# Patient Record
Sex: FEMALE | Race: WHITE | NOT HISPANIC OR LATINO | Employment: OTHER | URBAN - METROPOLITAN AREA
[De-identification: names, ages, dates, MRNs, and addresses within clinical notes are randomized per-mention and may not be internally consistent; named-entity substitution may affect disease eponyms.]

---

## 2017-03-28 ENCOUNTER — LAB VISIT (OUTPATIENT)
Dept: LAB | Facility: HOSPITAL | Age: 22
End: 2017-03-28
Attending: OBSTETRICS & GYNECOLOGY
Payer: MEDICARE

## 2017-03-28 ENCOUNTER — OFFICE VISIT (OUTPATIENT)
Dept: OBSTETRICS AND GYNECOLOGY | Facility: CLINIC | Age: 22
End: 2017-03-28
Payer: MEDICARE

## 2017-03-28 VITALS
SYSTOLIC BLOOD PRESSURE: 118 MMHG | HEART RATE: 102 BPM | WEIGHT: 276.69 LBS | HEIGHT: 63 IN | DIASTOLIC BLOOD PRESSURE: 70 MMHG | BODY MASS INDEX: 49.02 KG/M2

## 2017-03-28 DIAGNOSIS — N92.6 MISSED PERIOD: ICD-10-CM

## 2017-03-28 DIAGNOSIS — O21.9 NAUSEA/VOMITING IN PREGNANCY: ICD-10-CM

## 2017-03-28 DIAGNOSIS — N92.6 MISSED PERIOD: Primary | ICD-10-CM

## 2017-03-28 DIAGNOSIS — O02.89 OTHER ABNORMAL PRODUCTS OF CONCEPTION: ICD-10-CM

## 2017-03-28 LAB
B-HCG UR QL: POSITIVE
CTP QC/QA: YES
ERYTHROCYTE [DISTWIDTH] IN BLOOD BY AUTOMATED COUNT: 13.3 %
HCG INTACT+B SERPL-ACNC: 382 MIU/ML
HCT VFR BLD AUTO: 39.8 %
HGB BLD-MCNC: 13.1 G/DL
MCH RBC QN AUTO: 27.6 PG
MCHC RBC AUTO-ENTMCNC: 32.9 %
MCV RBC AUTO: 84 FL
PLATELET # BLD AUTO: 214 K/UL
PMV BLD AUTO: 11 FL
PROGEST SERPL-MCNC: 9.3 NG/ML
RBC # BLD AUTO: 4.74 M/UL
WBC # BLD AUTO: 8.12 K/UL

## 2017-03-28 PROCEDURE — 85027 COMPLETE CBC AUTOMATED: CPT

## 2017-03-28 PROCEDURE — 99999 PR PBB SHADOW E&M-EST. PATIENT-LVL III: CPT | Mod: PBBFAC,,, | Performed by: OBSTETRICS & GYNECOLOGY

## 2017-03-28 PROCEDURE — 84144 ASSAY OF PROGESTERONE: CPT

## 2017-03-28 PROCEDURE — 36415 COLL VENOUS BLD VENIPUNCTURE: CPT | Mod: PO

## 2017-03-28 PROCEDURE — 99214 OFFICE O/P EST MOD 30 MIN: CPT | Mod: S$PBB,,, | Performed by: OBSTETRICS & GYNECOLOGY

## 2017-03-28 PROCEDURE — 84702 CHORIONIC GONADOTROPIN TEST: CPT

## 2017-03-28 RX ORDER — PENICILLIN V POTASSIUM 500 MG/1
500 TABLET, FILM COATED ORAL 4 TIMES DAILY
COMMUNITY
Start: 2017-03-17 | End: 2017-03-29

## 2017-03-28 RX ORDER — PROMETHAZINE HYDROCHLORIDE 25 MG/1
25 TABLET ORAL EVERY 6 HOURS PRN
Qty: 30 TABLET | Refills: 1 | Status: SHIPPED | OUTPATIENT
Start: 2017-03-28 | End: 2017-04-04

## 2017-03-28 NOTE — PROGRESS NOTES
Chief Complaint   Patient presents with    Amenorrhea    Possible Pregnancy       History of Present Illness   21 y.o.  female  patient presents today for missed menses, LMP= 2017, episode of nasuea and light headed, when to [facility] ER last PM, bedside abdominal u/s showed small untrauterine gestational sac, positive UPT. No Vaginal Bleeding  Or pelvic pain, complains of  nasuea and constipation - counseled on meds and ectopic / bleeding precautions.       Past medical and surgical history reviewed.   I have reviewed the patient's medical history in detail and updated the computerized patient record.    Review of patient's allergies indicates:   Allergen Reactions    No known drug allergies        OB History      Para Term  AB TAB SAB Ectopic Multiple Living    1 1 1 0 0 0 0 0 0 1          Past Medical History:   Diagnosis Date    ADD (attention deficit disorder)     Depression     History of ovarian cyst     Obesity     Substance abuse     Hospitalization        Past Surgical History:   Procedure Laterality Date    TONSILLECTOMY, ADENOIDECTOMY, BILATERAL MYRINGOTOMY AND TUBES         Current Outpatient Prescriptions on File Prior to Visit   Medication Sig Dispense Refill    [DISCONTINUED] hydrocodone-acetaminophen 10-325mg (NORCO)  mg Tab Take 1 tablet by mouth every 6 (six) hours as needed for Pain. 30 tablet 0    [DISCONTINUED] hydrOXYzine pamoate (VISTARIL) 25 MG Cap Take 1 capsule (25 mg total) by mouth 2 (two) times daily as needed. 30 capsule 0    [DISCONTINUED] ibuprofen (ADVIL,MOTRIN) 800 MG tablet Take 1 tablet (800 mg total) by mouth every meal as needed for Pain. 60 tablet 1    [DISCONTINUED] venlafaxine (EFFEXOR-XR) 37.5 MG 24 hr capsule Take 1 capsule (37.5 mg total) by mouth once daily. 30 capsule 2     No current facility-administered medications on file prior to visit.        Review of patient's allergies indicates:   Allergen Reactions    No known  "drug allergies        Social History     Social History    Marital status: Single     Spouse name: N/A    Number of children: N/A    Years of education: N/A     Occupational History    Not on file.     Social History Main Topics    Smoking status: Current Some Day Smoker     Packs/day: 0.25     Types: Cigarettes    Smokeless tobacco: Never Used    Alcohol use No    Drug use: No    Sexual activity: Yes     Partners: Male     Birth control/ protection: None     Other Topics Concern    Not on file     Social History Narrative       Family History   Problem Relation Age of Onset    Colon cancer Neg Hx     Miscarriages / Stillbirths Neg Hx        Review of Systems - Negative except HPI3  GEN ROS: negative for - chills or fever  Breast ROS: negative for breast lumps  Genito-Urinary ROS: no dysuria, trouble voiding, or hematuria      Physical Examination:  /70 (BP Location: Left arm, Patient Position: Sitting, BP Method: Manual)  Pulse 102  Ht 5' 3" (1.6 m)  Wt 125.5 kg (276 lb 10.8 oz)  LMP 02/22/2017 (Exact Date)  BMI 49.01 kg/m2   Constitutional: She is oriented to person, place, and time. She appears well-developed and well-nourished. No distress. over weight.   HENT:   Head: Normocephalic and atraumatic.   Eyes: Conjunctivae and EOM are normal. No scleral icterus.   Neck: Normal range of motion. Neck supple. No tracheal deviation present.   Cardiovascular: Normal rate.    Pulmonary/Chest: Effort normal. No respiratory distress. She exhibits no tenderness.  Abdominal: Soft. She exhibits no distension and no mass. There is no tenderness. There is no rebound and no guarding.   Genitourinary:  Deferred  Musculoskeletal: Normal range of motion.   Lymphadenopathy: No inguinal adenopathy present.   Neurological: She is alert and oriented to person, place, and time. Coordination normal.   Skin: Skin is warm and dry. She is not diaphoretic.   Psychiatric: She has a normal mood and " affect.        Assessment:  Unsure / abnormal pregnancy  1. Missed period  POCT urine pregnancy   2. Nausea/vomiting in pregnancy       Plan:  Screening labs  Follow up 1 week  Bleeding/ pain precautions

## 2017-03-28 NOTE — PROGRESS NOTES
Obtained signed Advance Beneficiary Notice of noncoverage form for Medicare from pt dated today for a Beta HcG, Quantitative Pregnancy Lab draw to be done as ordered by Dr Rivera today; consent was explained to patient & pt verbalizes understanding & consent then signed by pt; consent was sent with pt to Lab.

## 2017-03-28 NOTE — MR AVS SNAPSHOT
[facility] - [facility]  [address] [place] [address]  Phone: [phone]                  Alexandra Martin   3/28/2017 2:20 PM   Office Visit    Description:  Female : 1995   Provider:  Koko Rivera MD   Department:  [facility] - OB/GYN           Reason for Visit     Amenorrhea     Possible Pregnancy           Diagnoses this Visit        Comments    Missed period    -  Primary            To Do List           Goals (5 Years of Data)     None      [facility]     [facility] On Call Nurse Care Line -  Assistance  Registered nurses in the [facility] On Call Center provide clinical advisement, health education, appointment booking, and other advisory services.  Call for this free service at [phone].             Medications           Message regarding Medications     Verify the changes and/or additions to your medication regime listed below are the same as discussed with your clinician today.  If any of these changes or additions are incorrect, please notify your healthcare provider.        STOP taking these medications     hydrocodone-acetaminophen 10-325mg (NORCO)  mg Tab Take 1 tablet by mouth every 6 (six) hours as needed for Pain.    hydrOXYzine pamoate (VISTARIL) 25 MG Cap Take 1 capsule (25 mg total) by mouth 2 (two) times daily as needed.    ibuprofen (ADVIL,MOTRIN) 800 MG tablet Take 1 tablet (800 mg total) by mouth every meal as needed for Pain.    venlafaxine (EFFEXOR-XR) 37.5 MG 24 hr capsule Take 1 capsule (37.5 mg total) by mouth once daily.           Verify that the below list of medications is an accurate representation of the medications you are currently taking.  If none reported, the list may be blank. If incorrect, please contact your healthcare provider. Carry this list with you in case of emergency.           Current Medications     penicillin v potassium (VEETID) 500 MG tablet Take 500 mg by mouth 4 (four) times daily.           Clinical  "Reference Information           Your Vitals Were     BP Pulse Height Weight Last Period BMI    118/70 (BP Location: Left arm, Patient Position: Sitting, BP Method: Manual) 102 5' 3" (1.6 m) 125.5 kg (276 lb 10.8 oz) 02/22/2017 (Exact Date) 49.01 kg/m2      Blood Pressure          Most Recent Value    BP  118/70      Allergies as of 3/28/2017     No Known Drug Allergies      Immunizations Administered on Date of Encounter - 3/28/2017     None      Orders Placed During Today's Visit      Normal Orders This Visit    POCT urine pregnancy          3/28/2017  2:27 PM - Ashley Vazquez LPN      Component Results     Component Value Flag Ref Range Units Status    POC Preg Test, Ur Positive (A) Negative  Final     Acceptable Yes    Final            Smoking Cessation     If you would like to quit smoking:   You may be eligible for free services if you are a [place] resident and started smoking cigarettes before September 1, 1988.  Call the Smoking Cessation Trust ([facility]) toll free at (883) 928-5614 or (823) 328-1170.   Call 1-800-QUIT-NOW if you do not meet the above criteria.            Language Assistance Services     ATTENTION: Language assistance services are available, free of charge. Please call [phone].      ATENCIÓN: Si habla español, tiene a bryan disposición servicios gratuitos de asistencia lingüística. Llame al [phone].     [facility] Ý: N?u b?n nói Ti?ng Vi?t, có các d?ch v? h? tr? ngôn ng? mi?n phí dành cho b?n. G?i s? [phone].         [facility] complies with applicable Federal civil rights laws and does not discriminate on the basis of race, color, national origin, age, disability, or sex.        "

## 2017-03-30 ENCOUNTER — LAB VISIT (OUTPATIENT)
Dept: LAB | Facility: HOSPITAL | Age: 22
End: 2017-03-30
Attending: OBSTETRICS & GYNECOLOGY
Payer: MEDICARE

## 2017-03-30 DIAGNOSIS — O02.81 INAPPROPRIATE CHANGE IN QUANTITATIVE HUMAN CHORIONIC GONADOTROPIN (HCG) IN EARLY PREGNANCY: ICD-10-CM

## 2017-03-30 DIAGNOSIS — Z32.01 POSITIVE URINE PREGNANCY TEST: Primary | ICD-10-CM

## 2017-03-30 LAB — HCG INTACT+B SERPL-ACNC: 725 MIU/ML

## 2017-03-30 PROCEDURE — 36415 COLL VENOUS BLD VENIPUNCTURE: CPT | Mod: PO

## 2017-03-30 PROCEDURE — 84702 CHORIONIC GONADOTROPIN TEST: CPT

## 2017-04-03 ENCOUNTER — TELEPHONE (OUTPATIENT)
Dept: OBSTETRICS AND GYNECOLOGY | Facility: CLINIC | Age: 22
End: 2017-04-03

## 2017-04-03 NOTE — TELEPHONE ENCOUNTER
Spoke with Alexandra c/o occasional sharp pain 6/7 on pain scale middle abdomen. No bleeding. Denies pain at this time. Advise to ER if pain increases, appointment offered for AM , refused wanted late tomorrow, appt scheduled.

## 2017-04-03 NOTE — TELEPHONE ENCOUNTER
----- Message from Florence Chaudhary sent at 4/3/2017  2:47 PM CDT -----  Contact: self  Patient is calling for LAB results. Patient is pregnant and has a question regarding sharp pains in her abdomin. Please call patient at [phone]. Thanks!

## 2017-04-04 ENCOUNTER — OFFICE VISIT (OUTPATIENT)
Dept: OBSTETRICS AND GYNECOLOGY | Facility: CLINIC | Age: 22
End: 2017-04-04
Payer: MEDICARE

## 2017-04-04 VITALS
BODY MASS INDEX: 49.3 KG/M2 | WEIGHT: 278.25 LBS | SYSTOLIC BLOOD PRESSURE: 118 MMHG | HEIGHT: 63 IN | DIASTOLIC BLOOD PRESSURE: 74 MMHG

## 2017-04-04 DIAGNOSIS — O36.8390 UNABLE TO HEAR FETAL HEART TONES AS REASON FOR ULTRASOUND SCAN: ICD-10-CM

## 2017-04-04 DIAGNOSIS — N92.6 MENSES, IRREGULAR: Primary | ICD-10-CM

## 2017-04-04 PROCEDURE — 99214 OFFICE O/P EST MOD 30 MIN: CPT | Mod: 25,S$PBB,, | Performed by: OBSTETRICS & GYNECOLOGY

## 2017-04-04 PROCEDURE — 76817 TRANSVAGINAL US OBSTETRIC: CPT | Mod: 26,S$PBB,, | Performed by: OBSTETRICS & GYNECOLOGY

## 2017-04-04 PROCEDURE — 76817 TRANSVAGINAL US OBSTETRIC: CPT | Mod: PBBFAC,PO | Performed by: OBSTETRICS & GYNECOLOGY

## 2017-04-04 PROCEDURE — 99999 PR PBB SHADOW E&M-EST. PATIENT-LVL II: CPT | Mod: PBBFAC,,, | Performed by: OBSTETRICS & GYNECOLOGY

## 2017-04-04 PROCEDURE — 99212 OFFICE O/P EST SF 10 MIN: CPT | Mod: PBBFAC,PO,25 | Performed by: OBSTETRICS & GYNECOLOGY

## 2017-04-04 NOTE — MR AVS SNAPSHOT
"    [facility]  [address] [place] [address]  Phone: [phone]                  Alexandra Martin   2017 3:00 PM   Office Visit    Description:  Female : 1995   Provider:  Koko Rivera MD   Department:  [facility] - OB/GYN           Reason for Visit     Pelvic Pain                To Do List           Future Appointments        Provider Department Dept Phone    2017 10:20 AM Koko Rievra MD [facility] [phone]      Goals (5 Years of Data)     None      [facility] On Call     [facility] On Call Nurse Care Line -  Assistance  Unless otherwise directed by your provider, please contact [facility] On-Call, our nurse care line that is available for  assistance.     Registered nurses in the [facility] On Call Center provide: appointment scheduling, clinical advisement, health education, and other advisory services.  Call: [phone] (toll free)               Medications           Message regarding Medications     Verify the changes and/or additions to your medication regime listed below are the same as discussed with your clinician today.  If any of these changes or additions are incorrect, please notify your healthcare provider.             Verify that the below list of medications is an accurate representation of the medications you are currently taking.  If none reported, the list may be blank. If incorrect, please contact your healthcare provider. Carry this list with you in case of emergency.           Current Medications     promethazine (PHENERGAN) 25 MG tablet Take 1 tablet (25 mg total) by mouth every 6 (six) hours as needed for Nausea.           Clinical Reference Information           Your Vitals Were     BP Height Weight Last Period BMI    118/74 5' 3" (1.6 m) 126.2 kg (278 lb 3.5 oz) 2017 (Exact Date) 49.28 kg/m2      Blood Pressure          Most Recent Value    BP  118/74      Allergies as of 2017     No Known Drug " Allergies      Immunizations Administered on Date of Encounter - 4/4/2017     None      Smoking Cessation     If you would like to quit smoking:   You may be eligible for free services if you are a [place] resident and started smoking cigarettes before September 1, 1988.  Call the Smoking Cessation Trust (SCT) toll free at (677) 388-6149 or (177) 965-1354.   Call 1-800-QUIT-NOW if you do not meet the above criteria.   Contact us via email: [email or web address]   View our website for more information: [email or web address]        Language Assistance Services     ATTENTION: Language assistance services are available, free of charge. Please call [phone].      ATENCIÓN: Si habla evelin, tiene a bryan disposición servicios gratuitos de asistencia lingüística. Llame al [phone].     CHÚ Ý: N?u b?n nói Ti?ng Vi?t, có các d?ch v? h? tr? ngôn ng? mi?n phí dành cho b?n. G?i s? [phone].         [facility] - [facility] complies with applicable Federal civil rights laws and does not discriminate on the basis of race, color, national origin, age, disability, or sex.

## 2017-04-04 NOTE — PROGRESS NOTES
"Chief Complaint   Patient presents with    Pelvic Pain       History of Present Illness   21 y.o.  female patient presents today for missed menses, spotting, pelvic pains non-localized. No Vaginal Bleeding ,  five days ago, rising normally.     Past medical and surgical history reviewed.   I have reviewed the patient's medical history in detail and updated the computerized patient record.    Review of patient's allergies indicates:   Allergen Reactions    No known drug allergies          Review of Systems - Negative except HPI  GEN ROS: negative for - chills or fever  Breast ROS: negative for breast lumps  Genito-Urinary ROS: no dysuria, trouble voiding, or hematuria      Physical Examination:  /74  Ht 5' 3" (1.6 m)  Wt 126.2 kg (278 lb 3.5 oz)  LMP 02/22/2017 (Exact Date)  BMI 49.28 kg/m2   Deferred    ULTRASOUND:    Transvaginal ultrasound performed in the usual fashion with 6.5mhz probe.  Viable pollack intrauterine pregnancy was seen, yolk sac was not seen  GS= 8 mm without flicker, consistent with 5w5d and EDC 12/01/2017.  Uterus: normal   Left ovary not seen, no pathology  Right ovary not seen  No free fluid in cul-de-sac or adnexal pathology seen      Assessment:  Early IUP - reassured    Plan:  Follow up 2 weeks, repeat ultrasound  Bleeding / pain precautions          "

## 2017-04-13 ENCOUNTER — OFFICE VISIT (OUTPATIENT)
Dept: OBSTETRICS AND GYNECOLOGY | Facility: CLINIC | Age: 22
End: 2017-04-13
Payer: MEDICARE

## 2017-04-13 VITALS
DIASTOLIC BLOOD PRESSURE: 74 MMHG | WEIGHT: 281.31 LBS | SYSTOLIC BLOOD PRESSURE: 128 MMHG | BODY MASS INDEX: 49.84 KG/M2 | HEIGHT: 63 IN

## 2017-04-13 DIAGNOSIS — O20.0 THREATENED ABORTION: Primary | ICD-10-CM

## 2017-04-13 DIAGNOSIS — N92.6 MENSES, IRREGULAR: ICD-10-CM

## 2017-04-13 PROCEDURE — 99999 PR PBB SHADOW E&M-EST. PATIENT-LVL III: CPT | Mod: PBBFAC,,, | Performed by: OBSTETRICS & GYNECOLOGY

## 2017-04-13 PROCEDURE — 99213 OFFICE O/P EST LOW 20 MIN: CPT | Mod: PBBFAC,PO | Performed by: OBSTETRICS & GYNECOLOGY

## 2017-04-13 PROCEDURE — 99213 OFFICE O/P EST LOW 20 MIN: CPT | Mod: S$PBB,,, | Performed by: OBSTETRICS & GYNECOLOGY

## 2017-04-13 RX ORDER — PROMETHAZINE HYDROCHLORIDE 25 MG/1
25 TABLET ORAL EVERY 4 HOURS PRN
COMMUNITY
End: 2018-02-28

## 2017-04-13 NOTE — MR AVS SNAPSHOT
"    [facility] - OB/GY[facility]  [address] [place] [address]  Phone: [phone]                  Alexandra Martin   2017 1:00 PM   Office Visit    Description:  Female : 1995   Provider:  Koko Rivera MD   Department:  [facility] - OB/GYN           Reason for Visit     f/u to er with pelvic pain and some bleeding [facility]                To Do List           Future Appointments        Provider Department Dept Phone    2017 11:00 AM Koko Rivera MD [facility] [phone]      Goals (5 Years of Data)     None      [facility]     [facility] On Call Nurse Care Line -  Assistance  Unless otherwise directed by your provider, please contact [facility], our nurse care line that is available for  assistance.     Registered nurses in the [facility] On Call Center provide: appointment scheduling, clinical advisement, health education, and other advisory services.  Call: [phone] (toll free)               Medications           Message regarding Medications     Verify the changes and/or additions to your medication regime listed below are the same as discussed with your clinician today.  If any of these changes or additions are incorrect, please notify your healthcare provider.             Verify that the below list of medications is an accurate representation of the medications you are currently taking.  If none reported, the list may be blank. If incorrect, please contact your healthcare provider. Carry this list with you in case of emergency.           Current Medications     DOCOSAHEXANOIC ACID (PRENATAL DHA ORAL) Take by mouth.    promethazine (PHENERGAN) 25 MG tablet Take 25 mg by mouth every 4 (four) hours.           Clinical Reference Information           Your Vitals Were     BP Height Weight Last Period BMI    128/74 5' 3" (1.6 m) 127.6 kg (281 lb 4.9 oz) 2017 (Exact Date) 49.83 kg/m2      Blood Pressure          Most Recent Value    BP  " 128/74      Allergies as of 4/13/2017     No Known Drug Allergies      Immunizations Administered on Date of Encounter - 4/13/2017     None      Smoking Cessation     If you would like to quit smoking:   You may be eligible for free services if you are a [place] resident and started smoking cigarettes before September 1, 1988.  Call the Smoking Cessation Trust ([employer]CT) toll free at (218) 381-1954 or (662) 510-2075.   Call 1-800-QUIT-NOW if you do not meet the above criteria.   Contact us via email: [email or web address]   View our website for more information: [email or web address]        Language Assistance Services     ATTENTION: Language assistance services are available, free of charge. Please call [phone].      ATENCIÓN: Si mia waters, tiene a bryan disposición servicios gratuitos de asistencia lingüística. Llame al [phone].     CHÚ Ý: N?u b?n nói Ti?ng Vi?t, có các d?ch v? h? tr? ngôn ng? mi?n phí dành cho b?n. G?i s? [phone].         [facility] - [facility] complies with applicable Federal civil rights laws and does not discriminate on the basis of race, color, national origin, age, disability, or sex.

## 2017-04-13 NOTE — PROGRESS NOTES
"Chief Complaint   Patient presents with    f/u to er with pelvic pain and some bleeding [facility]       History of Present Illness   21 y.o. .wwf patient presents today for follow up from ER, early pregnancy, bleeding, cramps. Ultrasound from ER- [facility] last PM= normal right ovary, CRL= 5mm, c/w 6w1d, FHT++. Small 2cm sunchorionic hemorrhage, normal CBC / CMP and wetprep, genprobe- pending. Results discussed with patient and spouse.       Past medical and surgical history reviewed.   I have reviewed the patient's medical history in detail and updated the computerized patient record.    Review of patient's allergies indicates:   Allergen Reactions    No known drug allergies          Review of Systems - Negative except HPI  GEN ROS: negative for - chills or fever  Breast ROS: negative for breast lumps  Genito-Urinary ROS: no dysuria, trouble voiding, or hematuria      Physical Examination:  /74  Ht 5' 3" (1.6 m)  Wt 127.6 kg (281 lb 4.9 oz)  LMP 2017 (Exact Date)  BMI 49.83 kg/m2   deferred      Assessment:  threatenet , viable IUP on u/s last PM      Plan:  Bleeding/ pain precautions  Pelvic rest until follow up  Follow up 2 weeks as scheduled or as needed.           "

## 2017-04-21 ENCOUNTER — OFFICE VISIT (OUTPATIENT)
Dept: OPTOMETRY | Facility: CLINIC | Age: 22
End: 2017-04-21
Payer: MEDICARE

## 2017-04-21 DIAGNOSIS — H00.021 HORDEOLUM INTERNUM OF RIGHT UPPER EYELID: Primary | ICD-10-CM

## 2017-04-21 PROCEDURE — 92012 INTRM OPH EXAM EST PATIENT: CPT | Mod: S$PBB,,, | Performed by: OPTOMETRIST

## 2017-04-21 PROCEDURE — 99999 PR PBB SHADOW E&M-EST. PATIENT-LVL II: CPT | Mod: PBBFAC,,, | Performed by: OPTOMETRIST

## 2017-04-21 PROCEDURE — 99212 OFFICE O/P EST SF 10 MIN: CPT | Mod: PBBFAC,PO | Performed by: OPTOMETRIST

## 2017-04-21 RX ORDER — BACITRACIN ZINC AND POLYMYXIN B SULFATE 500; 10000 [USP'U]/G; [USP'U]/G
OINTMENT OPHTHALMIC
Qty: 3.5 G | Refills: 0 | Status: SHIPPED | OUTPATIENT
Start: 2017-04-21 | End: 2017-04-28

## 2017-04-21 NOTE — PROGRESS NOTES
"HPI     Stye    Additional comments: pt can see white bumps on inside of upper lids OU x   1 week, crusting in morning --- +warm compresses, no gtts           Eye Pain    Additional comments: tender to touch, sore w/ blinking --- also stinging   when flushes w/ water           Comments   Agree above  Had "styes a lot" when younger, h/o chalazion and excision when younger  No make up recently, no other Rx meds changes  No fever / sore throat  Currently PRG, 7 weeks         Last edited by ELIJAH Jon, OD on 4/21/2017  1:50 PM. (History)            Assessment /Plan     For exam results, see Encounter Report.    Hordeolum internum of right upper eyelid  -     bacitracin-polymyxin b (POLYSPORIN) ophthalmic ointment; Apply to lid margins / lesion OD, tid x 7 days  Dispense: 3.5 g; Refill: 0        Focal hordeola RUL at lateral canthus  Mild edema and tenderness  Pt currently PRG, will hold oral meds at this time  Hot compress tid, bacitracin gauri as directed  Lid hygiene, w/ gentle cleansing  Pt interested in dyllan lesion or excision--discouraged due to active infection  Call / message with report next week, can consider oral abx or excision pending               "

## 2017-04-21 NOTE — PROGRESS NOTES
"HPI     Stye    Additional comments: pt can see white bumps on inside of upper lids OU x   1 week, crusting in morning --- +warm compresses, no gtts           Eye Pain    Additional comments: tender to touch, sore w/ blinking --- also stinging   when flushes w/ water           Comments   Agree above  Had styes "a lot" when younger, h/o chalazion and excision when younger  No make up recently, no other Rx meds changes   Currently PRG, 7 weeks         Last edited by ELIJAH Jon, OD on 4/21/2017  1:34 PM. (History)            Assessment /Plan     For exam results, see Encounter Report.    Hordeolum internum of right upper eyelid      ***                 "

## 2017-04-21 NOTE — MR AVS SNAPSHOT
[facility]  [address] [facility] [address] [place] [address]  Phone: [phone]  Fax: [phone]                  Alexandra Martin   2017 1:30 PM   Office Visit    Description:  Female : 1995   Provider:  ELIJAH Jon, OD   Department:  [facility]           Reason for Visit     Stye     Eye Pain           Diagnoses this Visit        Comments    Hordeolum internum of right upper eyelid    -  Primary            To Do List           Future Appointments        Provider Department Dept Phone    2017 8:45 AM ELIJAH Jon, OD [facility] [phone]    2017 11:00 AM Koko Rivera MD [facility] - [phone]      Goals (5 Years of Data)     None      Follow-Up and Disposition     Return if symptoms worsen or fail to improve.       These Medications        Disp Refills Start End    bacitracin-polymyxin b (POLYSPORIN) ophthalmic ointment 3.5 g 0 2017    Apply to lid margins / lesion OD, tid x 7 days    Pharmacy: [facility]/pharmacy #[id number] - [place], [place] - [address] [place] [address]. AT [facility] Ph #: [phone]         [facility]     [facility] Nurse Care [facility] -  Assistance  Unless otherwise directed by your provider, please contact [facility], our nurse care line that is available for  assistance.     Registered nurses in the [facility] provide: appointment scheduling, clinical advisement, health education, and other advisory services.  Call: [phone] (toll free)               Medications           Message regarding Medications     Verify the changes and/or additions to your medication regime listed below are the same as discussed with your clinician today.  If any of these changes or additions are incorrect, please notify your healthcare provider.        START taking these NEW medications        Refills    bacitracin-polymyxin b (POLYSPORIN) ophthalmic ointment 0    Sig:  Apply to lid margins / lesion OD, tid x 7 days    Class: Normal           Verify that the below list of medications is an accurate representation of the medications you are currently taking.  If none reported, the list may be blank. If incorrect, please contact your healthcare provider. Carry this list with you in case of emergency.           Current Medications     bacitracin-polymyxin b (POLYSPORIN) ophthalmic ointment Apply to lid margins / lesion OD, tid x 7 days    DOCOSAHEXANOIC ACID (PRENATAL DHA ORAL) Take by mouth.    promethazine (PHENERGAN) 25 MG tablet Take 25 mg by mouth every 4 (four) hours.           Clinical Reference Information           Your Vitals Were     Last Period                   02/22/2017 (Exact Date)           Allergies as of 4/21/2017     No Known Drug Allergies      Immunizations Administered on Date of Encounter - 4/21/2017     None      Instructions    BLEPHARITIS & TREATMENT   Definition  Blepharitis is an inflammation of the eyelid margin, which causes itchy, irritated, and often red eyes. One common cause is staphylococcus, skin bacteria, which can thrive in these conditions and can possibly cause eye damage such as corneal scarring.   Occurrence  Blepharitis is a very common problem seen particularly in people with a tendency toward oily skin, dandruff, or dry eyes. Though most frequently seen later in life, blepharitis can begin in childhood.  It can also be associated with hormonal changes (puberty, menopause), or changes in medications.  It is also common in patients with Rosacea.  Symptoms  Lid Crusting - The lids are often reddened, somewhat swollen and scaly appearing at the base of the lashes. These scales, as they become coarser, form crusts that cause the lids to stick together. This is especially prominent upon waking.   Itching - The inflammation of the lids will cause itching and irritation.   Tearing/ Light Sensitivity -The eyes may tear more frequently and may also  be sensitive to bright light.  Treatment  Treatment is aimed at lowering the number of bacteria along the eyelid margin and decreasing the scales by lid hygiene and in some cases antibiotic/steroid medications. The goal is to control this problem and prevent it from becoming a more serious condition. Treatment is focused on keeping the condition under control by routine daily lid hygiene. Unfortunately, if the treatment is stopped the symptoms often recur.    1. Upon waking, place a clean, warm, wet, washcloth over your closed eyelids (upper and lower) for several minutes.  This may be accomplished by allowing warm shower water to run with eyes closed.  2. Place a small amount of diluted (1/4 strength) Baby Shampoo or a commercial lid cleaning solution on a cotton swab, washcloth, or your clean fingertip. Gently pull down on your lower lid and use a horizontal back and forth motion to scrub the edge of your lid at the base of the eyelashes. Do not scrub the inside of the lid or the skin on the outside. Close the eye and use the cotton swab to scrub along the base of the upper lid lashes. Rinse the shampoo away with warm water.   3. Additionally your doctor may ask that you use an antibiotic/steroid drop or ointment for a few weeks. Use as directed.   Perform this procedure 2 times a day for the first 7 days and then cut back to once a day. (Or per your doctors recommendation.) You may need to continue lid scrubs on a daily basis, though some find they can successfully control their blepharitis symptoms with scrubs done 2 to 3 times a week.    Medication--use as directed if medication is prescribed    ________________________________________________________________________    DRY EYES:  Use Over The Counter artificial tears as needed for dry eye symptoms.  Some common brands include:  Systane, Optive, and Refresh.  These drops can be used as frequently as desired, but may be most helpful use during long periods of  "concentrated work.  For example, reading / working at the computer.  Avoid drops that "get redness out", as these contain medication that may further irritate the eyes.    ALLERGY EYES / SYMPTOMS:    Over the counter medications include--Zaditor and Alaway  Use as directed 1-2 drops daily for symptoms of itching / watering eyes.  These drops will not help for dry eye or exposure symptoms.           Smoking Cessation     If you would like to quit smoking:   You may be eligible for free services if you are a [place] resident and started smoking cigarettes before September 1, 1988.  Call the Smoking Cessation Trust ([facility]) toll free at (048) 889-9684 or (035) 415-1157.   Call 1-800-QUIT-NOW if you do not meet the above criteria.   Contact us via email: [email or web address]   View our website for more information: [email or web address]        Language Assistance Services     ATTENTION: Language assistance services are available, free of charge. Please call [phone].      ATENCIÓN: Si mia evelin, tiene a bryan disposición servicios gratuitos de asistencia lingüística. Llame al [phone].     CHÚ Ý: N?u b?n nói Ti?ng Vi?t, có các d?ch v? h? tr? ngôn ng? mi?n phí dành cho b?n. G?i s? [phone].         [facility] complies with applicable Federal civil rights laws and does not discriminate on the basis of race, color, national origin, age, disability, or sex.        "

## 2017-04-24 ENCOUNTER — TELEPHONE (OUTPATIENT)
Dept: OBSTETRICS AND GYNECOLOGY | Facility: CLINIC | Age: 22
End: 2017-04-24

## 2017-04-24 NOTE — TELEPHONE ENCOUNTER
----- Message from ELIJAH Jon, LIDIA sent at 4/24/2017  1:45 PM CDT -----  Patient 1st trimester  Ok to Rx a z roger for internal hordeolum eyelid, or consider something weaker, erythromycin 250 tid?      ----- Message -----     From: Leanne Grimm     Sent: 4/24/2017   1:02 PM       To: ELIJAH Jon, LIDIA    Pt called, states OD is worse -- swollen shut & painful.  Cautious about taking oral antibiotics due to pregnancy.  Please advise

## 2017-04-25 ENCOUNTER — OFFICE VISIT (OUTPATIENT)
Dept: OPTOMETRY | Facility: CLINIC | Age: 22
End: 2017-04-25
Payer: MEDICARE

## 2017-04-25 DIAGNOSIS — H00.021 HORDEOLUM INTERNUM OF RIGHT UPPER EYELID: Primary | ICD-10-CM

## 2017-04-25 PROCEDURE — 92012 INTRM OPH EXAM EST PATIENT: CPT | Mod: S$PBB,,, | Performed by: OPTOMETRIST

## 2017-04-25 PROCEDURE — 99999 PR PBB SHADOW E&M-EST. PATIENT-LVL II: CPT | Mod: PBBFAC,,, | Performed by: OPTOMETRIST

## 2017-04-25 PROCEDURE — 99212 OFFICE O/P EST SF 10 MIN: CPT | Mod: PBBFAC,PO | Performed by: OPTOMETRIST

## 2017-04-25 RX ORDER — AZITHROMYCIN 250 MG/1
TABLET, FILM COATED ORAL
Qty: 2 TABLET | Refills: 0 | Status: SHIPPED | OUTPATIENT
Start: 2017-04-25 | End: 2017-05-01 | Stop reason: SINTOL

## 2017-04-25 NOTE — MR AVS SNAPSHOT
[facility]  [address] [facility] [address] [place] [address]  Phone: [phone]  Fax: [phone]                  Alexandra Martin   2017 1:00 PM   Office Visit    Description:  Female : 1995   Provider:  ELIJAH Jon OD   Department:  [facility]           Reason for Visit     Eye Problem     Eye Pain     Blurred Vision           Diagnoses this Visit        Comments    Hordeolum internum of right upper eyelid    -  Primary            To Do List           Future Appointments        Provider Department Dept Phone    2017 10:40 AM Koko Rivera MD [facility] - [phone]      Goals (5 Years of Data)     None      Follow-Up and Disposition     Return if symptoms worsen or fail to improve.       These Medications        Disp Refills Start End    azithromycin (ZITHROMAX Z-VY) 250 MG tablet 2 tablet 0 2017    Take 2 tablets (500 mg) on  Day 1,  followed by 1 tablet (250 mg) once daily on Days 2 through 5.    Pharmacy: [facility]/pharmacy #[id number] - [facility] [address] [place] [address]. AT [facility] Ph #: [phone]         [facility]     [facility] On [facility] Nurse Care Line -  Assistance  Unless otherwise directed by your provider, please contact [facility], our nurse care line that is available for  assistance.     Registered nurses in the [facility] provide: appointment scheduling, clinical advisement, health education, and other advisory services.  Call: [phone] (toll free)               Medications           Message regarding Medications     Verify the changes and/or additions to your medication regime listed below are the same as discussed with your clinician today.  If any of these changes or additions are incorrect, please notify your healthcare provider.        START taking these NEW medications        Refills    azithromycin (ZITHROMAX Z-VY) 250 MG tablet 0    Sig: Take 2 tablets (500  "mg) on  Day 1,  followed by 1 tablet (250 mg) once daily on Days 2 through 5.    Class: Normal           Verify that the below list of medications is an accurate representation of the medications you are currently taking.  If none reported, the list may be blank. If incorrect, please contact your healthcare provider. Carry this list with you in case of emergency.           Current Medications     azithromycin (ZITHROMAX Z-VY) 250 MG tablet Take 2 tablets (500 mg) on  Day 1,  followed by 1 tablet (250 mg) once daily on Days 2 through 5.    bacitracin-polymyxin b (POLYSPORIN) ophthalmic ointment Apply to lid margins / lesion OD, tid x 7 days    DOCOSAHEXANOIC ACID (PRENATAL DHA ORAL) Take by mouth.    promethazine (PHENERGAN) 25 MG tablet Take 25 mg by mouth every 4 (four) hours.           Clinical Reference Information           Your Vitals Were     Last Period                   02/22/2017 (Exact Date)           Allergies as of 4/25/2017     No Known Drug Allergies      Immunizations Administered on Date of Encounter - 4/25/2017     None      Instructions    DRY EYES:  Use Over The Counter artificial tears as needed for dry eye symptoms.  Some common brands include:  Systane, Optive, and Refresh.  These drops can be used as frequently as desired, but may be most helpful use during long periods of concentrated work.  For example, reading / working at the computer.  Avoid drops that "get redness out", as these contain medication that may further irritate the eyes.    ALLERGY EYES / SYMPTOMS:    Over the counter medications include--Zaditor and Alaway  Use as directed 1-2 drops daily for symptoms of itching / watering eyes.  These drops will not help for dry eye or exposure symptoms.           Smoking Cessation     If you would like to quit smoking:   You may be eligible for free services if you are a [place] resident and started smoking cigarettes before September 1, 1988.  Call the Smoking Cessation Trust ([facility]) " toll free at (454) 793-4442 or (524) 572-7284.   Call 1-800-QUIT-NOW if you do not meet the above criteria.   Contact us via email: [email or web address]   View our website for more information: [email or web address]        Language Assistance Services     ATTENTION: Language assistance services are available, free of charge. Please call [phone].      ATENCIÓN: Si habla adoreañol, tiene a bryan disposición servicios gratuitos de asistencia lingüística. Llame al [phone].     CHÚ Ý: N?u b?n nói Ti?ng Vi?t, có các d?ch v? h? tr? ngôn ng? mi?n phí dành cho b?n. G?i s? [phone].         [facility] complies with applicable Federal civil rights laws and does not discriminate on the basis of race, color, national origin, age, disability, or sex.

## 2017-04-25 NOTE — PROGRESS NOTES
HPI     Eye Problem    Additional comments: f/u internal hordeolum RUL // pt states no change in   symptoms -- OU upper lids swollen, tender, discharge in morning -- only   using polysporin gauri 1x day           Eye Pain    Additional comments: tender, sore           Blurred Vision    Additional comments: OU           Comments   Agree above  Notes is slightly worse than previous visit  Using gauri as directed       Last edited by ELIJAH Jon, OD on 4/25/2017  1:22 PM. (History)            Assessment /Plan     For exam results, see Encounter Report.    Hordeolum internum of right upper eyelid  -     azithromycin (ZITHROMAX Z-VY) 250 MG tablet; Take 2 tablets (500 mg) on  Day 1,  followed by 1 tablet (250 mg) once daily on Days 2 through 5.  Dispense: 2 tablet; Refill: 0      Mild hordeolum / chalazion complex RUL at lateral canthus  Minimal complex NICKIE   Bleph / mgd OU, with h/o chronic chalazia and excision in past    Short course oral abx ok'd from OB office  z vy sent  Continue warm compress, gentle cleansing, and use polysporin gauri to lid areas qhs    Call if any issues with meds, or worsening, may need excision eventually

## 2017-05-01 ENCOUNTER — OFFICE VISIT (OUTPATIENT)
Dept: FAMILY MEDICINE | Facility: CLINIC | Age: 22
End: 2017-05-01
Payer: MEDICARE

## 2017-05-01 ENCOUNTER — TELEPHONE (OUTPATIENT)
Dept: OBSTETRICS AND GYNECOLOGY | Facility: CLINIC | Age: 22
End: 2017-05-01

## 2017-05-01 VITALS
HEIGHT: 63 IN | HEART RATE: 91 BPM | TEMPERATURE: 98 F | OXYGEN SATURATION: 97 % | DIASTOLIC BLOOD PRESSURE: 76 MMHG | BODY MASS INDEX: 51.91 KG/M2 | SYSTOLIC BLOOD PRESSURE: 110 MMHG | WEIGHT: 293 LBS

## 2017-05-01 DIAGNOSIS — J01.10 ACUTE NON-RECURRENT FRONTAL SINUSITIS: Primary | ICD-10-CM

## 2017-05-01 DIAGNOSIS — L73.9 FOLLICULITIS: ICD-10-CM

## 2017-05-01 DIAGNOSIS — Z3A.08 8 WEEKS GESTATION OF PREGNANCY: ICD-10-CM

## 2017-05-01 DIAGNOSIS — R09.81 NASAL CONGESTION: ICD-10-CM

## 2017-05-01 PROCEDURE — 99213 OFFICE O/P EST LOW 20 MIN: CPT | Mod: PBBFAC,PO | Performed by: NURSE PRACTITIONER

## 2017-05-01 PROCEDURE — 99213 OFFICE O/P EST LOW 20 MIN: CPT | Mod: S$PBB,,, | Performed by: NURSE PRACTITIONER

## 2017-05-01 PROCEDURE — 99999 PR PBB SHADOW E&M-EST. PATIENT-LVL III: CPT | Mod: PBBFAC,,, | Performed by: NURSE PRACTITIONER

## 2017-05-01 RX ORDER — CEPHALEXIN 500 MG/1
500 CAPSULE ORAL EVERY 8 HOURS
Qty: 21 CAPSULE | Refills: 0 | Status: ON HOLD | OUTPATIENT
Start: 2017-05-01 | End: 2017-05-09 | Stop reason: HOSPADM

## 2017-05-01 RX ORDER — PYRIDOXINE HCL (VITAMIN B6) 25 MG
12.5 TABLET ORAL DAILY
COMMUNITY
Start: 2017-04-28 | End: 2017-05-28

## 2017-05-01 NOTE — TELEPHONE ENCOUNTER
----- Message from Ilda Duarte sent at 5/1/2017  8:07 AM CDT -----  Contact: mother Nicolette   Patient' mother iNcolette  called stating she was in [place] from 4/26-4/28 for bleeding and a blood clot   She was told to follow up with her doctor today     Please call  to advise or schedule     Thanks

## 2017-05-01 NOTE — TELEPHONE ENCOUNTER
Patient spotted last night but not bleeding any more. Patient can't tell me if they found a heartbeat or not. She will keep her appointment on Friday.

## 2017-05-01 NOTE — PROGRESS NOTES
Subjective:       Patient ID: Alexandra Martin is a 21 y.o. female.    Chief Complaint: Cough (cough and congestion since last wednesday. coughing phlem out,ear stuffed up,chills,headaches,dizzy)    HPI Comments: 4-25-17 - took zpack from Dr Jon for her eyes -     Was in [facility] 4-26-4-28-17 - wed - Friday - they didn't give her anything - not taking anything at home  OTC due to 8 weeks pregnancy   dont know if the Zpack caused her vomiting or being 8 weeks pregnant   Kept for 2 days and then released with OTC meds for vomitting   To see Dr Young Friday     Cough   This is a new problem. The current episode started in the past 7 days (wednesday ). The problem has been rapidly worsening. Associated symptoms include headaches, postnasal drip and rhinorrhea. Pertinent negatives include no chest pain, fever, rash, shortness of breath or wheezing.     Vitals:    05/01/17 1429   BP: 110/76   Pulse: 91   Temp: 97.7 °F (36.5 °C)     Review of Systems   Constitutional: Negative.  Negative for diaphoresis, fatigue and fever.   HENT: Positive for congestion, postnasal drip and rhinorrhea.    Eyes: Negative.    Respiratory: Negative.  Negative for cough, shortness of breath and wheezing.    Cardiovascular: Negative.  Negative for chest pain.   Gastrointestinal: Positive for nausea. Negative for abdominal pain and diarrhea.   Endocrine: Negative.    Genitourinary: Negative.  Negative for dysuria and hematuria.   Musculoskeletal: Negative.    Skin: Negative for color change and rash.   Allergic/Immunologic: Negative.    Neurological: Positive for dizziness and headaches. Negative for speech difficulty and numbness.   Hematological: Negative.    Psychiatric/Behavioral: Negative.        Past Medical History:   Diagnosis Date    ADD (attention deficit disorder)     Depression     History of ovarian cyst     Obesity     Substance abuse     Hospitalization      Objective:      Physical Exam   Constitutional: She  is oriented to person, place, and time. She appears well-developed and well-nourished.   HENT:   Head: Normocephalic and atraumatic.   Right Ear: Hearing and ear canal normal. A middle ear effusion is present.   Left Ear: Hearing and ear canal normal.   Nose: Mucosal edema and rhinorrhea present. Right sinus exhibits frontal sinus tenderness. Right sinus exhibits no maxillary sinus tenderness. Left sinus exhibits frontal sinus tenderness. Left sinus exhibits no maxillary sinus tenderness.   Mouth/Throat: Uvula is midline and mucous membranes are normal. Posterior oropharyngeal erythema present.   Eyes: Conjunctivae and EOM are normal. Pupils are equal, round, and reactive to light.   Neck: Neck supple.   Cardiovascular: Normal rate, regular rhythm, normal heart sounds and intact distal pulses.    Pulmonary/Chest: Effort normal and breath sounds normal.   Abdominal: Soft. Bowel sounds are normal.   Genitourinary:   Genitourinary Comments: She reports enlarged area with boil on labia - but does not want me to look at the area   Has had them in the past    Musculoskeletal: Normal range of motion.   Neurological: She is alert and oriented to person, place, and time.   Skin: Skin is warm and dry.   Psychiatric: She has a normal mood and affect. Her behavior is normal.   Nursing note and vitals reviewed.      Assessment:       1. Acute non-recurrent frontal sinusitis    2. Folliculitis    3. Nasal congestion    4. 8 weeks gestation of pregnancy        Plan:       Acute non-recurrent frontal sinusitis  -     cephALEXin (KEFLEX) 500 MG capsule; Take 1 capsule (500 mg total) by mouth every 8 (eight) hours.  Dispense: 21 capsule; Refill: 0    Folliculitis  -     cephALEXin (KEFLEX) 500 MG capsule; Take 1 capsule (500 mg total) by mouth every 8 (eight) hours.  Dispense: 21 capsule; Refill: 0    Nasal congestion      8 weeks pregnant     Discussed with her Mucinex plain is safe during pregnancy - but I would still clear it with  her GYN   Discussed with her that she needs to call Dr Rivera for further directions on other meds     Fu if not better

## 2017-05-05 ENCOUNTER — APPOINTMENT (OUTPATIENT)
Dept: LAB | Facility: HOSPITAL | Age: 22
End: 2017-05-05
Attending: OBSTETRICS & GYNECOLOGY
Payer: MEDICARE

## 2017-05-05 ENCOUNTER — OFFICE VISIT (OUTPATIENT)
Dept: OBSTETRICS AND GYNECOLOGY | Facility: CLINIC | Age: 22
End: 2017-05-05
Payer: MEDICARE

## 2017-05-05 VITALS
DIASTOLIC BLOOD PRESSURE: 80 MMHG | BODY MASS INDEX: 50.07 KG/M2 | WEIGHT: 282.63 LBS | SYSTOLIC BLOOD PRESSURE: 132 MMHG

## 2017-05-05 DIAGNOSIS — O09.91 SUPERVISION OF HIGH RISK PREGNANCY IN FIRST TRIMESTER: ICD-10-CM

## 2017-05-05 DIAGNOSIS — Z32.00 POSSIBLE PREGNANCY: Primary | ICD-10-CM

## 2017-05-05 DIAGNOSIS — O36.8390 UNABLE TO HEAR FETAL HEART TONES AS REASON FOR ULTRASOUND SCAN: ICD-10-CM

## 2017-05-05 DIAGNOSIS — E07.9 DISORDER OF THYROID: ICD-10-CM

## 2017-05-05 DIAGNOSIS — Z32.00 POSSIBLE PREGNANCY: ICD-10-CM

## 2017-05-05 DIAGNOSIS — O26.811 FATIGUE DURING PREGNANCY IN FIRST TRIMESTER: ICD-10-CM

## 2017-05-05 DIAGNOSIS — O20.0 THREATENED ABORTION: ICD-10-CM

## 2017-05-05 DIAGNOSIS — Z3A.09 9 WEEKS GESTATION OF PREGNANCY: ICD-10-CM

## 2017-05-05 DIAGNOSIS — E66.9 OBESITY, UNSPECIFIED OBESITY SEVERITY, UNSPECIFIED OBESITY TYPE: ICD-10-CM

## 2017-05-05 LAB
BASOPHILS # BLD AUTO: 0.01 K/UL
BASOPHILS NFR BLD: 0.1 %
BILIRUB SERPL-MCNC: NORMAL MG/DL
BLOOD URINE, POC: NORMAL
COLOR, POC UA: YELLOW
DIFFERENTIAL METHOD: NORMAL
EOSINOPHIL # BLD AUTO: 0.1 K/UL
EOSINOPHIL NFR BLD: 1.3 %
ERYTHROCYTE [DISTWIDTH] IN BLOOD BY AUTOMATED COUNT: 13.2 %
GLUCOSE UR QL STRIP: NORMAL
HCT VFR BLD AUTO: 37.7 %
HGB BLD-MCNC: 12.5 G/DL
KETONES UR QL STRIP: NORMAL
LEUKOCYTE ESTERASE URINE, POC: NORMAL
LYMPHOCYTES # BLD AUTO: 2.5 K/UL
LYMPHOCYTES NFR BLD: 23.3 %
MCH RBC QN AUTO: 27.7 PG
MCHC RBC AUTO-ENTMCNC: 33.2 %
MCV RBC AUTO: 84 FL
MONOCYTES # BLD AUTO: 0.6 K/UL
MONOCYTES NFR BLD: 5.9 %
NEUTROPHILS # BLD AUTO: 7.2 K/UL
NEUTROPHILS NFR BLD: 68.4 %
NITRITE, POC UA: NORMAL
PH, POC UA: 5
PLATELET # BLD AUTO: 213 K/UL
PMV BLD AUTO: 10.2 FL
PROTEIN, POC: NORMAL
RBC # BLD AUTO: 4.51 M/UL
SPECIFIC GRAVITY, POC UA: NORMAL
TSH SERPL DL<=0.005 MIU/L-ACNC: 1.99 UIU/ML
UROBILINOGEN, POC UA: NORMAL
WBC # BLD AUTO: 10.5 K/UL

## 2017-05-05 PROCEDURE — 87340 HEPATITIS B SURFACE AG IA: CPT

## 2017-05-05 PROCEDURE — 86592 SYPHILIS TEST NON-TREP QUAL: CPT

## 2017-05-05 PROCEDURE — 86703 HIV-1/HIV-2 1 RESULT ANTBDY: CPT

## 2017-05-05 PROCEDURE — 76817 TRANSVAGINAL US OBSTETRIC: CPT | Mod: 26,S$PBB,, | Performed by: OBSTETRICS & GYNECOLOGY

## 2017-05-05 PROCEDURE — 85025 COMPLETE CBC W/AUTO DIFF WBC: CPT

## 2017-05-05 PROCEDURE — 99999 PR PBB SHADOW E&M-EST. PATIENT-LVL II: CPT | Mod: PBBFAC,,, | Performed by: OBSTETRICS & GYNECOLOGY

## 2017-05-05 PROCEDURE — 86850 RBC ANTIBODY SCREEN: CPT

## 2017-05-05 PROCEDURE — 99213 OFFICE O/P EST LOW 20 MIN: CPT | Mod: 25,S$PBB,, | Performed by: OBSTETRICS & GYNECOLOGY

## 2017-05-05 PROCEDURE — 86762 RUBELLA ANTIBODY: CPT

## 2017-05-05 PROCEDURE — 86901 BLOOD TYPING SEROLOGIC RH(D): CPT

## 2017-05-05 PROCEDURE — 81220 CFTR GENE COM VARIANTS: CPT

## 2017-05-05 PROCEDURE — 84443 ASSAY THYROID STIM HORMONE: CPT

## 2017-05-05 PROCEDURE — 86900 BLOOD TYPING SEROLOGIC ABO: CPT

## 2017-05-05 NOTE — PROGRESS NOTES
Chief Complaint   Patient presents with    Possible Pregnancy       History of Present Illness   21 y.o.  female patient presents today for missed menses, on and off spotting since last visit, cold symptoms. Multiple ER visits for bleeding.       Past medical and surgical history reviewed.   I have reviewed the patient's medical history in detail and updated the computerized patient record.    Review of patient's allergies indicates:   Allergen Reactions    Azithromycin Nausea And Vomiting     Hard to breathe          Review of Systems - Negative except Hpi  GEN ROS: negative for - chills or fever  Breast ROS: negative for breast lumps  Genito-Urinary ROS: no dysuria, trouble voiding, or hematuria      Physical Examination:  /80  Wt 128.2 kg (282 lb 10.1 oz)  LMP 02/22/2017  BMI 50.07 kg/m2   Constitutional: She is oriented to person, place, and time. She appears well-developed and well-nourished. No distress.   HENT:   Head: Normocephalic and atraumatic.   Eyes: Conjunctivae and EOM are normal. No scleral icterus.   Neck: Normal range of motion. Neck supple. No tracheal deviation present.   Cardiovascular: Normal rate.    Pulmonary/Chest: Effort normal. No respiratory distress. She exhibits no tenderness.  Abdominal: Soft. She exhibits no distension and no mass. There is no tenderness. There is no rebound and no guarding.   Genitourinary:    External rectal exam shows no thrombosed external hemorrhoids.    Pelvic exam was performed with patient supine.   No labial fusion.   There is no rash, lesion or injury on the right labia.   There is no rash, lesion or injury on the left labia.   No bleeding and no signs of injury around the vaginal introitus, urethra is without lesions and well supported. The cervix is visualized with no discharge, lesions or friability.   No vaginal discharge found.   No significant Cystocele, Enterocele or rectocele, and uterus well supported.   Bimanual exam:   The  urethra is normal to palpation and there are no palpable vaginal wall masses.   Uterus is not deviated, not enlarged, not fixed, normal shape and not tender.   Cervix exhibits no motion tenderness.    Right adnexum displays no mass and no tenderness.   Left adnexum displays no mass and no tenderness.  Musculoskeletal: Normal range of motion.   Lymphadenopathy: No inguinal adenopathy present.   Neurological: She is alert and oriented to person, place, and time. Coordination normal.   Skin: Skin is warm and dry. She is not diaphoretic.   Psychiatric: She has a normal mood and affect.          ULTRASOUND:    Transvaginal ultrasound performed in the usual fashion with 6.5mhz probe.  Viable pollack intrauterine pregnancy was seen, yolk sac was seen  Crown-rump length = 30 mm with flicker, consistent with 9w6d and EDC 12/02/2017 (agree with LMP EDC= 11/29/2017(LMP= 2/22/2017).  Uterus: normal, small sunchorionic hemorrhage  Left ovary not seen  Right ovary not seen  No free fluid in cul-de-sac or adnexal pathology seen         Assessment:  rachel Bhatia, counseled,. Final edc= 11/29/2017 (LMP), agreed with u/s today  1. Possible pregnancy  POCT URINE DIPSTICK WITHOUT MICROSCOPE    CBC auto differential    Type & Screen - Ob Profile    TSH    HIV-1 and HIV-2 antibodies    Hepatitis B surface antigen    RPR    Rubella antibody, IgG    Cystic Fibrosis Mutation Panel   2. 9 weeks gestation of pregnancy  CBC auto differential    Type & Screen - Ob Profile    TSH    HIV-1 and HIV-2 antibodies    Hepatitis B surface antigen    RPR    Rubella antibody, IgG    Cystic Fibrosis Mutation Panel   3. Fatigue during pregnancy in first trimester  CBC auto differential    Type & Screen - Ob Profile    TSH    HIV-1 and HIV-2 antibodies    Hepatitis B surface antigen    RPR    Rubella antibody, IgG    Cystic Fibrosis Mutation Panel       Plan:  PNL  Bleeding/ pain precautions  Follow up 3-4 weeks, pap at follow up

## 2017-05-05 NOTE — MR AVS SNAPSHOT
[facility]  [address], [address]  [facility] [address]  Phone: [phone]  Fax: [phone]                  Alexandra Martin   2017 10:40 AM   Office Visit    Description:  Female : 1995   Provider:  Koko Rivera MD   Department:  [facility]           Reason for Visit     Possible Pregnancy                To Do List           Goals (5 Years of Data)     None      [facility]     [facility] Care [facility] -  Assistance  Unless otherwise directed by your provider, please contact [facility], our nurse care line that is available for  assistance.     Registered nurses in the [facility] provide: appointment scheduling, clinical advisement, health education, and other advisory services.  Call: [phone] (toll free)               Medications           Message regarding Medications     Verify the changes and/or additions to your medication regime listed below are the same as discussed with your clinician today.  If any of these changes or additions are incorrect, please notify your healthcare provider.             Verify that the below list of medications is an accurate representation of the medications you are currently taking.  If none reported, the list may be blank. If incorrect, please contact your healthcare provider. Carry this list with you in case of emergency.           Current Medications     cephALEXin (KEFLEX) 500 MG capsule Take 1 capsule (500 mg total) by mouth every 8 (eight) hours.    DOCOSAHEXANOIC ACID (PRENATAL DHA ORAL) Take by mouth.    doxylamine succinate 25 mg tablet Take 12.5 mg by mouth.    promethazine (PHENERGAN) 25 MG tablet Take 25 mg by mouth every 4 (four) hours.    pyridoxine, vitamin B6, (B-6) 25 MG Tab Take 12.5 mg by mouth.           Clinical Reference Information           Your Vitals Were     BP Weight Last Period BMI       132/80 128.2 kg (282 lb 10.1 oz)  02/22/2017 50.07 kg/m2       Blood Pressure          Most Recent Value    BP  132/80      Allergies as of 5/5/2017     Azithromycin      Immunizations Administered on Date of Encounter - 5/5/2017     None      Smoking Cessation     If you would like to quit smoking:   You may be eligible for free services if you are a [place] resident and started smoking cigarettes before September 1, 1988.  Call the Smoking Cessation Trust ([facility]) toll free at (248) [phone] or (818) 429-6299.   Call 1-800-QUIT-NOW if you do not meet the above criteria.   Contact us via email: [email or web address]   View our website for more information: [email or web address]        Language Assistance Services     ATTENTION: Language assistance services are available, free of charge. Please call [phone].      ATENCIÓN: Si habla español, tiene a bryan disposición servicios gratuitos de asistencia lingüística. Llame al [phone].     CHÚ Ý: N?u b?n nói Ti?ng Vi?t, có các d?ch v? h? tr? ngôn ng? mi?n phí dành cho b?n. G?i s? [phone].         [facility] complies with applicable Federal civil rights laws and does not discriminate on the basis of race, color, national origin, age, disability, or sex.

## 2017-05-06 PROBLEM — K92.0 HEMATEMESIS WITH NAUSEA: Status: ACTIVE | Noted: 2017-05-06

## 2017-05-06 LAB
ABO + RH BLD: NORMAL
BLD GP AB SCN CELLS X3 SERPL QL: NORMAL
RPR SER QL: NORMAL

## 2017-05-08 PROBLEM — Z3A.10 10 WEEKS GESTATION OF PREGNANCY: Status: ACTIVE | Noted: 2017-05-08

## 2017-05-08 LAB
CFTR MUT ANL BLD/T: NORMAL
HBV SURFACE AG SERPL QL IA: NEGATIVE
HIV 1+2 AB+HIV1 P24 AG SERPL QL IA: NEGATIVE
RUBV IGG SER-ACNC: 21 IU/ML
RUBV IGG SER-IMP: REACTIVE

## 2017-06-02 ENCOUNTER — LAB VISIT (OUTPATIENT)
Dept: LAB | Facility: HOSPITAL | Age: 22
End: 2017-06-02
Attending: OBSTETRICS & GYNECOLOGY
Payer: MEDICARE

## 2017-06-02 ENCOUNTER — ROUTINE PRENATAL (OUTPATIENT)
Dept: OBSTETRICS AND GYNECOLOGY | Facility: CLINIC | Age: 22
End: 2017-06-02
Payer: MEDICARE

## 2017-06-02 VITALS — SYSTOLIC BLOOD PRESSURE: 128 MMHG | DIASTOLIC BLOOD PRESSURE: 72 MMHG | WEIGHT: 285.5 LBS | BODY MASS INDEX: 50.57 KG/M2

## 2017-06-02 DIAGNOSIS — O09.92 SUPERVISION OF HIGH RISK PREGNANCY IN SECOND TRIMESTER: ICD-10-CM

## 2017-06-02 DIAGNOSIS — Z3A.14 14 WEEKS GESTATION OF PREGNANCY: ICD-10-CM

## 2017-06-02 DIAGNOSIS — O09.92 SUPERVISION OF HIGH RISK PREGNANCY IN SECOND TRIMESTER: Primary | ICD-10-CM

## 2017-06-02 DIAGNOSIS — R30.0 DYSURIA: ICD-10-CM

## 2017-06-02 DIAGNOSIS — O26.812 PREGNANCY RELATED FATIGUE IN SECOND TRIMESTER: ICD-10-CM

## 2017-06-02 DIAGNOSIS — R10.2 PELVIC AND PERINEAL PAIN: ICD-10-CM

## 2017-06-02 LAB
ABO + RH BLD: NORMAL
BASOPHILS # BLD AUTO: 0.01 K/UL
BASOPHILS NFR BLD: 0.1 %
BILIRUB SERPL-MCNC: NORMAL MG/DL
BLD GP AB SCN CELLS X3 SERPL QL: NORMAL
BLOOD URINE, POC: NORMAL
C TRACH DNA SPEC QL NAA+PROBE: NOT DETECTED
COLOR, POC UA: YELLOW
DIFFERENTIAL METHOD: ABNORMAL
EOSINOPHIL # BLD AUTO: 0 K/UL
EOSINOPHIL NFR BLD: 0.4 %
ERYTHROCYTE [DISTWIDTH] IN BLOOD BY AUTOMATED COUNT: 13.5 %
GLUCOSE UR QL STRIP: NORMAL
HCT VFR BLD AUTO: 37.3 %
HGB BLD-MCNC: 12.1 G/DL
KETONES UR QL STRIP: NORMAL
LEUKOCYTE ESTERASE URINE, POC: NORMAL
LYMPHOCYTES # BLD AUTO: 2 K/UL
LYMPHOCYTES NFR BLD: 19.4 %
MCH RBC QN AUTO: 27.3 PG
MCHC RBC AUTO-ENTMCNC: 32.4 %
MCV RBC AUTO: 84 FL
MONOCYTES # BLD AUTO: 0.5 K/UL
MONOCYTES NFR BLD: 4.7 %
N GONORRHOEA DNA SPEC QL NAA+PROBE: NOT DETECTED
NEUTROPHILS # BLD AUTO: 7.6 K/UL
NEUTROPHILS NFR BLD: 75 %
NITRITE, POC UA: NORMAL
PH, POC UA: 7
PLATELET # BLD AUTO: 193 K/UL
PMV BLD AUTO: 10.6 FL
PROTEIN, POC: NORMAL
RBC # BLD AUTO: 4.43 M/UL
SPECIFIC GRAVITY, POC UA: NORMAL
UROBILINOGEN, POC UA: NORMAL
WBC # BLD AUTO: 10.14 K/UL

## 2017-06-02 PROCEDURE — 86592 SYPHILIS TEST NON-TREP QUAL: CPT

## 2017-06-02 PROCEDURE — 86703 HIV-1/HIV-2 1 RESULT ANTBDY: CPT

## 2017-06-02 PROCEDURE — 86900 BLOOD TYPING SEROLOGIC ABO: CPT

## 2017-06-02 PROCEDURE — 86850 RBC ANTIBODY SCREEN: CPT

## 2017-06-02 PROCEDURE — 99213 OFFICE O/P EST LOW 20 MIN: CPT | Mod: TH,S$PBB,, | Performed by: OBSTETRICS & GYNECOLOGY

## 2017-06-02 PROCEDURE — 99999 PR PBB SHADOW E&M-EST. PATIENT-LVL II: CPT | Mod: PBBFAC,,, | Performed by: OBSTETRICS & GYNECOLOGY

## 2017-06-02 PROCEDURE — 85025 COMPLETE CBC W/AUTO DIFF WBC: CPT

## 2017-06-02 PROCEDURE — 87340 HEPATITIS B SURFACE AG IA: CPT

## 2017-06-02 PROCEDURE — 86762 RUBELLA ANTIBODY: CPT

## 2017-06-02 PROCEDURE — 88175 CYTOPATH C/V AUTO FLUID REDO: CPT

## 2017-06-03 LAB
BACTERIA UR CULT: NORMAL
BACTERIA UR CULT: NORMAL
RPR SER QL: NORMAL

## 2017-06-05 LAB
HBV SURFACE AG SERPL QL IA: NEGATIVE
HIV 1+2 AB+HIV1 P24 AG SERPL QL IA: NEGATIVE
RUBV IGG SER-ACNC: 17 IU/ML
RUBV IGG SER-IMP: REACTIVE

## 2017-06-30 ENCOUNTER — HOSPITAL ENCOUNTER (OUTPATIENT)
Dept: RADIOLOGY | Facility: HOSPITAL | Age: 22
Discharge: HOME | End: 2017-06-30
Attending: OBSTETRICS & GYNECOLOGY
Payer: MEDICARE

## 2017-06-30 ENCOUNTER — ROUTINE PRENATAL (OUTPATIENT)
Dept: OBSTETRICS AND GYNECOLOGY | Facility: CLINIC | Age: 22
End: 2017-06-30
Payer: MEDICARE

## 2017-06-30 VITALS — DIASTOLIC BLOOD PRESSURE: 60 MMHG | WEIGHT: 282.88 LBS | SYSTOLIC BLOOD PRESSURE: 112 MMHG | BODY MASS INDEX: 50.1 KG/M2

## 2017-06-30 DIAGNOSIS — Z3A.14 14 WEEKS GESTATION OF PREGNANCY: ICD-10-CM

## 2017-06-30 DIAGNOSIS — O09.92 SUPERVISION OF HIGH RISK PREGNANCY IN SECOND TRIMESTER: ICD-10-CM

## 2017-06-30 DIAGNOSIS — O26.812 PREGNANCY RELATED FATIGUE IN SECOND TRIMESTER: ICD-10-CM

## 2017-06-30 DIAGNOSIS — Z3A.18 18 WEEKS GESTATION OF PREGNANCY: Primary | ICD-10-CM

## 2017-06-30 LAB
BILIRUB SERPL-MCNC: NORMAL MG/DL
BLOOD URINE, POC: NORMAL
COLOR, POC UA: YELLOW
GLUCOSE UR QL STRIP: NORMAL
KETONES UR QL STRIP: NORMAL
LEUKOCYTE ESTERASE URINE, POC: 1=
NITRITE, POC UA: NORMAL
PH, POC UA: 6
PROTEIN, POC: NORMAL
SPECIFIC GRAVITY, POC UA: NORMAL
UROBILINOGEN, POC UA: NORMAL

## 2017-06-30 PROCEDURE — 81002 URINALYSIS NONAUTO W/O SCOPE: CPT | Mod: PBBFAC,PN | Performed by: OBSTETRICS & GYNECOLOGY

## 2017-06-30 PROCEDURE — 99999 PR PBB SHADOW E&M-EST. PATIENT-LVL II: CPT | Mod: PBBFAC,,, | Performed by: OBSTETRICS & GYNECOLOGY

## 2017-06-30 PROCEDURE — 0502F SUBSEQUENT PRENATAL CARE: CPT | Mod: S$PBB,,, | Performed by: OBSTETRICS & GYNECOLOGY

## 2017-06-30 PROCEDURE — 76805 OB US >/= 14 WKS SNGL FETUS: CPT | Mod: 26,,, | Performed by: RADIOLOGY

## 2017-06-30 PROCEDURE — 99212 OFFICE O/P EST SF 10 MIN: CPT | Mod: PBBFAC,25,PN | Performed by: OBSTETRICS & GYNECOLOGY

## 2017-08-01 ENCOUNTER — ROUTINE PRENATAL (OUTPATIENT)
Dept: OBSTETRICS AND GYNECOLOGY | Facility: CLINIC | Age: 22
End: 2017-08-01
Payer: MEDICARE

## 2017-08-01 VITALS — DIASTOLIC BLOOD PRESSURE: 68 MMHG | SYSTOLIC BLOOD PRESSURE: 132 MMHG | WEIGHT: 283.94 LBS | BODY MASS INDEX: 50.3 KG/M2

## 2017-08-01 DIAGNOSIS — Z3A.22 22 WEEKS GESTATION OF PREGNANCY: Primary | ICD-10-CM

## 2017-08-01 DIAGNOSIS — O36.63X0: ICD-10-CM

## 2017-08-01 LAB
BILIRUB SERPL-MCNC: NORMAL MG/DL
BLOOD URINE, POC: NORMAL
COLOR, POC UA: YELLOW
GLUCOSE UR QL STRIP: NORMAL
KETONES UR QL STRIP: NORMAL
LEUKOCYTE ESTERASE URINE, POC: NORMAL
NITRITE, POC UA: NORMAL
PH, POC UA: 6
PROTEIN, POC: NORMAL
SPECIFIC GRAVITY, POC UA: NORMAL
UROBILINOGEN, POC UA: NORMAL

## 2017-08-01 PROCEDURE — 81002 URINALYSIS NONAUTO W/O SCOPE: CPT | Mod: PBBFAC,PN | Performed by: OBSTETRICS & GYNECOLOGY

## 2017-08-01 PROCEDURE — 99214 OFFICE O/P EST MOD 30 MIN: CPT | Mod: TH,S$PBB,, | Performed by: OBSTETRICS & GYNECOLOGY

## 2017-08-01 PROCEDURE — 99212 OFFICE O/P EST SF 10 MIN: CPT | Mod: PBBFAC,PN | Performed by: OBSTETRICS & GYNECOLOGY

## 2017-08-01 PROCEDURE — 99999 PR PBB SHADOW E&M-EST. PATIENT-LVL II: CPT | Mod: PBBFAC,,, | Performed by: OBSTETRICS & GYNECOLOGY

## 2017-08-01 RX ORDER — FLUOXETINE HYDROCHLORIDE 20 MG/1
20 CAPSULE ORAL DAILY
COMMUNITY
Start: 2017-07-26 | End: 2017-11-02

## 2017-08-01 RX ORDER — METRONIDAZOLE 500 MG/1
500 TABLET ORAL 3 TIMES DAILY
Qty: 30 TABLET | Refills: 0 | Status: ON HOLD | OUTPATIENT
Start: 2017-08-01 | End: 2017-08-15 | Stop reason: HOSPADM

## 2017-08-01 NOTE — PROGRESS NOTES
Complaints today: diarrhea . Cramping on and off x 3 weeks.    /68   Wt 128.8 kg (283 lb 15.2 oz)   LMP 2017   BMI 50.30 kg/m²     21 y.o., at 22w6d by Estimated Date of Delivery: 17  Patient Active Problem List   Diagnosis    Supervision of high risk pregnancy in second trimester    Bacterial vaginosis    Hematemesis with nausea    10 weeks gestation of pregnancy    14 weeks gestation of pregnancy     OB History    Para Term  AB Living   2 1 1 0 0 1   SAB TAB Ectopic Multiple Live Births   0 0 0 0 1      # Outcome Date GA Lbr Minor/2nd Weight Sex Delivery Anes PTL Lv   2 Current            1 Term 08/29/15 39w4d  3.685 kg (8 lb 2 oz) F Vag-Spont EPI N CINTHIA          Dating reviewed    Allergies and problem list reviewed and updated    Medical and surgical history reviewed    Prenatal labs reviewed and updated    Physical Exam:  ABD: soft, gravid, nontender    Assessment:  22 weeks, diarrhea  S>D    Plan:   Flagyl x 10 days  u/s at follow up   follow up 3 Weeks, kick counts, labor precautions      Complaints today: diarrhea without fever x 3 weeks, counseled.     /68   Wt 128.8 kg (283 lb 15.2 oz)   LMP 2017   BMI 50.30 kg/m²     21 y.o., at 22w6d by Estimated Date of Delivery: 17  Patient Active Problem List   Diagnosis    Supervision of high risk pregnancy in second trimester    Bacterial vaginosis    Hematemesis with nausea    10 weeks gestation of pregnancy    14 weeks gestation of pregnancy     OB History    Para Term  AB Living   2 1 1 0 0 1   SAB TAB Ectopic Multiple Live Births   0 0 0 0 1      # Outcome Date GA Lbr Minor/2nd Weight Sex Delivery Anes PTL Lv   2 Current            1 Term 08/29/15 39w4d  3.685 kg (8 lb 2 oz) F Vag-Spont EPI N CINTHIA          Dating reviewed    Allergies and problem list reviewed and updated    Medical and surgical history reviewed    Prenatal labs reviewed and updated    Physical Exam:  ABD: soft, gravid,  nontender    Assessment:  Suspect infectious diarrhea  S>D    Plan:   U/s at follow up  Flagyl po for colitis   follow up 3 Weeks, kick counts, labor precautions

## 2017-08-08 ENCOUNTER — TELEPHONE (OUTPATIENT)
Dept: OBSTETRICS AND GYNECOLOGY | Facility: CLINIC | Age: 22
End: 2017-08-08

## 2017-08-08 PROBLEM — O26.899 LOW BACK PAIN DURING PREGNANCY: Status: ACTIVE | Noted: 2017-08-08

## 2017-08-08 PROBLEM — M54.50 LOW BACK PAIN DURING PREGNANCY: Status: ACTIVE | Noted: 2017-08-08

## 2017-08-08 NOTE — TELEPHONE ENCOUNTER
C/o severe back pain , passing bloody mucus .Seen at [facility] last night with UTI. Advised to go to [facility] for evaluation.

## 2017-08-09 ENCOUNTER — TELEPHONE (OUTPATIENT)
Dept: OBSTETRICS AND GYNECOLOGY | Facility: CLINIC | Age: 22
End: 2017-08-09

## 2017-08-09 NOTE — TELEPHONE ENCOUNTER
----- Message from Ami Harris sent at 8/9/2017 11:45 AM CDT -----  Contact: self  Call connected to pod.  Patient was in [facility] yesterday for bladder infection, has pus/blood coming out of vagina. Hasn't felt baby move today.    appointment scheduled

## 2017-08-10 ENCOUNTER — ROUTINE PRENATAL (OUTPATIENT)
Dept: OBSTETRICS AND GYNECOLOGY | Facility: CLINIC | Age: 22
End: 2017-08-10
Payer: MEDICARE

## 2017-08-10 VITALS
SYSTOLIC BLOOD PRESSURE: 112 MMHG | BODY MASS INDEX: 49.83 KG/M2 | WEIGHT: 281.31 LBS | DIASTOLIC BLOOD PRESSURE: 72 MMHG

## 2017-08-10 DIAGNOSIS — Z3A.24 24 WEEKS GESTATION OF PREGNANCY: Primary | ICD-10-CM

## 2017-08-10 DIAGNOSIS — R31.9 URINARY TRACT INFECTION WITH HEMATURIA, SITE UNSPECIFIED: ICD-10-CM

## 2017-08-10 DIAGNOSIS — N39.0 URINARY TRACT INFECTION WITH HEMATURIA, SITE UNSPECIFIED: ICD-10-CM

## 2017-08-10 LAB
BILIRUB SERPL-MCNC: NORMAL MG/DL
BLOOD URINE, POC: 250
COLOR, POC UA: NORMAL
GLUCOSE UR QL STRIP: NORMAL
KETONES UR QL STRIP: NORMAL
LEUKOCYTE ESTERASE URINE, POC: NORMAL
NITRITE, POC UA: NORMAL
PH, POC UA: 6
PROTEIN, POC: NORMAL
SPECIFIC GRAVITY, POC UA: NORMAL
UROBILINOGEN, POC UA: NORMAL

## 2017-08-10 PROCEDURE — 87088 URINE BACTERIA CULTURE: CPT

## 2017-08-10 PROCEDURE — 87086 URINE CULTURE/COLONY COUNT: CPT

## 2017-08-10 PROCEDURE — 99999 PR PBB SHADOW E&M-EST. PATIENT-LVL III: CPT | Mod: PBBFAC,,, | Performed by: OBSTETRICS & GYNECOLOGY

## 2017-08-10 PROCEDURE — 87077 CULTURE AEROBIC IDENTIFY: CPT

## 2017-08-10 PROCEDURE — 0502F SUBSEQUENT PRENATAL CARE: CPT | Mod: S$PBB,,, | Performed by: OBSTETRICS & GYNECOLOGY

## 2017-08-10 PROCEDURE — 81002 URINALYSIS NONAUTO W/O SCOPE: CPT | Mod: PBBFAC,PN | Performed by: OBSTETRICS & GYNECOLOGY

## 2017-08-10 PROCEDURE — 87186 SC STD MICRODIL/AGAR DIL: CPT

## 2017-08-10 PROCEDURE — 99213 OFFICE O/P EST LOW 20 MIN: CPT | Mod: PBBFAC,PN | Performed by: OBSTETRICS & GYNECOLOGY

## 2017-08-10 RX ORDER — NITROFURANTOIN 25; 75 MG/1; MG/1
100 CAPSULE ORAL
COMMUNITY
End: 2017-11-02

## 2017-08-10 NOTE — PROGRESS NOTES
Complaints today: stringy vaginal d/c- sex today. No Vaginal Bleeding , but pelvic pain. Placed on antibiotics from hospital for UTI.     /72   Wt 127.6 kg (281 lb 4.9 oz)   LMP 2017   BMI 49.83 kg/m²     21 y.o., at 24w1d by Estimated Date of Delivery: 17  Patient Active Problem List   Diagnosis    Supervision of high risk pregnancy in second trimester    Bacterial vaginosis    Hematemesis with nausea    10 weeks gestation of pregnancy    14 weeks gestation of pregnancy    Low back pain during pregnancy     OB History    Para Term  AB Living   2 1 1 0 0 1   SAB TAB Ectopic Multiple Live Births   0 0 0 0 1      # Outcome Date GA Lbr Minor/2nd Weight Sex Delivery Anes PTL Lv   2 Current            1 Term 08/29/15 39w4d  3.685 kg (8 lb 2 oz) F Vag-Spont EPI N CINTHIA          Dating reviewed    Allergies and problem list reviewed and updated    Medical and surgical history reviewed    Prenatal labs reviewed and updated    Physical Exam:  ABD: soft, gravid, nontender    Assessment:  25 weeks, threatened  labor  Urinary Tract Infection      Plan:   contiue macrobis - repeat urine culture.    follow up 3 Weeks, kick counts, labor precautions

## 2017-08-12 PROBLEM — N12 PYELONEPHRITIS: Status: ACTIVE | Noted: 2017-08-12

## 2017-08-14 LAB — BACTERIA UR CULT: NORMAL

## 2017-08-22 ENCOUNTER — HOSPITAL ENCOUNTER (OUTPATIENT)
Dept: RADIOLOGY | Facility: HOSPITAL | Age: 22
Discharge: HOME | End: 2017-08-22
Attending: OBSTETRICS & GYNECOLOGY
Payer: MEDICARE

## 2017-08-22 ENCOUNTER — ROUTINE PRENATAL (OUTPATIENT)
Dept: OBSTETRICS AND GYNECOLOGY | Facility: CLINIC | Age: 22
End: 2017-08-22
Payer: MEDICARE

## 2017-08-22 VITALS
SYSTOLIC BLOOD PRESSURE: 116 MMHG | BODY MASS INDEX: 50.03 KG/M2 | WEIGHT: 282.44 LBS | DIASTOLIC BLOOD PRESSURE: 70 MMHG

## 2017-08-22 DIAGNOSIS — Z3A.25 25 WEEKS GESTATION OF PREGNANCY: Primary | ICD-10-CM

## 2017-08-22 DIAGNOSIS — O36.63X0: ICD-10-CM

## 2017-08-22 DIAGNOSIS — Z3A.22 22 WEEKS GESTATION OF PREGNANCY: ICD-10-CM

## 2017-08-22 LAB
BILIRUB SERPL-MCNC: NORMAL MG/DL
BLOOD URINE, POC: NORMAL
COLOR, POC UA: NORMAL
GLUCOSE UR QL STRIP: NORMAL
KETONES UR QL STRIP: NORMAL
LEUKOCYTE ESTERASE URINE, POC: NORMAL
NITRITE, POC UA: NORMAL
PH, POC UA: 7
PROTEIN, POC: NORMAL
SPECIFIC GRAVITY, POC UA: NORMAL
UROBILINOGEN, POC UA: NORMAL

## 2017-08-22 PROCEDURE — 99214 OFFICE O/P EST MOD 30 MIN: CPT | Mod: TH,S$PBB,, | Performed by: OBSTETRICS & GYNECOLOGY

## 2017-08-22 PROCEDURE — 76816 OB US FOLLOW-UP PER FETUS: CPT | Mod: 26,,, | Performed by: RADIOLOGY

## 2017-08-22 PROCEDURE — 99212 OFFICE O/P EST SF 10 MIN: CPT | Mod: PBBFAC,25,PN | Performed by: OBSTETRICS & GYNECOLOGY

## 2017-08-22 PROCEDURE — 81002 URINALYSIS NONAUTO W/O SCOPE: CPT | Mod: PBBFAC,PN | Performed by: OBSTETRICS & GYNECOLOGY

## 2017-08-22 PROCEDURE — 99999 PR PBB SHADOW E&M-EST. PATIENT-LVL II: CPT | Mod: PBBFAC,,, | Performed by: OBSTETRICS & GYNECOLOGY

## 2017-08-22 RX ORDER — TERCONAZOLE 4 MG/G
1 CREAM VAGINAL NIGHTLY
Qty: 45 G | Refills: 0 | Status: SHIPPED | OUTPATIENT
Start: 2017-08-22 | End: 2017-08-29

## 2017-08-22 NOTE — PROGRESS NOTES
Complaints today: vaginal itching    /70   Wt 128.1 kg (282 lb 6.6 oz)   LMP 2017   BMI 50.03 kg/m²     21 y.o., at 25w6d by Estimated Date of Delivery: 17  Patient Active Problem List   Diagnosis    Supervision of high risk pregnancy in second trimester    Bacterial vaginosis    Hematemesis with nausea    10 weeks gestation of pregnancy    14 weeks gestation of pregnancy    Low back pain during pregnancy    Pyelonephritis     OB History    Para Term  AB Living   2 1 1 0 0 1   SAB TAB Ectopic Multiple Live Births   0 0 0 0 1      # Outcome Date GA Lbr Minor/2nd Weight Sex Delivery Anes PTL Lv   2 Current            1 Term 08/29/15 39w4d  3.685 kg (8 lb 2 oz) F Vag-Spont EPI N CINTHIA          Dating reviewed    Allergies and problem list reviewed and updated    Medical and surgical history reviewed    Prenatal labs reviewed and updated    Physical Exam:  ABD: soft, gravid, nontender    Assessment:  26 eeks, doing well    Plan:   glucola at follow up   follow up 3 Weeks, kick counts, labor precautions   terazol-7

## 2017-08-29 ENCOUNTER — TELEPHONE (OUTPATIENT)
Dept: OBSTETRICS AND GYNECOLOGY | Facility: CLINIC | Age: 22
End: 2017-08-29

## 2017-08-29 NOTE — TELEPHONE ENCOUNTER
----- Message from Janett Aquino sent at 8/29/2017 10:03 AM CDT -----  Contact: diana   Went to ER for UTI,   No better today   Call back      Temp 101.4 back pain , body aches. Went to [place] last night. Advised to go now to [facility] for evaluation

## 2017-09-12 ENCOUNTER — ROUTINE PRENATAL (OUTPATIENT)
Dept: OBSTETRICS AND GYNECOLOGY | Facility: CLINIC | Age: 22
End: 2017-09-12
Payer: MEDICARE

## 2017-09-12 ENCOUNTER — LAB VISIT (OUTPATIENT)
Dept: LAB | Facility: HOSPITAL | Age: 22
End: 2017-09-12
Attending: OBSTETRICS & GYNECOLOGY
Payer: MEDICARE

## 2017-09-12 VITALS — WEIGHT: 281.5 LBS | DIASTOLIC BLOOD PRESSURE: 70 MMHG | BODY MASS INDEX: 49.87 KG/M2 | SYSTOLIC BLOOD PRESSURE: 118 MMHG

## 2017-09-12 DIAGNOSIS — Z3A.28 28 WEEKS GESTATION OF PREGNANCY: Primary | ICD-10-CM

## 2017-09-12 DIAGNOSIS — Z3A.25 25 WEEKS GESTATION OF PREGNANCY: ICD-10-CM

## 2017-09-12 DIAGNOSIS — R39.9 UTI SYMPTOMS: ICD-10-CM

## 2017-09-12 LAB
BILIRUB SERPL-MCNC: NORMAL MG/DL
BLOOD URINE, POC: NORMAL
COLOR, POC UA: NORMAL
GLUCOSE SERPL-MCNC: 125 MG/DL
GLUCOSE UR QL STRIP: NORMAL
KETONES UR QL STRIP: NORMAL
LEUKOCYTE ESTERASE URINE, POC: NORMAL
NITRITE, POC UA: NORMAL
PH, POC UA: 5
PROTEIN, POC: NORMAL
SPECIFIC GRAVITY, POC UA: NORMAL
UROBILINOGEN, POC UA: NORMAL

## 2017-09-12 PROCEDURE — 81002 URINALYSIS NONAUTO W/O SCOPE: CPT | Mod: PBBFAC,PN | Performed by: OBSTETRICS & GYNECOLOGY

## 2017-09-12 PROCEDURE — 87186 SC STD MICRODIL/AGAR DIL: CPT

## 2017-09-12 PROCEDURE — 99213 OFFICE O/P EST LOW 20 MIN: CPT | Mod: PBBFAC,PN | Performed by: OBSTETRICS & GYNECOLOGY

## 2017-09-12 PROCEDURE — 87088 URINE BACTERIA CULTURE: CPT

## 2017-09-12 PROCEDURE — 87086 URINE CULTURE/COLONY COUNT: CPT

## 2017-09-12 PROCEDURE — 36415 COLL VENOUS BLD VENIPUNCTURE: CPT | Mod: PO

## 2017-09-12 PROCEDURE — 0502F SUBSEQUENT PRENATAL CARE: CPT | Mod: ,,, | Performed by: OBSTETRICS & GYNECOLOGY

## 2017-09-12 PROCEDURE — 87077 CULTURE AEROBIC IDENTIFY: CPT

## 2017-09-12 PROCEDURE — 99999 PR PBB SHADOW E&M-EST. PATIENT-LVL III: CPT | Mod: PBBFAC,,, | Performed by: OBSTETRICS & GYNECOLOGY

## 2017-09-12 PROCEDURE — 82950 GLUCOSE TEST: CPT

## 2017-09-12 RX ORDER — AMOXICILLIN AND CLAVULANATE POTASSIUM 875; 125 MG/1; MG/1
1 TABLET, FILM COATED ORAL DAILY
Qty: 30 TABLET | Refills: 3 | Status: SHIPPED | OUTPATIENT
Start: 2017-09-12 | End: 2017-11-02

## 2017-09-12 RX ORDER — PHENAZOPYRIDINE HYDROCHLORIDE 200 MG/1
200 TABLET, FILM COATED ORAL 3 TIMES DAILY PRN
Qty: 12 TABLET | Refills: 0 | Status: SHIPPED | OUTPATIENT
Start: 2017-09-12 | End: 2017-09-22

## 2017-09-12 NOTE — PROGRESS NOTES
Complaints today: mild dysuria - recent admission to [facility]- recurrent pyelo, resistant    /70   Wt 127.7 kg (281 lb 8.4 oz)   LMP 2017   BMI 49.87 kg/m²     21 y.o., at 28w6d by Estimated Date of Delivery: 17  Patient Active Problem List   Diagnosis    Supervision of high risk pregnancy in second trimester    Bacterial vaginosis    Hematemesis with nausea    10 weeks gestation of pregnancy    14 weeks gestation of pregnancy    Low back pain during pregnancy    Pyelonephritis     OB History    Para Term  AB Living   2 1 1 0 0 1   SAB TAB Ectopic Multiple Live Births   0 0 0 0 1      # Outcome Date GA Lbr Minor/2nd Weight Sex Delivery Anes PTL Lv   2 Current            1 Term 08/29/15 39w4d  3.685 kg (8 lb 2 oz) F Vag-Spont EPI N CINTHIA          Dating reviewed    Allergies and problem list reviewed and updated    Medical and surgical history reviewed    Prenatal labs reviewed and updated    Physical Exam:  ABD: soft, gravid, nontender    Assessment:  Recurrent UTI / pyelo  dysuria    Plan:   Pyridium  aurmentin qd until delivery   follow up 2 Weeks, kick counts, labor precautions   glucola today

## 2017-09-15 LAB — BACTERIA UR CULT: NORMAL

## 2017-09-26 ENCOUNTER — ROUTINE PRENATAL (OUTPATIENT)
Dept: OBSTETRICS AND GYNECOLOGY | Facility: CLINIC | Age: 22
End: 2017-09-26
Payer: MEDICARE

## 2017-09-26 VITALS — DIASTOLIC BLOOD PRESSURE: 68 MMHG | WEIGHT: 285.5 LBS | BODY MASS INDEX: 50.57 KG/M2 | SYSTOLIC BLOOD PRESSURE: 122 MMHG

## 2017-09-26 DIAGNOSIS — Z3A.30 30 WEEKS GESTATION OF PREGNANCY: Primary | ICD-10-CM

## 2017-09-26 LAB
BILIRUB SERPL-MCNC: NORMAL MG/DL
BLOOD URINE, POC: NORMAL
COLOR, POC UA: YELLOW
GLUCOSE UR QL STRIP: NORMAL
KETONES UR QL STRIP: NORMAL
LEUKOCYTE ESTERASE URINE, POC: 1
NITRITE, POC UA: NORMAL
PH, POC UA: 6
PROTEIN, POC: NORMAL
SPECIFIC GRAVITY, POC UA: NORMAL
UROBILINOGEN, POC UA: NORMAL

## 2017-09-26 PROCEDURE — 99212 OFFICE O/P EST SF 10 MIN: CPT | Mod: PBBFAC,PN | Performed by: OBSTETRICS & GYNECOLOGY

## 2017-09-26 PROCEDURE — 99213 OFFICE O/P EST LOW 20 MIN: CPT | Mod: TH,S$PBB,, | Performed by: OBSTETRICS & GYNECOLOGY

## 2017-09-26 PROCEDURE — 99999 PR PBB SHADOW E&M-EST. PATIENT-LVL II: CPT | Mod: PBBFAC,,, | Performed by: OBSTETRICS & GYNECOLOGY

## 2017-09-26 PROCEDURE — 81002 URINALYSIS NONAUTO W/O SCOPE: CPT | Mod: PBBFAC,PN | Performed by: OBSTETRICS & GYNECOLOGY

## 2017-09-26 NOTE — PROGRESS NOTES
Complaints today: none    /68   Wt 129.5 kg (285 lb 7.9 oz)   LMP 2017   BMI 50.57 kg/m²     21 y.o., at 30w6d by Estimated Date of Delivery: 17  Patient Active Problem List   Diagnosis    Supervision of high risk pregnancy in second trimester    Bacterial vaginosis    Hematemesis with nausea    10 weeks gestation of pregnancy    14 weeks gestation of pregnancy    Low back pain during pregnancy    Pyelonephritis     OB History    Para Term  AB Living   2 1 1 0 0 1   SAB TAB Ectopic Multiple Live Births   0 0 0 0 1      # Outcome Date GA Lbr Minor/2nd Weight Sex Delivery Anes PTL Lv   2 Current            1 Term 08/29/15 39w4d  3.685 kg (8 lb 2 oz) F Vag-Spont EPI N CINTHIA          Dating reviewed    Allergies and problem list reviewed and updated    Medical and surgical history reviewed    Prenatal labs reviewed and updated    Physical Exam:  ABD: soft, gravid, nontender    Assessment:  30 weeks doing well  UTI / pyelo improved    Plan:    follow up 2 Weeks, kick counts, labor precautions

## 2017-10-10 ENCOUNTER — ROUTINE PRENATAL (OUTPATIENT)
Dept: OBSTETRICS AND GYNECOLOGY | Facility: CLINIC | Age: 22
End: 2017-10-10
Payer: MEDICARE

## 2017-10-10 VITALS
DIASTOLIC BLOOD PRESSURE: 67 MMHG | BODY MASS INDEX: 50.18 KG/M2 | SYSTOLIC BLOOD PRESSURE: 121 MMHG | WEIGHT: 283.31 LBS

## 2017-10-10 DIAGNOSIS — Z3A.33 33 WEEKS GESTATION OF PREGNANCY: Primary | ICD-10-CM

## 2017-10-10 PROCEDURE — 99212 OFFICE O/P EST SF 10 MIN: CPT | Mod: PBBFAC,PN | Performed by: OBSTETRICS & GYNECOLOGY

## 2017-10-10 PROCEDURE — 99213 OFFICE O/P EST LOW 20 MIN: CPT | Mod: TH,S$PBB,, | Performed by: OBSTETRICS & GYNECOLOGY

## 2017-10-10 PROCEDURE — 99999 PR PBB SHADOW E&M-EST. PATIENT-LVL II: CPT | Mod: PBBFAC,,, | Performed by: OBSTETRICS & GYNECOLOGY

## 2017-10-10 NOTE — PROGRESS NOTES
Complaints today: none, vaginal discharge a few days ago, resolved.     /67   Wt 128.5 kg (283 lb 4.7 oz)   LMP 2017   BMI 50.18 kg/m²     21 y.o., at 32w6d by Estimated Date of Delivery: 17  Patient Active Problem List   Diagnosis    Supervision of high risk pregnancy in second trimester    Bacterial vaginosis    Hematemesis with nausea    10 weeks gestation of pregnancy    14 weeks gestation of pregnancy    Low back pain during pregnancy    Pyelonephritis     OB History    Para Term  AB Living   2 1 1 0 0 1   SAB TAB Ectopic Multiple Live Births   0 0 0 0 1      # Outcome Date GA Lbr Minor/2nd Weight Sex Delivery Anes PTL Lv   2 Current            1 Term 08/29/15 39w4d  3.685 kg (8 lb 2 oz) F Vag-Spont EPI N CINTHIA          Dating reviewed    Allergies and problem list reviewed and updated    Medical and surgical history reviewed    Prenatal labs reviewed and updated    Physical Exam:  ABD: soft, gravid, nontender    Assessment:  32 weeks, doing well    Plan:    follow up 2 Weeks, kick counts, labor precautions

## 2017-10-11 ENCOUNTER — TELEPHONE (OUTPATIENT)
Dept: OBSTETRICS AND GYNECOLOGY | Facility: CLINIC | Age: 22
End: 2017-10-11

## 2017-10-11 RX ORDER — PROMETHAZINE HYDROCHLORIDE AND DEXTROMETHORPHAN HYDROBROMIDE 6.25; 15 MG/5ML; MG/5ML
5 SYRUP ORAL EVERY 4 HOURS PRN
Qty: 180 ML | Refills: 1 | Status: SHIPPED | OUTPATIENT
Start: 2017-10-11 | End: 2017-10-21

## 2017-10-11 NOTE — TELEPHONE ENCOUNTER
----- Message from Rufina Weaver sent at 10/11/2017 10:06 AM CDT -----  Patient is calling concerning to see if office can give her a prescription for her cough. She has been taking over the counter medication but it has been getting worse. Please remit to:      [facility]/pharmacy  [address] [place] [address].  # [phone]    Please call with questions or when completed at [phone].    Coughing , sore throat. Taking robitussin and tylenol sinus. No fever     Patient advised rx called in.

## 2017-10-24 ENCOUNTER — ROUTINE PRENATAL (OUTPATIENT)
Dept: OBSTETRICS AND GYNECOLOGY | Facility: CLINIC | Age: 22
End: 2017-10-24
Payer: MEDICARE

## 2017-10-24 VITALS
WEIGHT: 279.56 LBS | SYSTOLIC BLOOD PRESSURE: 120 MMHG | DIASTOLIC BLOOD PRESSURE: 68 MMHG | BODY MASS INDEX: 49.52 KG/M2

## 2017-10-24 DIAGNOSIS — Z3A.34 34 WEEKS GESTATION OF PREGNANCY: Primary | ICD-10-CM

## 2017-10-24 LAB
BILIRUB SERPL-MCNC: NORMAL MG/DL
BLOOD URINE, POC: NORMAL
COLOR, POC UA: NORMAL
GLUCOSE UR QL STRIP: NORMAL
KETONES UR QL STRIP: NORMAL
LEUKOCYTE ESTERASE URINE, POC: NORMAL
NITRITE, POC UA: NORMAL
PH, POC UA: 6
PROTEIN, POC: NORMAL
SPECIFIC GRAVITY, POC UA: NORMAL
UROBILINOGEN, POC UA: NORMAL

## 2017-10-24 PROCEDURE — 81002 URINALYSIS NONAUTO W/O SCOPE: CPT | Mod: PBBFAC,PN | Performed by: OBSTETRICS & GYNECOLOGY

## 2017-10-24 PROCEDURE — 99213 OFFICE O/P EST LOW 20 MIN: CPT | Mod: TH,S$PBB,, | Performed by: OBSTETRICS & GYNECOLOGY

## 2017-10-24 PROCEDURE — 99999 PR PBB SHADOW E&M-EST. PATIENT-LVL III: CPT | Mod: PBBFAC,,, | Performed by: OBSTETRICS & GYNECOLOGY

## 2017-10-24 PROCEDURE — 99213 OFFICE O/P EST LOW 20 MIN: CPT | Mod: PBBFAC,PN | Performed by: OBSTETRICS & GYNECOLOGY

## 2017-10-24 NOTE — PROGRESS NOTES
Complaints today: none    /68   Wt 126.8 kg (279 lb 8.7 oz)   LMP 2017   BMI 49.52 kg/m²     21 y.o., at 34w6d by Estimated Date of Delivery: 17  Patient Active Problem List   Diagnosis    Supervision of high risk pregnancy in second trimester    Bacterial vaginosis    Hematemesis with nausea    10 weeks gestation of pregnancy    14 weeks gestation of pregnancy    Low back pain during pregnancy    Pyelonephritis     OB History    Para Term  AB Living   2 1 1 0 0 1   SAB TAB Ectopic Multiple Live Births   0 0 0 0 1      # Outcome Date GA Lbr Minor/2nd Weight Sex Delivery Anes PTL Lv   2 Current            1 Term 08/29/15 39w4d  3.685 kg (8 lb 2 oz) F Vag-Spont EPI N CINTHIA          Dating reviewed    Allergies and problem list reviewed and updated    Medical and surgical history reviewed    Prenatal labs reviewed and updated    Physical Exam:  ABD: soft, gravid, nontender    Assessment:  34 weeks, doing well    Plan:   U/s at follow up, GBS at follow up   follow up 1 Weeks, kick counts, labor precautions

## 2017-10-30 ENCOUNTER — TELEPHONE (OUTPATIENT)
Dept: OBSTETRICS AND GYNECOLOGY | Facility: CLINIC | Age: 22
End: 2017-10-30

## 2017-10-30 NOTE — TELEPHONE ENCOUNTER
----- Message from Gifty Mcginnis sent at 10/30/2017  1:15 PM CDT -----  Contact: self  Patient [phone] is calling to reschedule her appt with Dr Rivera and her ultrasound that is scheduled for tomorrow 10 31 17/please call patient

## 2017-10-30 NOTE — TELEPHONE ENCOUNTER
I tried calling the patient but it went to voice mail and that is not set up so I could not leave a message.

## 2017-10-31 ENCOUNTER — HOSPITAL ENCOUNTER (OUTPATIENT)
Dept: RADIOLOGY | Facility: HOSPITAL | Age: 22
Discharge: HOME | End: 2017-10-31
Attending: OBSTETRICS & GYNECOLOGY
Payer: MEDICARE

## 2017-10-31 DIAGNOSIS — Z3A.34 34 WEEKS GESTATION OF PREGNANCY: ICD-10-CM

## 2017-10-31 PROCEDURE — 76816 OB US FOLLOW-UP PER FETUS: CPT | Mod: 26,,, | Performed by: RADIOLOGY

## 2017-10-31 PROCEDURE — 76816 OB US FOLLOW-UP PER FETUS: CPT | Mod: TC

## 2017-11-01 ENCOUNTER — TELEPHONE (OUTPATIENT)
Dept: OBSTETRICS AND GYNECOLOGY | Facility: CLINIC | Age: 22
End: 2017-11-01

## 2017-11-01 NOTE — TELEPHONE ENCOUNTER
Patient had a little bloody show when she wipes. She is also complaining of pain. I instructed her to go to L&D

## 2017-11-01 NOTE — TELEPHONE ENCOUNTER
----- Message from Varshaalbino Callaway sent at 11/1/2017  2:34 PM CDT -----  Patient states that she has a bloody discharge and need to speak to you.  Please call patient at [phone].

## 2017-11-02 ENCOUNTER — ROUTINE PRENATAL (OUTPATIENT)
Dept: OBSTETRICS AND GYNECOLOGY | Facility: CLINIC | Age: 22
End: 2017-11-02
Payer: MEDICARE

## 2017-11-02 ENCOUNTER — LAB VISIT (OUTPATIENT)
Dept: LAB | Facility: HOSPITAL | Age: 22
End: 2017-11-02
Attending: OBSTETRICS & GYNECOLOGY
Payer: MEDICARE

## 2017-11-02 VITALS
BODY MASS INDEX: 49.56 KG/M2 | WEIGHT: 279.75 LBS | SYSTOLIC BLOOD PRESSURE: 100 MMHG | DIASTOLIC BLOOD PRESSURE: 62 MMHG

## 2017-11-02 DIAGNOSIS — R42 DIZZY SPELLS: ICD-10-CM

## 2017-11-02 DIAGNOSIS — Z3A.36 36 WEEKS GESTATION OF PREGNANCY: ICD-10-CM

## 2017-11-02 DIAGNOSIS — Z36.85 ANTENATAL SCREENING FOR STREPTOCOCCUS B: Primary | ICD-10-CM

## 2017-11-02 LAB
ALBUMIN SERPL BCP-MCNC: 2.6 G/DL
ALP SERPL-CCNC: 134 U/L
ALT SERPL W/O P-5'-P-CCNC: <5 U/L
ANION GAP SERPL CALC-SCNC: 8 MMOL/L
AST SERPL-CCNC: 11 U/L
BILIRUB SERPL-MCNC: 0.4 MG/DL
BILIRUB SERPL-MCNC: NORMAL MG/DL
BLOOD URINE, POC: NORMAL
BUN SERPL-MCNC: 4 MG/DL
CALCIUM SERPL-MCNC: 9.2 MG/DL
CHLORIDE SERPL-SCNC: 105 MMOL/L
CO2 SERPL-SCNC: 22 MMOL/L
COLOR, POC UA: YELLOW
CREAT SERPL-MCNC: 0.7 MG/DL
ERYTHROCYTE [DISTWIDTH] IN BLOOD BY AUTOMATED COUNT: 14.6 %
EST. GFR  (AFRICAN AMERICAN): >60 ML/MIN/1.73 M^2
EST. GFR  (NON AFRICAN AMERICAN): >60 ML/MIN/1.73 M^2
GLUCOSE SERPL-MCNC: 99 MG/DL
GLUCOSE UR QL STRIP: NORMAL
HCT VFR BLD AUTO: 32.2 %
HGB BLD-MCNC: 10.2 G/DL
KETONES UR QL STRIP: NORMAL
LEUKOCYTE ESTERASE URINE, POC: NORMAL
MCH RBC QN AUTO: 25.8 PG
MCHC RBC AUTO-ENTMCNC: 31.7 G/DL
MCV RBC AUTO: 82 FL
NITRITE, POC UA: NORMAL
PH, POC UA: 8
PLATELET # BLD AUTO: 235 K/UL
PMV BLD AUTO: 11.2 FL
POTASSIUM SERPL-SCNC: 4.5 MMOL/L
PROT SERPL-MCNC: 6.7 G/DL
PROTEIN, POC: NORMAL
RBC # BLD AUTO: 3.95 M/UL
SODIUM SERPL-SCNC: 135 MMOL/L
SPECIFIC GRAVITY, POC UA: NORMAL
UROBILINOGEN, POC UA: NORMAL
WBC # BLD AUTO: 13.93 K/UL

## 2017-11-02 PROCEDURE — 80053 COMPREHEN METABOLIC PANEL: CPT

## 2017-11-02 PROCEDURE — 99999 PR PBB SHADOW E&M-EST. PATIENT-LVL II: CPT | Mod: PBBFAC,,, | Performed by: OBSTETRICS & GYNECOLOGY

## 2017-11-02 PROCEDURE — 87081 CULTURE SCREEN ONLY: CPT

## 2017-11-02 PROCEDURE — 85027 COMPLETE CBC AUTOMATED: CPT

## 2017-11-02 PROCEDURE — 99213 OFFICE O/P EST LOW 20 MIN: CPT | Mod: TH,S$PBB,, | Performed by: OBSTETRICS & GYNECOLOGY

## 2017-11-02 PROCEDURE — 99212 OFFICE O/P EST SF 10 MIN: CPT | Mod: PBBFAC,PN | Performed by: OBSTETRICS & GYNECOLOGY

## 2017-11-02 PROCEDURE — 36415 COLL VENOUS BLD VENIPUNCTURE: CPT | Mod: PO

## 2017-11-02 PROCEDURE — 81002 URINALYSIS NONAUTO W/O SCOPE: CPT | Mod: PBBFAC,PN | Performed by: OBSTETRICS & GYNECOLOGY

## 2017-11-02 NOTE — PROGRESS NOTES
Complaints today: light headed last few days, vaginal d/c on and off.     /62   Wt 126.9 kg (279 lb 12.2 oz)   LMP 2017   BMI 49.56 kg/m²     21 y.o., at 36w1d by Estimated Date of Delivery: 17  Patient Active Problem List   Diagnosis    Supervision of high risk pregnancy in second trimester    Bacterial vaginosis    Hematemesis with nausea    10 weeks gestation of pregnancy    14 weeks gestation of pregnancy    Low back pain during pregnancy    Pyelonephritis     OB History    Para Term  AB Living   2 1 1 0 0 1   SAB TAB Ectopic Multiple Live Births   0 0 0 0 1      # Outcome Date GA Lbr Minor/2nd Weight Sex Delivery Anes PTL Lv   2 Current            1 Term 08/29/15 39w4d  3.685 kg (8 lb 2 oz) F Vag-Spont EPI N CINTHIA          Dating reviewed    Allergies and problem list reviewed and updated    Medical and surgical history reviewed    Prenatal labs reviewed and updated    Physical Exam:  ABD: soft, gravid, nontender,    Assessment:  36 weeks, doing well  dizzy    Plan:   labs   follow up 1 Weeks, kick counts, labor precautions   GBS today

## 2017-11-04 PROBLEM — O26.899 VAGINAL DISCHARGE DURING PREGNANCY: Status: ACTIVE | Noted: 2017-11-04

## 2017-11-04 PROBLEM — N89.8 VAGINAL DISCHARGE DURING PREGNANCY: Status: ACTIVE | Noted: 2017-11-04

## 2017-11-06 ENCOUNTER — TELEPHONE (OUTPATIENT)
Dept: OBSTETRICS AND GYNECOLOGY | Facility: CLINIC | Age: 22
End: 2017-11-06

## 2017-11-06 LAB — BACTERIA SPEC AEROBE CULT: NORMAL

## 2017-11-06 NOTE — TELEPHONE ENCOUNTER
----- Message from Janett Aquino sent at 11/6/2017  2:23 PM CST -----  Contact: diana   Wants to be seen later 11 07 2017  Call back      appt rescheduled

## 2017-11-08 ENCOUNTER — ROUTINE PRENATAL (OUTPATIENT)
Dept: OBSTETRICS AND GYNECOLOGY | Facility: CLINIC | Age: 22
End: 2017-11-08
Payer: MEDICARE

## 2017-11-08 VITALS
BODY MASS INDEX: 49.68 KG/M2 | DIASTOLIC BLOOD PRESSURE: 58 MMHG | SYSTOLIC BLOOD PRESSURE: 120 MMHG | WEIGHT: 280.44 LBS

## 2017-11-08 DIAGNOSIS — Z3A.37 37 WEEKS GESTATION OF PREGNANCY: Primary | ICD-10-CM

## 2017-11-08 PROCEDURE — 99212 OFFICE O/P EST SF 10 MIN: CPT | Mod: PBBFAC,PN | Performed by: OBSTETRICS & GYNECOLOGY

## 2017-11-08 PROCEDURE — 99999 PR PBB SHADOW E&M-EST. PATIENT-LVL II: CPT | Mod: PBBFAC,,, | Performed by: OBSTETRICS & GYNECOLOGY

## 2017-11-08 PROCEDURE — 81002 URINALYSIS NONAUTO W/O SCOPE: CPT | Mod: PBBFAC,PN | Performed by: OBSTETRICS & GYNECOLOGY

## 2017-11-08 PROCEDURE — 0502F SUBSEQUENT PRENATAL CARE: CPT | Mod: ,,, | Performed by: OBSTETRICS & GYNECOLOGY

## 2017-11-08 NOTE — PROGRESS NOTES
Complaints today: NONE    BP (!) 120/58   Wt 127.2 kg (280 lb 6.8 oz)   LMP 2017   BMI 49.68 kg/m²     21 y.o., at 37w0d by Estimated Date of Delivery: 17  Patient Active Problem List   Diagnosis    Supervision of high risk pregnancy in second trimester    Bacterial vaginosis    Hematemesis with nausea    10 weeks gestation of pregnancy    14 weeks gestation of pregnancy    Low back pain during pregnancy    Pyelonephritis    Vaginal discharge during pregnancy     OB History    Para Term  AB Living   2 1 1 0 0 1   SAB TAB Ectopic Multiple Live Births   0 0 0 0 1      # Outcome Date GA Lbr Minor/2nd Weight Sex Delivery Anes PTL Lv   2 Current            1 Term 08/29/15 39w4d  3.685 kg (8 lb 2 oz) F Vag-Spont EPI N CINTHIA          Dating reviewed    Allergies and problem list reviewed and updated    Medical and surgical history reviewed    Prenatal labs reviewed and updated    Physical Exam:  ABD: soft, gravid, nontender    Assessment:  37 WEEKS, DOING WELL    Plan:   Labor precautions, request induction at 39 weeks   follow up 1 Weeks, kick counts, labor precautions

## 2017-11-10 PROBLEM — O47.9 UTERINE CONTRACTIONS DURING PREGNANCY: Status: ACTIVE | Noted: 2017-11-10

## 2017-11-14 ENCOUNTER — ROUTINE PRENATAL (OUTPATIENT)
Dept: OBSTETRICS AND GYNECOLOGY | Facility: CLINIC | Age: 22
End: 2017-11-14
Payer: MEDICARE

## 2017-11-14 VITALS — WEIGHT: 282.19 LBS | BODY MASS INDEX: 55.11 KG/M2 | SYSTOLIC BLOOD PRESSURE: 98 MMHG | DIASTOLIC BLOOD PRESSURE: 74 MMHG

## 2017-11-14 DIAGNOSIS — Z3A.37 37 WEEKS GESTATION OF PREGNANCY: Primary | ICD-10-CM

## 2017-11-14 PROCEDURE — 99999 PR PBB SHADOW E&M-EST. PATIENT-LVL II: CPT | Mod: PBBFAC,,, | Performed by: OBSTETRICS & GYNECOLOGY

## 2017-11-14 PROCEDURE — 0502F SUBSEQUENT PRENATAL CARE: CPT | Mod: S$PBB,,, | Performed by: OBSTETRICS & GYNECOLOGY

## 2017-11-14 PROCEDURE — 99212 OFFICE O/P EST SF 10 MIN: CPT | Mod: PBBFAC,PN | Performed by: OBSTETRICS & GYNECOLOGY

## 2017-11-16 ENCOUNTER — ROUTINE PRENATAL (OUTPATIENT)
Dept: OBSTETRICS AND GYNECOLOGY | Facility: CLINIC | Age: 22
End: 2017-11-16
Payer: MEDICARE

## 2017-11-16 VITALS — WEIGHT: 281.5 LBS | BODY MASS INDEX: 54.98 KG/M2 | SYSTOLIC BLOOD PRESSURE: 120 MMHG | DIASTOLIC BLOOD PRESSURE: 80 MMHG

## 2017-11-16 DIAGNOSIS — Z3A.38 38 WEEKS GESTATION OF PREGNANCY: Primary | ICD-10-CM

## 2017-11-16 LAB
BILIRUB SERPL-MCNC: NEGATIVE MG/DL
BLOOD URINE, POC: NEGATIVE
COLOR, POC UA: NORMAL
GLUCOSE UR QL STRIP: NORMAL
KETONES UR QL STRIP: NEGATIVE
LEUKOCYTE ESTERASE URINE, POC: NORMAL
NITRITE, POC UA: NEGATIVE
PH, POC UA: 7
PROTEIN, POC: NEGATIVE
SPECIFIC GRAVITY, POC UA: NORMAL
UROBILINOGEN, POC UA: NORMAL

## 2017-11-16 PROCEDURE — 0502F SUBSEQUENT PRENATAL CARE: CPT | Mod: S$PBB,,, | Performed by: SPECIALIST

## 2017-11-16 PROCEDURE — 99212 OFFICE O/P EST SF 10 MIN: CPT | Mod: PBBFAC,PN | Performed by: SPECIALIST

## 2017-11-16 PROCEDURE — 81002 URINALYSIS NONAUTO W/O SCOPE: CPT | Mod: PBBFAC,PN | Performed by: SPECIALIST

## 2017-11-16 PROCEDURE — 99999 PR PBB SHADOW E&M-EST. PATIENT-LVL II: CPT | Mod: PBBFAC,,, | Performed by: SPECIALIST

## 2017-11-16 NOTE — PROGRESS NOTES
Pt Dr Felder retruns for care. Pt doing well but requests MIL at 39 weeks. Good fetal movement. Pt with sucessful  of term fetus.  I discussed the associated increased risks of  as well as fetal nonreassurring monitoring associated with MIL.  Exam reveals cervix FT/40/-1 vertex  Pt voices understanding of the above risks and desires to proceed with MIL next week  Plan Will schedule

## 2017-11-17 ENCOUNTER — TELEPHONE (OUTPATIENT)
Dept: OBSTETRICS AND GYNECOLOGY | Facility: CLINIC | Age: 22
End: 2017-11-17

## 2017-11-17 NOTE — TELEPHONE ENCOUNTER
Pt states she was checked yesterday, scheduled for induction next week, woke up this morning with a mucus all done her legs, vaginal area, bed, she states it is like a green mucus color, is there any reason to be concerned? No pain, no bleeding.

## 2017-11-18 PROBLEM — N89.8 VAGINAL DISCHARGE DURING PREGNANCY IN THIRD TRIMESTER: Status: ACTIVE | Noted: 2017-11-18

## 2017-11-18 PROBLEM — O26.893 VAGINAL DISCHARGE DURING PREGNANCY IN THIRD TRIMESTER: Status: ACTIVE | Noted: 2017-11-18

## 2018-02-28 ENCOUNTER — OFFICE VISIT (OUTPATIENT)
Dept: FAMILY MEDICINE | Facility: CLINIC | Age: 23
End: 2018-02-28
Payer: MEDICARE

## 2018-02-28 ENCOUNTER — HOSPITAL ENCOUNTER (OUTPATIENT)
Dept: RADIOLOGY | Facility: HOSPITAL | Age: 23
Discharge: HOME | End: 2018-02-28
Attending: PHYSICIAN ASSISTANT
Payer: MEDICARE

## 2018-02-28 ENCOUNTER — TELEPHONE (OUTPATIENT)
Dept: FAMILY MEDICINE | Facility: CLINIC | Age: 23
End: 2018-02-28

## 2018-02-28 VITALS
WEIGHT: 242.5 LBS | SYSTOLIC BLOOD PRESSURE: 110 MMHG | DIASTOLIC BLOOD PRESSURE: 70 MMHG | BODY MASS INDEX: 44.63 KG/M2 | HEART RATE: 72 BPM | HEIGHT: 62 IN

## 2018-02-28 DIAGNOSIS — M26.622 ARTHRALGIA OF LEFT TEMPOROMANDIBULAR JOINT: Primary | ICD-10-CM

## 2018-02-28 DIAGNOSIS — S09.90XA TRAUMATIC INJURY OF HEAD, INITIAL ENCOUNTER: ICD-10-CM

## 2018-02-28 DIAGNOSIS — H72.92 PERFORATED EAR DRUM, LEFT: ICD-10-CM

## 2018-02-28 PROCEDURE — 70450 CT HEAD/BRAIN W/O DYE: CPT | Mod: TC,PO

## 2018-02-28 PROCEDURE — 99214 OFFICE O/P EST MOD 30 MIN: CPT | Mod: S$PBB,,, | Performed by: PHYSICIAN ASSISTANT

## 2018-02-28 PROCEDURE — 70450 CT HEAD/BRAIN W/O DYE: CPT | Mod: 26,,, | Performed by: RADIOLOGY

## 2018-02-28 PROCEDURE — 99214 OFFICE O/P EST MOD 30 MIN: CPT | Mod: PBBFAC,25,PO | Performed by: PHYSICIAN ASSISTANT

## 2018-02-28 PROCEDURE — 99999 PR PBB SHADOW E&M-EST. PATIENT-LVL IV: CPT | Mod: PBBFAC,,, | Performed by: PHYSICIAN ASSISTANT

## 2018-02-28 RX ORDER — PENICILLIN V POTASSIUM 500 MG/1
TABLET, FILM COATED ORAL
COMMUNITY
Start: 2018-01-10 | End: 2018-02-28

## 2018-02-28 RX ORDER — HYDROXYZINE PAMOATE 50 MG/1
50 CAPSULE ORAL 4 TIMES DAILY
COMMUNITY
End: 2018-05-02

## 2018-02-28 RX ORDER — DICLOFENAC SODIUM 50 MG/1
50 TABLET, DELAYED RELEASE ORAL 2 TIMES DAILY
Qty: 20 TABLET | Refills: 0 | Status: SHIPPED | OUTPATIENT
Start: 2018-02-28 | End: 2018-03-10
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
## 2018-02-28 NOTE — PATIENT INSTRUCTIONS
Ruptured Eardrum, Traumatic    The eardrum is a thin membrane about one inch from the opening of the ear canal. It is easily injured by objects put into the ear canal. It may also be injured by a loud noise close to the ear or a slap to the ear. A ruptured eardrum will cause pain. There may be some clear or bloody drainage. A buzzing sound may be heard in the ear. Some hearing may be lost the affected ear.  The goal is to keep the ear dry and clean until the eardrum heals. Antibiotics may be prescribed if infection is present. Follow-up with ear and hearing specialists is advised. In many cases, hearing returns to normal after the eardrum heals.  Home care  · Keep a cotton ball in the ear to keep it clean and protect the inner ear.  · Do not use eardrops unless prescribed by the healthcare provider.  · Do not get water in your ear when showering or bathing. No swimming until approved by the healthcare provider.  · Take any prescription or over-the-counter medicines as advised.  Follow-up care  Follow up with specialists for test or exams as directed. A hearing test should be done soon after the injury. Also follow up within 2 weeks to check healing of the eardrum.  When to seek medical advice  Fever in children:  · Child is 3 months old or younger and has a fever of 100.4°F (38°C) or higher. (Get medical care right away. Fever in a young baby can be a sign of a dangerous infection.)  · Child is younger than 2 years of age and has a fever of 100.4°F (38°C) that continues for more than 1 day.  · Child is 2 years old or older and has a fever of 100.4°F (38°C) that continues for more than 3 days.  · Child is of any age and has repeated fevers above 104°F (40°C).  Fever in adults:  · Fever of 100.4°F (38°C)  Also call for any of the following:  · Fluid drainage from the ear for longer than 24 hours  · Increasing ear pain  · Worsening headache or dizziness  · Worsening hearing loss  Date Last Reviewed: 5/3/2015  ©  1571-9346 [employer]. [address] [place] [address], [place] [address]. All rights reserved. This information is not intended as a substitute for professional medical care. Always follow your healthcare professional's instructions.

## 2018-02-28 NOTE — TELEPHONE ENCOUNTER
----- Message from Neelam Noe sent at 2/28/2018  2:31 PM CST -----  Contact: pt  Pt states that she would like to see if the PA will call in some pain medication for her face and ears,forgot to ask when there.....[phone]        .  [facility]/pharmacy #[id number] - [place] - [address] AT [facility]  [address] [address]  Phone: [phone] Fax: [phone]

## 2018-03-02 ENCOUNTER — TELEPHONE (OUTPATIENT)
Dept: FAMILY MEDICINE | Facility: CLINIC | Age: 23
End: 2018-03-02

## 2018-03-02 NOTE — TELEPHONE ENCOUNTER
Spoke to patient and stated to her what was noted from telephone encounter on 2/28/18. Verbalized understanding.

## 2018-03-02 NOTE — TELEPHONE ENCOUNTER
----- Message from Elaine Keyes sent at 3/2/2018 10:15 AM CST -----  Contact: patient   Patient returning a missed call. Please advise. Call to pod.   Call back    Thanks!

## 2018-03-02 NOTE — TELEPHONE ENCOUNTER
----- Message from Diane Aldrich sent at 3/2/2018 10:44 AM CST -----  Contact: Patient  Patient is returning phone call from Elizabeth.  Call placed to pod, no answer.  Call Back#[phone]  Thanks

## 2018-03-02 NOTE — TELEPHONE ENCOUNTER
Attempted to contact patient. Unable to leave message. Third attempt to contact patient. Closing message.

## 2018-03-12 ENCOUNTER — TELEPHONE (OUTPATIENT)
Dept: OTOLARYNGOLOGY | Facility: CLINIC | Age: 23
End: 2018-03-12
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
## 2018-03-14 ENCOUNTER — OFFICE VISIT (OUTPATIENT)
Dept: OTOLARYNGOLOGY | Facility: CLINIC | Age: 23
End: 2018-03-14
Payer: MEDICARE

## 2018-03-14 ENCOUNTER — CLINICAL SUPPORT (OUTPATIENT)
Dept: AUDIOLOGY | Facility: CLINIC | Age: 23
End: 2018-03-14
Payer: MEDICARE

## 2018-03-14 VITALS — HEIGHT: 62 IN | WEIGHT: 242 LBS | BODY MASS INDEX: 44.53 KG/M2

## 2018-03-14 DIAGNOSIS — H90.6 MIXED HEARING LOSS, BILATERAL: Primary | ICD-10-CM

## 2018-03-14 DIAGNOSIS — H90.6 MIXED CONDUCTIVE AND SENSORINEURAL HEARING LOSS OF BOTH EARS: ICD-10-CM

## 2018-03-14 DIAGNOSIS — H72.92 TYMPANIC MEMBRANE PERFORATION, LEFT: ICD-10-CM

## 2018-03-14 DIAGNOSIS — H93.13 BILATERAL TINNITUS: ICD-10-CM

## 2018-03-14 PROCEDURE — 92567 TYMPANOMETRY: CPT | Mod: PBBFAC,PO | Performed by: AUDIOLOGIST

## 2018-03-14 PROCEDURE — 99999 PR PBB SHADOW E&M-EST. PATIENT-LVL II: CPT | Mod: PBBFAC,,, | Performed by: OTOLARYNGOLOGY

## 2018-03-14 PROCEDURE — 99204 OFFICE O/P NEW MOD 45 MIN: CPT | Mod: S$PBB,,, | Performed by: OTOLARYNGOLOGY

## 2018-03-14 PROCEDURE — 99212 OFFICE O/P EST SF 10 MIN: CPT | Mod: PBBFAC,27,PO | Performed by: OTOLARYNGOLOGY

## 2018-03-14 PROCEDURE — 99999 PR PBB SHADOW E&M-EST. PATIENT-LVL I: CPT | Mod: PBBFAC,,,

## 2018-03-14 PROCEDURE — 99211 OFF/OP EST MAY X REQ PHY/QHP: CPT | Mod: PBBFAC,PO

## 2018-03-14 PROCEDURE — 92557 COMPREHENSIVE HEARING TEST: CPT | Mod: PBBFAC,PO | Performed by: AUDIOLOGIST
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
## 2018-03-14 NOTE — LETTER
March 14, 2018      Antoine Guevara MD  [address] [facility] [address]  [facility] [address]           [facility]  [address] [facility] [address] [place] [address]  Phone: [phone]  Fax: [phone]          Patient: Alexandra Martin   MR Number: [id number]   YOB: 1995   Date of Visit: 3/14/2018       Dear Dr. Antoine Guevara:    Thank you for referring Alexandra Martin to me for evaluation. Attached you will find relevant portions of my assessment and plan of care.    If you have questions, please do not hesitate to call me. I look forward to following Alexandra Martin along with you.    Sincerely,    Valeriy Knox MD    Enclosure  CC:  No Recipients    If you would like to receive this communication electronically, please contact [email or web address] or [phone] to request more information on [employer] access.    For providers and/or their staff who would like to refer a patient to [facility], please contact us through our one-stop-shop provider referral line, [facility], at [phone].    If you feel you have received this communication in error or would no longer like to receive these types of communications, please e-mail [email or web address]
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
## 2018-04-16 PROBLEM — Z3A.10 10 WEEKS GESTATION OF PREGNANCY: Status: RESOLVED | Noted: 2017-05-08 | Resolved: 2018-04-16

## 2018-04-27 ENCOUNTER — TELEPHONE (OUTPATIENT)
Dept: FAMILY MEDICINE | Facility: CLINIC | Age: 23
End: 2018-04-27

## 2018-04-27 ENCOUNTER — NURSE TRIAGE (OUTPATIENT)
Dept: ADMINISTRATIVE | Facility: CLINIC | Age: 23
End: 2018-04-27

## 2018-04-27 NOTE — TELEPHONE ENCOUNTER
Spoke to patient and states the past few day she has been dizzy and not feeling well. States she woke up this AM with chest pain, dizziness and sweating. Advised patient to go to ED to be evaluated. States she has someone that can bring her and she will go to [place].

## 2018-04-27 NOTE — TELEPHONE ENCOUNTER
----- Message from Elaine Keyes sent at 4/27/2018 10:32 AM CDT -----  Contact: patient   Patient calling to speak to the Nurse. She states that she is lightheaded and dizzy. No available appointments until Monday, 04/30/18. Please advise. Called on-call Nurse. Call connected. Warm transferred.   Call back    Thanks!

## 2018-04-27 NOTE — TELEPHONE ENCOUNTER
Reason for Disposition   Visible sweat on face or sweat dripping down face    Protocols used: ST CHEST PAIN-A-OH    Alexandra is calling to report dizziness, tightness in chest and states she has sweat dripping down her face.  Per protocol advised to call 911 now.  Asks why.  Advised this could be significant for issue with heart.  Advised to call 911 now.  States alright.

## 2018-05-02 ENCOUNTER — OFFICE VISIT (OUTPATIENT)
Dept: FAMILY MEDICINE | Facility: CLINIC | Age: 23
End: 2018-05-02
Payer: MEDICARE

## 2018-05-02 VITALS
DIASTOLIC BLOOD PRESSURE: 82 MMHG | BODY MASS INDEX: 43 KG/M2 | WEIGHT: 233.69 LBS | HEART RATE: 75 BPM | TEMPERATURE: 98 F | HEIGHT: 62 IN | OXYGEN SATURATION: 98 % | RESPIRATION RATE: 18 BRPM | SYSTOLIC BLOOD PRESSURE: 100 MMHG

## 2018-05-02 DIAGNOSIS — F41.9 ANXIETY: ICD-10-CM

## 2018-05-02 DIAGNOSIS — R42 VERTIGO: Primary | ICD-10-CM

## 2018-05-02 PROCEDURE — 99999 PR PBB SHADOW E&M-EST. PATIENT-LVL III: CPT | Mod: PBBFAC,,, | Performed by: FAMILY MEDICINE

## 2018-05-02 PROCEDURE — 99213 OFFICE O/P EST LOW 20 MIN: CPT | Mod: PBBFAC,PO | Performed by: FAMILY MEDICINE

## 2018-05-02 PROCEDURE — 99214 OFFICE O/P EST MOD 30 MIN: CPT | Mod: S$PBB,,, | Performed by: FAMILY MEDICINE

## 2018-05-02 RX ORDER — MECLIZINE HYDROCHLORIDE 25 MG/1
25 TABLET ORAL 3 TIMES DAILY PRN
Qty: 30 TABLET | Refills: 2 | Status: SHIPPED | OUTPATIENT
Start: 2018-05-02 | End: 2018-10-10

## 2018-05-02 RX ORDER — BUSPIRONE HYDROCHLORIDE 5 MG/1
5 TABLET ORAL 3 TIMES DAILY
Qty: 90 TABLET | Refills: 11 | Status: SHIPPED | OUTPATIENT
Start: 2018-05-02 | End: 2018-08-06 | Stop reason: ALTCHOICE
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
## 2018-05-14 PROBLEM — Z3A.14 14 WEEKS GESTATION OF PREGNANCY: Status: RESOLVED | Noted: 2017-06-02 | Resolved: 2018-05-14

## 2018-07-26 ENCOUNTER — HOSPITAL ENCOUNTER (EMERGENCY)
Facility: HOSPITAL | Age: 23
Discharge: HOME | End: 2018-07-26
Attending: EMERGENCY MEDICINE
Payer: MEDICARE

## 2018-07-26 VITALS
BODY MASS INDEX: 40.75 KG/M2 | WEIGHT: 230 LBS | HEIGHT: 63 IN | HEART RATE: 74 BPM | TEMPERATURE: 98 F | SYSTOLIC BLOOD PRESSURE: 126 MMHG | OXYGEN SATURATION: 100 % | RESPIRATION RATE: 18 BRPM | DIASTOLIC BLOOD PRESSURE: 75 MMHG

## 2018-07-26 DIAGNOSIS — F41.0 PANIC ATTACKS: Primary | ICD-10-CM

## 2018-07-26 PROCEDURE — 25000003 PHARM REV CODE 250: Performed by: EMERGENCY MEDICINE

## 2018-07-26 PROCEDURE — 99283 EMERGENCY DEPT VISIT LOW MDM: CPT

## 2018-07-26 RX ORDER — LORAZEPAM 1 MG/1
2 TABLET ORAL
Status: COMPLETED | OUTPATIENT
Start: 2018-07-26 | End: 2018-07-26

## 2018-07-26 RX ORDER — LORAZEPAM 1 MG/1
0.5 TABLET ORAL EVERY 6 HOURS PRN
Qty: 20 TABLET | Refills: 0 | Status: SHIPPED | OUTPATIENT
Start: 2018-07-26 | End: 2018-12-11

## 2018-07-26 RX ADMIN — LORAZEPAM 2 MG: 1 TABLET ORAL at 10:07

## 2018-07-27 NOTE — ED NOTES
"Pt presents to the ED with c/o a panic attack. Pt reports "I have had 3 panic attacks today and I have had them before in the past. I don't have any other stressors other than midterms at school." pt denies cp, sob, n/v, weakness, headache, or visual changes at this time. rr even and unlabored on ra. Nadn. Pt mother at bedside.       APPEARANCE: Alert, oriented and in no acute distress.  CARDIAC: Normal rate and rhythm.   PERIPHERAL VASCULAR: peripheral pulses present. Normal cap refill. No edema. Warm to touch.    RESPIRATORY:Normal rate and effort, breath sounds clear bilaterally throughout chest. Respirations are equal and unlabored no obvious signs of distress.  GASTRO: soft, bowel sounds normal, no tenderness, no abdominal distention.  MUSC: Full ROM. No bony tenderness or soft tissue tenderness. No obvious deformity.  SKIN: Skin is warm and dry, normal skin turgor, mucous membranes moist.  NEURO: 5/5 strength major flexors/extensors bilaterally. Sensory intact to light touch bilaterally. [place] coma scale: eyes open spontaneously-4, oriented & converses-5, obeys commands-6. No neurological abnormalities.   MENTAL STATUS: awake, alert and aware of environment.  EYE: PERRL, both eyes: pupils brisk and reactive to light. Normal size.  ENT: EARS: no obvious drainage. NOSE: no active bleeding.        "

## 2018-07-27 NOTE — ED PROVIDER NOTES
"Encounter Date: 7/26/2018    SCRIBE #1 NOTE: I, Kinga Garcia, am scribing for, and in the presence of,  Dr. Estrada. I have scribed the entire note.       History     Chief Complaint   Patient presents with    Panic Attack     Patient presents to the ED with reports of having "panic attacks". States having three episodes today. No treatment prior to arrival. Denies any drug use. States having normal stress from school but no additional stressors.      Alexandra Martin is a 22 y.o. female who  has a past medical history of ADD (attention deficit disorder); Depression; History of ovarian cyst; Obesity; Substance abuse; and Vertigo.    The patient presents to the ED due to panic attack. She reports onset of symptoms was several hours ago. She notes she feels like her heart is going to come out of her chest. The patient reports she feels anxious. She has associated shortness of breath, confusion, headache, and nausea. She also notes she has not been eating or sleeping. The patient admits to ceasing use of Buspar suddenly. She denies having any anxiety medications.       The history is provided by the patient.     Review of patient's allergies indicates:   Allergen Reactions    Azithromycin Nausea And Vomiting     Hard to breathe      Past Medical History:   Diagnosis Date    ADD (attention deficit disorder)     Depression     History of ovarian cyst     Obesity     Substance abuse     Hospitalization     Vertigo      Past Surgical History:   Procedure Laterality Date    TONSILLECTOMY, ADENOIDECTOMY, BILATERAL MYRINGOTOMY AND TUBES       Family History   Problem Relation Age of Onset    Breast cancer Maternal Aunt     Colon cancer Neg Hx     Miscarriages / Stillbirths Neg Hx     Ovarian cancer Neg Hx      Social History   Substance Use Topics    Smoking status: Current Every Day Smoker     Packs/day: 0.25     Types: Cigarettes    Smokeless tobacco: Never Used    Alcohol use No     Review of " Systems   Respiratory: Positive for shortness of breath.    Cardiovascular: Positive for palpitations.   Psychiatric/Behavioral: Positive for sleep disturbance. The patient is nervous/anxious.    All other systems reviewed and are negative.      Physical Exam     Initial Vitals [07/26/18 2209]   BP Pulse Resp Temp SpO2   (!) 140/87 73 20 97.6 °F (36.4 °C) 98 %      MAP       --         Physical Exam    Nursing note and vitals reviewed.  Constitutional: She appears well-developed and well-nourished. She is not diaphoretic. No distress.   HENT:   Head: Normocephalic and atraumatic.   Mouth/Throat: Oropharynx is clear and moist.   Eyes: Conjunctivae and EOM are normal. Pupils are equal, round, and reactive to light.   Neck: Normal range of motion. Neck supple.   Cardiovascular: Normal rate, regular rhythm and normal heart sounds. Exam reveals no gallop and no friction rub.    No murmur heard.  Pulmonary/Chest: Breath sounds normal. She has no wheezes. She has no rhonchi. She has no rales.   Abdominal: Soft. Bowel sounds are normal. There is no tenderness. There is no rebound and no guarding.   Musculoskeletal: Normal range of motion. She exhibits no edema or tenderness.   Lymphadenopathy:     She has no cervical adenopathy.   Neurological: She is alert and oriented to person, place, and time. She has normal strength.   Skin: Skin is warm and dry. Capillary refill takes less than 2 seconds. No rash noted.   Psychiatric: Her mood appears anxious.         ED Course   Procedures  Labs Reviewed - No data to display       Imaging Results    None          Medical Decision Making:   ED Management:  Pt will be dc'd w ativan. Instructed to f/u w psychiatry for management              Attending Attestation:           Physician Attestation for Scribe:  Physician Attestation Statement for Scribe #1: I, Alejandro Estrada, reviewed documentation, as scribed by Kinga Garcia in my presence, and it is both accurate and complete.                     Clinical Impression:     1. Panic attacks        Disposition:   Disposition: Discharged  Condition: Stable                        Alejandro Estrada MD  07/26/18 3692

## 2018-07-31 ENCOUNTER — OFFICE VISIT (OUTPATIENT)
Dept: URGENT CARE | Facility: CLINIC | Age: 23
End: 2018-07-31
Payer: MEDICARE

## 2018-07-31 VITALS
OXYGEN SATURATION: 98 % | HEART RATE: 91 BPM | HEIGHT: 63 IN | BODY MASS INDEX: 40.75 KG/M2 | TEMPERATURE: 98 F | SYSTOLIC BLOOD PRESSURE: 141 MMHG | RESPIRATION RATE: 16 BRPM | WEIGHT: 230 LBS | DIASTOLIC BLOOD PRESSURE: 60 MMHG

## 2018-07-31 DIAGNOSIS — J02.9 SORE THROAT: Primary | ICD-10-CM

## 2018-07-31 DIAGNOSIS — K12.1 STOMATITIS: ICD-10-CM

## 2018-07-31 DIAGNOSIS — R11.2 NON-INTRACTABLE VOMITING WITH NAUSEA, UNSPECIFIED VOMITING TYPE: ICD-10-CM

## 2018-07-31 DIAGNOSIS — J06.9 URI, ACUTE: ICD-10-CM

## 2018-07-31 LAB
B-HCG UR QL: NEGATIVE
CTP QC/QA: YES
CTP QC/QA: YES
S PYO RRNA THROAT QL PROBE: NEGATIVE

## 2018-07-31 PROCEDURE — 81025 URINE PREGNANCY TEST: CPT | Mod: S$GLB,,, | Performed by: FAMILY MEDICINE

## 2018-07-31 PROCEDURE — 87880 STREP A ASSAY W/OPTIC: CPT | Mod: QW,S$GLB,, | Performed by: FAMILY MEDICINE

## 2018-07-31 PROCEDURE — 99214 OFFICE O/P EST MOD 30 MIN: CPT | Mod: S$GLB,,, | Performed by: FAMILY MEDICINE

## 2018-07-31 NOTE — PROGRESS NOTES
"Subjective:       Patient ID: Alexandra Martin is a 22 y.o. female.    Vitals:  height is 5' 3" (1.6 m) and weight is 104.3 kg (230 lb). Her oral temperature is 98.1 °F (36.7 °C). Her blood pressure is 141/60 (abnormal) and her pulse is 91. Her respiration is 16 and oxygen saturation is 98%.     Chief Complaint: Sore Throat    Patient states her symptoms started on 7/30/2018. Also vomitted  X once this am, after further questioning states  Was Tx a week ago with ? ABX  For UTI. Denies fever no more dysuria.      Sore Throat    This is a new problem. The current episode started yesterday. The problem has been unchanged. There has been no fever. The pain is at a severity of 7/10. The pain is moderate. Associated symptoms include ear pain, headaches and trouble swallowing. Pertinent negatives include no abdominal pain, diarrhea, shortness of breath or vomiting. She has tried nothing for the symptoms.     Review of Systems   Constitution: Negative for chills and fever.   HENT: Positive for ear pain, sore throat and trouble swallowing.    Eyes: Negative for blurred vision.   Cardiovascular: Negative for chest pain.   Respiratory: Negative for shortness of breath.    Skin: Negative for rash.   Musculoskeletal: Negative for back pain and joint pain.   Gastrointestinal: Negative for abdominal pain, diarrhea, nausea and vomiting.   Neurological: Positive for headaches.   Psychiatric/Behavioral: The patient is not nervous/anxious.        Objective:      Physical Exam   Constitutional: No distress.   HENT:   Slightly blistery tongue, no LAP   Cardiovascular: Exam reveals no friction rub.    No murmur heard.  Pulmonary/Chest: No respiratory distress. She has no wheezes. She has no rales.   Abdominal: She exhibits distension. There is no tenderness. There is no guarding.   Lymphadenopathy:     She has no cervical adenopathy.       Assessment:       No diagnosis found.    Plan:         There are no diagnoses linked to " this encounter.      Reassurance, Gargle with cold water

## 2018-07-31 NOTE — LETTER
July 31, 2018      [facility] [place] [address] [address]  Phone: [phone]  Fax: [phone]       Patient: Alexandra Martin   YOB: 1995  Date of Visit: 07/31/2018    To Whom It May Concern:    New Martin  was at [facility] on 07/31/2018. She may return to work/school on 08/01/2018 with no restrictions. If you have any questions or concerns, or if I can be of further assistance, please do not hesitate to contact me.    Sincerely,    Mayco Lance MA

## 2018-08-01 ENCOUNTER — HOSPITAL ENCOUNTER (EMERGENCY)
Facility: HOSPITAL | Age: 23
Discharge: HOME | End: 2018-08-01
Attending: EMERGENCY MEDICINE
Payer: MEDICARE

## 2018-08-01 VITALS
BODY MASS INDEX: 40.75 KG/M2 | HEART RATE: 81 BPM | OXYGEN SATURATION: 97 % | DIASTOLIC BLOOD PRESSURE: 66 MMHG | RESPIRATION RATE: 18 BRPM | TEMPERATURE: 98 F | WEIGHT: 230 LBS | HEIGHT: 63 IN | SYSTOLIC BLOOD PRESSURE: 131 MMHG

## 2018-08-01 DIAGNOSIS — J06.9 UPPER RESPIRATORY TRACT INFECTION, UNSPECIFIED TYPE: Primary | ICD-10-CM

## 2018-08-01 PROCEDURE — 99283 EMERGENCY DEPT VISIT LOW MDM: CPT

## 2018-08-01 NOTE — ED NOTES
Patient identifiers for Alexandra Martin checked and correct.    LOC: The patient is awake, alert and aware of environment with an appropriate affect, the patient is oriented x 3 and speaking appropriately.  APPEARANCE: Patient resting comfortably and in no acute distress, patient is clean and well groomed, patient's clothing are properly fastened.  SKIN: The skin is warm and dry, patient has age appropriate skin turgor and moist mucus membranes, skin intact, no breakdown or bruising noted.  MUSCULOSKELETAL: Patient moving all extremities well, no obvious swelling or deformities noted.  RESPIRATORY: Airway is open and patent, respirations are spontaneous, patient has a normal effort and rate, no accessory muscle use noted.  Clear breath sounds bilaterally.  CARDIAC: Patient has a normal rate and rhythm, no periphreal edema noted, capillary refill < 3 seconds.  ABDOMEN: Soft and non tender to palpation, no distention noted.  Normoactive bowel sounds x4.  NEUROLOGIC: PERRL, facial expression is symmetrical, bilateral hand grasp equal and even, normal sensation in all extremities when touched with a finger.

## 2018-08-01 NOTE — DISCHARGE INSTRUCTIONS
over the counter cold medicine such as DAY-QUIL for your symptoms.    Thank you for choosing [facility]! We appreciate you coming to us for your medical care. We hope you feel better soon! Please come back to [facility] for all of your future medical needs.      Sincerely,    Rob Ruvalcaba MD  Medical Director  Emergency Department
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
## 2018-08-03 ENCOUNTER — HOSPITAL ENCOUNTER (INPATIENT)
Facility: HOSPITAL | Age: 23
LOS: 1 days | Discharge: HOME | DRG: 074 | End: 2018-08-04
Attending: EMERGENCY MEDICINE | Admitting: PSYCHIATRY & NEUROLOGY
Payer: MEDICARE

## 2018-08-03 DIAGNOSIS — I63.312 THROMBOTIC STROKE INVOLVING LEFT MIDDLE CEREBRAL ARTERY: ICD-10-CM

## 2018-08-03 DIAGNOSIS — I63.9 STROKE: ICD-10-CM

## 2018-08-03 PROBLEM — R53.1 RIGHT SIDED WEAKNESS: Status: ACTIVE | Noted: 2018-08-03

## 2018-08-03 PROBLEM — F12.10 MARIJUANA ABUSE: Status: ACTIVE | Noted: 2018-08-03

## 2018-08-03 PROBLEM — N39.0 UTI (URINARY TRACT INFECTION): Status: ACTIVE | Noted: 2018-08-03

## 2018-08-03 LAB
ALBUMIN SERPL BCP-MCNC: 4 G/DL
ALP SERPL-CCNC: 102 U/L
ALT SERPL W/O P-5'-P-CCNC: 20 U/L
AMPHET+METHAMPHET UR QL: NEGATIVE
ANION GAP SERPL CALC-SCNC: 10 MMOL/L
AST SERPL-CCNC: 18 U/L
B-HCG UR QL: NEGATIVE
BACTERIA #/AREA URNS AUTO: ABNORMAL /HPF
BARBITURATES UR QL SCN>200 NG/ML: NEGATIVE
BASOPHILS # BLD AUTO: 0.03 K/UL
BASOPHILS NFR BLD: 0.3 %
BENZODIAZ UR QL SCN>200 NG/ML: NEGATIVE
BILIRUB SERPL-MCNC: 1 MG/DL
BILIRUB UR QL STRIP: NEGATIVE
BUN SERPL-MCNC: 9 MG/DL
BZE UR QL SCN: NEGATIVE
CALCIUM SERPL-MCNC: 9.8 MG/DL
CANNABINOIDS UR QL SCN: ABNORMAL
CHLORIDE SERPL-SCNC: 107 MMOL/L
CHOLEST SERPL-MCNC: 162 MG/DL
CHOLEST/HDLC SERPL: 5.1 {RATIO}
CLARITY UR REFRACT.AUTO: ABNORMAL
CO2 SERPL-SCNC: 23 MMOL/L
COLOR UR AUTO: ABNORMAL
CREAT SERPL-MCNC: 0.8 MG/DL
CREAT SERPL-MCNC: 0.8 MG/DL (ref 0.5–1.4)
CREAT UR-MCNC: 344 MG/DL
CTP QC/QA: YES
DIASTOLIC DYSFUNCTION: NO
DIFFERENTIAL METHOD: NORMAL
EOSINOPHIL # BLD AUTO: 0.1 K/UL
EOSINOPHIL NFR BLD: 0.9 %
ERYTHROCYTE [DISTWIDTH] IN BLOOD BY AUTOMATED COUNT: 14.2 %
EST. GFR  (AFRICAN AMERICAN): >60 ML/MIN/1.73 M^2
EST. GFR  (NON AFRICAN AMERICAN): >60 ML/MIN/1.73 M^2
ESTIMATED AVG GLUCOSE: 88 MG/DL
ESTIMATED PA SYSTOLIC PRESSURE: 21.32
GLUCOSE SERPL-MCNC: 88 MG/DL
GLUCOSE UR QL STRIP: NEGATIVE
HBA1C MFR BLD HPLC: 4.7 %
HCT VFR BLD AUTO: 42.6 %
HDLC SERPL-MCNC: 32 MG/DL
HDLC SERPL: 19.8 %
HGB BLD-MCNC: 13.9 G/DL
HGB UR QL STRIP: NEGATIVE
HYALINE CASTS UR QL AUTO: 0 /LPF
IMM GRANULOCYTES # BLD AUTO: 0.03 K/UL
IMM GRANULOCYTES NFR BLD AUTO: 0.3 %
INR PPP: 1
KETONES UR QL STRIP: NEGATIVE
LDLC SERPL CALC-MCNC: 106.6 MG/DL
LEUKOCYTE ESTERASE UR QL STRIP: ABNORMAL
LYMPHOCYTES # BLD AUTO: 2.9 K/UL
LYMPHOCYTES NFR BLD: 31.3 %
MCH RBC QN AUTO: 27.2 PG
MCHC RBC AUTO-ENTMCNC: 32.6 G/DL
MCV RBC AUTO: 83 FL
METHADONE UR QL SCN>300 NG/ML: NEGATIVE
MICROSCOPIC COMMENT: ABNORMAL
MITRAL VALVE MOBILITY: NORMAL
MONOCYTES # BLD AUTO: 0.6 K/UL
MONOCYTES NFR BLD: 6.8 %
NEUTROPHILS # BLD AUTO: 5.6 K/UL
NEUTROPHILS NFR BLD: 60.4 %
NITRITE UR QL STRIP: NEGATIVE
NONHDLC SERPL-MCNC: 130 MG/DL
NRBC BLD-RTO: 0 /100 WBC
OPIATES UR QL SCN: NEGATIVE
PCP UR QL SCN>25 NG/ML: NEGATIVE
PH UR STRIP: 6 [PH] (ref 5–8)
PLATELET # BLD AUTO: 213 K/UL
PMV BLD AUTO: 11.2 FL
POC PTINR: 0.9 (ref 0.9–1.2)
POC PTWBT: 11.3 SEC (ref 9.7–14.3)
POTASSIUM SERPL-SCNC: 4.3 MMOL/L
PROT SERPL-MCNC: 7.3 G/DL
PROT UR QL STRIP: ABNORMAL
PROTHROMBIN TIME: 10.6 SEC
RBC # BLD AUTO: 5.11 M/UL
RBC #/AREA URNS AUTO: 3 /HPF (ref 0–4)
RETIRED EF AND QEF - SEE NOTES: 58 (ref 55–65)
SAMPLE: NORMAL
SAMPLE: NORMAL
SODIUM SERPL-SCNC: 140 MMOL/L
SP GR UR STRIP: 1.03 (ref 1–1.03)
SQUAMOUS #/AREA URNS AUTO: 41 /HPF
TOXICOLOGY INFORMATION: ABNORMAL
TRICUSPID VALVE REGURGITATION: NORMAL
TRIGL SERPL-MCNC: 117 MG/DL
TSH SERPL DL<=0.005 MIU/L-ACNC: 0.92 UIU/ML
URN SPEC COLLECT METH UR: ABNORMAL
UROBILINOGEN UR STRIP-ACNC: 1 EU/DL
WBC # BLD AUTO: 9.32 K/UL
WBC #/AREA URNS AUTO: 89 /HPF (ref 0–5)

## 2018-08-03 PROCEDURE — 25500020 PHARM REV CODE 255: Performed by: EMERGENCY MEDICINE

## 2018-08-03 PROCEDURE — 80061 LIPID PANEL: CPT

## 2018-08-03 PROCEDURE — 84443 ASSAY THYROID STIM HORMONE: CPT

## 2018-08-03 PROCEDURE — 93010 ELECTROCARDIOGRAM REPORT: CPT | Mod: ,,, | Performed by: INTERNAL MEDICINE

## 2018-08-03 PROCEDURE — 82565 ASSAY OF CREATININE: CPT

## 2018-08-03 PROCEDURE — 63600175 PHARM REV CODE 636 W HCPCS: Performed by: PHYSICIAN ASSISTANT

## 2018-08-03 PROCEDURE — 99285 EMERGENCY DEPT VISIT HI MDM: CPT | Mod: 25

## 2018-08-03 PROCEDURE — 85610 PROTHROMBIN TIME: CPT

## 2018-08-03 PROCEDURE — 87086 URINE CULTURE/COLONY COUNT: CPT

## 2018-08-03 PROCEDURE — 83036 HEMOGLOBIN GLYCOSYLATED A1C: CPT

## 2018-08-03 PROCEDURE — 80053 COMPREHEN METABOLIC PANEL: CPT

## 2018-08-03 PROCEDURE — 25000003 PHARM REV CODE 250: Performed by: PHYSICIAN ASSISTANT

## 2018-08-03 PROCEDURE — 96374 THER/PROPH/DIAG INJ IV PUSH: CPT | Mod: 59

## 2018-08-03 PROCEDURE — 99222 1ST HOSP IP/OBS MODERATE 55: CPT | Mod: AI,,, | Performed by: PSYCHIATRY & NEUROLOGY

## 2018-08-03 PROCEDURE — 81001 URINALYSIS AUTO W/SCOPE: CPT

## 2018-08-03 PROCEDURE — 93306 TTE W/DOPPLER COMPLETE: CPT | Mod: 26,,, | Performed by: INTERNAL MEDICINE

## 2018-08-03 PROCEDURE — 85025 COMPLETE CBC W/AUTO DIFF WBC: CPT

## 2018-08-03 PROCEDURE — 20600001 HC STEP DOWN PRIVATE ROOM

## 2018-08-03 PROCEDURE — 80307 DRUG TEST PRSMV CHEM ANLYZR: CPT

## 2018-08-03 PROCEDURE — 93005 ELECTROCARDIOGRAM TRACING: CPT

## 2018-08-03 PROCEDURE — 99285 EMERGENCY DEPT VISIT HI MDM: CPT | Mod: ,,, | Performed by: EMERGENCY MEDICINE

## 2018-08-03 PROCEDURE — A4216 STERILE WATER/SALINE, 10 ML: HCPCS | Performed by: PHYSICIAN ASSISTANT

## 2018-08-03 PROCEDURE — 81025 URINE PREGNANCY TEST: CPT | Performed by: PHYSICIAN ASSISTANT

## 2018-08-03 RX ORDER — ACETAMINOPHEN 325 MG/1
650 TABLET ORAL EVERY 6 HOURS PRN
Status: DISCONTINUED | OUTPATIENT
Start: 2018-08-03 | End: 2018-08-04 | Stop reason: HOSPADM

## 2018-08-03 RX ORDER — NAPROXEN SODIUM 220 MG/1
81 TABLET, FILM COATED ORAL DAILY
Status: DISCONTINUED | OUTPATIENT
Start: 2018-08-03 | End: 2018-08-04 | Stop reason: HOSPADM

## 2018-08-03 RX ORDER — LABETALOL HYDROCHLORIDE 5 MG/ML
10 INJECTION, SOLUTION INTRAVENOUS
Status: DISCONTINUED | OUTPATIENT
Start: 2018-08-03 | End: 2018-08-04 | Stop reason: HOSPADM

## 2018-08-03 RX ORDER — SODIUM CHLORIDE 0.9 % (FLUSH) 0.9 %
3 SYRINGE (ML) INJECTION EVERY 8 HOURS
Status: DISCONTINUED | OUTPATIENT
Start: 2018-08-03 | End: 2018-08-04 | Stop reason: HOSPADM

## 2018-08-03 RX ORDER — HEPARIN SODIUM 5000 [USP'U]/ML
5000 INJECTION, SOLUTION INTRAVENOUS; SUBCUTANEOUS EVERY 8 HOURS
Status: DISCONTINUED | OUTPATIENT
Start: 2018-08-03 | End: 2018-08-04 | Stop reason: HOSPADM

## 2018-08-03 RX ORDER — ONDANSETRON 8 MG/1
8 TABLET, ORALLY DISINTEGRATING ORAL EVERY 8 HOURS PRN
Status: DISCONTINUED | OUTPATIENT
Start: 2018-08-03 | End: 2018-08-04 | Stop reason: HOSPADM

## 2018-08-03 RX ORDER — NITROFURANTOIN 25; 75 MG/1; MG/1
100 CAPSULE ORAL EVERY 12 HOURS
Status: DISCONTINUED | OUTPATIENT
Start: 2018-08-03 | End: 2018-08-04 | Stop reason: HOSPADM

## 2018-08-03 RX ORDER — ATORVASTATIN CALCIUM 20 MG/1
40 TABLET, FILM COATED ORAL DAILY
Status: DISCONTINUED | OUTPATIENT
Start: 2018-08-03 | End: 2018-08-04 | Stop reason: HOSPADM

## 2018-08-03 RX ADMIN — ATORVASTATIN CALCIUM 40 MG: 20 TABLET, FILM COATED ORAL at 12:08

## 2018-08-03 RX ADMIN — IOHEXOL 100 ML: 350 INJECTION, SOLUTION INTRAVENOUS at 02:08

## 2018-08-03 RX ADMIN — NITROFURANTOIN MONOHYDRATE/MACROCRYSTALLINE 100 MG: 25; 75 CAPSULE ORAL at 10:08

## 2018-08-03 RX ADMIN — ASPIRIN 81 MG CHEWABLE TABLET 81 MG: 81 TABLET CHEWABLE at 12:08

## 2018-08-03 RX ADMIN — Medication 3 ML: at 10:08

## 2018-08-03 RX ADMIN — HEPARIN SODIUM 5000 UNITS: 5000 INJECTION, SOLUTION INTRAVENOUS; SUBCUTANEOUS at 10:08

## 2018-08-03 RX ADMIN — ACETAMINOPHEN 650 MG: 325 TABLET ORAL at 10:08

## 2018-08-03 NOTE — CONSULTS
Inpatient consult to Physical Medicine Rehab  Consult performed by: YULY CORDOVA  Consult ordered by: GABY MARIA  Reason for consult: assess rehab needs      Reviewed patient history and current admission.  Rehab team following.  Full consult to follow.    SANDRA Alfred, FNP-C  Physical Medicine & Rehabilitation   08/03/2018  Spectralink: [id number]

## 2018-08-03 NOTE — PROGRESS NOTES
Obtained pt from ED room #15 placed to portable monitor / verified name and  moved to prep area and to MRI table and  Monitor

## 2018-08-03 NOTE — SUBJECTIVE & OBJECTIVE
Past Medical History:   Diagnosis Date    ADD (attention deficit disorder)     Depression     History of ovarian cyst     Obesity     Substance abuse     Hospitalization     Vertigo      Past Surgical History:   Procedure Laterality Date    TONSILLECTOMY, ADENOIDECTOMY, BILATERAL MYRINGOTOMY AND TUBES       Family History   Problem Relation Age of Onset    Breast cancer Maternal Aunt     No Known Problems Mother     No Known Problems Father     No Known Problems Brother     Colon cancer Neg Hx     Miscarriages / Stillbirths Neg Hx     Ovarian cancer Neg Hx      Social History   Substance Use Topics    Smoking status: Current Every Day Smoker     Packs/day: 0.25     Types: Cigarettes    Smokeless tobacco: Never Used    Alcohol use No     Review of patient's allergies indicates:   Allergen Reactions    Azithromycin Nausea And Vomiting     Hard to breathe        Medications: I have reviewed the current medication administration record.      (Not in a hospital admission)    Review of Systems   Constitutional: Negative for chills and fever.   HENT: Negative for nosebleeds and trouble swallowing.    Eyes: Negative for discharge and visual disturbance.   Respiratory: Negative for choking and stridor.    Cardiovascular: Negative for chest pain and leg swelling.   Gastrointestinal: Negative for nausea and vomiting.   Musculoskeletal: Positive for gait problem. Negative for neck stiffness.   Skin: Negative for rash.   Neurological: Positive for facial asymmetry, speech difficulty, weakness and numbness. Negative for headaches.   Psychiatric/Behavioral: Negative for agitation and behavioral problems.     Objective:     Vital Signs (Most Recent):  Temp: 98.7 °F (37.1 °C) (08/03/18 1112)  Pulse: 76 (08/03/18 1330)  Resp: 19 (08/03/18 1330)  BP: 118/68 (08/03/18 1330)  SpO2: 100 % (08/03/18 1330)    Vital Signs Range (Last 24H):  Temp:  [98.7 °F (37.1 °C)]   Pulse:  [65-81]   Resp:  [18-21]   BP:  (111-128)/(68-81)   SpO2:  [98 %-100 %]     Physical Exam   Constitutional: She is oriented to person, place, and time. She appears well-developed and well-nourished. No distress.   HENT:   Head: Normocephalic and atraumatic.   Eyes: Conjunctivae and EOM are normal.   Cardiovascular: Normal rate.    Pulmonary/Chest: Effort normal. No respiratory distress.   Musculoskeletal: She exhibits no edema, tenderness or deformity.   Neurological: She is alert and oriented to person, place, and time. A cranial nerve deficit and sensory deficit is present. She exhibits normal muscle tone.   Skin: Skin is warm and dry.   Psychiatric: Her behavior is normal. Her affect is blunt.       Neurological Exam:   LOC: alert  Attention Span: Good   Language: No aphasia  Articulation: Dysarthria  Orientation: Person, Place, Time   Visual Fields: Full  EOM (CN III, IV, VI): Full/intact  Facial Movement (CN VII): Upper facial weakness on the Right  Motor: Arm left  Normal 5/5  Leg left  Normal 5/5  Arm right  Paresis: 4/5  Leg right Paresis: 4/5  Cebellar: No evidence of appendicular or axial ataxia  Sensation: Intact to light touch, temp  Tone: Normal tone throughout      Laboratory:  CMP:   Recent Labs  Lab 08/03/18  1139   CALCIUM 9.8   ALBUMIN 4.0   PROT 7.3      K 4.3   CO2 23      BUN 9   CREATININE 0.8   ALKPHOS 102   ALT 20   AST 18   BILITOT 1.0     CBC:   Recent Labs  Lab 08/03/18  1139   WBC 9.32   RBC 5.11   HGB 13.9   HCT 42.6      MCV 83   MCH 27.2   MCHC 32.6     Lipid Panel:   Recent Labs  Lab 08/03/18  1139   CHOL 162   LDLCALC 106.6   HDL 32*   TRIG 117     Coagulation:   Recent Labs  Lab 08/03/18  1139   INR 1.0     Hgb A1C: No results for input(s): HGBA1C in the last 168 hours.  TSH:   Recent Labs  Lab 08/03/18  1139   TSH 0.920       Diagnostic Results:      Brain imaging:    CT Head 8/3/18  No evidence of acute intracranial pathology.      Vessel Imaging:    No vessel imaging on file.      Cardiac  Evaluation:     Echo pending.
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
## 2018-08-03 NOTE — ED NOTES
LOC: The patient is awake, alert and aware of environment with an appropriate affect, the patient is oriented x 3 and speaking appropriately. PERRLA present. Patient with right side facial droop present. Patient with weakness present to right side. Patient reports numbness present to right side. Patient with slurred speech present. Patient following commands appropriately.  APPEARANCE: Patient resting comfortably and in no acute distress, patient is clean and well groomed, patient's clothing is properly fastened.  SKIN: The skin is warm and dry, patient has normal skin turgor and moist mucus membranes, skin intact, no breakdown or brusing noted.  MUSCULOSKELETAL: Patient moving all extremities well, no obvious swelling or deformities noted. Patient reports weakness to RUE and RLE.  RESPIRATORY: Airway is open and patent, respirations are spontaneous, patient has a normal effort and rate. Breath sounds are clear and equal bilaterally. Denies cough.  CARDIAC: Normal heart sounds. No peripheral edema. Denies chest pain or SOA.  ABDOMEN: Soft and non tender to palpation, no distention noted. Bowel sounds present. Patient denies N/V/D.   ENT: Patient with tearing present to right eye; denies any changes in vision or visual disturbances.     PLEASE SEE NEURO FLOW SHEET FOR ALL FURTHER DOCUMENTATION.

## 2018-08-03 NOTE — SIGNIFICANT EVENT
Calledby ECHO team for hypotension with SBP 83 mmHg and nausea. The patient reports HA and facial paresthesia. She also reports recent nausea, vomiting, and poor appetite. The patient is on the stroke service albeit with a negative MRI. Stroke called immediately given sx and exam. Per Nancy (stroke provider), the patient's exam has not changed. BP noted to improved with 500 cc NS bolus. Team to proceed with TTE.
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
## 2018-08-03 NOTE — ED NOTES
MD Huffman with anesthesiology called to notify that patient back in room from MRI; no answer. Will call back.

## 2018-08-03 NOTE — NURSING
1530 Ok to proceed with testing per Dr Xie and ROYCE Schaefer    Pt reports she feels better.    1535 Pt sitting up in stretcher playing on cellphone with no complaints.    Pt will go back to room after testing.

## 2018-08-03 NOTE — ED PROVIDER NOTES
Encounter Date: 8/3/2018    SCRIBE #1 NOTE: I, Amena Haque, am scribing for, and in the presence of,  Dr. Emmanuel. I have scribed the following portions of the note - the APC attestation.       History     Chief Complaint   Patient presents with    Facial Droop     started  at midnight and my R arm was tingling, slurred speech, numbness to r arm to light touch and drift to r arm noted, crying     22 year old female with medical history of ADD, Depression, History of substance abuse presenting to the ED with the chief complaint of right facial droop. Patient reports noticing her facial droop last night at midnight. She states she went to sleep thinking it would go away, but it persisted into this morning. She reports an associated slurred speech and tingling in her right hand. Patient's mother saw her 1 hour PTA and subsequently brought her to the ED. She expresses concern about possible Bell's Palsy. She denies having symptoms like this previously. Patient's mother at bedside reports concern she maybe using Heroin again, but is not certain.           Review of patient's allergies indicates:   Allergen Reactions    Azithromycin Nausea And Vomiting     Hard to breathe      Past Medical History:   Diagnosis Date    ADD (attention deficit disorder)     Depression     History of ovarian cyst     Obesity     Substance abuse     Hospitalization     Vertigo      Past Surgical History:   Procedure Laterality Date    TONSILLECTOMY, ADENOIDECTOMY, BILATERAL MYRINGOTOMY AND TUBES       Family History   Problem Relation Age of Onset    Breast cancer Maternal Aunt     No Known Problems Mother     No Known Problems Father     No Known Problems Brother     Colon cancer Neg Hx     Miscarriages / Stillbirths Neg Hx     Ovarian cancer Neg Hx      Social History   Substance Use Topics    Smoking status: Current Every Day Smoker     Packs/day: 0.25     Types: Cigarettes    Smokeless tobacco: Never Used     Alcohol use No     Review of Systems   Constitutional: Negative for chills and fever.   HENT: Positive for facial swelling (Right facial droop).    Eyes: Negative for pain.   Respiratory: Negative for cough and shortness of breath.    Cardiovascular: Negative for chest pain.   Gastrointestinal: Negative for abdominal pain, nausea and vomiting.   Genitourinary: Negative for dysuria.   Musculoskeletal: Negative for arthralgias.   Skin: Negative for wound.   Neurological: Positive for speech difficulty (slurred) and weakness (Right facial weakness).       Physical Exam     Initial Vitals [08/03/18 1112]   BP Pulse Resp Temp SpO2   128/78 81 18 98.7 °F (37.1 °C) 98 %      MAP       --         Physical Exam    Constitutional:   Obese female, tearful during exam   HENT:   Head: Normocephalic and atraumatic.   Eyes: EOM are normal. Pupils are equal, round, and reactive to light.   Neck: Normal range of motion. Neck supple.   Cardiovascular: Normal rate and regular rhythm.   Pulmonary/Chest: Breath sounds normal. No respiratory distress. She has no wheezes.   Abdominal: Soft. Bowel sounds are normal. There is no tenderness.   Musculoskeletal: Normal range of motion. She exhibits no edema or tenderness.   Neurological: She is alert and oriented to person, place, and time. A sensory deficit (Reduced sensation to touch RLE) is present.   Right facial asymmetry    Skin:   Ecchymosis to RUE       ED Course   Procedures  Labs Reviewed   LIPID PANEL - Abnormal; Notable for the following:        Result Value    HDL 32 (*)     HDL/Chol Ratio 19.8 (*)     Total Cholesterol/HDL Ratio 5.1 (*)     All other components within normal limits   URINALYSIS, REFLEX TO URINE CULTURE - Abnormal; Notable for the following:     Appearance, UA Cloudy (*)     Protein, UA 1+ (*)     Leukocytes, UA 3+ (*)     All other components within normal limits    Narrative:     Preferred Collection Type->Urine, Clean Catch   DRUG SCREEN PANEL, URINE EMERGENCY  - Abnormal; Notable for the following:     Creatinine, Random Ur 344.0 (*)     All other components within normal limits    Narrative:     Preferred Collection Type->Urine, Clean Catch   URINALYSIS MICROSCOPIC - Abnormal; Notable for the following:     WBC, UA 89 (*)     Bacteria, UA Moderate (*)     All other components within normal limits    Narrative:     Preferred Collection Type->Urine, Clean Catch   CULTURE, URINE   CBC W/ AUTO DIFFERENTIAL   COMPREHENSIVE METABOLIC PANEL   PROTIME-INR   TSH   URINALYSIS   HEMOGLOBIN A1C   HEMOGLOBIN A1C    Narrative:     [facility] add on per MD Avery  13:15  08/03/2018    POCT URINE PREGNANCY   ISTAT CREATININE   ISTAT PROCEDURE          Imaging Results          CTA Head and Neck (xpd) (Final result)  Result time 08/03/18 15:37:12    Final result by Flaco Chou MD (08/03/18 15:37:12)                 Impression:      No evidence of acute hemorrhage or major vascular distribution infarct.    CT angiogram appears within normal limits, allowing for mild patient motion artifact.  No high-grade stenosis or large vessel occlusion.    Electronically signed by resident: Bruna Vega  Date:    08/03/2018  Time:    15:23    Electronically signed by: Flaco Chou MD  Date:    08/03/2018  Time:    15:37             Narrative:    EXAMINATION:  CTA HEAD AND NECK (XPD)    CLINICAL HISTORY:  Focal neuro deficit, new, fixed or worsening, >6 hours;    TECHNIQUE:  Low-dose axial CT were obtained throughout the region of the head before and after the administration of intravenous contrast.  CT angiogram was performed from through the cervical and intracranial vasculature during the IV bolus administration of 100mL of Omnipaque 350.  Multiplanar MPR and MIP reformats were performed.  Examination mildly degraded by patient motion artifact.    COMPARISON:  MRI brain without 08/03/2018, CT head without 08/03/2018, CT head without 02/28/2018      The ventricles are normal in size without  evidence of hydrocephalus.    The brain appears within normal limits. No parenchymal mass, hemorrhage, edema or major vascular distribution infarct.  No enhancing lesions identified.    No extra-axial blood or fluid collections.    The cranium appears intact. Mastoid air cells and paranasal sinuses are essentially clear.      CTA:    The aortic arch maintains a normal branching pattern.    The common and internal carotid arteries are normal in course and caliber. No significant stenosis in either carotid bifurcation.    The vertebral origins are patent. The cervical vertebral arteries are normal in course and caliber. Vertebrobasilar system is within normal limits without focal abnormality.    The anterior, middle, and posterior cerebral arteries are within normal limits, without evidence of significant stenosis, focal occlusion, or intracranial aneurysm formation.                               MRI BRAIN WITHOUT CONTRAST (Final result)  Result time 08/03/18 13:27:51    Final result by Flaco Chou MD (08/03/18 13:27:51)                 Impression:      No evidence of acute intracranial pathology.      Electronically signed by: Flaco Chou MD  Date:    08/03/2018  Time:    13:27             Narrative:    EXAMINATION:  MRI BRAIN WITHOUT CONTRAST    CLINICAL HISTORY:  Focal neuro deficit, new, fixed or worsening, >6 hours;    TECHNIQUE:  Multiplanar multisequence MR imaging of the brain was performed without intravenous contrast.    COMPARISON:  Head CT 08/03/2018    FINDINGS:  Intracranial Compartment:    Ventricles are normal in size for age without evidence of hydrocephalus.    The brain parenchyma appears within normal limits.  No restricted diffusion to suggest infarction.  No parenchymal mass or hemorrhage.  No remote major vascular distribution infarct.    No extra-axial blood or fluid collections.    Normal vascular flow voids are preserved.    Skull/Extracranial Contents (limited evaluation):    Bone  marrow signal intensity is normal.  Small Thornwaldt cyst.                               X-Ray Chest AP Portable (Final result)  Result time 08/03/18 12:27:05    Final result by Jg Agee MD (08/03/18 12:27:05)                 Impression:      No detrimental change or radiographic acute intrathoracic process seen.      Electronically signed by: Jg Agee MD  Date:    08/03/2018  Time:    12:27             Narrative:    EXAMINATION:  XR CHEST AP PORTABLE    CLINICAL HISTORY:  Stroke;    TECHNIQUE:  Single frontal view of the chest was performed.    COMPARISON:  Chest radiograph 05/06/2017    FINDINGS:  No detrimental change.  Monitoring leads overlie the chest.  Large body habitus.  Cardiomediastinal silhouette is midline and within normal limits.  Pulmonary vasculature and hilar regions are within normal limits.  The lungs are symmetrically well expanded and clear.  No pleural effusion or pneumothorax.  No acute osseous process seen.  PA and lateral views can be obtained.                               CT Head Without Contrast (Final result)  Result time 08/03/18 11:31:26    Final result by Flaco Chou MD (08/03/18 11:31:26)                 Impression:      No evidence of acute intracranial pathology.      Electronically signed by: Flaco Chou MD  Date:    08/03/2018  Time:    11:31             Narrative:    EXAMINATION:  CT HEAD WITHOUT CONTRAST    CLINICAL HISTORY:  Motor neuron disease;    TECHNIQUE:  Low dose axial CT images obtained throughout the head without the use of intravenous contrast.  Axial, sagittal and coronal reconstructions were performed.    COMPARISON:  02/28/2018    FINDINGS:  Intracranial compartment:    Ventricles and sulci are normal in size for age without evidence of hydrocephalus.    The brain parenchyma appears within normal limits.  No parenchymal mass, hemorrhage, edema or major vascular distribution infarct.    No extra-axial blood or fluid  collections.    Skull/extracranial contents (limited evaluation):    No fracture. Mastoid air cells and paranasal sinuses are essentially clear.                                 Medical Decision Making:   History:   Old Medical Records: I decided to obtain old medical records.  Clinical Tests:   Lab Tests: Ordered and Reviewed  Radiological Study: Ordered and Reviewed  Medical Tests: Ordered and Reviewed       APC / Resident Notes:   22 year old female with medical history of ADD, Depression, History of substance abuse presenting to the ED c/o right facial droop onset at midnight. Stroke code initiated in triage. DDx includes but not limited to ischemic stroke, TIA, intracranial hemorrhage, SDH, brain mass, cerebral hypoperfusion, complex migraine, Bell's Palsy, seizure d/o.     Patient evaluated by stroke service at bedside. Patient outside TPA window. Initial CTH without acute intracranial pathology. Labs show stable CBC and CMP. Patient will be admitted to the stroke service for ongoing management. Patient agreeable to the plan. I have discussed the care of this patient with my supervising physician.        Scribe Attestation:   Scribe #1: I performed the above scribed service and the documentation accurately describes the services I performed. I attest to the accuracy of the note.    Attending Attestation:     Physician Attestation Statement for NP/PA:   I have conducted a face to face encounter with this patient in addition to the NP/PA, due to Medical Complexity    Other NP/PA Attestation Additions:    History of Present Illness: 22 y.o.female presents to the ED with right sided facial droop, slurred speech, and RUE weakness since midnight. Stroke code activated. Patient is not a TPA candidate. Will admit to vascular neurology.         Physician Attestation for Scribe:      Comments: I, Dr. Hernando Emmanuel, personally performed the services described in this documentation. All medical record entries made by the  ky were at my direction and in my presence.  I have reviewed the chart and agree that the record reflects my personal performance and is accurate and complete. Hernando Emmanuel MD.  3:58 PM 08/03/2018                 Clinical Impression:   Diagnoses of Stroke and Thrombotic stroke involving left middle cerebral artery were pertinent to this visit.      Disposition:   Disposition: Admitted  Condition: Fair                        Jg Rajput PA-C  08/03/18 1602

## 2018-08-03 NOTE — PROGRESS NOTES
Two patient identifiers verified. #18 g IV in place to right AC. 8 cc's of saline agitated and injected into IV x 2 for bubble study. Patient tolerated well.

## 2018-08-03 NOTE — ASSESSMENT & PLAN NOTE
UA with 3+ leukocytes, moderate bacteria  Urine cx pending  Will assess if need for antibiotic therapy

## 2018-08-03 NOTE — ASSESSMENT & PLAN NOTE
Stroke risk factor  Mother reports pt had drug abuse in 2014 and underwent inpatient rehab at that time; Had been doing much better recently  Urine DS shows presumptive positive THC  Will  on cessation for stroke prevention

## 2018-08-04 VITALS
TEMPERATURE: 99 F | DIASTOLIC BLOOD PRESSURE: 68 MMHG | SYSTOLIC BLOOD PRESSURE: 117 MMHG | WEIGHT: 240.31 LBS | BODY MASS INDEX: 44.22 KG/M2 | OXYGEN SATURATION: 98 % | HEART RATE: 67 BPM | RESPIRATION RATE: 18 BRPM | HEIGHT: 62 IN

## 2018-08-04 PROBLEM — G51.0 BELL'S PALSY: Status: ACTIVE | Noted: 2018-08-03

## 2018-08-04 PROBLEM — G51.0 BELL'S PALSY: Status: ACTIVE | Noted: 2018-08-04

## 2018-08-04 LAB
ALBUMIN SERPL BCP-MCNC: 3.7 G/DL
ALP SERPL-CCNC: 99 U/L
ALT SERPL W/O P-5'-P-CCNC: 17 U/L
ANION GAP SERPL CALC-SCNC: 8 MMOL/L
APTT BLDCRRT: 23.5 SEC
AST SERPL-CCNC: 14 U/L
BASOPHILS # BLD AUTO: 0.02 K/UL
BASOPHILS NFR BLD: 0.3 %
BILIRUB SERPL-MCNC: 1.1 MG/DL
BUN SERPL-MCNC: 9 MG/DL
CALCIUM SERPL-MCNC: 9.5 MG/DL
CHLORIDE SERPL-SCNC: 107 MMOL/L
CK MB SERPL-MCNC: 0.5 NG/ML
CK MB SERPL-RTO: 1.5 %
CK SERPL-CCNC: 33 U/L
CO2 SERPL-SCNC: 25 MMOL/L
CREAT SERPL-MCNC: 0.8 MG/DL
DIFFERENTIAL METHOD: NORMAL
EOSINOPHIL # BLD AUTO: 0.1 K/UL
EOSINOPHIL NFR BLD: 1.7 %
ERYTHROCYTE [DISTWIDTH] IN BLOOD BY AUTOMATED COUNT: 14.3 %
EST. GFR  (AFRICAN AMERICAN): >60 ML/MIN/1.73 M^2
EST. GFR  (NON AFRICAN AMERICAN): >60 ML/MIN/1.73 M^2
GLUCOSE SERPL-MCNC: 97 MG/DL
HCT VFR BLD AUTO: 39.5 %
HGB BLD-MCNC: 12.7 G/DL
IMM GRANULOCYTES # BLD AUTO: 0.03 K/UL
IMM GRANULOCYTES NFR BLD AUTO: 0.4 %
INR PPP: 1
LYMPHOCYTES # BLD AUTO: 2.5 K/UL
LYMPHOCYTES NFR BLD: 32.1 %
MAGNESIUM SERPL-MCNC: 2.1 MG/DL
MCH RBC QN AUTO: 27.2 PG
MCHC RBC AUTO-ENTMCNC: 32.2 G/DL
MCV RBC AUTO: 85 FL
MONOCYTES # BLD AUTO: 0.5 K/UL
MONOCYTES NFR BLD: 6.9 %
NEUTROPHILS # BLD AUTO: 4.6 K/UL
NEUTROPHILS NFR BLD: 58.6 %
NRBC BLD-RTO: 0 /100 WBC
PHOSPHATE SERPL-MCNC: 4.4 MG/DL
PLATELET # BLD AUTO: 182 K/UL
PMV BLD AUTO: 11 FL
POTASSIUM SERPL-SCNC: 4 MMOL/L
PROT SERPL-MCNC: 6.7 G/DL
PROTHROMBIN TIME: 10.2 SEC
RBC # BLD AUTO: 4.67 M/UL
SODIUM SERPL-SCNC: 140 MMOL/L
TROPONIN I SERPL DL<=0.01 NG/ML-MCNC: <0.006 NG/ML
WBC # BLD AUTO: 7.83 K/UL

## 2018-08-04 PROCEDURE — 83735 ASSAY OF MAGNESIUM: CPT

## 2018-08-04 PROCEDURE — G8980 MOBILITY D/C STATUS: HCPCS | Mod: CH

## 2018-08-04 PROCEDURE — 85730 THROMBOPLASTIN TIME PARTIAL: CPT

## 2018-08-04 PROCEDURE — G8987 SELF CARE CURRENT STATUS: HCPCS | Mod: CH

## 2018-08-04 PROCEDURE — 85025 COMPLETE CBC W/AUTO DIFF WBC: CPT

## 2018-08-04 PROCEDURE — 82550 ASSAY OF CK (CPK): CPT

## 2018-08-04 PROCEDURE — 97161 PT EVAL LOW COMPLEX 20 MIN: CPT

## 2018-08-04 PROCEDURE — A4216 STERILE WATER/SALINE, 10 ML: HCPCS | Performed by: PHYSICIAN ASSISTANT

## 2018-08-04 PROCEDURE — G8996 SWALLOW CURRENT STATUS: HCPCS | Mod: CH

## 2018-08-04 PROCEDURE — 82553 CREATINE MB FRACTION: CPT

## 2018-08-04 PROCEDURE — 84484 ASSAY OF TROPONIN QUANT: CPT

## 2018-08-04 PROCEDURE — 63600175 PHARM REV CODE 636 W HCPCS: Performed by: PHYSICIAN ASSISTANT

## 2018-08-04 PROCEDURE — 94761 N-INVAS EAR/PLS OXIMETRY MLT: CPT

## 2018-08-04 PROCEDURE — 99239 HOSP IP/OBS DSCHRG MGMT >30: CPT | Mod: GC,,, | Performed by: PSYCHIATRY & NEUROLOGY

## 2018-08-04 PROCEDURE — 97165 OT EVAL LOW COMPLEX 30 MIN: CPT

## 2018-08-04 PROCEDURE — 85610 PROTHROMBIN TIME: CPT | Mod: 59

## 2018-08-04 PROCEDURE — 25000003 PHARM REV CODE 250: Performed by: PHYSICIAN ASSISTANT

## 2018-08-04 PROCEDURE — G8998 SWALLOW D/C STATUS: HCPCS | Mod: CH

## 2018-08-04 PROCEDURE — G8978 MOBILITY CURRENT STATUS: HCPCS | Mod: CH

## 2018-08-04 PROCEDURE — G8997 SWALLOW GOAL STATUS: HCPCS | Mod: CH

## 2018-08-04 PROCEDURE — 92610 EVALUATE SWALLOWING FUNCTION: CPT

## 2018-08-04 PROCEDURE — 80053 COMPREHEN METABOLIC PANEL: CPT

## 2018-08-04 PROCEDURE — G8989 SELF CARE D/C STATUS: HCPCS | Mod: CH

## 2018-08-04 PROCEDURE — 92523 SPEECH SOUND LANG COMPREHEN: CPT

## 2018-08-04 PROCEDURE — 84100 ASSAY OF PHOSPHORUS: CPT

## 2018-08-04 PROCEDURE — G8988 SELF CARE GOAL STATUS: HCPCS | Mod: CH

## 2018-08-04 PROCEDURE — 36415 COLL VENOUS BLD VENIPUNCTURE: CPT

## 2018-08-04 PROCEDURE — G8979 MOBILITY GOAL STATUS: HCPCS | Mod: CH

## 2018-08-04 RX ORDER — PREDNISONE 20 MG/1
20 TABLET ORAL DAILY
Qty: 3 TABLET | Refills: 0 | Status: SHIPPED | OUTPATIENT
Start: 2018-08-10 | End: 2018-08-13

## 2018-08-04 RX ORDER — PREDNISONE 20 MG/1
40 TABLET ORAL DAILY
Qty: 6 TABLET | Refills: 0 | Status: SHIPPED | OUTPATIENT
Start: 2018-08-07 | End: 2018-08-04

## 2018-08-04 RX ORDER — PREDNISONE 20 MG/1
60 TABLET ORAL DAILY
Qty: 9 TABLET | Refills: 0 | Status: SHIPPED | OUTPATIENT
Start: 2018-08-04 | End: 2018-08-07

## 2018-08-04 RX ORDER — VALACYCLOVIR HYDROCHLORIDE 1 G/1
1000 TABLET, FILM COATED ORAL 3 TIMES DAILY
Qty: 21 TABLET | Refills: 0 | Status: SHIPPED | OUTPATIENT
Start: 2018-08-04 | End: 2019-01-11

## 2018-08-04 RX ORDER — PREDNISONE 20 MG/1
40 TABLET ORAL DAILY
Qty: 6 TABLET | Refills: 0 | Status: SHIPPED | OUTPATIENT
Start: 2018-08-07 | End: 2018-08-10

## 2018-08-04 RX ORDER — PREDNISONE 20 MG/1
60 TABLET ORAL DAILY
Qty: 9 TABLET | Refills: 0 | Status: SHIPPED | OUTPATIENT
Start: 2018-08-04 | End: 2018-08-04

## 2018-08-04 RX ORDER — NITROFURANTOIN 25; 75 MG/1; MG/1
100 CAPSULE ORAL 2 TIMES DAILY
Qty: 8 CAPSULE | Refills: 0 | Status: SHIPPED | OUTPATIENT
Start: 2018-08-04 | End: 2018-10-10

## 2018-08-04 RX ORDER — PREDNISONE 20 MG/1
20 TABLET ORAL DAILY
Qty: 3 TABLET | Refills: 0 | Status: SHIPPED | OUTPATIENT
Start: 2018-08-10 | End: 2018-08-04

## 2018-08-04 RX ORDER — VALACYCLOVIR HYDROCHLORIDE 1 G/1
1000 TABLET, FILM COATED ORAL 3 TIMES DAILY
Qty: 21 TABLET | Refills: 0 | Status: SHIPPED | OUTPATIENT
Start: 2018-08-04 | End: 2018-08-04

## 2018-08-04 RX ORDER — NITROFURANTOIN 25; 75 MG/1; MG/1
100 CAPSULE ORAL 2 TIMES DAILY
Qty: 8 CAPSULE | Refills: 0 | Status: SHIPPED | OUTPATIENT
Start: 2018-08-04 | End: 2018-08-04

## 2018-08-04 RX ADMIN — ASPIRIN 81 MG CHEWABLE TABLET 81 MG: 81 TABLET CHEWABLE at 09:08

## 2018-08-04 RX ADMIN — ACETAMINOPHEN 650 MG: 325 TABLET ORAL at 11:08

## 2018-08-04 RX ADMIN — NITROFURANTOIN MONOHYDRATE/MACROCRYSTALLINE 100 MG: 25; 75 CAPSULE ORAL at 09:08

## 2018-08-04 RX ADMIN — Medication 3 ML: at 06:08

## 2018-08-04 RX ADMIN — ATORVASTATIN CALCIUM 40 MG: 20 TABLET, FILM COATED ORAL at 09:08

## 2018-08-04 RX ADMIN — HEPARIN SODIUM 5000 UNITS: 5000 INJECTION, SOLUTION INTRAVENOUS; SUBCUTANEOUS at 02:08

## 2018-08-04 NOTE — CONSULTS
Diet Education: Cardiac  Time Spent: 15 min  Learners: Pt     Nutrition Education provided with handouts: Cardiac-[facility] Nutrition Therapy     Comments: Consult received for cardiac diet education. Provided and explained handout detailing heart healthy fats and increasing omega 3 fatty acids and fiber intake. Pt voiced understanding.     Follow-Up: 1x weekly     Please re-consult as needed.

## 2018-08-04 NOTE — PLAN OF CARE
Problem: Occupational Therapy Goal  Goal: Occupational Therapy Goal  Outcome: Outcome(s) achieved Date Met: 08/04/18  OT eval completed. No goals set due to independent with ADL's assessed.  Discharge OT on acute.  GURWINDER You 8/4/2018

## 2018-08-04 NOTE — PT/OT/SLP EVAL
Occupational Therapy   Evaluation and Discharge Note    Name: Alexandra Martin  MRN: [id number]  Admitting Diagnosis:  Right sided weakness      Recommendations:     Discharge Recommendations: home  Discharge Equipment Recommendations:  none  Barriers to discharge:  None    History:     Occupational Profile:  Living Environment & PLOF: Pt resides with mother in 2 story condo in [place] with bed & bath on 1st floor & no steps to enter.  Pt is independent with ADL's, ambulation, & housework.  Pt has a bathtub for bathing.  Pt is an active .  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  Pt enjoys watching TV & phone.  Equipment Owned:   (tall toilets)  Assistance upon Discharge: yes    Past Medical History:   Diagnosis Date    ADD (attention deficit disorder)     Depression     History of ovarian cyst     Obesity     Substance abuse     Hospitalization     Vertigo        Past Surgical History:   Procedure Laterality Date    TONSILLECTOMY, ADENOIDECTOMY, BILATERAL MYRINGOTOMY AND TUBES         Subjective   Pt reported that she hopes her face gets better.  Chief Complaint: facial droop & language issues  Patient/Family stated goals: to get better  Communicated with: RN prior to session.  Pain/Comfort:  · Pain Rating 1: 0/10  · Pain Rating Post-Intervention 1: 0/10    Patients cultural, spiritual, Adventist conflicts given the current situation: none stated    Objective:     Patient found with: telemetry    General Precautions: Standard, aspiration, fall     Occupational Performance:    Bed Mobility:    · Patient completed Supine to Sit with independence  · Patient completed Sit to Supine with independence    Functional Mobility/Transfers:  · Patient completed Sit <> Stand Transfer with independence  with  no assistive device   · Functional Mobility: Pt performed functional mobility in room with [place].    Activities of Daily Living:  · Grooming: independence putting hair into ponytail while  standing  · Upper Body Dressing: independence donning gown around back while seated EOB  · Lower Body Dressing: independence donning socks seated EOB    Cognitive/Visual Perceptual:  Cognitive/Psychosocial Skills:     -       Oriented to: Person, Place, Time and Situation   -       Follows Commands/attention:Follows multistep  commands  -       Communication: dysarthria  -       Memory: No Deficits noted  -       Safety awareness/insight to disability: intact   -       Mood/Affect/Coping skills/emotional control: Appropriate to situation    Physical Exam:  Postural examination/scapula alignment:    -       Rounded shoulders  -       Forward head  -       Posterior pelvic tilt  Sensation:    -       Impaired  light/touch proximal RUE (shoulder to elbow only)  Dominant hand:    -       right  Upper Extremity Range of Motion:     -       Right Upper Extremity: WFL (decreased coordination of RUE during parts of assessment)  -       Left Upper Extremity: WFL  Upper Extremity Strength:    -       Right Upper Extremity: 4/5 proximally & 5/5 distally  -       Left Upper Extremity: 5/5   Strength:    -       Right Upper Extremity: WFL  -       Left Upper Extremity: WFL  Fine Motor Coordination:    -       Intact    Patient left supine with all lines intact, call button in reach, RN notified and white board updated.    AMPA 6 Click:  AMPAC Total Score: 24    Treatment & Education:  Provided education regarding role of OT, POC, & discharge recommendations with pt & family verbalizing understanding.  Pt had no further questions & when asked whether there were any concerns pt reported none.    Discussed safety due to sensory deficits & signs of stroke with pt verbalizing understanding.  Education:    Assessment:     Alexandra Martin is a 22 y.o. female with a medical diagnosis of Right sided weakness. At this time, patient is functioning at their prior level of function and does not require further acute OT  "services.     Clinical Decision Makin.  OT Low:  "Pt evaluation falls under low complexity for evaluation coding due to performance deficits noted in 1-3 areas as stated above and 0 co-morbities affecting current functional status. Data obtained from problem focused assessments. No modifications or assistance was required for completion of evaluation. Only brief occupational profile and history review completed."     Plan:     During this hospitalization, patient does not require further acute OT services.  Please re-consult if situation changes.    · Plan of Care Reviewed with: patient, mother    This Plan of care has been discussed with the patient who was involved in its development and understands and is in agreement with the identified goals and treatment plan    GOALS:    Occupational Therapy Goals     Not on file          Multidisciplinary Problems (Resolved)        Problem: Occupational Therapy Goal    Goal Priority Disciplines Outcome Interventions   Occupational Therapy Goal   (Resolved)     OT, PT/OT Outcome(s) achieved                    Time Tracking:     OT Date of Treatment: 18  OT Start Time: 1009  OT Stop Time: 1028  OT Total Time (min): 19 min    Billable Minutes:Evaluation 19    GURWINDER You  2018    "

## 2018-08-04 NOTE — PT/OT/SLP EVAL
"Speech Language Pathology Evaluation  Cognitive/Bedside Swallow    Patient Name:  Alexandra Martin   MRN:  [id number]  Admitting Diagnosis: Right sided weakness    Recommendations:                  General Recommendations:  Cognitive-linguistic therapy  Diet recommendations:  Regular, Thin   Aspiration Precautions: Standard aspiration precautions   General Precautions: Standard, fall  Communication strategies:  none    History:     Past Medical History:   Diagnosis Date    ADD (attention deficit disorder)     Depression     History of ovarian cyst     Obesity     Substance abuse     Hospitalization     Vertigo        Past Surgical History:   Procedure Laterality Date    TONSILLECTOMY, ADENOIDECTOMY, BILATERAL MYRINGOTOMY AND TUBES         Social History: Patient lives with mother      Modified Barium Swallow: N/A    Prior diet: Regular/thin.      Subjective     Awake/alert  " My face and tongue are off."     Pain/Comfort:  Pain Rating 1: 0/10  Pain Rating Post-Intervention 1: 0/10    Objective:     Cognitive Status:    Orientation Oriented x4  Memory Immediate Recall 5 numbers/4/5 words   Problem Solving Conclusions 100%, Categories 100%, Sequencing 4/4 word set repetition x2 provided and Compare/contrast 100% with cues to compare items     Receptive Language:   Comprehension:   Questions Complex yes/no 100%  Commands  complex/abstract commands 100%    Pragmatics:    WFL    Expressive Language:  Verbal:    WFL  Nonverbal:   WFL      Motor Speech:  Dysarthria      Voice:   [facility]    Visual-Spatial:  [facility]    Written Expression:   [facility]    Oral Musculature Evaluation  Oral Musculature: facial asymmetry present, right weakness  Dentition: present and adequate  Oral Labial Strength and Mobility: impaired retraction, impaired pursing, impaired seal  Lingual Strength and Mobility: impaired strength  Volitional Cough: adequate  Volitional Swallow: timely  Voice Prior to PO Intake: clear    Bedside Swallow Eval: " "  Consistencies Assessed:  · Thin liquids x6 straw  · Solids x3     Oral Phase:   · WFL  · Prolonged mastication 2/2 facial weakness    Pharyngeal Phase:   · no overt clinical signs/symptoms of aspiration    Compensatory Strategies  · None    Treatment: OME handout provided to pt and mother. Pt believes she is at baseline besides oral motor weakness and dysarthria. Pt's mother states pt is thinking "a little slower" and endorses short term memory difficulty.     Assessment:     Alexandra Martin is a 22 y.o. female with an SLP diagnosis of Dysarthria and Cognitive-Linguistic Impairment. =  Goals:    SLP Goals        Problem: SLP Goal    Goal Priority Disciplines Outcome   SLP Goal     SLP    Description:  Speech Language Pathology Goals  Goals expected to be met by 8/11    1. Pt will complete higher level reasoning task with 80% accy   2. Pt will complete delayed recall task with 75% accy   3. Pt will recall and utilize speech strategies ind'ly  4. Pt will compete OME x5-10 reps with min cues                        Plan:     · Patient to be seen:    3x week  · Plan of Care expires:   9/2/18  · Plan of Care reviewed with:  patient, mother   · SLP Follow-Up:     Yes     Discharge recommendations:    continued ST   Time Tracking:     SLP Treatment Date:   08/04/18  Speech Start Time:  0911  Speech Stop Time:  0927     Speech Total Time (min):  16 min    Billable Minutes: Eval 8  and Eval Swallow and Oral Function 8    Elizabeth Lee CCC-SLP  08/04/2018         "

## 2018-08-04 NOTE — CARE UPDATE
08/04/2018      Alexandra Martin  [address] [address] [address]          Vascular Neurology Dept.  [facility] [address] [place] [id number] [phone] after hours  [phone] fax Diagnosis:  Right sided Bell's palsy                             - requires outpatient physical therapy for facial muscles due to Bell's palsy  __________________________  Nupur Dennis MD  08/04/2018

## 2018-08-04 NOTE — SUBJECTIVE & OBJECTIVE
Neurologic Chief Complaint: R facial droop     Subjective:     Interval History: Patient is seen for follow-up neurological assessment and treatment recommendations: patient with R facial droop, numbness and inability to close R eye. CTH and MRI negative for vascular pathologies. Stroke work up with echo/bubble study, TSH and A1C unremarkable. One episode of hypotension overnight, responsive to fluids.    HPI, Past Medical, Family, and Social History remains the same as documented in the initial encounter.     Review of Systems   Constitutional: Negative for chills, fatigue and fever.   HENT: Positive for tinnitus (chronic). Negative for drooling, hearing loss, trouble swallowing and voice change.    Eyes: Negative for visual disturbance.   Respiratory: Negative for choking and shortness of breath.    Cardiovascular: Negative for palpitations.   Gastrointestinal: Negative for nausea and vomiting.   Genitourinary: Negative for dysuria, frequency and hematuria.   Musculoskeletal: Negative for gait problem.   Skin: Negative for rash.   Allergic/Immunologic: Negative for immunocompromised state.   Neurological: Positive for dizziness (chronic), facial asymmetry, speech difficulty and numbness. Negative for syncope, weakness and headaches.   Psychiatric/Behavioral: Negative for agitation, confusion and hallucinations. The patient is not nervous/anxious.      Scheduled Meds:   aspirin  81 mg Oral Daily    atorvastatin  40 mg Oral Daily    heparin (porcine)  5,000 Units Subcutaneous Q8H    nitrofurantoin (macrocrystal-monohydrate)  100 mg Oral Q12H    sodium chloride 0.9%  3 mL Intravenous Q8H     Continuous Infusions:   sodium chloride 0.9%       PRN Meds:acetaminophen, labetalol, ondansetron, sodium chloride 0.9%    Objective:     Vital Signs (Most Recent):  Temp: 98 °F (36.7 °C) (08/04/18 0535)  Pulse: 72 (08/04/18 0800)  Resp: 16 (08/04/18 0535)  BP: 111/69 (08/04/18 0535)  SpO2: 98 % (08/04/18 1000)  BP  Location: Left arm    Vital Signs Range (Last 24H):  Temp:  [96.4 °F (35.8 °C)-98 °F (36.7 °C)]   Pulse:  [48-74]   Resp:  [14-18]   BP: (109-119)/(67-74)   SpO2:  [98 %-99 %]   BP Location: Left arm    Physical Exam   Constitutional: She is oriented to person, place, and time. She appears well-developed and well-nourished. She is cooperative. She does not appear ill. No distress.   Obese young female in no apparent distress   HENT:   Head: Normocephalic and atraumatic.   Mouth/Throat: Mucous membranes are normal.   Eyes: EOM are normal. Pupils are equal, round, and reactive to light.   Unable to close R eyelid   Neck: Normal range of motion. No neck rigidity. Normal range of motion present.   Cardiovascular: Normal rate and regular rhythm.    Pulses:       Radial pulses are 2+ on the right side, and 2+ on the left side.   Pulmonary/Chest: Effort normal. No tachypnea. No respiratory distress.   Abdominal: Soft. She exhibits no distension.   Musculoskeletal: Normal range of motion. She exhibits no edema.   Neurological: She is alert and oriented to person, place, and time. She displays no seizure activity.   Skin: Skin is warm. She is not diaphoretic. No cyanosis or erythema.   Psychiatric: She has a normal mood and affect. Her behavior is normal. Thought content normal. Her speech is slurred (due to abnormal movements of buccal muscle). Cognition and memory are normal.   Nursing note and vitals reviewed.      Neurological Exam:   LOC: alert  Attention Span: Good   Language: No aphasia  Articulation: moderate slurred speech due to abnormal movement of buccal muscles  Orientation: Person, Place, Time   Visual Fields: Full  EOM (CN III, IV, VI): Full/intact, Unable to close R eyelid  Pupils (CN II, III): PERRL  Facial Sensation (CN V): Facial sensory loss  Facial Movement (CN VII): Upper & lower facial weakness on the Right, change in taste   Reflexes: 2+ throughout  Motor: 5/5 throughout  Cebellar: No evidence of  appendicular or axial ataxia  Sensation: R-sided hypoesthesia on face  Tone: Normal tone throughout    Laboratory:  CMP:   Recent Labs  Lab 08/04/18  0452   CALCIUM 9.5   ALBUMIN 3.7   PROT 6.7      K 4.0   CO2 25      BUN 9   CREATININE 0.8   ALKPHOS 99   ALT 17   AST 14   BILITOT 1.1*     CBC:   Recent Labs  Lab 08/04/18  0452   WBC 7.83   RBC 4.67   HGB 12.7   HCT 39.5      MCV 85   MCH 27.2   MCHC 32.2     Lipid Panel:   Recent Labs  Lab 08/03/18  1139   CHOL 162   LDLCALC 106.6   HDL 32*   TRIG 117     Coagulation:   Recent Labs  Lab 08/04/18  0452   INR 1.0   APTT 23.5     Hgb A1C:   Recent Labs  Lab 08/03/18  1139   HGBA1C 4.7     TSH:   Recent Labs  Lab 08/03/18  1139   TSH 0.920       Diagnostic Results     Brain Imaging   - CTH on 8/3: no acute intracranial pathology  - MRI Brain on 8/3: unremarkable    Vessel Imaging   - CTA on 8/3: No high-grade stenosis or large vessel occlusion    Cardiac Imaging   - 2D echo + bubble study on 8/3:  1 - Normal left ventricular systolic function (EF 55-60%).     2 - No wall motion abnormalities.     3 - Normal left ventricular diastolic function.     4 - Normal right ventricular systolic function .     5 - Trivial tricuspid regurgitation.     6 - No evidence of intracardiac shunt.     7 - The estimated PA systolic pressure is 21 mmHg.

## 2018-08-04 NOTE — PLAN OF CARE
Problem: Physical Therapy Goal  Goal: Physical Therapy Goal  Outcome: Outcome(s) achieved Date Met: 08/04/18  Initial eval completed.  Results, POC, and therapy recommendations discussed with patient and mother.   Complete evaluation documentation to follow.  The patient is currently independent with mobility and not in need of any additional skilled PT services. Safe to d/c home when medically appropriate    Mobility Recommendations: safe to ambulate without assistance  D/c recommendations: home, no PT needs     Qian Irvin, PT  8/4/2018  [phone] (pager)

## 2018-08-04 NOTE — PLAN OF CARE
Problem: Patient Care Overview  Goal: Plan of Care Review  Outcome: Ongoing (interventions implemented as appropriate)  POC reviewed with patient. Safety precautions maintained, med education, and medical equipment information provided. NSR on telemetry. VSS. Mother at BS.
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
## 2018-08-04 NOTE — DISCHARGE INSTRUCTIONS
Start prednisone today (60 mg =3 tablets) continue for 3 days, taper down to 40 mg (=2 tablets), for another 3 days and then 20 mg (1 tablets) for another 3 days. Please call the neurology clinic if your symptoms do not improve after 4 weeks (contact information on discharge papers)  Please try to use artificial tear eye drops and eye patch to prevent the right eye from drying out and irritation

## 2018-08-04 NOTE — PLAN OF CARE
Problem: Fall Risk (Adult)  Goal: Identify Related Risk Factors and Signs and Symptoms  Related risk factors and signs and symptoms are identified upon initiation of Human Response Clinical Practice Guideline (CPG)   Plan of care reviewed with patient. Verbalized understanding. No acute events overnight. Vitals stable. Neuro remain unchanged. Remains free from falls. Bed low, call light in reach. Will continue to monitor.

## 2018-08-04 NOTE — PROGRESS NOTES
POC reviewed with patient and mother. Safety precautions, medication information, and follow up with neurology discussed. Patient and mother verbalized understanding. Transport to d/c by w/c.

## 2018-08-05 ENCOUNTER — NURSE TRIAGE (OUTPATIENT)
Dept: ADMINISTRATIVE | Facility: CLINIC | Age: 23
End: 2018-08-05

## 2018-08-05 LAB
BACTERIA UR CULT: NORMAL
BACTERIA UR CULT: NORMAL

## 2018-08-05 NOTE — TELEPHONE ENCOUNTER
"  Reason for Disposition   Caller requesting a refill, no triage required, and triager able to refill per unit policy    Answer Assessment - Initial Assessment Questions  1. SYMPTOMS: "Do you have any symptoms?"      Rx for prednisone is incorrect  2. SEVERITY: If symptoms are present, ask "Are they mild, moderate or severe?"      Order clarification    Protocols used: ST MEDICATION QUESTION CALL-A-    Patient's mom called for clarification of the prescriptions given to her at discharge yesterday. Information was provided. However, mom states she received incorrect number of prednisone; she was given (6) 20 mg tablets of prednisone. Called the pharmacist Carlos at [facility] and clarified the orders. Mom is aware to f/u with the pharmacy for the rest of the order of prednisone, and she verbalized understanding.     "
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
## 2018-08-05 NOTE — PROGRESS NOTES
[facility]  Vascular Neurology  Comprehensive Stroke Center  Progress Note    Assessment/Plan:     * Bell's palsy    Will discharge on steroid taper (starting on 60 mg daily x3d > 40 mg x3d > 20 mg x3d)  Will start on valacyclovir 1000 mg Tid x7 days  Advised to obtain artificial tear drops and eye patch for preventing dryness and keratitis   Recommended outpatient therapy on facial muscles  Will have her follow up in general neurology clinic in 4 weeks        UTI (urinary tract infection)    UA with 3+ leukocytes, moderate bacteria  Started on macrobid x5 days        Marijuana abuse    Stroke risk factor  Mother reports pt had drug abuse in 2014 and underwent inpatient rehab at that time; Had been doing much better recently  Urine DS shows presumptive positive THC  Will  on cessation for stroke prevention        Right sided weakness    Acute-onset R-sided facial weakness and hypoesthesia + change in the taste   Stroke work up is negative. Physical exam very consistent with Bell's palsy.  From stroke standpoint she is safe for discharge  Please see Bell's palsy for management             No notes on file    STROKE DOCUMENTATION   Acute Stroke Times   Last Known Normal Date: 08/03/18  Last Known Normal Time: 0000  Symptom Onset Date: 08/03/18  Symptom Onset Time: 0000  Stroke Team Called Date: 08/03/18  Stroke Team Called Time: 1117  Stroke Team Arrival Date: 08/03/18  Stroke Team Arrival Time: 1124  CT Interpretation Time: 1131  Decision to Treat Time for Alteplase: 1129  Decision to Treat Time for IR: 1250      Hemorrhagic change of an Ischemic Stroke: Does this patient have an ischemic stroke with hemorrhagic changes? No     Neurologic Chief Complaint: R facial droop     Subjective:     Interval History: Patient is seen for follow-up neurological assessment and treatment recommendations: patient with R facial droop, numbness and inability to close R eye. CTH and MRI negative for vascular  pathologies. Stroke work up with echo/bubble study, TSH and A1C unremarkable. One episode of hypotension overnight, responsive to fluids.    HPI, Past Medical, Family, and Social History remains the same as documented in the initial encounter.     Review of Systems   Constitutional: Negative for chills, fatigue and fever.   HENT: Positive for tinnitus (chronic). Negative for drooling, hearing loss, trouble swallowing and voice change.    Eyes: Negative for visual disturbance.   Respiratory: Negative for choking and shortness of breath.    Cardiovascular: Negative for palpitations.   Gastrointestinal: Negative for nausea and vomiting.   Genitourinary: Negative for dysuria, frequency and hematuria.   Musculoskeletal: Negative for gait problem.   Skin: Negative for rash.   Allergic/Immunologic: Negative for immunocompromised state.   Neurological: Positive for dizziness (chronic), facial asymmetry, speech difficulty and numbness. Negative for syncope, weakness and headaches.   Psychiatric/Behavioral: Negative for agitation, confusion and hallucinations. The patient is not nervous/anxious.      Scheduled Meds:   aspirin  81 mg Oral Daily    atorvastatin  40 mg Oral Daily    heparin (porcine)  5,000 Units Subcutaneous Q8H    nitrofurantoin (macrocrystal-monohydrate)  100 mg Oral Q12H    sodium chloride 0.9%  3 mL Intravenous Q8H     Continuous Infusions:   sodium chloride 0.9%       PRN Meds:acetaminophen, labetalol, ondansetron, sodium chloride 0.9%    Objective:     Vital Signs (Most Recent):  Temp: 98 °F (36.7 °C) (08/04/18 0535)  Pulse: 72 (08/04/18 0800)  Resp: 16 (08/04/18 0535)  BP: 111/69 (08/04/18 0535)  SpO2: 98 % (08/04/18 1000)  BP Location: Left arm    Vital Signs Range (Last 24H):  Temp:  [96.4 °F (35.8 °C)-98 °F (36.7 °C)]   Pulse:  [48-74]   Resp:  [14-18]   BP: (109-119)/(67-74)   SpO2:  [98 %-99 %]   BP Location: Left arm    Physical Exam   Constitutional: She is oriented to person, place, and  time. She appears well-developed and well-nourished. She is cooperative. She does not appear ill. No distress.   Obese young female in no apparent distress   HENT:   Head: Normocephalic and atraumatic.   Mouth/Throat: Mucous membranes are normal.   Eyes: EOM are normal. Pupils are equal, round, and reactive to light.   Unable to close R eyelid   Neck: Normal range of motion. No neck rigidity. Normal range of motion present.   Cardiovascular: Normal rate and regular rhythm.    Pulses:       Radial pulses are 2+ on the right side, and 2+ on the left side.   Pulmonary/Chest: Effort normal. No tachypnea. No respiratory distress.   Abdominal: Soft. She exhibits no distension.   Musculoskeletal: Normal range of motion. She exhibits no edema.   Neurological: She is alert and oriented to person, place, and time. She displays no seizure activity.   Skin: Skin is warm. She is not diaphoretic. No cyanosis or erythema.   Psychiatric: She has a normal mood and affect. Her behavior is normal. Thought content normal. Her speech is slurred (due to abnormal movements of buccal muscle). Cognition and memory are normal.   Nursing note and vitals reviewed.      Neurological Exam:   LOC: alert  Attention Span: Good   Language: No aphasia  Articulation: moderate slurred speech due to abnormal movement of buccal muscles  Orientation: Person, Place, Time   Visual Fields: Full  EOM (CN III, IV, VI): Full/intact, Unable to close R eyelid  Pupils (CN II, III): PERRL  Facial Sensation (CN V): Facial sensory loss  Facial Movement (CN VII): Upper & lower facial weakness on the Right, change in taste   Reflexes: 2+ throughout  Motor: 5/5 throughout  Cebellar: No evidence of appendicular or axial ataxia  Sensation: R-sided hypoesthesia on face  Tone: Normal tone throughout    Laboratory:  CMP:   Recent Labs  Lab 08/04/18  0452   CALCIUM 9.5   ALBUMIN 3.7   PROT 6.7      K 4.0   CO2 25      BUN 9   CREATININE 0.8   ALKPHOS 99   ALT 17    AST 14   BILITOT 1.1*     CBC:   Recent Labs  Lab 08/04/18  0452   WBC 7.83   RBC 4.67   HGB 12.7   HCT 39.5      MCV 85   MCH 27.2   MCHC 32.2     Lipid Panel:   Recent Labs  Lab 08/03/18  1139   CHOL 162   LDLCALC 106.6   HDL 32*   TRIG 117     Coagulation:   Recent Labs  Lab 08/04/18  0452   INR 1.0   APTT 23.5     Hgb A1C:   Recent Labs  Lab 08/03/18  1139   HGBA1C 4.7     TSH:   Recent Labs  Lab 08/03/18  1139   TSH 0.920       Diagnostic Results     Brain Imaging   - CTH on 8/3: no acute intracranial pathology  - MRI Brain on 8/3: unremarkable    Vessel Imaging   - CTA on 8/3: No high-grade stenosis or large vessel occlusion    Cardiac Imaging   - 2D echo + bubble study on 8/3:  1 - Normal left ventricular systolic function (EF 55-60%).     2 - No wall motion abnormalities.     3 - Normal left ventricular diastolic function.     4 - Normal right ventricular systolic function .     5 - Trivial tricuspid regurgitation.     6 - No evidence of intracardiac shunt.     7 - The estimated PA systolic pressure is 21 mmHg.       Nupur Dennis MD  Comprehensive Stroke Center  Department of Vascular Neurology   [facility]

## 2018-08-05 NOTE — ASSESSMENT & PLAN NOTE
Acute-onset R-sided facial weakness and hypoesthesia + change in the taste   Stroke work up is negative. Physical exam very consistent with Bell's palsy.  From stroke standpoint she is safe for discharge  Please see Bell's palsy for management

## 2018-08-05 NOTE — HOSPITAL COURSE
patient transferred to [facility] from [place], with R facial droop, numbness and inability to close R eye. CTH and MRI negative for vascular pathologies, CTA did no show any LVO or high-grade stenosis. Stroke work up with echo/bubble study, TSH and A1C unremarkable. One episode of hypotension overnight, responsive to fluids. In the morning patient unable to close the R eye completely and with abnormal taste sensation, specific for Bell's palsy. Patient discharged on antiviral + steroids and will f/u in neurology clinic. Also evidence of mild UTI in the labs, macrobid prescribed upon discharge.

## 2018-08-05 NOTE — ASSESSMENT & PLAN NOTE
Will discharge on steroid taper (starting on 60 mg daily x3d > 40 mg x3d > 20 mg x3d)  Will start on valacyclovir 1000 mg Tid x7 days  Advised to obtain artificial tear drops and eye patch for preventing dryness and keratitis   Recommended outpatient therapy on facial muscles  Will have her follow up in general neurology clinic in 4 weeks

## 2018-08-06 ENCOUNTER — PATIENT OUTREACH (OUTPATIENT)
Dept: ADMINISTRATIVE | Facility: CLINIC | Age: 23
End: 2018-08-06

## 2018-08-06 NOTE — PATIENT INSTRUCTIONS
Renes Palsy    Bell's Palsy is a problem involving the nerve that controls the muscles on one side of the face.  The cause is unknown, but may be related to inflammation of the nerve. Symptoms usually appear only on the affected side. They may include:  · Inability to close the eyelid  · Tearing of the eye  · Facial drooping  · Drooling  · Numbness or pain  · Changes in taste  · Sound sensitivity  Damage to the eye can be a serious problem. The inability to blink can cause the eye to dry out. An ulcer (sore) can then form on the cornea. Also, not blinking means that the eye has no protection from dirt and dust particles.  Treatment involves protecting and moistening the eye. Medicines may also help.  Most persons recover completely within 3 to 6 months. However, the condition sometimes returns months or years later.  Home care  · Get plenty of rest and eat a healthy diet to help yourself recover.  · Use Artificial Tears frequently during the day and at bedtime to prevent drying. These drops are available without prescription at your drug store.  · Wear protective glasses especially when outside to protect from flying debris. Use sunglasses when outdoors.  · Tape the eyelid closed at bedtime with a paper tape (available at your pharmacy). This has a very mild adhesive to avoid injury to the lid. This will protect your eye from injury while you sleep.  · Sometimes medicines are prescribed to reduce inflammation or treat specific viral infections of the nerve. If medicines are prescribed, take them exactly as directed. Usually there is a 10-day course of medication that is started as soon as possible. Taking this medicine as prescribed will help with a full recovery.  · Use low heat, for example from a heating pad, on the affected area. This can help reduce pain and swelling.  · If you are experiencing sever pain, contact your health care provider.  Follow-up care  Follow up with your healthcare provider as advised.  If you referred to a specialist, make that appointment promptly.  When to seek medical advice  Call your healthcare provider if any of the following occur:  · Severe eye redness  · Eye pain  · Thick drainage from the eye  · Change in vision (such as double vision or losing vision)  · Fever over 100.4°F (38°C) or as directed by your healthcare provider  · Headache, neck pain, weakness, trouble speaking or walking, or other unexplained symptoms  Date Last Reviewed: 8/23/2015 © 2000-2018 [employer]. [address] [place] [address], [place] [address]. All rights reserved. This information is not intended as a substitute for professional medical care. Always follow your healthcare professional's instructions.

## 2018-08-07 ENCOUNTER — NURSE TRIAGE (OUTPATIENT)
Dept: ADMINISTRATIVE | Facility: CLINIC | Age: 23
End: 2018-08-07

## 2018-08-07 NOTE — TELEPHONE ENCOUNTER
"SPoke with pt. States that she has not gone to ER but will "go later". States that she is not feeling any better.  Eye pain remains, numbness to leg remains, blurry vision remains.  Instructed to go to ER as soon as possible rather than waiting until later. Verbalized  Understanding.   "

## 2018-08-07 NOTE — TELEPHONE ENCOUNTER
Reason for Disposition   Severe eye pain   Difficult to awaken or acting confused (e.g., disoriented, slurred speech)    Protocols used:  EYE PAIN-A-OH,  NEUROLOGIC DEFICIT-A-OH    Mother states pt was admitted to hospital Friday for Bell's Palsy and was discharged Saturday. Mother states she is not currently with pt, pt is currently at school. Pt then called directly. Pt c/o R eye pain and has blurred vision. Pt states she has not seen opthalmology since bells palsy diagnosis.  Pt denies relief from eye drops or eye patch. Pt rates eye pain 8/10. Pt c/o intermittent R leg numbness that began Thursday and has worsened. When asking questions, pt c/o confusion. Pt advised per protocol to call 911 and verbalizes understanding.

## 2018-08-08 ENCOUNTER — TELEPHONE (OUTPATIENT)
Dept: INTERNAL MEDICINE | Facility: CLINIC | Age: 23
End: 2018-08-08

## 2018-08-08 PROBLEM — H72.92 TYMPANIC MEMBRANE PERFORATION, LEFT: Status: RESOLVED | Noted: 2018-03-14 | Resolved: 2018-08-08

## 2018-08-08 PROBLEM — M54.50 LOW BACK PAIN DURING PREGNANCY: Status: RESOLVED | Noted: 2017-08-08 | Resolved: 2018-08-08

## 2018-08-08 PROBLEM — O26.899 VAGINAL DISCHARGE DURING PREGNANCY: Status: RESOLVED | Noted: 2017-11-04 | Resolved: 2018-08-08

## 2018-08-08 PROBLEM — N89.8 VAGINAL DISCHARGE DURING PREGNANCY: Status: RESOLVED | Noted: 2017-11-04 | Resolved: 2018-08-08

## 2018-08-08 PROBLEM — O47.9 UTERINE CONTRACTIONS DURING PREGNANCY: Status: RESOLVED | Noted: 2017-11-10 | Resolved: 2018-08-08

## 2018-08-08 PROBLEM — O26.893 VAGINAL DISCHARGE DURING PREGNANCY IN THIRD TRIMESTER: Status: RESOLVED | Noted: 2017-11-18 | Resolved: 2018-08-08

## 2018-08-08 PROBLEM — N89.8 VAGINAL DISCHARGE DURING PREGNANCY IN THIRD TRIMESTER: Status: RESOLVED | Noted: 2017-11-18 | Resolved: 2018-08-08

## 2018-08-08 PROBLEM — O26.899 LOW BACK PAIN DURING PREGNANCY: Status: RESOLVED | Noted: 2017-08-08 | Resolved: 2018-08-08

## 2018-08-08 PROBLEM — N12 PYELONEPHRITIS: Status: RESOLVED | Noted: 2017-08-12 | Resolved: 2018-08-08

## 2018-08-08 PROBLEM — K92.0 HEMATEMESIS WITH NAUSEA: Status: RESOLVED | Noted: 2017-05-06 | Resolved: 2018-08-08

## 2018-08-08 NOTE — TELEPHONE ENCOUNTER
Spoke with pt in regards to canceling her appt with us today do to the phone call she had with the on call nurse which advised the pt to go to the ER for her numbness and blurred vision.
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
## 2018-10-10 ENCOUNTER — OFFICE VISIT (OUTPATIENT)
Dept: NEUROLOGY | Facility: CLINIC | Age: 23
End: 2018-10-10
Payer: MEDICARE

## 2018-10-10 VITALS
SYSTOLIC BLOOD PRESSURE: 118 MMHG | WEIGHT: 240.5 LBS | HEIGHT: 63 IN | HEART RATE: 80 BPM | DIASTOLIC BLOOD PRESSURE: 77 MMHG | BODY MASS INDEX: 42.61 KG/M2

## 2018-10-10 DIAGNOSIS — G44.52 NEW DAILY PERSISTENT HEADACHE: ICD-10-CM

## 2018-10-10 DIAGNOSIS — G51.0 RIGHT-SIDED BELL'S PALSY: Primary | ICD-10-CM

## 2018-10-10 PROBLEM — R51.9 HEADACHE: Status: ACTIVE | Noted: 2018-10-10

## 2018-10-10 PROBLEM — O23.00 PYELONEPHRITIS AFFECTING PREGNANCY: Status: ACTIVE | Noted: 2017-08-29

## 2018-10-10 PROBLEM — R53.1 RIGHT SIDED WEAKNESS: Status: RESOLVED | Noted: 2018-08-03 | Resolved: 2018-10-10

## 2018-10-10 PROCEDURE — 99999 PR PBB SHADOW E&M-EST. PATIENT-LVL III: CPT | Mod: PBBFAC,GC,, | Performed by: STUDENT IN AN ORGANIZED HEALTH CARE EDUCATION/TRAINING PROGRAM

## 2018-10-10 PROCEDURE — 99214 OFFICE O/P EST MOD 30 MIN: CPT | Mod: S$PBB,GC,, | Performed by: PSYCHIATRY & NEUROLOGY

## 2018-10-10 PROCEDURE — 99213 OFFICE O/P EST LOW 20 MIN: CPT | Mod: PBBFAC | Performed by: STUDENT IN AN ORGANIZED HEALTH CARE EDUCATION/TRAINING PROGRAM

## 2018-10-10 RX ORDER — AMITRIPTYLINE HYDROCHLORIDE 10 MG/1
10 TABLET, FILM COATED ORAL NIGHTLY
Qty: 30 TABLET | Refills: 5 | Status: SHIPPED | OUTPATIENT
Start: 2018-10-10 | End: 2019-01-11

## 2018-10-10 NOTE — ASSESSMENT & PLAN NOTE
Completed course of oral prednisone and valtrex with almost complete resolution of symptoms.    No need for further medical treatment or follow up unless symptoms recur.

## 2018-10-11 ENCOUNTER — OFFICE VISIT (OUTPATIENT)
Dept: URGENT CARE | Facility: CLINIC | Age: 23
End: 2018-10-11
Payer: MEDICARE

## 2018-10-11 VITALS
HEIGHT: 63 IN | RESPIRATION RATE: 20 BRPM | WEIGHT: 230 LBS | DIASTOLIC BLOOD PRESSURE: 80 MMHG | OXYGEN SATURATION: 100 % | TEMPERATURE: 98 F | BODY MASS INDEX: 40.75 KG/M2 | HEART RATE: 76 BPM | SYSTOLIC BLOOD PRESSURE: 124 MMHG

## 2018-10-11 DIAGNOSIS — R30.0 DYSURIA: ICD-10-CM

## 2018-10-11 DIAGNOSIS — N92.6 MISSED PERIOD: Primary | ICD-10-CM

## 2018-10-11 DIAGNOSIS — J32.9 SINUSITIS, UNSPECIFIED CHRONICITY, UNSPECIFIED LOCATION: ICD-10-CM

## 2018-10-11 DIAGNOSIS — N30.00 ACUTE CYSTITIS WITHOUT HEMATURIA: ICD-10-CM

## 2018-10-11 LAB
B-HCG UR QL: NEGATIVE
BILIRUB UR QL STRIP: NEGATIVE
CTP QC/QA: YES
GLUCOSE UR QL STRIP: NEGATIVE
KETONES UR QL STRIP: NEGATIVE
LEUKOCYTE ESTERASE UR QL STRIP: POSITIVE
PH, POC UA: 6.5 (ref 5–8)
POC BLOOD, URINE: NEGATIVE
POC NITRATES, URINE: NEGATIVE
PROT UR QL STRIP: NEGATIVE
SP GR UR STRIP: 1.02 (ref 1–1.03)
UROBILINOGEN UR STRIP-ACNC: ABNORMAL (ref 0.1–1.1)

## 2018-10-11 PROCEDURE — 99214 OFFICE O/P EST MOD 30 MIN: CPT | Mod: S$GLB,,, | Performed by: PHYSICIAN ASSISTANT

## 2018-10-11 PROCEDURE — 81003 URINALYSIS AUTO W/O SCOPE: CPT | Mod: QW,S$GLB,, | Performed by: PHYSICIAN ASSISTANT

## 2018-10-11 PROCEDURE — 81025 URINE PREGNANCY TEST: CPT | Mod: S$GLB,,, | Performed by: PHYSICIAN ASSISTANT

## 2018-10-11 RX ORDER — CEPHALEXIN 500 MG/1
500 CAPSULE ORAL 4 TIMES DAILY
Qty: 40 CAPSULE | Refills: 0 | Status: SHIPPED | OUTPATIENT
Start: 2018-10-11 | End: 2018-10-21

## 2018-10-11 NOTE — PATIENT INSTRUCTIONS
Sinusitis (Antibiotic Treatment)    The sinuses are air-filled spaces within the bones of the face. They connect to the inside of the nose. Sinusitis is an inflammation of the tissue lining the sinus cavity. Sinus inflammation can occur during a cold. It can also be due to allergies to pollens and other particles in the air. Sinusitis can cause symptoms of sinus congestion and fullness. A sinus infection causes fever, headache and facial pain. There is often green or yellow drainage from the nose or into the back of the throat (post-nasal drip). You have been given antibiotics to treat this condition.  Home care:  · Take the full course of antibiotics as instructed. Do not stop taking them, even if you feel better.  · Drink plenty of water, hot tea, and other liquids. This may help thin mucus. It also may promote sinus drainage.  · Heat may help soothe painful areas of the face. Use a towel soaked in hot water. Or,  the shower and direct the hot spray onto your face. Using a vaporizer along with a menthol rub at night may also help.   · An expectorant containing guaifenesin may help thin the mucus and promote drainage from the sinuses.  · Over-the-counter decongestants may be used unless a similar medicine was prescribed. Nasal sprays work the fastest. Use one that contains phenylephrine or oxymetazoline. First blow the nose gently. Then use the spray. Do not use these medicines more often than directed on the label or symptoms may get worse. You may also use tablets containing pseudoephedrine. Avoid products that combine ingredients, because side effects may be increased. Read labels. You can also ask the pharmacist for help. (NOTE: Persons with high blood pressure should not use decongestants. They can raise blood pressure.)  · Over-the-counter antihistamines may help if allergies contributed to your sinusitis.    · Do not use nasal rinses or irrigation during an acute sinus infection, unless told to by  "your health care provider. Rinsing may spread the infection to other sinuses.  · Use acetaminophen or ibuprofen to control pain, unless another pain medicine was prescribed. (If you have chronic liver or kidney disease or ever had a stomach ulcer, talk with your doctor before using these medicines. Aspirin should never be used in anyone under 18 years of age who is ill with a fever. It may cause severe liver damage.)  · Don't smoke. This can worsen symptoms.  Follow-up care  Follow up with your healthcare provider or our staff if you are not improving within the next week.  When to seek medical advice  Call your healthcare provider if any of these occur:  · Facial pain or headache becoming more severe  · Stiff neck  · Unusual drowsiness or confusion  · Swelling of the forehead or eyelids  · Vision problems, including blurred or double vision  · Fever of 100.4ºF (38ºC) or higher, or as directed by your healthcare provider  · Seizure  · Breathing problems  · Symptoms not resolving within 10 days  Date Last Reviewed: 4/13/2015  © 2674-1308 [employer]. [address] [place] [address]. All rights reserved. This information is not intended as a substitute for professional medical care. Always follow your healthcare professional's instructions.        Bladder Infection, Female (Adult)    Urine is normally doesn't have any bacteria in it. But bacteria can get into the urinary tract from the skin around the rectum. Or they can travel in the blood from elsewhere in the body. Once they are in your urinary tract, they can cause infection in the urethra (urethritis), the bladder (cystitis), or the kidneys (pyelonephritis).  The most common place for an infection is in the bladder. This is called a bladder infection. This is one of the most common infections in women. Most bladder infections are easily treated. They are not serious unless the infection spreads to the kidney.  The phrases "bladder " "infection," "UTI," and "cystitis" are often used to describe the same thing. But they are not always the same. Cystitis is an inflammation of the bladder. The most common cause of cystitis is an infection.  Symptoms  The infection causes inflammation in the urethra and bladder. This causes many of the symptoms. The most common symptoms of a bladder infection are:  · Pain or burning when urinating  · Having to urinate more often than usual  · Urgent need to urinate  · Only a small amount of urine comes out  · Blood in urine  · Abdominal discomfort. This is usually in the lower abdomen above the pubic bone.  · Cloudy urine  · Strong- or bad-smelling urine  · Unable to urinate (urinary retention)  · Unable to hold urine in (urinary incontinence)  · Fever  · Loss of appetite  · Confusion (in older adults)  Causes  Bladder infections are not contagious. You can't get one from someone else, from a toilet seat, or from sharing a bath.  The most common cause of bladder infections is bacteria from the bowels. The bacteria get onto the skin around the opening of the urethra. From there, they can get into the urine and travel up to the bladder, causing inflammation and infection. This usually happens because of:  · Wiping improperly after urinating. Always wipe from front to back.  · Bowel incontinence  · Pregnancy  · Procedures such as having a catheter inserted  · Older age  · Not emptying your bladder. This can allow bacteria a chance to grow in your urine.  · Dehydration  · Constipation  · Sex  · Use of a diaphragm for birth control   Treatment  Bladder infections are diagnosed by a urine test. They are treated with antibiotics and usually clear up quickly without complications. Treatment helps prevent a more serious kidney infection.  Medicines  Medicines can help in the treatment of a bladder infection:  · Take antibiotics until they are used up, even if you feel better. It is important to finish them to make sure the " infection has cleared.  · You can use acetaminophen or ibuprofen for pain, fever, or discomfort, unless another medicine was prescribed. If you have chronic liver or kidney disease, talk with your healthcare provider before using these medicines. Also talk with your provider if you've ever had a stomach ulcer or gastrointestinal bleeding, or are taking blood-thinner medicines.  · If you are given phenazopydridine to reduce burning with urination, it will cause your urine to become a bright orange color. This can stain clothing.  Care and prevention  These self-care steps can help prevent future infections:  · Drink plenty of fluids to prevent dehydration and flush out your bladder. Do this unless you must restrict fluids for other health reasons, or your doctor told you not to.  · Proper cleaning after going to the bathroom is important. Wipe from front to back after using the toilet to prevent the spread of bacteria.  · Urinate more often. Don't try to hold urine in for a long time.  · Wear loose-fitting clothes and cotton underwear. Avoid tight-fitting pants.  · Improve your diet and prevent constipation. Eat more fresh fruit and vegetables, and fiber, and less junk and fatty foods.  · Avoid sex until your symptoms are gone.  · Avoid caffeine, alcohol, and spicy foods. These can irritate your bladder.  · Urinate right after intercourse to flush out your bladder.  · If you use birth control pills and have frequent bladder infections, discuss it with your doctor.  Follow-up care  Call your healthcare provider if all symptoms are not gone after 3 days of treatment. This is especially important if you have repeat infections.  If a culture was done, you will be told if your treatment needs to be changed. If directed, you can call to find out the results.  If X-rays were done, you will be told if the results will affect your treatment.  Call 911  Call 911 if any of the following occur:  · Trouble breathing  · Hard to  wake up or confusion  · Fainting or loss of consciousness  · Rapid heart rate  When to seek medical advice  Call your healthcare provider right away if any of these occur:  · Fever of 100.4ºF (38.0ºC) or higher, or as directed by your healthcare provider  · Symptoms are not better by the third day of treatment  · Back or belly (abdominal) pain that gets worse  · Repeated vomiting, or unable to keep medicine down  · Weakness or dizziness  · Vaginal discharge  · Pain, redness, or swelling in the outer vaginal area (labia)  Date Last Reviewed: 10/1/2016  © 1304-7894 [employer]. [address] [place] [address]. All rights reserved. This information is not intended as a substitute for professional medical care. Always follow your healthcare professional's instructions.      Please follow up with your Primary care provider within 2-5 days if your signs and symptoms have not resolved or worsen.     If your condition worsens or fails to improve we recommend that you receive another evaluation at the emergency room immediately or contact your primary medical clinic to discuss your concerns.   You must understand that you have received an Urgent Care treatment only and that you may be released before all of your medical problems are known or treated. You, the patient, will arrange for follow up care as instructed.     RED FLAGS/WARNING SYMPTOMS DISCUSSED WITH PATIENT THAT WOULD WARRANT EMERGENT MEDICAL ATTENTION. PATIENT VERBALIZED UNDERSTANDING.

## 2018-10-11 NOTE — PROGRESS NOTES
"Subjective:       Patient ID: Alexandra Martin is a 22 y.o. female.    Vitals:  height is 5' 3" (1.6 m) and weight is 104.3 kg (230 lb). Her temperature is 97.9 °F (36.6 °C). Her blood pressure is 124/80 and her pulse is 76. Her respiration is 20 and oxygen saturation is 100%.     Chief Complaint: Sinus Problem    Sinus Problem   This is a new problem. The current episode started in the past 7 days. The problem is unchanged. There has been no fever. Associated symptoms include congestion, coughing, ear pain, a hoarse voice and sinus pressure. Pertinent negatives include no chills, headaches, shortness of breath or sore throat. Past treatments include nothing.   Dysuria    This is a new problem. The current episode started today. The problem has been unchanged. The quality of the pain is described as burning. The pain is at a severity of 2/10. The pain is mild. There has been no fever. She is sexually active. There is no history of pyelonephritis. Associated symptoms include a possible pregnancy and urgency. Pertinent negatives include no chills, discharge, flank pain, frequency, hematuria, nausea, sweats, vomiting, weight loss, constipation, rash or withholding. She has tried nothing for the symptoms.     Review of Systems   Constitution: Negative for chills, fever, malaise/fatigue and weight loss.   HENT: Positive for congestion, ear pain, hoarse voice and sinus pressure. Negative for sore throat.    Eyes: Negative for discharge and redness.   Cardiovascular: Negative for chest pain, dyspnea on exertion and leg swelling.   Respiratory: Positive for cough and sputum production. Negative for shortness of breath and wheezing.    Skin: Negative for rash.   Musculoskeletal: Negative for myalgias.   Gastrointestinal: Negative for abdominal pain, constipation, nausea and vomiting.   Genitourinary: Positive for dysuria and urgency. Negative for flank pain, frequency and hematuria.   Neurological: Negative for " headaches.       Objective:      Physical Exam   Constitutional: She is oriented to person, place, and time. She appears well-developed and well-nourished. She is cooperative.  Non-toxic appearance. She does not appear ill. No distress.   HENT:   Head: Normocephalic and atraumatic.   Right Ear: Hearing, tympanic membrane, external ear and ear canal normal.   Left Ear: Hearing, tympanic membrane, external ear and ear canal normal.   Nose: Mucosal edema, rhinorrhea and sinus tenderness present. No nasal deformity. No epistaxis. Right sinus exhibits maxillary sinus tenderness. Right sinus exhibits no frontal sinus tenderness. Left sinus exhibits maxillary sinus tenderness. Left sinus exhibits no frontal sinus tenderness.   Mouth/Throat: Uvula is midline, oropharynx is clear and moist and mucous membranes are normal. No trismus in the jaw. Normal dentition. No uvula swelling. No posterior oropharyngeal erythema.   Eyes: Conjunctivae and lids are normal. Right eye exhibits no discharge. Left eye exhibits no discharge. No scleral icterus.   Sclera clear bilat   Neck: Trachea normal, normal range of motion, full passive range of motion without pain and phonation normal. Neck supple.   Cardiovascular: Normal rate, regular rhythm, normal heart sounds, intact distal pulses and normal pulses.   Pulmonary/Chest: Effort normal and breath sounds normal. No respiratory distress.   Abdominal: Soft. Normal appearance and bowel sounds are normal. She exhibits no distension, no pulsatile midline mass and no mass. There is no tenderness.   Musculoskeletal: Normal range of motion. She exhibits no edema or deformity.   Neurological: She is alert and oriented to person, place, and time. She exhibits normal muscle tone. Coordination normal.   Skin: Skin is warm, dry and intact. She is not diaphoretic. No pallor.   Psychiatric: She has a normal mood and affect. Her speech is normal and behavior is normal. Judgment and thought content  normal. Cognition and memory are normal.   Nursing note and vitals reviewed.      Assessment:       1. Sinusitis, unspecified chronicity, unspecified location    2. Missed period    3. Dysuria    4. Acute cystitis without hematuria        Plan:         Sinusitis, unspecified chronicity, unspecified location  -     cephALEXin (KEFLEX) 500 MG capsule; Take 1 capsule (500 mg total) by mouth 4 (four) times daily. for 10 days  Dispense: 40 capsule; Refill: 0    Missed period  -     POCT Urinalysis, Dipstick, Manual, W/O Scope    Dysuria  -     POCT Urinalysis, Dipstick, Automated, W/O Scope    Acute cystitis without hematuria  -     cephALEXin (KEFLEX) 500 MG capsule; Take 1 capsule (500 mg total) by mouth 4 (four) times daily. for 10 days  Dispense: 40 capsule; Refill: 0      Sinusitis (Antibiotic Treatment)    The sinuses are air-filled spaces within the bones of the face. They connect to the inside of the nose. Sinusitis is an inflammation of the tissue lining the sinus cavity. Sinus inflammation can occur during a cold. It can also be due to allergies to pollens and other particles in the air. Sinusitis can cause symptoms of sinus congestion and fullness. A sinus infection causes fever, headache and facial pain. There is often green or yellow drainage from the nose or into the back of the throat (post-nasal drip). You have been given antibiotics to treat this condition.  Home care:  · Take the full course of antibiotics as instructed. Do not stop taking them, even if you feel better.  · Drink plenty of water, hot tea, and other liquids. This may help thin mucus. It also may promote sinus drainage.  · Heat may help soothe painful areas of the face. Use a towel soaked in hot water. Or,  the shower and direct the hot spray onto your face. Using a vaporizer along with a menthol rub at night may also help.   · An expectorant containing guaifenesin may help thin the mucus and promote drainage from the  sinuses.  · Over-the-counter decongestants may be used unless a similar medicine was prescribed. Nasal sprays work the fastest. Use one that contains phenylephrine or oxymetazoline. First blow the nose gently. Then use the spray. Do not use these medicines more often than directed on the label or symptoms may get worse. You may also use tablets containing pseudoephedrine. Avoid products that combine ingredients, because side effects may be increased. Read labels. You can also ask the pharmacist for help. (NOTE: Persons with high blood pressure should not use decongestants. They can raise blood pressure.)  · Over-the-counter antihistamines may help if allergies contributed to your sinusitis.    · Do not use nasal rinses or irrigation during an acute sinus infection, unless told to by your health care provider. Rinsing may spread the infection to other sinuses.  · Use acetaminophen or ibuprofen to control pain, unless another pain medicine was prescribed. (If you have chronic liver or kidney disease or ever had a stomach ulcer, talk with your doctor before using these medicines. Aspirin should never be used in anyone under 18 years of age who is ill with a fever. It may cause severe liver damage.)  · Don't smoke. This can worsen symptoms.  Follow-up care  Follow up with your healthcare provider or our staff if you are not improving within the next week.  When to seek medical advice  Call your healthcare provider if any of these occur:  · Facial pain or headache becoming more severe  · Stiff neck  · Unusual drowsiness or confusion  · Swelling of the forehead or eyelids  · Vision problems, including blurred or double vision  · Fever of 100.4ºF (38ºC) or higher, or as directed by your healthcare provider  · Seizure  · Breathing problems  · Symptoms not resolving within 10 days  Date Last Reviewed: 4/13/2015  © 1063-3778 [employer]. [address] [place] [address], [place] [address]. All rights reserved. This  "information is not intended as a substitute for professional medical care. Always follow your healthcare professional's instructions.        Bladder Infection, Female (Adult)    Urine is normally doesn't have any bacteria in it. But bacteria can get into the urinary tract from the skin around the rectum. Or they can travel in the blood from elsewhere in the body. Once they are in your urinary tract, they can cause infection in the urethra (urethritis), the bladder (cystitis), or the kidneys (pyelonephritis).  The most common place for an infection is in the bladder. This is called a bladder infection. This is one of the most common infections in women. Most bladder infections are easily treated. They are not serious unless the infection spreads to the kidney.  The phrases "bladder infection," "UTI," and "cystitis" are often used to describe the same thing. But they are not always the same. Cystitis is an inflammation of the bladder. The most common cause of cystitis is an infection.  Symptoms  The infection causes inflammation in the urethra and bladder. This causes many of the symptoms. The most common symptoms of a bladder infection are:  · Pain or burning when urinating  · Having to urinate more often than usual  · Urgent need to urinate  · Only a small amount of urine comes out  · Blood in urine  · Abdominal discomfort. This is usually in the lower abdomen above the pubic bone.  · Cloudy urine  · Strong- or bad-smelling urine  · Unable to urinate (urinary retention)  · Unable to hold urine in (urinary incontinence)  · Fever  · Loss of appetite  · Confusion (in older adults)  Causes  Bladder infections are not contagious. You can't get one from someone else, from a toilet seat, or from sharing a bath.  The most common cause of bladder infections is bacteria from the bowels. The bacteria get onto the skin around the opening of the urethra. From there, they can get into the urine and travel up to the bladder, " causing inflammation and infection. This usually happens because of:  · Wiping improperly after urinating. Always wipe from front to back.  · Bowel incontinence  · Pregnancy  · Procedures such as having a catheter inserted  · Older age  · Not emptying your bladder. This can allow bacteria a chance to grow in your urine.  · Dehydration  · Constipation  · Sex  · Use of a diaphragm for birth control   Treatment  Bladder infections are diagnosed by a urine test. They are treated with antibiotics and usually clear up quickly without complications. Treatment helps prevent a more serious kidney infection.  Medicines  Medicines can help in the treatment of a bladder infection:  · Take antibiotics until they are used up, even if you feel better. It is important to finish them to make sure the infection has cleared.  · You can use acetaminophen or ibuprofen for pain, fever, or discomfort, unless another medicine was prescribed. If you have chronic liver or kidney disease, talk with your healthcare provider before using these medicines. Also talk with your provider if you've ever had a stomach ulcer or gastrointestinal bleeding, or are taking blood-thinner medicines.  · If you are given phenazopydridine to reduce burning with urination, it will cause your urine to become a bright orange color. This can stain clothing.  Care and prevention  These self-care steps can help prevent future infections:  · Drink plenty of fluids to prevent dehydration and flush out your bladder. Do this unless you must restrict fluids for other health reasons, or your doctor told you not to.  · Proper cleaning after going to the bathroom is important. Wipe from front to back after using the toilet to prevent the spread of bacteria.  · Urinate more often. Don't try to hold urine in for a long time.  · Wear loose-fitting clothes and cotton underwear. Avoid tight-fitting pants.  · Improve your diet and prevent constipation. Eat more fresh fruit and  vegetables, and fiber, and less junk and fatty foods.  · Avoid sex until your symptoms are gone.  · Avoid caffeine, alcohol, and spicy foods. These can irritate your bladder.  · Urinate right after intercourse to flush out your bladder.  · If you use birth control pills and have frequent bladder infections, discuss it with your doctor.  Follow-up care  Call your healthcare provider if all symptoms are not gone after 3 days of treatment. This is especially important if you have repeat infections.  If a culture was done, you will be told if your treatment needs to be changed. If directed, you can call to find out the results.  If X-rays were done, you will be told if the results will affect your treatment.  Call 911  Call 911 if any of the following occur:  · Trouble breathing  · Hard to wake up or confusion  · Fainting or loss of consciousness  · Rapid heart rate  When to seek medical advice  Call your healthcare provider right away if any of these occur:  · Fever of 100.4ºF (38.0ºC) or higher, or as directed by your healthcare provider  · Symptoms are not better by the third day of treatment  · Back or belly (abdominal) pain that gets worse  · Repeated vomiting, or unable to keep medicine down  · Weakness or dizziness  · Vaginal discharge  · Pain, redness, or swelling in the outer vaginal area (labia)  Date Last Reviewed: 10/1/2016  © 1168-0895 [employer]. [address] [place] [address]. All rights reserved. This information is not intended as a substitute for professional medical care. Always follow your healthcare professional's instructions.      Please follow up with your Primary care provider within 2-5 days if your signs and symptoms have not resolved or worsen.     If your condition worsens or fails to improve we recommend that you receive another evaluation at the emergency room immediately or contact your primary medical clinic to discuss your concerns.   You must understand that  you have received an Urgent Care treatment only and that you may be released before all of your medical problems are known or treated. You, the patient, will arrange for follow up care as instructed.     RED FLAGS/WARNING SYMPTOMS DISCUSSED WITH PATIENT THAT WOULD WARRANT EMERGENT MEDICAL ATTENTION. PATIENT VERBALIZED UNDERSTANDING.

## 2018-10-17 ENCOUNTER — OFFICE VISIT (OUTPATIENT)
Dept: URGENT CARE | Facility: CLINIC | Age: 23
End: 2018-10-17
Payer: MEDICARE

## 2018-10-17 VITALS
WEIGHT: 272 LBS | HEART RATE: 70 BPM | DIASTOLIC BLOOD PRESSURE: 68 MMHG | TEMPERATURE: 99 F | OXYGEN SATURATION: 97 % | BODY MASS INDEX: 48.2 KG/M2 | HEIGHT: 63 IN | RESPIRATION RATE: 16 BRPM | SYSTOLIC BLOOD PRESSURE: 117 MMHG

## 2018-10-17 DIAGNOSIS — R42 DIZZINESS: Primary | ICD-10-CM

## 2018-10-17 LAB — GLUCOSE SERPL-MCNC: 83 MG/DL (ref 70–110)

## 2018-10-17 PROCEDURE — 99214 OFFICE O/P EST MOD 30 MIN: CPT | Mod: S$GLB,,, | Performed by: PHYSICIAN ASSISTANT

## 2018-10-17 PROCEDURE — 82948 REAGENT STRIP/BLOOD GLUCOSE: CPT | Mod: S$GLB,,, | Performed by: PHYSICIAN ASSISTANT

## 2018-10-17 NOTE — PROGRESS NOTES
"Subjective:       Patient ID: Alexandra Martin is a 22 y.o. female.    Vitals:  height is 5' 3" (1.6 m) and weight is 123.4 kg (272 lb). Her oral temperature is 98.5 °F (36.9 °C). Her blood pressure is 117/68 and her pulse is 70. Her respiration is 16 and oxygen saturation is 97%.     Chief Complaint: Dizziness    Dizziness:   Chronicity:  New  Onset:  Yesterday  Progression since onset:  Unchanged  Frequency:  Every few hours  Pain Scale:  10/10  Severity:  Severe  Duration:  1 hour  Dizziness characteristics:  Lightheaded/impending faint  Frequency of Spells:  Daily   Associated symptoms: headaches, nausea and light-headedness.no fever, no vomiting and no chest pain.  Aggravated by:  Nothing  Treatments tried:  Nothing  Improvements on treatment:  No relief    Review of Systems   Constitution: Negative for chills and fever.   HENT: Negative for sore throat.    Eyes: Negative for blurred vision.   Cardiovascular: Negative for chest pain.   Respiratory: Positive for cough. Negative for shortness of breath.    Skin: Negative for rash.   Musculoskeletal: Negative for back pain and joint pain.   Gastrointestinal: Positive for nausea. Negative for abdominal pain, diarrhea and vomiting.   Neurological: Positive for dizziness, headaches and light-headedness.   Psychiatric/Behavioral: The patient is not nervous/anxious.        Objective:      Physical Exam   Constitutional: She is oriented to person, place, and time. Vital signs are normal. She appears well-developed and well-nourished. No distress.   HENT:   Head: Normocephalic and atraumatic.   Right Ear: Hearing, tympanic membrane, external ear and ear canal normal.   Left Ear: Hearing, tympanic membrane, external ear and ear canal normal.   Nose: Nose normal. No mucosal edema. Right sinus exhibits no maxillary sinus tenderness and no frontal sinus tenderness. Left sinus exhibits no maxillary sinus tenderness and no frontal sinus tenderness.   Mouth/Throat: " Uvula is midline. No oropharyngeal exudate, posterior oropharyngeal edema or posterior oropharyngeal erythema.   Eyes: Conjunctivae, EOM and lids are normal.   Neck: Normal range of motion. Neck supple.   Cardiovascular: Normal rate, regular rhythm and normal heart sounds. Exam reveals no gallop and no friction rub.   No murmur heard.  Pulmonary/Chest: Effort normal and breath sounds normal. No respiratory distress. She has no wheezes. She has no rales.   Musculoskeletal: Normal range of motion.   Lymphadenopathy:        Head (right side): No submandibular and no tonsillar adenopathy present.        Head (left side): No submandibular and no tonsillar adenopathy present.   Neurological: She is alert and oriented to person, place, and time. She has normal strength. She is not disoriented. No sensory deficit. She displays a negative Romberg sign. GCS eye subscore is 4. GCS verbal subscore is 5. GCS motor subscore is 6.   Skin: Skin is warm and dry. No rash noted. No erythema.   Psychiatric: She has a normal mood and affect. Her speech is normal and behavior is normal. Thought content normal.   Nursing note and vitals reviewed.      Assessment:       1. Dizziness        Office Visit on 10/17/2018   Component Date Value Ref Range Status    POC Glucose 10/17/2018 83  70 - 110 MG/DL Final     Plan:       ER precautions given.  Patient states she has had episodes in the past with the same symptoms.  Advised she keep a journal documenting circumstances whenever she experiences an episode to find trigger.  Dizziness  -     Orthostatic vital signs  -     POCT glucose

## 2018-11-26 ENCOUNTER — OFFICE VISIT (OUTPATIENT)
Dept: URGENT CARE | Facility: CLINIC | Age: 23
End: 2018-11-26
Payer: MEDICARE

## 2018-11-26 VITALS
DIASTOLIC BLOOD PRESSURE: 92 MMHG | SYSTOLIC BLOOD PRESSURE: 122 MMHG | BODY MASS INDEX: 48.2 KG/M2 | TEMPERATURE: 98 F | HEART RATE: 82 BPM | RESPIRATION RATE: 18 BRPM | OXYGEN SATURATION: 99 % | WEIGHT: 272 LBS | HEIGHT: 63 IN

## 2018-11-26 DIAGNOSIS — J06.9 VIRAL URI WITH COUGH: Primary | ICD-10-CM

## 2018-11-26 DIAGNOSIS — R68.89 FLU-LIKE SYMPTOMS: ICD-10-CM

## 2018-11-26 LAB
CTP QC/QA: YES
FLUAV AG NPH QL: NEGATIVE
FLUBV AG NPH QL: NEGATIVE

## 2018-11-26 PROCEDURE — 96372 THER/PROPH/DIAG INJ SC/IM: CPT | Mod: S$GLB,,, | Performed by: PHYSICIAN ASSISTANT

## 2018-11-26 PROCEDURE — 99214 OFFICE O/P EST MOD 30 MIN: CPT | Mod: 25,S$GLB,, | Performed by: PHYSICIAN ASSISTANT

## 2018-11-26 PROCEDURE — 87804 INFLUENZA ASSAY W/OPTIC: CPT | Mod: QW,S$GLB,, | Performed by: PHYSICIAN ASSISTANT

## 2018-11-26 RX ORDER — PROMETHAZINE HYDROCHLORIDE AND DEXTROMETHORPHAN HYDROBROMIDE 6.25; 15 MG/5ML; MG/5ML
5 SYRUP ORAL 3 TIMES DAILY PRN
Qty: 180 ML | Refills: 0 | Status: SHIPPED | OUTPATIENT
Start: 2018-11-26 | End: 2018-12-06

## 2018-11-26 RX ORDER — BETAMETHASONE SODIUM PHOSPHATE AND BETAMETHASONE ACETATE 3; 3 MG/ML; MG/ML
6 INJECTION, SUSPENSION INTRA-ARTICULAR; INTRALESIONAL; INTRAMUSCULAR; SOFT TISSUE
Status: COMPLETED | OUTPATIENT
Start: 2018-11-26 | End: 2018-11-26

## 2018-11-26 RX ADMIN — BETAMETHASONE SODIUM PHOSPHATE AND BETAMETHASONE ACETATE 6 MG: 3; 3 INJECTION, SUSPENSION INTRA-ARTICULAR; INTRALESIONAL; INTRAMUSCULAR; SOFT TISSUE at 07:11

## 2018-11-27 NOTE — PROGRESS NOTES
"Subjective:       Patient ID: Alexandra Martin is a 23 y.o. female.    Vitals:  height is 5' 3" (1.6 m) and weight is 123.4 kg (272 lb). Her temperature is 97.8 °F (36.6 °C). Her blood pressure is 122/92 (abnormal) and her pulse is 82. Her respiration is 18 and oxygen saturation is 99%.     Chief Complaint: Sinus Problem    Sinus Problem   This is a new problem. Episode onset: 3 days. The problem has been gradually worsening since onset. There has been no fever. Her pain is at a severity of 3/10. The pain is mild. Associated symptoms include congestion, coughing and shortness of breath. Pertinent negatives include no chills, diaphoresis, ear pain, sinus pressure or sore throat. Past treatments include oral decongestants. The treatment provided mild relief.       Constitution: Negative for chills, sweating, fatigue and fever.   HENT: Positive for congestion. Negative for ear pain, sinus pain, sinus pressure, sore throat and voice change.    Neck: Negative for painful lymph nodes.   Eyes: Negative for eye redness.   Respiratory: Positive for cough and shortness of breath. Negative for chest tightness, sputum production, bloody sputum, COPD, stridor, wheezing and asthma.    Gastrointestinal: Negative for abdominal pain, nausea and vomiting.   Musculoskeletal: Positive for muscle ache.   Skin: Negative for rash.   Allergic/Immunologic: Negative for seasonal allergies and asthma.   Hematologic/Lymphatic: Negative for swollen lymph nodes.       Objective:      Physical Exam   Constitutional: She is oriented to person, place, and time. She appears well-developed and well-nourished. She is cooperative.  Non-toxic appearance. She does not appear ill. No distress.   HENT:   Head: Normocephalic and atraumatic.   Right Ear: Hearing, external ear and ear canal normal. Tympanic membrane is injected.   Left Ear: Hearing, external ear and ear canal normal. Tympanic membrane is injected.   Nose: Rhinorrhea present. No " mucosal edema or nasal deformity. No epistaxis. Right sinus exhibits no maxillary sinus tenderness and no frontal sinus tenderness. Left sinus exhibits no maxillary sinus tenderness and no frontal sinus tenderness.   Mouth/Throat: Uvula is midline, oropharynx is clear and moist and mucous membranes are normal. No trismus in the jaw. Normal dentition. No uvula swelling. No posterior oropharyngeal erythema.   Eyes: Conjunctivae and lids are normal. No scleral icterus.   Sclera clear bilat   Neck: Trachea normal, full passive range of motion without pain and phonation normal. Neck supple.   Cardiovascular: Normal rate, regular rhythm, normal heart sounds, intact distal pulses and normal pulses.   Pulmonary/Chest: Effort normal and breath sounds normal. No accessory muscle usage or stridor. No respiratory distress. She has no decreased breath sounds. She has no wheezes. She has no rhonchi. She has no rales.   Abdominal: Soft. Normal appearance and bowel sounds are normal. She exhibits no distension. There is no tenderness.   Musculoskeletal: Normal range of motion. She exhibits no edema or deformity.   Neurological: She is alert and oriented to person, place, and time. She exhibits normal muscle tone. Coordination normal.   Skin: Skin is warm, dry and intact. She is not diaphoretic. No pallor.   Psychiatric: She has a normal mood and affect. Her speech is normal and behavior is normal. Judgment and thought content normal. Cognition and memory are normal.   Nursing note and vitals reviewed.      Assessment:       1. Viral URI with cough    2. Flu-like symptoms        Plan:         Viral URI with cough  -     promethazine-dextromethorphan (PROMETHAZINE-DM) 6.25-15 mg/5 mL Syrp; Take 5 mLs by mouth 3 (three) times daily as needed (cough).  Dispense: 180 mL; Refill: 0  -     betamethasone acetate-betamethasone sodium phosphate injection 6 mg    Flu-like symptoms  -     POCT Influenza A/B          Viral Upper Respiratory  Illness (Adult)  You have a viral upper respiratory illness (URI), which is another term for the common cold. This illness is contagious during the first few days. It is spread through the air by coughing and sneezing. It may also be spread by direct contact (touching the sick person and then touching your own eyes, nose, or mouth). Frequent handwashing will decrease risk of spread. Most viral illnesses go away within 7 to 10 days with rest and simple home remedies. Sometimes the illness may last for several weeks. Antibiotics will not kill a virus, and they are generally not prescribed for this condition.    Home care  · If symptoms are severe, rest at home for the first 2 to 3 days. When you resume activity, don't let yourself get too tired.  · Avoid being exposed to cigarette smoke (yours or others).  · You may use acetaminophen or ibuprofen to control pain and fever, unless another medicine was prescribed. (Note: If you have chronic liver or kidney disease, have ever had a stomach ulcer or gastrointestinal bleeding, or are taking blood-thinning medicines, talk with your healthcare provider before using these medicines.) Aspirin should never be given to anyone under 18 years of age who is ill with a viral infection or fever. It may cause severe liver or brain damage.  · Your appetite may be poor, so a light diet is fine. Avoid dehydration by drinking 6 to 8 glasses of fluids per day (water, soft drinks, juices, tea, or soup). Extra fluids will help loosen secretions in the nose and lungs.  · Over-the-counter cold medicines will not shorten the length of time youre sick, but they may be helpful for the following symptoms: cough, sore throat, and nasal and sinus congestion. (Note: Do not use decongestants if you have high blood pressure.)  Follow-up care  Follow up with your healthcare provider, or as advised.  When to seek medical advice  Call your healthcare provider right away if any of these occur:  · Cough  with lots of colored sputum (mucus)  · Severe headache; face, neck, or ear pain  · Difficulty swallowing due to throat pain  · Fever of 100.4°F (38°C)  Call 911, or get immediate medical care  Call emergency services right away if any of these occur:  · Chest pain, shortness of breath, wheezing, or difficulty breathing  · Coughing up blood  · Inability to swallow due to throat pain  Date Last Reviewed: 9/13/2015  © 8484-9688 [employer]. [address] [place] [address]. All rights reserved. This information is not intended as a substitute for professional medical care. Always follow your healthcare professional's instructions.      Please follow up with your Primary care provider within 2-5 days if your signs and symptoms have not resolved or worsen.     If your condition worsens or fails to improve we recommend that you receive another evaluation at the emergency room immediately or contact your primary medical clinic to discuss your concerns.   You must understand that you have received an Urgent Care treatment only and that you may be released before all of your medical problems are known or treated. You, the patient, will arrange for follow up care as instructed.     RED FLAGS/WARNING SYMPTOMS DISCUSSED WITH PATIENT THAT WOULD WARRANT EMERGENT MEDICAL ATTENTION. PATIENT VERBALIZED UNDERSTANDING.

## 2018-11-27 NOTE — PROGRESS NOTES
Subjective:       Patient ID: Alexandra Martin is a 23 y.o. female.    Vitals:  vitals were not taken for this visit.     Chief Complaint: No chief complaint on file.    HPI    Constitution: Negative for chills, sweating, fatigue and fever.   HENT: Negative for ear pain, congestion, sinus pain, sinus pressure, sore throat and voice change.    Neck: Negative for painful lymph nodes.   Eyes: Negative for eye redness.   Respiratory: Negative for chest tightness, cough, sputum production, bloody sputum, COPD, shortness of breath, stridor, wheezing and asthma.    Gastrointestinal: Negative for nausea and vomiting.   Musculoskeletal: Negative for muscle ache.   Skin: Negative for rash.   Allergic/Immunologic: Negative for seasonal allergies and asthma.   Hematologic/Lymphatic: Negative for swollen lymph nodes.       Objective:      Physical Exam    Assessment:       No diagnosis found.    Plan:         There are no diagnoses linked to this encounter.

## 2018-11-27 NOTE — PATIENT INSTRUCTIONS
Viral Upper Respiratory Illness (Adult)  You have a viral upper respiratory illness (URI), which is another term for the common cold. This illness is contagious during the first few days. It is spread through the air by coughing and sneezing. It may also be spread by direct contact (touching the sick person and then touching your own eyes, nose, or mouth). Frequent handwashing will decrease risk of spread. Most viral illnesses go away within 7 to 10 days with rest and simple home remedies. Sometimes the illness may last for several weeks. Antibiotics will not kill a virus, and they are generally not prescribed for this condition.    Home care  · If symptoms are severe, rest at home for the first 2 to 3 days. When you resume activity, don't let yourself get too tired.  · Avoid being exposed to cigarette smoke (yours or others).  · You may use acetaminophen or ibuprofen to control pain and fever, unless another medicine was prescribed. (Note: If you have chronic liver or kidney disease, have ever had a stomach ulcer or gastrointestinal bleeding, or are taking blood-thinning medicines, talk with your healthcare provider before using these medicines.) Aspirin should never be given to anyone under 18 years of age who is ill with a viral infection or fever. It may cause severe liver or brain damage.  · Your appetite may be poor, so a light diet is fine. Avoid dehydration by drinking 6 to 8 glasses of fluids per day (water, soft drinks, juices, tea, or soup). Extra fluids will help loosen secretions in the nose and lungs.  · Over-the-counter cold medicines will not shorten the length of time youre sick, but they may be helpful for the following symptoms: cough, sore throat, and nasal and sinus congestion. (Note: Do not use decongestants if you have high blood pressure.)  Follow-up care  Follow up with your healthcare provider, or as advised.  When to seek medical advice  Call your healthcare provider right away if any  of these occur:  · Cough with lots of colored sputum (mucus)  · Severe headache; face, neck, or ear pain  · Difficulty swallowing due to throat pain  · Fever of 100.4°F (38°C)  Call 911, or get immediate medical care  Call emergency services right away if any of these occur:  · Chest pain, shortness of breath, wheezing, or difficulty breathing  · Coughing up blood  · Inability to swallow due to throat pain  Date Last Reviewed: 9/13/2015  © 0941-8254 [employer]. [address] [place] [id number]. All rights reserved. This information is not intended as a substitute for professional medical care. Always follow your healthcare professional's instructions.      Please follow up with your Primary care provider within 2-5 days if your signs and symptoms have not resolved or worsen.     If your condition worsens or fails to improve we recommend that you receive another evaluation at the emergency room immediately or contact your primary medical clinic to discuss your concerns.   You must understand that you have received an Urgent Care treatment only and that you may be released before all of your medical problems are known or treated. You, the patient, will arrange for follow up care as instructed.     RED FLAGS/WARNING SYMPTOMS DISCUSSED WITH PATIENT THAT WOULD WARRANT EMERGENT MEDICAL ATTENTION. PATIENT VERBALIZED UNDERSTANDING.

## 2018-12-05 ENCOUNTER — OFFICE VISIT (OUTPATIENT)
Dept: URGENT CARE | Facility: CLINIC | Age: 23
End: 2018-12-05
Payer: MEDICARE

## 2018-12-05 VITALS
WEIGHT: 272 LBS | HEIGHT: 63 IN | OXYGEN SATURATION: 98 % | HEART RATE: 78 BPM | BODY MASS INDEX: 48.2 KG/M2 | RESPIRATION RATE: 18 BRPM | SYSTOLIC BLOOD PRESSURE: 105 MMHG | DIASTOLIC BLOOD PRESSURE: 63 MMHG | TEMPERATURE: 98 F

## 2018-12-05 DIAGNOSIS — J32.9 BACTERIAL SINUSITIS: Primary | ICD-10-CM

## 2018-12-05 DIAGNOSIS — B96.89 BACTERIAL SINUSITIS: Primary | ICD-10-CM

## 2018-12-05 PROCEDURE — 99214 OFFICE O/P EST MOD 30 MIN: CPT | Mod: S$GLB,,, | Performed by: PHYSICIAN ASSISTANT

## 2018-12-05 RX ORDER — AMOXICILLIN AND CLAVULANATE POTASSIUM 875; 125 MG/1; MG/1
1 TABLET, FILM COATED ORAL 2 TIMES DAILY
Qty: 14 TABLET | Refills: 0 | Status: SHIPPED | OUTPATIENT
Start: 2018-12-05 | End: 2018-12-11

## 2018-12-05 NOTE — PROGRESS NOTES
"Subjective:       Patient ID: Alexandra Martin is a 23 y.o. female.    Vitals:  height is 5' 3" (1.6 m) and weight is 123.4 kg (272 lb). Her temperature is 98.3 °F (36.8 °C). Her blood pressure is 105/63 and her pulse is 78. Her respiration is 18 and oxygen saturation is 98%.     Chief Complaint: URI    URI    This is a recurrent problem. Episode onset: 11/25/2018. The problem has been gradually worsening. Associated symptoms include congestion, coughing, ear pain, headaches, nausea, rhinorrhea, sinus pain, a sore throat, vomiting and wheezing. Pertinent negatives include no rash. The treatment provided no relief.       Constitution: Positive for chills and fatigue. Negative for sweating and fever.   HENT: Positive for ear pain, congestion, sinus pain, sinus pressure and sore throat. Negative for voice change.    Neck: Negative for painful lymph nodes.   Eyes: Negative for eye redness.   Respiratory: Positive for cough, sputum production, shortness of breath and wheezing. Negative for chest tightness, bloody sputum, COPD, stridor and asthma.    Gastrointestinal: Positive for nausea and vomiting.   Musculoskeletal: Positive for muscle ache.   Skin: Negative for rash and erythema.   Allergic/Immunologic: Negative for seasonal allergies and asthma.   Neurological: Positive for headaches.   Hematologic/Lymphatic: Negative for swollen lymph nodes.       Objective:      Physical Exam   Constitutional: She is oriented to person, place, and time. Vital signs are normal. She appears well-developed and well-nourished. She does not appear ill. No distress.   HENT:   Head: Normocephalic and atraumatic.   Right Ear: Hearing, tympanic membrane, external ear and ear canal normal.   Left Ear: Hearing, tympanic membrane, external ear and ear canal normal.   Nose: Mucosal edema present. Right sinus exhibits maxillary sinus tenderness and frontal sinus tenderness. Left sinus exhibits maxillary sinus tenderness and frontal " sinus tenderness.   Mouth/Throat: Uvula is midline and oropharynx is clear and moist. No oropharyngeal exudate, posterior oropharyngeal edema or posterior oropharyngeal erythema.   Eyes: Conjunctivae, EOM and lids are normal. Right eye exhibits no discharge. Left eye exhibits no discharge.   Neck: Normal range of motion. Neck supple.   Cardiovascular: Normal rate, regular rhythm and normal heart sounds. Exam reveals no gallop and no friction rub.   No murmur heard.  Pulmonary/Chest: Effort normal and breath sounds normal. No stridor. No respiratory distress. She has no decreased breath sounds. She has no wheezes. She has no rhonchi. She has no rales.   Musculoskeletal: Normal range of motion.   Lymphadenopathy:        Head (right side): No submandibular and no tonsillar adenopathy present.        Head (left side): No submandibular and no tonsillar adenopathy present.   Neurological: She is alert and oriented to person, place, and time.   Skin: Skin is warm and dry. No rash noted. She is not diaphoretic. No erythema. No pallor.   Psychiatric: She has a normal mood and affect. Her behavior is normal.   Nursing note and vitals reviewed.      Assessment:       1. Bacterial sinusitis        Plan:         Bacterial sinusitis  -     amoxicillin-clavulanate 875-125mg (AUGMENTIN) 875-125 mg per tablet; Take 1 tablet by mouth 2 (two) times daily. for 7 days  Dispense: 14 tablet; Refill: 0      Patient Instructions   -Please take antibiotic to completion.  Below are suggestions for symptomatic relief:   -Tylenol every 4 hours OR ibuprofen every 6 hours as needed for pain/fever.   -Salt water gargles to soothe throat pain.   -Chloroseptic spray also helps to numb throat pain.   -Nasal saline spray reduces inflammation and dryness.   -Warm face compresses to help with facial sinus pain/pressure.   -Vicks vapor rub at night.   -Flonase OTC or Nasacort OTC for nasal congestion.   -Simple foods like chicken noodle soup.   -Delsym  helps with coughing at night   -Zyrtec/Claritin during the day & Benadryl at night may help with allergies.     If you DO NOT have Hypertension or any history of palpitations, it is ok to take over the counter Sudafed or Mucinex D or Allegra-D or Claritin-D or Zyrtec-D.  If you do take one of the above, it is ok to combine that with plain over the counter Mucinex or Allegra or Claritin or Zyrtec. If, for example, you are taking Zyrtec -D, you can combine that with Mucinex, but not Mucinex-D.  If you are taking Mucinex-D, you can combine that with plain Allegra or Claritin or Zyrtec.   If you DO have Hypertension or palpitations, it is safe to take Coricidin HBP for relief of sinus symptoms.    Please follow up with your primary care provider within 2-5 days if your signs and symptoms have not resolved or worsen.     If your condition worsens or fails to improve we recommend that you receive another evaluation at the emergency room immediately or contact your primary medical clinic to discuss your concerns.   You must understand that you have received an Urgent Care treatment only and that you may be released before all of your medical problems are known or treated. You, the patient, will arrange for follow up care as instructed.

## 2018-12-11 ENCOUNTER — HOSPITAL ENCOUNTER (EMERGENCY)
Facility: HOSPITAL | Age: 23
Discharge: HOME | End: 2018-12-11
Attending: EMERGENCY MEDICINE
Payer: MEDICARE

## 2018-12-11 VITALS
HEART RATE: 70 BPM | OXYGEN SATURATION: 96 % | RESPIRATION RATE: 18 BRPM | SYSTOLIC BLOOD PRESSURE: 122 MMHG | TEMPERATURE: 98 F | DIASTOLIC BLOOD PRESSURE: 77 MMHG

## 2018-12-11 DIAGNOSIS — N30.91 CYSTITIS WITH HEMATURIA: ICD-10-CM

## 2018-12-11 DIAGNOSIS — R10.2 SUPRAPUBIC ABDOMINAL PAIN: ICD-10-CM

## 2018-12-11 DIAGNOSIS — N93.9 VAGINAL BLEEDING: Primary | ICD-10-CM

## 2018-12-11 LAB
ALBUMIN SERPL BCP-MCNC: 3.7 G/DL
ALP SERPL-CCNC: 85 U/L
ALT SERPL W/O P-5'-P-CCNC: 18 U/L
ANION GAP SERPL CALC-SCNC: 8 MMOL/L
AST SERPL-CCNC: 15 U/L
B-HCG UR QL: NEGATIVE
BACTERIA #/AREA URNS HPF: ABNORMAL /HPF
BILIRUB SERPL-MCNC: 0.4 MG/DL
BILIRUB UR QL STRIP: NEGATIVE
BUN SERPL-MCNC: 11 MG/DL
CALCIUM SERPL-MCNC: 9.4 MG/DL
CHLORIDE SERPL-SCNC: 109 MMOL/L
CLARITY UR: ABNORMAL
CO2 SERPL-SCNC: 25 MMOL/L
COLOR UR: YELLOW
CREAT SERPL-MCNC: 0.8 MG/DL
CTP QC/QA: YES
ERYTHROCYTE [DISTWIDTH] IN BLOOD BY AUTOMATED COUNT: 13.7 %
EST. GFR  (AFRICAN AMERICAN): >60 ML/MIN/1.73 M^2
EST. GFR  (NON AFRICAN AMERICAN): >60 ML/MIN/1.73 M^2
GLUCOSE SERPL-MCNC: 96 MG/DL
GLUCOSE UR QL STRIP: NEGATIVE
HCT VFR BLD AUTO: 41.6 %
HGB BLD-MCNC: 13.3 G/DL
HGB UR QL STRIP: ABNORMAL
KETONES UR QL STRIP: NEGATIVE
LEUKOCYTE ESTERASE UR QL STRIP: ABNORMAL
MCH RBC QN AUTO: 27.8 PG
MCHC RBC AUTO-ENTMCNC: 32 G/DL
MCV RBC AUTO: 87 FL
MICROSCOPIC COMMENT: ABNORMAL
NITRITE UR QL STRIP: NEGATIVE
PH UR STRIP: 7 [PH] (ref 5–8)
PLATELET # BLD AUTO: 196 K/UL
PMV BLD AUTO: 10.8 FL
POTASSIUM SERPL-SCNC: 4.3 MMOL/L
PROT SERPL-MCNC: 7.2 G/DL
PROT UR QL STRIP: NEGATIVE
RBC # BLD AUTO: 4.79 M/UL
RBC #/AREA URNS HPF: 3 /HPF (ref 0–4)
SODIUM SERPL-SCNC: 142 MMOL/L
SP GR UR STRIP: 1.02 (ref 1–1.03)
SQUAMOUS #/AREA URNS HPF: 5 /HPF
URN SPEC COLLECT METH UR: ABNORMAL
UROBILINOGEN UR STRIP-ACNC: NEGATIVE EU/DL
WBC # BLD AUTO: 9.24 K/UL
WBC #/AREA URNS HPF: 30 /HPF (ref 0–5)

## 2018-12-11 PROCEDURE — 99284 EMERGENCY DEPT VISIT MOD MDM: CPT | Mod: 25

## 2018-12-11 PROCEDURE — 81025 URINE PREGNANCY TEST: CPT | Performed by: EMERGENCY MEDICINE

## 2018-12-11 PROCEDURE — 85027 COMPLETE CBC AUTOMATED: CPT

## 2018-12-11 PROCEDURE — 87088 URINE BACTERIA CULTURE: CPT

## 2018-12-11 PROCEDURE — 63600175 PHARM REV CODE 636 W HCPCS: Performed by: EMERGENCY MEDICINE

## 2018-12-11 PROCEDURE — 96372 THER/PROPH/DIAG INJ SC/IM: CPT

## 2018-12-11 PROCEDURE — 87077 CULTURE AEROBIC IDENTIFY: CPT

## 2018-12-11 PROCEDURE — 87186 SC STD MICRODIL/AGAR DIL: CPT

## 2018-12-11 PROCEDURE — 80053 COMPREHEN METABOLIC PANEL: CPT

## 2018-12-11 PROCEDURE — 87086 URINE CULTURE/COLONY COUNT: CPT

## 2018-12-11 PROCEDURE — 81000 URINALYSIS NONAUTO W/SCOPE: CPT

## 2018-12-11 RX ORDER — KETOROLAC TROMETHAMINE 30 MG/ML
15 INJECTION, SOLUTION INTRAMUSCULAR; INTRAVENOUS ONCE
Status: COMPLETED | OUTPATIENT
Start: 2018-12-11 | End: 2018-12-11

## 2018-12-11 RX ORDER — NITROFURANTOIN 25; 75 MG/1; MG/1
100 CAPSULE ORAL 2 TIMES DAILY
Qty: 10 CAPSULE | Refills: 0 | Status: SHIPPED | OUTPATIENT
Start: 2018-12-11 | End: 2018-12-16

## 2018-12-11 RX ADMIN — KETOROLAC TROMETHAMINE 15 MG: 30 INJECTION, SOLUTION INTRAMUSCULAR at 09:12

## 2018-12-11 NOTE — ED PROVIDER NOTES
Encounter Date: 12/11/2018    SCRIBE #1 NOTE: I, Ligia Alonso, am scribing for, and in the presence of,  Dr. Banegas. I have scribed the entire note.       History     Chief Complaint   Patient presents with    Vaginal Bleeding     Vaginal bleeding with dark clots since yesterday with cramping.  Patient is concerned because she took a Plan B 2 to 3 weeks ago     Alexandra Martin is a 23 y.o. female who  has a past medical history of ADD (attention deficit disorder), Depression, History of ovarian cyst, Obesity, Substance abuse, Supervision of high risk pregnancy in second trimester (1/6/2015), Tympanic membrane perforation, left (3/14/2018), and Vertigo.    The patient presents to the ED due to vaginal bleeding.   Patient reports symptoms started yesterday and have been present until this morning. Described as dark bleeding, consistent in amount with her regular menstrual cycles. She has had to use 3 pads over the last few days for the bleeding. She reports associated nausea/vomiting, lower pelvic and back pain. Pt denies any fever, vaginal discharge, flank pain, or urinary complaints. She denies any other medical problems. Pt notes that her LMP was at the end of October. Pt notes that she took Plan B 2 weeks ago. She has a history of STI 2 years ago, which was treated. She reports one prior miscarriage.      The history is provided by the patient.     Review of patient's allergies indicates:   Allergen Reactions    Azithromycin Nausea And Vomiting     Hard to breathe      Past Medical History:   Diagnosis Date    ADD (attention deficit disorder)     Depression     History of ovarian cyst     Obesity     Substance abuse     Hospitalization     Supervision of high risk pregnancy in second trimester 1/6/2015    Tympanic membrane perforation, left 3/14/2018    Vertigo      Past Surgical History:   Procedure Laterality Date    TONSILLECTOMY, ADENOIDECTOMY, BILATERAL MYRINGOTOMY AND TUBES       Family  History   Problem Relation Age of Onset    Breast cancer Maternal Aunt     No Known Problems Mother     No Known Problems Father     No Known Problems Brother     Colon cancer Neg Hx     Miscarriages / Stillbirths Neg Hx     Ovarian cancer Neg Hx      Social History     Tobacco Use    Smoking status: Current Every Day Smoker     Packs/day: 0.25     Types: Cigarettes    Smokeless tobacco: Never Used   Substance Use Topics    Alcohol use: No     Alcohol/week: 0.0 oz    Drug use: Yes     Types: Marijuana     Comment: history of opoid abuse     Review of Systems   Constitutional: Negative for chills and fever.   HENT: Negative for sore throat.    Respiratory: Negative for cough and shortness of breath.    Cardiovascular: Negative for chest pain.   Gastrointestinal: Positive for nausea and vomiting. Negative for abdominal distention, abdominal pain, anal bleeding and constipation.   Genitourinary: Positive for pelvic pain and vaginal bleeding. Negative for dysuria, frequency, urgency and vaginal discharge.   Musculoskeletal: Positive for back pain.   Skin: Negative for rash and wound.   Neurological: Negative for syncope and weakness.   Hematological: Does not bruise/bleed easily.   Psychiatric/Behavioral: Negative for agitation, behavioral problems and confusion.       Physical Exam     Initial Vitals [12/11/18 0806]   BP Pulse Resp Temp SpO2   124/67 80 18 97.8 °F (36.6 °C) 98 %      MAP       --         Physical Exam    Nursing note and vitals reviewed.  Constitutional: She appears well-developed and well-nourished. She is not diaphoretic. No distress.   Well-appearing, NAD.   HENT:   Head: Normocephalic and atraumatic.   Mouth/Throat: Oropharynx is clear and moist.   Eyes: EOM are normal. Pupils are equal, round, and reactive to light.   Neck: No tracheal deviation present.   Cardiovascular: Normal rate, regular rhythm, normal heart sounds and intact distal pulses.   Pulmonary/Chest: Breath sounds normal.  No stridor. No respiratory distress. She has no wheezes.   Abdominal: Soft. Bowel sounds are normal. She exhibits no distension and no mass. There is no tenderness.   Musculoskeletal: Normal range of motion. She exhibits no edema.   Neurological: She is alert and oriented to person, place, and time. She has normal strength. No cranial nerve deficit or sensory deficit.   Skin: Skin is warm and dry. Capillary refill takes less than 2 seconds. No pallor.   Psychiatric: She has a normal mood and affect. Her behavior is normal. Thought content normal.         ED Course   Procedures  Labs Reviewed   URINALYSIS, REFLEX TO URINE CULTURE - Abnormal; Notable for the following components:       Result Value    Appearance, UA Hazy (*)     Occult Blood UA 3+ (*)     Leukocytes, UA Trace (*)     All other components within normal limits    Narrative:     Preferred Collection Type->Urine, Clean Catch   URINALYSIS MICROSCOPIC - Abnormal; Notable for the following components:    WBC, UA 30 (*)     Bacteria, UA Few (*)     All other components within normal limits    Narrative:     Preferred Collection Type->Urine, Clean Catch   CULTURE, URINE    Narrative:     Preferred Collection Type->Urine, Clean Catch   CBC WITHOUT DIFFERENTIAL   COMPREHENSIVE METABOLIC PANEL   POCT URINE PREGNANCY               Medical Decision Making:   Initial Assessment:   22 yo female, A1, LMP approximately 5 weeks ago, presents with vaginal bleeding that started yesterday, continuing today, requiring a total of 3 pads. Vitals and exam benign. No vaginal discharge.  Plan to obtain UPT, UA, labs, and reassess.  Differential Diagnosis:   Differential Diagnosis includes, but is not limited to:  Pregnancy complication (threatened AB, inevitable AB, incomplete Ab, missed AB, ectopic pregnancy, placenta previa), normal menses, STD, foreign body, trauma.  Clinical Tests:   Lab Tests: Ordered and Reviewed  ED Management:  9:11 AM  UPT is negative.  UA with 30  WBCs concerning for UTI. VB possibly from cystitis/UTI.  Will prescribe macrobid and refer to OB for follow up and management of cystitis and vaginal bleeding.                      Clinical Impression:     1. Vaginal bleeding    2. Cystitis with hematuria    3. Suprapubic abdominal pain            Disposition:   Disposition: Discharged  Condition: Stable         I, Dr. Joesph Banegas, personally performed the services described in this documentation. All medical record entries made by the scribe were at my direction and in my presence.  I have reviewed the chart and agree that the record reflects my personal performance and is accurate and complete.     Joesph Banegas MD.                 Joesph Banegas MD  12/15/18 3608

## 2018-12-13 LAB — BACTERIA UR CULT: NORMAL

## 2019-01-10 ENCOUNTER — PES CALL (OUTPATIENT)
Dept: ADMINISTRATIVE | Facility: CLINIC | Age: 24
End: 2019-01-10

## 2019-01-11 ENCOUNTER — OFFICE VISIT (OUTPATIENT)
Dept: OBSTETRICS AND GYNECOLOGY | Facility: CLINIC | Age: 24
End: 2019-01-11
Payer: MEDICARE

## 2019-01-11 ENCOUNTER — TELEPHONE (OUTPATIENT)
Dept: OBSTETRICS AND GYNECOLOGY | Facility: CLINIC | Age: 24
End: 2019-01-11

## 2019-01-11 VITALS
SYSTOLIC BLOOD PRESSURE: 108 MMHG | DIASTOLIC BLOOD PRESSURE: 80 MMHG | BODY MASS INDEX: 43.2 KG/M2 | HEIGHT: 63 IN | WEIGHT: 243.81 LBS

## 2019-01-11 DIAGNOSIS — Z30.9 ENCOUNTER FOR CONTRACEPTIVE MANAGEMENT, UNSPECIFIED TYPE: Primary | ICD-10-CM

## 2019-01-11 PROCEDURE — 99213 OFFICE O/P EST LOW 20 MIN: CPT | Mod: PBBFAC,PO | Performed by: OBSTETRICS & GYNECOLOGY

## 2019-01-11 PROCEDURE — 99212 PR OFFICE/OUTPT VISIT, EST, LEVL II, 10-19 MIN: ICD-10-PCS | Mod: S$PBB,,, | Performed by: OBSTETRICS & GYNECOLOGY

## 2019-01-11 PROCEDURE — 99999 PR PBB SHADOW E&M-EST. PATIENT-LVL III: CPT | Mod: PBBFAC,,, | Performed by: OBSTETRICS & GYNECOLOGY

## 2019-01-11 PROCEDURE — 99999 PR PBB SHADOW E&M-EST. PATIENT-LVL III: ICD-10-PCS | Mod: PBBFAC,,, | Performed by: OBSTETRICS & GYNECOLOGY

## 2019-01-11 PROCEDURE — 99212 OFFICE O/P EST SF 10 MIN: CPT | Mod: S$PBB,,, | Performed by: OBSTETRICS & GYNECOLOGY

## 2019-01-11 RX ORDER — PHENTERMINE HYDROCHLORIDE 37.5 MG/1
37.5 TABLET ORAL
Qty: 30 TABLET | Refills: 2 | Status: SHIPPED | OUTPATIENT
Start: 2019-01-11 | End: 2019-02-06 | Stop reason: ALTCHOICE

## 2019-01-11 RX ORDER — NORGESTIMATE AND ETHINYL ESTRADIOL 7DAYSX3 28
1 KIT ORAL DAILY
Qty: 28 TABLET | Refills: 11 | Status: SHIPPED | OUTPATIENT
Start: 2019-01-11 | End: 2019-02-06 | Stop reason: ALTCHOICE

## 2019-01-11 NOTE — TELEPHONE ENCOUNTER
----- Message from Miranda Richter sent at 1/11/2019 10:43 AM CST -----  Contact: [phone]/self  Patient is running late for their appointment and would like to know if they can still be seen. Please advise.

## 2019-01-11 NOTE — PROGRESS NOTES
Patient presents for contraception.  She has previously use Nexplanon but wishes to go with birth control pills at this time.  She was counseled for starting and scheduling and will begin birth control pill this Sunday.  She is on her period at the present time so exam is deferred until a future visit.  Patient also has a BMI of 43.  This was discussed and dietary plan was reviewed briefly as well as maintenance of hydration to avoid unconscious eating.  Patient will be given Adipex for appetite suppression.  Nexplanon request is submitted and if possible will be reinserted as she prefers this to the pill.

## 2019-02-06 ENCOUNTER — APPOINTMENT (OUTPATIENT)
Dept: LAB | Facility: HOSPITAL | Age: 24
End: 2019-02-06
Attending: OBSTETRICS & GYNECOLOGY
Payer: MEDICARE

## 2019-02-06 ENCOUNTER — OFFICE VISIT (OUTPATIENT)
Dept: OBSTETRICS AND GYNECOLOGY | Facility: CLINIC | Age: 24
End: 2019-02-06
Payer: MEDICARE

## 2019-02-06 VITALS
DIASTOLIC BLOOD PRESSURE: 68 MMHG | SYSTOLIC BLOOD PRESSURE: 114 MMHG | BODY MASS INDEX: 43.05 KG/M2 | RESPIRATION RATE: 18 BRPM | WEIGHT: 242.94 LBS | HEIGHT: 63 IN

## 2019-02-06 DIAGNOSIS — Z32.01 POSITIVE URINE PREGNANCY TEST: Primary | ICD-10-CM

## 2019-02-06 DIAGNOSIS — A54.03 GC CERVICITIS, ACUTE: ICD-10-CM

## 2019-02-06 DIAGNOSIS — Z11.3 SCREENING EXAMINATION FOR SEXUALLY TRANSMITTED DISEASE: ICD-10-CM

## 2019-02-06 DIAGNOSIS — N91.2 ABSENT MENSES: ICD-10-CM

## 2019-02-06 DIAGNOSIS — N94.89 OTHER SPECIFIED CONDITIONS ASSOCIATED WITH FEMALE GENITAL ORGANS AND MENSTRUAL CYCLE: ICD-10-CM

## 2019-02-06 DIAGNOSIS — Z34.91 ENCOUNTER FOR SUPERVISION OF NORMAL PREGNANCY IN FIRST TRIMESTER, UNSPECIFIED GRAVIDITY: ICD-10-CM

## 2019-02-06 PROBLEM — N39.0 UTI (URINARY TRACT INFECTION): Status: RESOLVED | Noted: 2018-08-03 | Resolved: 2019-02-06

## 2019-02-06 PROBLEM — O23.00 PYELONEPHRITIS AFFECTING PREGNANCY: Status: RESOLVED | Noted: 2017-08-29 | Resolved: 2019-02-06

## 2019-02-06 PROCEDURE — 99214 PR OFFICE/OUTPT VISIT, EST, LEVL IV, 30-39 MIN: ICD-10-PCS | Mod: S$PBB,,, | Performed by: OBSTETRICS & GYNECOLOGY

## 2019-02-06 PROCEDURE — 99214 OFFICE O/P EST MOD 30 MIN: CPT | Mod: S$PBB,,, | Performed by: OBSTETRICS & GYNECOLOGY

## 2019-02-06 PROCEDURE — 99213 OFFICE O/P EST LOW 20 MIN: CPT | Mod: PBBFAC,PN | Performed by: OBSTETRICS & GYNECOLOGY

## 2019-02-06 PROCEDURE — 99999 PR PBB SHADOW E&M-EST. PATIENT-LVL III: ICD-10-PCS | Mod: PBBFAC,,, | Performed by: OBSTETRICS & GYNECOLOGY

## 2019-02-06 PROCEDURE — 99999 PR PBB SHADOW E&M-EST. PATIENT-LVL III: CPT | Mod: PBBFAC,,, | Performed by: OBSTETRICS & GYNECOLOGY

## 2019-02-06 PROCEDURE — 87491 CHLMYD TRACH DNA AMP PROBE: CPT

## 2019-02-06 PROCEDURE — 88175 CYTOPATH C/V AUTO FLUID REDO: CPT

## 2019-02-06 NOTE — PROGRESS NOTES
Subjective:       Patient ID: Alexandra Martin is a 23 y.o. female.    Chief Complaint:  Establish Care and Possible Pregnancy      History of Present Illness  HPI  Missed Menses/ Possible Pregnancy  Patient complains of menstrual irregularity. She believes she could be pregnant. Pregnancy is not desired.Patient is contemplating termination of pregnancy. Sexual Activity: single partner, contraception: none. Current symptoms also include: positive home pregnancy test. Last period was normal.     Patient's last menstrual period was 2019.       GYN & OB History  Patient's last menstrual period was 2019.   Date of Last Pap: 2017    OB History    Para Term  AB Living   2 2 2 0 0 2   SAB TAB Ectopic Multiple Live Births   0 0 0 0 2      # Outcome Date GA Lbr Minor/2nd Weight Sex Delivery Anes PTL Lv   2 Term 17 38w3d  3 kg (6 lb 9.8 oz) F Vag-Spont EPI N CINTHIA   1 Term 08/29/15 39w4d  3.685 kg (8 lb 2 oz) F Vag-Spont EPI N CINTHIA          Review of Systems  Review of Systems   Constitutional: Negative for activity change, appetite change, chills, diaphoresis, fatigue, fever and unexpected weight change.   HENT: Negative for nasal congestion, mouth sores and tinnitus.    Eyes: Negative for discharge and visual disturbance.   Respiratory: Negative for cough, shortness of breath and wheezing.    Cardiovascular: Negative for chest pain, palpitations and leg swelling.   Gastrointestinal: Negative for abdominal pain, bloating, blood in stool, constipation, diarrhea, nausea, vomiting, reflux and fecal incontinence.   Endocrine: Negative for diabetes, hair loss, hot flashes, hyperthyroidism and hypothyroidism.   Genitourinary: Negative for bladder incontinence, decreased libido, dysmenorrhea, dyspareunia, dysuria, flank pain, frequency, genital sores, hematuria, hot flashes, menorrhagia, menstrual problem, pelvic pain, urgency, vaginal bleeding, vaginal discharge, vaginal pain, urinary  incontinence, postcoital bleeding, postmenopausal bleeding, vaginal dryness and vaginal odor.   Musculoskeletal: Negative for arthralgias, back pain, joint swelling, leg pain and myalgias.   Neurological: Negative for vertigo, seizures, syncope, numbness and headaches.   Hematological: Negative for adenopathy. Does not bruise/bleed easily.   Psychiatric/Behavioral: Negative for depression and sleep disturbance. The patient is not nervous/anxious.    Breast: Negative for asymmetry, breast self exam, lump, mass, mastodynia, nipple discharge, skin changes and tenderness          Objective:    Physical Exam:   Constitutional: She is oriented to person, place, and time. She appears well-developed and well-nourished. No distress.    HENT:   Head: Normocephalic.   Nose: No epistaxis.    Eyes: Pupils are equal, round, and reactive to light.    Neck: Normal range of motion.    Cardiovascular: Normal rate.     Pulmonary/Chest: Effort normal.        Abdominal: Soft. She exhibits no distension. There is no tenderness.     Genitourinary: Vagina normal and uterus normal.   Genitourinary Comments: Pap smear and cultures done of cervix           Musculoskeletal: Normal range of motion and moves all extremeties.       Neurological: She is alert and oriented to person, place, and time.    Skin: Skin is warm and dry.           Assessment:        1. Positive urine pregnancy test    2. Encounter for supervision of normal pregnancy in first trimester, unspecified      3. Absent menses    4. Screening examination for sexually transmitted disease    5. Other specified conditions associated with female genital organs and menstrual cycle                 Plan:      Prenatal labs and serum HCG today  Initial prenatal visit in 2 weeks

## 2019-02-08 PROBLEM — A54.03 GC CERVICITIS, ACUTE: Status: ACTIVE | Noted: 2019-02-08

## 2019-02-08 LAB
C TRACH DNA SPEC QL NAA+PROBE: NOT DETECTED
N GONORRHOEA DNA SPEC QL NAA+PROBE: DETECTED

## 2019-02-08 RX ORDER — AZITHROMYCIN 1 G/1
1 POWDER, FOR SUSPENSION ORAL ONCE
Qty: 1 PACKET | Refills: 0 | Status: SHIPPED | OUTPATIENT
Start: 2019-02-08 | End: 2019-02-08

## 2019-02-14 ENCOUNTER — CLINICAL SUPPORT (OUTPATIENT)
Dept: OBSTETRICS AND GYNECOLOGY | Facility: CLINIC | Age: 24
End: 2019-02-14
Payer: MEDICARE

## 2019-02-14 ENCOUNTER — TELEPHONE (OUTPATIENT)
Dept: OBSTETRICS AND GYNECOLOGY | Facility: CLINIC | Age: 24
End: 2019-02-14

## 2019-02-14 DIAGNOSIS — A54.9 GONORRHEA: Primary | ICD-10-CM

## 2019-02-14 PROCEDURE — 96372 THER/PROPH/DIAG INJ SC/IM: CPT | Mod: PBBFAC,PN

## 2019-02-14 RX ORDER — CEFTRIAXONE 250 MG/1
250 INJECTION, POWDER, FOR SOLUTION INTRAMUSCULAR; INTRAVENOUS
Status: COMPLETED | OUTPATIENT
Start: 2019-02-14 | End: 2019-02-14

## 2019-02-14 RX ORDER — PROMETHAZINE HYDROCHLORIDE 25 MG/1
25 TABLET ORAL EVERY 6 HOURS PRN
Qty: 20 TABLET | Refills: 2 | Status: SHIPPED | OUTPATIENT
Start: 2019-02-14 | End: 2019-04-12

## 2019-02-14 RX ADMIN — CEFTRIAXONE SODIUM 250 MG: 250 INJECTION, POWDER, FOR SOLUTION INTRAMUSCULAR; INTRAVENOUS at 03:02

## 2019-02-14 NOTE — PROGRESS NOTES
2 pt identifiers and allergies reviewed. Pt tolerated rocephin injection well. Instructed to remain in lobby for 20 minutes to insure no reaction

## 2019-02-14 NOTE — TELEPHONE ENCOUNTER
Pt requesting rx for nausea to be sent to pharmacy.  States zofran doesn't work for her. Prefers phenergan.    [facility] [address]    Pt number [phone]

## 2019-02-27 ENCOUNTER — HOSPITAL ENCOUNTER (OUTPATIENT)
Dept: RADIOLOGY | Facility: HOSPITAL | Age: 24
Discharge: HOME | End: 2019-02-27
Attending: NURSE PRACTITIONER
Payer: MEDICARE

## 2019-02-27 ENCOUNTER — OFFICE VISIT (OUTPATIENT)
Dept: FAMILY MEDICINE | Facility: CLINIC | Age: 24
End: 2019-02-27
Payer: MEDICARE

## 2019-02-27 VITALS
TEMPERATURE: 99 F | WEIGHT: 240.31 LBS | DIASTOLIC BLOOD PRESSURE: 68 MMHG | OXYGEN SATURATION: 97 % | BODY MASS INDEX: 42.58 KG/M2 | HEIGHT: 63 IN | SYSTOLIC BLOOD PRESSURE: 106 MMHG | HEART RATE: 133 BPM

## 2019-02-27 DIAGNOSIS — R10.2 PELVIC AND PERINEAL PAIN: ICD-10-CM

## 2019-02-27 DIAGNOSIS — R10.32 LLQ PAIN: ICD-10-CM

## 2019-02-27 DIAGNOSIS — Z32.01 POSITIVE PREGNANCY TEST: ICD-10-CM

## 2019-02-27 DIAGNOSIS — R11.2 NAUSEA AND VOMITING, INTRACTABILITY OF VOMITING NOT SPECIFIED, UNSPECIFIED VOMITING TYPE: Primary | ICD-10-CM

## 2019-02-27 DIAGNOSIS — K59.00 CONSTIPATION, UNSPECIFIED CONSTIPATION TYPE: ICD-10-CM

## 2019-02-27 DIAGNOSIS — Z3A.01 LESS THAN 8 WEEKS GESTATION OF PREGNANCY: ICD-10-CM

## 2019-02-27 DIAGNOSIS — R10.9 LEFT SIDED ABDOMINAL PAIN: ICD-10-CM

## 2019-02-27 PROBLEM — E86.0 DEHYDRATION: Status: ACTIVE | Noted: 2019-02-27

## 2019-02-27 PROBLEM — J10.1 INFLUENZA A: Status: ACTIVE | Noted: 2019-02-27

## 2019-02-27 PROBLEM — O23.01 PYELONEPHRITIS AFFECTING PREGNANCY IN FIRST TRIMESTER: Status: ACTIVE | Noted: 2019-02-27

## 2019-02-27 PROBLEM — R11.0 NAUSEA: Status: ACTIVE | Noted: 2019-02-27

## 2019-02-27 PROCEDURE — 76700 US ABDOMEN COMPLETE: ICD-10-PCS | Mod: 26,,, | Performed by: RADIOLOGY

## 2019-02-27 PROCEDURE — 99213 OFFICE O/P EST LOW 20 MIN: CPT | Mod: PBBFAC,25,PO | Performed by: NURSE PRACTITIONER

## 2019-02-27 PROCEDURE — 76700 US EXAM ABDOM COMPLETE: CPT | Mod: TC,PO

## 2019-02-27 PROCEDURE — 99215 OFFICE O/P EST HI 40 MIN: CPT | Mod: S$PBB,,, | Performed by: NURSE PRACTITIONER

## 2019-02-27 PROCEDURE — 76801 OB US < 14 WKS SINGLE FETUS: CPT | Mod: TC,PO

## 2019-02-27 PROCEDURE — 76801 US OB LESS THAN 14 WKS WITH TRANSVAGINAL (XPD): ICD-10-PCS | Mod: 26,,, | Performed by: RADIOLOGY

## 2019-02-27 PROCEDURE — 99999 PR PBB SHADOW E&M-EST. PATIENT-LVL III: CPT | Mod: PBBFAC,,, | Performed by: NURSE PRACTITIONER

## 2019-02-27 PROCEDURE — 99215 PR OFFICE/OUTPT VISIT, EST, LEVL V, 40-54 MIN: ICD-10-PCS | Mod: S$PBB,,, | Performed by: NURSE PRACTITIONER

## 2019-02-27 PROCEDURE — 99999 PR PBB SHADOW E&M-EST. PATIENT-LVL III: ICD-10-PCS | Mod: PBBFAC,,, | Performed by: NURSE PRACTITIONER

## 2019-02-27 PROCEDURE — 76700 US EXAM ABDOM COMPLETE: CPT | Mod: 26,,, | Performed by: RADIOLOGY

## 2019-02-27 PROCEDURE — 76801 OB US < 14 WKS SINGLE FETUS: CPT | Mod: 26,,, | Performed by: RADIOLOGY

## 2019-02-27 NOTE — PROGRESS NOTES
Subjective:       Patient ID: Alexandra Martin is a 23 y.o. female.    Chief Complaint: Generalized Body Aches; Otalgia; and Emesis    Here today as 6 weeks pregnant female - went to er 10 days ago + influenza A - was given tamiflu but she didn't take.   2 days ago - she has acute LLQ, discolored urine. Fever, chills, nausea and vomiting. - she has not been taking any thing for this  She is actively vomiting during a(ppt       Otalgia    There is pain in both ears. This is a recurrent problem. The current episode started 1 to 4 weeks ago. The problem occurs constantly. The problem has been gradually worsening. The maximum temperature recorded prior to her arrival was 101 - 101.9 F. The fever has been present for 1 to 2 days. Associated symptoms include abdominal pain, diarrhea and vomiting. Pertinent negatives include no coughing, ear discharge, headaches, hearing loss, neck pain, rash, rhinorrhea or sore throat.   Abdominal Pain   This is a new problem. The current episode started yesterday. The onset quality is sudden. The problem occurs daily. The problem has been gradually worsening. The pain is located in the LLQ. The pain is at a severity of 10/10. The pain is severe. The quality of the pain is aching, dull, colicky and sharp. The abdominal pain radiates to the back. Associated symptoms include anorexia, arthralgias, constipation, diarrhea, dysuria, a fever, frequency, nausea and vomiting. Pertinent negatives include no belching, flatus, headaches, hematochezia, hematuria, melena, myalgias or weight loss. The pain is aggravated by certain positions and deep breathing. The treatment provided no relief.     Vitals:    02/27/19 1105   BP: 106/68   Pulse: (!) 133   Temp: 98.8 °F (37.1 °C)     Review of Systems   Constitutional: Positive for chills, fatigue and fever. Negative for activity change, diaphoresis and weight loss.   HENT: Negative for ear discharge, ear pain, hearing loss, rhinorrhea and sore  throat.    Eyes: Negative.    Respiratory: Negative.  Negative for cough, shortness of breath and wheezing.    Cardiovascular: Negative.  Negative for chest pain.   Gastrointestinal: Positive for abdominal distention, abdominal pain, anorexia, constipation, diarrhea, nausea and vomiting. Negative for flatus, hematochezia and melena.   Endocrine: Negative.    Genitourinary: Positive for dysuria, flank pain, frequency and pelvic pain. Negative for hematuria.   Musculoskeletal: Positive for arthralgias. Negative for myalgias and neck pain.   Skin: Negative.  Negative for color change and rash.   Allergic/Immunologic: Negative.    Neurological: Negative.  Negative for speech difficulty, numbness and headaches.   Hematological: Negative.    Psychiatric/Behavioral: Negative.        Past Medical History:   Diagnosis Date    ADD (attention deficit disorder)     Depression     History of ovarian cyst     Obesity     Pyelonephritis affecting pregnancy 8/29/2017    Substance abuse     Hospitalization     Supervision of high risk pregnancy in second trimester 1/6/2015    Tympanic membrane perforation, left 3/14/2018    UTI (urinary tract infection) 8/3/2018    Vertigo      Objective:      Physical Exam   Constitutional: She is oriented to person, place, and time. She appears well-developed and well-nourished. She has a sickly appearance. She appears ill.   HENT:   Head: Normocephalic and atraumatic.   Right Ear: Hearing, tympanic membrane, external ear and ear canal normal.   Left Ear: Hearing, tympanic membrane, external ear and ear canal normal.   Nose: Nose normal. No mucosal edema, rhinorrhea or sinus tenderness. Right sinus exhibits no maxillary sinus tenderness and no frontal sinus tenderness. Left sinus exhibits no maxillary sinus tenderness and no frontal sinus tenderness.   Mouth/Throat: Uvula is midline, oropharynx is clear and moist and mucous membranes are normal. No oropharyngeal exudate or posterior  oropharyngeal edema.   Eyes: Conjunctivae and EOM are normal. Pupils are equal, round, and reactive to light.   Neck: Normal range of motion. Neck supple.   Cardiovascular: Normal rate, regular rhythm, normal heart sounds and intact distal pulses. Exam reveals no friction rub.   No murmur heard.  Pulmonary/Chest: Effort normal and breath sounds normal. No stridor. No respiratory distress. She has no wheezes. She has no rales.   Abdominal: Soft. Bowel sounds are normal.   Musculoskeletal: Normal range of motion. She exhibits no edema or tenderness.   Neurological: She is alert and oriented to person, place, and time. No cranial nerve deficit. Coordination normal.   Skin: Skin is warm and dry.   Psychiatric: Her behavior is normal. Judgment and thought content normal. Her mood appears anxious. Cognition and memory are normal.   In pain during apt    Nursing note and vitals reviewed.      Assessment:       1. Nausea and vomiting, intractability of vomiting not specified, unspecified vomiting type    2. LLQ pain    3. Constipation, unspecified constipation type    4. Less than 8 weeks gestation of pregnancy    5. Positive pregnancy test    6. Pelvic and perineal pain         Plan:       Nausea and vomiting, intractability of vomiting not specified, unspecified vomiting type  -     Comprehensive metabolic panel; Future; Expected date: 02/27/2019  -     CBC auto differential; Future; Expected date: 02/27/2019  -     hCG, quantitative; Future; Expected date: 02/27/2019  -     URINALYSIS; Future; Expected date: 02/27/2019  -     Urine culture; Future; Expected date: 02/27/2019    LLQ pain  -     Comprehensive metabolic panel; Future; Expected date: 02/27/2019  -     CBC auto differential; Future; Expected date: 02/27/2019  -     hCG, quantitative; Future; Expected date: 02/27/2019  -     URINALYSIS; Future; Expected date: 02/27/2019  -     Urine culture; Future; Expected date: 02/27/2019    Constipation, unspecified  constipation type  -     Comprehensive metabolic panel; Future; Expected date: 02/27/2019  -     CBC auto differential; Future; Expected date: 02/27/2019  -     hCG, quantitative; Future; Expected date: 02/27/2019  -     URINALYSIS; Future; Expected date: 02/27/2019  -     Urine culture; Future; Expected date: 02/27/2019    Less than 8 weeks gestation of pregnancy  -     Comprehensive metabolic panel; Future; Expected date: 02/27/2019  -     CBC auto differential; Future; Expected date: 02/27/2019  -     hCG, quantitative; Future; Expected date: 02/27/2019  -     URINALYSIS; Future; Expected date: 02/27/2019  -     Urine culture; Future; Expected date: 02/27/2019    Positive pregnancy test  -     CBC auto differential; Future; Expected date: 02/27/2019  -     hCG, quantitative; Future; Expected date: 02/27/2019  -     URINALYSIS; Future; Expected date: 02/27/2019  -     Urine culture; Future; Expected date: 02/27/2019    Pelvic and perineal pain   -     hCG, quantitative; Future; Expected date: 02/27/2019  -     URINALYSIS; Future; Expected date: 02/27/2019  -     Urine culture; Future; Expected date: 02/27/2019            Coordinated care with radiologist , labs  And discussed case with patient and based on her status - she needs lindsey go to ER   She was transported to [facility] by her mother and report called to Shan KOWALSKI a [facility]     >40 min spent with patient

## 2019-02-28 PROBLEM — E66.01 CLASS 3 SEVERE OBESITY WITH BODY MASS INDEX (BMI) OF 40.0 TO 44.9 IN ADULT: Status: ACTIVE | Noted: 2019-02-28

## 2019-02-28 PROBLEM — R10.9 ABDOMINAL PAIN: Status: ACTIVE | Noted: 2019-02-28

## 2019-02-28 PROBLEM — E66.813 CLASS 3 SEVERE OBESITY WITH BODY MASS INDEX (BMI) OF 40.0 TO 44.9 IN ADULT: Status: ACTIVE | Noted: 2019-02-28

## 2019-03-01 PROBLEM — Z16.12 UTI DUE TO EXTENDED-SPECTRUM BETA LACTAMASE (ESBL) PRODUCING ESCHERICHIA COLI: Status: ACTIVE | Noted: 2019-03-01

## 2019-03-01 PROBLEM — B96.29 UTI DUE TO EXTENDED-SPECTRUM BETA LACTAMASE (ESBL) PRODUCING ESCHERICHIA COLI: Status: ACTIVE | Noted: 2019-03-01

## 2019-03-01 PROBLEM — N39.0 UTI DUE TO EXTENDED-SPECTRUM BETA LACTAMASE (ESBL) PRODUCING ESCHERICHIA COLI: Status: ACTIVE | Noted: 2019-03-01

## 2019-03-15 ENCOUNTER — HOSPITAL ENCOUNTER (OUTPATIENT)
Dept: RADIOLOGY | Facility: HOSPITAL | Age: 24
Discharge: HOME | End: 2019-03-15
Attending: NURSE PRACTITIONER
Payer: MEDICARE

## 2019-03-15 ENCOUNTER — OFFICE VISIT (OUTPATIENT)
Dept: FAMILY MEDICINE | Facility: CLINIC | Age: 24
End: 2019-03-15
Payer: MEDICARE

## 2019-03-15 VITALS
HEIGHT: 63 IN | SYSTOLIC BLOOD PRESSURE: 112 MMHG | WEIGHT: 238.56 LBS | DIASTOLIC BLOOD PRESSURE: 78 MMHG | BODY MASS INDEX: 42.27 KG/M2 | TEMPERATURE: 98 F | OXYGEN SATURATION: 97 % | HEART RATE: 82 BPM

## 2019-03-15 DIAGNOSIS — J10.1 INFLUENZA A: ICD-10-CM

## 2019-03-15 DIAGNOSIS — R82.90 ABNORMAL FINDING IN URINE: ICD-10-CM

## 2019-03-15 DIAGNOSIS — Z16.12 UTI DUE TO EXTENDED-SPECTRUM BETA LACTAMASE (ESBL) PRODUCING ESCHERICHIA COLI: ICD-10-CM

## 2019-03-15 DIAGNOSIS — G89.29 CHRONIC BILATERAL LOW BACK PAIN WITHOUT SCIATICA: Primary | ICD-10-CM

## 2019-03-15 DIAGNOSIS — R05.9 COUGH: ICD-10-CM

## 2019-03-15 DIAGNOSIS — Z3A.01 6 WEEKS GESTATION OF PREGNANCY: ICD-10-CM

## 2019-03-15 DIAGNOSIS — M54.50 CHRONIC BILATERAL LOW BACK PAIN WITHOUT SCIATICA: Primary | ICD-10-CM

## 2019-03-15 DIAGNOSIS — B96.29 UTI DUE TO EXTENDED-SPECTRUM BETA LACTAMASE (ESBL) PRODUCING ESCHERICHIA COLI: ICD-10-CM

## 2019-03-15 DIAGNOSIS — R06.02 SOB (SHORTNESS OF BREATH) ON EXERTION: ICD-10-CM

## 2019-03-15 DIAGNOSIS — E66.01 CLASS 3 SEVERE OBESITY DUE TO EXCESS CALORIES WITH BODY MASS INDEX (BMI) OF 40.0 TO 44.9 IN ADULT, UNSPECIFIED WHETHER SERIOUS COMORBIDITY PRESENT: ICD-10-CM

## 2019-03-15 DIAGNOSIS — N39.0 UTI DUE TO EXTENDED-SPECTRUM BETA LACTAMASE (ESBL) PRODUCING ESCHERICHIA COLI: ICD-10-CM

## 2019-03-15 LAB
BACTERIA #/AREA URNS HPF: ABNORMAL /HPF
BILIRUB UR QL STRIP: NEGATIVE
CLARITY UR: CLEAR
COLOR UR: YELLOW
GLUCOSE UR QL STRIP: NEGATIVE
HGB UR QL STRIP: NEGATIVE
KETONES UR QL STRIP: NEGATIVE
LEUKOCYTE ESTERASE UR QL STRIP: ABNORMAL
MICROSCOPIC COMMENT: ABNORMAL
NITRITE UR QL STRIP: NEGATIVE
PH UR STRIP: 6 [PH] (ref 5–8)
PROT UR QL STRIP: NEGATIVE
SP GR UR STRIP: 1.02 (ref 1–1.03)
URN SPEC COLLECT METH UR: ABNORMAL
WBC #/AREA URNS HPF: 20 /HPF (ref 0–5)
WBC CLUMPS URNS QL MICRO: ABNORMAL

## 2019-03-15 PROCEDURE — 99999 PR PBB SHADOW E&M-EST. PATIENT-LVL IV: CPT | Mod: PBBFAC,,, | Performed by: NURSE PRACTITIONER

## 2019-03-15 PROCEDURE — 99214 OFFICE O/P EST MOD 30 MIN: CPT | Mod: PBBFAC,25,PO | Performed by: NURSE PRACTITIONER

## 2019-03-15 PROCEDURE — 71046 X-RAY EXAM CHEST 2 VIEWS: CPT | Mod: 26,,, | Performed by: RADIOLOGY

## 2019-03-15 PROCEDURE — 99214 OFFICE O/P EST MOD 30 MIN: CPT | Mod: S$PBB,,, | Performed by: NURSE PRACTITIONER

## 2019-03-15 PROCEDURE — 99999 PR PBB SHADOW E&M-EST. PATIENT-LVL IV: ICD-10-PCS | Mod: PBBFAC,,, | Performed by: NURSE PRACTITIONER

## 2019-03-15 PROCEDURE — 99214 PR OFFICE/OUTPT VISIT, EST, LEVL IV, 30-39 MIN: ICD-10-PCS | Mod: S$PBB,,, | Performed by: NURSE PRACTITIONER

## 2019-03-15 PROCEDURE — 81000 URINALYSIS NONAUTO W/SCOPE: CPT | Mod: PO

## 2019-03-15 PROCEDURE — 71046 X-RAY EXAM CHEST 2 VIEWS: CPT | Mod: TC,FY,PO

## 2019-03-15 PROCEDURE — 71046 XR CHEST PA AND LATERAL: ICD-10-PCS | Mod: 26,,, | Performed by: RADIOLOGY

## 2019-03-15 PROCEDURE — 87086 URINE CULTURE/COLONY COUNT: CPT

## 2019-03-15 NOTE — PROGRESS NOTES
Subjective:       Patient ID: Alexandra Martin is a 23 y.o. female.    Chief Complaint: Cough and foul urine    Here today to follow up on hospital admission 2/27-3/5   Dx with influenza A, ecoli UTI and 6 weeks pregnant   She was treated and sent home with macrobid and percocet for pain and infection - she was to FU with Dr Valerio for pregnancy   She completed the macrobid for UTI - she was on merrem in hospital stay     Today she has back pain and continued cough and foul smelling urine     Cough   This is a new problem. The current episode started in the past 7 days. The problem has been gradually worsening. The cough is productive of brown sputum and productive of purulent sputum. Associated symptoms include nasal congestion, postnasal drip and shortness of breath. Pertinent negatives include no chest pain, chills, ear congestion, ear pain, fever, headaches, heartburn, hemoptysis, myalgias, rash, rhinorrhea, sore throat, sweats, weight loss or wheezing.     Vitals:    03/15/19 1229   BP: 112/78   Pulse: 82   Temp: 98.3 °F (36.8 °C)     Review of Systems   Constitutional: Positive for activity change. Negative for chills, diaphoresis, fatigue, fever, unexpected weight change and weight loss.   HENT: Positive for postnasal drip. Negative for ear pain, rhinorrhea and sore throat.    Eyes: Negative.    Respiratory: Positive for cough and shortness of breath. Negative for hemoptysis and wheezing.    Cardiovascular: Negative.  Negative for chest pain.   Gastrointestinal: Negative.  Negative for abdominal pain, diarrhea, heartburn and nausea.   Endocrine: Negative.    Genitourinary: Positive for dysuria and frequency. Negative for hematuria.        Foul smelling urine    Musculoskeletal: Positive for back pain. Negative for myalgias.   Skin: Negative.  Negative for color change and rash.   Allergic/Immunologic: Negative.    Neurological: Negative for speech difficulty, numbness and headaches.   Hematological:  Negative.    Psychiatric/Behavioral: Negative.        Past Medical History:   Diagnosis Date    ADD (attention deficit disorder)     Depression     History of ovarian cyst     Obesity     Pyelonephritis affecting pregnancy 8/29/2017    Substance abuse     Hospitalization     Supervision of high risk pregnancy in second trimester 1/6/2015    Tympanic membrane perforation, left 3/14/2018    UTI (urinary tract infection) 8/3/2018    Vertigo      Objective:      Physical Exam   Constitutional: She is oriented to person, place, and time. She appears well-developed and well-nourished.   HENT:   Head: Normocephalic and atraumatic.   Right Ear: Hearing, tympanic membrane, external ear and ear canal normal.   Left Ear: Hearing, tympanic membrane, external ear and ear canal normal.   Nose: Nose normal. No mucosal edema, rhinorrhea or sinus tenderness. Right sinus exhibits no maxillary sinus tenderness and no frontal sinus tenderness. Left sinus exhibits no maxillary sinus tenderness and no frontal sinus tenderness.   Mouth/Throat: Uvula is midline, oropharynx is clear and moist and mucous membranes are normal. No oropharyngeal exudate or posterior oropharyngeal edema.   Eyes: Conjunctivae and EOM are normal. Pupils are equal, round, and reactive to light.   Neck: Normal range of motion. Neck supple.   Cardiovascular: Normal rate, regular rhythm, normal heart sounds and intact distal pulses. Exam reveals no friction rub.   No murmur heard.  Pulmonary/Chest: Effort normal and breath sounds normal. No stridor. No respiratory distress. She has no wheezes. She has no rales.   Abdominal: Soft. Bowel sounds are normal. There is no tenderness.   Musculoskeletal: Normal range of motion. She exhibits no edema or tenderness.        Lumbar back: She exhibits pain.   Neurological: She is alert and oriented to person, place, and time. No cranial nerve deficit. Coordination normal.   Skin: Skin is warm and dry.   Psychiatric: She  has a normal mood and affect. Her behavior is normal. Judgment and thought content normal.   Nursing note and vitals reviewed.      Assessment:       1. Chronic bilateral low back pain without sciatica    2. Abnormal finding in urine     3. SOB (shortness of breath) on exertion    4. Cough    5. Influenza A    6. 6 weeks gestation of pregnancy    7. UTI due to extended-spectrum beta lactamase (ESBL) producing Escherichia coli    8. Class 3 severe obesity due to excess calories with body mass index (BMI) of 40.0 to 44.9 in adult, unspecified whether serious comorbidity present        Plan:       Chronic bilateral low back pain without sciatica  -     URINALYSIS  -     Urine culture  -     Cancel: Urinalysis Microscopic  -     CBC auto differential; Future; Expected date: 03/15/2019  -     Comprehensive metabolic panel; Future; Expected date: 03/15/2019    Abnormal finding in urine   -     Urine culture  -     CBC auto differential; Future; Expected date: 03/15/2019  -     Comprehensive metabolic panel; Future; Expected date: 03/15/2019    SOB (shortness of breath) on exertion  -     CBC auto differential; Future; Expected date: 03/15/2019  -     Comprehensive metabolic panel; Future; Expected date: 03/15/2019  -     D dimer, quantitative; Future; Expected date: 03/15/2019  -     X-Ray Chest PA And Lateral; Future; Expected date: 03/15/2019    Cough  -     CBC auto differential; Future; Expected date: 03/15/2019  -     Comprehensive metabolic panel; Future; Expected date: 03/15/2019  -     X-Ray Chest PA And Lateral; Future; Expected date: 03/15/2019    Influenza A  -     CBC auto differential; Future; Expected date: 03/15/2019  -     Comprehensive metabolic panel; Future; Expected date: 03/15/2019    6 weeks gestation of pregnancy  -     X-Ray Chest PA And Lateral; Future; Expected date: 03/15/2019    UTI due to extended-spectrum beta lactamase (ESBL) producing Escherichia coli  Recently got over this in hospital      Class 3 severe obesity due to excess calories with body mass index (BMI) of 40.0 to 44.9 in adult, unspecified whether serious comorbidity present          She reports more SOB, no fever, some mild cough   Will review labs and cxr and call with report

## 2019-03-17 LAB
BACTERIA UR CULT: NORMAL
BACTERIA UR CULT: NORMAL

## 2019-03-18 ENCOUNTER — TELEPHONE (OUTPATIENT)
Dept: FAMILY MEDICINE | Facility: CLINIC | Age: 24
End: 2019-03-18

## 2019-03-18 DIAGNOSIS — N30.00 ACUTE CYSTITIS WITHOUT HEMATURIA: Primary | ICD-10-CM

## 2019-03-18 DIAGNOSIS — J01.00 ACUTE NON-RECURRENT MAXILLARY SINUSITIS: ICD-10-CM

## 2019-03-18 DIAGNOSIS — R05.9 COUGH: ICD-10-CM

## 2019-03-18 RX ORDER — AMOXICILLIN AND CLAVULANATE POTASSIUM 875; 125 MG/1; MG/1
1 TABLET, FILM COATED ORAL EVERY 12 HOURS
Qty: 14 TABLET | Refills: 0 | Status: SHIPPED | OUTPATIENT
Start: 2019-03-18 | End: 2019-04-12

## 2019-03-18 NOTE — TELEPHONE ENCOUNTER
Pc to patient - she is no longer SOB, feeling better - still with some cough. cxr negative for any pulmonary edema, or infection   Based on her UA and still culture for multiple organisms - I want to give her augmentin x 7 days   She is able to take this while pregnant.    Reviewed D dimer - but this can be elevated with trauma to body and also pregnancy - feel that  PE is low probability and she is pregnant at this time, so would increase ddimer value

## 2019-04-12 PROBLEM — Z34.82 PRENATAL CARE, SUBSEQUENT PREGNANCY IN SECOND TRIMESTER: Status: ACTIVE | Noted: 2019-04-12

## 2019-04-15 ENCOUNTER — TELEPHONE (OUTPATIENT)
Dept: OBSTETRICS AND GYNECOLOGY | Facility: CLINIC | Age: 24
End: 2019-04-15

## 2019-04-15 NOTE — TELEPHONE ENCOUNTER
----- Message from Koko Mendez sent at 4/15/2019  9:40 AM CDT -----  Contact: Patient  Type: Needs Medical Advice    Who Called:  Patient  Best Call Back Number: [phone]  Additional Information: Patient states they have an infected tooth and the dentist would like to know which anti-botics the patient can take. Please call to advise. Thanks!

## 2019-05-20 PROBLEM — R10.30 LOWER ABDOMINAL PAIN: Status: ACTIVE | Noted: 2019-05-20

## 2019-05-25 PROBLEM — N10 ACUTE PYELONEPHRITIS: Status: ACTIVE | Noted: 2019-05-25

## 2019-07-17 ENCOUNTER — PATIENT OUTREACH (OUTPATIENT)
Dept: ADMINISTRATIVE | Facility: OTHER | Age: 24
End: 2019-07-17

## 2019-07-19 ENCOUNTER — OFFICE VISIT (OUTPATIENT)
Dept: OBSTETRICS AND GYNECOLOGY | Facility: CLINIC | Age: 24
End: 2019-07-19
Payer: MEDICARE

## 2019-07-19 VITALS
WEIGHT: 249.81 LBS | BODY MASS INDEX: 44.26 KG/M2 | SYSTOLIC BLOOD PRESSURE: 108 MMHG | HEIGHT: 63 IN | DIASTOLIC BLOOD PRESSURE: 60 MMHG

## 2019-07-19 DIAGNOSIS — O09.32 INSUFFICIENT PRENATAL CARE IN SECOND TRIMESTER: Primary | ICD-10-CM

## 2019-07-19 PROCEDURE — 99212 PR OFFICE/OUTPT VISIT, EST, LEVL II, 10-19 MIN: ICD-10-PCS | Mod: S$PBB,,, | Performed by: OBSTETRICS & GYNECOLOGY

## 2019-07-19 PROCEDURE — 99999 PR PBB SHADOW E&M-EST. PATIENT-LVL III: ICD-10-PCS | Mod: PBBFAC,,, | Performed by: OBSTETRICS & GYNECOLOGY

## 2019-07-19 PROCEDURE — 99212 OFFICE O/P EST SF 10 MIN: CPT | Mod: S$PBB,,, | Performed by: OBSTETRICS & GYNECOLOGY

## 2019-07-19 PROCEDURE — 99999 PR PBB SHADOW E&M-EST. PATIENT-LVL III: CPT | Mod: PBBFAC,,, | Performed by: OBSTETRICS & GYNECOLOGY

## 2019-07-19 PROCEDURE — 99213 OFFICE O/P EST LOW 20 MIN: CPT | Mod: PBBFAC,PO | Performed by: OBSTETRICS & GYNECOLOGY

## 2019-08-14 ENCOUNTER — PATIENT OUTREACH (OUTPATIENT)
Dept: ADMINISTRATIVE | Facility: OTHER | Age: 24
End: 2019-08-14

## 2019-08-29 ENCOUNTER — PATIENT OUTREACH (OUTPATIENT)
Dept: ADMINISTRATIVE | Facility: OTHER | Age: 24
End: 2019-08-29

## 2019-08-29 ENCOUNTER — ROUTINE PRENATAL (OUTPATIENT)
Dept: OBSTETRICS AND GYNECOLOGY | Facility: CLINIC | Age: 24
End: 2019-08-29
Payer: MEDICARE

## 2019-08-29 VITALS
DIASTOLIC BLOOD PRESSURE: 80 MMHG | SYSTOLIC BLOOD PRESSURE: 110 MMHG | WEIGHT: 258.81 LBS | BODY MASS INDEX: 45.85 KG/M2

## 2019-08-29 DIAGNOSIS — O23.93: ICD-10-CM

## 2019-08-29 DIAGNOSIS — O09.33 INSUFFICIENT PRENATAL CARE IN THIRD TRIMESTER: Primary | ICD-10-CM

## 2019-08-29 PROCEDURE — 99212 OFFICE O/P EST SF 10 MIN: CPT | Mod: PBBFAC,PO | Performed by: OBSTETRICS & GYNECOLOGY

## 2019-08-29 PROCEDURE — 99214 OFFICE O/P EST MOD 30 MIN: CPT | Mod: S$PBB,,, | Performed by: OBSTETRICS & GYNECOLOGY

## 2019-08-29 PROCEDURE — 87086 URINE CULTURE/COLONY COUNT: CPT

## 2019-08-29 PROCEDURE — 99999 PR PBB SHADOW E&M-EST. PATIENT-LVL II: ICD-10-PCS | Mod: PBBFAC,,, | Performed by: OBSTETRICS & GYNECOLOGY

## 2019-08-29 PROCEDURE — 99214 PR OFFICE/OUTPT VISIT, EST, LEVL IV, 30-39 MIN: ICD-10-PCS | Mod: S$PBB,,, | Performed by: OBSTETRICS & GYNECOLOGY

## 2019-08-29 PROCEDURE — 99999 PR PBB SHADOW E&M-EST. PATIENT-LVL II: CPT | Mod: PBBFAC,,, | Performed by: OBSTETRICS & GYNECOLOGY

## 2019-08-29 RX ORDER — METRONIDAZOLE 500 MG/1
500 TABLET ORAL 2 TIMES DAILY
Refills: 0 | Status: ON HOLD | COMMUNITY
Start: 2019-08-20 | End: 2019-09-11 | Stop reason: HOSPADM

## 2019-08-29 RX ORDER — HYDROCORTISONE 25 MG/G
CREAM TOPICAL
Refills: 5 | Status: ON HOLD | COMMUNITY
Start: 2019-08-19 | End: 2019-09-28

## 2019-08-30 ENCOUNTER — TELEPHONE (OUTPATIENT)
Dept: OBSTETRICS AND GYNECOLOGY | Facility: CLINIC | Age: 24
End: 2019-08-30

## 2019-08-30 RX ORDER — PROMETHAZINE HYDROCHLORIDE 25 MG/1
25 TABLET ORAL EVERY 6 HOURS PRN
Qty: 30 TABLET | Refills: 1 | Status: ON HOLD | OUTPATIENT
Start: 2019-08-30 | End: 2019-10-01 | Stop reason: HOSPADM

## 2019-08-30 NOTE — TELEPHONE ENCOUNTER
----- Message from Mary Mcfarland sent at 8/30/2019  9:42 AM CDT -----  Contact: [phone] /self  Type:  Needs Medical Advice    Who Called:  self  Symptoms (please be specific):  nausea   How long has patient had these symptoms:     Pharmacy name and phone #:   [id number] - [place], [place] - [address]  Would the patient rather a call back or a response via [facility]?  call  Best Call Back Number:    Additional Information:

## 2019-08-30 NOTE — TELEPHONE ENCOUNTER
Pt states she was supposed to have phenergan sent to the pharmacy for her yesterday. Pt is requesting it again. Please advise

## 2019-09-01 LAB — BACTERIA UR CULT: NORMAL

## 2019-09-06 PROBLEM — N93.9 VAGINAL BLEEDING: Status: ACTIVE | Noted: 2019-09-06

## 2019-09-10 ENCOUNTER — PATIENT OUTREACH (OUTPATIENT)
Dept: ADMINISTRATIVE | Facility: OTHER | Age: 24
End: 2019-09-10

## 2019-09-11 ENCOUNTER — TELEPHONE (OUTPATIENT)
Dept: OBSTETRICS AND GYNECOLOGY | Facility: CLINIC | Age: 24
End: 2019-09-11

## 2019-09-11 PROBLEM — O47.03 FALSE LABOR BEFORE 37 COMPLETED WEEKS OF GESTATION IN THIRD TRIMESTER: Status: ACTIVE | Noted: 2019-09-11

## 2019-09-11 NOTE — TELEPHONE ENCOUNTER
----- Message from Mary Mcfarland sent at 9/11/2019  9:10 AM CDT -----  Contact: [phone]/self  Type:  Same Day Appointment Request    Caller is requesting a same day appointment.  Caller declined first available appointment listed below.    Name of Caller:   When is the first available appointment? 09/12  Symptoms: Pregnancy  Best Call Back Number:   Additional Information:

## 2019-09-11 NOTE — TELEPHONE ENCOUNTER
Pt states she is having leaking and she would like to know if she can be seen today instead of tomorrow. Pt advised to go to L&D since she is having leaking

## 2019-09-12 ENCOUNTER — TELEPHONE (OUTPATIENT)
Dept: OBSTETRICS AND GYNECOLOGY | Facility: CLINIC | Age: 24
End: 2019-09-12

## 2019-09-12 NOTE — TELEPHONE ENCOUNTER
----- Message from Fausto Ceballos sent at 9/12/2019  8:57 AM CDT -----  Contact: self [phone]  Patient will be about 30 mins late for her appointment and wants to know if she can still be seen. Please call and advise.

## 2019-09-16 NOTE — PROGRESS NOTES
22yo  presents for late transfer of care at 33w3d.  Left prior to seeing provider at last visit.  Today c/o lower pelvic/ligament pain and nausea.  Overall tolerating PO.  No vb/spotting.  No ctx/lof.  +FM.  Not taking PNV.  H/o 2 previous full term vaginal deliveries, denies any complications.    Prenatal records reviewed - labs wnl.    A/P:  22yo  at 33w3d with late transfer of care and insufficient prenatal care.  - Encourage daily PNV - rx sent.  - Discussed importance of compliance with prenatal visits.  - Counseling done, precautions given, all questions answered.  RTC 2 wks for OB visit and GBS/3rd trimester labs.

## 2019-09-17 DIAGNOSIS — O09.33 INSUFFICIENT PRENATAL CARE IN THIRD TRIMESTER: Primary | ICD-10-CM

## 2019-09-20 ENCOUNTER — PATIENT OUTREACH (OUTPATIENT)
Dept: ADMINISTRATIVE | Facility: OTHER | Age: 24
End: 2019-09-20

## 2019-09-23 ENCOUNTER — ROUTINE PRENATAL (OUTPATIENT)
Dept: OBSTETRICS AND GYNECOLOGY | Facility: CLINIC | Age: 24
End: 2019-09-23
Payer: MEDICARE

## 2019-09-23 ENCOUNTER — LAB VISIT (OUTPATIENT)
Dept: LAB | Facility: HOSPITAL | Age: 24
End: 2019-09-23
Attending: OBSTETRICS & GYNECOLOGY
Payer: MEDICARE

## 2019-09-23 ENCOUNTER — PROCEDURE VISIT (OUTPATIENT)
Dept: OBSTETRICS AND GYNECOLOGY | Facility: CLINIC | Age: 24
End: 2019-09-23
Payer: MEDICARE

## 2019-09-23 VITALS
DIASTOLIC BLOOD PRESSURE: 76 MMHG | WEIGHT: 264.63 LBS | BODY MASS INDEX: 46.87 KG/M2 | SYSTOLIC BLOOD PRESSURE: 122 MMHG

## 2019-09-23 DIAGNOSIS — O23.93: ICD-10-CM

## 2019-09-23 DIAGNOSIS — O09.33 INSUFFICIENT PRENATAL CARE IN THIRD TRIMESTER: ICD-10-CM

## 2019-09-23 DIAGNOSIS — Z36.89 ENCOUNTER FOR ULTRASOUND TO CHECK FETAL GROWTH: ICD-10-CM

## 2019-09-23 DIAGNOSIS — N30.81 OTHER CYSTITIS WITH HEMATURIA: ICD-10-CM

## 2019-09-23 DIAGNOSIS — O09.33 INSUFFICIENT PRENATAL CARE IN THIRD TRIMESTER: Primary | ICD-10-CM

## 2019-09-23 DIAGNOSIS — O09.93 SUPERVISION OF HIGH RISK PREGNANCY IN THIRD TRIMESTER: ICD-10-CM

## 2019-09-23 LAB
BASOPHILS # BLD AUTO: 0.01 K/UL (ref 0–0.2)
BASOPHILS NFR BLD: 0.1 % (ref 0–1.9)
DIFFERENTIAL METHOD: ABNORMAL
EOSINOPHIL # BLD AUTO: 0 K/UL (ref 0–0.5)
EOSINOPHIL NFR BLD: 0.3 % (ref 0–8)
ERYTHROCYTE [DISTWIDTH] IN BLOOD BY AUTOMATED COUNT: 14.4 % (ref 11.5–14.5)
HCT VFR BLD AUTO: 35.8 % (ref 37–48.5)
HGB BLD-MCNC: 11.1 G/DL (ref 12–16)
LYMPHOCYTES # BLD AUTO: 1.1 K/UL (ref 1–4.8)
LYMPHOCYTES NFR BLD: 9.8 % (ref 18–48)
MCH RBC QN AUTO: 25.7 PG (ref 27–31)
MCHC RBC AUTO-ENTMCNC: 31 G/DL (ref 32–36)
MCV RBC AUTO: 83 FL (ref 82–98)
MONOCYTES # BLD AUTO: 0.7 K/UL (ref 0.3–1)
MONOCYTES NFR BLD: 6 % (ref 4–15)
NEUTROPHILS # BLD AUTO: 9.5 K/UL (ref 1.8–7.7)
NEUTROPHILS NFR BLD: 83.8 % (ref 38–73)
PLATELET # BLD AUTO: 197 K/UL (ref 150–350)
PMV BLD AUTO: 10.8 FL (ref 9.2–12.9)
RBC # BLD AUTO: 4.32 M/UL (ref 4–5.4)
WBC # BLD AUTO: 11.36 K/UL (ref 3.9–12.7)

## 2019-09-23 PROCEDURE — 99213 PR OFFICE/OUTPT VISIT, EST, LEVL III, 20-29 MIN: ICD-10-PCS | Mod: S$PBB,,, | Performed by: OBSTETRICS & GYNECOLOGY

## 2019-09-23 PROCEDURE — 86703 HIV-1/HIV-2 1 RESULT ANTBDY: CPT

## 2019-09-23 PROCEDURE — 99212 OFFICE O/P EST SF 10 MIN: CPT | Mod: PBBFAC,PO | Performed by: OBSTETRICS & GYNECOLOGY

## 2019-09-23 PROCEDURE — 87077 CULTURE AEROBIC IDENTIFY: CPT

## 2019-09-23 PROCEDURE — 99999 PR PBB SHADOW E&M-EST. PATIENT-LVL II: CPT | Mod: PBBFAC,,, | Performed by: OBSTETRICS & GYNECOLOGY

## 2019-09-23 PROCEDURE — 87186 SC STD MICRODIL/AGAR DIL: CPT

## 2019-09-23 PROCEDURE — 76816 OB US FOLLOW-UP PER FETUS: CPT | Mod: PBBFAC,PO | Performed by: OBSTETRICS & GYNECOLOGY

## 2019-09-23 PROCEDURE — 87086 URINE CULTURE/COLONY COUNT: CPT

## 2019-09-23 PROCEDURE — 76816 OB US FOLLOW-UP PER FETUS: CPT | Mod: 26,S$PBB,, | Performed by: OBSTETRICS & GYNECOLOGY

## 2019-09-23 PROCEDURE — 87491 CHLMYD TRACH DNA AMP PROBE: CPT

## 2019-09-23 PROCEDURE — 85025 COMPLETE CBC W/AUTO DIFF WBC: CPT

## 2019-09-23 PROCEDURE — 87081 CULTURE SCREEN ONLY: CPT

## 2019-09-23 PROCEDURE — 76816 PR  US,PREGNANT UTERUS,F/U,TRANSABD APP: ICD-10-PCS | Mod: 26,S$PBB,, | Performed by: OBSTETRICS & GYNECOLOGY

## 2019-09-23 PROCEDURE — 87088 URINE BACTERIA CULTURE: CPT

## 2019-09-23 PROCEDURE — 99213 OFFICE O/P EST LOW 20 MIN: CPT | Mod: S$PBB,,, | Performed by: OBSTETRICS & GYNECOLOGY

## 2019-09-23 PROCEDURE — 99999 PR PBB SHADOW E&M-EST. PATIENT-LVL II: ICD-10-PCS | Mod: PBBFAC,,, | Performed by: OBSTETRICS & GYNECOLOGY

## 2019-09-23 PROCEDURE — 36415 COLL VENOUS BLD VENIPUNCTURE: CPT

## 2019-09-23 RX ORDER — CLOTRIMAZOLE AND BETAMETHASONE DIPROPIONATE 10; .64 MG/G; MG/G
CREAM TOPICAL
Qty: 15 G | Refills: 1 | Status: ON HOLD | OUTPATIENT
Start: 2019-09-23 | End: 2019-09-28 | Stop reason: CLARIF

## 2019-09-23 RX ORDER — FLUCONAZOLE 150 MG/1
TABLET ORAL
Qty: 2 TABLET | Refills: 0 | Status: ON HOLD | OUTPATIENT
Start: 2019-09-23 | End: 2019-09-28

## 2019-09-23 RX ORDER — CEPHALEXIN 500 MG/1
500 CAPSULE ORAL 4 TIMES DAILY
Qty: 40 CAPSULE | Refills: 0 | Status: ON HOLD | OUTPATIENT
Start: 2019-09-23 | End: 2019-10-01 | Stop reason: HOSPADM

## 2019-09-23 NOTE — PROGRESS NOTES
22yo  at 37w0d, HR due to late transfer of care and insufficient care - last seen at 33wga.Today c/o pelvic pressure.  No ctx/lof/vb.  +FM.  Not taking PNV.  H/o 2 previous full term vaginal deliveries, denies any complications.    UA: trace blood, trace leuks, Cx pending  EFW 17%  PE: Cvx /-3, very anterior, with bloody show noted.  Of note, erythematous rash in right groin, c/w candida.    A/P:  22yo  at 37w0d with late transfer of care and insufficient prenatal care.  - Encourage daily PNV.  GBS/3T labs today.  - keflex for UTI (recently rx'd macrobid)  - Lotrisone cream and diflucan for candida   - Discussed importance of compliance with prenatal visits.  - Counseling done, labor precautions given, all questions answered.  RTC 1 wk for OB visit.  Will schedule IOL at 39wga per pt request at nv.

## 2019-09-24 ENCOUNTER — TELEPHONE (OUTPATIENT)
Dept: OBSTETRICS AND GYNECOLOGY | Facility: CLINIC | Age: 24
End: 2019-09-24

## 2019-09-24 LAB
C TRACH DNA SPEC QL NAA+PROBE: NOT DETECTED
HIV 1+2 AB+HIV1 P24 AG SERPL QL IA: NEGATIVE
N GONORRHOEA DNA SPEC QL NAA+PROBE: NOT DETECTED

## 2019-09-24 NOTE — TELEPHONE ENCOUNTER
----- Message from Rita Mcfarland sent at 9/24/2019  9:04 AM CDT -----  Contact: pt    Name of Who is Calling:TIMMY ROBERTSON [[id number]]    What is the request in detail: Patient would like a call back regarding cramping and bleeding , states she's can't walk Please contact to further discuss and advise     Can the clinic reply by [facility]: no    What Number to Call Back if not in [facility]: [phone]

## 2019-09-26 LAB
BACTERIA SPEC AEROBE CULT: NORMAL
BACTERIA UR CULT: ABNORMAL

## 2019-09-27 ENCOUNTER — TELEPHONE (OUTPATIENT)
Dept: OBSTETRICS AND GYNECOLOGY | Facility: CLINIC | Age: 24
End: 2019-09-27

## 2019-09-27 ENCOUNTER — HOSPITAL ENCOUNTER (OUTPATIENT)
Facility: HOSPITAL | Age: 24
Discharge: HOME | End: 2019-09-27
Attending: OBSTETRICS & GYNECOLOGY | Admitting: OBSTETRICS & GYNECOLOGY
Payer: MEDICARE

## 2019-09-27 ENCOUNTER — PATIENT OUTREACH (OUTPATIENT)
Dept: ADMINISTRATIVE | Facility: OTHER | Age: 24
End: 2019-09-27

## 2019-09-27 VITALS
HEART RATE: 87 BPM | OXYGEN SATURATION: 97 % | DIASTOLIC BLOOD PRESSURE: 58 MMHG | TEMPERATURE: 98 F | BODY MASS INDEX: 46.78 KG/M2 | HEIGHT: 63 IN | WEIGHT: 264 LBS | RESPIRATION RATE: 18 BRPM | SYSTOLIC BLOOD PRESSURE: 119 MMHG

## 2019-09-27 DIAGNOSIS — Z34.93 THIRD TRIMESTER PREGNANCY: ICD-10-CM

## 2019-09-27 LAB — RUPTURE OF MEMBRANE: NEGATIVE

## 2019-09-27 PROCEDURE — 99211 OFF/OP EST MAY X REQ PHY/QHP: CPT

## 2019-09-27 PROCEDURE — 84112 EVAL AMNIOTIC FLUID PROTEIN: CPT

## 2019-09-27 NOTE — TELEPHONE ENCOUNTER
Please let the patient know she is doing the right thing by going to L&D.  The nurses there will assess her and call me with an update.  Thanks.

## 2019-09-27 NOTE — NURSING
Arrived to the unit from home with c/o possible ROM since last night at 1900.  Reports clear odorless fluid, no contractions, but some blood tinged mucus discharge.  Active fetal movement.  fht's wnl.  Abdomen soft non tender.  Pt instructed to cough with no visible LOF.  ROM+ collected.  SVE done 1.5-2/50/-4 posterior with no blood or fluid to exam glove.  Reports has been taking antibiotics since Monday for UTI.  Infant at bedside in car seat, baby sitting for friend and if definitely SROM will call friend to come and get infant.  Reviewed poc with verbal recall. Call light within reach.     1051  Dr Oseguera called unit, report of above charted information provided and ROM sent to lab.  Orders rec'd to call back with result.    1122  Report to Dr Oseguera via phone of negative ROM+ test results, reactive fht's, no contractions and clarified pt should be taking Keflex 4 times daily.  Orders rec'd to discharge home with labor precautions and keep next appt as scheduled,rb&v.

## 2019-09-27 NOTE — TELEPHONE ENCOUNTER
----- Message from Martha Arellano MA sent at 9/27/2019  8:03 AM CDT -----  Contact: [phone]/ Self       ----- Message -----  From: Miranda Richter  Sent: 9/27/2019   7:23 AM CDT  To: [facility] Staff    Patient thinks her water may have broke and is on her way to labor and delivery. Please call.

## 2019-09-27 NOTE — DISCHARGE INSTRUCTIONS
Home Undelivered Discharge Instructions    After Discharge Orders:    Future Appointments   Date Time Provider Department Center   9/30/2019  9:30 AM Joana Oseguera MD [facility]       Call physician or midwife's office for any problems or concerns.    Current Discharge Medication List      CONTINUE these medications which have NOT CHANGED    Details   cephALEXin (KEFLEX) 500 MG capsule Take 1 capsule (500 mg total) by mouth 4 (four) times daily. for 10 days  Qty: 40 capsule, Refills: 0      clotrimazole-betamethasone 1-0.05% (LOTRISONE) cream Apply 2-3 times daily as needed to affect area.  Qty: 15 g, Refills: 1      fluconazole (DIFLUCAN) 150 MG Tab Take one tablet by mouth.  Repeat in 3 days if symptoms still present.  Qty: 2 tablet, Refills: 0      hydrocortisone 2.5 % cream APPLY TO AFFECTED AREA 2 TO 3 TIMES A DAY  Refills: 5      nitrofurantoin, macrocrystal-monohydrate, (MACROBID) 100 MG capsule Take 1 capsule (100 mg total) by mouth every 12 (twelve) hours.  Qty: 20 capsule, Refills: 0      PNV,calcium 72-iron-folic acid (PRENATAL VITAMIN PLUS LOW IRON) 27 mg iron- 1 mg Tab Take 1 tablet (1 each total) by mouth once daily.  Qty: 30 tablet, Refills: 12      promethazine (PHENERGAN) 25 MG tablet Take 1 tablet (25 mg total) by mouth every 6 (six) hours as needed for Nausea.  Qty: 30 tablet, Refills: 1                     · Diet:  normal diet as tolerated and drink at least 4-5 bottles of water per day    · Rest: normal activity as tolerated    Other instructions: Do kick counts once a day on your baby. Choose the time of day your baby is most active. Get in a comfortable lying or sitting position and time how long it takes to feel 10 kicks, twists, turns, swishes, or rolls. Call your physician or midwife if there have not been 10 kicks in 1.5 hours    Call physician or midwife, return to Labor and Delivery, call 911, or go to the nearest Emergency Room if: increased leakage or fluid,  contractions more than  12 per  1 hour (every 5 minutes, regular pattern,  Increasing in strength), decreased fetal movement, bleeding from vaginal area, difficulty urinating, pain with urination, difficulty breathing, new calf pain, persistent headache or vision change        Recognizing Labor    The beginning of labor is the beginning of birth. Youll start to feel strong contractions. Thats when the muscles of your uterus tighten up to help push your baby out during birth.  Yes, labor has probably started   Signs of labor include:  · Your contractions are getting stronger and more painful instead of weaker. Youll probably feel them throughout your whole uterus.  · Your contractions are regular. This means that you feel them about every 5 to 10 minutes. And they are getting closer together.  · You have pink-colored or blood-streaked fluid from your vagina.  · Your water breaks. It may be a gush or a slow trickle of clear fluid from your vagina.  No, its probably not real labor   Signs of false labor include:  · Your contractions arent regular or strong.  · You feel the contractions only in your lower uterus.  · Your contractions go away when you walk or change position.  · Your contractions go away after drinking fluids.  When to call your healthcare provider  Call your healthcare provider or clinic right away if you notice any of these signs:  · Fluid from your vagina, with or without contractions.  · Bleeding heavy enough that you need a sanitary pad.  · You dont feel your baby moving as much as before.     NOTE: Contractions are timed by both of these measures:  · The length of each contraction from its start to its finish.  · How far apart the contractions are -- the time between the start of one contraction and the start of the next contraction.   Date Last Reviewed: 8/9/2015 © 2000-2017 [employer]. [address] [place] [address], [place] [address]. All rights reserved. This information is not  intended as a substitute for professional medical care. Always follow your healthcare professional's instructions.

## 2019-09-27 NOTE — NURSING
Discharge instructions reviewed with handouts provided and verbal recall. Ambulated off unit to home.

## 2019-09-27 NOTE — TELEPHONE ENCOUNTER
----- Message from Marina Mota sent at 9/27/2019  8:08 AM CDT -----  Contact: self, [phone]  Patient called in returning your call. States she's on her way to L&D.

## 2019-09-28 PROBLEM — O42.90 AMNIOTIC FLUID LEAKING: Status: ACTIVE | Noted: 2019-09-28

## 2019-09-29 PROBLEM — O47.03 FALSE LABOR BEFORE 37 COMPLETED WEEKS OF GESTATION IN THIRD TRIMESTER: Status: RESOLVED | Noted: 2019-09-11 | Resolved: 2019-09-29

## 2019-09-29 PROBLEM — B96.29 UTI DUE TO EXTENDED-SPECTRUM BETA LACTAMASE (ESBL) PRODUCING ESCHERICHIA COLI: Status: RESOLVED | Noted: 2019-03-01 | Resolved: 2019-09-29

## 2019-09-29 PROBLEM — N93.9 VAGINAL BLEEDING: Status: RESOLVED | Noted: 2019-09-06 | Resolved: 2019-09-29

## 2019-09-29 PROBLEM — N10 ACUTE PYELONEPHRITIS: Status: RESOLVED | Noted: 2019-05-25 | Resolved: 2019-09-29

## 2019-09-29 PROBLEM — J10.1 INFLUENZA A: Status: RESOLVED | Noted: 2019-02-27 | Resolved: 2019-09-29

## 2019-09-29 PROBLEM — E86.0 DEHYDRATION: Status: RESOLVED | Noted: 2019-02-27 | Resolved: 2019-09-29

## 2019-09-29 PROBLEM — N39.0 UTI DUE TO EXTENDED-SPECTRUM BETA LACTAMASE (ESBL) PRODUCING ESCHERICHIA COLI: Status: RESOLVED | Noted: 2019-03-01 | Resolved: 2019-09-29

## 2019-09-29 PROBLEM — Z16.12 UTI DUE TO EXTENDED-SPECTRUM BETA LACTAMASE (ESBL) PRODUCING ESCHERICHIA COLI: Status: RESOLVED | Noted: 2019-03-01 | Resolved: 2019-09-29

## 2019-09-29 PROBLEM — R10.9 ABDOMINAL PAIN: Status: RESOLVED | Noted: 2019-02-28 | Resolved: 2019-09-29

## 2019-09-29 PROBLEM — R10.30 LOWER ABDOMINAL PAIN: Status: RESOLVED | Noted: 2019-05-20 | Resolved: 2019-09-29

## 2019-09-29 PROBLEM — Z34.93 THIRD TRIMESTER PREGNANCY: Status: RESOLVED | Noted: 2019-09-27 | Resolved: 2019-09-29

## 2019-09-29 PROBLEM — R11.0 NAUSEA: Status: RESOLVED | Noted: 2019-02-27 | Resolved: 2019-09-29

## 2019-09-29 PROBLEM — O23.01 PYELONEPHRITIS AFFECTING PREGNANCY IN FIRST TRIMESTER: Status: RESOLVED | Noted: 2019-02-27 | Resolved: 2019-09-29

## 2019-09-30 ENCOUNTER — TELEPHONE (OUTPATIENT)
Dept: OBSTETRICS AND GYNECOLOGY | Facility: CLINIC | Age: 24
End: 2019-09-30

## 2019-10-02 ENCOUNTER — TELEPHONE (OUTPATIENT)
Dept: OBSTETRICS AND GYNECOLOGY | Facility: CLINIC | Age: 24
End: 2019-10-02

## 2019-10-02 ENCOUNTER — OFFICE VISIT (OUTPATIENT)
Dept: URGENT CARE | Facility: CLINIC | Age: 24
End: 2019-10-02
Payer: MEDICARE

## 2019-10-02 VITALS
DIASTOLIC BLOOD PRESSURE: 64 MMHG | WEIGHT: 260 LBS | BODY MASS INDEX: 46.07 KG/M2 | SYSTOLIC BLOOD PRESSURE: 97 MMHG | TEMPERATURE: 99 F | HEIGHT: 63 IN | HEART RATE: 109 BPM | RESPIRATION RATE: 20 BRPM | OXYGEN SATURATION: 99 %

## 2019-10-02 DIAGNOSIS — R53.83 FATIGUE, UNSPECIFIED TYPE: Primary | ICD-10-CM

## 2019-10-02 DIAGNOSIS — O99.019 ANEMIA AFFECTING SECOND PREGNANCY: ICD-10-CM

## 2019-10-02 DIAGNOSIS — N30.01 ACUTE CYSTITIS WITH HEMATURIA: ICD-10-CM

## 2019-10-02 LAB
BILIRUB UR QL STRIP: NEGATIVE
GLUCOSE SERPL-MCNC: 88 MG/DL (ref 70–110)
GLUCOSE UR QL STRIP: NEGATIVE
KETONES UR QL STRIP: POSITIVE
LEUKOCYTE ESTERASE UR QL STRIP: POSITIVE
PH, POC UA: 6
POC ANION GAP: 16 MMOL/L (ref 10–20)
POC BLOOD, URINE: POSITIVE
POC BUN: 6 MMOL/L (ref 8–26)
POC CHLORIDE: 102 MMOL/L (ref 98–109)
POC CREATININE: 0.7 MG/DL (ref 0.6–1.3)
POC HEMATOCRIT: 25 %PCV (ref 37–47)
POC HEMOGLOBIN: 8.5 G/DL (ref 12.5–16)
POC ICA: 1.16 MMOL/L (ref 1.12–1.32)
POC NITRATES, URINE: NEGATIVE
POC POTASSIUM: 3.7 MMOL/L (ref 3.5–4.9)
POC SODIUM: 137 MMOL/L (ref 138–146)
POC TCO2: 24 MMOL/L (ref 24–29)
PROT UR QL STRIP: POSITIVE
SP GR UR STRIP: 1.01 (ref 1–1.03)
UROBILINOGEN UR STRIP-ACNC: NORMAL (ref 0.1–1.1)

## 2019-10-02 PROCEDURE — 80047 BASIC METABLC PNL IONIZED CA: CPT | Mod: QW,S$GLB,, | Performed by: INTERNAL MEDICINE

## 2019-10-02 PROCEDURE — 99205 PR OFFICE/OUTPT VISIT, NEW, LEVL V, 60-74 MIN: ICD-10-PCS | Mod: S$GLB,,, | Performed by: INTERNAL MEDICINE

## 2019-10-02 PROCEDURE — 81003 POCT URINALYSIS, DIPSTICK, AUTOMATED, W/O SCOPE: ICD-10-PCS | Mod: QW,S$GLB,, | Performed by: INTERNAL MEDICINE

## 2019-10-02 PROCEDURE — 81003 URINALYSIS AUTO W/O SCOPE: CPT | Mod: QW,S$GLB,, | Performed by: INTERNAL MEDICINE

## 2019-10-02 PROCEDURE — 99205 OFFICE O/P NEW HI 60 MIN: CPT | Mod: S$GLB,,, | Performed by: INTERNAL MEDICINE

## 2019-10-02 PROCEDURE — 80047 POCT CHEMISTRY PANEL: ICD-10-PCS | Mod: QW,S$GLB,, | Performed by: INTERNAL MEDICINE

## 2019-10-02 RX ORDER — SULFAMETHOXAZOLE AND TRIMETHOPRIM 800; 160 MG/1; MG/1
1 TABLET ORAL 2 TIMES DAILY
Qty: 10 TABLET | Refills: 0 | Status: SHIPPED | OUTPATIENT
Start: 2019-10-02 | End: 2019-10-07

## 2019-10-02 NOTE — PROGRESS NOTES
"Subjective:       Patient ID: Alexandra Martin is a 23 y.o. female.    Vitals:  height is 5' 3" (1.6 m) and weight is 117.9 kg (260 lb). Her tympanic temperature is 99.3 °F (37.4 °C). Her blood pressure is 97/64 and her pulse is 109. Her respiration is 20 and oxygen saturation is 99%.     Chief Complaint: Dizziness    Patient states she recently had a baby. Since this weekend, Sunday, she has been feeling really light-headed. She states she is hurting in her back and abdomen and down her legs also.    Dizziness:   Chronicity:  New  Onset:  In the past 7 days (4 days)  Progression since onset:  Unchanged  Frequency:  Constantly  Pain Scale:  10/10  Severity:  Severe  Duration:  Off/on all day  Dizziness characteristics:  Lightheaded/impending faint and spinning inside head only   Associated symptoms: headaches, nausea, weakness and light-headedness.no hearing loss, no ear congestion, no ear pain, no fever, no tinnitus, no vomiting, no diaphoresis, no aural fullness, no visual disturbances, no syncope, no palpitations, no panic, no facial weakness, no slurred speech, no numbness in extremities and no chest pain.  Aggravated by:  Exertion  Treatments tried: ibuprofen.  Improvements on treatment:  No relief      Constitution: Negative for chills, sweating, fatigue and fever.   HENT: Negative for ear pain, tinnitus, hearing loss, congestion and sore throat.    Neck: Negative for painful lymph nodes.   Cardiovascular: Negative for chest pain, leg swelling, palpitations and passing out.   Eyes: Negative for double vision and blurred vision.   Respiratory: Negative for cough and shortness of breath.    Gastrointestinal: Positive for nausea. Negative for vomiting and diarrhea.   Endocrine: negative.   Genitourinary: Negative for dysuria, frequency, urgency and history of kidney stones.   Musculoskeletal: Negative for joint pain, joint swelling, muscle cramps and muscle ache.   Skin: Negative for color change, pale, " rash and bruising.   Allergic/Immunologic: Negative for seasonal allergies.   Neurological: Positive for dizziness, light-headedness and headaches. Negative for history of vertigo and passing out.   Hematologic/Lymphatic: Negative for swollen lymph nodes.   Psychiatric/Behavioral: Negative for nervous/anxious, sleep disturbance and depression. The patient is not nervous/anxious.        Objective:      Physical Exam   Constitutional: She is oriented to person, place, and time. She appears well-developed and well-nourished. She is cooperative.  Non-toxic appearance. She does not appear ill. No distress.   HENT:   Head: Normocephalic and atraumatic.   Right Ear: Hearing, external ear and ear canal normal. Tympanic membrane is injected. Tympanic membrane is not erythematous.   Left Ear: Hearing, external ear and ear canal normal. Tympanic membrane is not injected and not erythematous.   Nose: No mucosal edema, rhinorrhea or nasal deformity. No epistaxis (right). Right sinus exhibits no maxillary sinus tenderness and no frontal sinus tenderness. Left sinus exhibits no maxillary sinus tenderness and no frontal sinus tenderness.   Mouth/Throat: Uvula is midline and mucous membranes are normal. No trismus in the jaw. Normal dentition. No uvula swelling. No posterior oropharyngeal erythema.   Eyes: Conjunctivae and lids are normal. No scleral icterus.   Sclera clear bilat   Neck: Trachea normal, full passive range of motion without pain and phonation normal. Neck supple.   Cardiovascular: Normal rate, regular rhythm, S1 normal, S2 normal, normal heart sounds, intact distal pulses and normal pulses. PMI is not displaced. Exam reveals no S3, no S4, no distant heart sounds and no friction rub.   No murmur heard.  Pulmonary/Chest: Effort normal and breath sounds normal. No accessory muscle usage. No respiratory distress. She has no decreased breath sounds. She has no wheezes. She has no rhonchi. She has no rales (no crackles  bilaterally).   Abdominal: Soft. Normal appearance and bowel sounds are normal. She exhibits no distension. There is no hepatosplenomegaly. There is no tenderness. There is no rigidity, no rebound, no guarding, no CVA tenderness, no tenderness at McBurney's point and negative Solis's sign.       Musculoskeletal: Normal range of motion. She exhibits no edema or deformity.   Neurological: She is alert and oriented to person, place, and time. She has normal reflexes. She is not disoriented. She displays no tremor. She exhibits normal muscle tone. She displays a negative Romberg sign. She displays no seizure activity. Coordination and gait normal. GCS eye subscore is 4. GCS verbal subscore is 5. GCS motor subscore is 6.   Reflex Scores:       Tricep reflexes are 2+ on the right side and 2+ on the left side.       Bicep reflexes are 2+ on the right side and 2+ on the left side.       Brachioradialis reflexes are 2+ on the right side and 2+ on the left side.       Patellar reflexes are 2+ on the right side and 2+ on the left side.       Achilles reflexes are 2+ on the right side and 2+ on the left side.  Skin: Skin is warm, dry and intact. She is not diaphoretic. No pallor.   Psychiatric: She has a normal mood and affect. Her speech is normal and behavior is normal. Judgment and thought content normal. Cognition and memory are normal.   Nursing note and vitals reviewed.      Assessment:       1. Fatigue, unspecified type    2. Anemia affecting second pregnancy        Plan:         Fatigue, unspecified type  -     POCT Chemistry Panel  -     POCT Urinalysis, Dipstick, Automated, W/O Scope    Anemia affecting second pregnancy    fu with gyn

## 2019-10-02 NOTE — TELEPHONE ENCOUNTER
Patient is in a lot of pain from the delivery the ibuprofen is not working.  She is having headaches.  Could you call something else in for her.  She went to urgent care but they said the doctor has to give the pain medication.  Please advise.

## 2019-10-02 NOTE — TELEPHONE ENCOUNTER
----- Message from Haider Lakhani, Patient Care Assistant sent at 10/2/2019  3:21 PM CDT -----  Contact: Self  Pt is calling because the patient is in pain and the ibuprofen she is taking is not helping. Pt went to ER and was told she has to get something from her doctor.    Please advise, pt can be reached at [phone]

## 2019-10-02 NOTE — TELEPHONE ENCOUNTER
----- Message from Vibha Garcia sent at 10/2/2019 11:03 AM CDT -----  Type:  Needs Medical Advice    Who Called: pt  Symptoms (please be specific): pain, lightheaded   How long has patient had these symptoms: 2 days  Pharmacy name and phone #: [facility] [place]  Would the patient rather a call back or a response via [facility]? Call back  Best Call Back Number: [phone]  Additional Information:

## 2019-10-02 NOTE — PATIENT INSTRUCTIONS
Managing Fatigue     Family members can help with meals and chores around the house.   Fatigue is common. It can be caused by worry, lack of sleep, or poor appetite. Fatigue can also be a sign of anemia, a shortage of red blood cells. You might need medical treatment for anemia. The tips below can help you feel better.  Conserving energy  · Keep track of the times of day when you are most tired and plan around them. For instance, if you are more tired in the afternoon, try to get tasks done in the morning.  · Decide which tasks are most important. Do those first.  · Pass tasks along to others when you need to. Ask for help.  · Accept help when its offered. Tell people what they can do to help. For instance, you may need someone to fix a meal, fold clothes, or put gas in your car.  · Plan rest times. You may want to take a nap each day. Just sitting quietly for a few minutes can make you feel more rested.  What you can do to feel better  · Relax before you try to sleep. Take a bath or read for a while.  · Form a sleep pattern. Go to bed at the same time each night and get up at the same time each morning.  · Eat well. Choose foods from all of the food groups each day.  · Exercise. Take a brisk walk to help increase your energy.  · Avoid caffeine and alcohol. Drink plenty of water or fruit juices instead.  Treating anemia  If you begin to feel more tired than normal, tell your doctor. Fatigue could be a sign of anemia. This problem is fairly common in cancer patients, especially during chemotherapy and radiation treatments. If your red blood cell count is too low, you may get a blood transfusion. In some cases, you may need medicine to increase the number of red blood cells your body makes.  When to call your healthcare provider  Call your healthcare provider if you have:  · Shortness of breath or chest pain  · A dizzy feeling when you get up from lying or sitting down  · Paler skin than normal  · Extreme tiredness  that is not helped by sleep   Date Last Reviewed: 1/3/2016  © 0048-0698 [employer]. [address] [place] [address], [place] [address]. All rights reserved. This information is not intended as a substitute for professional medical care. Always follow your healthcare professional's instructions.

## 2019-10-03 NOTE — TELEPHONE ENCOUNTER
Patient delivered at another hospital NOT and NOT with Dr. Oseguera.  If she is a zable patient, I recommend that she come in for an evaluation so that Dr. Oseguera can determine why she is in so much pain since her delivery.    Dr perrin

## 2019-10-03 NOTE — TELEPHONE ENCOUNTER
Please let the patient know she can alternate 1000mg Tylenol and 800mg ibuprofen every 3-4 hours (so she is taking Tylenol every 6 hours, and Ibuprofen every 8 hours).  She can also use a heating bad on her back.  Please also schedule her for a postpartum visit next Friday, Oct 11.  If her pain does not improve before then, she can call to schedule an appointment sooner.  Thanks.

## 2019-10-03 NOTE — TELEPHONE ENCOUNTER
Called patient, informed patient she can alternate 1000mg Tylenol and 800mg ibuprofen every 3-4 hours. Informed patient she could also use a heating pad on her back. Informed patient provider wants to see her for a postpartum visit on 10/11/19. Patient verbalized understanding and appointment scheduled for 10/11/19 at 10:45am.

## 2019-10-07 ENCOUNTER — TELEPHONE (OUTPATIENT)
Dept: OBSTETRICS AND GYNECOLOGY | Facility: CLINIC | Age: 24
End: 2019-10-07

## 2019-10-07 ENCOUNTER — POSTPARTUM VISIT (OUTPATIENT)
Dept: OBSTETRICS AND GYNECOLOGY | Facility: CLINIC | Age: 24
End: 2019-10-07
Payer: MEDICARE

## 2019-10-07 VITALS
BODY MASS INDEX: 44.86 KG/M2 | DIASTOLIC BLOOD PRESSURE: 88 MMHG | TEMPERATURE: 99 F | HEIGHT: 63 IN | SYSTOLIC BLOOD PRESSURE: 120 MMHG | WEIGHT: 253.19 LBS

## 2019-10-07 DIAGNOSIS — R10.2 PELVIC PAIN IN FEMALE: Primary | ICD-10-CM

## 2019-10-07 PROCEDURE — 87088 URINE BACTERIA CULTURE: CPT

## 2019-10-07 PROCEDURE — 87481 CANDIDA DNA AMP PROBE: CPT | Mod: 59

## 2019-10-07 PROCEDURE — 99213 OFFICE O/P EST LOW 20 MIN: CPT | Mod: 24,,, | Performed by: OBSTETRICS & GYNECOLOGY

## 2019-10-07 PROCEDURE — 87186 SC STD MICRODIL/AGAR DIL: CPT

## 2019-10-07 PROCEDURE — 99213 PR OFFICE/OUTPT VISIT, EST, LEVL III, 20-29 MIN: ICD-10-PCS | Mod: 24,,, | Performed by: OBSTETRICS & GYNECOLOGY

## 2019-10-07 PROCEDURE — 87801 DETECT AGNT MULT DNA AMPLI: CPT

## 2019-10-07 PROCEDURE — 87086 URINE CULTURE/COLONY COUNT: CPT

## 2019-10-07 PROCEDURE — 99999 PR PBB SHADOW E&M-EST. PATIENT-LVL III: ICD-10-PCS | Mod: PBBFAC,,, | Performed by: OBSTETRICS & GYNECOLOGY

## 2019-10-07 PROCEDURE — 99213 OFFICE O/P EST LOW 20 MIN: CPT | Mod: PBBFAC,PO | Performed by: OBSTETRICS & GYNECOLOGY

## 2019-10-07 PROCEDURE — 99999 PR PBB SHADOW E&M-EST. PATIENT-LVL III: CPT | Mod: PBBFAC,,, | Performed by: OBSTETRICS & GYNECOLOGY

## 2019-10-07 PROCEDURE — 87077 CULTURE AEROBIC IDENTIFY: CPT | Mod: 59

## 2019-10-07 RX ORDER — NITROFURANTOIN 25; 75 MG/1; MG/1
100 CAPSULE ORAL 2 TIMES DAILY
Qty: 14 CAPSULE | Refills: 0 | Status: SHIPPED | OUTPATIENT
Start: 2019-10-07 | End: 2019-10-14

## 2019-10-07 NOTE — PROGRESS NOTES
"  Subjective:       Patient ID: Alexanrda Martin is a 23 y.o. female.    Chief Complaint:  Gynecologic Exam (lower abdominal and back )      History of Present Illness  24yo  s/p  on 19 at [place].  Today c/o lower abd and back pain.  No fevers/chills.  Previously c/o HA, now resolved.  No other complaints today.  Lochia minimal.  Bottle feeding, no breast tenderness.  No S/sx of PP depression.  Desires Nexplanon for PP contraception.    GYN & OB History  No LMP recorded.   Date of Last Pap: 2019    OB History    Para Term  AB Living   3 3 3 0 0 3   SAB TAB Ectopic Multiple Live Births   0 0 0 0 3      # Outcome Date GA Lbr Minor/2nd Weight Sex Delivery Anes PTL Lv   3 Term 19 37w6d  2.78 kg (6 lb 2.1 oz) M Vag-Spont EPI N CINTHIA      Complications: Anesthetic Complications   2 Term 17 38w3d  3 kg (6 lb 9.8 oz) F Vag-Spont EPI N CINTHIA   1 Term 08/29/15 39w4d  3.685 kg (8 lb 2 oz) F Vag-Spont EPI N CINTHIA       Review of Systems  Review of Systems: neg x 10, except as per HPI        Objective:    Physical Exam     /88   Temp 98.6 °F (37 °C)   Ht 5' 3" (1.6 m)   Wt 114.9 kg (253 lb 3.2 oz)   BMI 44.85 kg/m²     UA: large leuks, large blood, Cx pending    Gen: NAD  Resp: Normal respiratory effort  Abd: soft, NT  Pelvic: Normal-appearing external female genitalia.  Scant lochia noted, cx taken.  Uterus small, mobile, mildly tender along bladder.  No adnexal masses or tenderness.  Hemorrhoids also noted.  Ext: normal ROM  Psych: appropriate affect  Neuro: grossly intact    Assessment:        1. Pelvic pain in female    2. Follow-up, postpartum, routine              Plan:      1.  Will treat empirically for UTI, cx pending.  2.  Continue motrin and tylenol prn.  3.  Contraception counseling done, desires Nexplanon - paperwork submitted today.  4.  Counseling done, precautions given, all questions answered.  RTC 1wk for f/u.      "

## 2019-10-08 LAB
BACTERIAL VAGINOSIS DNA: NEGATIVE
CANDIDA GLABRATA DNA: NEGATIVE
CANDIDA KRUSEI DNA: NEGATIVE
CANDIDA RRNA VAG QL PROBE: NEGATIVE
T VAGINALIS RRNA GENITAL QL PROBE: NEGATIVE

## 2019-10-10 LAB — BACTERIA UR CULT: ABNORMAL

## 2019-10-11 ENCOUNTER — PATIENT OUTREACH (OUTPATIENT)
Dept: ADMINISTRATIVE | Facility: OTHER | Age: 24
End: 2019-10-11

## 2019-10-18 ENCOUNTER — PATIENT OUTREACH (OUTPATIENT)
Dept: ADMINISTRATIVE | Facility: OTHER | Age: 24
End: 2019-10-18

## 2019-11-11 ENCOUNTER — TELEPHONE (OUTPATIENT)
Dept: OBSTETRICS AND GYNECOLOGY | Facility: CLINIC | Age: 24
End: 2019-11-11

## 2019-11-11 NOTE — TELEPHONE ENCOUNTER
----- Message from Morelia Rashid sent at 11/11/2019 10:31 AM CST -----  Contact: self [phone]  Patient is requesting a callback to [facility] nex[facility]on insertion. Please call.

## 2019-11-20 ENCOUNTER — PATIENT OUTREACH (OUTPATIENT)
Dept: ADMINISTRATIVE | Facility: OTHER | Age: 24
End: 2019-11-20

## 2019-12-17 ENCOUNTER — TELEPHONE (OUTPATIENT)
Dept: OBSTETRICS AND GYNECOLOGY | Facility: CLINIC | Age: 24
End: 2019-12-17

## 2019-12-17 NOTE — TELEPHONE ENCOUNTER
----- Message from Day Smith sent at 12/17/2019 12:04 PM CST -----  Contact: self / [phone]  Patient is requesting a call back regarding, her birthcontrol. Please advise

## 2019-12-26 PROBLEM — N13.30 HYDRONEPHROSIS: Status: ACTIVE | Noted: 2019-12-26

## 2019-12-26 PROBLEM — D64.9 ANEMIA: Status: ACTIVE | Noted: 2019-12-26

## 2019-12-26 PROBLEM — I95.9 HYPOTENSION: Status: ACTIVE | Noted: 2019-12-26

## 2019-12-26 PROBLEM — N20.0 KIDNEY STONE: Status: ACTIVE | Noted: 2019-12-26

## 2019-12-27 PROBLEM — R78.81 BACTEREMIA: Status: ACTIVE | Noted: 2019-12-27

## 2019-12-27 PROBLEM — A41.9 SEPSIS: Status: ACTIVE | Noted: 2019-12-27

## 2019-12-29 PROBLEM — I95.9 HYPOTENSION: Status: RESOLVED | Noted: 2019-12-26 | Resolved: 2019-12-29

## 2019-12-30 PROBLEM — R03.0 ELEVATED BLOOD PRESSURE READING: Status: ACTIVE | Noted: 2019-12-30

## 2019-12-30 PROBLEM — A41.9 SEPSIS: Status: RESOLVED | Noted: 2019-12-27 | Resolved: 2019-12-30

## 2019-12-31 ENCOUNTER — TELEPHONE (OUTPATIENT)
Dept: UROLOGY | Facility: CLINIC | Age: 24
End: 2019-12-31

## 2019-12-31 NOTE — TELEPHONE ENCOUNTER
Per patient, she states Dr. Medley was supposed to send in percocet to her pharmacy and they told her they had pyridium. I advised Ultram was what was prescribed, patient stated she could not take that because it does not work. I advised Dr. Medley had noted this was being sent in because patient did not do well with narcotics. Patient then asked for her medical records. I advised she would need to contact HIM. Placed patient on hold to get her the number and the call was disconnected. I called her back, but patient did not answer. I left a message with the correct number to call for the request and advised could also be done on patient portal. Spoke with Dr. Medley about the patient's request for percocet. Dr. Medley advised he could not send in narcotics for patient as she did not do well on them in the hospital.

## 2019-12-31 NOTE — TELEPHONE ENCOUNTER
----- Message from Jennifer Vang sent at 12/31/2019  8:09 AM CST -----  Contact: Patient   Type: Needs Medical Advice    Who Called:  Patient  Best Call Back Number: [phone]  Additional Information:Pt is requesting a call from office regarding medication. Pt states Dr sent the wrong medication to pharmacy on 12/30/19. Pt will further explain when she receive a call from office. Please call and advise.

## 2020-01-02 ENCOUNTER — TELEPHONE (OUTPATIENT)
Dept: OBSTETRICS AND GYNECOLOGY | Facility: CLINIC | Age: 25
End: 2020-01-02

## 2020-01-02 ENCOUNTER — TELEPHONE (OUTPATIENT)
Dept: UROLOGY | Facility: CLINIC | Age: 25
End: 2020-01-02

## 2020-01-02 DIAGNOSIS — N20.1 URETERAL STONE: Primary | ICD-10-CM

## 2020-01-02 NOTE — TELEPHONE ENCOUNTER
----- Message from Aspen Reynolds sent at 1/2/2020  3:23 PM CST -----  Contact: Pt   Pt would like to be called back regarding upcoming appt. Pt is having surgery on Monday 1/6 so she would like to see if it would be possible to see doctor on tomorrow 1/3/20.     Pt can be reached at [phone].

## 2020-01-02 NOTE — TELEPHONE ENCOUNTER
----- Message from Sanya Del Rio sent at 1/2/2020  1:01 PM CST -----  Type:  Same Day Appointment Request    Caller is requesting a same day appointment.  Caller declined first available appointment listed below.      Name of Caller:Patient  When is the first available appointment?  01/15  Symptoms:  Stent removal  Best Call Back Number: [phone]   Additional Information:

## 2020-01-02 NOTE — TELEPHONE ENCOUNTER
----- Message from Ivory Ward sent at 1/2/2020 10:27 AM CST -----  Contact: Self  Type:  Same Day Appointment Request    Caller is requesting a same day appointment.  Caller declined first available appointment listed below.      Name of Caller:  self  When is the first available appointment?  1/21  Symptoms:  [facility] ER f/u appt for kidney stones, had stint put in   Best Call Back Number:  [phone] (home) na  Additional Information:

## 2020-01-03 ENCOUNTER — PATIENT OUTREACH (OUTPATIENT)
Dept: ADMINISTRATIVE | Facility: OTHER | Age: 25
End: 2020-01-03

## 2020-01-06 ENCOUNTER — TELEPHONE (OUTPATIENT)
Dept: UROLOGY | Facility: CLINIC | Age: 25
End: 2020-01-06

## 2020-01-06 PROBLEM — N20.1 URETERAL STONE: Status: ACTIVE | Noted: 2020-01-06

## 2020-01-06 PROBLEM — R10.9 FLANK PAIN: Status: ACTIVE | Noted: 2020-01-06

## 2020-01-06 NOTE — TELEPHONE ENCOUNTER
Pt was asking if she could still have procedure if she is on her cycle. Advised that would not be an issue.

## 2020-01-06 NOTE — TELEPHONE ENCOUNTER
----- Message from Koko Mendez sent at 1/6/2020  8:09 AM CST -----  Contact: Patient  Type: Needs Medical Advice    Who Called:  Patient  Best Call Back Number: [phone]  Additional Information: Patient has questions for procedure later today. Please call to advise. Thanks!

## 2020-01-07 ENCOUNTER — TELEPHONE (OUTPATIENT)
Dept: OBSTETRICS AND GYNECOLOGY | Facility: CLINIC | Age: 25
End: 2020-01-07

## 2020-01-07 NOTE — TELEPHONE ENCOUNTER
----- Message from Day Smith sent at 1/7/2020  8:49 AM CST -----  Contact: self / [phone]  Needs to reschedule her Nexplanon ins. Please advise

## 2020-01-08 ENCOUNTER — TELEPHONE (OUTPATIENT)
Dept: UROLOGY | Facility: CLINIC | Age: 25
End: 2020-01-08

## 2020-01-08 NOTE — TELEPHONE ENCOUNTER
----- Message from Koko Mendez sent at 1/8/2020  8:38 AM CST -----  Contact: Patient  Type: Needs Medical Advice    Who Called:  Patient  Best Call Back Number: [phone]  Additional Information: Patient would like to request a school excuse. Please call to advise. Thanks!

## 2020-01-21 ENCOUNTER — PATIENT OUTREACH (OUTPATIENT)
Dept: ADMINISTRATIVE | Facility: OTHER | Age: 25
End: 2020-01-21

## 2020-01-22 ENCOUNTER — TELEPHONE (OUTPATIENT)
Dept: OBSTETRICS AND GYNECOLOGY | Facility: CLINIC | Age: 25
End: 2020-01-22

## 2020-01-22 ENCOUNTER — PATIENT MESSAGE (OUTPATIENT)
Dept: OBSTETRICS AND GYNECOLOGY | Facility: CLINIC | Age: 25
End: 2020-01-22

## 2020-01-22 NOTE — TELEPHONE ENCOUNTER
----- Message from Sara Martin sent at 1/22/2020  8:37 AM CST -----  Contact: patient  Patient called to reschedule her upcoming appointment for a morning time instead of the current one.    Please call [phone] to discuss today.

## 2020-01-23 ENCOUNTER — TELEPHONE (OUTPATIENT)
Dept: OBSTETRICS AND GYNECOLOGY | Facility: CLINIC | Age: 25
End: 2020-01-23

## 2020-01-23 ENCOUNTER — TELEPHONE (OUTPATIENT)
Dept: UROLOGY | Facility: CLINIC | Age: 25
End: 2020-01-23

## 2020-01-23 NOTE — TELEPHONE ENCOUNTER
----- Message from Rodolfo Tsai sent at 1/23/2020  7:35 AM CST -----  Contact: Ptnt [phone]   Type:  Same Day Appointment Request    Caller is requesting a same day appointment.  Caller declined first available appointment listed below.      Name of Caller:  Ptnt [phone]     When is the first available appointment?  02-11-20    Symptoms:  Pain in lower back kidney area.    Best Call Back Number:  Ptnt [phone]     Additional Information:   Advised to see the Dr today for her condition, please call to schedule.

## 2020-01-23 NOTE — TELEPHONE ENCOUNTER
----- Message from Rodolfo Tsai sent at 1/23/2020  8:10 AM CST -----  Contact: Ptn   Type: Needs Medical Advice    Who Called: Ptnt      Additional Information: Advised can't make it today for her appmnt she will recall to schedule.

## 2020-01-28 ENCOUNTER — OFFICE VISIT (OUTPATIENT)
Dept: FAMILY MEDICINE | Facility: CLINIC | Age: 25
End: 2020-01-28
Payer: MEDICARE

## 2020-01-28 VITALS
TEMPERATURE: 98 F | OXYGEN SATURATION: 97 % | HEIGHT: 63 IN | DIASTOLIC BLOOD PRESSURE: 78 MMHG | SYSTOLIC BLOOD PRESSURE: 114 MMHG | HEART RATE: 86 BPM | WEIGHT: 244.5 LBS | BODY MASS INDEX: 43.32 KG/M2

## 2020-01-28 DIAGNOSIS — H90.6 MIXED CONDUCTIVE AND SENSORINEURAL HEARING LOSS OF BOTH EARS: Primary | ICD-10-CM

## 2020-01-28 DIAGNOSIS — Z23 NEED FOR VACCINATION: ICD-10-CM

## 2020-01-28 PROCEDURE — 90472 IMMUNIZATION ADMIN EACH ADD: CPT | Mod: PBBFAC,PO

## 2020-01-28 PROCEDURE — 90471 IMMUNIZATION ADMIN: CPT | Mod: PBBFAC,PO

## 2020-01-28 PROCEDURE — 99213 OFFICE O/P EST LOW 20 MIN: CPT | Mod: PBBFAC,PO,25 | Performed by: INTERNAL MEDICINE

## 2020-01-28 PROCEDURE — 99213 PR OFFICE/OUTPT VISIT, EST, LEVL III, 20-29 MIN: ICD-10-PCS | Mod: S$PBB,25,, | Performed by: INTERNAL MEDICINE

## 2020-01-28 PROCEDURE — 99999 PR PBB SHADOW E&M-EST. PATIENT-LVL III: CPT | Mod: PBBFAC,,, | Performed by: INTERNAL MEDICINE

## 2020-01-28 PROCEDURE — 90715 TDAP VACCINE 7 YRS/> IM: CPT | Mod: PBBFAC,PO

## 2020-01-28 PROCEDURE — 99213 OFFICE O/P EST LOW 20 MIN: CPT | Mod: S$PBB,25,, | Performed by: INTERNAL MEDICINE

## 2020-01-28 PROCEDURE — 99999 PR PBB SHADOW E&M-EST. PATIENT-LVL III: ICD-10-PCS | Mod: PBBFAC,,, | Performed by: INTERNAL MEDICINE

## 2020-01-28 PROCEDURE — 90686 IIV4 VACC NO PRSV 0.5 ML IM: CPT | Mod: PBBFAC,PO

## 2020-01-28 RX ORDER — METRONIDAZOLE 500 MG/1
TABLET ORAL
COMMUNITY
Start: 2020-01-22 | End: 2020-02-13 | Stop reason: CLARIF
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
## 2020-02-11 ENCOUNTER — OFFICE VISIT (OUTPATIENT)
Dept: FAMILY MEDICINE | Facility: CLINIC | Age: 25
End: 2020-02-11
Payer: MEDICARE

## 2020-02-11 VITALS
WEIGHT: 250 LBS | OXYGEN SATURATION: 98 % | SYSTOLIC BLOOD PRESSURE: 126 MMHG | BODY MASS INDEX: 44.3 KG/M2 | HEIGHT: 63 IN | DIASTOLIC BLOOD PRESSURE: 82 MMHG | HEART RATE: 79 BPM

## 2020-02-11 DIAGNOSIS — F43.10 PTSD (POST-TRAUMATIC STRESS DISORDER): Primary | ICD-10-CM

## 2020-02-11 DIAGNOSIS — F41.9 ANXIETY: ICD-10-CM

## 2020-02-11 PROCEDURE — 99214 PR OFFICE/OUTPT VISIT, EST, LEVL IV, 30-39 MIN: ICD-10-PCS | Mod: S$PBB,,, | Performed by: FAMILY MEDICINE

## 2020-02-11 PROCEDURE — 99999 PR PBB SHADOW E&M-EST. PATIENT-LVL III: ICD-10-PCS | Mod: PBBFAC,,, | Performed by: FAMILY MEDICINE

## 2020-02-11 PROCEDURE — 99999 PR PBB SHADOW E&M-EST. PATIENT-LVL III: CPT | Mod: PBBFAC,,, | Performed by: FAMILY MEDICINE

## 2020-02-11 PROCEDURE — 99214 OFFICE O/P EST MOD 30 MIN: CPT | Mod: S$PBB,,, | Performed by: FAMILY MEDICINE

## 2020-02-11 PROCEDURE — 99213 OFFICE O/P EST LOW 20 MIN: CPT | Mod: PBBFAC,PO | Performed by: FAMILY MEDICINE

## 2020-02-11 RX ORDER — PAROXETINE HYDROCHLORIDE 20 MG/1
20 TABLET, FILM COATED ORAL EVERY MORNING
Qty: 30 TABLET | Refills: 5 | Status: SHIPPED | OUTPATIENT
Start: 2020-02-11 | End: 2020-02-24

## 2020-02-11 NOTE — PROGRESS NOTES
Subjective:       Patient ID: Alexandra Martin is a 24 y.o. female.    Chief Complaint: Anxiety    HPI     Here for a f/u.     C/o generalized anxiety > 1 year.  Reports strain financially.  No depressed mood. Hx of ptsd.       Review of Systems      Review of Systems   Constitutional: Negative for fever and chills.   HENT: Negative for hearing loss and neck stiffness.    Eyes: Negative for redness and itching.   Respiratory: Negative for cough and choking.    Cardiovascular: Negative for chest pain and leg swelling.  Abdomen: Negative for abdominal pain and blood in stool.   Genitourinary: Negative for dysuria and flank pain.   Musculoskeletal: Negative for back pain and gait problem.   Neurological: Negative for light-headedness and headaches.   Hematological: Negative for adenopathy.       Objective:      Physical Exam   Constitutional: She appears well-developed.   HENT:   Head: Normocephalic and atraumatic.   Eyes: Pupils are equal, round, and reactive to light. Conjunctivae are normal.   Neck: Normal range of motion.   Cardiovascular: Normal rate and regular rhythm.   No murmur heard.  Pulmonary/Chest: Effort normal and breath sounds normal.   Lymphadenopathy:     She has no cervical adenopathy.       Assessment:       1. PTSD (post-traumatic stress disorder)    2. Anxiety        Plan:       PTSD (post-traumatic stress disorder)  -     Ambulatory referral/consult to Psychology; Future; Expected date: 02/18/2020    Anxiety    Other orders  -     paroxetine (PAXIL) 20 MG tablet; Take 1 tablet (20 mg total) by mouth every morning.  Dispense: 30 tablet; Refill: 5            Plan:  See referral  Start paxil  rtw letter given to patient      Total time spent with patient was 25 minutes with more than half the time spent in direct consultation with the patient in regards to our treatment/plan.

## 2020-02-13 ENCOUNTER — TELEPHONE (OUTPATIENT)
Dept: UROLOGY | Facility: CLINIC | Age: 25
End: 2020-02-13

## 2020-02-13 NOTE — TELEPHONE ENCOUNTER
----- Message from Matilda Lu sent at 2/13/2020  3:11 PM CST -----  Contact: self   Patient want to know if you can fit her in this month for kidney stones if so please call back at [phone]      Case number [id number]

## 2020-02-21 ENCOUNTER — TELEPHONE (OUTPATIENT)
Dept: FAMILY MEDICINE | Facility: CLINIC | Age: 25
End: 2020-02-21

## 2020-02-21 NOTE — TELEPHONE ENCOUNTER
----- Message from Jennifermyesha Vang sent at 2/21/2020 12:36 PM CST -----  Contact: Pt   Type: Needs Medical Advice    Who Called:Pt  Best Call Back Number: [phone]  Additional Information: Pt states she's taking paroxetine (PAXIL) 20 MG tablet and it's making her sick and not helping with anixety . Pt would like to know if another medication could be called in. Please call and advise.

## 2020-02-21 NOTE — TELEPHONE ENCOUNTER
Pt requesting a change in medication because the Paxil is not working. Pt would like the medication sent to [facility] in [place] off of [address].  Please advise.

## 2020-02-23 ENCOUNTER — OFFICE VISIT (OUTPATIENT)
Dept: URGENT CARE | Facility: CLINIC | Age: 25
End: 2020-02-23
Payer: MEDICAID

## 2020-02-24 ENCOUNTER — TELEPHONE (OUTPATIENT)
Dept: FAMILY MEDICINE | Facility: CLINIC | Age: 25
End: 2020-02-24

## 2020-02-24 RX ORDER — SERTRALINE HYDROCHLORIDE 50 MG/1
50 TABLET, FILM COATED ORAL DAILY
Qty: 30 TABLET | Refills: 11 | Status: SHIPPED | OUTPATIENT
Start: 2020-02-24 | End: 2020-02-24

## 2020-02-24 RX ORDER — ESCITALOPRAM OXALATE 10 MG/1
10 TABLET ORAL DAILY
Qty: 30 TABLET | Refills: 11 | Status: SHIPPED | OUTPATIENT
Start: 2020-02-24 | End: 2020-05-27

## 2020-02-24 NOTE — TELEPHONE ENCOUNTER
Spoke with pt and informed her that PCP sent Erx for Lexapro to her pharmacy. Pt verbalized understanding.

## 2020-02-24 NOTE — TELEPHONE ENCOUNTER
I spoke with pt. She said she has tried zoloft and it does not wrk. She has treid zoloft, respirdone, Seroquel, geodone, prozac, effexor, topamax, depakote, lamictil, saphris. None of these have helped her.

## 2020-02-24 NOTE — TELEPHONE ENCOUNTER
----- Message from María Morton sent at 2/24/2020  8:49 AM CST -----  Contact: pt  Type:  Sooner Apoointment Request    Caller is requesting a sooner appointment.  Caller declined first available appointment listed below.  Caller will not accept being placed on the waitlist and is requesting a message be sent to doctor.    Name of Caller:  pt  When is the first available appointment?  04/26  Symptoms:   Best Call Back Number: [phone] (home)     Additional Information: pt stated that the current medication is taking for depression and anxiety is not working requesting for doctor to change to a different medication

## 2020-03-01 ENCOUNTER — PATIENT OUTREACH (OUTPATIENT)
Dept: ADMINISTRATIVE | Facility: OTHER | Age: 25
End: 2020-03-01

## 2020-03-16 ENCOUNTER — PATIENT OUTREACH (OUTPATIENT)
Dept: ADMINISTRATIVE | Facility: OTHER | Age: 25
End: 2020-03-16

## 2020-05-13 ENCOUNTER — TELEPHONE (OUTPATIENT)
Dept: FAMILY MEDICINE | Facility: CLINIC | Age: 25
End: 2020-05-13

## 2020-05-13 ENCOUNTER — OFFICE VISIT (OUTPATIENT)
Dept: FAMILY MEDICINE | Facility: CLINIC | Age: 25
End: 2020-05-13
Payer: MEDICAID

## 2020-05-13 DIAGNOSIS — G47.00 INSOMNIA, UNSPECIFIED TYPE: ICD-10-CM

## 2020-05-13 DIAGNOSIS — F39 UNSPECIFIED MOOD (AFFECTIVE) DISORDER: ICD-10-CM

## 2020-05-13 DIAGNOSIS — F41.9 ANXIETY: Primary | ICD-10-CM

## 2020-05-13 DIAGNOSIS — E66.01 CLASS 3 SEVERE OBESITY DUE TO EXCESS CALORIES WITH BODY MASS INDEX (BMI) OF 40.0 TO 44.9 IN ADULT, UNSPECIFIED WHETHER SERIOUS COMORBIDITY PRESENT: ICD-10-CM

## 2020-05-13 PROCEDURE — 99213 OFFICE O/P EST LOW 20 MIN: CPT | Mod: 95,,, | Performed by: NURSE PRACTITIONER

## 2020-05-13 PROCEDURE — 99213 PR OFFICE/OUTPT VISIT, EST, LEVL III, 20-29 MIN: ICD-10-PCS | Mod: 95,,, | Performed by: NURSE PRACTITIONER

## 2020-05-13 RX ORDER — BUSPIRONE HYDROCHLORIDE 5 MG/1
5 TABLET ORAL 2 TIMES DAILY
Qty: 14 TABLET | Refills: 0 | Status: SHIPPED | OUTPATIENT
Start: 2020-05-13 | End: 2020-05-27

## 2020-05-13 NOTE — TELEPHONE ENCOUNTER
----- Message from Princess LAYLA Ceballos sent at 5/13/2020  9:00 AM CDT -----  Contact: pt  Type:  Same Day Appointment Request    Caller is requesting a same day appointment.  Caller declined first available appointment listed below.      Name of Caller:  Patient  When is the first available appointment?  none  Symptoms:  severe panic attack- continously  Best Call Back Number:  [phone] (home)     Additional Information:   Requesting to a call in regards to scheduling appt. Patient states they are bad enough she not able to take care of her baby

## 2020-05-13 NOTE — PROGRESS NOTES
Subjective:       Patient ID: Alexandra Martin is a 24 y.o. female.    Chief Complaint: No chief complaint on file.  Patient was last seen by me in 2016  Last seen by PCP on 02/11/2020  HPI  There were no vitals filed for this visit.  Review of Systems   Constitutional: Positive for unexpected weight change. Negative for activity change.   HENT: Negative for hearing loss, rhinorrhea and trouble swallowing.    Eyes: Negative for discharge and visual disturbance.   Respiratory: Negative for chest tightness and wheezing.    Cardiovascular: Positive for palpitations. Negative for chest pain.   Gastrointestinal: Negative for blood in stool, constipation, diarrhea and vomiting.   Endocrine: Negative for polydipsia and polyuria.   Genitourinary: Negative for difficulty urinating, dysuria, hematuria and menstrual problem.   Musculoskeletal: Negative for arthralgias, joint swelling and neck pain.   Neurological: Negative for weakness and headaches.   Psychiatric/Behavioral: Positive for confusion. Negative for dysphoric mood, self-injury and suicidal ideas. The patient is nervous/anxious.        The patient location is: [place], [place].  The chief complaint leading to consultation is: Complains of bad panic attacks that has gotten worse and has not been sleeping. States having some personal issues that are making her anxiety worse now. Visit type: audiovisual  Total time spent with patient: 11:42-12:02  Each patient to whom he or she provides medical services by telemedicine is:  (1) informed of the relationship between the physician and patient and the respective role of any other health care provider with respect to management of the patient; and (2) notified that he or she may decline to receive medical services by telemedicine and may withdraw from such care at any time.    Notes:  States she was on Lexapro for anxiety and stopped about 2-3 weeks ago because it was making her feel depressed.  Feeling panic and  anxiety which is worse then before  States diagnosed with anxiety for many year  States her last in patient treatment was before 2014.  [facility] made an appt for June 3rd to see counselor (at [facility]).  States she has been on numerous medications including (Lexapro, Wellbutrin, Paxil, Prozac) over the year that may work for a brief time but symptoms come back. States some medications also made her too sleepy.  States Buspar and vistaril that helps but it made too sleepy during daytime.  Objective:      Physical Exam   Constitutional: She is oriented to person, place, and time. She appears well-developed and well-nourished. She is active and cooperative.   HENT:   Head: Normocephalic and atraumatic.   Right Ear: Hearing normal.   Left Ear: Hearing normal.   Nose: Nose normal.   Eyes: Lids are normal.   Neurological: She is alert and oriented to person, place, and time.   Psychiatric: Her speech is normal and behavior is normal. Judgment and thought content normal. Her mood appears anxious. Cognition and memory are normal. She expresses no homicidal and no suicidal ideation. She expresses no suicidal plans.       Assessment & Plan:       Anxiety  -     busPIRone (BUSPAR) 5 MG Tab; Take 1 tablet (5 mg total) by mouth 2 (two) times daily.  Dispense: 14 tablet; Refill: 0    Unspecified mood (affective) disorder  -     busPIRone (BUSPAR) 5 MG Tab; Take 1 tablet (5 mg total) by mouth 2 (two) times daily.  Dispense: 14 tablet; Refill: 0    Insomnia, unspecified type  -     busPIRone (BUSPAR) 5 MG Tab; Take 1 tablet (5 mg total) by mouth 2 (two) times daily.  Dispense: 14 tablet; Refill: 0    Class 3 severe obesity due to excess calories with body mass index (BMI) of 40.0 to 44.9 in adult, unspecified whether serious comorbidity present    I have discussed in detail that she is to inform family ad go directly to ED if she feels overwhelmed or that she would like to hurt her self.  She verbalized understanding.       No follow-ups on file.   Medication List with Changes/Refills   New Medications    BUSPIRONE (BUSPAR) 5 MG TAB    Take 1 tablet (5 mg total) by mouth 2 (two) times daily.   Current Medications    ESCITALOPRAM OXALATE (LEXAPRO) 10 MG TABLET    Take 1 tablet (10 mg total) by mouth once daily.   Discontinued Medications    FERROUS GLUCONATE (FERGON) 324 MG TABLET    Take 1 tablet (324 mg total) by mouth daily with breakfast.    KETOROLAC (TORADOL) 10 MG TABLET    Take 1 tablet (10 mg total) by mouth every 6 (six) hours.    ONDANSETRON (ZOFRAN-ODT) 4 MG TBDL    Take 1 tablet (4 mg total) by mouth every 6 (six) hours as needed.

## 2020-05-13 NOTE — TELEPHONE ENCOUNTER
"Spoke with pt she states " Im having severe panic attacks, Im not eating, and I cant sleep. I really need to see someone today." Verbalized understanding informed pt that provider was out this week, but I could scheduled  her with another provider here in the clinic. Pt verbalized understanding, scheduled appointment with provider phone call ended.  "

## 2020-05-27 ENCOUNTER — OFFICE VISIT (OUTPATIENT)
Dept: FAMILY MEDICINE | Facility: CLINIC | Age: 25
End: 2020-05-27
Payer: MEDICAID

## 2020-05-27 VITALS
HEIGHT: 63 IN | WEIGHT: 253.06 LBS | SYSTOLIC BLOOD PRESSURE: 118 MMHG | HEART RATE: 97 BPM | OXYGEN SATURATION: 95 % | BODY MASS INDEX: 44.84 KG/M2 | TEMPERATURE: 99 F | DIASTOLIC BLOOD PRESSURE: 76 MMHG

## 2020-05-27 DIAGNOSIS — F41.1 GAD (GENERALIZED ANXIETY DISORDER): Primary | ICD-10-CM

## 2020-05-27 DIAGNOSIS — J22 LOWER RESPIRATORY INFECTION: ICD-10-CM

## 2020-05-27 PROCEDURE — 99214 OFFICE O/P EST MOD 30 MIN: CPT | Mod: S$PBB,,, | Performed by: NURSE PRACTITIONER

## 2020-05-27 PROCEDURE — 99213 OFFICE O/P EST LOW 20 MIN: CPT | Mod: PBBFAC,PO | Performed by: NURSE PRACTITIONER

## 2020-05-27 PROCEDURE — 99999 PR PBB SHADOW E&M-EST. PATIENT-LVL III: CPT | Mod: PBBFAC,,, | Performed by: NURSE PRACTITIONER

## 2020-05-27 PROCEDURE — 99999 PR PBB SHADOW E&M-EST. PATIENT-LVL III: ICD-10-PCS | Mod: PBBFAC,,, | Performed by: NURSE PRACTITIONER

## 2020-05-27 PROCEDURE — 99214 PR OFFICE/OUTPT VISIT, EST, LEVL IV, 30-39 MIN: ICD-10-PCS | Mod: S$PBB,,, | Performed by: NURSE PRACTITIONER

## 2020-05-27 RX ORDER — ETONOGESTREL 68 MG/1
IMPLANT SUBCUTANEOUS
COMMUNITY
Start: 2020-05-20 | End: 2021-06-17

## 2020-05-27 RX ORDER — DOXYCYCLINE HYCLATE 100 MG
100 TABLET ORAL EVERY 12 HOURS
Qty: 14 TABLET | Refills: 0 | Status: SHIPPED | OUTPATIENT
Start: 2020-05-27 | End: 2020-06-03

## 2020-05-27 RX ORDER — CITALOPRAM 10 MG/1
10 TABLET ORAL DAILY
Qty: 30 TABLET | Refills: 1 | Status: SHIPPED | OUTPATIENT
Start: 2020-05-27 | End: 2020-09-22

## 2020-05-27 NOTE — PROGRESS NOTES
Subjective:       Patient ID: Alexandra Martin is a 24 y.o. female.    Chief Complaint: 2week follow up    HPI   Ms. Martin is a new patient to me. She presents for anxiety follow up. Saw NP Mario via [facility] on 5/13/20--prescribed buspar 5mg BID--has been taking 2-3 tabs at a time--no improvement in anxiety. She is very stressed--fighting for custody of her middle child. She denies depression. She has tried lexapro, prozac, paxil and wellbutrin in the past without improvement in symptoms. She denies thoughts of hurting self or others. She has an appointment with a counselor at [facility] on 6/3    She reports intermittent cough for months--now cough with green mucus production, chest congestion. Denies fever, chills, sweats. Reports daughter recently had bronchitis. Denies smoking. Denies known exposure to Covid 19  Vitals:    05/27/20 1100   BP: 118/76   Pulse: 97   Temp: 98.5 °F (36.9 °C)     Review of Systems   Constitutional: Negative for chills, diaphoresis and fever.   HENT: Negative for facial swelling and trouble swallowing.    Eyes: Negative for discharge and redness.   Respiratory: Positive for cough. Negative for shortness of breath and wheezing.    Cardiovascular: Negative for chest pain and palpitations.   Gastrointestinal: Negative for abdominal pain and diarrhea.   Genitourinary: Negative for difficulty urinating.   Musculoskeletal: Negative for gait problem and myalgias.   Skin: Negative for pallor and rash.   Neurological: Negative for facial asymmetry and speech difficulty.   Psychiatric/Behavioral: Negative for confusion. The patient is nervous/anxious.        Past Medical History:   Diagnosis Date    ADD (attention deficit disorder)     Anxiety disorder     Depression     History of ovarian cyst     Obesity     Substance abuse     Hospitalization     Trauma     not at this time. 9/11/19    Tympanic membrane perforation, left 3/14/2018    UTI (urinary tract infection)  8/3/2018    Vertigo      Objective:      Physical Exam   Constitutional: She is oriented to person, place, and time. No distress.   HENT:   Head: Normocephalic.   Right Ear: Hearing normal.   Left Ear: Hearing normal.   Nose: Nose normal.   Eyes: Conjunctivae and lids are normal.   Neck: No JVD present. No tracheal deviation present.   Cardiovascular: Normal rate and normal heart sounds.   Pulmonary/Chest: Effort normal. She has no wheezes. She has rhonchi.   Abdominal: She exhibits no distension.   Musculoskeletal: She exhibits no deformity.   Neurological: She is alert and oriented to person, place, and time.   Skin: She is not diaphoretic. No pallor.   Psychiatric: Her speech is normal and behavior is normal. Judgment and thought content normal. Her mood appears anxious. Cognition and memory are normal. She does not exhibit a depressed mood.   Nursing note and vitals reviewed.      Assessment:       1. TRACE (generalized anxiety disorder)    2. Lower respiratory infection        Plan:       TRACE (generalized anxiety disorder)  -     citalopram (CELEXA) 10 MG tablet; Take 1 tablet (10 mg total) by mouth once daily.  Dispense: 30 tablet; Refill: 1    Lower respiratory infection  -     doxycycline (VIBRA-TABS) 100 MG tablet; Take 1 tablet (100 mg total) by mouth every 12 (twelve) hours. for 7 days  Dispense: 14 tablet; Refill: 0    discontinue buspar--no improvement with max dose  Start SSRI  Follow up 2 weeks with PCP. Educated on medication safety, return precautions     ADDENDUM: Recommended 2 week follow up with PCP--patient became frustrated with MA and refused scheduling follow up appointment           Medication List with Changes/Refills   New Medications    CITALOPRAM (CELEXA) 10 MG TABLET    Take 1 tablet (10 mg total) by mouth once daily.    DOXYCYCLINE (VIBRA-TABS) 100 MG TABLET    Take 1 tablet (100 mg total) by mouth every 12 (twelve) hours. for 7 days   Current Medications    NEXPLANON 68 MG IMPL  SUBDERMAL DEVICE       Discontinued Medications    BUSPIRONE (BUSPAR) 5 MG TAB    Take 1 tablet (5 mg total) by mouth 2 (two) times daily.    ESCITALOPRAM OXALATE (LEXAPRO) 10 MG TABLET    Take 1 tablet (10 mg total) by mouth once daily.

## 2020-06-02 ENCOUNTER — NURSE TRIAGE (OUTPATIENT)
Dept: ADMINISTRATIVE | Facility: CLINIC | Age: 25
End: 2020-06-02

## 2020-06-02 NOTE — TELEPHONE ENCOUNTER
Spoke with pt: went to [facility] last week and had cough. started on abx. Taking and dayquil/nyquil cough syrup. feeling worse. Coughing frequently- NP now. + subjective. Pt reports feeling SOB at rest. instructed pt to go to Ed for evaluation. Pt states she cannot go. Instructed pt I would send message to [facility] office, but that My recommendation is to go to ED. Pt verbalizes understanding.         Reason for Disposition   MODERATE difficulty breathing (e.g., speaks in phrases, SOB even at rest, pulse 100-120)    Additional Information   Negative: Severe difficulty breathing (e.g., struggling for each breath, speaks in single words)   Negative: Sounds like a life-threatening emergency to the triager    Protocols used: INFECTION ON ANTIBIOTIC FOLLOW-UP CALL-A-

## 2020-06-03 ENCOUNTER — OFFICE VISIT (OUTPATIENT)
Dept: URGENT CARE | Facility: CLINIC | Age: 25
End: 2020-06-03
Payer: MEDICARE

## 2020-06-03 ENCOUNTER — TELEPHONE (OUTPATIENT)
Dept: FAMILY MEDICINE | Facility: CLINIC | Age: 25
End: 2020-06-03

## 2020-06-03 VITALS
HEIGHT: 63 IN | RESPIRATION RATE: 16 BRPM | HEART RATE: 113 BPM | OXYGEN SATURATION: 98 % | BODY MASS INDEX: 44.83 KG/M2 | WEIGHT: 253 LBS | TEMPERATURE: 99 F

## 2020-06-03 DIAGNOSIS — R05.9 COUGH: ICD-10-CM

## 2020-06-03 DIAGNOSIS — R06.02 SOB (SHORTNESS OF BREATH): Primary | ICD-10-CM

## 2020-06-03 DIAGNOSIS — F41.9 ANXIOUSNESS: ICD-10-CM

## 2020-06-03 DIAGNOSIS — F17.200 CURRENT SMOKER: ICD-10-CM

## 2020-06-03 DIAGNOSIS — J06.9 UPPER RESPIRATORY TRACT INFECTION, UNSPECIFIED TYPE: ICD-10-CM

## 2020-06-03 PROCEDURE — 99214 PR OFFICE/OUTPT VISIT, EST, LEVL IV, 30-39 MIN: ICD-10-PCS | Mod: S$GLB,,, | Performed by: NURSE PRACTITIONER

## 2020-06-03 PROCEDURE — U0003 INFECTIOUS AGENT DETECTION BY NUCLEIC ACID (DNA OR RNA); SEVERE ACUTE RESPIRATORY SYNDROME CORONAVIRUS 2 (SARS-COV-2) (CORONAVIRUS DISEASE [COVID-19]), AMPLIFIED PROBE TECHNIQUE, MAKING USE OF HIGH THROUGHPUT TECHNOLOGIES AS DESCRIBED BY CMS-2020-01-R: HCPCS

## 2020-06-03 PROCEDURE — 99214 OFFICE O/P EST MOD 30 MIN: CPT | Mod: S$GLB,,, | Performed by: NURSE PRACTITIONER

## 2020-06-03 RX ORDER — BENZONATATE 200 MG/1
200 CAPSULE ORAL 3 TIMES DAILY PRN
Qty: 30 CAPSULE | Refills: 0 | Status: SHIPPED | OUTPATIENT
Start: 2020-06-03 | End: 2020-06-13

## 2020-06-03 RX ORDER — ALBUTEROL SULFATE 90 UG/1
2 AEROSOL, METERED RESPIRATORY (INHALATION) EVERY 6 HOURS PRN
Qty: 18 G | Refills: 0 | Status: SHIPPED | OUTPATIENT
Start: 2020-06-03 | End: 2020-09-22

## 2020-06-03 RX ORDER — LORATADINE 10 MG/1
10 TABLET ORAL DAILY
Qty: 30 TABLET | Refills: 0 | Status: SHIPPED | OUTPATIENT
Start: 2020-06-03 | End: 2020-09-22

## 2020-06-03 RX ORDER — FLUTICASONE PROPIONATE 50 MCG
1 SPRAY, SUSPENSION (ML) NASAL DAILY
Qty: 16 G | Refills: 0 | Status: SHIPPED | OUTPATIENT
Start: 2020-06-03 | End: 2020-09-22

## 2020-06-03 NOTE — TELEPHONE ENCOUNTER
Spoke with patient, states she is not any better from when she saw Carmen on 5/27. States she has cough and SOB. States nothing is helping the cough. Denies fever. States she has been sick for 2 weeks. Advised patient to go to  for evaluation and possible COVID testing.

## 2020-06-03 NOTE — TELEPHONE ENCOUNTER
----- Message from Rosamaria Arceo sent at 6/3/2020 11:39 AM CDT -----  Contact: Patient  Type: Needs Medical Advice  Who Called:  Patient   Symptoms (please be specific):  Cough and cold  Best Call Back Number:   Additional Information: Calling to schedule office visit for her symptoms. Not giving any available spots. She says she was told to go to the er but she does not wish to.

## 2020-06-03 NOTE — PATIENT INSTRUCTIONS
Follow up with your doctor in a few days.  Return to the urgent care or go to the ER if symptoms get worse.    claritin daily for the next 2 weeks.  flonase nasal spray daily.  Tessalon as needed for cough control.  Albuterol rescue inhaler every 6-8 hours as needed for shortness of breath.    If symptoms worsen- trouble breathing- go to the ER  We will call with results of testing in 1-3 days- self quarantine until results and based on cdc guidelines.

## 2020-06-03 NOTE — PROGRESS NOTES
"Subjective:       Patient ID: Alexandra Martin is a 24 y.o. female.    Vitals:  height is 5' 3" (1.6 m) and weight is 114.8 kg (253 lb). Her temperature is 99.4 °F (37.4 °C). Her pulse is 113 (abnormal). Her respiration is 16 and oxygen saturation is 98%.     Chief Complaint: Cough    Pt presents today with sob, chills for 3 days; cough has been for several weeks- Notes from pcp shows cough present for months. Treated 2 weeks ago with doxycyline. F/u call with pcp yesterday requesting appt for sob, cough- no appts available, presents to . Reports after doxycycline cough improved from wet to dry.  smh- cough, anxiety, unspecified mood disorder  Denies change in smell or taste. Reports congestion and ear fullness. No reported fevers at home. Reports daughter was at home with bronchitis a few weeks back.   Reports not working. Has been out in the community. Denies known exposure to covid. Reports hx of allergies- not currently taking any medication for it. Recently started celexa 2 weeks ago and then stopped.   Reports high stress - reports she is currently in therapy and has an appt this week.     Cough   This is a new problem. The current episode started 1 to 4 weeks ago. The problem has been unchanged. The problem occurs every few minutes. The cough is non-productive. Associated symptoms include chills, ear congestion, headaches, nasal congestion, postnasal drip and shortness of breath. Pertinent negatives include no chest pain, ear pain, eye redness, fever, heartburn, hemoptysis, myalgias, rash, rhinorrhea, sore throat, sweats, weight loss or wheezing. Nothing aggravates the symptoms. Treatments tried: dayquil and nyquil. The treatment provided moderate relief. Her past medical history is significant for bronchitis. There is no history of asthma, bronchiectasis, COPD, emphysema, environmental allergies or pneumonia.       Constitution: Positive for chills. Negative for sweating, fatigue and fever. "   HENT: Positive for postnasal drip. Negative for ear pain, congestion, sinus pain, sinus pressure, sore throat and voice change.    Neck: Negative for painful lymph nodes.   Cardiovascular: Negative for chest pain.   Eyes: Negative for eye redness.   Respiratory: Positive for cough and shortness of breath. Negative for chest tightness, sputum production, bloody sputum, COPD, stridor, wheezing and asthma.    Gastrointestinal: Negative for nausea, vomiting and heartburn.   Musculoskeletal: Negative for muscle ache.   Skin: Negative for rash.   Allergic/Immunologic: Negative for environmental allergies, seasonal allergies and asthma.   Neurological: Positive for headaches.   Hematologic/Lymphatic: Negative for swollen lymph nodes.       Objective:      Physical Exam   Constitutional: She appears well-developed and well-nourished.  Non-toxic appearance. She does not have a sickly appearance. She does not appear ill. No distress.   HENT:   Nose: Mucosal edema and rhinorrhea present.   Eyes: Pupils are equal, round, and reactive to light.   Cardiovascular: Tachycardia present.   Pulses:       Radial pulses are 2+ on the right side, and 2+ on the left side.   Pulmonary/Chest: No stridor. No respiratory distress. She has decreased breath sounds (decreased air movement). She has no wheezes. She has no rhonchi.   Noted mild sob with speaking full sentence   Skin: Skin is not diaphoretic.   Psychiatric: Her speech is normal. Her mood appears anxious. Her affect is not blunt and not inappropriate. She is not agitated and not withdrawn. Thought content is not paranoid. She expresses no homicidal and no suicidal ideation.       Patient was seen with limited PE due to state of emergency for the COVID-19 outbreak.  Assessment:       1. SOB (shortness of breath)    2. Cough    3. Upper respiratory tract infection, unspecified type    4. Anxiousness    5. Current smoker        Plan:     reports she has social support and therapy  scheduled. Advised to f/u with pcp regarding anxiety control after stopping celexa.  Smoking cessation discussed.  ER precautions given.  discussed meets criteria for covid 19 testing- . Advised to self quarantine until results and based on cdc guidelines if symptomatic.  Albuterol, claritin, flonase, tessalon    SOB (shortness of breath)  -     COVID-19 Routine Screening  -     loratadine (CLARITIN) 10 mg tablet; Take 1 tablet (10 mg total) by mouth once daily.  Dispense: 30 tablet; Refill: 0  -     fluticasone propionate (FLONASE) 50 mcg/actuation nasal spray; 1 spray (50 mcg total) by Each Nostril route once daily.  Dispense: 16 g; Refill: 0  -     albuterol (PROVENTIL/VENTOLIN HFA) 90 mcg/actuation inhaler; Inhale 2 puffs into the lungs every 6 (six) hours as needed for Shortness of Breath. Rescue  Dispense: 18 g; Refill: 0    Cough  -     COVID-19 Routine Screening  -     loratadine (CLARITIN) 10 mg tablet; Take 1 tablet (10 mg total) by mouth once daily.  Dispense: 30 tablet; Refill: 0  -     fluticasone propionate (FLONASE) 50 mcg/actuation nasal spray; 1 spray (50 mcg total) by Each Nostril route once daily.  Dispense: 16 g; Refill: 0  -     albuterol (PROVENTIL/VENTOLIN HFA) 90 mcg/actuation inhaler; Inhale 2 puffs into the lungs every 6 (six) hours as needed for Shortness of Breath. Rescue  Dispense: 18 g; Refill: 0  -     benzonatate (TESSALON) 200 MG capsule; Take 1 capsule (200 mg total) by mouth 3 (three) times daily as needed.  Dispense: 30 capsule; Refill: 0    Upper respiratory tract infection, unspecified type    Anxiousness    Current smoker      Patient Instructions   Follow up with your doctor in a few days.  Return to the urgent care or go to the ER if symptoms get worse.    claritin daily for the next 2 weeks.  flonase nasal spray daily.  Tessalon as needed for cough control.  Albuterol rescue inhaler every 6-8 hours as needed for shortness of breath.    If symptoms worsen- trouble breathing-  go to the ER  We will call with results of testing in 1-3 days- self quarantine until results and based on cdc guidelines.

## 2020-06-05 LAB — SARS-COV-2 RNA RESP QL NAA+PROBE: NOT DETECTED

## 2020-06-06 ENCOUNTER — TELEPHONE (OUTPATIENT)
Dept: URGENT CARE | Facility: CLINIC | Age: 25
End: 2020-06-06

## 2020-07-17 ENCOUNTER — TELEPHONE (OUTPATIENT)
Dept: FAMILY MEDICINE | Facility: CLINIC | Age: 25
End: 2020-07-17

## 2020-07-17 NOTE — TELEPHONE ENCOUNTER
----- Message from Mary Berry sent at 7/17/2020  8:32 AM CDT -----  Regarding: needs same day appt/medicaid  Contact: Alexandra pt  Type:  Same Day Appointment Request    Caller is requesting a same day appointment.  Caller declined first available appointment listed below.      Name of Caller:  Alexandra  When is the first available appointment?  Can't schedule due to medicaid  Symptoms:  pains in her lower back, nauseated  Best Call Back Number:  [phone]  Additional Information:   pt will see NP

## 2020-07-22 ENCOUNTER — TELEPHONE (OUTPATIENT)
Dept: UROLOGY | Facility: CLINIC | Age: 25
End: 2020-07-22

## 2020-07-22 NOTE — TELEPHONE ENCOUNTER
----- Message from Javier PAT Fricoty sent at 7/22/2020  2:53 PM CDT -----  Contact: patient  Patient called in and stated she had sent a previous message about having a kidney infection & is getting worse.  Patient stated Dr. Medley put stints in when patient went to hospital.  Patient stated she never saw Dr. Medley in the office.  Patient is currently on antibiotics the hospital gave her on Saturday 7/18/2020 but is not any better.    Patient would like a call back at [phone]

## 2020-07-22 NOTE — TELEPHONE ENCOUNTER
Advised no appts available and cannot find any appts in family medicine for today. Suggested ED. Reviewed recent ED records from [place], [place] says no stones, no infections grown on culture.

## 2020-07-22 NOTE — TELEPHONE ENCOUNTER
----- Message from Tamra Macdonald sent at 7/22/2020 11:53 AM CDT -----  Type:  Sooner Apoointment Request    Caller is requesting a sooner appointment.  Caller declined first available appointment listed below.  Caller will not accept being placed on the waitlist and is requesting a message be sent to doctor.    Name of Caller:  Alexandra  When is the first available appointment?  8/18  Symptoms:  Fever, nausea, vomiting, pain in left flank.  Seen in [facility] on 7/18  Best Call Back Number:  [phone]  Additional Information:  Thank you!

## 2020-07-29 ENCOUNTER — TELEPHONE (OUTPATIENT)
Dept: FAMILY MEDICINE | Facility: CLINIC | Age: 25
End: 2020-07-29

## 2020-07-29 ENCOUNTER — OFFICE VISIT (OUTPATIENT)
Dept: FAMILY MEDICINE | Facility: CLINIC | Age: 25
End: 2020-07-29
Payer: MEDICAID

## 2020-07-29 VITALS
BODY MASS INDEX: 46.25 KG/M2 | TEMPERATURE: 98 F | HEART RATE: 72 BPM | DIASTOLIC BLOOD PRESSURE: 80 MMHG | OXYGEN SATURATION: 98 % | SYSTOLIC BLOOD PRESSURE: 110 MMHG | HEIGHT: 63 IN | WEIGHT: 261 LBS

## 2020-07-29 DIAGNOSIS — R53.83 FATIGUE, UNSPECIFIED TYPE: Primary | ICD-10-CM

## 2020-07-29 DIAGNOSIS — E16.2 LOW BLOOD SUGAR READING: ICD-10-CM

## 2020-07-29 LAB
BILIRUB UR QL STRIP: NEGATIVE
CLARITY UR: CLEAR
COLOR UR: YELLOW
GLUCOSE UR QL STRIP: NEGATIVE
HGB UR QL STRIP: NEGATIVE
KETONES UR QL STRIP: NEGATIVE
LEUKOCYTE ESTERASE UR QL STRIP: NEGATIVE
NITRITE UR QL STRIP: NEGATIVE
PH UR STRIP: 7 [PH] (ref 5–8)
PROT UR QL STRIP: NEGATIVE
SP GR UR STRIP: 1.02 (ref 1–1.03)
URN SPEC COLLECT METH UR: NORMAL

## 2020-07-29 PROCEDURE — 99214 OFFICE O/P EST MOD 30 MIN: CPT | Mod: PBBFAC,PO | Performed by: NURSE PRACTITIONER

## 2020-07-29 PROCEDURE — 99999 PR PBB SHADOW E&M-EST. PATIENT-LVL IV: CPT | Mod: PBBFAC,,, | Performed by: NURSE PRACTITIONER

## 2020-07-29 PROCEDURE — 99214 PR OFFICE/OUTPT VISIT, EST, LEVL IV, 30-39 MIN: ICD-10-PCS | Mod: S$PBB,,, | Performed by: NURSE PRACTITIONER

## 2020-07-29 PROCEDURE — 81003 URINALYSIS AUTO W/O SCOPE: CPT | Mod: PO

## 2020-07-29 PROCEDURE — 99999 PR PBB SHADOW E&M-EST. PATIENT-LVL IV: ICD-10-PCS | Mod: PBBFAC,,, | Performed by: NURSE PRACTITIONER

## 2020-07-29 PROCEDURE — 99214 OFFICE O/P EST MOD 30 MIN: CPT | Mod: S$PBB,,, | Performed by: NURSE PRACTITIONER

## 2020-07-29 RX ORDER — NITROFURANTOIN 25; 75 MG/1; MG/1
CAPSULE ORAL
COMMUNITY
Start: 2020-07-18 | End: 2020-09-11

## 2020-07-29 RX ORDER — AMOXICILLIN 500 MG/1
CAPSULE ORAL
COMMUNITY
Start: 2020-06-16 | End: 2020-11-12

## 2020-07-29 RX ORDER — BUSPIRONE HYDROCHLORIDE 15 MG/1
15 TABLET ORAL
COMMUNITY
End: 2020-11-12

## 2020-07-29 NOTE — TELEPHONE ENCOUNTER
----- Message from Gena Rodrigez sent at 7/29/2020  8:40 AM CDT -----  Regarding: sam day apt  Contact: patient  Type:  Same Day Appointment Request    Caller is requesting a same day appointment.  Caller declined first available appointment listed below.      Name of Caller:  patient  When is the first available appointment?  ?  Symptoms:  low blood sugar last two weeks not feeling well  Best Call Back Number:  [phone]  Additional Information:   Due to Epic I was not able to schedule same day apt.  Please call to advise.  Thanks!

## 2020-07-29 NOTE — PROGRESS NOTES
Subjective:       Patient ID: Alexandra Martin is a 24 y.o. female.    Chief Complaint: Hypoglycemia    Patient who is new to me presents for low blood glucose levels. She checked her blood sugar level the day before yesterday and her blood glucose was low, unsure of the level. Her aunt is diabetic and that is how she was able to check her sugar. For 2 weeks she has been urinating frequently, eating more especially at night, feeling fatigued, and gaining weight. She is unsure what makes it better or worse.     Review of Systems   Constitutional: Positive for appetite change, fatigue and unexpected weight change. Negative for chills and fever.   HENT: Negative for congestion, sinus pressure, sinus pain, sneezing and sore throat.    Respiratory: Negative for cough, chest tightness, shortness of breath and wheezing.    Cardiovascular: Negative for chest pain, palpitations and leg swelling.   Gastrointestinal: Negative for abdominal distention, abdominal pain, constipation, diarrhea, nausea and vomiting.   Endocrine: Positive for polyphagia and polyuria.   Genitourinary: Negative for decreased urine volume, difficulty urinating, dysuria, frequency and urgency.   Musculoskeletal: Negative for arthralgias, gait problem, joint swelling and myalgias.   Skin: Negative for rash and wound.   Neurological: Negative for dizziness, light-headedness, numbness and headaches.       Objective:      Physical Exam  Vitals signs and nursing note reviewed.   Constitutional:       Appearance: She is well-developed. She is obese. She is not ill-appearing or diaphoretic.   HENT:      Head: Normocephalic and atraumatic.      Right Ear: External ear normal.      Left Ear: External ear normal.      Nose: Nose normal.   Eyes:      Pupils: Pupils are equal, round, and reactive to light.   Neck:      Musculoskeletal: Normal range of motion.   Cardiovascular:      Rate and Rhythm: Normal rate and regular rhythm.      Heart sounds: Normal  heart sounds.   Pulmonary:      Effort: Pulmonary effort is normal.      Breath sounds: Normal breath sounds.   Abdominal:      General: Bowel sounds are normal.      Palpations: Abdomen is soft.   Musculoskeletal: Normal range of motion.   Skin:     General: Skin is warm and dry.   Neurological:      Mental Status: She is alert and oriented to person, place, and time.         Assessment:       1. Fatigue, unspecified type    2. Low blood sugar reading        Plan:       Alexandra was seen today for hypoglycemia.    Diagnoses and all orders for this visit:    Fatigue, unspecified type  -     CBC auto differential; Future  -     Comprehensive metabolic panel; Future  -     Hemoglobin A1C; Future  -     TSH; Future  -     Urinalysis, Reflex to Urine Culture Urine, Clean Catch    Low blood sugar reading  -     CBC auto differential; Future  -     Comprehensive metabolic panel; Future  -     Hemoglobin A1C; Future  -     TSH; Future  -     Urinalysis, Reflex to Urine Culture Urine, Clean Catch      Will follow up on labs. Discussed worsening signs/symptoms and when to return to clinic or go to ED.   Patient expresses understanding and agrees with treatment plan.

## 2020-08-05 ENCOUNTER — OFFICE VISIT (OUTPATIENT)
Dept: URGENT CARE | Facility: CLINIC | Age: 25
End: 2020-08-05
Payer: MEDICAID

## 2020-08-05 VITALS
SYSTOLIC BLOOD PRESSURE: 126 MMHG | WEIGHT: 261 LBS | HEIGHT: 63 IN | DIASTOLIC BLOOD PRESSURE: 86 MMHG | HEART RATE: 72 BPM | OXYGEN SATURATION: 98 % | BODY MASS INDEX: 46.25 KG/M2 | RESPIRATION RATE: 16 BRPM | TEMPERATURE: 98 F

## 2020-08-05 DIAGNOSIS — B34.9 NONSPECIFIC SYNDROME SUGGESTIVE OF VIRAL ILLNESS: Primary | ICD-10-CM

## 2020-08-05 PROCEDURE — U0003 INFECTIOUS AGENT DETECTION BY NUCLEIC ACID (DNA OR RNA); SEVERE ACUTE RESPIRATORY SYNDROME CORONAVIRUS 2 (SARS-COV-2) (CORONAVIRUS DISEASE [COVID-19]), AMPLIFIED PROBE TECHNIQUE, MAKING USE OF HIGH THROUGHPUT TECHNOLOGIES AS DESCRIBED BY CMS-2020-01-R: HCPCS

## 2020-08-05 PROCEDURE — 99214 OFFICE O/P EST MOD 30 MIN: CPT | Mod: S$GLB,,, | Performed by: PHYSICIAN ASSISTANT

## 2020-08-05 PROCEDURE — 99214 PR OFFICE/OUTPT VISIT, EST, LEVL IV, 30-39 MIN: ICD-10-PCS | Mod: S$GLB,,, | Performed by: PHYSICIAN ASSISTANT

## 2020-08-05 RX ORDER — ONDANSETRON 4 MG/1
4 TABLET, ORALLY DISINTEGRATING ORAL EVERY 8 HOURS PRN
Qty: 15 TABLET | Refills: 0 | Status: SHIPPED | OUTPATIENT
Start: 2020-08-05 | End: 2020-08-10

## 2020-08-05 RX ORDER — AZELASTINE 1 MG/ML
1 SPRAY, METERED NASAL 2 TIMES DAILY
Qty: 30 ML | Refills: 0 | Status: SHIPPED | OUTPATIENT
Start: 2020-08-05 | End: 2020-11-12

## 2020-08-05 RX ORDER — BENZONATATE 100 MG/1
100 CAPSULE ORAL EVERY 6 HOURS PRN
Qty: 28 CAPSULE | Refills: 0 | Status: SHIPPED | OUTPATIENT
Start: 2020-08-05 | End: 2020-08-12

## 2020-08-05 RX ORDER — LOPERAMIDE HYDROCHLORIDE 2 MG/1
2 CAPSULE ORAL 4 TIMES DAILY PRN
Qty: 21 CAPSULE | Refills: 0 | Status: SHIPPED | OUTPATIENT
Start: 2020-08-05 | End: 2020-08-10

## 2020-08-05 RX ORDER — PROMETHAZINE HYDROCHLORIDE AND DEXTROMETHORPHAN HYDROBROMIDE 6.25; 15 MG/5ML; MG/5ML
5 SYRUP ORAL
Qty: 118 ML | Refills: 0 | Status: SHIPPED | OUTPATIENT
Start: 2020-08-05 | End: 2020-08-12

## 2020-08-05 NOTE — PATIENT INSTRUCTIONS
"· Follow up with your primary care if symptoms do not improve, or you may return here at any time.  · If you were referred to a specialist, please follow up with that specialty.  · If you were prescribed antibiotics, please take them to completion.  · If you were prescribed a narcotic or any medication with sedative effects, do not drive or operate heavy equipment or machinery while taking these medications.  · You must understand that you have received treatment at an Urgent Care facility only, and that you may be released before all of your medical problems are known or treated. Urgent Care facilities are not equipped to handle life threatening emergencies. It is recommended that you seek care at an Emergency Department for further evaluation of worsening or concerning symptoms, or possibly life threatening conditions as discussed.                                        If you  smoke, please stop smoking               PLEASE PRACTICE SOCIAL DISTANCING          Over the counter and home treatment of symptoms:  Alternate tylenol and motrin every 3 hours  Salt water gargles  Cold-eeze helps to reduce the duration of sore throat symptoms  Cepachol helps to numb the discomfort  Chloroseptic spray  Nasal saline spray reduces inflammation and dryness  Warm face compresses as often as you can  Vicks vapor rub at night  Flonase OTC or Nasacort OTC  Simple foods like chicken noodle soup help  Pedialyte helps with dehydration if lacking appetite  Rest as much as you can        Viral Syndrome (Adult)  A viral illness may cause a number of symptoms. The symptoms depend on the part of the body that the virus affects. If it settles in your nose, throat, and lungs, it may cause cough, sore throat, congestion, and sometimes headache. If it settles in your stomach and intestinal tract, it may cause vomiting and diarrhea. Sometimes it causes vague symptoms like "aching all over," feeling tired, loss of appetite, or fever.  A viral " illness usually lasts 1 to 2 weeks, but sometimes it lasts longer. In some cases, a more serious infection can look like a viral syndrome in the first few days of the illness. You may need another exam and additional tests to know the difference. Watch for the warning signs listed below.  Home care  Follow these guidelines for taking care of yourself at home:  · If symptoms are severe, rest at home for the first 2 to 3 days.  · Stay away from cigarette smoke - both your smoke and the smoke from others.  · You may use over-the-counter acetaminophen or ibuprofen for fever, muscle aching, and headache, unless another medicine was prescribed for this. If you have chronic liver or kidney disease or ever had a stomach ulcer or GI bleeding, talk with your doctor before using these medicines. No one who is younger than 18 and ill with a fever should take aspirin. It may cause severe disease or death.  · Your appetite may be poor, so a light diet is fine. Avoid dehydration by drinking 8 to 12 8-ounce glasses of fluids each day. This may include water; orange juice; lemonade; apple, grape, and cranberry juice; clear fruit drinks; electrolyte replacement and sports drinks; and decaffeinated teas and coffee. If you have been diagnosed with a kidney disease, ask your doctor how much and what types of fluids you should drink to prevent dehydration. If you have kidney disease, drinking too much fluid can cause it build up in the your body and be dangerous to your health.  · Over-the-counter remedies won't shorten the length of the illness but may be helpful for cough, sore throat; and nasal and sinus congestion. Don't use decongestants if you have high blood pressure.  Follow-up care  Follow up with your healthcare provider if you do not improve over the next week.  Call 911  Get emergency medical care if any of the following occur:  · Convulsion  · Feeling weak, dizzy, or like you are going to faint  · Chest pain, shortness of  breath, wheezing, or difficulty breathing  When to seek medical advice  Call your healthcare provider right away if any of these occur:  · Cough with lots of colored sputum (mucus) or blood in your sputum  · Chest pain, shortness of breath, wheezing, or difficulty breathing  · Severe headache; face, neck, or ear pain  · Severe, constant pain in the lower right side of your belly (abdominal)  · Continued vomiting (cant keep liquids down)  · Frequent diarrhea (more than 5 times a day); blood (red or black color) or mucus in diarrhea  · Feeling weak, dizzy, or like you are going to faint  · Extreme thirst  · Fever of 100.4°F (38°C) or higher, or as directed by your healthcare provider  Date Last Reviewed: 9/25/2015 © 2000-2017 [employer]. [address] [place] [address], [place] [address]. All rights reserved. This information is not intended as a substitute for professional medical care. Always follow your healthcare professional's instructions.

## 2020-08-05 NOTE — PROGRESS NOTES
Amanda Bettencourt   1/18/2017 8:00 AM   Office Visit    Dept Phone:  [phone]   Encounter #:  [id number]    Provider:  PAPA Rudolph   Department:  [facility] PAIN MANAGEMENT                Your Full Care Plan              Where to Get Your Medications      You can get these medications from any pharmacy     Bring a paper prescription for each of these medications     HYDROcodone-acetaminophen 7.5-325 MG per tablet            Your Updated Medication List          This list is accurate as of: 1/18/17  8:18 AM.  Always use your most recent med list.                aspirin 81 MG tablet       atorvastatin 80 MG tablet   Commonly known as:  LIPITOR       cefdinir 300 MG capsule   Commonly known as:  OMNICEF       cholecalciferol 99307 UNITS capsule   Commonly known as:  VITAMIN D3       ezetimibe 10 MG tablet   Commonly known as:  ZETIA       FLUoxetine 20 MG capsule   Commonly known as:  PROzac       gabapentin 600 MG tablet   Commonly known as:  NEURONTIN       HYDROcodone-acetaminophen 7.5-325 MG per tablet   Commonly known as:  NORCO   Take 1 tablet by mouth Every 6 (Six) Hours As Needed (pain).       lisinopril-hydrochlorothiazide 20-12.5 MG per tablet   Commonly known as:  PRINZIDE,ZESTORETIC       metoclopramide 10 MG tablet   Commonly known as:  REGLAN   Take 1 tablet by mouth 3 (Three) Times a Day.       minocycline 100 MG capsule   Commonly known as:  MINOCIN,DYNACIN       pantoprazole 40 MG EC tablet   Commonly known as:  PROTONIX   Take 1 tablet every morning before breakfast       PredniSONE 10 MG (21) tablet pack   Commonly known as:  DELTASONE       SITagliptin-MetFORMIN HCl -1000 MG tablet sustained-release 24 hour       Vitamin B Complex tablet               You Were Diagnosed With        Codes Comments    Other chronic pain    -  Primary ICD-10-CM: G89.29  ICD-9-CM: 338.29     Chronic pain of both knees     ICD-10-CM: M25.561, M25.562,  "Subjective:       Patient ID: Alexandra Martin is a 24 y.o. female.    Vitals:  height is 5' 3" (1.6 m) and weight is 118.4 kg (261 lb). Her oral temperature is 97.5 °F (36.4 °C). Her blood pressure is 126/86 and her pulse is 72. Her respiration is 16 and oxygen saturation is 98%.     Chief Complaint: Fatigue    Pt presents to urgent care today with fatigue, nausea, diarrhea, sore throat, cough, and she has been having a hard time breathing.   She states that she has been exposed to COVID. Symptoms started 4 days ago.     Fatigue  This is a new problem. The current episode started in the past 7 days. The problem occurs constantly. The problem has been gradually worsening. Associated symptoms include coughing, fatigue, nausea and a sore throat. Pertinent negatives include no arthralgias, chest pain, chills, congestion, fever, headaches, joint swelling, myalgias, rash, vertigo, vomiting or weakness. Associated symptoms comments: diarrhea. Nothing aggravates the symptoms. She has tried nothing for the symptoms. The treatment provided no relief.       Constitution: Positive for fatigue. Negative for chills and fever.   HENT: Positive for sore throat. Negative for congestion.    Neck: Negative for painful lymph nodes.   Cardiovascular: Negative for chest pain and leg swelling.   Eyes: Negative for double vision and blurred vision.   Respiratory: Positive for cough. Negative for shortness of breath.    Gastrointestinal: Positive for nausea and diarrhea. Negative for vomiting.   Genitourinary: Negative for dysuria, frequency, urgency and history of kidney stones.   Musculoskeletal: Negative for joint pain, joint swelling, muscle cramps and muscle ache.   Skin: Negative for color change, pale, rash and bruising.   Allergic/Immunologic: Negative for seasonal allergies.   Neurological: Negative for dizziness, history of vertigo, light-headedness, passing out and headaches.   Hematologic/Lymphatic: Negative for " "G89.29  ICD-9-CM: 719.46, 338.29     Primary osteoarthritis of both knees     ICD-10-CM: M17.0  ICD-9-CM: 715.16     Encounter for long-term current use of high risk medication     ICD-10-CM: Z79.899  ICD-9-CM: V58.69       Instructions     None    Patient Instructions History      Upcoming Appointments     Visit Type Date Time Department    OFFICE VISIT 2017  8:00 AM [facility]      iTOK Signup     [facility] iTOK allows you to send messages to your doctor, view your test results, renew your prescriptions, schedule appointments, and more. To sign up, go to [employer] and click on the Sign Up Now link in the New User? box. Enter your iTOK Activation Code exactly as it appears below along with the last four digits of your Social Security Number and your Date of Birth () to complete the sign-up process. If you do not sign up before the expiration date, you must request a new code.    iTOK Activation Code: [email or web address]  Expires: 2017  8:18 AM    If you have questions, you can email [email or web address] or call [phone] to talk to our iTOK staff. Remember, iTOK is NOT to be used for urgent needs. For medical emergencies, dial 911.               Other Info from Your Visit           Allergies     No Known Allergies      Reason for Visit     Pain           Vital Signs     Blood Pressure Pulse Temperature Respirations Height Weight    152/100 102 97.8 °F (36.6 °C) 16 66\" (167.6 cm) 206 lb 9.6 oz (93.7 kg)    Oxygen Saturation Body Mass Index Smoking Status             100% 33.35 kg/m2 Current Every Day Smoker         Problems and Diagnoses Noted     Bilateral knee pain    Chronic pain    Encounter for long-term current use of high risk medication    Degenerative arthritis of knee        " swollen lymph nodes.   Psychiatric/Behavioral: Negative for nervous/anxious, sleep disturbance and depression. The patient is not nervous/anxious.        Objective:      Physical Exam   Constitutional: She is oriented to person, place, and time. She appears well-developed. She is cooperative.  Non-toxic appearance. She does not appear ill. No distress.   HENT:   Head: Normocephalic and atraumatic.   Ears:   Right Ear: Hearing, tympanic membrane, external ear and ear canal normal.   Left Ear: Hearing, tympanic membrane, external ear and ear canal normal.   Nose: Nose normal. No mucosal edema, rhinorrhea or nasal deformity. No epistaxis. Right sinus exhibits no maxillary sinus tenderness and no frontal sinus tenderness. Left sinus exhibits no maxillary sinus tenderness and no frontal sinus tenderness.   Mouth/Throat: Uvula is midline, oropharynx is clear and moist and mucous membranes are normal. No trismus in the jaw. Normal dentition. No uvula swelling. No oropharyngeal exudate, posterior oropharyngeal edema or posterior oropharyngeal erythema.       Eyes: Conjunctivae and lids are normal. No scleral icterus.   Neck: Trachea normal, full passive range of motion without pain and phonation normal. Neck supple. No neck rigidity. No edema and no erythema present.   Cardiovascular: Normal rate, regular rhythm, normal heart sounds and normal pulses.   Pulmonary/Chest: Effort normal and breath sounds normal. No respiratory distress. She has no decreased breath sounds. She has no wheezes. She has no rhonchi. She has no rales.   Frequent cough. No abnormalities on ausculation. No increased wob.    Comments: Frequent cough. No abnormalities on ausculation. No increased wob.    Abdominal: Soft. Normal appearance and bowel sounds are normal. She exhibits no distension, no abdominal bruit, no pulsatile midline mass and no mass. There is no abdominal tenderness.   Musculoskeletal: Normal range of motion.         General: No  deformity.   Neurological: She is alert and oriented to person, place, and time. She has normal strength. She exhibits normal muscle tone. Coordination normal.   Skin: Skin is warm, dry, intact, not diaphoretic and not pale. Psychiatric: Her speech is normal and behavior is normal. Judgment and thought content normal.   Nursing note and vitals reviewed.        Assessment:       1. Nonspecific syndrome suggestive of viral illness        Plan:       All hx was provided by the pt or available as part of established EMR. The pt past medical hx, family hx, social hx, and current medications were reviewed. Pt to follow up with [facility] if no improvement or for any concern, and seek treatment in an ER for worsening. Tx options discussed. Pt voiced understanding of all discussed, and agreed with decision making.    Nonspecific syndrome suggestive of viral illness  -     COVID-19 Routine Screening  -     promethazine-dextromethorphan (PROMETHAZINE-DM) 6.25-15 mg/5 mL Syrp; Take 5 mLs by mouth every 4 to 6 hours as needed.  Dispense: 118 mL; Refill: 0  -     benzonatate (TESSALON PERLES) 100 MG capsule; Take 1 capsule (100 mg total) by mouth every 6 (six) hours as needed for Cough.  Dispense: 28 capsule; Refill: 0  -     azelastine (ASTELIN) 137 mcg (0.1 %) nasal spray; 1 spray (137 mcg total) by Nasal route 2 (two) times daily.  Dispense: 30 mL; Refill: 0  -     loperamide (IMODIUM) 2 mg capsule; Take 1 capsule (2 mg total) by mouth 4 (four) times daily as needed.  Dispense: 21 capsule; Refill: 0  -     ondansetron (ZOFRAN-ODT) 4 MG TbDL; Take 1 tablet (4 mg total) by mouth every 8 (eight) hours as needed.  Dispense: 15 tablet; Refill: 0      Patient Instructions   · Follow up with your primary care if symptoms do not improve, or you may return here at any time.  · If you were referred to a specialist, please follow up with that specialty.  · If you were prescribed antibiotics, please take them to completion.  · If you were  "prescribed a narcotic or any medication with sedative effects, do not drive or operate heavy equipment or machinery while taking these medications.  · You must understand that you have received treatment at an Urgent Care facility only, and that you may be released before all of your medical problems are known or treated. Urgent Care facilities are not equipped to handle life threatening emergencies. It is recommended that you seek care at an Emergency Department for further evaluation of worsening or concerning symptoms, or possibly life threatening conditions as discussed.                                        If you  smoke, please stop smoking               PLEASE PRACTICE SOCIAL DISTANCING          Over the counter and home treatment of symptoms:  Alternate tylenol and motrin every 3 hours  Salt water gargles  Cold-eeze helps to reduce the duration of sore throat symptoms  Cepachol helps to numb the discomfort  Chloroseptic spray  Nasal saline spray reduces inflammation and dryness  Warm face compresses as often as you can  Vicks vapor rub at night  Flonase OTC or Nasacort OTC  Simple foods like chicken noodle soup help  Pedialyte helps with dehydration if lacking appetite  Rest as much as you can        Viral Syndrome (Adult)  A viral illness may cause a number of symptoms. The symptoms depend on the part of the body that the virus affects. If it settles in your nose, throat, and lungs, it may cause cough, sore throat, congestion, and sometimes headache. If it settles in your stomach and intestinal tract, it may cause vomiting and diarrhea. Sometimes it causes vague symptoms like "aching all over," feeling tired, loss of appetite, or fever.  A viral illness usually lasts 1 to 2 weeks, but sometimes it lasts longer. In some cases, a more serious infection can look like a viral syndrome in the first few days of the illness. You may need another exam and additional tests to know the difference. Watch for the " warning signs listed below.  Home care  Follow these guidelines for taking care of yourself at home:  · If symptoms are severe, rest at home for the first 2 to 3 days.  · Stay away from cigarette smoke - both your smoke and the smoke from others.  · You may use over-the-counter acetaminophen or ibuprofen for fever, muscle aching, and headache, unless another medicine was prescribed for this. If you have chronic liver or kidney disease or ever had a stomach ulcer or GI bleeding, talk with your doctor before using these medicines. No one who is younger than 18 and ill with a fever should take aspirin. It may cause severe disease or death.  · Your appetite may be poor, so a light diet is fine. Avoid dehydration by drinking 8 to 12 8-ounce glasses of fluids each day. This may include water; orange juice; lemonade; apple, grape, and cranberry juice; clear fruit drinks; electrolyte replacement and sports drinks; and decaffeinated teas and coffee. If you have been diagnosed with a kidney disease, ask your doctor how much and what types of fluids you should drink to prevent dehydration. If you have kidney disease, drinking too much fluid can cause it build up in the your body and be dangerous to your health.  · Over-the-counter remedies won't shorten the length of the illness but may be helpful for cough, sore throat; and nasal and sinus congestion. Don't use decongestants if you have high blood pressure.  Follow-up care  Follow up with your healthcare provider if you do not improve over the next week.  Call 911  Get emergency medical care if any of the following occur:  · Convulsion  · Feeling weak, dizzy, or like you are going to faint  · Chest pain, shortness of breath, wheezing, or difficulty breathing  When to seek medical advice  Call your healthcare provider right away if any of these occur:  · Cough with lots of colored sputum (mucus) or blood in your sputum  · Chest pain, shortness of breath, wheezing, or  difficulty breathing  · Severe headache; face, neck, or ear pain  · Severe, constant pain in the lower right side of your belly (abdominal)  · Continued vomiting (cant keep liquids down)  · Frequent diarrhea (more than 5 times a day); blood (red or black color) or mucus in diarrhea  · Feeling weak, dizzy, or like you are going to faint  · Extreme thirst  · Fever of 100.4°F (38°C) or higher, or as directed by your healthcare provider  Date Last Reviewed: 9/25/2015 © 2000-2017 [employer]. [address] [place] [id number]. All rights reserved. This information is not intended as a substitute for professional medical care. Always follow your healthcare professional's instructions.

## 2020-08-06 ENCOUNTER — TELEPHONE (OUTPATIENT)
Dept: URGENT CARE | Facility: CLINIC | Age: 25
End: 2020-08-06

## 2020-08-06 LAB — SARS-COV-2 RNA RESP QL NAA+PROBE: NOT DETECTED

## 2020-08-06 NOTE — TELEPHONE ENCOUNTER
Called patient regarding her COVID-19 (NOT DETECTED) results. Advised patient to continue CDC precautions especially if symptomatic and/or if there was a known exposure as the test could be a false negative result because our test is currently only about 80% accurate. Advised patient to practice social distancing and to wear a mask if in close contact and in public to limit any future exposure. ER precautions given to patient. Patient aware and verbalized understanding.

## 2020-09-10 ENCOUNTER — TELEPHONE (OUTPATIENT)
Dept: FAMILY MEDICINE | Facility: CLINIC | Age: 25
End: 2020-09-10

## 2020-09-10 NOTE — TELEPHONE ENCOUNTER
----- Message from Marilee vIey sent at 9/10/2020 12:53 PM CDT -----  Pt is asking for a call to reschedule the appointment that was set today [phone]

## 2020-09-11 ENCOUNTER — OFFICE VISIT (OUTPATIENT)
Dept: FAMILY MEDICINE | Facility: CLINIC | Age: 25
End: 2020-09-11
Payer: MEDICAID

## 2020-09-11 VITALS
SYSTOLIC BLOOD PRESSURE: 120 MMHG | HEART RATE: 70 BPM | WEIGHT: 266.31 LBS | DIASTOLIC BLOOD PRESSURE: 62 MMHG | BODY MASS INDEX: 47.18 KG/M2 | OXYGEN SATURATION: 98 %

## 2020-09-11 DIAGNOSIS — N30.00 ACUTE CYSTITIS WITHOUT HEMATURIA: Primary | ICD-10-CM

## 2020-09-11 DIAGNOSIS — R10.2 PELVIC PRESSURE IN FEMALE: ICD-10-CM

## 2020-09-11 DIAGNOSIS — R30.0 DYSURIA: ICD-10-CM

## 2020-09-11 PROCEDURE — 99999 PR PBB SHADOW E&M-EST. PATIENT-LVL IV: CPT | Mod: PBBFAC,,, | Performed by: NURSE PRACTITIONER

## 2020-09-11 PROCEDURE — 99999 PR PBB SHADOW E&M-EST. PATIENT-LVL IV: ICD-10-PCS | Mod: PBBFAC,,, | Performed by: NURSE PRACTITIONER

## 2020-09-11 PROCEDURE — 99214 OFFICE O/P EST MOD 30 MIN: CPT | Mod: PBBFAC,PO | Performed by: NURSE PRACTITIONER

## 2020-09-11 PROCEDURE — 99214 OFFICE O/P EST MOD 30 MIN: CPT | Mod: S$PBB,,, | Performed by: NURSE PRACTITIONER

## 2020-09-11 PROCEDURE — 87086 URINE CULTURE/COLONY COUNT: CPT

## 2020-09-11 PROCEDURE — 99214 PR OFFICE/OUTPT VISIT, EST, LEVL IV, 30-39 MIN: ICD-10-PCS | Mod: S$PBB,,, | Performed by: NURSE PRACTITIONER

## 2020-09-11 RX ORDER — NITROFURANTOIN 25; 75 MG/1; MG/1
100 CAPSULE ORAL 2 TIMES DAILY
Qty: 14 CAPSULE | Refills: 0 | Status: SHIPPED | OUTPATIENT
Start: 2020-09-11 | End: 2020-09-18

## 2020-09-11 NOTE — PROGRESS NOTES
Subjective:       Patient ID: Alexandra Martin is a 24 y.o. female.    Chief Complaint: Pelvic Discomfort, Bladder Pain, Abdominal Cramping, and Hematuria    Patient who is known to me presents for pelvic pain. Does have a nexplanon and noticed some brown spotting recently. Has not had a cycle since April. She is sexually active and denies any new partners or discharge. She declines STD testing at this time. She states she has frequent UTIs, history of ovarian cysts, and a history of kidney stones. She denies any fever, n/v, chills, or chest pain.     Pelvic Pain  The patient's primary symptoms include pelvic pain and vaginal bleeding. The patient's pertinent negatives include no genital itching, genital lesions or vaginal discharge. This is a new problem. The current episode started in the past 7 days. The problem occurs daily. The problem has been waxing and waning. She is not pregnant. Associated symptoms include back pain, dysuria, flank pain and hematuria (one episode of dark red blood in urine 2 days ago). Pertinent negatives include no abdominal pain, chills, constipation, diarrhea, fever, frequency, headaches, nausea, rash, sore throat, urgency or vomiting. The vaginal discharge was brown and bloody. The vaginal bleeding is spotting. She has not been passing clots. She has not been passing tissue. The symptoms are aggravated by intercourse. She has tried nothing for the symptoms. She is sexually active. Contraceptive use: nexplanon. Menstrual history: april was last cycle  Her past medical history is significant for an STD. There is no history of an abdominal surgery, a  section or menorrhagia.     Review of Systems   Constitutional: Negative for chills, fatigue and fever.   HENT: Negative for congestion, sinus pressure, sinus pain, sneezing and sore throat.    Respiratory: Negative for cough, chest tightness, shortness of breath and wheezing.    Cardiovascular: Negative for chest pain,  palpitations and leg swelling.   Gastrointestinal: Negative for abdominal distention, abdominal pain, constipation, diarrhea, nausea and vomiting.   Genitourinary: Positive for dysuria, flank pain, hematuria (one episode of dark red blood in urine 2 days ago) and pelvic pain. Negative for decreased urine volume, difficulty urinating, frequency, menorrhagia, urgency and vaginal discharge.   Musculoskeletal: Positive for back pain. Negative for arthralgias, gait problem, joint swelling and myalgias.   Skin: Negative for rash and wound.   Neurological: Negative for dizziness, light-headedness, numbness and headaches.       Objective:      Physical Exam  Vitals signs and nursing note reviewed.   Constitutional:       Appearance: Normal appearance. She is well-developed. She is obese.   HENT:      Head: Normocephalic and atraumatic.      Right Ear: External ear normal.      Left Ear: External ear normal.      Nose: Nose normal.   Eyes:      Pupils: Pupils are equal, round, and reactive to light.   Neck:      Musculoskeletal: Normal range of motion.   Cardiovascular:      Rate and Rhythm: Normal rate and regular rhythm.      Heart sounds: Normal heart sounds.   Pulmonary:      Effort: Pulmonary effort is normal.      Breath sounds: Normal breath sounds.   Abdominal:      General: Bowel sounds are normal.      Palpations: Abdomen is soft.      Tenderness: There is abdominal tenderness in the suprapubic area. There is no right CVA tenderness or left CVA tenderness.   Musculoskeletal: Normal range of motion.   Skin:     General: Skin is warm and dry.   Neurological:      Mental Status: She is alert and oriented to person, place, and time.         Assessment:       1. Acute cystitis without hematuria    2. Dysuria    3. Pelvic pressure in female        Plan:       Alexandra was seen today for pelvic discomfort, bladder pain, abdominal cramping and hematuria.    Diagnoses and all orders for this visit:    Acute cystitis without  hematuria    Dysuria  -     POCT URINE DIPSTICK WITHOUT MICROSCOPE  -     Urine culture  -     PREGNANCY TEST, URINE RAPID  -     nitrofurantoin, macrocrystal-monohydrate, (MACROBID) 100 MG capsule; Take 1 capsule (100 mg total) by mouth 2 (two) times daily. for 7 days    Pelvic pressure in female  -     nitrofurantoin, macrocrystal-monohydrate, (MACROBID) 100 MG capsule; Take 1 capsule (100 mg total) by mouth 2 (two) times daily. for 7 days  -     US Pelvis Comp with Transvag NON-OB (xpd; Future  -     CBC auto differential; Future    Advised to follow up next week if no improvement. Discussed possible STD testing and pelvic ultrasound if symptoms do not improve Will follow up on urine culture.   Discussed worsening signs/symptoms and when to return to clinic or go to ED.   Patient expresses understanding and agrees with treatment plan.

## 2020-09-11 NOTE — PATIENT INSTRUCTIONS

## 2020-09-13 LAB — BACTERIA UR CULT: NORMAL

## 2020-09-14 ENCOUNTER — PATIENT MESSAGE (OUTPATIENT)
Dept: FAMILY MEDICINE | Facility: CLINIC | Age: 25
End: 2020-09-14

## 2020-09-15 ENCOUNTER — HOSPITAL ENCOUNTER (OUTPATIENT)
Dept: RADIOLOGY | Facility: HOSPITAL | Age: 25
Discharge: HOME | End: 2020-09-15
Attending: NURSE PRACTITIONER
Payer: MEDICAID

## 2020-09-15 DIAGNOSIS — R10.2 PELVIC PRESSURE IN FEMALE: ICD-10-CM

## 2020-09-15 PROCEDURE — 76830 TRANSVAGINAL US NON-OB: CPT | Mod: TC,PO

## 2020-09-15 PROCEDURE — 76830 US PELVIS COMP WITH TRANSVAG NON-OB (XPD): ICD-10-PCS | Mod: 26,,, | Performed by: RADIOLOGY

## 2020-09-15 PROCEDURE — 76856 US EXAM PELVIC COMPLETE: CPT | Mod: 26,,, | Performed by: RADIOLOGY

## 2020-09-15 PROCEDURE — 76856 US PELVIS COMP WITH TRANSVAG NON-OB (XPD): ICD-10-PCS | Mod: 26,,, | Performed by: RADIOLOGY

## 2020-09-15 PROCEDURE — 76830 TRANSVAGINAL US NON-OB: CPT | Mod: 26,,, | Performed by: RADIOLOGY

## 2020-09-21 ENCOUNTER — TELEPHONE (OUTPATIENT)
Dept: FAMILY MEDICINE | Facility: CLINIC | Age: 25
End: 2020-09-21

## 2020-09-21 NOTE — TELEPHONE ENCOUNTER
----- Message from Neelam Noe sent at 9/21/2020  7:57 AM CDT -----  Contact: pt  Pt calling would like to be seen today for severe back pain ,going in neck,leg,numb,can't move. Pt has medicaid.....[phone] (home)

## 2020-09-21 NOTE — TELEPHONE ENCOUNTER
----- Message from Neelam Noe sent at 9/21/2020  7:57 AM CDT -----  Contact: pt  Pt calling would like to be seen today for severe back pain ,going in neck,leg,numb,can't move. Pt has medicaid.....[phone] (home)

## 2020-09-21 NOTE — TELEPHONE ENCOUNTER
Pt is requesting an appointment for her back pain, she cant come today she has to get her daughter off the bus. Requesting appt for tomorrow.

## 2020-09-22 ENCOUNTER — OFFICE VISIT (OUTPATIENT)
Dept: FAMILY MEDICINE | Facility: CLINIC | Age: 25
End: 2020-09-22
Payer: MEDICAID

## 2020-09-22 VITALS
OXYGEN SATURATION: 98 % | BODY MASS INDEX: 46.98 KG/M2 | SYSTOLIC BLOOD PRESSURE: 118 MMHG | TEMPERATURE: 99 F | HEART RATE: 83 BPM | WEIGHT: 265.19 LBS | DIASTOLIC BLOOD PRESSURE: 80 MMHG

## 2020-09-22 DIAGNOSIS — M54.9 ACUTE BACK PAIN LESS THAN 4 WEEKS DURATION: ICD-10-CM

## 2020-09-22 DIAGNOSIS — R20.0 NUMBNESS IN BOTH LEGS: ICD-10-CM
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
## 2020-09-22 DIAGNOSIS — R11.2 NAUSEA AND VOMITING, INTRACTABILITY OF VOMITING NOT SPECIFIED, UNSPECIFIED VOMITING TYPE: ICD-10-CM

## 2020-09-22 PROCEDURE — 99999 PR PBB SHADOW E&M-EST. PATIENT-LVL III: CPT | Mod: PBBFAC,,, | Performed by: PHYSICIAN ASSISTANT

## 2020-09-22 PROCEDURE — 99215 OFFICE O/P EST HI 40 MIN: CPT | Mod: S$PBB,,, | Performed by: PHYSICIAN ASSISTANT

## 2020-09-22 PROCEDURE — 99213 OFFICE O/P EST LOW 20 MIN: CPT | Mod: PBBFAC,PO | Performed by: PHYSICIAN ASSISTANT

## 2020-09-22 PROCEDURE — 99999 PR PBB SHADOW E&M-EST. PATIENT-LVL III: ICD-10-PCS | Mod: PBBFAC,,, | Performed by: PHYSICIAN ASSISTANT

## 2020-09-22 PROCEDURE — 99215 PR OFFICE/OUTPT VISIT, EST, LEVL V, 40-54 MIN: ICD-10-PCS | Mod: S$PBB,,, | Performed by: PHYSICIAN ASSISTANT

## 2020-09-22 NOTE — PROGRESS NOTES
Subjective:      Patient ID: Alexandra Martin is a 24 y.o. female.    Chief Complaint: Fall (saturday night, hurt back), Back Pain (down the middle, left leg tingling), and Nausea (last night, vomitting, hot and cold)  Patient is new to me.    HPI   Patient fell Saturday night on the stairs while she was intoxicated with alcohol, and she is having left leg numbness and tingling.  She has been nauseous and vomiting due to pain.  Pain 9/10.  She did not hit head or lose consciousness.  Taken advil and tizanidine muscle relaxant for pain without resolution of symptoms.  Patient denies any urinary or fecal incontinence.    Review of Systems   Constitutional: Negative for appetite change, chills and fever.   HENT: Negative for ear pain.    Eyes: Negative for pain.   Respiratory: Negative for cough and shortness of breath.    Cardiovascular: Negative for chest pain.   Gastrointestinal: Positive for diarrhea, nausea and vomiting. Negative for abdominal pain and constipation.   Musculoskeletal: Positive for back pain.        Left leg numbness.   Neurological: Positive for dizziness, light-headedness and headaches.   Psychiatric/Behavioral: Positive for sleep disturbance (baseline).       Objective:   /80   Pulse 83   Temp 98.8 °F (37.1 °C)   Wt 120.3 kg (265 lb 3.4 oz)   SpO2 98%   BMI 46.98 kg/m²      Physical Exam  Vitals signs reviewed.   Constitutional:       General: She is not in acute distress.     Appearance: Normal appearance. She is well-developed and well-groomed. She is obese.   HENT:      Head: Normocephalic and atraumatic.      Right Ear: Hearing and external ear normal.      Left Ear: Hearing and external ear normal.      Nose: Nose normal.      Mouth/Throat:      Lips: Pink.   Eyes:      General: Lids are normal.      Conjunctiva/sclera: Conjunctivae normal.   Neck:      Musculoskeletal: Normal range of motion.      Trachea: Phonation normal.   Cardiovascular:      Rate and Rhythm: Normal  rate and regular rhythm.      Heart sounds: Normal heart sounds. No murmur. No friction rub. No gallop.    Pulmonary:      Effort: Pulmonary effort is normal. No respiratory distress.      Breath sounds: Normal breath sounds. No decreased breath sounds, wheezing, rhonchi or rales.   Abdominal:      Comments: Patient vomited twice during appointment.   Musculoskeletal:      Left hip: She exhibits tenderness. She exhibits normal range of motion.      Cervical back: She exhibits bony tenderness.      Thoracic back: She exhibits bony tenderness.      Lumbar back: She exhibits bony tenderness.        Legs:    Skin:     General: Skin is warm and dry.      Findings: No rash.   Neurological:      Mental Status: She is alert and oriented to person, place, and time.      Gait: Gait is intact.   Psychiatric:         Mood and Affect: Affect is inappropriate.         Speech: Speech normal.         Behavior: Behavior is cooperative.      Comments: Patient laughed through the whole visit even in between vomiting.         Assessment:      1. Fall down stairs, initial encounter    2. Numbness in both legs    3. Acute back pain less than 4 weeks duration    4. Nausea and vomiting, intractability of vomiting not specified, unspecified vomiting type       Plan:   1. Fall down stairs, initial encounter    2. Numbness in both legs  Sent to ER due to the numbness.    3. Acute back pain less than 4 weeks duration    4. Nausea and vomiting, intractability of vomiting not specified, unspecified vomiting type    Patient agreed with plan and expressed understanding.    Thank you for allowing me to serve you,

## 2020-09-29 ENCOUNTER — OFFICE VISIT (OUTPATIENT)
Dept: URGENT CARE | Facility: CLINIC | Age: 25
End: 2020-09-29
Payer: MEDICAID

## 2020-09-29 VITALS
OXYGEN SATURATION: 98 % | RESPIRATION RATE: 16 BRPM | SYSTOLIC BLOOD PRESSURE: 144 MMHG | HEART RATE: 69 BPM | HEIGHT: 63 IN | TEMPERATURE: 97 F | DIASTOLIC BLOOD PRESSURE: 92 MMHG | WEIGHT: 264 LBS | BODY MASS INDEX: 46.78 KG/M2

## 2020-09-29 DIAGNOSIS — B34.9 VIRAL SYNDROME: Primary | ICD-10-CM

## 2020-09-29 DIAGNOSIS — R05.9 COUGH: ICD-10-CM

## 2020-09-29 DIAGNOSIS — R50.9 FEVER, UNSPECIFIED FEVER CAUSE: ICD-10-CM

## 2020-09-29 LAB
CTP QC/QA: YES
SARS-COV-2 RDRP RESP QL NAA+PROBE: NEGATIVE

## 2020-09-29 PROCEDURE — 99214 OFFICE O/P EST MOD 30 MIN: CPT | Mod: S$GLB,,, | Performed by: PHYSICIAN ASSISTANT

## 2020-09-29 PROCEDURE — U0002 COVID-19 LAB TEST NON-CDC: HCPCS | Mod: QW,S$GLB,, | Performed by: PHYSICIAN ASSISTANT

## 2020-09-29 PROCEDURE — 99214 PR OFFICE/OUTPT VISIT, EST, LEVL IV, 30-39 MIN: ICD-10-PCS | Mod: S$GLB,,, | Performed by: PHYSICIAN ASSISTANT

## 2020-09-29 PROCEDURE — U0002: ICD-10-PCS | Mod: QW,S$GLB,, | Performed by: PHYSICIAN ASSISTANT

## 2020-09-29 RX ORDER — PROMETHAZINE HYDROCHLORIDE AND DEXTROMETHORPHAN HYDROBROMIDE 6.25; 15 MG/5ML; MG/5ML
5 SYRUP ORAL 3 TIMES DAILY PRN
Qty: 180 ML | Refills: 0 | Status: SHIPPED | OUTPATIENT
Start: 2020-09-29 | End: 2020-10-09

## 2020-09-29 NOTE — PROGRESS NOTES
"Subjective:       Patient ID: Alexandra Martin is a 24 y.o. female.    Vitals:  height is 5' 3" (1.6 m) and weight is 119.7 kg (264 lb). Her oral temperature is 97.4 °F (36.3 °C). Her blood pressure is 144/92 (abnormal) and her pulse is 69. Her respiration is 16 and oxygen saturation is 98%.     Chief Complaint: Fever    Pt presents to urgent care with fever, headache and body aches.  Symptoms started last night.  She has not been exposed to COVID that she knows of.  Denies chest pain, SOB, GI upset or abdominal pain.    Fever   This is a new problem. The current episode started yesterday. The problem occurs constantly. The problem has been unchanged. Associated symptoms include headaches. Pertinent negatives include no chest pain, congestion, coughing, diarrhea, nausea, rash, sore throat, urinary pain or vomiting. She has tried nothing for the symptoms. The treatment provided no relief.       Constitution: Positive for fever. Negative for chills and fatigue.   HENT: Negative for congestion and sore throat.    Neck: Negative for painful lymph nodes.   Cardiovascular: Negative for chest pain and leg swelling.   Eyes: Negative for double vision and blurred vision.   Respiratory: Negative for cough and shortness of breath.    Gastrointestinal: Negative for nausea, vomiting and diarrhea.   Genitourinary: Negative for dysuria, frequency, urgency and history of kidney stones.   Musculoskeletal: Negative for joint pain, joint swelling, muscle cramps and muscle ache.   Skin: Negative for color change, pale, rash and bruising.   Allergic/Immunologic: Negative for seasonal allergies.   Neurological: Positive for headaches. Negative for dizziness, history of vertigo, light-headedness and passing out.   Hematologic/Lymphatic: Negative for swollen lymph nodes.   Psychiatric/Behavioral: Negative for nervous/anxious, sleep disturbance and depression. The patient is not nervous/anxious.        Objective:      Physical Exam "   Constitutional: She is oriented to person, place, and time. She appears well-developed. She is cooperative.  Non-toxic appearance. She does not appear ill. No distress.   HENT:   Head: Normocephalic and atraumatic.   Ears:   Right Ear: Hearing, tympanic membrane, external ear and ear canal normal. Tympanic membrane is not erythematous, not retracted and not bulging. No middle ear effusion. impacted cerumen  Left Ear: Hearing, tympanic membrane, external ear and ear canal normal. Tympanic membrane is not erythematous, not retracted and not bulging.  No middle ear effusion. impacted cerumen  Nose: Mucosal edema and rhinorrhea present. No nasal deformity or congestion. No epistaxis. Right sinus exhibits no maxillary sinus tenderness and no frontal sinus tenderness. Left sinus exhibits no maxillary sinus tenderness and no frontal sinus tenderness.   Mouth/Throat: Uvula is midline and mucous membranes are normal. No trismus in the jaw. Normal dentition. No uvula swelling. Posterior oropharyngeal erythema (mild) present. No oropharyngeal exudate, posterior oropharyngeal edema, tonsillar abscesses or cobblestoning. No tonsillar exudate.   Eyes: Conjunctivae and lids are normal. Right eye exhibits no discharge. Left eye exhibits no discharge. No scleral icterus.   Neck: Trachea normal, full passive range of motion without pain and phonation normal. Neck supple. No neck rigidity. No edema and no erythema present.   Cardiovascular: Normal rate, regular rhythm, normal heart sounds and normal pulses. Exam reveals no gallop and no friction rub.   No murmur heard.  Pulmonary/Chest: Effort normal and breath sounds normal. No stridor. No respiratory distress. She has no decreased breath sounds. She has no wheezes. She has no rhonchi. She has no rales.   Abdominal: Normal appearance.   Musculoskeletal: Normal range of motion.         General: No deformity.   Lymphadenopathy:        Head (right side): No submandibular and no  tonsillar adenopathy present.        Head (left side): No submandibular and no tonsillar adenopathy present.     She has no cervical adenopathy.        Right cervical: No superficial cervical and no posterior cervical adenopathy present.       Left cervical: No superficial cervical and no posterior cervical adenopathy present.   Neurological: She is alert and oriented to person, place, and time. She exhibits normal muscle tone. Coordination normal.   Skin: Skin is warm, dry, intact, not diaphoretic and not pale. Psychiatric: Her speech is normal and behavior is normal. Judgment and thought content normal.   Nursing note and vitals reviewed.        Results for orders placed or performed in visit on 09/29/20   POCT COVID-19 Rapid Screening   Result Value Ref Range    POC Rapid COVID Negative Negative     Acceptable Yes      Assessment:       1. Viral syndrome    2. Fever, unspecified fever cause    3. Cough        Plan:     Patient was Covid-19 Rapid tested at time of visit and is negative at this time. Reviewed results with patient. Discussed with patient that their symptoms are consistent with a Viral Syndrome at this time. Flonase should be used twice daily and take a daily Zyrtec to resolve sinus inflammation and post-nasal drip. Take promethazine DM for cough suppression. Should symptoms persist or worsen you may return to clinic for evaluation or follow-up with your PCP. Patient verbalized understanding and all of their questions were answered.       Viral syndrome    Fever, unspecified fever cause  -     POCT COVID-19 Rapid Screening    Cough  -     promethazine-dextromethorphan (PROMETHAZINE-DM) 6.25-15 mg/5 mL Syrp; Take 5 mLs by mouth 3 (three) times daily as needed (cough).  Dispense: 180 mL; Refill: 0      Patient Instructions     Viral Syndrome (Adult)  A viral illness may cause a number of symptoms. The symptoms depend on the part of the body that the virus affects. If it settles in your  "nose, throat, and lungs, it may cause cough, sore throat, congestion, and sometimes headache. If it settles in your stomach and intestinal tract, it may cause vomiting and diarrhea. Sometimes it causes vague symptoms like "aching all over," feeling tired, loss of appetite, or fever.  A viral illness usually lasts 1 to 2 weeks, but sometimes it lasts longer. In some cases, a more serious infection can look like a viral syndrome in the first few days of the illness. You may need another exam and additional tests to know the difference. Watch for the warning signs listed below.  Home care  Follow these guidelines for taking care of yourself at home:  · If symptoms are severe, rest at home for the first 2 to 3 days.  · Stay away from cigarette smoke - both your smoke and the smoke from others.  · You may use over-the-counter acetaminophen or ibuprofen for fever, muscle aching, and headache, unless another medicine was prescribed for this. If you have chronic liver or kidney disease or ever had a stomach ulcer or GI bleeding, talk with your doctor before using these medicines. No one who is younger than 18 and ill with a fever should take aspirin. It may cause severe disease or death.  · Your appetite may be poor, so a light diet is fine. Avoid dehydration by drinking 8 to 12 8-ounce glasses of fluids each day. This may include water; orange juice; lemonade; apple, grape, and cranberry juice; clear fruit drinks; electrolyte replacement and sports drinks; and decaffeinated teas and coffee. If you have been diagnosed with a kidney disease, ask your doctor how much and what types of fluids you should drink to prevent dehydration. If you have kidney disease, drinking too much fluid can cause it build up in the your body and be dangerous to your health.  · Over-the-counter remedies won't shorten the length of the illness but may be helpful for cough, sore throat; and nasal and sinus congestion. Don't use decongestants if you " have high blood pressure.  Follow-up care  Follow up with your healthcare provider if you do not improve over the next week.  Call 911  Get emergency medical care if any of the following occur:  · Convulsion  · Feeling weak, dizzy, or like you are going to faint  · Chest pain, shortness of breath, wheezing, or difficulty breathing  When to seek medical advice  Call your healthcare provider right away if any of these occur:  · Cough with lots of colored sputum (mucus) or blood in your sputum  · Chest pain, shortness of breath, wheezing, or difficulty breathing  · Severe headache; face, neck, or ear pain  · Severe, constant pain in the lower right side of your belly (abdominal)  · Continued vomiting (cant keep liquids down)  · Frequent diarrhea (more than 5 times a day); blood (red or black color) or mucus in diarrhea  · Feeling weak, dizzy, or like you are going to faint  · Extreme thirst  · Fever of 100.4°F (38°C) or higher, or as directed by your healthcare provider  Date Last Reviewed: 9/25/2015  © 4680-0259 [employer]. [address] [place] [address]. All rights reserved. This information is not intended as a substitute for professional medical care. Always follow your healthcare professional's instructions.      Please follow up with your Primary care provider within 2-5 days if your signs and symptoms have not resolved or worsen.     If your condition worsens or fails to improve we recommend that you receive another evaluation at the emergency room immediately or contact your primary medical clinic to discuss your concerns.   You must understand that you have received an Urgent Care treatment only and that you may be released before all of your medical problems are known or treated. You, the patient, will arrange for follow up care as instructed.     RED FLAGS/WARNING SYMPTOMS DISCUSSED WITH PATIENT THAT WOULD WARRANT EMERGENT MEDICAL ATTENTION. PATIENT VERBALIZED UNDERSTANDING.

## 2020-09-29 NOTE — PATIENT INSTRUCTIONS
"  Viral Syndrome (Adult)  A viral illness may cause a number of symptoms. The symptoms depend on the part of the body that the virus affects. If it settles in your nose, throat, and lungs, it may cause cough, sore throat, congestion, and sometimes headache. If it settles in your stomach and intestinal tract, it may cause vomiting and diarrhea. Sometimes it causes vague symptoms like "aching all over," feeling tired, loss of appetite, or fever.  A viral illness usually lasts 1 to 2 weeks, but sometimes it lasts longer. In some cases, a more serious infection can look like a viral syndrome in the first few days of the illness. You may need another exam and additional tests to know the difference. Watch for the warning signs listed below.  Home care  Follow these guidelines for taking care of yourself at home:  · If symptoms are severe, rest at home for the first 2 to 3 days.  · Stay away from cigarette smoke - both your smoke and the smoke from others.  · You may use over-the-counter acetaminophen or ibuprofen for fever, muscle aching, and headache, unless another medicine was prescribed for this. If you have chronic liver or kidney disease or ever had a stomach ulcer or GI bleeding, talk with your doctor before using these medicines. No one who is younger than 18 and ill with a fever should take aspirin. It may cause severe disease or death.  · Your appetite may be poor, so a light diet is fine. Avoid dehydration by drinking 8 to 12 8-ounce glasses of fluids each day. This may include water; orange juice; lemonade; apple, grape, and cranberry juice; clear fruit drinks; electrolyte replacement and sports drinks; and decaffeinated teas and coffee. If you have been diagnosed with a kidney disease, ask your doctor how much and what types of fluids you should drink to prevent dehydration. If you have kidney disease, drinking too much fluid can cause it build up in the your body and be dangerous to your " health.  · Over-the-counter remedies won't shorten the length of the illness but may be helpful for cough, sore throat; and nasal and sinus congestion. Don't use decongestants if you have high blood pressure.  Follow-up care  Follow up with your healthcare provider if you do not improve over the next week.  Call 911  Get emergency medical care if any of the following occur:  · Convulsion  · Feeling weak, dizzy, or like you are going to faint  · Chest pain, shortness of breath, wheezing, or difficulty breathing  When to seek medical advice  Call your healthcare provider right away if any of these occur:  · Cough with lots of colored sputum (mucus) or blood in your sputum  · Chest pain, shortness of breath, wheezing, or difficulty breathing  · Severe headache; face, neck, or ear pain  · Severe, constant pain in the lower right side of your belly (abdominal)  · Continued vomiting (cant keep liquids down)  · Frequent diarrhea (more than 5 times a day); blood (red or black color) or mucus in diarrhea  · Feeling weak, dizzy, or like you are going to faint  · Extreme thirst  · Fever of 100.4°F (38°C) or higher, or as directed by your healthcare provider  Date Last Reviewed: 9/25/2015  © 8987-5519 [employer]. [address] [place] [address], [place] [address]. All rights reserved. This information is not intended as a substitute for professional medical care. Always follow your healthcare professional's instructions.      Please follow up with your Primary care provider within 2-5 days if your signs and symptoms have not resolved or worsen.     If your condition worsens or fails to improve we recommend that you receive another evaluation at the emergency room immediately or contact your primary medical clinic to discuss your concerns.   You must understand that you have received an Urgent Care treatment only and that you may be released before all of your medical problems are known or treated. You, the patient, will  arrange for follow up care as instructed.     RED FLAGS/WARNING SYMPTOMS DISCUSSED WITH PATIENT THAT WOULD WARRANT EMERGENT MEDICAL ATTENTION. PATIENT VERBALIZED UNDERSTANDING.

## 2020-09-30 ENCOUNTER — TELEPHONE (OUTPATIENT)
Dept: FAMILY MEDICINE | Facility: CLINIC | Age: 25
End: 2020-09-30

## 2020-09-30 NOTE — TELEPHONE ENCOUNTER
----- [facility] sent at 9/29/2020 11:20 AM CDT -----  Contact: self  Patient just left urgent care and she was running fever last night of 104 they ran an rapid covid test and it was negative.  Gave her cough medicine for cough and that was it.  She had bells per a couple of years ago and one side of her face and back of neck feels funny.  She states there is something wrong she doesn't feel right.  Call back at [phone] (home) to advise and thank you.

## 2020-09-30 NOTE — TELEPHONE ENCOUNTER
Patient is not feeling well. States her fever is 104. Advised urgent care or ED   Patient verbalized understanding

## 2020-10-26 ENCOUNTER — TELEPHONE (OUTPATIENT)
Dept: FAMILY MEDICINE | Facility: CLINIC | Age: 25
End: 2020-10-26

## 2020-10-26 NOTE — TELEPHONE ENCOUNTER
----- Message from Linda Lakhani sent at 10/26/2020  1:25 PM CDT -----  Regarding: Medication  Contact: Patient  Type: Patient Call Back    Who called: Patient    What is the request in detail: Patient is requesting a refill on weight management medication.  Please advise.    Can the clinic reply by [facility]? No    Would the patient rather a call back or a response via [facility]? Call    Best call back number: [phone] (home)     Additional Information:   [id number] [place] [facility] [address] [place] [address]  Phone: [phone] Fax: [phone]      Thanks

## 2020-10-26 NOTE — TELEPHONE ENCOUNTER
Spoke with patient to schedule appointment   In order to meet Medicare requirements, the clinical documentation must support the information cited in the admission order.  Please be sure to provide detailed and clear documentation about the following in the admitting note/history and physical:

## 2020-11-11 ENCOUNTER — TELEPHONE (OUTPATIENT)
Dept: FAMILY MEDICINE | Facility: CLINIC | Age: 25
End: 2020-11-11

## 2020-11-11 NOTE — TELEPHONE ENCOUNTER
----- Message from Koko Mendez sent at 11/11/2020  8:47 AM CST -----  Type:  Same Day Appointment Request    Caller is requesting a same day appointment.  Caller declined first available appointment listed below.      Name of Caller:  Patient  When is the first available appointment?  N/A, unable to schedule  Symptoms:  various sinus issues; exposure to mold  Best Call Back Number:  [phone]  Additional Information:   NA

## 2020-11-12 ENCOUNTER — LAB VISIT (OUTPATIENT)
Dept: LAB | Facility: HOSPITAL | Age: 25
End: 2020-11-12
Attending: NURSE PRACTITIONER
Payer: MEDICAID

## 2020-11-12 ENCOUNTER — TELEPHONE (OUTPATIENT)
Dept: FAMILY MEDICINE | Facility: CLINIC | Age: 25
End: 2020-11-12

## 2020-11-12 ENCOUNTER — OFFICE VISIT (OUTPATIENT)
Dept: FAMILY MEDICINE | Facility: CLINIC | Age: 25
End: 2020-11-12
Payer: MEDICAID

## 2020-11-12 VITALS
OXYGEN SATURATION: 98 % | TEMPERATURE: 99 F | HEART RATE: 80 BPM | SYSTOLIC BLOOD PRESSURE: 134 MMHG | BODY MASS INDEX: 47.23 KG/M2 | DIASTOLIC BLOOD PRESSURE: 86 MMHG | WEIGHT: 266.56 LBS | HEIGHT: 63 IN

## 2020-11-12 DIAGNOSIS — J32.9 SINUSITIS, UNSPECIFIED CHRONICITY, UNSPECIFIED LOCATION: Primary | ICD-10-CM

## 2020-11-12 DIAGNOSIS — F41.1 GAD (GENERALIZED ANXIETY DISORDER): ICD-10-CM

## 2020-11-12 DIAGNOSIS — F33.9 RECURRENT MAJOR DEPRESSIVE DISORDER, REMISSION STATUS UNSPECIFIED: ICD-10-CM

## 2020-11-12 DIAGNOSIS — N92.1 MENORRHAGIA WITH IRREGULAR CYCLE: ICD-10-CM

## 2020-11-12 LAB
BASOPHILS # BLD AUTO: 0.02 K/UL (ref 0–0.2)
BASOPHILS NFR BLD: 0.2 % (ref 0–1.9)
DIFFERENTIAL METHOD: ABNORMAL
EOSINOPHIL # BLD AUTO: 0.2 K/UL (ref 0–0.5)
EOSINOPHIL NFR BLD: 1.9 % (ref 0–8)
ERYTHROCYTE [DISTWIDTH] IN BLOOD BY AUTOMATED COUNT: 13.8 % (ref 11.5–14.5)
FERRITIN SERPL-MCNC: 20 NG/ML (ref 20–300)
HCT VFR BLD AUTO: 43.8 % (ref 37–48.5)
HGB BLD-MCNC: 13.5 G/DL (ref 12–16)
IMM GRANULOCYTES # BLD AUTO: 0.04 K/UL (ref 0–0.04)
IMM GRANULOCYTES NFR BLD AUTO: 0.3 % (ref 0–0.5)
IRON SERPL-MCNC: 28 UG/DL (ref 30–160)
LYMPHOCYTES # BLD AUTO: 2.3 K/UL (ref 1–4.8)
LYMPHOCYTES NFR BLD: 18.9 % (ref 18–48)
MCH RBC QN AUTO: 26.6 PG (ref 27–31)
MCHC RBC AUTO-ENTMCNC: 30.8 G/DL (ref 32–36)
MCV RBC AUTO: 86 FL (ref 82–98)
MONOCYTES # BLD AUTO: 0.8 K/UL (ref 0.3–1)
MONOCYTES NFR BLD: 6.6 % (ref 4–15)
NEUTROPHILS # BLD AUTO: 8.8 K/UL (ref 1.8–7.7)
NEUTROPHILS NFR BLD: 72.1 % (ref 38–73)
NRBC BLD-RTO: 0 /100 WBC
PLATELET # BLD AUTO: 207 K/UL (ref 150–350)
PMV BLD AUTO: 11 FL (ref 9.2–12.9)
RBC # BLD AUTO: 5.08 M/UL (ref 4–5.4)
SATURATED IRON: 5 % (ref 20–50)
TOTAL IRON BINDING CAPACITY: 528 UG/DL (ref 250–450)
TRANSFERRIN SERPL-MCNC: 357 MG/DL (ref 200–375)
WBC # BLD AUTO: 12.16 K/UL (ref 3.9–12.7)

## 2020-11-12 PROCEDURE — 99214 PR OFFICE/OUTPT VISIT, EST, LEVL IV, 30-39 MIN: ICD-10-PCS | Mod: S$PBB,,, | Performed by: NURSE PRACTITIONER

## 2020-11-12 PROCEDURE — 83540 ASSAY OF IRON: CPT

## 2020-11-12 PROCEDURE — 99999 PR PBB SHADOW E&M-EST. PATIENT-LVL IV: ICD-10-PCS | Mod: PBBFAC,,, | Performed by: NURSE PRACTITIONER

## 2020-11-12 PROCEDURE — 99999 PR PBB SHADOW E&M-EST. PATIENT-LVL IV: CPT | Mod: PBBFAC,,, | Performed by: NURSE PRACTITIONER

## 2020-11-12 PROCEDURE — 82728 ASSAY OF FERRITIN: CPT

## 2020-11-12 PROCEDURE — 99214 OFFICE O/P EST MOD 30 MIN: CPT | Mod: PBBFAC,PO | Performed by: NURSE PRACTITIONER

## 2020-11-12 PROCEDURE — 36415 COLL VENOUS BLD VENIPUNCTURE: CPT | Mod: PO

## 2020-11-12 PROCEDURE — 99214 OFFICE O/P EST MOD 30 MIN: CPT | Mod: S$PBB,,, | Performed by: NURSE PRACTITIONER

## 2020-11-12 PROCEDURE — 85025 COMPLETE CBC W/AUTO DIFF WBC: CPT

## 2020-11-12 RX ORDER — VENLAFAXINE HYDROCHLORIDE 37.5 MG/1
CAPSULE, EXTENDED RELEASE ORAL
COMMUNITY
Start: 2020-11-03 | End: 2021-04-06

## 2020-11-12 RX ORDER — METHYLPREDNISOLONE 4 MG/1
TABLET ORAL
Qty: 21 TABLET | Refills: 0 | Status: SHIPPED | OUTPATIENT
Start: 2020-11-13 | End: 2021-02-22

## 2020-11-12 RX ORDER — AMOXICILLIN AND CLAVULANATE POTASSIUM 875; 125 MG/1; MG/1
1 TABLET, FILM COATED ORAL 2 TIMES DAILY
Qty: 14 TABLET | Refills: 0 | Status: SHIPPED | OUTPATIENT
Start: 2020-11-12 | End: 2020-11-19

## 2020-11-12 NOTE — PROGRESS NOTES
Subjective:       Patient ID: Alexandra Martin is a 24 y.o. female.    Chief Complaint: exposed to mold (not being able to remember things, )    Sinusitis  This is a new problem. The current episode started 1 to 4 weeks ago. The problem has been waxing and waning since onset. Associated symptoms include congestion, ear pain, headaches and sinus pressure. Pertinent negatives include no coughing, diaphoresis or shortness of breath. (Green mucus ) Treatments tried: antihistamine, decongestant, tylenol, ibuprofen. The treatment provided no relief.     Patient reports continues vaginal bleeding--waxing and waning in intensity--for the last 2-3 months. Fatigued. +Nexaplanon. GYN Dr. Adams. [facility]    MDD, TRACE--followed by psych. Stopped taking effexor   Vitals:    11/12/20 1048   BP: 134/86   Pulse: 80   Temp: 98.6 °F (37 °C)     Review of Systems   Constitutional: Positive for fatigue. Negative for diaphoresis and fever.   HENT: Positive for congestion, ear pain, sinus pressure and sinus pain. Negative for facial swelling and trouble swallowing.    Eyes: Negative for discharge and redness.   Respiratory: Negative for cough and shortness of breath.    Cardiovascular: Negative for chest pain and palpitations.   Gastrointestinal: Negative for vomiting.   Genitourinary: Positive for menstrual problem and vaginal bleeding. Negative for difficulty urinating.   Musculoskeletal: Negative for gait problem.   Skin: Negative for pallor and rash.   Neurological: Positive for headaches. Negative for facial asymmetry and speech difficulty.   Psychiatric/Behavioral: Negative for confusion. The patient is not nervous/anxious.        Past Medical History:   Diagnosis Date    ADD (attention deficit disorder)     Anxiety disorder     Depression     History of ovarian cyst     Obesity     Substance abuse     Hospitalization     Trauma     not at this time. 9/11/19    Tympanic membrane perforation, left  3/14/2018    UTI (urinary tract infection) 8/3/2018    Vertigo      Objective:      Physical Exam  Vitals signs and nursing note reviewed.   Constitutional:       General: She is not in acute distress.     Appearance: She is not diaphoretic.   HENT:      Head: Normocephalic.      Right Ear: Hearing normal. A middle ear effusion is present.      Left Ear: Hearing normal. A middle ear effusion is present.      Nose:      Right Sinus: Maxillary sinus tenderness present.      Left Sinus: Maxillary sinus tenderness present.   Eyes:      General: Lids are normal.      Conjunctiva/sclera: Conjunctivae normal.   Neck:      Vascular: No JVD.      Trachea: No tracheal deviation.   Cardiovascular:      Rate and Rhythm: Normal rate.      Heart sounds: Normal heart sounds.   Pulmonary:      Effort: Pulmonary effort is normal.      Breath sounds: Normal breath sounds.   Abdominal:      General: There is no distension.   Musculoskeletal:         General: No deformity.   Skin:     Coloration: Skin is not pale.   Neurological:      Mental Status: She is alert and oriented to person, place, and time.   Psychiatric:         Speech: Speech normal.         Behavior: Behavior normal.         Thought Content: Thought content normal.         Judgment: Judgment normal.         Assessment:       1. Sinusitis, unspecified chronicity, unspecified location    2. Menorrhagia with irregular cycle    3. TRACE (generalized anxiety disorder)    4. Recurrent major depressive disorder, remission status unspecified        Plan:       Sinusitis, unspecified chronicity, unspecified location  -     amoxicillin-clavulanate 875-125mg (AUGMENTIN) 875-125 mg per tablet; Take 1 tablet by mouth 2 (two) times daily. for 7 days  Dispense: 14 tablet; Refill: 0  -     methylPREDNISolone (MEDROL DOSEPACK) 4 mg tablet; use as directed  Dispense: 21 tablet; Refill: 0    Menorrhagia with irregular cycle  -     CBC Auto Differential; Future; Expected date: 11/12/2020  -      Iron and TIBC; Future; Expected date: 11/12/2020  -     Ferritin; Future; Expected date: 11/12/2020  -     Ambulatory referral/consult to Gynecology; Future; Expected date: 11/19/2020    TRACE, MDD   Recommend continued FU with psych     education provided on supportive care, symptom monitoring and return precautions     Follow up for further evaluation if s/s worsen, fail to improve, or new symptoms arise.    Medication List with Changes/Refills   New Medications    AMOXICILLIN-CLAVULANATE 875-125MG (AUGMENTIN) 875-125 MG PER TABLET    Take 1 tablet by mouth 2 (two) times daily. for 7 days    METHYLPREDNISOLONE (MEDROL DOSEPACK) 4 MG TABLET    use as directed   Current Medications    NEXPLANON 68 MG IMPL SUBDERMAL DEVICE        VENLAFAXINE (EFFEXOR-XR) 37.5 MG 24 HR CAPSULE    TAKE 1 CAPSULE BY MOUTH EVERY AM FOR 1 WEEK, THEN 2 CAPSULES EVERY AM FOR DEPRESSION AND ANXIET   Discontinued Medications    AMOXICILLIN (AMOXIL) 500 MG CAPSULE        AZELASTINE (ASTELIN) 137 MCG (0.1 %) NASAL SPRAY    1 spray (137 mcg total) by Nasal route 2 (two) times daily.    BUSPIRONE (BUSPAR) 15 MG TABLET    15 mg.    LIDOCAINE (LIDODERM) 5 %    Place 1 patch onto the skin once daily. Remove & Discard patch within 12 hours or as directed by MD    NAPROXEN (NAPROSYN) 500 MG TABLET    Take 1 tablet (500 mg total) by mouth 2 (two) times daily with meals.

## 2020-11-12 NOTE — TELEPHONE ENCOUNTER
----- Message from Delma Blank sent at 11/12/2020  2:54 PM CST -----  Contact: pt  Type: Needs Medical Advice    Who Called:  Pt  Symptoms (please be specific): left ear pain  How long has patient had these symptoms:  today  Pharmacy name and phone #:    [id number] [place] [address] [facility]  [address] [place] [address]  Phone: [phone] Fax: [phone]  Best Call Back Number: [phone]  Additional Information: Please Advise ---Thank you         .

## 2020-11-12 NOTE — TELEPHONE ENCOUNTER
Spoke with patient. She saw the nurse practitioner today for a sinus infection. She blew her nose and her ear popped and is stopped up now. She was wondering if this is normal. I advised her that could happen, but it should release in time.

## 2020-11-15 ENCOUNTER — PATIENT OUTREACH (OUTPATIENT)
Dept: ADMINISTRATIVE | Facility: OTHER | Age: 25
End: 2020-11-15

## 2020-11-15 NOTE — PROGRESS NOTES
Health Maintenance Due   Topic Date Due    Pneumococcal Vaccine (Medium Risk) (1 of 1 - PPSV23) 11/16/2012    HPV Vaccines (2 - 3-dose series) 02/25/2020     Updates were requested from care everywhere.  Chart was reviewed for overdue [facility] Encounters (HOME) topics (CRS, Breast Cancer Screening, Eye exam)  Health Maintenance has been updated.  LINKS immunization registry triggered.  Immunizations were reconciled.

## 2020-11-19 ENCOUNTER — OFFICE VISIT (OUTPATIENT)
Dept: FAMILY MEDICINE | Facility: CLINIC | Age: 25
End: 2020-11-19
Payer: MEDICAID

## 2020-11-19 VITALS
WEIGHT: 267.19 LBS | TEMPERATURE: 98 F | DIASTOLIC BLOOD PRESSURE: 72 MMHG | OXYGEN SATURATION: 97 % | HEIGHT: 63 IN | BODY MASS INDEX: 47.34 KG/M2 | SYSTOLIC BLOOD PRESSURE: 112 MMHG | HEART RATE: 89 BPM

## 2020-11-19 DIAGNOSIS — F41.1 GAD (GENERALIZED ANXIETY DISORDER): Primary | ICD-10-CM

## 2020-11-19 DIAGNOSIS — E66.01 MORBID OBESITY: ICD-10-CM

## 2020-11-19 DIAGNOSIS — F32.A DEPRESSION, UNSPECIFIED DEPRESSION TYPE: ICD-10-CM

## 2020-11-19 PROCEDURE — 99213 OFFICE O/P EST LOW 20 MIN: CPT | Mod: S$PBB,,, | Performed by: FAMILY MEDICINE

## 2020-11-19 PROCEDURE — 99999 PR PBB SHADOW E&M-EST. PATIENT-LVL III: CPT | Mod: PBBFAC,,, | Performed by: FAMILY MEDICINE

## 2020-11-19 PROCEDURE — 99999 PR PBB SHADOW E&M-EST. PATIENT-LVL III: ICD-10-PCS | Mod: PBBFAC,,, | Performed by: FAMILY MEDICINE

## 2020-11-19 PROCEDURE — 99213 PR OFFICE/OUTPT VISIT, EST, LEVL III, 20-29 MIN: ICD-10-PCS | Mod: S$PBB,,, | Performed by: FAMILY MEDICINE

## 2020-11-19 PROCEDURE — 99213 OFFICE O/P EST LOW 20 MIN: CPT | Mod: PBBFAC,PO | Performed by: FAMILY MEDICINE

## 2020-11-19 RX ORDER — SERTRALINE HYDROCHLORIDE 25 MG/1
25 TABLET, FILM COATED ORAL DAILY
COMMUNITY
Start: 2020-11-12 | End: 2021-04-13 | Stop reason: DRUGHIGH

## 2020-11-19 RX ORDER — PHENTERMINE HYDROCHLORIDE 37.5 MG/1
37.5 TABLET ORAL
Qty: 30 TABLET | Refills: 0 | Status: SHIPPED | OUTPATIENT
Start: 2020-11-19 | End: 2020-12-19

## 2020-11-19 NOTE — PROGRESS NOTES
Subjective:       Patient ID: Alexandra Martin is a 25 y.o. female.    Chief Complaint: Obesity    HPI     Requesting med to curb appetite.    Sees psych for management of TRACE and depression.     Review of Systems    Objective:      Physical Exam    Assessment:       1. TRACE (generalized anxiety disorder)    2. Depression, unspecified depression type    3. Morbid obesity        Plan:       TRACE (generalized anxiety disorder)    Depression, unspecified depression type    Morbid obesity    Other orders  -     phentermine (ADIPEX-P) 37.5 mg tablet; Take 1 tablet (37.5 mg total) by mouth before breakfast.  Dispense: 30 tablet; Refill: 0              Plan:  Start adipex  Cont all other meds      Medication List with Changes/Refills   New Medications    PHENTERMINE (ADIPEX-P) 37.5 MG TABLET    Take 1 tablet (37.5 mg total) by mouth before breakfast.   Current Medications    AMOXICILLIN-CLAVULANATE 875-125MG (AUGMENTIN) 875-125 MG PER TABLET    Take 1 tablet by mouth 2 (two) times daily. for 7 days    METHYLPREDNISOLONE (MEDROL DOSEPACK) 4 MG TABLET    use as directed    NEXPLANON 68 MG IMPL SUBDERMAL DEVICE        SERTRALINE (ZOLOFT) 25 MG TABLET    Take 25 mg by mouth once daily.    VENLAFAXINE (EFFEXOR-XR) 37.5 MG 24 HR CAPSULE    TAKE 1 CAPSULE BY MOUTH EVERY AM FOR 1 WEEK, THEN 2 CAPSULES EVERY AM FOR DEPRESSION AND ANXIET

## 2020-11-24 ENCOUNTER — TELEPHONE (OUTPATIENT)
Dept: FAMILY MEDICINE | Facility: CLINIC | Age: 25
End: 2020-11-24

## 2020-11-24 NOTE — TELEPHONE ENCOUNTER
I see she was given an antibiotic for UTI--this will cover for sinusitis as well    If symptoms persists recommend ENT magdy

## 2020-11-24 NOTE — TELEPHONE ENCOUNTER
----- Message from Mahnaz Shirley sent at 11/24/2020 10:18 AM CST -----  Regarding: Advice  Contact: patient  Type: Needs Medical Advice    Who Called:  patient  Symptoms (please be specific):  n/a  How long has patient had these symptoms:  n/a  Pharmacy name and phone #:  n/a  Best Call Back Number: [phone] (home)     Additional Information: has taken all steroids and antibiotics and still feels the same maybe worse. Taking avil, tylenol and nothing is helping. Please call to advise

## 2020-12-08 ENCOUNTER — OFFICE VISIT (OUTPATIENT)
Dept: URGENT CARE | Facility: CLINIC | Age: 25
End: 2020-12-08
Payer: MEDICARE

## 2020-12-08 VITALS
RESPIRATION RATE: 16 BRPM | SYSTOLIC BLOOD PRESSURE: 101 MMHG | HEIGHT: 63 IN | HEART RATE: 83 BPM | DIASTOLIC BLOOD PRESSURE: 81 MMHG | BODY MASS INDEX: 47.31 KG/M2 | WEIGHT: 267 LBS | TEMPERATURE: 99 F | OXYGEN SATURATION: 98 %

## 2020-12-08 DIAGNOSIS — R10.9 ABDOMINAL PAIN, UNSPECIFIED ABDOMINAL LOCATION: ICD-10-CM

## 2020-12-08 DIAGNOSIS — R05.9 COUGH: ICD-10-CM

## 2020-12-08 DIAGNOSIS — B34.9 NONSPECIFIC SYNDROME SUGGESTIVE OF VIRAL ILLNESS: Primary | ICD-10-CM

## 2020-12-08 LAB
BILIRUB UR QL STRIP: NEGATIVE
CTP QC/QA: YES
GLUCOSE UR QL STRIP: NEGATIVE
KETONES UR QL STRIP: NEGATIVE
LEUKOCYTE ESTERASE UR QL STRIP: NEGATIVE
PH, POC UA: 6.5 (ref 5–8)
POC BLOOD, URINE: NEGATIVE
POC NITRATES, URINE: NEGATIVE
PROT UR QL STRIP: NEGATIVE
SARS-COV-2 RDRP RESP QL NAA+PROBE: NEGATIVE
SP GR UR STRIP: 1.02 (ref 1–1.03)
UROBILINOGEN UR STRIP-ACNC: NORMAL (ref 0.1–1.1)

## 2020-12-08 PROCEDURE — U0002: ICD-10-PCS | Mod: QW,S$GLB,, | Performed by: PHYSICIAN ASSISTANT

## 2020-12-08 PROCEDURE — U0002 COVID-19 LAB TEST NON-CDC: HCPCS | Mod: QW,S$GLB,, | Performed by: PHYSICIAN ASSISTANT

## 2020-12-08 PROCEDURE — 99214 OFFICE O/P EST MOD 30 MIN: CPT | Mod: 25,S$GLB,, | Performed by: PHYSICIAN ASSISTANT

## 2020-12-08 PROCEDURE — 81003 POCT URINALYSIS, DIPSTICK, AUTOMATED, W/O SCOPE: ICD-10-PCS | Mod: QW,S$GLB,, | Performed by: PHYSICIAN ASSISTANT

## 2020-12-08 PROCEDURE — 81003 URINALYSIS AUTO W/O SCOPE: CPT | Mod: QW,S$GLB,, | Performed by: PHYSICIAN ASSISTANT

## 2020-12-08 PROCEDURE — 99214 PR OFFICE/OUTPT VISIT, EST, LEVL IV, 30-39 MIN: ICD-10-PCS | Mod: 25,S$GLB,, | Performed by: PHYSICIAN ASSISTANT

## 2020-12-08 RX ORDER — ONDANSETRON 4 MG/1
4 TABLET, ORALLY DISINTEGRATING ORAL EVERY 8 HOURS PRN
Qty: 15 TABLET | Refills: 0 | Status: SHIPPED | OUTPATIENT
Start: 2020-12-08 | End: 2020-12-13

## 2020-12-08 RX ORDER — KETOROLAC TROMETHAMINE 30 MG/ML
30 INJECTION, SOLUTION INTRAMUSCULAR; INTRAVENOUS
Status: COMPLETED | OUTPATIENT
Start: 2020-12-08 | End: 2020-12-08

## 2020-12-08 RX ORDER — DIPHENOXYLATE HYDROCHLORIDE AND ATROPINE SULFATE 2.5; .025 MG/1; MG/1
1 TABLET ORAL 4 TIMES DAILY PRN
Qty: 30 TABLET | Refills: 0 | Status: SHIPPED | OUTPATIENT
Start: 2020-12-08 | End: 2021-02-22

## 2020-12-08 RX ADMIN — KETOROLAC TROMETHAMINE 30 MG: 30 INJECTION, SOLUTION INTRAMUSCULAR; INTRAVENOUS at 11:12

## 2020-12-08 NOTE — PROGRESS NOTES
"Subjective:       Patient ID: Alexandra Martin is a 25 y.o. female.    Vitals:  height is 5' 3" (1.6 m) and weight is 121.1 kg (267 lb). Her oral temperature is 98.9 °F (37.2 °C). Her blood pressure is 101/81 and her pulse is 83. Her respiration is 16 and oxygen saturation is 98%.     Chief Complaint: Cough    Pt presents to urgent care with fever, nausea, headache, diarrhea,fatigue, chills, stomach pains, no appetite, and cough.  Symptoms started yesterday.     Cough  This is a new problem. The current episode started yesterday. The problem has been unchanged. The problem occurs constantly. The cough is non-productive. Associated symptoms include chills, a fever and headaches. Pertinent negatives include no chest pain, myalgias, rash, sore throat or shortness of breath. Nothing aggravates the symptoms. She has tried nothing for the symptoms. The treatment provided no relief.       Constitution: Positive for chills, fatigue and fever.   HENT: Negative for congestion and sore throat.    Neck: Negative for painful lymph nodes.   Cardiovascular: Negative for chest pain and leg swelling.   Eyes: Negative for double vision and blurred vision.   Respiratory: Positive for cough. Negative for shortness of breath.    Gastrointestinal: Positive for abdominal pain, nausea and diarrhea. Negative for vomiting.   Genitourinary: Negative for dysuria, frequency, urgency and history of kidney stones.   Musculoskeletal: Negative for joint pain, joint swelling, muscle cramps and muscle ache.   Skin: Negative for color change, pale, rash and bruising.   Allergic/Immunologic: Negative for seasonal allergies.   Neurological: Positive for headaches. Negative for dizziness, history of vertigo, light-headedness and passing out.   Hematologic/Lymphatic: Negative for swollen lymph nodes.   Psychiatric/Behavioral: Negative for nervous/anxious, sleep disturbance and depression. The patient is not nervous/anxious.        Objective:      Physical " Exam   Constitutional: She is oriented to person, place, and time. She appears well-developed.  Non-toxic appearance. She does not appear ill. No distress.   HENT:   Head: Normocephalic and atraumatic.   Ears:   Right Ear: Hearing and external ear normal.   Left Ear: Hearing and external ear normal.   Eyes: Pupils are equal, round, and reactive to light. Conjunctivae and EOM are normal.   Neck: Normal range of motion. Neck supple. No neck rigidity.   Cardiovascular: Normal rate and regular rhythm.   Pulmonary/Chest: Effort normal and breath sounds normal. No respiratory distress. She has no decreased breath sounds. She has no wheezes. She has no rhonchi.   Abdominal: Soft. Bowel sounds are normal. There is generalized abdominal tenderness (mild). There is no guarding, no left CVA tenderness and no right CVA tenderness.      Comments: Normal appearance of abdomen   Musculoskeletal: Normal range of motion.   Neurological: She is alert and oriented to person, place, and time.   Skin: Skin is warm, dry and not diaphoretic. Psychiatric: Her speech is normal and behavior is normal. Mood normal.   Nursing note and vitals reviewed.    Office Visit on 12/08/2020   Component Date Value Ref Range Status    POC Rapid COVID 12/08/2020 Negative  Negative Final     Acceptable 12/08/2020 Yes   Final    POC Blood, Urine 12/08/2020 Negative  Negative Final    POC Bilirubin, Urine 12/08/2020 Negative  Negative Final    POC Urobilinogen, Urine 12/08/2020 normal  0.1 - 1.1 Final    POC Ketones, Urine 12/08/2020 Negative  Negative Final    POC Protein, Urine 12/08/2020 Negative  Negative Final    POC Nitrates, Urine 12/08/2020 Negative  Negative Final    POC Glucose, Urine 12/08/2020 Negative  Negative Final    pH, UA 12/08/2020 6.5  5 - 8 Final    POC Specific Gravity, Urine 12/08/2020 1.025  1.003 - 1.029 Final    POC Leukocytes, Urine 12/08/2020 Negative  Negative Final           Assessment:       1.  Nonspecific syndrome suggestive of viral illness    2. Cough    3. Abdominal pain, unspecified abdominal location        Plan:       All hx was provided by the pt or available as part of established EMR. The pt past medical hx, family hx, social hx, and current medications were reviewed. Interpretation of diagnostics performed today were discussed. Tx discussed. Quarantine recommendations based on CDC guidelines discussed. Pt to follow up with [facility] or PCP if no improvement or for any concern. Seek treatment in an ER for worsening. Pt voiced understanding of all discussed, and agreed.    Nonspecific syndrome suggestive of viral illness  -     ketorolac injection 30 mg  -     ondansetron (ZOFRAN-ODT) 4 MG TbDL; Take 1 tablet (4 mg total) by mouth every 8 (eight) hours as needed.  Dispense: 15 tablet; Refill: 0  -     diphenoxylate-atropine 2.5-0.025 mg (LOMOTIL) 2.5-0.025 mg per tablet; Take 1 tablet by mouth 4 (four) times daily as needed for Diarrhea.  Dispense: 30 tablet; Refill: 0    Cough  -     POCT COVID-19 Rapid Screening    Abdominal pain, unspecified abdominal location  -     POCT Urinalysis, Dipstick, Automated, W/O Scope      Patient Instructions   · Follow up with your primary care if symptoms do not improve, or you may return here at any time.  · If you were referred to a specialist, please follow up with that specialty.  · If you were prescribed antibiotics, please take them to completion.  · If you were prescribed a narcotic or any medication with sedative effects, do not drive or operate heavy equipment or machinery while taking these medications.  · You must understand that you have received treatment at an Urgent Care facility only, and that you may be released before all of your medical problems are known or treated. Urgent Care facilities are not equipped to handle life threatening emergencies. It is recommended that you seek care at an Emergency Department for further evaluation of worsening or  "concerning symptoms, or possibly life threatening conditions as discussed.                                        If you  smoke, please stop smoking               PLEASE PRACTICE SOCIAL DISTANCING      COVID19 testing is NEGATIVE and NO known high risk exposure to covid-19 virus, can exclude from work/school until:  o MINIMUM OF 24-48 hours fever-free without the use of fever-reducing medications AND  o Improvement in symptoms (e.g. cough, shortness of breath, fatigue, GI symptoms, etc)      You should follow CDC guidelines as well as your employer/school protocols for safely returning to work/school. Please be aware that there are False Negative possibilities with testing and you should return to work based upon CDC guidelines, not simply a negative result, unless your employer/school has a different RTW protocol/guidance for you. If you are able to return to work, you should still quarantine outside of work based on CDC guidance as we discussed.           Viral Syndrome (Adult)  A viral illness may cause a number of symptoms. The symptoms depend on the part of the body that the virus affects. If it settles in your nose, throat, and lungs, it may cause cough, sore throat, congestion, and sometimes headache. If it settles in your stomach and intestinal tract, it may cause vomiting and diarrhea. Sometimes it causes vague symptoms like "aching all over," feeling tired, loss of appetite, or fever.  A viral illness usually lasts 1 to 2 weeks, but sometimes it lasts longer. In some cases, a more serious infection can look like a viral syndrome in the first few days of the illness. You may need another exam and additional tests to know the difference. Watch for the warning signs listed below.  Home care  Follow these guidelines for taking care of yourself at home:  · If symptoms are severe, rest at home for the first 2 to 3 days.  · Stay away from cigarette smoke - both your smoke and the smoke from others.  · You may use " over-the-counter acetaminophen or ibuprofen for fever, muscle aching, and headache, unless another medicine was prescribed for this. If you have chronic liver or kidney disease or ever had a stomach ulcer or GI bleeding, talk with your doctor before using these medicines. No one who is younger than 18 and ill with a fever should take aspirin. It may cause severe disease or death.  · Your appetite may be poor, so a light diet is fine. Avoid dehydration by drinking 8 to 12 8-ounce glasses of fluids each day. This may include water; orange juice; lemonade; apple, grape, and cranberry juice; clear fruit drinks; electrolyte replacement and sports drinks; and decaffeinated teas and coffee. If you have been diagnosed with a kidney disease, ask your doctor how much and what types of fluids you should drink to prevent dehydration. If you have kidney disease, drinking too much fluid can cause it build up in the your body and be dangerous to your health.  · Over-the-counter remedies won't shorten the length of the illness but may be helpful for cough, sore throat; and nasal and sinus congestion. Don't use decongestants if you have high blood pressure.  Follow-up care  Follow up with your healthcare provider if you do not improve over the next week.  Call 911  Get emergency medical care if any of the following occur:  · Convulsion  · Feeling weak, dizzy, or like you are going to faint  · Chest pain, shortness of breath, wheezing, or difficulty breathing  When to seek medical advice  Call your healthcare provider right away if any of these occur:  · Cough with lots of colored sputum (mucus) or blood in your sputum  · Chest pain, shortness of breath, wheezing, or difficulty breathing  · Severe headache; face, neck, or ear pain  · Severe, constant pain in the lower right side of your belly (abdominal)  · Continued vomiting (cant keep liquids down)  · Frequent diarrhea (more than 5 times a day); blood (red or black color) or mucus  in diarrhea  · Feeling weak, dizzy, or like you are going to faint  · Extreme thirst  · Fever of 100.4°F (38°C) or higher, or as directed by your healthcare provider  Date Last Reviewed: 9/25/2015  © 2947-2801 [employer]. [address] [place] [address]. All rights reserved. This information is not intended as a substitute for professional medical care. Always follow your healthcare professional's instructions.

## 2020-12-08 NOTE — PATIENT INSTRUCTIONS
· Follow up with your primary care if symptoms do not improve, or you may return here at any time.  · If you were referred to a specialist, please follow up with that specialty.  · If you were prescribed antibiotics, please take them to completion.  · If you were prescribed a narcotic or any medication with sedative effects, do not drive or operate heavy equipment or machinery while taking these medications.  · You must understand that you have received treatment at an Urgent Care facility only, and that you may be released before all of your medical problems are known or treated. Urgent Care facilities are not equipped to handle life threatening emergencies. It is recommended that you seek care at an Emergency Department for further evaluation of worsening or concerning symptoms, or possibly life threatening conditions as discussed.                                        If you  smoke, please stop smoking               PLEASE PRACTICE SOCIAL DISTANCING      COVID19 testing is NEGATIVE and NO known high risk exposure to covid-19 virus, can exclude from work/school until:  o MINIMUM OF 24-48 hours fever-free without the use of fever-reducing medications AND  o Improvement in symptoms (e.g. cough, shortness of breath, fatigue, GI symptoms, etc)      You should follow CDC guidelines as well as your employer/school protocols for safely returning to work/school. Please be aware that there are False Negative possibilities with testing and you should return to work based upon CDC guidelines, not simply a negative result, unless your employer/school has a different RTW protocol/guidance for you. If you are able to return to work, you should still quarantine outside of work based on CDC guidance as we discussed.           Viral Syndrome (Adult)  A viral illness may cause a number of symptoms. The symptoms depend on the part of the body that the virus affects. If it settles in your nose, throat, and lungs, it may cause cough,  "sore throat, congestion, and sometimes headache. If it settles in your stomach and intestinal tract, it may cause vomiting and diarrhea. Sometimes it causes vague symptoms like "aching all over," feeling tired, loss of appetite, or fever.  A viral illness usually lasts 1 to 2 weeks, but sometimes it lasts longer. In some cases, a more serious infection can look like a viral syndrome in the first few days of the illness. You may need another exam and additional tests to know the difference. Watch for the warning signs listed below.  Home care  Follow these guidelines for taking care of yourself at home:  · If symptoms are severe, rest at home for the first 2 to 3 days.  · Stay away from cigarette smoke - both your smoke and the smoke from others.  · You may use over-the-counter acetaminophen or ibuprofen for fever, muscle aching, and headache, unless another medicine was prescribed for this. If you have chronic liver or kidney disease or ever had a stomach ulcer or GI bleeding, talk with your doctor before using these medicines. No one who is younger than 18 and ill with a fever should take aspirin. It may cause severe disease or death.  · Your appetite may be poor, so a light diet is fine. Avoid dehydration by drinking 8 to 12 8-ounce glasses of fluids each day. This may include water; orange juice; lemonade; apple, grape, and cranberry juice; clear fruit drinks; electrolyte replacement and sports drinks; and decaffeinated teas and coffee. If you have been diagnosed with a kidney disease, ask your doctor how much and what types of fluids you should drink to prevent dehydration. If you have kidney disease, drinking too much fluid can cause it build up in the your body and be dangerous to your health.  · Over-the-counter remedies won't shorten the length of the illness but may be helpful for cough, sore throat; and nasal and sinus congestion. Don't use decongestants if you have high blood pressure.  Follow-up " care  Follow up with your healthcare provider if you do not improve over the next week.  Call 911  Get emergency medical care if any of the following occur:  · Convulsion  · Feeling weak, dizzy, or like you are going to faint  · Chest pain, shortness of breath, wheezing, or difficulty breathing  When to seek medical advice  Call your healthcare provider right away if any of these occur:  · Cough with lots of colored sputum (mucus) or blood in your sputum  · Chest pain, shortness of breath, wheezing, or difficulty breathing  · Severe headache; face, neck, or ear pain  · Severe, constant pain in the lower right side of your belly (abdominal)  · Continued vomiting (cant keep liquids down)  · Frequent diarrhea (more than 5 times a day); blood (red or black color) or mucus in diarrhea  · Feeling weak, dizzy, or like you are going to faint  · Extreme thirst  · Fever of 100.4°F (38°C) or higher, or as directed by your healthcare provider  Date Last Reviewed: 9/25/2015  © 8704-9015 [employer]. [address] [place] [address]. All rights reserved. This information is not intended as a substitute for professional medical care. Always follow your healthcare professional's instructions.

## 2020-12-10 ENCOUNTER — TELEPHONE (OUTPATIENT)
Dept: FAMILY MEDICINE | Facility: CLINIC | Age: 25
End: 2020-12-10

## 2020-12-10 NOTE — TELEPHONE ENCOUNTER
She wants to make an apt to see the provider    States went to the urgent care and she states they gave her a shot and it is not helping her headache they tested her for covid and was negative but she still feels bad.     Wanted apt with dr. celestin but he is out the clinic till 12/21

## 2020-12-10 NOTE — TELEPHONE ENCOUNTER
----- Message from Tamra Torres sent at 12/10/2020  1:36 PM CST -----  Type:  Same Day Appointment Request    Caller is requesting a same day appointment.  Caller declined first available appointment listed below.      Name of Caller:  Alexandra  When is the first available appointment?  2/25/21  Symptoms:  headache for several days.  Best Call Back Number:  [phone]  Additional Information:   She was seen at urgent care, they gave her a shot.  It did not help.  They also tested her for covid, it was negative.  thank you!

## 2020-12-23 ENCOUNTER — TELEPHONE (OUTPATIENT)
Dept: FAMILY MEDICINE | Facility: CLINIC | Age: 25
End: 2020-12-23

## 2020-12-23 NOTE — TELEPHONE ENCOUNTER
----- Message from Ivory Ward sent at 12/23/2020  8:56 AM CST -----  Contact: self  Went to [facility] on 12/8 and was given an injection and it didn't work. Has had a really bad headache for about 2 weeks now and needs to see if the doctor will call in something for her.  This is the reason she missed her appt on Monday with the doctor.  Call back at [phone] (home) and thanks.    [facility]/pharmacy #[id number] - [place] - [address] AT [facility]  [address]  [place] [address]  Phone: [phone] Fax: [phone]

## 2020-12-23 NOTE — TELEPHONE ENCOUNTER
Pt is requesting a rx for headaches. Adv pt to schedule an appointment for a further eval. Pt verbalized understanding.

## 2020-12-29 ENCOUNTER — TELEPHONE (OUTPATIENT)
Dept: UROLOGY | Facility: CLINIC | Age: 25
End: 2020-12-29

## 2020-12-29 NOTE — TELEPHONE ENCOUNTER
----- Message from Tamra Macdonald sent at 12/29/2020 11:16 AM CST -----  Patient was referred to urology when she was seen in the ER for pain in her low back, radiating to her abdomen. She has previously seen by Dr Medley.  She is now requesting an appt with Dr العلي.  She is now having nausea and vomiting with the pain.  No appts were offered on [place] due to her insurance.  Please call her mother, Elizabeth, at  [phone].  Thank you!

## 2020-12-30 NOTE — TELEPHONE ENCOUNTER
LVM advising dr serrano does not take patient's insurance, gave phone number to Dr. Riggs or callback for further direction

## 2021-01-12 ENCOUNTER — TELEPHONE (OUTPATIENT)
Dept: FAMILY MEDICINE | Facility: CLINIC | Age: 26
End: 2021-01-12

## 2021-01-13 ENCOUNTER — OFFICE VISIT (OUTPATIENT)
Dept: URGENT CARE | Facility: CLINIC | Age: 26
End: 2021-01-13
Payer: MEDICAID

## 2021-01-13 VITALS
TEMPERATURE: 98 F | BODY MASS INDEX: 47.31 KG/M2 | DIASTOLIC BLOOD PRESSURE: 73 MMHG | SYSTOLIC BLOOD PRESSURE: 118 MMHG | WEIGHT: 267 LBS | HEART RATE: 85 BPM | RESPIRATION RATE: 15 BRPM | OXYGEN SATURATION: 98 % | HEIGHT: 63 IN

## 2021-01-13 DIAGNOSIS — R11.2 NAUSEA AND VOMITING, INTRACTABILITY OF VOMITING NOT SPECIFIED, UNSPECIFIED VOMITING TYPE: ICD-10-CM

## 2021-01-13 DIAGNOSIS — N20.0 KIDNEY STONE: Primary | ICD-10-CM

## 2021-01-13 DIAGNOSIS — R10.9 LEFT FLANK PAIN: ICD-10-CM

## 2021-01-13 PROCEDURE — 99214 OFFICE O/P EST MOD 30 MIN: CPT | Mod: 25,S$GLB,, | Performed by: PHYSICIAN ASSISTANT

## 2021-01-13 PROCEDURE — 99214 PR OFFICE/OUTPT VISIT, EST, LEVL IV, 30-39 MIN: ICD-10-PCS | Mod: 25,S$GLB,, | Performed by: PHYSICIAN ASSISTANT

## 2021-01-13 RX ORDER — ONDANSETRON 4 MG/1
4 TABLET, ORALLY DISINTEGRATING ORAL EVERY 8 HOURS PRN
Qty: 30 TABLET | Refills: 0 | Status: SHIPPED | OUTPATIENT
Start: 2021-01-13 | End: 2021-04-06

## 2021-01-13 RX ORDER — TAMSULOSIN HYDROCHLORIDE 0.4 MG/1
1 CAPSULE ORAL DAILY
COMMUNITY
Start: 2021-01-04 | End: 2021-04-06

## 2021-01-13 RX ORDER — ONDANSETRON 2 MG/ML
4 INJECTION INTRAMUSCULAR; INTRAVENOUS
Status: COMPLETED | OUTPATIENT
Start: 2021-01-13 | End: 2021-01-13

## 2021-01-13 RX ORDER — SERTRALINE HYDROCHLORIDE 100 MG/1
TABLET, FILM COATED ORAL
COMMUNITY
Start: 2021-01-05 | End: 2021-04-13

## 2021-01-13 RX ORDER — CIPROFLOXACIN 500 MG/1
500 TABLET ORAL 2 TIMES DAILY
COMMUNITY
Start: 2021-01-04 | End: 2021-04-06

## 2021-01-13 RX ORDER — HYDROCODONE BITARTRATE AND ACETAMINOPHEN 7.5; 325 MG/1; MG/1
1 TABLET ORAL EVERY 8 HOURS PRN
COMMUNITY
Start: 2021-01-04 | End: 2021-04-06

## 2021-01-13 RX ORDER — KETOROLAC TROMETHAMINE 30 MG/ML
30 INJECTION, SOLUTION INTRAMUSCULAR; INTRAVENOUS
Status: COMPLETED | OUTPATIENT
Start: 2021-01-13 | End: 2021-01-13

## 2021-01-13 RX ORDER — TRAZODONE HYDROCHLORIDE 50 MG/1
50 TABLET ORAL NIGHTLY PRN
COMMUNITY
Start: 2020-12-01 | End: 2021-04-06

## 2021-01-13 RX ADMIN — ONDANSETRON 4 MG: 2 INJECTION INTRAMUSCULAR; INTRAVENOUS at 12:01

## 2021-01-13 RX ADMIN — KETOROLAC TROMETHAMINE 30 MG: 30 INJECTION, SOLUTION INTRAMUSCULAR; INTRAVENOUS at 12:01

## 2021-01-19 ENCOUNTER — TELEPHONE (OUTPATIENT)
Dept: FAMILY MEDICINE | Facility: CLINIC | Age: 26
End: 2021-01-19

## 2021-02-22 ENCOUNTER — OFFICE VISIT (OUTPATIENT)
Dept: FAMILY MEDICINE | Facility: CLINIC | Age: 26
End: 2021-02-22
Payer: MEDICARE

## 2021-02-22 DIAGNOSIS — G43.911 INTRACTABLE MIGRAINE WITH STATUS MIGRAINOSUS, UNSPECIFIED MIGRAINE TYPE: ICD-10-CM

## 2021-02-22 DIAGNOSIS — E66.01 MORBID OBESITY: Primary | ICD-10-CM

## 2021-02-22 PROCEDURE — 99213 PR OFFICE/OUTPT VISIT, EST, LEVL III, 20-29 MIN: ICD-10-PCS | Mod: 95,,, | Performed by: NURSE PRACTITIONER

## 2021-02-22 PROCEDURE — 99213 OFFICE O/P EST LOW 20 MIN: CPT | Mod: 95,,, | Performed by: NURSE PRACTITIONER

## 2021-02-22 RX ORDER — TOPIRAMATE 50 MG/1
50 TABLET, FILM COATED ORAL NIGHTLY
Qty: 30 TABLET | Refills: 1 | Status: SHIPPED | OUTPATIENT
Start: 2021-02-22 | End: 2021-04-06

## 2021-02-23 ENCOUNTER — TELEPHONE (OUTPATIENT)
Dept: BARIATRICS | Facility: CLINIC | Age: 26
End: 2021-02-23

## 2021-03-02 ENCOUNTER — TELEPHONE (OUTPATIENT)
Dept: BARIATRICS | Facility: CLINIC | Age: 26
End: 2021-03-02

## 2021-03-12 ENCOUNTER — TELEPHONE (OUTPATIENT)
Dept: BARIATRICS | Facility: CLINIC | Age: 26
End: 2021-03-12

## 2021-03-22 ENCOUNTER — TELEPHONE (OUTPATIENT)
Dept: BARIATRICS | Facility: CLINIC | Age: 26
End: 2021-03-22

## 2021-03-30 ENCOUNTER — TELEPHONE (OUTPATIENT)
Dept: FAMILY MEDICINE | Facility: CLINIC | Age: 26
End: 2021-03-30

## 2021-03-31 ENCOUNTER — OFFICE VISIT (OUTPATIENT)
Dept: BARIATRICS | Facility: CLINIC | Age: 26
End: 2021-03-31
Payer: MEDICARE

## 2021-03-31 ENCOUNTER — HOSPITAL ENCOUNTER (OUTPATIENT)
Dept: CARDIOLOGY | Facility: HOSPITAL | Age: 26
Discharge: HOME | End: 2021-03-31
Attending: SURGERY
Payer: MEDICARE

## 2021-03-31 VITALS
RESPIRATION RATE: 16 BRPM | DIASTOLIC BLOOD PRESSURE: 87 MMHG | HEIGHT: 63 IN | TEMPERATURE: 98 F | SYSTOLIC BLOOD PRESSURE: 119 MMHG | BODY MASS INDEX: 48.98 KG/M2 | WEIGHT: 276.44 LBS | HEART RATE: 95 BPM

## 2021-03-31 DIAGNOSIS — G47.9 SLEEP DISTURBANCE: ICD-10-CM

## 2021-03-31 DIAGNOSIS — E66.01 MORBID OBESITY WITH BMI OF 45.0-49.9, ADULT: ICD-10-CM

## 2021-03-31 DIAGNOSIS — E66.01 MORBID OBESITY: ICD-10-CM

## 2021-03-31 DIAGNOSIS — E66.01 MORBID OBESITY: Primary | ICD-10-CM

## 2021-03-31 DIAGNOSIS — Z01.818 OTHER SPECIFIED PRE-OPERATIVE EXAMINATION: ICD-10-CM

## 2021-03-31 DIAGNOSIS — G47.33 OBSTRUCTIVE SLEEP APNEA OF ADULT: ICD-10-CM

## 2021-03-31 PROCEDURE — 99205 OFFICE O/P NEW HI 60 MIN: CPT | Mod: S$PBB,,, | Performed by: SURGERY

## 2021-03-31 PROCEDURE — 93010 ELECTROCARDIOGRAM REPORT: CPT | Mod: ,,, | Performed by: INTERNAL MEDICINE

## 2021-03-31 PROCEDURE — 99999 PR PBB SHADOW E&M-EST. PATIENT-LVL V: CPT | Mod: PBBFAC,,, | Performed by: SURGERY

## 2021-03-31 PROCEDURE — 99215 OFFICE O/P EST HI 40 MIN: CPT | Mod: PBBFAC,PN | Performed by: SURGERY

## 2021-03-31 PROCEDURE — 93010 EKG 12-LEAD: ICD-10-PCS | Mod: ,,, | Performed by: INTERNAL MEDICINE

## 2021-03-31 PROCEDURE — 99205 PR OFFICE/OUTPT VISIT, NEW, LEVL V, 60-74 MIN: ICD-10-PCS | Mod: S$PBB,,, | Performed by: SURGERY

## 2021-03-31 PROCEDURE — 93005 ELECTROCARDIOGRAM TRACING: CPT

## 2021-03-31 PROCEDURE — 99999 PR PBB SHADOW E&M-EST. PATIENT-LVL V: ICD-10-PCS | Mod: PBBFAC,,, | Performed by: SURGERY

## 2021-04-01 ENCOUNTER — TELEPHONE (OUTPATIENT)
Dept: BARIATRICS | Facility: CLINIC | Age: 26
End: 2021-04-01

## 2021-04-01 ENCOUNTER — PATIENT MESSAGE (OUTPATIENT)
Dept: BARIATRICS | Facility: CLINIC | Age: 26
End: 2021-04-01

## 2021-04-05 ENCOUNTER — TELEPHONE (OUTPATIENT)
Dept: BARIATRICS | Facility: CLINIC | Age: 26
End: 2021-04-05

## 2021-04-05 ENCOUNTER — TELEPHONE (OUTPATIENT)
Dept: FAMILY MEDICINE | Facility: CLINIC | Age: 26
End: 2021-04-05

## 2021-04-06 ENCOUNTER — OFFICE VISIT (OUTPATIENT)
Dept: FAMILY MEDICINE | Facility: CLINIC | Age: 26
End: 2021-04-06
Payer: MEDICARE

## 2021-04-06 ENCOUNTER — HOSPITAL ENCOUNTER (OUTPATIENT)
Dept: PREADMISSION TESTING | Facility: HOSPITAL | Age: 26
Discharge: HOME | End: 2021-04-06
Attending: SURGERY
Payer: MEDICARE

## 2021-04-06 DIAGNOSIS — G43.901 MIGRAINE WITH STATUS MIGRAINOSUS, NOT INTRACTABLE, UNSPECIFIED MIGRAINE TYPE: Primary | ICD-10-CM

## 2021-04-06 DIAGNOSIS — Z01.818 OTHER SPECIFIED PRE-OPERATIVE EXAMINATION: ICD-10-CM

## 2021-04-06 DIAGNOSIS — G51.0 LEFT-SIDED BELL'S PALSY: ICD-10-CM

## 2021-04-06 PROCEDURE — U0003 INFECTIOUS AGENT DETECTION BY NUCLEIC ACID (DNA OR RNA); SEVERE ACUTE RESPIRATORY SYNDROME CORONAVIRUS 2 (SARS-COV-2) (CORONAVIRUS DISEASE [COVID-19]), AMPLIFIED PROBE TECHNIQUE, MAKING USE OF HIGH THROUGHPUT TECHNOLOGIES AS DESCRIBED BY CMS-2020-01-R: HCPCS | Performed by: SURGERY

## 2021-04-06 PROCEDURE — 99442 PR PHYSICIAN TELEPHONE EVALUATION 11-20 MIN: ICD-10-PCS | Mod: 95,,, | Performed by: PHYSICIAN ASSISTANT

## 2021-04-06 PROCEDURE — U0005 INFEC AGEN DETEC AMPLI PROBE: HCPCS | Performed by: SURGERY

## 2021-04-06 PROCEDURE — 99442 PR PHYSICIAN TELEPHONE EVALUATION 11-20 MIN: CPT | Mod: 95,,, | Performed by: PHYSICIAN ASSISTANT

## 2021-04-06 RX ORDER — RIZATRIPTAN BENZOATE 10 MG/1
10 TABLET ORAL
Qty: 15 TABLET | Refills: 1 | Status: SHIPPED | OUTPATIENT
Start: 2021-04-06 | End: 2021-06-17

## 2021-04-07 LAB — SARS-COV-2 RNA RESP QL NAA+PROBE: NOT DETECTED

## 2021-04-08 ENCOUNTER — TELEPHONE (OUTPATIENT)
Dept: NEUROLOGY | Facility: CLINIC | Age: 26
End: 2021-04-08

## 2021-04-09 ENCOUNTER — PROCEDURE VISIT (OUTPATIENT)
Dept: SLEEP MEDICINE | Facility: HOSPITAL | Age: 26
End: 2021-04-09
Attending: SURGERY
Payer: MEDICARE

## 2021-04-09 DIAGNOSIS — G47.33 OBSTRUCTIVE SLEEP APNEA OF ADULT: ICD-10-CM

## 2021-04-09 PROCEDURE — 95810 POLYSOM 6/> YRS 4/> PARAM: CPT

## 2021-04-13 ENCOUNTER — OFFICE VISIT (OUTPATIENT)
Dept: PSYCHIATRY | Facility: CLINIC | Age: 26
End: 2021-04-13
Payer: MEDICARE

## 2021-04-13 VITALS
SYSTOLIC BLOOD PRESSURE: 117 MMHG | BODY MASS INDEX: 48.87 KG/M2 | HEART RATE: 67 BPM | DIASTOLIC BLOOD PRESSURE: 65 MMHG | WEIGHT: 275.81 LBS | HEIGHT: 63 IN

## 2021-04-13 DIAGNOSIS — E66.01 CLASS 3 SEVERE OBESITY DUE TO EXCESS CALORIES WITH BODY MASS INDEX (BMI) OF 40.0 TO 44.9 IN ADULT, UNSPECIFIED WHETHER SERIOUS COMORBIDITY PRESENT: ICD-10-CM

## 2021-04-13 DIAGNOSIS — E66.01 MORBID OBESITY: ICD-10-CM

## 2021-04-13 DIAGNOSIS — F41.1 GAD (GENERALIZED ANXIETY DISORDER): ICD-10-CM

## 2021-04-13 DIAGNOSIS — F12.10 MARIJUANA ABUSE: ICD-10-CM

## 2021-04-13 DIAGNOSIS — Z71.89 ENCOUNTER FOR PSYCHOLOGICAL ASSESSMENT PRIOR TO BARIATRIC SURGERY: Primary | ICD-10-CM

## 2021-04-13 PROCEDURE — 90792 PR PSYCHIATRIC DIAGNOSTIC EVALUATION W/MEDICAL SERVICES: ICD-10-PCS | Mod: ,,, | Performed by: PSYCHIATRY & NEUROLOGY

## 2021-04-13 PROCEDURE — 99213 OFFICE O/P EST LOW 20 MIN: CPT | Mod: PBBFAC,PO | Performed by: PSYCHIATRY & NEUROLOGY

## 2021-04-13 PROCEDURE — 90792 PSYCH DIAG EVAL W/MED SRVCS: CPT | Mod: ,,, | Performed by: PSYCHIATRY & NEUROLOGY

## 2021-04-13 PROCEDURE — 99999 PR PBB SHADOW E&M-EST. PATIENT-LVL III: ICD-10-PCS | Mod: PBBFAC,,, | Performed by: PSYCHIATRY & NEUROLOGY

## 2021-04-13 PROCEDURE — 99999 PR PBB SHADOW E&M-EST. PATIENT-LVL III: CPT | Mod: PBBFAC,,, | Performed by: PSYCHIATRY & NEUROLOGY

## 2021-04-13 RX ORDER — SERTRALINE HYDROCHLORIDE 100 MG/1
150 TABLET, FILM COATED ORAL DAILY
Qty: 45 TABLET | Refills: 0 | Status: SHIPPED | OUTPATIENT
Start: 2021-04-13 | End: 2021-05-12

## 2021-04-14 ENCOUNTER — OFFICE VISIT (OUTPATIENT)
Dept: FAMILY MEDICINE | Facility: CLINIC | Age: 26
End: 2021-04-14
Payer: MEDICARE

## 2021-04-14 ENCOUNTER — OFFICE VISIT (OUTPATIENT)
Dept: PSYCHIATRY | Facility: CLINIC | Age: 26
End: 2021-04-14
Payer: MEDICARE

## 2021-04-14 DIAGNOSIS — G51.0 BELL'S PALSY: Primary | ICD-10-CM

## 2021-04-14 DIAGNOSIS — F33.1 MAJOR DEPRESSIVE DISORDER, RECURRENT, MODERATE: ICD-10-CM

## 2021-04-14 DIAGNOSIS — F43.10 POST TRAUMATIC STRESS DISORDER: ICD-10-CM

## 2021-04-14 DIAGNOSIS — F41.1 GAD (GENERALIZED ANXIETY DISORDER): ICD-10-CM

## 2021-04-14 DIAGNOSIS — F41.0 PANIC DISORDER WITHOUT AGORAPHOBIA: Primary | ICD-10-CM

## 2021-04-14 PROCEDURE — 99999 PR PBB SHADOW E&M-EST. PATIENT-LVL I: CPT | Mod: PBBFAC,,, | Performed by: PSYCHOLOGIST

## 2021-04-14 PROCEDURE — 90791 PR PSYCHIATRIC DIAGNOSTIC EVALUATION: ICD-10-PCS | Mod: ,,, | Performed by: PSYCHOLOGIST

## 2021-04-14 PROCEDURE — 99213 OFFICE O/P EST LOW 20 MIN: CPT | Mod: 95,,, | Performed by: FAMILY MEDICINE

## 2021-04-14 PROCEDURE — 90791 PSYCH DIAGNOSTIC EVALUATION: CPT | Mod: ,,, | Performed by: PSYCHOLOGIST

## 2021-04-14 PROCEDURE — 99211 OFF/OP EST MAY X REQ PHY/QHP: CPT | Mod: PBBFAC,PN | Performed by: PSYCHOLOGIST

## 2021-04-14 PROCEDURE — 99999 PR PBB SHADOW E&M-EST. PATIENT-LVL I: ICD-10-PCS | Mod: PBBFAC,,, | Performed by: PSYCHOLOGIST

## 2021-04-14 PROCEDURE — 99213 PR OFFICE/OUTPT VISIT, EST, LEVL III, 20-29 MIN: ICD-10-PCS | Mod: 95,,, | Performed by: FAMILY MEDICINE

## 2021-04-14 RX ORDER — VALACYCLOVIR HYDROCHLORIDE 1 G/1
1000 TABLET, FILM COATED ORAL 3 TIMES DAILY
Qty: 30 TABLET | Refills: 0 | Status: SHIPPED | OUTPATIENT
Start: 2021-04-14 | End: 2021-06-17

## 2021-04-14 RX ORDER — PREDNISONE 20 MG/1
60 TABLET ORAL DAILY
Qty: 24 TABLET | Refills: 0 | Status: SHIPPED | OUTPATIENT
Start: 2021-04-14 | End: 2021-06-17

## 2021-04-20 ENCOUNTER — TELEPHONE (OUTPATIENT)
Dept: BARIATRICS | Facility: CLINIC | Age: 26
End: 2021-04-20

## 2021-04-20 ENCOUNTER — OFFICE VISIT (OUTPATIENT)
Dept: PSYCHIATRY | Facility: CLINIC | Age: 26
End: 2021-04-20
Payer: MEDICARE

## 2021-04-20 DIAGNOSIS — F43.10 POST TRAUMATIC STRESS DISORDER: Primary | ICD-10-CM

## 2021-04-20 PROCEDURE — 90834 PSYTX W PT 45 MINUTES: CPT | Mod: ,,, | Performed by: PSYCHOLOGIST

## 2021-04-20 PROCEDURE — 90834 PR PSYCHOTHERAPY W/PATIENT, 45 MIN: ICD-10-PCS | Mod: ,,, | Performed by: PSYCHOLOGIST

## 2021-04-21 ENCOUNTER — HOSPITAL ENCOUNTER (OUTPATIENT)
Dept: RADIOLOGY | Facility: HOSPITAL | Age: 26
Discharge: HOME | End: 2021-04-21
Attending: SURGERY
Payer: MEDICARE

## 2021-04-21 DIAGNOSIS — E66.01 MORBID OBESITY: ICD-10-CM

## 2021-04-21 PROCEDURE — A9698 NON-RAD CONTRAST MATERIALNOC: HCPCS | Mod: PO | Performed by: SURGERY

## 2021-04-21 PROCEDURE — 74240 X-RAY XM UPR GI TRC 1CNTRST: CPT | Mod: TC,FY,PO

## 2021-04-21 PROCEDURE — 25500020 PHARM REV CODE 255: Mod: PO | Performed by: SURGERY

## 2021-04-21 PROCEDURE — 74240 FL UPPER GI: ICD-10-PCS | Mod: 26,,, | Performed by: RADIOLOGY

## 2021-04-21 PROCEDURE — 74240 X-RAY XM UPR GI TRC 1CNTRST: CPT | Mod: 26,,, | Performed by: RADIOLOGY

## 2021-04-21 RX ADMIN — Medication: at 10:04

## 2021-04-21 RX ADMIN — BARIUM SULFATE 355 ML: 0.6 SUSPENSION ORAL at 10:04

## 2021-04-23 ENCOUNTER — OFFICE VISIT (OUTPATIENT)
Dept: BARIATRICS | Facility: CLINIC | Age: 26
End: 2021-04-23
Payer: MEDICARE

## 2021-04-23 ENCOUNTER — CLINICAL SUPPORT (OUTPATIENT)
Dept: BARIATRICS | Facility: CLINIC | Age: 26
End: 2021-04-23
Payer: MEDICARE

## 2021-04-23 VITALS
DIASTOLIC BLOOD PRESSURE: 81 MMHG | HEIGHT: 63 IN | SYSTOLIC BLOOD PRESSURE: 149 MMHG | WEIGHT: 276.19 LBS | BODY MASS INDEX: 48.94 KG/M2 | TEMPERATURE: 99 F | RESPIRATION RATE: 16 BRPM | HEART RATE: 75 BPM

## 2021-04-23 VITALS — WEIGHT: 281.75 LBS | HEIGHT: 63 IN | BODY MASS INDEX: 49.92 KG/M2

## 2021-04-23 DIAGNOSIS — E66.01 MORBID OBESITY: Primary | ICD-10-CM

## 2021-04-23 DIAGNOSIS — G47.33 OBSTRUCTIVE SLEEP APNEA OF ADULT: ICD-10-CM

## 2021-04-23 DIAGNOSIS — Z71.3 DIETARY COUNSELING AND SURVEILLANCE: ICD-10-CM

## 2021-04-23 DIAGNOSIS — E66.01 MORBID OBESITY WITH BMI OF 45.0-49.9, ADULT: ICD-10-CM

## 2021-04-23 DIAGNOSIS — E66.01 MORBID OBESITY WITH BMI OF 45.0-49.9, ADULT: Primary | ICD-10-CM

## 2021-04-23 PROCEDURE — 99999 PR PBB SHADOW E&M-EST. PATIENT-LVL II: ICD-10-PCS | Mod: PBBFAC,,, | Performed by: DIETITIAN, REGISTERED

## 2021-04-23 PROCEDURE — 99213 OFFICE O/P EST LOW 20 MIN: CPT | Mod: PBBFAC,27,PO | Performed by: SURGERY

## 2021-04-23 PROCEDURE — 99999 PR PBB SHADOW E&M-EST. PATIENT-LVL III: CPT | Mod: PBBFAC,,, | Performed by: SURGERY

## 2021-04-23 PROCEDURE — 97802 MEDICAL NUTRITION INDIV IN: CPT | Mod: PBBFAC,PO | Performed by: DIETITIAN, REGISTERED

## 2021-04-23 PROCEDURE — 99213 PR OFFICE/OUTPT VISIT, EST, LEVL III, 20-29 MIN: ICD-10-PCS | Mod: S$PBB,,, | Performed by: SURGERY

## 2021-04-23 PROCEDURE — 99212 OFFICE O/P EST SF 10 MIN: CPT | Mod: PBBFAC,PO | Performed by: DIETITIAN, REGISTERED

## 2021-04-23 PROCEDURE — 99999 PR PBB SHADOW E&M-EST. PATIENT-LVL II: CPT | Mod: PBBFAC,,, | Performed by: DIETITIAN, REGISTERED

## 2021-04-23 PROCEDURE — 99213 OFFICE O/P EST LOW 20 MIN: CPT | Mod: S$PBB,,, | Performed by: SURGERY

## 2021-04-23 PROCEDURE — 99999 PR PBB SHADOW E&M-EST. PATIENT-LVL III: ICD-10-PCS | Mod: PBBFAC,,, | Performed by: SURGERY

## 2021-04-23 RX ORDER — CLINDAMYCIN HYDROCHLORIDE 300 MG/1
300 CAPSULE ORAL EVERY 6 HOURS
COMMUNITY
Start: 2021-04-22 | End: 2021-05-18

## 2021-04-23 RX ORDER — HYDROCODONE BITARTRATE AND ACETAMINOPHEN 7.5; 325 MG/1; MG/1
TABLET ORAL
COMMUNITY
Start: 2021-04-22 | End: 2021-06-17

## 2021-04-23 RX ORDER — OMEPRAZOLE 40 MG/1
40 CAPSULE, DELAYED RELEASE ORAL DAILY
COMMUNITY
Start: 2021-03-15 | End: 2021-06-17

## 2021-04-27 ENCOUNTER — OFFICE VISIT (OUTPATIENT)
Dept: PSYCHIATRY | Facility: CLINIC | Age: 26
End: 2021-04-27
Payer: MEDICARE

## 2021-04-27 DIAGNOSIS — F43.10 POST TRAUMATIC STRESS DISORDER: Primary | ICD-10-CM

## 2021-04-27 PROCEDURE — 90834 PR PSYCHOTHERAPY W/PATIENT, 45 MIN: ICD-10-PCS | Mod: ,,, | Performed by: PSYCHOLOGIST

## 2021-04-27 PROCEDURE — 90834 PSYTX W PT 45 MINUTES: CPT | Mod: ,,, | Performed by: PSYCHOLOGIST

## 2021-04-29 ENCOUNTER — TELEPHONE (OUTPATIENT)
Dept: OPTOMETRY | Facility: CLINIC | Age: 26
End: 2021-04-29

## 2021-05-12 ENCOUNTER — OFFICE VISIT (OUTPATIENT)
Dept: PSYCHIATRY | Facility: CLINIC | Age: 26
End: 2021-05-12
Payer: MEDICARE

## 2021-05-12 VITALS
BODY MASS INDEX: 48.64 KG/M2 | HEART RATE: 81 BPM | SYSTOLIC BLOOD PRESSURE: 126 MMHG | HEIGHT: 63 IN | DIASTOLIC BLOOD PRESSURE: 85 MMHG | WEIGHT: 274.5 LBS

## 2021-05-12 DIAGNOSIS — Z71.89 ENCOUNTER FOR PSYCHOLOGICAL ASSESSMENT PRIOR TO BARIATRIC SURGERY: ICD-10-CM

## 2021-05-12 DIAGNOSIS — E66.01 CLASS 3 SEVERE OBESITY DUE TO EXCESS CALORIES WITH BODY MASS INDEX (BMI) OF 40.0 TO 44.9 IN ADULT, UNSPECIFIED WHETHER SERIOUS COMORBIDITY PRESENT: Primary | ICD-10-CM

## 2021-05-12 DIAGNOSIS — F90.0 ADHD (ATTENTION DEFICIT HYPERACTIVITY DISORDER), INATTENTIVE TYPE: ICD-10-CM

## 2021-05-12 DIAGNOSIS — F33.1 MDD (MAJOR DEPRESSIVE DISORDER), RECURRENT EPISODE, MODERATE: ICD-10-CM

## 2021-05-12 DIAGNOSIS — F39 UNSPECIFIED MOOD (AFFECTIVE) DISORDER: ICD-10-CM

## 2021-05-12 DIAGNOSIS — F41.1 GAD (GENERALIZED ANXIETY DISORDER): ICD-10-CM

## 2021-05-12 PROCEDURE — 90792 PR PSYCHIATRIC DIAGNOSTIC EVALUATION W/MEDICAL SERVICES: ICD-10-PCS | Mod: ,,, | Performed by: PSYCHIATRY & NEUROLOGY

## 2021-05-12 PROCEDURE — 99212 OFFICE O/P EST SF 10 MIN: CPT | Mod: PBBFAC,PO | Performed by: PSYCHIATRY & NEUROLOGY

## 2021-05-12 PROCEDURE — 99999 PR PBB SHADOW E&M-EST. PATIENT-LVL II: CPT | Mod: PBBFAC,,, | Performed by: PSYCHIATRY & NEUROLOGY

## 2021-05-12 PROCEDURE — 90792 PSYCH DIAG EVAL W/MED SRVCS: CPT | Mod: ,,, | Performed by: PSYCHIATRY & NEUROLOGY

## 2021-05-12 PROCEDURE — 99999 PR PBB SHADOW E&M-EST. PATIENT-LVL II: ICD-10-PCS | Mod: PBBFAC,,, | Performed by: PSYCHIATRY & NEUROLOGY

## 2021-05-12 RX ORDER — SERTRALINE HYDROCHLORIDE 100 MG/1
200 TABLET, FILM COATED ORAL DAILY
Qty: 60 TABLET | Refills: 0 | Status: SHIPPED | OUTPATIENT
Start: 2021-05-12 | End: 2021-05-12

## 2021-05-12 RX ORDER — CLONAZEPAM 0.5 MG/1
0.25 TABLET ORAL DAILY PRN
Qty: 15 TABLET | Refills: 0 | Status: SHIPPED | OUTPATIENT
Start: 2021-05-12 | End: 2021-05-12 | Stop reason: SDUPTHER

## 2021-05-12 RX ORDER — SERTRALINE HYDROCHLORIDE 100 MG/1
200 TABLET, FILM COATED ORAL DAILY
Qty: 60 TABLET | Refills: 0 | Status: SHIPPED | OUTPATIENT
Start: 2021-05-12 | End: 2021-07-02

## 2021-05-12 RX ORDER — CLONAZEPAM 0.5 MG/1
0.25 TABLET ORAL DAILY PRN
Qty: 15 TABLET | Refills: 0 | Status: SHIPPED | OUTPATIENT
Start: 2021-05-12 | End: 2021-06-17

## 2021-05-17 ENCOUNTER — TELEPHONE (OUTPATIENT)
Dept: PSYCHIATRY | Facility: CLINIC | Age: 26
End: 2021-05-17

## 2021-05-17 ENCOUNTER — TELEPHONE (OUTPATIENT)
Dept: FAMILY MEDICINE | Facility: CLINIC | Age: 26
End: 2021-05-17

## 2021-05-18 ENCOUNTER — OFFICE VISIT (OUTPATIENT)
Dept: FAMILY MEDICINE | Facility: CLINIC | Age: 26
End: 2021-05-18
Payer: MEDICARE

## 2021-05-18 ENCOUNTER — OFFICE VISIT (OUTPATIENT)
Dept: PSYCHIATRY | Facility: CLINIC | Age: 26
End: 2021-05-18
Payer: MEDICARE

## 2021-05-18 VITALS
OXYGEN SATURATION: 98 % | BODY MASS INDEX: 50.46 KG/M2 | DIASTOLIC BLOOD PRESSURE: 72 MMHG | HEART RATE: 80 BPM | SYSTOLIC BLOOD PRESSURE: 118 MMHG | WEIGHT: 284.81 LBS

## 2021-05-18 DIAGNOSIS — F43.10 POST TRAUMATIC STRESS DISORDER: Primary | ICD-10-CM

## 2021-05-18 DIAGNOSIS — H60.90 OTITIS EXTERNA, UNSPECIFIED CHRONICITY, UNSPECIFIED LATERALITY, UNSPECIFIED TYPE: Primary | ICD-10-CM

## 2021-05-18 DIAGNOSIS — M26.622 ARTHRALGIA OF LEFT TEMPOROMANDIBULAR JOINT: ICD-10-CM

## 2021-05-18 PROCEDURE — 96372 THER/PROPH/DIAG INJ SC/IM: CPT | Mod: PBBFAC,PO

## 2021-05-18 PROCEDURE — 99214 OFFICE O/P EST MOD 30 MIN: CPT | Mod: S$PBB,,, | Performed by: NURSE PRACTITIONER

## 2021-05-18 PROCEDURE — 99211 OFF/OP EST MAY X REQ PHY/QHP: CPT | Mod: PBBFAC,PN,25 | Performed by: PSYCHOLOGIST

## 2021-05-18 PROCEDURE — 99999 PR PBB SHADOW E&M-EST. PATIENT-LVL I: CPT | Mod: PBBFAC,,, | Performed by: PSYCHOLOGIST

## 2021-05-18 PROCEDURE — 90834 PSYTX W PT 45 MINUTES: CPT | Mod: ,,, | Performed by: PSYCHOLOGIST

## 2021-05-18 PROCEDURE — 99214 OFFICE O/P EST MOD 30 MIN: CPT | Mod: PBBFAC,27,PO | Performed by: NURSE PRACTITIONER

## 2021-05-18 PROCEDURE — 99214 PR OFFICE/OUTPT VISIT, EST, LEVL IV, 30-39 MIN: ICD-10-PCS | Mod: S$PBB,,, | Performed by: NURSE PRACTITIONER

## 2021-05-18 PROCEDURE — 99999 PR PBB SHADOW E&M-EST. PATIENT-LVL I: ICD-10-PCS | Mod: PBBFAC,,, | Performed by: PSYCHOLOGIST

## 2021-05-18 PROCEDURE — 90834 PR PSYCHOTHERAPY W/PATIENT, 45 MIN: ICD-10-PCS | Mod: ,,, | Performed by: PSYCHOLOGIST

## 2021-05-18 PROCEDURE — 99999 PR PBB SHADOW E&M-EST. PATIENT-LVL IV: CPT | Mod: PBBFAC,,, | Performed by: NURSE PRACTITIONER

## 2021-05-18 PROCEDURE — 99999 PR PBB SHADOW E&M-EST. PATIENT-LVL IV: ICD-10-PCS | Mod: PBBFAC,,, | Performed by: NURSE PRACTITIONER

## 2021-05-18 RX ORDER — AMOXICILLIN AND CLAVULANATE POTASSIUM 875; 125 MG/1; MG/1
1 TABLET, FILM COATED ORAL EVERY 12 HOURS
Qty: 14 TABLET | Refills: 0 | Status: SHIPPED | OUTPATIENT
Start: 2021-05-18 | End: 2021-05-25

## 2021-05-18 RX ORDER — NEOMYCIN SULFATE, POLYMYXIN B SULFATE AND HYDROCORTISONE 10; 3.5; 1 MG/ML; MG/ML; [USP'U]/ML
3 SUSPENSION/ DROPS AURICULAR (OTIC) 3 TIMES DAILY
Qty: 10 ML | Refills: 0 | Status: SHIPPED | OUTPATIENT
Start: 2021-05-18 | End: 2021-06-17

## 2021-05-18 RX ORDER — KETOROLAC TROMETHAMINE 30 MG/ML
60 INJECTION, SOLUTION INTRAMUSCULAR; INTRAVENOUS
Status: COMPLETED | OUTPATIENT
Start: 2021-05-18 | End: 2021-05-18

## 2021-05-18 RX ADMIN — KETOROLAC TROMETHAMINE 60 MG: 30 INJECTION, SOLUTION INTRAMUSCULAR at 12:05

## 2021-05-24 ENCOUNTER — OFFICE VISIT (OUTPATIENT)
Dept: OTOLARYNGOLOGY | Facility: CLINIC | Age: 26
End: 2021-05-24
Payer: MEDICARE

## 2021-05-24 ENCOUNTER — TELEPHONE (OUTPATIENT)
Dept: FAMILY MEDICINE | Facility: CLINIC | Age: 26
End: 2021-05-24

## 2021-05-24 VITALS — BODY MASS INDEX: 50.23 KG/M2 | HEIGHT: 63 IN | WEIGHT: 283.5 LBS

## 2021-05-24 DIAGNOSIS — M79.2 NEUROPATHIC PAIN: Primary | ICD-10-CM

## 2021-05-24 DIAGNOSIS — G51.0 LEFT-SIDED BELL'S PALSY: ICD-10-CM

## 2021-05-24 PROCEDURE — 99204 OFFICE O/P NEW MOD 45 MIN: CPT | Mod: S$PBB,,, | Performed by: OTOLARYNGOLOGY

## 2021-05-24 PROCEDURE — 99213 OFFICE O/P EST LOW 20 MIN: CPT | Mod: PBBFAC,PO | Performed by: OTOLARYNGOLOGY

## 2021-05-24 PROCEDURE — 99999 PR PBB SHADOW E&M-EST. PATIENT-LVL III: CPT | Mod: PBBFAC,,, | Performed by: OTOLARYNGOLOGY

## 2021-05-24 PROCEDURE — 99204 PR OFFICE/OUTPT VISIT, NEW, LEVL IV, 45-59 MIN: ICD-10-PCS | Mod: S$PBB,,, | Performed by: OTOLARYNGOLOGY

## 2021-05-24 PROCEDURE — 99999 PR PBB SHADOW E&M-EST. PATIENT-LVL III: ICD-10-PCS | Mod: PBBFAC,,, | Performed by: OTOLARYNGOLOGY

## 2021-05-24 RX ORDER — GABAPENTIN 300 MG/1
CAPSULE ORAL
Qty: 90 CAPSULE | Refills: 3 | Status: SHIPPED | OUTPATIENT
Start: 2021-05-24 | End: 2021-08-31

## 2021-05-26 ENCOUNTER — OFFICE VISIT (OUTPATIENT)
Dept: PSYCHIATRY | Facility: CLINIC | Age: 26
End: 2021-05-26
Payer: MEDICARE

## 2021-05-26 DIAGNOSIS — F43.10 POST TRAUMATIC STRESS DISORDER: Primary | ICD-10-CM

## 2021-05-26 PROCEDURE — 90832 PR PSYCHOTHERAPY W/PATIENT, 30 MIN: ICD-10-PCS | Mod: ,,, | Performed by: PSYCHOLOGIST

## 2021-05-26 PROCEDURE — 90832 PSYTX W PT 30 MINUTES: CPT | Mod: ,,, | Performed by: PSYCHOLOGIST

## 2021-05-28 ENCOUNTER — OFFICE VISIT (OUTPATIENT)
Dept: BARIATRICS | Facility: CLINIC | Age: 26
End: 2021-05-28
Payer: MEDICARE

## 2021-05-28 ENCOUNTER — OFFICE VISIT (OUTPATIENT)
Dept: PSYCHIATRY | Facility: CLINIC | Age: 26
End: 2021-05-28
Payer: MEDICARE

## 2021-05-28 VITALS
BODY MASS INDEX: 49.11 KG/M2 | WEIGHT: 277.19 LBS | HEIGHT: 63 IN | HEART RATE: 68 BPM | SYSTOLIC BLOOD PRESSURE: 149 MMHG | TEMPERATURE: 98 F | DIASTOLIC BLOOD PRESSURE: 79 MMHG | RESPIRATION RATE: 16 BRPM

## 2021-05-28 DIAGNOSIS — E66.01 CLASS 3 SEVERE OBESITY DUE TO EXCESS CALORIES WITH BODY MASS INDEX (BMI) OF 40.0 TO 44.9 IN ADULT, UNSPECIFIED WHETHER SERIOUS COMORBIDITY PRESENT: ICD-10-CM

## 2021-05-28 DIAGNOSIS — E66.01 MORBID OBESITY: Primary | ICD-10-CM

## 2021-05-28 DIAGNOSIS — F41.1 GAD (GENERALIZED ANXIETY DISORDER): Primary | ICD-10-CM

## 2021-05-28 DIAGNOSIS — E66.01 MORBID OBESITY WITH BMI OF 45.0-49.9, ADULT: ICD-10-CM

## 2021-05-28 DIAGNOSIS — G47.33 OBSTRUCTIVE SLEEP APNEA OF ADULT: ICD-10-CM

## 2021-05-28 DIAGNOSIS — F39 UNSPECIFIED MOOD (AFFECTIVE) DISORDER: ICD-10-CM

## 2021-05-28 PROCEDURE — 99214 OFFICE O/P EST MOD 30 MIN: CPT | Mod: ,,, | Performed by: PSYCHIATRY & NEUROLOGY

## 2021-05-28 PROCEDURE — 99214 PR OFFICE/OUTPT VISIT, EST, LEVL IV, 30-39 MIN: ICD-10-PCS | Mod: ,,, | Performed by: PSYCHIATRY & NEUROLOGY

## 2021-05-28 PROCEDURE — 99214 OFFICE O/P EST MOD 30 MIN: CPT | Mod: PBBFAC,PO | Performed by: SURGERY

## 2021-05-28 PROCEDURE — 99999 PR PBB SHADOW E&M-EST. PATIENT-LVL IV: ICD-10-PCS | Mod: PBBFAC,,, | Performed by: SURGERY

## 2021-05-28 PROCEDURE — 99213 OFFICE O/P EST LOW 20 MIN: CPT | Mod: S$PBB,,, | Performed by: SURGERY

## 2021-05-28 PROCEDURE — 99999 PR PBB SHADOW E&M-EST. PATIENT-LVL IV: CPT | Mod: PBBFAC,,, | Performed by: SURGERY

## 2021-05-28 PROCEDURE — 99213 PR OFFICE/OUTPT VISIT, EST, LEVL III, 20-29 MIN: ICD-10-PCS | Mod: S$PBB,,, | Performed by: SURGERY

## 2021-05-28 PROCEDURE — 90833 PR PSYCHOTHERAPY W/PATIENT W/E&M, 30 MIN (ADD ON): ICD-10-PCS | Mod: ,,, | Performed by: PSYCHIATRY & NEUROLOGY

## 2021-05-28 PROCEDURE — 90833 PSYTX W PT W E/M 30 MIN: CPT | Mod: ,,, | Performed by: PSYCHIATRY & NEUROLOGY

## 2021-06-03 ENCOUNTER — TELEPHONE (OUTPATIENT)
Dept: PSYCHIATRY | Facility: CLINIC | Age: 26
End: 2021-06-03

## 2021-06-09 ENCOUNTER — TELEPHONE (OUTPATIENT)
Dept: OTOLARYNGOLOGY | Facility: CLINIC | Age: 26
End: 2021-06-09

## 2021-06-10 ENCOUNTER — TELEPHONE (OUTPATIENT)
Dept: FAMILY MEDICINE | Facility: CLINIC | Age: 26
End: 2021-06-10

## 2021-06-10 DIAGNOSIS — G51.0 BELL'S PALSY: Primary | ICD-10-CM

## 2021-06-14 NOTE — PROGRESS NOTES
1510 pt c/o headache and feeling like she is going to pass out. Connected to zoll monitor. Pt in sb 47. bp 83/47. Pt placeg in trendelenberg. Dr marin at bedside assessing pt. Pt is alert and oriented x3.  Pt placed on 2l o2 via nc. Dr marin spoke siva on stroke team. Report given.   500 cc ns bolus started per vorb dr marin. Wide open  1520 bp 94/53 monitor showing sb 54. Remains alert and oriented x3. Denies nausea and vomiting today. o2 2l nc in progress. sats 100%.  1527 siva hardin on stroke team here to assess pt.  Report given to mariusz cheng rn. In echo lab.  bp 103/55 hr 79 nsr ,sats 100% on 2l nc   Refill Approved    Rx renewed per Medication Renewal Policy. Medication was last renewed on 1/220/20.    Ashley García, [facility] Triage/Med Refill 2/1/2021     Requested Prescriptions   Pending Prescriptions Disp Refills     tretinoin (RETIN-A) 0.05 % cream [Pharmacy Med Name: TRETINOIN 0.05% CREAM 20GM] 20 g 0     Sig: APPLY EXTERNALLY TO THE AFFECTED AREA AT BEDTIME       Topical Dermatology Medications Refill Protocol Passed - 1/31/2021  9:24 PM        Passed - Patient has had office visit/physical in last 1 year     Last office visit with prescriber/PCP: 1/20/2020 Librado Cesar MD OR same dept: Visit date not found OR same specialty: 1/20/2020 Librado Cesar MD  Last physical: Visit date not found Last MTM visit: Visit date not found   Next visit within 3 mo: Visit date not found  Next physical within 3 mo: Visit date not found  Prescriber OR PCP: Lirbado Cesar MD  Last diagnosis associated with med order: 1. Acne, unspecified acne type  - tretinoin (RETIN-A) 0.05 % cream [Pharmacy Med Name: TRETINOIN 0.05% CREAM 20GM]; APPLY EXTERNALLY TO THE AFFECTED AREA AT BEDTIME  Dispense: 20 g; Refill: 0    If protocol passes may refill for 12 months if within 3 months of last provider visit (or a total of 15 months).

## 2021-06-16 ENCOUNTER — TELEPHONE (OUTPATIENT)
Dept: NEUROLOGY | Facility: CLINIC | Age: 26
End: 2021-06-16

## 2021-06-17 ENCOUNTER — OFFICE VISIT (OUTPATIENT)
Dept: NEUROLOGY | Facility: CLINIC | Age: 26
End: 2021-06-17
Payer: MEDICARE

## 2021-06-17 ENCOUNTER — TELEPHONE (OUTPATIENT)
Dept: NEUROLOGY | Facility: CLINIC | Age: 26
End: 2021-06-17

## 2021-06-17 VITALS
RESPIRATION RATE: 17 BRPM | HEIGHT: 63 IN | DIASTOLIC BLOOD PRESSURE: 90 MMHG | BODY MASS INDEX: 50.79 KG/M2 | WEIGHT: 286.63 LBS | HEART RATE: 76 BPM | SYSTOLIC BLOOD PRESSURE: 130 MMHG | TEMPERATURE: 99 F

## 2021-06-17 DIAGNOSIS — G43.901 MIGRAINE WITH STATUS MIGRAINOSUS, NOT INTRACTABLE, UNSPECIFIED MIGRAINE TYPE: Primary | ICD-10-CM

## 2021-06-17 DIAGNOSIS — M79.18 AMPLIFIED MUSCULOSKELETAL PAIN SYNDROME: ICD-10-CM

## 2021-06-17 DIAGNOSIS — G89.4 AMPLIFIED MUSCULOSKELETAL PAIN SYNDROME: ICD-10-CM

## 2021-06-17 DIAGNOSIS — G47.9 TROUBLE IN SLEEPING: ICD-10-CM

## 2021-06-17 DIAGNOSIS — G51.0 BELL'S PALSY: ICD-10-CM

## 2021-06-17 DIAGNOSIS — R51.9 FACIAL PAIN: ICD-10-CM

## 2021-06-17 PROCEDURE — 99215 OFFICE O/P EST HI 40 MIN: CPT | Mod: S$PBB,,, | Performed by: PHYSICIAN ASSISTANT

## 2021-06-17 PROCEDURE — 99214 OFFICE O/P EST MOD 30 MIN: CPT | Mod: PBBFAC,PO | Performed by: PHYSICIAN ASSISTANT

## 2021-06-17 PROCEDURE — 99215 PR OFFICE/OUTPT VISIT, EST, LEVL V, 40-54 MIN: ICD-10-PCS | Mod: S$PBB,,, | Performed by: PHYSICIAN ASSISTANT

## 2021-06-17 PROCEDURE — 99999 PR PBB SHADOW E&M-EST. PATIENT-LVL IV: CPT | Mod: PBBFAC,,, | Performed by: PHYSICIAN ASSISTANT

## 2021-06-17 PROCEDURE — 99999 PR PBB SHADOW E&M-EST. PATIENT-LVL IV: ICD-10-PCS | Mod: PBBFAC,,, | Performed by: PHYSICIAN ASSISTANT

## 2021-06-17 RX ORDER — CHLORZOXAZONE 500 MG/1
500 TABLET ORAL 3 TIMES DAILY PRN
Qty: 90 TABLET | Refills: 4 | Status: SHIPPED | OUTPATIENT
Start: 2021-06-17 | End: 2021-06-27

## 2021-06-21 ENCOUNTER — NURSE TRIAGE (OUTPATIENT)
Dept: ADMINISTRATIVE | Facility: CLINIC | Age: 26
End: 2021-06-21

## 2021-06-22 ENCOUNTER — TELEPHONE (OUTPATIENT)
Dept: NEUROLOGY | Facility: CLINIC | Age: 26
End: 2021-06-22

## 2021-06-28 ENCOUNTER — OFFICE VISIT (OUTPATIENT)
Dept: OTOLARYNGOLOGY | Facility: CLINIC | Age: 26
End: 2021-06-28
Payer: MEDICARE

## 2021-06-28 ENCOUNTER — TELEPHONE (OUTPATIENT)
Dept: FAMILY MEDICINE | Facility: CLINIC | Age: 26
End: 2021-06-28

## 2021-06-28 VITALS — WEIGHT: 292.56 LBS | HEIGHT: 63 IN | BODY MASS INDEX: 51.84 KG/M2

## 2021-06-28 DIAGNOSIS — M79.2 NEUROPATHIC PAIN: ICD-10-CM

## 2021-06-28 DIAGNOSIS — G51.0 LEFT-SIDED BELL'S PALSY: Primary | ICD-10-CM

## 2021-06-28 DIAGNOSIS — H54.62 VISION LOSS OF LEFT EYE: ICD-10-CM

## 2021-06-28 PROCEDURE — 99999 PR PBB SHADOW E&M-EST. PATIENT-LVL III: ICD-10-PCS | Mod: PBBFAC,,, | Performed by: OTOLARYNGOLOGY

## 2021-06-28 PROCEDURE — 99999 PR PBB SHADOW E&M-EST. PATIENT-LVL III: CPT | Mod: PBBFAC,,, | Performed by: OTOLARYNGOLOGY

## 2021-06-28 PROCEDURE — 99213 OFFICE O/P EST LOW 20 MIN: CPT | Mod: S$PBB,,, | Performed by: OTOLARYNGOLOGY

## 2021-06-28 PROCEDURE — 99213 OFFICE O/P EST LOW 20 MIN: CPT | Mod: PBBFAC,PO | Performed by: OTOLARYNGOLOGY

## 2021-06-28 PROCEDURE — 99213 PR OFFICE/OUTPT VISIT, EST, LEVL III, 20-29 MIN: ICD-10-PCS | Mod: S$PBB,,, | Performed by: OTOLARYNGOLOGY

## 2021-06-28 RX ORDER — HYDROCODONE BITARTRATE AND ACETAMINOPHEN 5; 325 MG/1; MG/1
1 TABLET ORAL EVERY 6 HOURS PRN
COMMUNITY
Start: 2021-06-23 | End: 2021-08-31

## 2021-06-30 ENCOUNTER — OFFICE VISIT (OUTPATIENT)
Dept: OPTOMETRY | Facility: CLINIC | Age: 26
End: 2021-06-30
Payer: MEDICARE

## 2021-06-30 DIAGNOSIS — H54.7 REDUCED VISION: ICD-10-CM

## 2021-06-30 DIAGNOSIS — H16.142 SUPERFICIAL PUNCTATE KERATITIS OF LEFT EYE: ICD-10-CM

## 2021-06-30 DIAGNOSIS — G51.0 LEFT-SIDED BELL'S PALSY: Primary | ICD-10-CM

## 2021-06-30 PROCEDURE — 99213 OFFICE O/P EST LOW 20 MIN: CPT | Mod: PBBFAC,PO | Performed by: OPTOMETRIST

## 2021-06-30 PROCEDURE — 92004 PR EYE EXAM, NEW PATIENT,COMPREHESV: ICD-10-PCS | Mod: S$PBB,,, | Performed by: OPTOMETRIST

## 2021-06-30 PROCEDURE — 92004 COMPRE OPH EXAM NEW PT 1/>: CPT | Mod: S$PBB,,, | Performed by: OPTOMETRIST

## 2021-06-30 PROCEDURE — 99999 PR PBB SHADOW E&M-EST. PATIENT-LVL III: CPT | Mod: PBBFAC,,, | Performed by: OPTOMETRIST

## 2021-06-30 PROCEDURE — 99999 PR PBB SHADOW E&M-EST. PATIENT-LVL III: ICD-10-PCS | Mod: PBBFAC,,, | Performed by: OPTOMETRIST

## 2021-07-01 ENCOUNTER — OFFICE VISIT (OUTPATIENT)
Dept: OPHTHALMOLOGY | Facility: CLINIC | Age: 26
End: 2021-07-01
Payer: MEDICARE

## 2021-07-01 ENCOUNTER — CLINICAL SUPPORT (OUTPATIENT)
Dept: OPHTHALMOLOGY | Facility: CLINIC | Age: 26
End: 2021-07-01
Payer: MEDICARE

## 2021-07-01 DIAGNOSIS — H16.212 EXPOSURE KERATOCONJUNCTIVITIS OF EYE, LEFT: Primary | ICD-10-CM

## 2021-07-01 DIAGNOSIS — H54.62 VISION LOSS OF LEFT EYE: ICD-10-CM

## 2021-07-01 DIAGNOSIS — G51.0 LEFT-SIDED BELL'S PALSY: ICD-10-CM

## 2021-07-01 PROCEDURE — 99213 OFFICE O/P EST LOW 20 MIN: CPT | Mod: PBBFAC,PO | Performed by: OPHTHALMOLOGY

## 2021-07-01 PROCEDURE — 99999 PR PBB SHADOW E&M-EST. PATIENT-LVL III: ICD-10-PCS | Mod: PBBFAC,,, | Performed by: OPHTHALMOLOGY

## 2021-07-01 PROCEDURE — 99203 PR OFFICE/OUTPT VISIT, NEW, LEVL III, 30-44 MIN: ICD-10-PCS | Mod: S$PBB,,, | Performed by: OPHTHALMOLOGY

## 2021-07-01 PROCEDURE — 99999 PR PBB SHADOW E&M-EST. PATIENT-LVL III: CPT | Mod: PBBFAC,,, | Performed by: OPHTHALMOLOGY

## 2021-07-01 PROCEDURE — 99203 OFFICE O/P NEW LOW 30 MIN: CPT | Mod: S$PBB,,, | Performed by: OPHTHALMOLOGY

## 2021-07-02 ENCOUNTER — OFFICE VISIT (OUTPATIENT)
Dept: PSYCHIATRY | Facility: CLINIC | Age: 26
End: 2021-07-02
Payer: MEDICARE

## 2021-07-02 DIAGNOSIS — F39 UNSPECIFIED MOOD (AFFECTIVE) DISORDER: ICD-10-CM

## 2021-07-02 DIAGNOSIS — G51.0 LEFT-SIDED BELL'S PALSY: ICD-10-CM

## 2021-07-02 DIAGNOSIS — E66.01 CLASS 3 SEVERE OBESITY DUE TO EXCESS CALORIES WITH BODY MASS INDEX (BMI) OF 40.0 TO 44.9 IN ADULT, UNSPECIFIED WHETHER SERIOUS COMORBIDITY PRESENT: ICD-10-CM

## 2021-07-02 DIAGNOSIS — F41.1 GAD (GENERALIZED ANXIETY DISORDER): Primary | ICD-10-CM

## 2021-07-02 PROCEDURE — 90833 PR PSYCHOTHERAPY W/PATIENT W/E&M, 30 MIN (ADD ON): ICD-10-PCS | Mod: 95,,, | Performed by: PSYCHIATRY & NEUROLOGY

## 2021-07-02 PROCEDURE — 99214 PR OFFICE/OUTPT VISIT, EST, LEVL IV, 30-39 MIN: ICD-10-PCS | Mod: 95,,, | Performed by: PSYCHIATRY & NEUROLOGY

## 2021-07-02 PROCEDURE — 99214 OFFICE O/P EST MOD 30 MIN: CPT | Mod: 95,,, | Performed by: PSYCHIATRY & NEUROLOGY

## 2021-07-02 PROCEDURE — 90833 PSYTX W PT W E/M 30 MIN: CPT | Mod: 95,,, | Performed by: PSYCHIATRY & NEUROLOGY

## 2021-07-02 RX ORDER — DEXTROAMPHETAMINE SACCHARATE, AMPHETAMINE ASPARTATE, DEXTROAMPHETAMINE SULFATE AND AMPHETAMINE SULFATE 1.25; 1.25; 1.25; 1.25 MG/1; MG/1; MG/1; MG/1
5 TABLET ORAL 2 TIMES DAILY
Qty: 60 TABLET | Refills: 0 | Status: SHIPPED | OUTPATIENT
Start: 2021-07-02 | End: 2021-07-29 | Stop reason: DRUGHIGH

## 2021-07-02 RX ORDER — SERTRALINE HYDROCHLORIDE 100 MG/1
200 TABLET, FILM COATED ORAL DAILY
Qty: 60 TABLET | Refills: 0 | Status: SHIPPED | OUTPATIENT
Start: 2021-07-02 | End: 2021-07-29 | Stop reason: SDUPTHER

## 2021-07-02 RX ORDER — CLONAZEPAM 0.5 MG/1
0.25 TABLET ORAL DAILY PRN
Qty: 15 TABLET | Refills: 0 | Status: SHIPPED | OUTPATIENT
Start: 2021-07-02 | End: 2021-12-20 | Stop reason: SDUPTHER

## 2021-07-12 ENCOUNTER — TELEPHONE (OUTPATIENT)
Dept: PSYCHIATRY | Facility: CLINIC | Age: 26
End: 2021-07-12

## 2021-07-15 ENCOUNTER — PATIENT MESSAGE (OUTPATIENT)
Dept: PSYCHIATRY | Facility: CLINIC | Age: 26
End: 2021-07-15

## 2021-07-26 ENCOUNTER — TELEPHONE (OUTPATIENT)
Dept: NEUROLOGY | Facility: CLINIC | Age: 26
End: 2021-07-26

## 2021-07-29 ENCOUNTER — OFFICE VISIT (OUTPATIENT)
Dept: PSYCHIATRY | Facility: CLINIC | Age: 26
End: 2021-07-29
Payer: MEDICARE

## 2021-07-29 VITALS
HEIGHT: 63 IN | SYSTOLIC BLOOD PRESSURE: 110 MMHG | DIASTOLIC BLOOD PRESSURE: 70 MMHG | HEART RATE: 76 BPM | BODY MASS INDEX: 51.23 KG/M2 | WEIGHT: 289.13 LBS

## 2021-07-29 DIAGNOSIS — F33.1 MDD (MAJOR DEPRESSIVE DISORDER), RECURRENT EPISODE, MODERATE: ICD-10-CM

## 2021-07-29 DIAGNOSIS — F41.1 GAD (GENERALIZED ANXIETY DISORDER): Primary | ICD-10-CM

## 2021-07-29 DIAGNOSIS — E66.01 CLASS 3 SEVERE OBESITY DUE TO EXCESS CALORIES WITH BODY MASS INDEX (BMI) OF 40.0 TO 44.9 IN ADULT, UNSPECIFIED WHETHER SERIOUS COMORBIDITY PRESENT: ICD-10-CM

## 2021-07-29 PROCEDURE — 99214 OFFICE O/P EST MOD 30 MIN: CPT | Mod: S$PBB,,, | Performed by: PSYCHIATRY & NEUROLOGY

## 2021-07-29 PROCEDURE — 90833 PSYTX W PT W E/M 30 MIN: CPT | Mod: ,,, | Performed by: PSYCHIATRY & NEUROLOGY

## 2021-07-29 PROCEDURE — 99999 PR PBB SHADOW E&M-EST. PATIENT-LVL II: ICD-10-PCS | Mod: PBBFAC,,, | Performed by: PSYCHIATRY & NEUROLOGY

## 2021-07-29 PROCEDURE — 99212 OFFICE O/P EST SF 10 MIN: CPT | Mod: PBBFAC,PO | Performed by: PSYCHIATRY & NEUROLOGY

## 2021-07-29 PROCEDURE — 90833 PR PSYCHOTHERAPY W/PATIENT W/E&M, 30 MIN (ADD ON): ICD-10-PCS | Mod: ,,, | Performed by: PSYCHIATRY & NEUROLOGY

## 2021-07-29 PROCEDURE — 99214 PR OFFICE/OUTPT VISIT, EST, LEVL IV, 30-39 MIN: ICD-10-PCS | Mod: S$PBB,,, | Performed by: PSYCHIATRY & NEUROLOGY

## 2021-07-29 PROCEDURE — 99999 PR PBB SHADOW E&M-EST. PATIENT-LVL II: CPT | Mod: PBBFAC,,, | Performed by: PSYCHIATRY & NEUROLOGY

## 2021-07-29 RX ORDER — SERTRALINE HYDROCHLORIDE 100 MG/1
200 TABLET, FILM COATED ORAL DAILY
Qty: 60 TABLET | Refills: 2 | Status: SHIPPED | OUTPATIENT
Start: 2021-07-29 | End: 2021-10-28 | Stop reason: SDUPTHER

## 2021-07-29 RX ORDER — DEXTROAMPHETAMINE SACCHARATE, AMPHETAMINE ASPARTATE, DEXTROAMPHETAMINE SULFATE AND AMPHETAMINE SULFATE 2.5; 2.5; 2.5; 2.5 MG/1; MG/1; MG/1; MG/1
10 TABLET ORAL 2 TIMES DAILY
Qty: 60 TABLET | Refills: 0 | Status: SHIPPED | OUTPATIENT
Start: 2021-07-29 | End: 2021-08-31

## 2021-08-04 ENCOUNTER — LAB VISIT (OUTPATIENT)
Dept: LAB | Facility: HOSPITAL | Age: 26
End: 2021-08-04
Attending: NURSE PRACTITIONER
Payer: MEDICARE

## 2021-08-04 ENCOUNTER — OFFICE VISIT (OUTPATIENT)
Dept: FAMILY MEDICINE | Facility: CLINIC | Age: 26
End: 2021-08-04
Payer: MEDICARE

## 2021-08-04 VITALS
HEART RATE: 90 BPM | SYSTOLIC BLOOD PRESSURE: 114 MMHG | DIASTOLIC BLOOD PRESSURE: 78 MMHG | HEIGHT: 63 IN | TEMPERATURE: 98 F | OXYGEN SATURATION: 97 % | WEIGHT: 290.56 LBS | BODY MASS INDEX: 51.48 KG/M2

## 2021-08-04 DIAGNOSIS — R60.9 EDEMA, UNSPECIFIED TYPE: Primary | ICD-10-CM

## 2021-08-04 DIAGNOSIS — G62.9 NEUROPATHY: ICD-10-CM

## 2021-08-04 DIAGNOSIS — R60.9 EDEMA, UNSPECIFIED TYPE: ICD-10-CM

## 2021-08-04 LAB
ALBUMIN SERPL BCP-MCNC: 3.6 G/DL (ref 3.5–5.2)
ALP SERPL-CCNC: 83 U/L (ref 55–135)
ALT SERPL W/O P-5'-P-CCNC: 21 U/L (ref 10–44)
ANION GAP SERPL CALC-SCNC: 5 MMOL/L (ref 8–16)
AST SERPL-CCNC: 24 U/L (ref 10–40)
BASOPHILS # BLD AUTO: 0.02 K/UL (ref 0–0.2)
BASOPHILS NFR BLD: 0.3 % (ref 0–1.9)
BILIRUB SERPL-MCNC: 0.5 MG/DL (ref 0.1–1)
BNP SERPL-MCNC: 56 PG/ML (ref 0–99)
BUN SERPL-MCNC: 7 MG/DL (ref 6–20)
CALCIUM SERPL-MCNC: 9.5 MG/DL (ref 8.7–10.5)
CHLORIDE SERPL-SCNC: 108 MMOL/L (ref 95–110)
CO2 SERPL-SCNC: 26 MMOL/L (ref 23–29)
CREAT SERPL-MCNC: 0.8 MG/DL (ref 0.5–1.4)
DIFFERENTIAL METHOD: NORMAL
EOSINOPHIL # BLD AUTO: 0.2 K/UL (ref 0–0.5)
EOSINOPHIL NFR BLD: 2.3 % (ref 0–8)
ERYTHROCYTE [DISTWIDTH] IN BLOOD BY AUTOMATED COUNT: 12.5 % (ref 11.5–14.5)
EST. GFR  (AFRICAN AMERICAN): >60 ML/MIN/1.73 M^2
EST. GFR  (NON AFRICAN AMERICAN): >60 ML/MIN/1.73 M^2
GLUCOSE SERPL-MCNC: 77 MG/DL (ref 70–110)
HCT VFR BLD AUTO: 39.7 % (ref 37–48.5)
HGB BLD-MCNC: 12.9 G/DL (ref 12–16)
IMM GRANULOCYTES # BLD AUTO: 0.02 K/UL (ref 0–0.04)
IMM GRANULOCYTES NFR BLD AUTO: 0.3 % (ref 0–0.5)
LYMPHOCYTES # BLD AUTO: 2.1 K/UL (ref 1–4.8)
LYMPHOCYTES NFR BLD: 27.8 % (ref 18–48)
MCH RBC QN AUTO: 28.2 PG (ref 27–31)
MCHC RBC AUTO-ENTMCNC: 32.5 G/DL (ref 32–36)
MCV RBC AUTO: 87 FL (ref 82–98)
MONOCYTES # BLD AUTO: 0.7 K/UL (ref 0.3–1)
MONOCYTES NFR BLD: 8.9 % (ref 4–15)
NEUTROPHILS # BLD AUTO: 4.5 K/UL (ref 1.8–7.7)
NEUTROPHILS NFR BLD: 60.4 % (ref 38–73)
NRBC BLD-RTO: 0 /100 WBC
PLATELET # BLD AUTO: 191 K/UL (ref 150–450)
PMV BLD AUTO: 11 FL (ref 9.2–12.9)
POTASSIUM SERPL-SCNC: 4.1 MMOL/L (ref 3.5–5.1)
PROT SERPL-MCNC: 6.5 G/DL (ref 6–8.4)
RBC # BLD AUTO: 4.57 M/UL (ref 4–5.4)
SODIUM SERPL-SCNC: 139 MMOL/L (ref 136–145)
TSH SERPL DL<=0.005 MIU/L-ACNC: 0.81 UIU/ML (ref 0.4–4)
WBC # BLD AUTO: 7.38 K/UL (ref 3.9–12.7)

## 2021-08-04 PROCEDURE — 84443 ASSAY THYROID STIM HORMONE: CPT | Performed by: NURSE PRACTITIONER

## 2021-08-04 PROCEDURE — 99214 PR OFFICE/OUTPT VISIT, EST, LEVL IV, 30-39 MIN: ICD-10-PCS | Mod: S$PBB,,, | Performed by: NURSE PRACTITIONER

## 2021-08-04 PROCEDURE — 80053 COMPREHEN METABOLIC PANEL: CPT | Performed by: NURSE PRACTITIONER

## 2021-08-04 PROCEDURE — 85025 COMPLETE CBC W/AUTO DIFF WBC: CPT | Performed by: NURSE PRACTITIONER

## 2021-08-04 PROCEDURE — 83880 ASSAY OF NATRIURETIC PEPTIDE: CPT | Performed by: NURSE PRACTITIONER

## 2021-08-04 PROCEDURE — 36415 COLL VENOUS BLD VENIPUNCTURE: CPT | Mod: PO | Performed by: NURSE PRACTITIONER

## 2021-08-04 PROCEDURE — 99214 OFFICE O/P EST MOD 30 MIN: CPT | Mod: S$PBB,,, | Performed by: NURSE PRACTITIONER

## 2021-08-05 ENCOUNTER — NURSE TRIAGE (OUTPATIENT)
Dept: ADMINISTRATIVE | Facility: CLINIC | Age: 26
End: 2021-08-05

## 2021-08-05 ENCOUNTER — PATIENT MESSAGE (OUTPATIENT)
Dept: FAMILY MEDICINE | Facility: CLINIC | Age: 26
End: 2021-08-05

## 2021-08-05 RX ORDER — BENZONATATE 100 MG/1
100 CAPSULE ORAL 3 TIMES DAILY PRN
Qty: 30 CAPSULE | Refills: 0 | Status: SHIPPED | OUTPATIENT
Start: 2021-08-05 | End: 2021-08-15

## 2021-08-06 ENCOUNTER — TELEPHONE (OUTPATIENT)
Dept: FAMILY MEDICINE | Facility: CLINIC | Age: 26
End: 2021-08-06

## 2021-08-17 ENCOUNTER — OFFICE VISIT (OUTPATIENT)
Dept: URGENT CARE | Facility: CLINIC | Age: 26
End: 2021-08-17
Payer: MEDICARE

## 2021-08-17 VITALS
WEIGHT: 290 LBS | TEMPERATURE: 97 F | SYSTOLIC BLOOD PRESSURE: 120 MMHG | OXYGEN SATURATION: 98 % | HEIGHT: 63 IN | RESPIRATION RATE: 20 BRPM | DIASTOLIC BLOOD PRESSURE: 91 MMHG | HEART RATE: 90 BPM | BODY MASS INDEX: 51.38 KG/M2

## 2021-08-17 DIAGNOSIS — R05.9 COUGH: Primary | ICD-10-CM

## 2021-08-17 DIAGNOSIS — R09.81 SINUS CONGESTION: ICD-10-CM

## 2021-08-17 LAB
CTP QC/QA: YES
SARS-COV-2 RDRP RESP QL NAA+PROBE: NEGATIVE

## 2021-08-17 PROCEDURE — 99214 PR OFFICE/OUTPT VISIT, EST, LEVL IV, 30-39 MIN: ICD-10-PCS | Mod: S$GLB,CS,, | Performed by: PHYSICIAN ASSISTANT

## 2021-08-17 PROCEDURE — U0002 COVID-19 LAB TEST NON-CDC: HCPCS | Mod: QW,CR,S$GLB, | Performed by: PHYSICIAN ASSISTANT

## 2021-08-17 PROCEDURE — 99214 OFFICE O/P EST MOD 30 MIN: CPT | Mod: S$GLB,CS,, | Performed by: PHYSICIAN ASSISTANT

## 2021-08-17 PROCEDURE — U0002: ICD-10-PCS | Mod: QW,CR,S$GLB, | Performed by: PHYSICIAN ASSISTANT

## 2021-08-17 RX ORDER — FLUTICASONE PROPIONATE 50 MCG
1 SPRAY, SUSPENSION (ML) NASAL 2 TIMES DAILY
Qty: 16 ML | Refills: 3 | Status: SHIPPED | OUTPATIENT
Start: 2021-08-17 | End: 2021-08-31

## 2021-08-17 RX ORDER — CETIRIZINE HYDROCHLORIDE 10 MG/1
10 TABLET ORAL DAILY
Qty: 30 TABLET | Refills: 3 | Status: SHIPPED | OUTPATIENT
Start: 2021-08-17 | End: 2022-01-19

## 2021-08-18 ENCOUNTER — IMMUNIZATION (OUTPATIENT)
Dept: FAMILY MEDICINE | Facility: CLINIC | Age: 26
End: 2021-08-18
Payer: MEDICARE

## 2021-08-18 DIAGNOSIS — Z23 NEED FOR VACCINATION: Primary | ICD-10-CM

## 2021-08-18 PROCEDURE — 91300 COVID-19, MRNA, LNP-S, PF, 30 MCG/0.3 ML DOSE VACCINE: ICD-10-PCS | Mod: ,,, | Performed by: FAMILY MEDICINE

## 2021-08-18 PROCEDURE — 0001A COVID-19, MRNA, LNP-S, PF, 30 MCG/0.3 ML DOSE VACCINE: ICD-10-PCS | Mod: CV19,,, | Performed by: FAMILY MEDICINE

## 2021-08-18 PROCEDURE — 91300 COVID-19, MRNA, LNP-S, PF, 30 MCG/0.3 ML DOSE VACCINE: CPT | Mod: ,,, | Performed by: FAMILY MEDICINE

## 2021-08-18 PROCEDURE — 0001A COVID-19, MRNA, LNP-S, PF, 30 MCG/0.3 ML DOSE VACCINE: CPT | Mod: CV19,,, | Performed by: FAMILY MEDICINE

## 2021-08-31 ENCOUNTER — OFFICE VISIT (OUTPATIENT)
Dept: PSYCHIATRY | Facility: CLINIC | Age: 26
End: 2021-08-31
Payer: MEDICARE

## 2021-08-31 DIAGNOSIS — F41.1 GAD (GENERALIZED ANXIETY DISORDER): ICD-10-CM

## 2021-08-31 DIAGNOSIS — F90.0 ADHD (ATTENTION DEFICIT HYPERACTIVITY DISORDER), INATTENTIVE TYPE: Primary | ICD-10-CM

## 2021-08-31 DIAGNOSIS — F33.1 MDD (MAJOR DEPRESSIVE DISORDER), RECURRENT EPISODE, MODERATE: ICD-10-CM

## 2021-08-31 DIAGNOSIS — E66.01 CLASS 3 SEVERE OBESITY WITH SERIOUS COMORBIDITY AND BODY MASS INDEX (BMI) OF 50.0 TO 59.9 IN ADULT, UNSPECIFIED OBESITY TYPE: ICD-10-CM

## 2021-08-31 PROCEDURE — 99214 PR OFFICE/OUTPT VISIT, EST, LEVL IV, 30-39 MIN: ICD-10-PCS | Mod: 95,,, | Performed by: PSYCHIATRY & NEUROLOGY

## 2021-08-31 PROCEDURE — 99214 OFFICE O/P EST MOD 30 MIN: CPT | Mod: 95,,, | Performed by: PSYCHIATRY & NEUROLOGY

## 2021-08-31 PROCEDURE — 90833 PR PSYCHOTHERAPY W/PATIENT W/E&M, 30 MIN (ADD ON): ICD-10-PCS | Mod: 95,,, | Performed by: PSYCHIATRY & NEUROLOGY

## 2021-08-31 PROCEDURE — 90833 PSYTX W PT W E/M 30 MIN: CPT | Mod: 95,,, | Performed by: PSYCHIATRY & NEUROLOGY

## 2021-08-31 RX ORDER — DEXTROAMPHETAMINE SACCHARATE, AMPHETAMINE ASPARTATE, DEXTROAMPHETAMINE SULFATE AND AMPHETAMINE SULFATE 5; 5; 5; 5 MG/1; MG/1; MG/1; MG/1
1 TABLET ORAL 2 TIMES DAILY
Qty: 60 TABLET | Refills: 0 | Status: SHIPPED | OUTPATIENT
Start: 2021-08-31 | End: 2021-10-28 | Stop reason: SDUPTHER

## 2021-08-31 RX ORDER — AMITRIPTYLINE HYDROCHLORIDE 25 MG/1
25 TABLET, FILM COATED ORAL NIGHTLY PRN
Qty: 30 TABLET | Refills: 0 | Status: SHIPPED | OUTPATIENT
Start: 2021-08-31 | End: 2021-10-28 | Stop reason: SDUPTHER

## 2021-10-28 ENCOUNTER — OFFICE VISIT (OUTPATIENT)
Dept: PSYCHIATRY | Facility: CLINIC | Age: 26
End: 2021-10-28
Payer: MEDICARE

## 2021-10-28 VITALS
SYSTOLIC BLOOD PRESSURE: 101 MMHG | BODY MASS INDEX: 48.8 KG/M2 | HEART RATE: 81 BPM | HEIGHT: 63 IN | WEIGHT: 275.44 LBS | DIASTOLIC BLOOD PRESSURE: 69 MMHG

## 2021-10-28 DIAGNOSIS — F41.1 GAD (GENERALIZED ANXIETY DISORDER): ICD-10-CM

## 2021-10-28 DIAGNOSIS — F33.1 MDD (MAJOR DEPRESSIVE DISORDER), RECURRENT EPISODE, MODERATE: Primary | ICD-10-CM

## 2021-10-28 DIAGNOSIS — E66.01 CLASS 3 SEVERE OBESITY DUE TO EXCESS CALORIES WITH BODY MASS INDEX (BMI) OF 50.0 TO 59.9 IN ADULT, UNSPECIFIED WHETHER SERIOUS COMORBIDITY PRESENT: ICD-10-CM

## 2021-10-28 DIAGNOSIS — F90.0 ADHD (ATTENTION DEFICIT HYPERACTIVITY DISORDER), INATTENTIVE TYPE: ICD-10-CM

## 2021-10-28 PROCEDURE — 99214 PR OFFICE/OUTPT VISIT, EST, LEVL IV, 30-39 MIN: ICD-10-PCS | Mod: S$PBB,,, | Performed by: PSYCHIATRY & NEUROLOGY

## 2021-10-28 PROCEDURE — 99999 PR PBB SHADOW E&M-EST. PATIENT-LVL III: CPT | Mod: PBBFAC,,, | Performed by: PSYCHIATRY & NEUROLOGY

## 2021-10-28 PROCEDURE — 99999 PR PBB SHADOW E&M-EST. PATIENT-LVL III: ICD-10-PCS | Mod: PBBFAC,,, | Performed by: PSYCHIATRY & NEUROLOGY

## 2021-10-28 PROCEDURE — 90833 PR PSYCHOTHERAPY W/PATIENT W/E&M, 30 MIN (ADD ON): ICD-10-PCS | Mod: ,,, | Performed by: PSYCHIATRY & NEUROLOGY

## 2021-10-28 PROCEDURE — 99213 OFFICE O/P EST LOW 20 MIN: CPT | Mod: PBBFAC,PO | Performed by: PSYCHIATRY & NEUROLOGY

## 2021-10-28 PROCEDURE — 99214 OFFICE O/P EST MOD 30 MIN: CPT | Mod: S$PBB,,, | Performed by: PSYCHIATRY & NEUROLOGY

## 2021-10-28 PROCEDURE — 90833 PSYTX W PT W E/M 30 MIN: CPT | Mod: ,,, | Performed by: PSYCHIATRY & NEUROLOGY

## 2021-10-28 RX ORDER — AMITRIPTYLINE HYDROCHLORIDE 25 MG/1
25 TABLET, FILM COATED ORAL NIGHTLY PRN
Qty: 30 TABLET | Refills: 0 | Status: SHIPPED | OUTPATIENT
Start: 2021-10-28 | End: 2022-01-19

## 2021-10-28 RX ORDER — SERTRALINE HYDROCHLORIDE 100 MG/1
200 TABLET, FILM COATED ORAL DAILY
Qty: 60 TABLET | Refills: 2 | Status: SHIPPED | OUTPATIENT
Start: 2021-10-28 | End: 2022-02-01

## 2021-10-28 RX ORDER — TRIAMCINOLONE ACETONIDE 1 MG/G
PASTE DENTAL
COMMUNITY
Start: 2021-10-20 | End: 2022-11-15

## 2021-10-28 RX ORDER — DEXTROAMPHETAMINE SACCHARATE, AMPHETAMINE ASPARTATE, DEXTROAMPHETAMINE SULFATE AND AMPHETAMINE SULFATE 5; 5; 5; 5 MG/1; MG/1; MG/1; MG/1
1 TABLET ORAL 2 TIMES DAILY
Qty: 60 TABLET | Refills: 0 | Status: SHIPPED | OUTPATIENT
Start: 2021-10-28 | End: 2021-12-01 | Stop reason: SDUPTHER

## 2021-11-16 ENCOUNTER — OFFICE VISIT (OUTPATIENT)
Dept: FAMILY MEDICINE | Facility: CLINIC | Age: 26
End: 2021-11-16
Payer: COMMERCIAL

## 2021-11-16 VITALS
DIASTOLIC BLOOD PRESSURE: 82 MMHG | SYSTOLIC BLOOD PRESSURE: 128 MMHG | HEIGHT: 63 IN | HEART RATE: 81 BPM | BODY MASS INDEX: 48.44 KG/M2 | OXYGEN SATURATION: 97 % | TEMPERATURE: 98 F | WEIGHT: 273.38 LBS

## 2021-11-16 DIAGNOSIS — R60.9 SWELLING: ICD-10-CM

## 2021-11-16 DIAGNOSIS — E66.01 MORBID OBESITY: Primary | ICD-10-CM

## 2021-11-16 PROCEDURE — 99999 PR PBB SHADOW E&M-EST. PATIENT-LVL III: CPT | Mod: PBBFAC,,, | Performed by: NURSE PRACTITIONER

## 2021-11-16 PROCEDURE — 99999 PR PBB SHADOW E&M-EST. PATIENT-LVL III: ICD-10-PCS | Mod: PBBFAC,,, | Performed by: NURSE PRACTITIONER

## 2021-11-16 PROCEDURE — 99213 OFFICE O/P EST LOW 20 MIN: CPT | Mod: S$GLB,,, | Performed by: NURSE PRACTITIONER

## 2021-11-16 PROCEDURE — 99213 PR OFFICE/OUTPT VISIT, EST, LEVL III, 20-29 MIN: ICD-10-PCS | Mod: S$GLB,,, | Performed by: NURSE PRACTITIONER

## 2021-11-16 PROCEDURE — 99213 OFFICE O/P EST LOW 20 MIN: CPT | Mod: PBBFAC,PO | Performed by: NURSE PRACTITIONER

## 2021-12-01 DIAGNOSIS — F90.0 ADHD (ATTENTION DEFICIT HYPERACTIVITY DISORDER), INATTENTIVE TYPE: ICD-10-CM

## 2021-12-01 RX ORDER — DEXTROAMPHETAMINE SACCHARATE, AMPHETAMINE ASPARTATE, DEXTROAMPHETAMINE SULFATE AND AMPHETAMINE SULFATE 5; 5; 5; 5 MG/1; MG/1; MG/1; MG/1
1 TABLET ORAL 2 TIMES DAILY
Qty: 60 TABLET | Refills: 0 | Status: SHIPPED | OUTPATIENT
Start: 2021-12-01 | End: 2022-01-03 | Stop reason: SDUPTHER

## 2021-12-02 ENCOUNTER — TELEPHONE (OUTPATIENT)
Dept: PSYCHIATRY | Facility: CLINIC | Age: 26
End: 2021-12-02
Payer: COMMERCIAL

## 2021-12-02 ENCOUNTER — PATIENT MESSAGE (OUTPATIENT)
Dept: PSYCHIATRY | Facility: CLINIC | Age: 26
End: 2021-12-02
Payer: COMMERCIAL

## 2021-12-20 ENCOUNTER — PATIENT MESSAGE (OUTPATIENT)
Dept: PSYCHIATRY | Facility: CLINIC | Age: 26
End: 2021-12-20
Payer: COMMERCIAL

## 2021-12-20 RX ORDER — CLONAZEPAM 0.5 MG/1
0.25 TABLET ORAL DAILY PRN
Qty: 15 TABLET | Refills: 0 | Status: SHIPPED | OUTPATIENT
Start: 2021-12-20 | End: 2022-02-11 | Stop reason: SDUPTHER

## 2022-01-02 ENCOUNTER — PATIENT MESSAGE (OUTPATIENT)
Dept: PSYCHIATRY | Facility: CLINIC | Age: 27
End: 2022-01-02
Payer: COMMERCIAL

## 2022-01-03 DIAGNOSIS — F90.0 ADHD (ATTENTION DEFICIT HYPERACTIVITY DISORDER), INATTENTIVE TYPE: ICD-10-CM

## 2022-01-03 RX ORDER — DEXTROAMPHETAMINE SACCHARATE, AMPHETAMINE ASPARTATE, DEXTROAMPHETAMINE SULFATE AND AMPHETAMINE SULFATE 5; 5; 5; 5 MG/1; MG/1; MG/1; MG/1
1 TABLET ORAL 2 TIMES DAILY
Qty: 60 TABLET | Refills: 0 | Status: SHIPPED | OUTPATIENT
Start: 2022-01-03 | End: 2022-01-31 | Stop reason: SDUPTHER

## 2022-01-19 ENCOUNTER — OFFICE VISIT (OUTPATIENT)
Dept: FAMILY MEDICINE | Facility: CLINIC | Age: 27
End: 2022-01-19
Payer: MEDICARE

## 2022-01-19 ENCOUNTER — TELEPHONE (OUTPATIENT)
Dept: FAMILY MEDICINE | Facility: CLINIC | Age: 27
End: 2022-01-19

## 2022-01-19 DIAGNOSIS — G43.911 INTRACTABLE MIGRAINE WITH STATUS MIGRAINOSUS, UNSPECIFIED MIGRAINE TYPE: Primary | ICD-10-CM

## 2022-01-19 PROCEDURE — 99214 OFFICE O/P EST MOD 30 MIN: CPT | Mod: 95,,, | Performed by: NURSE PRACTITIONER

## 2022-01-19 PROCEDURE — 99214 PR OFFICE/OUTPT VISIT, EST, LEVL IV, 30-39 MIN: ICD-10-PCS | Mod: 95,,, | Performed by: NURSE PRACTITIONER

## 2022-01-19 RX ORDER — METHOCARBAMOL 500 MG/1
1000 TABLET, FILM COATED ORAL 4 TIMES DAILY
Qty: 24 TABLET | Refills: 0 | Status: SHIPPED | OUTPATIENT
Start: 2022-01-19 | End: 2022-01-22

## 2022-01-19 RX ORDER — RIZATRIPTAN BENZOATE 10 MG/1
TABLET ORAL
Qty: 12 TABLET | Refills: 0 | Status: SHIPPED | OUTPATIENT
Start: 2022-01-19 | End: 2022-02-24 | Stop reason: SDUPTHER

## 2022-01-20 ENCOUNTER — OFFICE VISIT (OUTPATIENT)
Dept: PSYCHIATRY | Facility: CLINIC | Age: 27
End: 2022-01-20
Payer: COMMERCIAL

## 2022-01-20 ENCOUNTER — TELEPHONE (OUTPATIENT)
Dept: PSYCHIATRY | Facility: CLINIC | Age: 27
End: 2022-01-20
Payer: COMMERCIAL

## 2022-01-20 DIAGNOSIS — F41.1 GAD (GENERALIZED ANXIETY DISORDER): Primary | ICD-10-CM

## 2022-01-20 DIAGNOSIS — F33.1 MDD (MAJOR DEPRESSIVE DISORDER), RECURRENT EPISODE, MODERATE: ICD-10-CM

## 2022-01-20 DIAGNOSIS — F43.10 POST TRAUMATIC STRESS DISORDER: ICD-10-CM

## 2022-01-20 PROCEDURE — 99214 PR OFFICE/OUTPT VISIT, EST, LEVL IV, 30-39 MIN: ICD-10-PCS | Mod: 95,,, | Performed by: PSYCHIATRY & NEUROLOGY

## 2022-01-20 PROCEDURE — 90833 PSYTX W PT W E/M 30 MIN: CPT | Mod: 95,,, | Performed by: PSYCHIATRY & NEUROLOGY

## 2022-01-20 PROCEDURE — 99214 OFFICE O/P EST MOD 30 MIN: CPT | Mod: 95,,, | Performed by: PSYCHIATRY & NEUROLOGY

## 2022-01-20 PROCEDURE — 90833 PR PSYCHOTHERAPY W/PATIENT W/E&M, 30 MIN (ADD ON): ICD-10-PCS | Mod: 95,,, | Performed by: PSYCHIATRY & NEUROLOGY

## 2022-01-20 NOTE — TELEPHONE ENCOUNTER
MD Antonietta Fisher MA; Phoebe Recinos PsyD  Caller: Unspecified (Today, 11:31 AM)  Hi- I have a patients,  trauma patient, who have fallen off for f/u (on them, I think, not you) but I was hoping you could reach out to her and get her scheduled?  Alexandra Martin [id number] (last seen 5/2021)- this patient is really struggling- has less resources than many of our patients- I am putting in a case mgmt referral but my idea is that the trauma therapy could be very helpful and then we'll refer to longer term talk therapy/interpersonal stuff/maybe even dbt. She wants a better life for herself just can't seem to find the path. I'm really hopeful about her. Thanks, aw
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
## 2022-02-07 ENCOUNTER — OFFICE VISIT (OUTPATIENT)
Dept: FAMILY MEDICINE | Facility: CLINIC | Age: 27
End: 2022-02-07
Payer: MEDICARE

## 2022-02-07 ENCOUNTER — TELEPHONE (OUTPATIENT)
Dept: FAMILY MEDICINE | Facility: CLINIC | Age: 27
End: 2022-02-07
Payer: COMMERCIAL

## 2022-02-07 DIAGNOSIS — R10.9 FLANK PAIN: Primary | ICD-10-CM

## 2022-02-07 PROCEDURE — 99212 OFFICE O/P EST SF 10 MIN: CPT | Mod: 95,,, | Performed by: NURSE PRACTITIONER

## 2022-02-07 PROCEDURE — 99212 PR OFFICE/OUTPT VISIT, EST, LEVL II, 10-19 MIN: ICD-10-PCS | Mod: 95,,, | Performed by: NURSE PRACTITIONER

## 2022-02-07 NOTE — TELEPHONE ENCOUNTER
Made a virtual with Maryana Iverson       ----- Message from Maryana Torres sent at 2/7/2022  8:59 AM CST -----  Type:  Same Day Appointment Request    Caller is requesting a same day appointment.  Caller declined first available appointment listed below.      Name of Caller:  patient   When is the first available appointment?  2/8   Symptoms:  lower abdominal pain/flank pain   Best Call Back Number:  [phone]  Additional Information:  please advise-thank you

## 2022-02-07 NOTE — PROGRESS NOTES
Answers for HPI/ROS submitted by the patient on 2/7/2022  Chronicity: chronic  Onset: in the past 7 days  Onset quality: undetermined  Frequency: constantly  Progression since onset: rapidly worsening  Pain quality: sharp  dysuria: Yes  frequency: Yes  headaches: Yes  nausea: Yes  vomiting: Yes  Pain severity: severe  Treatments tried: acetaminophen, oral narcotic analgesics  Improvement on treatment: no relief  kidney stones: Yes  PUD: Yes  UTI: Yes

## 2022-02-07 NOTE — PROGRESS NOTES
Subjective:       Patient ID: Alexandra Martin is a 26 y.o. female.    Chief Complaint: No chief complaint on file.    The patient location is: [place], [place]  The chief complaint leading to consultation is: left flank pain    Visit type: audiovisual    Face to Face time with patient: 10  10 minutes of total time spent on the encounter, which includes face to face time and non-face to face time preparing to see the patient (eg, review of tests), Obtaining and/or reviewing separately obtained history, Documenting clinical information in the electronic or other health record, Independently interpreting results (not separately reported) and communicating results to the patient/family/caregiver, or Care coordination (not separately reported).         Each patient to whom he or she provides medical services by telemedicine is:  (1) informed of the relationship between the physician and patient and the respective role of any other health care provider with respect to management of the patient; and (2) notified that he or she may decline to receive medical services by telemedicine and may withdraw from such care at any time.    Notes:   Patient states she is having 10/10 side, back  and abdominal pain, vomiting, diarrhea, fever, chills x 3 days    Abdominal Pain  This is a chronic problem. The current episode started in the past 7 days. The onset quality is undetermined. The problem occurs constantly. The problem has been rapidly worsening. The pain is severe. The quality of the pain is sharp. Associated symptoms include diarrhea, dysuria, a fever, frequency, headaches, nausea and vomiting. She has tried acetaminophen and oral narcotic analgesics for the symptoms. The treatment provided no relief. Her past medical history is significant for PUD. Patient's medical history includes kidney stones and UTI.     Review of Systems   Constitutional: Positive for chills and fever.   Gastrointestinal: Positive for abdominal  pain, diarrhea, nausea and vomiting.   Genitourinary: Positive for dysuria, flank pain and frequency.   Neurological: Positive for headaches.         Objective:      Physical Exam  Constitutional:       General: She is not in acute distress.     Appearance: She is not toxic-appearing.   Neurological:      Mental Status: She is alert.         Assessment:       Problem List Items Addressed This Visit        Unprioritized    Flank pain - Primary          Plan:       I advised patient that if she is having 10/10 pain with fever, chills, vomiting and diarrhea she needs to be seen immediately, preferable in ED, but in urgent care at the least. Discussed with patient this could be a serious issue such as an infection of her kidneys, kidney stone, appendicitis, etc and there is no way for me to evaluate that either over video visit or emergently in regular clinic and that she needs to go to Ed for evaluation right away. She stated her understanding.

## 2022-02-10 ENCOUNTER — PATIENT MESSAGE (OUTPATIENT)
Dept: PSYCHIATRY | Facility: CLINIC | Age: 27
End: 2022-02-10
Payer: COMMERCIAL

## 2022-02-11 RX ORDER — CLONAZEPAM 0.5 MG/1
0.25 TABLET ORAL DAILY PRN
Qty: 15 TABLET | Refills: 0 | Status: SHIPPED | OUTPATIENT
Start: 2022-02-11 | End: 2022-03-23

## 2022-02-11 NOTE — TELEPHONE ENCOUNTER
"Patient called the office apologized for the confusion with message she sent.    States, " I have an appointment with the therapist lady." no ma'am I have no safety concerns."   "I didn't get any phone call from case management."  Monica"

## 2022-02-18 ENCOUNTER — PATIENT OUTREACH (OUTPATIENT)
Dept: ADMINISTRATIVE | Facility: OTHER | Age: 27
End: 2022-02-18
Payer: COMMERCIAL

## 2022-02-18 NOTE — PROGRESS NOTES
Health Maintenance Due   Topic Date Due    Influenza Vaccine (1) 09/01/2021    COVID-19 Vaccine (2 - Pfizer 3-dose series) 09/08/2021     Updates were requested from care everywhere.  Chart was reviewed for overdue Proactive [facility] Encounters (HOME) topics (CRS, Breast Cancer Screening, Eye exam)  Health Maintenance has been updated.  LINKS immunization registry triggered.  Immunizations were reconciled.

## 2022-02-21 ENCOUNTER — TELEPHONE (OUTPATIENT)
Dept: FAMILY MEDICINE | Facility: CLINIC | Age: 27
End: 2022-02-21
Payer: COMMERCIAL

## 2022-02-21 ENCOUNTER — OUTPATIENT CASE MANAGEMENT (OUTPATIENT)
Dept: ADMINISTRATIVE | Facility: OTHER | Age: 27
End: 2022-02-21
Payer: COMMERCIAL

## 2022-02-21 NOTE — PROGRESS NOTES
Outpatient Care Management  Initial Patient Assessment    Patient: Alexandra Martin  MRN: [id number]  Date of Service: 02/21/2022  Completed by: Amaris Lane RN  Referral Date: 01/21/2022  Program:     Reason for Visit   Patient presents with    OPCM Chart Review     02/21/22    OPCM Enrollment Call     02/21/22    Initial Assessment     02/21/22    Nursing Assessment     02/21/22    Plan Of Care     02/21/22       Brief Summary:  Alexandra Martin was referred by Dr. Ball for PTSD. Patient qualifies for program based on high risk score of 92.2%.   Active problem list, medical, surgical and social history reviewed. Active comorbidities include headaches, TRACE, pyelonephritis, anemia, and obesity. Areas of need identified by patient include follow up for UTI and financial concerns.   Complex care plan created with patient/caregiver input. By next encounter, patient agrees to attend appointment with Dr. Ball.  Med Rec done.   F/u in 2weeks, refer to  for financial assistance. Will mail info on depression and anxiety. [place] [facility] [place] and if there were any outpatient recommendations made. Educate patient on the importance of prioritizing mental health.         Assessment Documentation     OPCM Initial Assessment    Involvement of Care  Do I have permission to speak with other family members about your care?: No  Assessment completed by: Patient  Identified Areas of Need  Advanced Care Planning: No  Housing: no  Medication Adherence: No  *Active medication list was reviewed and reconciled with patient and/or caregiver:   Nutrition: yes  Lab Adherence: no  Depression: Yes  Cognitive/Behavioral Health: no  Communication: no  Health Literacy: no  Fall risk?: No  Equipment/Supplies/Services: no          Problem List and History     Problems Addressed This Visit    UTI: Identified as current problem         Reviewed medical and social history with patient and/or caregiver. A  complex care plan was discussed and completed today, with input from patient and/or caregiver.    Patient Instructions     Instructions were provided via the [facility] patient resources and are available for the patient to view on the patient portal, if active.        Follow up in about 2 weeks (around 3/7/2022) for RN follow up.    Todays OPCM Self-Management Care Plan was developed with the patients/caregivers input and was based on identified barriers from todays assessment.  Goals were written today with the patient/caregiver and the patient has agreed to work towards these goals to improve his/her overall well-being. Patient verbalized understanding of the care plan, goals, and all of today's instructions. Encouraged patient/caregiver to communicate with his/her physician and health care team about health conditions and the treatment plan.  Provided my contact information today and encouraged patient/caregiver to call me with any questions as needed.

## 2022-02-21 NOTE — TELEPHONE ENCOUNTER
----- Message from Amaris Lane RN sent at 2/21/2022  2:31 PM CST -----  Patient was seen at [facility] ER last week for back pain and dysuria. Diagnosed with a UTI. She has one day left of her Keflex and still complains of dysuria. She is requesting appt for a follow up.   Please call and advise patient.     Thank you for your assistance,   Amaris Lane RN  Outpatient Complex Care Management  [phone]

## 2022-02-21 NOTE — LETTER
February 21, 2022    Alexandra Martin  [address]  [place] [address]             [facility] [address]  [facility] [address] Dear Ms. Martin:    Welcome to [facility]s Complex Care Management Program.  It was a pleasure talking with you today.  My name is Amaris Lane RN. I look forward to working with you as your Care Manager.  My goal is to help you function at the healthiest and highest level possible.  You can contact me directly at [phone].    As an [facility] patient, some of the services we may be able to provide include:      Development of an individualized care plan with a Registered Nurse    Connection with a    Connection with available resources and services     Coordinate communication among your care team members    Provide coaching and education    Help you understand your doctors treatment plan   Help you obtain information about your insurance coverage.     All services provided by [facility]s Complex Care Managers and other care team members are coordinated with and communicated to your primary care team.    As part of your enrollment, you will be receiving education materials and more information about these services in your [facility] account, by phone or through the mail.  If you do not wish to participate or receive information, please contact our office at [phone].      Sincerely,        Amaris Lane RN  [facility]   Out-patient RN Complex Care Manager

## 2022-02-22 ENCOUNTER — OFFICE VISIT (OUTPATIENT)
Dept: PSYCHIATRY | Facility: CLINIC | Age: 27
End: 2022-02-22
Payer: COMMERCIAL

## 2022-02-22 VITALS
BODY MASS INDEX: 47.13 KG/M2 | HEART RATE: 88 BPM | DIASTOLIC BLOOD PRESSURE: 74 MMHG | HEIGHT: 63 IN | SYSTOLIC BLOOD PRESSURE: 118 MMHG | WEIGHT: 266 LBS

## 2022-02-22 DIAGNOSIS — F41.1 GAD (GENERALIZED ANXIETY DISORDER): Primary | ICD-10-CM

## 2022-02-22 DIAGNOSIS — F33.1 MDD (MAJOR DEPRESSIVE DISORDER), RECURRENT EPISODE, MODERATE: ICD-10-CM

## 2022-02-22 DIAGNOSIS — F39 UNSPECIFIED MOOD (AFFECTIVE) DISORDER: ICD-10-CM

## 2022-02-22 PROCEDURE — 99214 OFFICE O/P EST MOD 30 MIN: CPT | Mod: S$GLB,,, | Performed by: PSYCHIATRY & NEUROLOGY

## 2022-02-22 PROCEDURE — 99214 PR OFFICE/OUTPT VISIT, EST, LEVL IV, 30-39 MIN: ICD-10-PCS | Mod: S$GLB,,, | Performed by: PSYCHIATRY & NEUROLOGY

## 2022-02-22 PROCEDURE — 90833 PR PSYCHOTHERAPY W/PATIENT W/E&M, 30 MIN (ADD ON): ICD-10-PCS | Mod: S$GLB,,, | Performed by: PSYCHIATRY & NEUROLOGY

## 2022-02-22 PROCEDURE — 99999 PR PBB SHADOW E&M-EST. PATIENT-LVL III: CPT | Mod: PBBFAC,,, | Performed by: PSYCHIATRY & NEUROLOGY

## 2022-02-22 PROCEDURE — 99999 PR PBB SHADOW E&M-EST. PATIENT-LVL III: ICD-10-PCS | Mod: PBBFAC,,, | Performed by: PSYCHIATRY & NEUROLOGY

## 2022-02-22 PROCEDURE — 90833 PSYTX W PT W E/M 30 MIN: CPT | Mod: S$GLB,,, | Performed by: PSYCHIATRY & NEUROLOGY

## 2022-02-22 PROCEDURE — 99213 OFFICE O/P EST LOW 20 MIN: CPT | Mod: PBBFAC,PO | Performed by: PSYCHIATRY & NEUROLOGY
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
## 2022-02-23 ENCOUNTER — OUTPATIENT CASE MANAGEMENT (OUTPATIENT)
Dept: ADMINISTRATIVE | Facility: OTHER | Age: 27
End: 2022-02-23
Payer: COMMERCIAL

## 2022-02-23 DIAGNOSIS — F41.1 GAD (GENERALIZED ANXIETY DISORDER): ICD-10-CM

## 2022-02-23 DIAGNOSIS — F33.1 MDD (MAJOR DEPRESSIVE DISORDER), RECURRENT EPISODE, MODERATE: ICD-10-CM

## 2022-02-23 DIAGNOSIS — F43.10 POST TRAUMATIC STRESS DISORDER: Primary | ICD-10-CM

## 2022-02-24 ENCOUNTER — OFFICE VISIT (OUTPATIENT)
Dept: FAMILY MEDICINE | Facility: CLINIC | Age: 27
End: 2022-02-24
Payer: COMMERCIAL

## 2022-02-24 VITALS
DIASTOLIC BLOOD PRESSURE: 78 MMHG | BODY MASS INDEX: 47.53 KG/M2 | WEIGHT: 268.31 LBS | SYSTOLIC BLOOD PRESSURE: 122 MMHG | OXYGEN SATURATION: 98 % | HEART RATE: 70 BPM

## 2022-02-24 DIAGNOSIS — A59.9 TRICHOMONOSIS: ICD-10-CM

## 2022-02-24 DIAGNOSIS — G43.809 OTHER MIGRAINE WITHOUT STATUS MIGRAINOSUS, NOT INTRACTABLE: Primary | ICD-10-CM

## 2022-02-24 PROCEDURE — 99214 PR OFFICE/OUTPT VISIT, EST, LEVL IV, 30-39 MIN: ICD-10-PCS | Mod: S$GLB,,, | Performed by: FAMILY MEDICINE

## 2022-02-24 PROCEDURE — 99214 OFFICE O/P EST MOD 30 MIN: CPT | Mod: S$GLB,,, | Performed by: FAMILY MEDICINE

## 2022-02-24 PROCEDURE — 99999 PR PBB SHADOW E&M-EST. PATIENT-LVL III: ICD-10-PCS | Mod: PBBFAC,,, | Performed by: FAMILY MEDICINE

## 2022-02-24 PROCEDURE — 99999 PR PBB SHADOW E&M-EST. PATIENT-LVL III: CPT | Mod: PBBFAC,,, | Performed by: FAMILY MEDICINE

## 2022-02-24 PROCEDURE — 99213 OFFICE O/P EST LOW 20 MIN: CPT | Mod: PBBFAC,PO | Performed by: FAMILY MEDICINE

## 2022-02-24 RX ORDER — METRONIDAZOLE 500 MG/1
500 TABLET ORAL EVERY 12 HOURS
Qty: 14 TABLET | Refills: 0 | Status: SHIPPED | OUTPATIENT
Start: 2022-02-24 | End: 2022-11-15

## 2022-02-24 RX ORDER — TOPIRAMATE 25 MG/1
25 TABLET ORAL NIGHTLY
Qty: 30 TABLET | Refills: 11 | Status: SHIPPED | OUTPATIENT
Start: 2022-02-24 | End: 2022-11-15

## 2022-02-24 RX ORDER — RIZATRIPTAN BENZOATE 10 MG/1
TABLET ORAL
Qty: 12 TABLET | Refills: 5 | Status: SHIPPED | OUTPATIENT
Start: 2022-02-24 | End: 2022-11-15

## 2022-02-24 NOTE — PROGRESS NOTES
Subjective:       Patient ID: Alexandra Martin is a 26 y.o. female.    Chief Complaint: Urinary Tract Infection    HPI     Reports that she had trichomonas last week when she went to [facility]. Misplaced her flagl rx    Reports migraines daily for the past 2 months. Maxalt helps to ease the pain.          Review of Systems      Review of Systems   Constitutional: Negative for fever and chills.   HENT: Negative for hearing loss and neck stiffness.    Eyes: Negative for redness and itching.   Respiratory: Negative for cough and choking.    Cardiovascular: Negative for chest pain and leg swelling.  Abdomen: Negative for abdominal pain and blood in stool.   Genitourinary: Negative for dysuria and flank pain.   Musculoskeletal: Negative for back pain and gait problem.   Neurological: Negative for light-headedness   Hematological: Negative for adenopathy.   Psychiatric/Behavioral: Negative for behavioral problems.     Objective:      Physical Exam  Constitutional:       Appearance: She is well-developed.   HENT:      Head: Normocephalic and atraumatic.   Eyes:      Conjunctiva/sclera: Conjunctivae normal.      Pupils: Pupils are equal, round, and reactive to light.   Cardiovascular:      Rate and Rhythm: Normal rate and regular rhythm.      Heart sounds: No murmur heard.  Pulmonary:      Effort: Pulmonary effort is normal.      Breath sounds: Normal breath sounds.   Musculoskeletal:      Cervical back: Normal range of motion.   Lymphadenopathy:      Cervical: No cervical adenopathy.         Assessment:       1. Other migraine without status migrainosus, not intractable    2. Trichomonosis        Plan:       Other migraine without status migrainosus, not intractable  -     Ambulatory referral/consult to Neurology; Future; Expected date: 03/03/2022    Trichomonosis    Other orders  -     metroNIDAZOLE (FLAGYL) 500 MG tablet; Take 1 tablet (500 mg total) by mouth every 12 (twelve) hours.  Dispense: 14 tablet;  Refill: 0  -     rizatriptan (MAXALT) 10 MG tablet; Take one at the start of a Migraine Headache. May take one additional tablet in 2 hours if the headache persists.  Dispense: 12 tablet; Refill: 5  -     topiramate (TOPAMAX) 25 MG tablet; Take 1 tablet (25 mg total) by mouth every evening.  Dispense: 30 tablet; Refill: 11               Plan:  Refer to neuro. Start topamax to reduce migraines. rf maxalt  E sent her flagyl

## 2022-02-28 ENCOUNTER — OUTPATIENT CASE MANAGEMENT (OUTPATIENT)
Dept: ADMINISTRATIVE | Facility: OTHER | Age: 27
End: 2022-02-28
Payer: COMMERCIAL

## 2022-02-28 NOTE — PROGRESS NOTES
Community Health Worker assistance requested from Magda Babcock LCSW to provide support calls regarding SNAP as needed for this patient.  Spoke to: Patient   Pt/caregiver reported needs: Patient was was unable to talk at this time.  Resources/Support provided: n/a   Follow up required: yes   Next scheduled call: n/a

## 2022-03-07 ENCOUNTER — OUTPATIENT CASE MANAGEMENT (OUTPATIENT)
Dept: ADMINISTRATIVE | Facility: OTHER | Age: 27
End: 2022-03-07
Payer: COMMERCIAL

## 2022-03-07 ENCOUNTER — OFFICE VISIT (OUTPATIENT)
Dept: FAMILY MEDICINE | Facility: CLINIC | Age: 27
End: 2022-03-07
Payer: MEDICARE

## 2022-03-07 ENCOUNTER — TELEPHONE (OUTPATIENT)
Dept: PSYCHIATRY | Facility: CLINIC | Age: 27
End: 2022-03-07
Payer: COMMERCIAL

## 2022-03-07 DIAGNOSIS — J06.9 UPPER RESPIRATORY TRACT INFECTION, UNSPECIFIED TYPE: Primary | ICD-10-CM

## 2022-03-07 DIAGNOSIS — E66.01 MORBID OBESITY: ICD-10-CM

## 2022-03-07 DIAGNOSIS — R05.9 COUGH: ICD-10-CM

## 2022-03-07 PROCEDURE — 99213 PR OFFICE/OUTPT VISIT, EST, LEVL III, 20-29 MIN: ICD-10-PCS | Mod: 95,,, | Performed by: FAMILY MEDICINE

## 2022-03-07 PROCEDURE — 99213 OFFICE O/P EST LOW 20 MIN: CPT | Mod: 95,,, | Performed by: FAMILY MEDICINE

## 2022-03-07 NOTE — TELEPHONE ENCOUNTER
Patient late canceled appointment today for Rima will need to reschedule. She and son are sick and she has to bring son to ER.

## 2022-03-07 NOTE — PROGRESS NOTES
"Outpatient Care Management   - High Risk Patient Assessment    Patient: Alexandra Martin  MRN:  [id number]  Date of Service:  3/7/2022  Completed by:  Magda Babcock LCSW  Referral Date: 01/21/2022  Program:     Reason for Visit   Patient presents with    [facility] Review    Plan Of Care    Social Work Assessment - High Risk       Brief Summary:  received a referral from [facility] DEX Glez for the following patient identified psycho-social needs of pt needing financial assistance, food and bill assistance.  [facility]  completed high risk assessment. Pt reports that she lives with her children in an apt, does not work and her step father pays for pt's rent and bills but pt is not sure how long he will continue to help financially.  Pt is able to drive and did go to the [facility] the other day.  This  did email ([email or web address]) info on the [employer].  Pt. Reports she is on the "call list" for e(ye)BRAIN and has the application for the [place] [address] [employer] Program but has not filled out or sent in that application.  Pt also states she received a disconnect notice from [employer] but she has not called [facility] yet to request an extension or to see if any assistance could be provided.  This  encouraged pt to contct [employer] and also provided pt with the phone number for "[employer].  The W also tried calling pt on 2/28 but pt told her she was not available to complete phone SNAP application.  This  did message the W to see if she could call pt back this week.  This  and RN case manager reviewed pt's upcoming appts where pt is scheduled to attend an outpt therapy appt today with Rima Morris LCSW with [facility] at 1:00 today which pt states on the call at 12:15 pm. That she would not be able to make this appt.  This  encouraged pt to call as soon as we hung up to reschedule that " therapy appt and also [facility] about her utility disconnect notice.  Pt verbalized understanding.      Patient Summary     OPCM Social Work Assessment (High Risk)    Involvement of Care  Do I have permission to speak with other family members about your care?: No  Assessment completed by: Patient  Cognitive  Which of the following can you state?: Name, Date of birth  Cognitive barriers?: No  General  Marital status: Single  Current employment status: Disabled (Comment: receives SSI)  Support  Level of Caregiver support: Member independent and does not need caregiver assistance  Support system: Family, Friends (Comment: pt's step dad helps with bills; one friend)  Is the caregiver reporting burnout?: No  Support Barriers?: Yes (Comment: limited support system; not many family or friends)  Advanced Care Planning  Do you have any of the following?: None  If yes, do we have a copy?: N/A  If no, would you like Advance Directive resources?: No  Advance Care Planning resources provided?: No  Is Advance Care Planning an area of need?: No  Financial  Current medical coverage: Medicare, Medicaid (Comment: pt has Medicare and [facility] Medicaid)  Have you applied for government assistance programs?: Yes  Are you unable to pay any of the following?: Housing, Utilities, Food (Comment: pt's step dad helps with utilities and rent; pt went to the [facility] the other day)  Gross monthly income: 1000 (Comment: pt receives $1000 and pt's dtr receives $650)  Financial Support Barriers?: Yes (Comment: relying on step dad to pay her bills and not sure how long this will last; not sure how long will be able to keep her car because note and insurance is expensive)  Safety  Significant change in functioning?: Disability benefits  Safety barriers?: No   History  Do you or your spouse currently or formerly serve in the ?: No  Disaster Plan  Established evacuation plan?: No  [place] residence: [facility]  Evacuation  location: unsure at this time  Registered for evacuation?: No  Ability to evacuate: N/A  Mental Health Status  Emotional status: Anxious (Comment: due to financial situation)  Have you recenetly lost a loved one?: No  Psychiatric diagnosis: TRACE  Current mental health treatment: Yes (Comment: pt is followed by [facility] and is going to be referred to outpt )  Would you like mental health resources?: No  Current symptoms: Sleep disturbances  Mental/Behavioral/Environmental risk: History of suicidal ideation (Comment: in her teens)  Mental Health Barriers?: No  Addictive Behaviors  Current alcohol consumption?: No  Current substance abuse?: Yes (Comment: pt smokes marijuana)  Gambling habits?: No  Was the PHQ depression screening completed?: No  Was the TRACE-7 completed?: No         Complex Care Plan     Care plan was discussed and completed today with input from patient and/or caregiver.    Patient Instructions     Follow up in about 1 week (around 3/2/2022).    Todays OPCM Self-Management Care Plan was developed with the patients/caregivers input and was based on identified barriers from todays assessment.  Goals were written today with the patient/caregiver and the patient has agreed to work towards these goals to improve his/her overall well-being. Patient verbalized understanding of the care plan, goals, and all of today's instructions. Encouraged patient/caregiver to communicate with his/her physician and health care team about health conditions and the treatment plan.  Provided my contact information today and encouraged patient/caregiver to call me with any questions as needed.

## 2022-03-07 NOTE — PROGRESS NOTES
Outpatient Care Management  Plan of Care Follow Up Visit    Patient: Alexandra Martin  MRN: [id number]  Date of Service: 03/07/2022  Completed by: Amaris Lane RN  Referral Date: 01/21/2022  Program:     Reason for Visit   Patient presents with    OPCM Chart Review    Update Plan Of Care       Brief Summary: Collaborated with Magda patient's SW, for this outreach. Spoke with the patient about changes in medications and recent appointments. Will follow up in 2 weeks. Continue education and management.     Patient Summary     Involvement of Care:  Do I have permission to speak with other family members about your care?       Patient Reported Labs & Vitals:  1.  Any Patient Reported Labs & Vitals?     2.  Patient Reported Blood Pressure:     3.  Patient Reported Pulse:     4.  Patient Reported Weight (Kg):     5.  Patient Reported Blood Glucose (mg/dl):       Medical and social history was reviewed with patient and/or caregiver.     Clinical Assessment     Reviewed and provided basic information on available community resources for mental health, transportation, wellness resources, and palliative care programs with patient and/or caregiver.     Complex Care Plan     Care plan was discussed and completed today with input from patient and/or caregiver.    Patient Instructions     Instructions were provided via the [facility] patient resources and are available for the patient to view on the patient portal.    Follow up in about 2 weeks (around 3/21/2022) for RN follow up.    [facility] OPC Self-Management Care Plan was developed with the patients/caregivers input and was based on identified barriers from [facility] assessment.  Goals were written today with the patient/caregiver and the patient has agreed to work towards these goals to improve his/her overall well-being. Patient verbalized understanding of the care plan, goals, and all of today's instructions. Encouraged patient/caregiver to communicate with  his/her physician and health care team about health conditions and the treatment plan.  Provided my contact information today and encouraged patient/caregiver to call me with any questions as needed.

## 2022-03-07 NOTE — PROGRESS NOTES
Subjective:       Patient ID: Alexandra Martin is a 26 y.o. female.    Chief Complaint: No chief complaint on file.    HPI     The [place] location is: [place]  The chief complaint leading to consultation is: cough    Visit type: audiovisual    Face to Face time with patient:   20 minutes of total time spent on the encounter, which includes face to face time and non-face to face time preparing to see the patient (eg, review of tests), Obtaining and/or reviewing separately obtained history, Documenting clinical information in the electronic or other health record, Independently interpreting results (not separately reported) and communicating results to the patient/family/caregiver, or Care coordination (not separately reported).         Each patient to whom he or she provides medical services by telemedicine is:  (1) informed of the relationship between the physician and patient and the respective role of any other health care provider with respect to management of the patient; and (2) notified that he or she may decline to receive medical services by telemedicine and may withdraw from such care at any time.    Notes:     C/o sore throat, rhinorrhea, cough and ear pressure x 3 days. Taking otc nyquil and dayquil.  No fever. No altered taste or smell.         Review of Systems      Review of Systems   Constitutional: Negative for fever and chills.   HENT: Negative for hearing loss and neck stiffness.    Eyes: Negative for redness and itching.   Respiratory: Negative for cough and choking.    Cardiovascular: Negative for chest pain and leg swelling.  Abdomen: Negative for abdominal pain and blood in stool.   Genitourinary: Negative for dysuria and flank pain.   Musculoskeletal: Negative for back pain and gait problem.   Neurological: Negative for light-headedness and headaches.   Hematological: Negative for adenopathy.       Objective:      Physical Exam  Constitutional:       Appearance: She is well-developed.    HENT:      Head: Normocephalic and atraumatic.   Eyes:      Conjunctiva/sclera: Conjunctivae normal.      Pupils: Pupils are equal, round, and reactive to light.   Cardiovascular:      Rate and Rhythm: Normal rate and regular rhythm.      Heart sounds: No murmur heard.  Pulmonary:      Effort: Pulmonary effort is normal.      Breath sounds: Normal breath sounds.   Musculoskeletal:      Cervical back: Normal range of motion.   Lymphadenopathy:      Cervical: No cervical adenopathy.         Assessment:       1. Upper respiratory tract infection, unspecified type    2. Morbid obesity    3. Cough        Plan:       Upper respiratory tract infection, unspecified type    Morbid obesity    Cough  -     COVID-19 Routine Screening; Future            Plan:  Order covid test  Cont with otc cold meds      Medication List with Changes/Refills   Current Medications    CLONAZEPAM (KLONOPIN) 0.5 MG TABLET    Take 0.5 tablets (0.25 mg total) by mouth daily as needed for Anxiety.    DEXTROAMPHETAMINE-AMPHETAMINE (ADDERALL) 20 MG TABLET    Take 1 tablet by mouth 2 (two) times a day.    METRONIDAZOLE (FLAGYL) 500 MG TABLET    Take 1 tablet (500 mg total) by mouth every 12 (twelve) hours.    RIZATRIPTAN (MAXALT) 10 MG TABLET    Take one at the start of a Migraine Headache. May take one additional tablet in 2 hours if the headache persists.    SERTRALINE (ZOLOFT) 100 MG TABLET    TAKE 2 TABLETS(200 MG) BY MOUTH EVERY DAY    TOPIRAMATE (TOPAMAX) 25 MG TABLET    Take 1 tablet (25 mg total) by mouth every evening.    TRIAMCINOLONE ACETONIDE 0.1% (KENALOG) 0.1 % PASTE

## 2022-03-11 ENCOUNTER — OUTPATIENT CASE MANAGEMENT (OUTPATIENT)
Dept: ADMINISTRATIVE | Facility: OTHER | Age: 27
End: 2022-03-11
Payer: COMMERCIAL

## 2022-03-11 NOTE — PROGRESS NOTES
Community Health Worker's second attempt to contact this patient regarding support for SNAP status.  Unable to contact patient at this time.  Left a voicemail message and will try again at a later date.

## 2022-03-15 ENCOUNTER — OUTPATIENT CASE MANAGEMENT (OUTPATIENT)
Dept: ADMINISTRATIVE | Facility: OTHER | Age: 27
End: 2022-03-15
Payer: COMMERCIAL

## 2022-03-21 ENCOUNTER — OUTPATIENT CASE MANAGEMENT (OUTPATIENT)
Dept: ADMINISTRATIVE | Facility: OTHER | Age: 27
End: 2022-03-21
Payer: COMMERCIAL

## 2022-03-21 NOTE — PROGRESS NOTES
03/21/22 Attempt f/u with patient/caregiver. No answer. Left message requesting call back. RN OPCM  First follow up attempt

## 2022-03-21 NOTE — PROGRESS NOTES
OPCM  and RN attempted to contact pt for follow up call.  No answer.  Voicmail message left with this 's contact info.  Awaiting return call.  Will follow up at a later date and time.

## 2022-03-25 ENCOUNTER — OUTPATIENT CASE MANAGEMENT (OUTPATIENT)
Dept: ADMINISTRATIVE | Facility: OTHER | Age: 27
End: 2022-03-25
Payer: COMMERCIAL

## 2022-03-25 NOTE — PROGRESS NOTES
03/25/22 Attempt f/u with patient/caregiver. No answer. Left message requesting call back. RN OPCM second f/u attempt. Letter mailed

## 2022-03-25 NOTE — LETTER
March 25, 2022    Alexandra Joana Mario  [address]  [place] [address]             [facility] [address]  [place] [address] Dear Ms. Martin:      I work with [facility]'s Outpatient Case Management Department. I have been unsuccessful at reaching you to follow-up to see how you have been doing. Please call me back at your earliest convenience to discuss your health care needs.      I can be reached at [phone] from 8:00AM to 4:30 PM on Monday thru Friday. [facility] is a program offered through [facility] where a nurse is available 24/7 to answer questions or provide medical advice, their number is [phone].        Best Regards,      BRIGETTE SahuN, RN  Outpatient Complex Care Management  [phone]        This transmission (including any attachments) may contain confidential information, privileged material (including material protected by the solicitor-client or other applicable privileges), or constitute non-public information. Any use of this information by anyone other than the intended recipient is prohibited. If you have received this transmission in error, please immediately reply to the sender and delete this information from your system. Use, dissemination, distribution, or reproduction of this transmission by unintended recipients is not authorized and may be unlawful.  Privileged and Confidential Pursuant to [place] 13:3715.3

## 2022-03-28 NOTE — PROGRESS NOTES
2nd Attempt to complete SW follow-up for Outpatient Care Management; left message requesting a return call and LCSW mailed a letter with contact information requesting a return call.  OPCM RN notified.

## 2022-04-01 DIAGNOSIS — F90.0 ADHD (ATTENTION DEFICIT HYPERACTIVITY DISORDER), INATTENTIVE TYPE: ICD-10-CM

## 2022-04-01 RX ORDER — DEXTROAMPHETAMINE SACCHARATE, AMPHETAMINE ASPARTATE, DEXTROAMPHETAMINE SULFATE AND AMPHETAMINE SULFATE 5; 5; 5; 5 MG/1; MG/1; MG/1; MG/1
1 TABLET ORAL 2 TIMES DAILY
Qty: 60 TABLET | Refills: 0 | Status: SHIPPED | OUTPATIENT
Start: 2022-04-01 | End: 2022-04-06

## 2022-04-04 ENCOUNTER — TELEPHONE (OUTPATIENT)
Dept: NEUROLOGY | Facility: CLINIC | Age: 27
End: 2022-04-04
Payer: COMMERCIAL

## 2022-04-04 NOTE — TELEPHONE ENCOUNTER
Called patient because she was scheduled with the wrong provider but no answer. Left voicemail and sent message through [employer].

## 2022-04-05 ENCOUNTER — TELEPHONE (OUTPATIENT)
Dept: PSYCHIATRY | Facility: CLINIC | Age: 27
End: 2022-04-05
Payer: COMMERCIAL

## 2022-04-05 NOTE — TELEPHONE ENCOUNTER
Called patient to schedule the appointment from the no show/cancelation work que. Spoke to patient and scheduled a virtual appointment for 4/6/22 @ 8 am

## 2022-04-06 ENCOUNTER — OFFICE VISIT (OUTPATIENT)
Dept: PSYCHIATRY | Facility: CLINIC | Age: 27
End: 2022-04-06
Payer: COMMERCIAL

## 2022-04-06 DIAGNOSIS — F33.1 MDD (MAJOR DEPRESSIVE DISORDER), RECURRENT EPISODE, MODERATE: ICD-10-CM

## 2022-04-06 DIAGNOSIS — F43.10 POST TRAUMATIC STRESS DISORDER: Primary | ICD-10-CM

## 2022-04-06 DIAGNOSIS — F41.1 GAD (GENERALIZED ANXIETY DISORDER): ICD-10-CM

## 2022-04-06 DIAGNOSIS — F12.10 MARIJUANA ABUSE: ICD-10-CM

## 2022-04-06 PROCEDURE — 90833 PSYTX W PT W E/M 30 MIN: CPT | Mod: 95,,, | Performed by: PSYCHIATRY & NEUROLOGY

## 2022-04-06 PROCEDURE — 99214 PR OFFICE/OUTPT VISIT, EST, LEVL IV, 30-39 MIN: ICD-10-PCS | Mod: 95,,, | Performed by: PSYCHIATRY & NEUROLOGY

## 2022-04-06 PROCEDURE — 90833 PR PSYCHOTHERAPY W/PATIENT W/E&M, 30 MIN (ADD ON): ICD-10-PCS | Mod: 95,,, | Performed by: PSYCHIATRY & NEUROLOGY

## 2022-04-06 PROCEDURE — 99214 OFFICE O/P EST MOD 30 MIN: CPT | Mod: 95,,, | Performed by: PSYCHIATRY & NEUROLOGY

## 2022-04-06 RX ORDER — HYDROXYZINE PAMOATE 25 MG/1
25 CAPSULE ORAL 2 TIMES DAILY PRN
Qty: 30 CAPSULE | Refills: 2 | Status: SHIPPED | OUTPATIENT
Start: 2022-04-06 | End: 2022-11-15

## 2022-04-06 RX ORDER — SERTRALINE HYDROCHLORIDE 100 MG/1
200 TABLET, FILM COATED ORAL DAILY
Qty: 60 TABLET | Refills: 2 | Status: SHIPPED | OUTPATIENT
Start: 2022-04-06 | End: 2022-11-15
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
## 2022-04-08 ENCOUNTER — OUTPATIENT CASE MANAGEMENT (OUTPATIENT)
Dept: ADMINISTRATIVE | Facility: OTHER | Age: 27
End: 2022-04-08
Payer: COMMERCIAL

## 2022-04-08 NOTE — PROGRESS NOTES
04/08/22 Attempt assessment with patient/caregiver. No answer. Left message requesting call back. RN OPCM third assessment attempt. Will close once SW makes 3rd attempt if unsuccessful.

## 2022-04-11 ENCOUNTER — PATIENT MESSAGE (OUTPATIENT)
Dept: PSYCHIATRY | Facility: CLINIC | Age: 27
End: 2022-04-11
Payer: COMMERCIAL

## 2022-04-11 NOTE — PROGRESS NOTES
OPCM  made 3rd phone attempt today to make contact with pt.  Case closed on this date due to inability to make contact with pt on 3 phone attempts and also mailed to pt's home with this 's contact info.  Notified OPCM RN re: case closure.

## 2022-04-13 ENCOUNTER — OUTPATIENT CASE MANAGEMENT (OUTPATIENT)
Dept: ADMINISTRATIVE | Facility: OTHER | Age: 27
End: 2022-04-13
Payer: COMMERCIAL

## 2022-05-06 ENCOUNTER — TELEPHONE (OUTPATIENT)
Dept: NEUROLOGY | Facility: CLINIC | Age: 27
End: 2022-05-06
Payer: COMMERCIAL

## 2022-05-07 NOTE — ADDENDUM NOTE
Addended by: MANUELA SILVEIRA on: 2/27/2019 05:39 PM     Modules accepted: Level of Service    
negative -  no rash

## 2022-05-19 ENCOUNTER — TELEPHONE (OUTPATIENT)
Dept: NEUROLOGY | Facility: CLINIC | Age: 27
End: 2022-05-19
Payer: COMMERCIAL

## 2022-05-20 ENCOUNTER — TELEPHONE (OUTPATIENT)
Dept: NEUROLOGY | Facility: CLINIC | Age: 27
End: 2022-05-20
Payer: COMMERCIAL

## 2022-05-20 ENCOUNTER — HOSPITAL ENCOUNTER (EMERGENCY)
Facility: HOSPITAL | Age: 27
Discharge: HOME | End: 2022-05-20
Attending: EMERGENCY MEDICINE
Payer: MEDICARE

## 2022-05-20 VITALS
TEMPERATURE: 99 F | DIASTOLIC BLOOD PRESSURE: 64 MMHG | HEIGHT: 63 IN | SYSTOLIC BLOOD PRESSURE: 132 MMHG | RESPIRATION RATE: 19 BRPM | OXYGEN SATURATION: 100 % | BODY MASS INDEX: 44.3 KG/M2 | WEIGHT: 250 LBS | HEART RATE: 92 BPM

## 2022-05-20 DIAGNOSIS — B34.9 VIRAL ILLNESS: Primary | ICD-10-CM

## 2022-05-20 LAB
B-HCG UR QL: NEGATIVE
CTP QC/QA: YES
CTP QC/QA: YES
GROUP A STREP, MOLECULAR: NEGATIVE
INFLUENZA A, MOLECULAR: NEGATIVE
INFLUENZA B, MOLECULAR: NEGATIVE
SARS-COV-2 RDRP RESP QL NAA+PROBE: NEGATIVE
SPECIMEN SOURCE: NORMAL

## 2022-05-20 PROCEDURE — 99284 EMERGENCY DEPT VISIT MOD MDM: CPT

## 2022-05-20 PROCEDURE — U0002 COVID-19 LAB TEST NON-CDC: HCPCS | Performed by: NURSE PRACTITIONER

## 2022-05-20 PROCEDURE — 87502 INFLUENZA DNA AMP PROBE: CPT

## 2022-05-20 PROCEDURE — 81025 URINE PREGNANCY TEST: CPT | Performed by: NURSE PRACTITIONER

## 2022-05-20 PROCEDURE — 87651 STREP A DNA AMP PROBE: CPT | Performed by: NURSE PRACTITIONER

## 2022-05-20 PROCEDURE — 87502 INFLUENZA DNA AMP PROBE: CPT | Performed by: NURSE PRACTITIONER

## 2022-05-20 PROCEDURE — 25000003 PHARM REV CODE 250: Performed by: NURSE PRACTITIONER

## 2022-05-20 RX ORDER — FLUTICASONE PROPIONATE 50 MCG
1 SPRAY, SUSPENSION (ML) NASAL 2 TIMES DAILY PRN
Qty: 15 G | Refills: 0 | Status: SHIPPED | OUTPATIENT
Start: 2022-05-20 | End: 2022-11-15

## 2022-05-20 RX ORDER — ONDANSETRON 4 MG/1
4 TABLET, ORALLY DISINTEGRATING ORAL EVERY 8 HOURS PRN
Qty: 9 TABLET | Refills: 0 | Status: SHIPPED | OUTPATIENT
Start: 2022-05-20 | End: 2022-05-23

## 2022-05-20 RX ORDER — ONDANSETRON 4 MG/1
4 TABLET, ORALLY DISINTEGRATING ORAL
Status: COMPLETED | OUTPATIENT
Start: 2022-05-20 | End: 2022-05-20

## 2022-05-20 RX ORDER — BENZONATATE 100 MG/1
100 CAPSULE ORAL 3 TIMES DAILY PRN
Qty: 20 CAPSULE | Refills: 0 | Status: SHIPPED | OUTPATIENT
Start: 2022-05-20 | End: 2022-05-30

## 2022-05-20 RX ADMIN — ONDANSETRON 4 MG: 4 TABLET, ORALLY DISINTEGRATING ORAL at 10:05

## 2022-05-20 NOTE — DISCHARGE INSTRUCTIONS

## 2022-05-20 NOTE — ED NOTES
APPEARANCE: Awake, alert, & oriented. No acute distress.  CARDIAC: Normal rate and rhythm. Denies chest pain.     RESPIRATORY: Respirations are even and unlabored no obvious signs of distress. No accessory muscle use. Breath sounds clear bilaterally throughout chest.  PERIPHERAL VASCULAR: peripheral pulses present. Normal cap refill. No edema.   GASTRO: soft, no tenderness, no abdominal distention. Reports nausea and diarrhea  MUSC: Full ROM. No bony tenderness or soft tissue tenderness. No obvious deformity.  SKIN: Skin is warm, dry, and intact. Normal skin turgor and color.  NEURO: Equal strength bilaterally. [place] coma scale: Eye Response-4, Motor Response-6, Verbal Response-5. Total=15. Clear speech. No neurological abnormalities except reports tingling in hands and feet. Reports feeling intermittently faint  EENT: No c/o vision or hearing difficulties. Oropharynx clear. Reports sore throat, nasal congestion

## 2022-05-20 NOTE — Clinical Note
"Alexandra"Quyen Martin was seen and treated in our emergency department on 5/20/2022.  She may return to work on 05/23/2022.       If you have any questions or concerns, please don't hesitate to call.      Eleonora Cherry NP"

## 2022-05-20 NOTE — TELEPHONE ENCOUNTER
Called patient to schedule Neurology appointment.  No Answer/No voicemail.  3rd attempt, unable to contact.

## 2022-05-20 NOTE — ED PROVIDER NOTES
Encounter Date: 5/20/2022       History     Chief Complaint   Patient presents with    Influenza     Headache, sore throat,cough, running nose.    COVID-19 Concerns     Headache, sore throat,cough, running nose. X2 days. Denies fevers.        26-year-old female presents emergency room with reports of sore throat, cough, congestion and headache in addition to body aches for the past 2 days.  She denies fever, rash or neck stiffness.  She denies any urinary complaints.  Patient reports nausea currently.  She denies vomiting or diarrhea.  Patient has a past medical history of ADD, anxiety, depression, ovarian cysts, obesity, vertigo, recurrent UTIs.    The history is provided by the patient. No  was used.     Review of patient's allergies indicates:   Allergen Reactions    Ceftriaxone Nausea And Vomiting    Azithromycin Nausea And Vomiting     Hard to breathe     Latex, natural rubber Swelling and Rash     Past Medical History:   Diagnosis Date    ADD (attention deficit disorder)     Anxiety disorder     Depression     Headache     History of ovarian cyst     Obesity     Substance abuse     Hospitalization     Trauma     not at this time. 9/11/19    Tympanic membrane perforation, left 3/14/2018    UTI (urinary tract infection) 8/3/2018    Vertigo      Past Surgical History:   Procedure Laterality Date    CYSTOSCOPY W/ URETERAL STENT PLACEMENT Right 12/26/2019    Procedure: CYSTOSCOPY, WITH URETERAL STENT INSERTION;  Surgeon: Rito Medley MD;  Location: [facility] OR;  Service: Urology;  Laterality: Right;    CYSTOSCOPY W/ URETERAL STENT REMOVAL Right 1/6/2020    Procedure: CYSTOSCOPY, WITH URETERAL STENT REMOVAL;  Surgeon: Rito Medley MD;  Location: [facility] OR;  Service: Urology;  Laterality: Right;    LASER LITHOTRIPSY Right 1/6/2020    Procedure: LITHOTRIPSY, USING LASER;  Surgeon: Rito Medley MD;  Location: [facility] OR;  Service: Urology;  Laterality: Right;     TONSILLECTOMY, ADENOIDECTOMY, BILATERAL MYRINGOTOMY AND TUBES      URETEROSCOPIC REMOVAL OF URETERIC CALCULUS Right 2020    Procedure: REMOVAL, CALCULUS, URETER, URETEROSCOPIC;  Surgeon: Rito Medley MD;  Location: [facility] OR;  Service: Urology;  Laterality: Right;    URETEROSCOPY Left 2019    Procedure: URETEROSCOPY;  Surgeon: Rito Medley MD;  Location: [facility] OR;  Service: Urology;  Laterality: Left;    URETEROSCOPY Right 2020    Procedure: URETEROSCOPY;  Surgeon: Rito Medley MD;  Location: [facility] OR;  Service: Urology;  Laterality: Right;     Family History   Problem Relation Age of Onset    Breast cancer Maternal Aunt     Obesity Mother     No Known Problems Father     No Known Problems Brother     Colon cancer Neg Hx     Miscarriages / Stillbirths Neg Hx     Ovarian cancer Neg Hx      Social History     Tobacco Use    Smoking status: Former Smoker     Packs/day: 0.25     Types: Cigarettes     Quit date: 3/17/2021     Years since quittin.1    Smokeless tobacco: Never Used   Substance Use Topics    Alcohol use: Not Currently     Alcohol/week: 0.0 standard drinks    Drug use: Not Currently     Types: Marijuana     Comment: history of opoid abuse     Review of Systems   HENT: Positive for congestion and sore throat. Negative for facial swelling.    Eyes: Negative for pain and redness.   Respiratory: Positive for cough. Negative for shortness of breath.    Cardiovascular: Negative for chest pain.   Gastrointestinal: Positive for nausea.   Genitourinary: Negative for dysuria.   Musculoskeletal: Positive for myalgias. Negative for neck pain and neck stiffness.   Skin: Negative for wound.   Neurological: Positive for headaches.       Physical Exam     Initial Vitals [22 0942]   BP Pulse Resp Temp SpO2   130/60 95 16 98.8 °F (37.1 °C) 98 %      MAP       --         Physical Exam    Constitutional: She appears well-developed and well-nourished. She appears ill.    HENT:   Head: Normocephalic and atraumatic.   Right Ear: Hearing and tympanic membrane normal.   Left Ear: Hearing and tympanic membrane normal.   Nose: Rhinorrhea present.   Mouth/Throat: Posterior oropharyngeal erythema present. No oropharyngeal exudate or posterior oropharyngeal edema.   Eyes: Lids are normal. Pupils are equal, round, and reactive to light.   Neck:   Normal range of motion.  Cardiovascular: Normal rate.   Pulmonary/Chest: Breath sounds normal. No respiratory distress. She has no wheezes. She has no rhonchi.   Abdominal: Abdomen is soft. There is no abdominal tenderness.   Musculoskeletal:         General: Normal range of motion.      Cervical back: Normal range of motion. No rigidity. No spinous process tenderness or muscular tenderness.     Neurological: She is alert and oriented to person, place, and time.   Skin: Skin is warm and dry. No rash noted.   Psychiatric: She has a normal mood and affect. Her behavior is normal. Judgment and thought content normal.         ED Course   Procedures  Labs Reviewed   INFLUENZA A & B BY MOLECULAR   GROUP A STREP, MOLECULAR   SARS-COV-2 RDRP GENE   POCT URINE PREGNANCY          Imaging Results    None          Medications   ondansetron disintegrating tablet 4 mg (4 mg Oral Given 5/20/22 1018)     Medical Decision Making:   Clinical Tests:   Lab Tests: Ordered and Reviewed             ED Course as of 05/20/22 1102   Fri May 20, 2022   1100 Tested here for flu, covid and strep which were all negative. Pt is not toxic in appearance. Viral illness suspected.  [DT]   1102 Strict return precautions given.  [DT]      ED Course User Index  [DT] Eleonora Cherry NP             Clinical Impression:   Final diagnoses:  [B34.9] Viral illness (Primary)          ED Disposition Condition    Discharge Stable        ED Prescriptions     Medication Sig Dispense Start Date End Date Auth. Provider    fluticasone propionate (FLONASE) 50 mcg/actuation nasal spray 1 spray (50  mcg total) by Each Nostril route 2 (two) times daily as needed for Rhinitis. 15 g 5/20/2022  Eleonora Cherry NP    benzonatate (TESSALON) 100 MG capsule Take 1 capsule (100 mg total) by mouth 3 (three) times daily as needed for Cough. 20 capsule 5/20/2022 5/30/2022 Eleonora Cherry NP    ondansetron (ZOFRAN-ODT) 4 MG TbDL Take 1 tablet (4 mg total) by mouth every 8 (eight) hours as needed (nausea, vomiting). 9 tablet 5/20/2022 5/23/2022 Eleonora Cherry NP        Follow-up Information     Follow up With Specialties Details Why Contact Info    Antoine Guevara MD Family Medicine Schedule an appointment as soon as possible for a visit in 2 days  [address] [place] [address]  [phone]             Eleonora Cherry NP  05/20/22 7646

## 2022-06-28 ENCOUNTER — OFFICE VISIT (OUTPATIENT)
Dept: URGENT CARE | Facility: CLINIC | Age: 27
End: 2022-06-28
Payer: COMMERCIAL

## 2022-06-28 VITALS
RESPIRATION RATE: 22 BRPM | HEART RATE: 97 BPM | SYSTOLIC BLOOD PRESSURE: 111 MMHG | OXYGEN SATURATION: 97 % | HEIGHT: 64 IN | BODY MASS INDEX: 44.83 KG/M2 | TEMPERATURE: 98 F | DIASTOLIC BLOOD PRESSURE: 80 MMHG | WEIGHT: 262.56 LBS

## 2022-06-28 DIAGNOSIS — R30.0 DYSURIA: Primary | ICD-10-CM

## 2022-06-28 DIAGNOSIS — Z30.46 IMPLANTABLE SUBDERMAL CONTRACEPTIVE SURVEILLANCE: ICD-10-CM

## 2022-06-28 DIAGNOSIS — N89.8 VAGINAL DISCHARGE: ICD-10-CM

## 2022-06-28 LAB
B-HCG UR QL: NEGATIVE
BILIRUB UR QL STRIP: NEGATIVE
C TRACH DNA SPEC QL NAA+PROBE: NOT DETECTED
CLUE CELLS VAG QL WET PREP: ABNORMAL
CTP QC/QA: YES
GLUCOSE UR QL STRIP: NEGATIVE
HBV SURFACE AB SER-ACNC: 0.71 MIU/ML
HBV SURFACE AB SERPL IA-ACNC: NONREACTIVE M[IU]/ML
HCV AB SERPL QL IA: NONREACTIVE
HIV 1+2 AB+HIV1 P24 AG SERPL QL IA: NONREACTIVE
KETONES UR QL STRIP: NEGATIVE
LEUKOCYTE ESTERASE UR QL STRIP: POSITIVE
N GONORRHOEA DNA SPEC QL NAA+PROBE: NOT DETECTED
PH, POC UA: 6
POC BLOOD, URINE: POSITIVE
POC NITRATES, URINE: NEGATIVE
PROT UR QL STRIP: POSITIVE
SP GR UR STRIP: ABNORMAL (ref 1–1.03)
T PALLIDUM AB SER QL: NONREACTIVE
T VAGINALIS VAG QL WET PREP: ABNORMAL
UROBILINOGEN UR STRIP-ACNC: 0.2 (ref 0.1–1.1)
WBC #/AREA VAG WET PREP: ABNORMAL
YEAST SPEC QL WET PREP: ABNORMAL

## 2022-06-28 PROCEDURE — 86803 HEPATITIS C AB TEST: CPT | Performed by: NURSE PRACTITIONER

## 2022-06-28 PROCEDURE — 36415 COLL VENOUS BLD VENIPUNCTURE: CPT | Performed by: NURSE PRACTITIONER

## 2022-06-28 PROCEDURE — 99214 OFFICE O/P EST MOD 30 MIN: CPT | Mod: S$PBB,,, | Performed by: NURSE PRACTITIONER

## 2022-06-28 PROCEDURE — 86706 HEP B SURFACE ANTIBODY: CPT | Performed by: NURSE PRACTITIONER

## 2022-06-28 PROCEDURE — 81003 URINALYSIS AUTO W/O SCOPE: CPT | Mod: PBBFAC | Performed by: NURSE PRACTITIONER

## 2022-06-28 PROCEDURE — 87210 SMEAR WET MOUNT SALINE/INK: CPT | Performed by: NURSE PRACTITIONER

## 2022-06-28 PROCEDURE — 87389 HIV-1 AG W/HIV-1&-2 AB AG IA: CPT | Performed by: NURSE PRACTITIONER

## 2022-06-28 PROCEDURE — 87491 CHLMYD TRACH DNA AMP PROBE: CPT | Performed by: NURSE PRACTITIONER

## 2022-06-28 PROCEDURE — 87591 N.GONORRHOEAE DNA AMP PROB: CPT | Performed by: NURSE PRACTITIONER

## 2022-06-28 PROCEDURE — 99214 PR OFFICE/OUTPT VISIT, EST, LEVL IV, 30-39 MIN: ICD-10-PCS | Mod: S$PBB,,, | Performed by: NURSE PRACTITIONER

## 2022-06-28 PROCEDURE — 86780 TREPONEMA PALLIDUM: CPT | Performed by: NURSE PRACTITIONER

## 2022-06-28 PROCEDURE — 99215 OFFICE O/P EST HI 40 MIN: CPT | Mod: PBBFAC | Performed by: NURSE PRACTITIONER

## 2022-06-28 PROCEDURE — 81025 URINE PREGNANCY TEST: CPT | Mod: PBBFAC | Performed by: NURSE PRACTITIONER

## 2022-06-28 NOTE — PATIENT INSTRUCTIONS
The vaginal swab result will be back today.  Please check your patient portal amanuel to see your results.  If they are all negative, you need STD testing if it was not already performed.  If you have any positive/abnormal results, medication will automatically be sent to your pharmacy and we will call you to notify you about that.   Providers can write comments and messages directly in your portal, please look for those.  Stop all bath bombs, scented washes, scented feminine products, scented toilet paper, douching, and tub baths.   Shower only.  Clean and wipe front to back only.  Plain dove or dial soap, or summer's grabiel wash if desired.     Printed this for patient, page 1, 25:  [email or web address]    **Your Nexplanon needs to be REMOVED and possibly replaced if you are not pregnant**    You have been referred to the OB/GYN Clinic at [facility]. They will call you with an appointment, please answer your phone.

## 2022-06-28 NOTE — PROGRESS NOTES
"Subjective:       Patient ID: Alexandra Martin is a 26 y.o. female.    Vitals:  height is 5' 4.17" (1.63 m) and weight is 119.1 kg (262 lb 9.1 oz). Her temperature is 98.2 °F (36.8 °C). Her blood pressure is 111/80 and her pulse is 97. Her respiration is 22 (abnormal) and oxygen saturation is 97%.     Chief Complaint: Dysuria (X 2days) and Vaginal Discharge (x2days)    Vaginal discharge x1 month. States she had 2 periods last month, but has a Nexplanon. Says her Nexplanon has been in for 3 years. States she just "got off drugs" (reports Fentanyl) and gained about 40 pounds. Concerned she  May be pregnant. Consents to blood, urine std testing. Requesting a referral to an OBGYN.    ROS    Objective:      Physical Exam   Constitutional: She is oriented to person, place, and time.  Non-toxic appearance. She does not appear ill. No distress. obesity  Pulmonary/Chest: Effort normal.   Abdominal: Normal appearance.   Genitourinary: Marco A stage (genital) is 5.    Pelvic exam was performed with patient in the lithotomy position.      Vaginal discharge present.      No vaginal erythema or bleeding.   No erythema or bleeding in the vagina.         Comments: White to clear milky discharge. Mild odor.     Neurological: She is alert and oriented to person, place, and time.   Skin: Skin is warm and dry.   Psychiatric: Her behavior is normal. Mood, judgment and thought content normal.   Vitals reviewed.chaperone present (I chaperoned Kinga Hawk NP for pelvic exam)           Assessment:       1. Dysuria    2. Vaginal discharge        Results for orders placed or performed in visit on 06/28/22   POCT Urinalysis, Dipstick, Automated, W/O Scope   Result Value Ref Range    POC Blood, Urine Positive (A) Negative    POC Bilirubin, Urine Negative Negative    POC Urobilinogen, Urine 0.2 0.1 - 1.1    POC Ketones, Urine Negative Negative    POC Protein, Urine Positive (A) Negative    POC Nitrates, Urine Negative Negative    " POC Glucose, Urine Negative Negative    pH, UA 6.0     POC Specific Gravity, Urine >1.030. 1.003 - 1.029    POC Leukocytes, Urine Positive (A) Negative   POCT urine pregnancy   Result Value Ref Range    POC Preg Test, Ur Negative Negative     Acceptable Yes    ]      Plan:         Dysuria  -     POCT Urinalysis, Dipstick, Automated, W/O Scope    Vaginal discharge  -     Wet Prep, Genital  -     Chlamydia/GC, PCR          The vaginal swab result will be back today.  Please check your patient portal amanuel to see your results.  If they are all negative, you need STD testing if it was not already performed.  If you have any positive/abnormal results, medication will automatically be sent to your pharmacy and we will call you to notify you about that.   Providers can write comments and messages directly in your portal, please look for those.  Stop all bath bombs, scented washes, scented feminine products, scented toilet paper, douching, and tub baths.   Shower only.  Clean and wipe front to back only.  Plain dove or dial soap, or summer's grabiel wash if desired.     Printed this for patient, page 1, 25:  [email or web address]    **Your Nexplanon needs to be REMOVED and possibly replaced if you are not pregnant**    You have been referred to the OB/GYN Clinic at [facility]. They will call you with an appointment, please answer your phone.

## 2022-07-15 ENCOUNTER — TELEPHONE (OUTPATIENT)
Dept: URGENT CARE | Facility: CLINIC | Age: 27
End: 2022-07-15
Payer: COMMERCIAL

## 2022-07-15 DIAGNOSIS — Z30.9 ENCOUNTER FOR CONTRACEPTIVE MANAGEMENT, UNSPECIFIED TYPE: Primary | ICD-10-CM

## 2022-07-15 NOTE — TELEPHONE ENCOUNTER
----- Message from Matilde Mei sent at 7/13/2022  2:33 PM CDT -----  Patient called and she was suppose to get a referral to gyn to have her birth control removed. They didn't get it can you resend

## 2022-09-01 ENCOUNTER — OFFICE VISIT (OUTPATIENT)
Dept: URGENT CARE | Facility: CLINIC | Age: 27
End: 2022-09-01
Payer: COMMERCIAL

## 2022-09-01 VITALS
RESPIRATION RATE: 18 BRPM | OXYGEN SATURATION: 99 % | WEIGHT: 271 LBS | HEIGHT: 64 IN | HEART RATE: 72 BPM | BODY MASS INDEX: 46.26 KG/M2 | TEMPERATURE: 98 F | SYSTOLIC BLOOD PRESSURE: 116 MMHG | DIASTOLIC BLOOD PRESSURE: 85 MMHG

## 2022-09-01 DIAGNOSIS — R11.0 NAUSEA: Primary | ICD-10-CM

## 2022-09-01 LAB
B-HCG UR QL: NEGATIVE
BILIRUB UR QL STRIP: NEGATIVE
CTP QC/QA: YES
GLUCOSE UR QL STRIP: NEGATIVE
KETONES UR QL STRIP: NEGATIVE
LEUKOCYTE ESTERASE UR QL STRIP: POSITIVE
PH, POC UA: 6.5
POC BLOOD, URINE: NEGATIVE
POC NITRATES, URINE: NEGATIVE
PROT UR QL STRIP: NEGATIVE
SP GR UR STRIP: 1.02 (ref 1–1.03)
UROBILINOGEN UR STRIP-ACNC: 0.2 (ref 0.1–1.1)

## 2022-09-01 PROCEDURE — 99215 OFFICE O/P EST HI 40 MIN: CPT | Mod: PBBFAC

## 2022-09-01 PROCEDURE — 99213 PR OFFICE/OUTPT VISIT, EST, LEVL III, 20-29 MIN: ICD-10-PCS | Mod: S$PBB,,,

## 2022-09-01 PROCEDURE — 87088 URINE BACTERIA CULTURE: CPT

## 2022-09-01 PROCEDURE — 99213 OFFICE O/P EST LOW 20 MIN: CPT | Mod: S$PBB,,,

## 2022-09-01 PROCEDURE — 81003 URINALYSIS AUTO W/O SCOPE: CPT | Mod: PBBFAC

## 2022-09-01 PROCEDURE — 81025 URINE PREGNANCY TEST: CPT | Mod: PBBFAC

## 2022-09-01 RX ORDER — ONDANSETRON 4 MG/1
4 TABLET, ORALLY DISINTEGRATING ORAL EVERY 8 HOURS PRN
Qty: 10 TABLET | Refills: 0 | Status: SHIPPED | OUTPATIENT
Start: 2022-09-01 | End: 2022-11-15

## 2022-09-01 RX ORDER — NALTREXONE 380 MG
KIT INTRAMUSCULAR
COMMUNITY
Start: 2022-07-05 | End: 2024-03-04

## 2022-09-01 NOTE — PROGRESS NOTES
"Subjective:       Patient ID: Alexandra Martin is a 26 y.o. female.    Vitals:  height is 5' 3.78" (1.62 m) and weight is 122.9 kg (271 lb). Her temperature is 98.4 °F (36.9 °C). Her blood pressure is 116/85 and her pulse is 72. Her respiration is 18 and oxygen saturation is 99%.     Chief Complaint: Nausea (Since yesterday, denies vomiting. Denies any other symptoms.)    Pt states nausea and lower abdominal pain. Denies vomiting or diarrhea. Denies urinary symptoms or flank pain. States brown vaginal discharge.      Constitution: Negative.   HENT: Negative.     Neck: neck negative.   Cardiovascular: Negative.    Eyes: Negative.    Respiratory: Negative.     Gastrointestinal:  Positive for nausea. Negative for vomiting and diarrhea.   Genitourinary:  Positive for vaginal discharge. Negative for dysuria, frequency and urgency.   Musculoskeletal: Negative.    Skin: Negative.      Objective:      Physical Exam   Constitutional: normal  HENT:   Head: Normocephalic.   Nose: Nose normal.   Mouth/Throat: Uvula is midline, oropharynx is clear and moist and mucous membranes are normal. Mucous membranes are moist. Oropharynx is clear.   Eyes: Pupils are equal, round, and reactive to light.   Cardiovascular: Normal rate, regular rhythm, normal heart sounds and normal pulses.   Pulmonary/Chest: Effort normal and breath sounds normal.   Abdominal: Normal appearance and bowel sounds are normal. She exhibits no distension and no mass. Soft. There is no abdominal tenderness. There is no rebound, no guarding, no left CVA tenderness and no right CVA tenderness. No hernia.   Musculoskeletal: Normal range of motion.         General: Normal range of motion.   Neurological: She is alert.   Skin: Skin is warm and dry.   Vitals reviewed.      Results for orders placed or performed in visit on 09/01/22   POCT Urinalysis, Dipstick, Automated, W/O Scope   Result Value Ref Range    POC Blood, Urine Negative Negative    POC Bilirubin, " Urine Negative Negative    POC Urobilinogen, Urine 0.2 0.1 - 1.1    POC Ketones, Urine Negative Negative    POC Protein, Urine Negative Negative    POC Nitrates, Urine Negative Negative    POC Glucose, Urine Negative Negative    pH, UA 6.5     POC Specific Gravity, Urine 1.025 1.003 - 1.029    POC Leukocytes, Urine Positive (A) Negative   POCT urine pregnancy   Result Value Ref Range    POC Preg Test, Ur Negative Negative     Acceptable Yes        Assessment:       1. Nausea            Plan:         Nausea  -     POCT Urinalysis, Dipstick, Automated, W/O Scope  -     POCT urine pregnancy  -     Urine culture  -     ondansetron (ZOFRAN-ODT) 4 MG TbDL; Take 1 tablet (4 mg total) by mouth every 8 (eight) hours as needed (nausea).  Dispense: 10 tablet; Refill: 0  -     Ambulatory referral/consult to Family Practice       Urine sent for culture prior to prescribing antibiotics as there was no flank pain or urinary symptoms.    Pt offered a wet prep and STD testing but currently declined.    - see pt education for bland diet    - discussed give zofran when ready at pharmacy. Wait 30 minutes after giving med, then start water only. If water is tolerated x2 hours, advance to pedialyte/powerade/gatorade, pt choice. If those liquids tolerated x2 hours, advance to bland diet. Do not reintroduce heavier foods until tomorrow.        Please see provided patient education for guidance.    Go to the ER if you experience chest pain with SOB, SOBOE, high fevers 103+, excessive vomiting/diarrhea, or general distress.

## 2022-09-03 LAB — BACTERIA UR CULT: NORMAL

## 2022-09-26 ENCOUNTER — OFFICE VISIT (OUTPATIENT)
Dept: GYNECOLOGY | Facility: CLINIC | Age: 27
End: 2022-09-26
Payer: MEDICARE

## 2022-09-26 VITALS
BODY MASS INDEX: 48.28 KG/M2 | WEIGHT: 272.5 LBS | OXYGEN SATURATION: 98 % | RESPIRATION RATE: 20 BRPM | SYSTOLIC BLOOD PRESSURE: 97 MMHG | HEIGHT: 63 IN | TEMPERATURE: 98 F | DIASTOLIC BLOOD PRESSURE: 66 MMHG | HEART RATE: 60 BPM

## 2022-09-26 DIAGNOSIS — Z30.46 ENCOUNTER FOR REMOVAL AND REINSERTION OF NEXPLANON: Primary | ICD-10-CM

## 2022-09-26 LAB
B-HCG UR QL: NEGATIVE
CTP QC/QA: YES

## 2022-09-26 PROCEDURE — 11976 REMOVE CONTRACEPTIVE CAPSULE: CPT | Mod: PBBFAC | Performed by: OBSTETRICS & GYNECOLOGY

## 2022-09-26 PROCEDURE — 96372 THER/PROPH/DIAG INJ SC/IM: CPT | Mod: PBBFAC

## 2022-09-26 PROCEDURE — 11981 INSERTION DRUG DLVR IMPLANT: CPT | Mod: PBBFAC | Performed by: STUDENT IN AN ORGANIZED HEALTH CARE EDUCATION/TRAINING PROGRAM

## 2022-09-26 PROCEDURE — 81025 URINE PREGNANCY TEST: CPT | Mod: PBBFAC

## 2022-09-26 PROCEDURE — 99214 OFFICE O/P EST MOD 30 MIN: CPT | Mod: PBBFAC

## 2022-09-26 RX ORDER — LIDOCAINE HYDROCHLORIDE 10 MG/ML
10 INJECTION INFILTRATION; PERINEURAL
Status: DISCONTINUED | OUTPATIENT
Start: 2022-09-26 | End: 2022-11-15

## 2022-09-26 RX ADMIN — ETONOGESTREL 68 MG: 68 IMPLANT SUBCUTANEOUS at 11:09

## 2022-09-26 NOTE — PROCEDURES
[facility] GYNECOLOGY CLINIC NOTE     Alexandra Martin is a 26 y.o.  presenting to GYN clinic for Nexplanon removal and insertion.     Nexplanon placed in 2019 after birth of her son. Pt has had no menses since placement until 2022. Currently menses are heavy; using multiple tampons daily with bleeding lasting 1 week. Pt reports slight increase in discharge prior to menses, with no erythema or irritation. Seen in urgent care for young in discharge with wet prep, GC negative. No changes in urination. No changes in BM.      Gynecology  OB History          3    Para   3    Term   3       0    AB   0    Living   3         SAB   0    IAB   0    Ectopic   0    Multiple   0    Live Births   3                Past Medical History:   Diagnosis Date    ADD (attention deficit disorder)     Anxiety disorder     Depression     Headache     History of ovarian cyst     Obesity     Substance abuse     Hospitalization     Trauma     not at this time. 19    Tympanic membrane perforation, left 3/14/2018    UTI (urinary tract infection) 8/3/2018    Vertigo       Past Surgical History:   Procedure Laterality Date    CYSTOSCOPY W/ URETERAL STENT PLACEMENT Right 2019    Procedure: CYSTOSCOPY, WITH URETERAL STENT INSERTION;  Surgeon: Rito Medley MD;  Location: [facility] OR;  Service: Urology;  Laterality: Right;    CYSTOSCOPY W/ URETERAL STENT REMOVAL Right 2020    Procedure: CYSTOSCOPY, WITH URETERAL STENT REMOVAL;  Surgeon: Rito Medley MD;  Location: [facility] OR;  Service: Urology;  Laterality: Right;    LASER LITHOTRIPSY Right 2020    Procedure: LITHOTRIPSY, USING LASER;  Surgeon: Rito Medley MD;  Location: [facility] OR;  Service: Urology;  Laterality: Right;    TONSILLECTOMY, ADENOIDECTOMY, BILATERAL MYRINGOTOMY AND TUBES      URETEROSCOPIC REMOVAL OF URETERIC CALCULUS Right 2020    Procedure: REMOVAL, CALCULUS, URETER, URETEROSCOPIC;  Surgeon: Rito Medley MD;   "Location: [facility] OR;  Service: Urology;  Laterality: Right;    URETEROSCOPY Left 2019    Procedure: URETEROSCOPY;  Surgeon: Rito Medley MD;  Location: [facility] OR;  Service: Urology;  Laterality: Left;    URETEROSCOPY Right 2020    Procedure: URETEROSCOPY;  Surgeon: Rito Medley MD;  Location: [facility] OR;  Service: Urology;  Laterality: Right;      Current Outpatient Medications   Medication Instructions    fluticasone propionate (FLONASE) 50 mcg, Each Nostril, 2 times daily PRN    hydrOXYzine pamoate (VISTARIL) 25 mg, Oral, 2 times daily PRN    metroNIDAZOLE (FLAGYL) 500 mg, Oral, Every 12 hours    ondansetron (ZOFRAN-ODT) 4 mg, Oral, Every 8 hours PRN    rizatriptan (MAXALT) 10 MG tablet Take one at the start of a Migraine Headache. May take one additional tablet in 2 hours if the headache persists.    sertraline (ZOLOFT) 200 mg, Oral, Daily    topiramate (TOPAMAX) 25 mg, Oral, Nightly    triamcinolone acetonide 0.1% (KENALOG) 0.1 % paste No dose, route, or frequency recorded.    VIVITROL 380 mg SSRR kit SMARTSI Milligram(s) IM Every 4 Weeks     Social History     Tobacco Use    Smoking status: Every Day     Packs/day: 0.25     Types: Cigarettes     Last attempt to quit: 3/17/2021     Years since quittin.5    Smokeless tobacco: Never   Substance Use Topics    Alcohol use: Not Currently     Alcohol/week: 0.0 standard drinks    Drug use: Not Currently     Types: Marijuana, Fentanyl     Comment: history of opoid abuse       Review of Systems  Pertinent items are noted in HPI.     Objective:     BP 97/66 (BP Location: Right arm, Patient Position: Sitting, BP Method: X-Large (Automatic))   Pulse 60   Temp 98.2 °F (36.8 °C) (Oral)   Resp 20   Ht 5' 3" (1.6 m)   Wt 123.6 kg (272 lb 7.8 oz)   SpO2 98%   BMI 48.27 kg/m²   Physical Exam:  Gen: Well-nourished, well-developed female appearing stated age. Alert, cooperative, in no acute distress.  CV: rrr, no murmurs/rubs/gallops  Chest: ctabl, " no wheezes/rales/rhonchi  Abdomen: Soft, non-tender, no masses.  Extrem: Extremities normal, atraumatic, non-tender calves.    Assessment:       26 y.o.  here for Nexplanon insertion.  1. Encounter for removal and reinsertion of Nexplanon  Insertion of Nexplanon Device    Removal of Nexplanon Device    POCT Urine Pregnancy             Plan:       Problem List Items Addressed This Visit    None  Visit Diagnoses       Encounter for removal and reinsertion of Nexplanon    -  Primary    Relevant Orders    Insertion of Nexplanon Device    Removal of Nexplanon Device    POCT Urine Pregnancy            Insertion of Nexplanon    Date/Time: 2022 9:15 AM  Performed by: Amaris Anne MD  Authorized by: Reddy Marks MD     Consent obtained:  Written  Consent given by:  Patient  Patient questions answered: yes    Patient agrees, verbalizes understanding, and wants to proceed: yes    Educational handouts given: yes    Instructions and paperwork completed: yes    Pre-procedure timeout performed: yes    Prepped with: povidone-iodine    Local anesthetic:  Lidocaine 1%  The site was cleaned and prepped in a sterile fashion: yes    Small stab incision was made in arm: yes    Left/right:  Left  Preloaded Implanon trocar was placed subdermally: yes    Visualization of implant was obtained: yes    Nexplanon was inserted and trocar removed: yes    Visualization of notch in stilette and palpitation of device: yes    Palpitation confirms placement by provider and patient: yes    Site was closed with steri-strips and pressure bandage applied: yes     Removal nexplanon implant. 1% lidocaine used, parallel incision made at tip of implant, implant removed with forceps successfully. Then preceded with insertion as above. Pt tolerated procedures well.   Lot No: [id number]    RTC in 2 months for annual wellness exam if needed  Encouraged to f/u with [facility] to est PCP    Amaris Anne M.D.  [facility] -2

## 2022-11-10 NOTE — PROGRESS NOTES
Complaints today: pain, request induction    BP 98/74   Wt 128 kg (282 lb 3 oz)   LMP 2017   Breastfeeding? Unknown   BMI 55.11 kg/m²     21 y.o., at 37w6d by Estimated Date of Delivery: 17  Patient Active Problem List   Diagnosis    Supervision of high risk pregnancy in second trimester    Bacterial vaginosis    Hematemesis with nausea    10 weeks gestation of pregnancy    14 weeks gestation of pregnancy    Low back pain during pregnancy    Pyelonephritis    Vaginal discharge during pregnancy    Uterine contractions during pregnancy     OB History    Para Term  AB Living   2 1 1 0 0 1   SAB TAB Ectopic Multiple Live Births   0 0 0 0 1      # Outcome Date GA Lbr Minor/2nd Weight Sex Delivery Anes PTL Lv   2             1 Term 08/29/15 39w4d  3.685 kg (8 lb 2 oz) F Vag-Spont EPI N CINTHIA          Dating reviewed    Allergies and problem list reviewed and updated    Medical and surgical history reviewed    Prenatal labs reviewed and updated    Physical Exam:  ABD: soft, gravid, nontender    Assessment:  37 weeks, doing well    Plan:   Follow up with Hannah this week  to discuss induction   follow up 2-3 days, kick counts, labor precautions       
99

## 2022-11-15 ENCOUNTER — OFFICE VISIT (OUTPATIENT)
Dept: FAMILY MEDICINE | Facility: CLINIC | Age: 27
End: 2022-11-15
Payer: COMMERCIAL

## 2022-11-15 ENCOUNTER — TELEPHONE (OUTPATIENT)
Dept: BARIATRICS | Facility: CLINIC | Age: 27
End: 2022-11-15
Payer: COMMERCIAL

## 2022-11-15 VITALS
WEIGHT: 285 LBS | RESPIRATION RATE: 20 BRPM | TEMPERATURE: 98 F | HEART RATE: 72 BPM | BODY MASS INDEX: 50.5 KG/M2 | OXYGEN SATURATION: 98 % | SYSTOLIC BLOOD PRESSURE: 135 MMHG | HEIGHT: 63 IN | DIASTOLIC BLOOD PRESSURE: 81 MMHG

## 2022-11-15 DIAGNOSIS — F41.1 GAD (GENERALIZED ANXIETY DISORDER): ICD-10-CM

## 2022-11-15 DIAGNOSIS — D64.9 ANEMIA, UNSPECIFIED TYPE: ICD-10-CM

## 2022-11-15 DIAGNOSIS — Z86.69 HISTORY OF BELL'S PALSY: ICD-10-CM

## 2022-11-15 DIAGNOSIS — R51.9 NONINTRACTABLE HEADACHE, UNSPECIFIED CHRONICITY PATTERN, UNSPECIFIED HEADACHE TYPE: ICD-10-CM

## 2022-11-15 DIAGNOSIS — E66.01 CLASS 3 SEVERE OBESITY DUE TO EXCESS CALORIES WITH BODY MASS INDEX (BMI) OF 50.0 TO 59.9 IN ADULT, UNSPECIFIED WHETHER SERIOUS COMORBIDITY PRESENT: ICD-10-CM

## 2022-11-15 DIAGNOSIS — Z00.00 ENCOUNTER FOR WELLNESS EXAMINATION: Primary | ICD-10-CM

## 2022-11-15 PROBLEM — Z71.89 ENCOUNTER FOR PSYCHOLOGICAL ASSESSMENT PRIOR TO BARIATRIC SURGERY: Status: RESOLVED | Noted: 2021-04-13 | Resolved: 2022-11-15

## 2022-11-15 PROBLEM — N20.0 KIDNEY STONE: Status: RESOLVED | Noted: 2019-12-26 | Resolved: 2022-11-15

## 2022-11-15 PROBLEM — R78.81 BACTEREMIA: Status: RESOLVED | Noted: 2019-12-27 | Resolved: 2022-11-15

## 2022-11-15 PROBLEM — H90.6 MIXED CONDUCTIVE AND SENSORINEURAL HEARING LOSS OF BOTH EARS: Status: RESOLVED | Noted: 2018-03-14 | Resolved: 2022-11-15

## 2022-11-15 PROBLEM — O42.90 AMNIOTIC FLUID LEAKING: Status: RESOLVED | Noted: 2019-09-28 | Resolved: 2022-11-15

## 2022-11-15 PROBLEM — N20.1 URETERAL STONE: Status: RESOLVED | Noted: 2020-01-06 | Resolved: 2022-11-15

## 2022-11-15 PROBLEM — F33.1 MDD (MAJOR DEPRESSIVE DISORDER), RECURRENT EPISODE, MODERATE: Status: RESOLVED | Noted: 2021-07-29 | Resolved: 2022-11-15

## 2022-11-15 PROBLEM — N12 PYELONEPHRITIS: Status: RESOLVED | Noted: 2017-08-12 | Resolved: 2022-11-15

## 2022-11-15 PROBLEM — R10.9 FLANK PAIN: Status: RESOLVED | Noted: 2020-01-06 | Resolved: 2022-11-15

## 2022-11-15 PROBLEM — R03.0 ELEVATED BLOOD PRESSURE READING: Status: RESOLVED | Noted: 2019-12-30 | Resolved: 2022-11-15

## 2022-11-15 PROBLEM — A54.03 GC CERVICITIS, ACUTE: Status: RESOLVED | Noted: 2019-02-08 | Resolved: 2022-11-15

## 2022-11-15 PROBLEM — Z32.01 POSITIVE URINE PREGNANCY TEST: Status: RESOLVED | Noted: 2019-02-06 | Resolved: 2022-11-15

## 2022-11-15 PROBLEM — N13.30 HYDRONEPHROSIS: Status: RESOLVED | Noted: 2019-12-26 | Resolved: 2022-11-15

## 2022-11-15 PROBLEM — G51.0 LEFT-SIDED BELL'S PALSY: Status: RESOLVED | Noted: 2021-07-01 | Resolved: 2022-11-15

## 2022-11-15 PROBLEM — Z34.82 PRENATAL CARE, SUBSEQUENT PREGNANCY IN SECOND TRIMESTER: Status: RESOLVED | Noted: 2019-04-12 | Resolved: 2022-11-15

## 2022-11-15 LAB
ALBUMIN SERPL-MCNC: 4 GM/DL (ref 3.5–5)
ALBUMIN/GLOB SERPL: 1.2 RATIO (ref 1.1–2)
ALP SERPL-CCNC: 93 UNIT/L (ref 40–150)
ALT SERPL-CCNC: 20 UNIT/L (ref 0–55)
APPEARANCE UR: ABNORMAL
AST SERPL-CCNC: 17 UNIT/L (ref 5–34)
BACTERIA #/AREA URNS AUTO: ABNORMAL /HPF
BASOPHILS # BLD AUTO: 0.03 X10(3)/MCL (ref 0–0.2)
BASOPHILS NFR BLD AUTO: 0.3 %
BILIRUB UR QL STRIP.AUTO: NEGATIVE MG/DL
BILIRUBIN DIRECT+TOT PNL SERPL-MCNC: 0.6 MG/DL
BUN SERPL-MCNC: 9.8 MG/DL (ref 7–18.7)
CALCIUM SERPL-MCNC: 10 MG/DL (ref 8.4–10.2)
CHLORIDE SERPL-SCNC: 104 MMOL/L (ref 98–107)
CHOLEST SERPL-MCNC: 165 MG/DL
CHOLEST/HDLC SERPL: 4 {RATIO} (ref 0–5)
CO2 SERPL-SCNC: 26 MMOL/L (ref 22–29)
COLOR UR AUTO: YELLOW
CREAT SERPL-MCNC: 0.83 MG/DL (ref 0.55–1.02)
EOSINOPHIL # BLD AUTO: 0.07 X10(3)/MCL (ref 0–0.9)
EOSINOPHIL NFR BLD AUTO: 0.8 %
ERYTHROCYTE [DISTWIDTH] IN BLOOD BY AUTOMATED COUNT: 13.1 % (ref 11.5–17)
EST. AVERAGE GLUCOSE BLD GHB EST-MCNC: 102.5 MG/DL
GFR SERPLBLD CREATININE-BSD FMLA CKD-EPI: >60 MLS/MIN/1.73/M2
GLOBULIN SER-MCNC: 3.4 GM/DL (ref 2.4–3.5)
GLUCOSE SERPL-MCNC: 82 MG/DL (ref 74–100)
GLUCOSE UR QL STRIP.AUTO: NORMAL MG/DL
HAV IGM SERPL QL IA: NONREACTIVE
HBA1C MFR BLD: 5.2 %
HBV CORE IGM SERPL QL IA: NONREACTIVE
HBV SURFACE AG SERPL QL IA: NONREACTIVE
HCT VFR BLD AUTO: 43.3 % (ref 37–47)
HCV AB SERPL QL IA: NONREACTIVE
HDLC SERPL-MCNC: 40 MG/DL (ref 35–60)
HGB BLD-MCNC: 13.7 GM/DL (ref 12–16)
HIV 1+2 AB+HIV1 P24 AG SERPL QL IA: NONREACTIVE
HYALINE CASTS #/AREA URNS LPF: ABNORMAL /LPF
IMM GRANULOCYTES # BLD AUTO: 0.03 X10(3)/MCL (ref 0–0.04)
IMM GRANULOCYTES NFR BLD AUTO: 0.3 %
KETONES UR QL STRIP.AUTO: NEGATIVE MG/DL
LDLC SERPL CALC-MCNC: 98 MG/DL (ref 50–140)
LEUKOCYTE ESTERASE UR QL STRIP.AUTO: NEGATIVE UNIT/L
LYMPHOCYTES # BLD AUTO: 2.43 X10(3)/MCL (ref 0.6–4.6)
LYMPHOCYTES NFR BLD AUTO: 26.4 %
MCH RBC QN AUTO: 26.1 PG (ref 27–31)
MCHC RBC AUTO-ENTMCNC: 31.6 MG/DL (ref 33–36)
MCV RBC AUTO: 82.6 FL (ref 80–94)
MONOCYTES # BLD AUTO: 0.48 X10(3)/MCL (ref 0.1–1.3)
MONOCYTES NFR BLD AUTO: 5.2 %
MUCOUS THREADS URNS QL MICRO: ABNORMAL /LPF
NEUTROPHILS # BLD AUTO: 6.2 X10(3)/MCL (ref 2.1–9.2)
NEUTROPHILS NFR BLD AUTO: 67 %
NITRITE UR QL STRIP.AUTO: NEGATIVE
NRBC BLD AUTO-RTO: 0 %
PH UR STRIP.AUTO: 7.5 [PH]
PLATELET # BLD AUTO: 199 X10(3)/MCL (ref 130–400)
PMV BLD AUTO: 11.4 FL (ref 7.4–10.4)
POTASSIUM SERPL-SCNC: 4.9 MMOL/L (ref 3.5–5.1)
PROT SERPL-MCNC: 7.4 GM/DL (ref 6.4–8.3)
PROT UR QL STRIP.AUTO: ABNORMAL MG/DL
RBC # BLD AUTO: 5.24 X10(6)/MCL (ref 4.2–5.4)
RBC #/AREA URNS AUTO: ABNORMAL /HPF
RBC UR QL AUTO: NEGATIVE UNIT/L
SODIUM SERPL-SCNC: 139 MMOL/L (ref 136–145)
SP GR UR STRIP.AUTO: 1.02
SQUAMOUS #/AREA URNS LPF: ABNORMAL /HPF
T PALLIDUM AB SER QL: NONREACTIVE
T4 FREE SERPL-MCNC: 0.99 NG/DL (ref 0.7–1.48)
TRIGL SERPL-MCNC: 136 MG/DL (ref 37–140)
TSH SERPL-ACNC: 1.61 UIU/ML (ref 0.35–4.94)
UROBILINOGEN UR STRIP-ACNC: NORMAL MG/DL
VLDLC SERPL CALC-MCNC: 27 MG/DL
WBC # SPEC AUTO: 9.2 X10(3)/MCL (ref 4.5–11.5)
WBC #/AREA URNS AUTO: ABNORMAL /HPF

## 2022-11-15 PROCEDURE — 99204 PR OFFICE/OUTPT VISIT, NEW, LEVL IV, 45-59 MIN: ICD-10-PCS | Mod: 25,S$PBB,, | Performed by: NURSE PRACTITIONER

## 2022-11-15 PROCEDURE — 85025 COMPLETE CBC W/AUTO DIFF WBC: CPT | Performed by: NURSE PRACTITIONER

## 2022-11-15 PROCEDURE — 80053 COMPREHEN METABOLIC PANEL: CPT | Performed by: NURSE PRACTITIONER

## 2022-11-15 PROCEDURE — 1159F PR MEDICATION LIST DOCUMENTED IN MEDICAL RECORD: ICD-10-PCS | Mod: CPTII,,, | Performed by: NURSE PRACTITIONER

## 2022-11-15 PROCEDURE — 99214 OFFICE O/P EST MOD 30 MIN: CPT | Mod: PBBFAC,PN | Performed by: NURSE PRACTITIONER

## 2022-11-15 PROCEDURE — 1160F RVW MEDS BY RX/DR IN RCRD: CPT | Mod: CPTII,,, | Performed by: NURSE PRACTITIONER

## 2022-11-15 PROCEDURE — 83036 HEMOGLOBIN GLYCOSYLATED A1C: CPT | Performed by: NURSE PRACTITIONER

## 2022-11-15 PROCEDURE — 3008F BODY MASS INDEX DOCD: CPT | Mod: CPTII,,, | Performed by: NURSE PRACTITIONER

## 2022-11-15 PROCEDURE — 87389 HIV-1 AG W/HIV-1&-2 AB AG IA: CPT | Performed by: NURSE PRACTITIONER

## 2022-11-15 PROCEDURE — 99385 PR PREVENTIVE VISIT,NEW,18-39: ICD-10-PCS | Mod: S$PBB,,, | Performed by: NURSE PRACTITIONER

## 2022-11-15 PROCEDURE — 3079F PR MOST RECENT DIASTOLIC BLOOD PRESSURE 80-89 MM HG: ICD-10-PCS | Mod: CPTII,,, | Performed by: NURSE PRACTITIONER

## 2022-11-15 PROCEDURE — 86780 TREPONEMA PALLIDUM: CPT | Performed by: NURSE PRACTITIONER

## 2022-11-15 PROCEDURE — 3008F PR BODY MASS INDEX (BMI) DOCUMENTED: ICD-10-PCS | Mod: CPTII,,, | Performed by: NURSE PRACTITIONER

## 2022-11-15 PROCEDURE — 36415 COLL VENOUS BLD VENIPUNCTURE: CPT | Performed by: NURSE PRACTITIONER

## 2022-11-15 PROCEDURE — 3075F SYST BP GE 130 - 139MM HG: CPT | Mod: CPTII,,, | Performed by: NURSE PRACTITIONER

## 2022-11-15 PROCEDURE — 80074 ACUTE HEPATITIS PANEL: CPT | Performed by: NURSE PRACTITIONER

## 2022-11-15 PROCEDURE — 1160F PR REVIEW ALL MEDS BY PRESCRIBER/CLIN PHARMACIST DOCUMENTED: ICD-10-PCS | Mod: CPTII,,, | Performed by: NURSE PRACTITIONER

## 2022-11-15 PROCEDURE — 99204 OFFICE O/P NEW MOD 45 MIN: CPT | Mod: 25,S$PBB,, | Performed by: NURSE PRACTITIONER

## 2022-11-15 PROCEDURE — 1159F MED LIST DOCD IN RCRD: CPT | Mod: CPTII,,, | Performed by: NURSE PRACTITIONER

## 2022-11-15 PROCEDURE — 3079F DIAST BP 80-89 MM HG: CPT | Mod: CPTII,,, | Performed by: NURSE PRACTITIONER

## 2022-11-15 PROCEDURE — 84439 ASSAY OF FREE THYROXINE: CPT | Performed by: NURSE PRACTITIONER

## 2022-11-15 PROCEDURE — 84443 ASSAY THYROID STIM HORMONE: CPT | Performed by: NURSE PRACTITIONER

## 2022-11-15 PROCEDURE — 81001 URINALYSIS AUTO W/SCOPE: CPT | Performed by: NURSE PRACTITIONER

## 2022-11-15 PROCEDURE — 3075F PR MOST RECENT SYSTOLIC BLOOD PRESS GE 130-139MM HG: ICD-10-PCS | Mod: CPTII,,, | Performed by: NURSE PRACTITIONER

## 2022-11-15 PROCEDURE — 99385 PREV VISIT NEW AGE 18-39: CPT | Mod: S$PBB,,, | Performed by: NURSE PRACTITIONER

## 2022-11-15 PROCEDURE — 80061 LIPID PANEL: CPT | Performed by: NURSE PRACTITIONER

## 2022-11-15 NOTE — ASSESSMENT & PLAN NOTE
Chronic, worsening  Pt failed Topamax and Maxalt treatments, failed Elavil by previous provider.  Referred to Neurology by previous provider, could not get in touch with her to schedule appt.   Referral to [facility] neurology.  Headache education provided - including good sleep hygiene, stress management, medication overuse education provided - using more 3 OTC per week may worsen headaches, High intensity interval training has shown to reduce headache frequency. Low carb, high protein has shown to reduce headache frequency. Patient is instructed to keep headache diary.

## 2022-11-15 NOTE — PROGRESS NOTES
Patient Name: Alexandra Martin   : 1995  MRN: [id number]     SUBJECTIVE DATA:    CHIEF COMPLAINT:  Alexandra Martin  is a 26 y.o. female presenting to clinic today for Establish Care (Est pcp, )      11/15/22:  Here to establish care with new PCP. Pt with previous documented history of obesity, depression/anxiety, kidney stones, hearing loss.  Has had bell's palsy in past and was referred to Neurology but no appt attended. [place] by [facility] [place].  Concerns about weight loss. Was set up to do bariatric surgery and would like referral for that here but is still smoking cigarettes.  Has quit in past for this reason. Increase anxiety due to life issues currently. Denies drug use.  Stopped all psych meds for anxiety/depression and feels better since stopping.        ROS:  Review of Systems   Constitutional: Negative.    HENT: Negative.     Eyes: Negative.    Respiratory: Negative.     Cardiovascular: Negative.    Gastrointestinal: Negative.    Genitourinary: Negative.    Musculoskeletal: Negative.    Skin: Negative.    Neurological: Negative.    Endo/Heme/Allergies: Negative.    Psychiatric/Behavioral: Negative.  Negative for depression, hallucinations, memory loss, substance abuse and suicidal ideas. The patient is not nervous/anxious and does not have insomnia.    All other systems reviewed and are negative.    ALLERGIES:  Review of patient's allergies indicates:   Allergen Reactions    Ceftriaxone Nausea And Vomiting    Azithromycin Nausea And Vomiting     Hard to breathe     Latex, natural rubber Swelling and Rash        HISTORY:  Past Medical History:   Diagnosis Date    ADD (attention deficit disorder)     Anxiety disorder     Depression     Headache     History of ovarian cyst     MDD (major depressive disorder), recurrent episode, moderate 2021    Obesity     Substance abuse     Hospitalization     Trauma     not at this time. 17    Tympanic  membrane perforation, left 3/14/2018    UTI (urinary tract infection) 8/3/2018    Vertigo       Past Surgical History:   Procedure Laterality Date    CYSTOSCOPY W/ URETERAL STENT PLACEMENT Right 2019    Procedure: CYSTOSCOPY, WITH URETERAL STENT INSERTION;  Surgeon: Rito Medley MD;  Location: [facility];  Service: Urology;  Laterality: Right;    CYSTOSCOPY W/ URETERAL STENT REMOVAL Right 2020    Procedure: CYSTOSCOPY, WITH URETERAL STENT REMOVAL;  Surgeon: Rito Medley MD;  Location: [facility];  Service: Urology;  Laterality: Right;    LASER LITHOTRIPSY Right 2020    Procedure: LITHOTRIPSY, USING LASER;  Surgeon: Rito Medley MD;  Location: [facility] OR;  Service: Urology;  Laterality: Right;    TONSILLECTOMY, ADENOIDECTOMY, BILATERAL MYRINGOTOMY AND TUBES      URETEROSCOPIC REMOVAL OF URETERIC CALCULUS Right 2020    Procedure: REMOVAL, CALCULUS, URETER, URETEROSCOPIC;  Surgeon: Rito Medley MD;  Location: [facility];  Service: Urology;  Laterality: Right;    URETEROSCOPY Left 2019    Procedure: URETEROSCOPY;  Surgeon: Rito Medley MD;  Location: [facility] OR;  Service: Urology;  Laterality: Left;    URETEROSCOPY Right 2020    Procedure: URETEROSCOPY;  Surgeon: Rito Medley MD;  Location: [facility] OR;  Service: Urology;  Laterality: Right;     Family History   Problem Relation Age of Onset    Breast cancer Maternal Aunt     Obesity Mother     No Known Problems Father     No Known Problems Brother     Colon cancer Neg Hx     Miscarriages / Stillbirths Neg Hx     Ovarian cancer Neg Hx      Social History     Tobacco Use   Smoking Status Every Day    Packs/day: 0.25    Types: Cigarettes    Last attempt to quit: 3/17/2021    Years since quittin.6   Smokeless Tobacco Never        OBJECTIVE DATA:  Vital signs  Vitals:    11/15/22 1254   BP: 135/81   BP Location: Right arm   Patient Position: Sitting   BP Method: Large (Automatic)   Pulse: 72   Resp: 20   Temp: 98 °F  "(36.7 °C)   SpO2: 98%   Weight: 129.3 kg (285 lb)   Height: 5' 3" (1.6 m)        Physical Exam  Constitutional:       General: She is awake.      Appearance: Normal appearance. She is well-developed.   HENT:      Head: Normocephalic and atraumatic.      Right Ear: Hearing, tympanic membrane, ear canal and external ear normal.      Left Ear: Hearing, tympanic membrane, ear canal and external ear normal.      Nose: Nose normal.      Mouth/Throat:      Lips: Pink.      Mouth: Mucous membranes are moist.      Pharynx: Oropharynx is clear. Uvula midline.   Eyes:      Pupils: Pupils are equal, round, and reactive to light.   Cardiovascular:      Rate and Rhythm: Normal rate and regular rhythm.      Pulses: Normal pulses.      Heart sounds: Normal heart sounds.   Pulmonary:      Effort: Pulmonary effort is normal.      Breath sounds: Normal breath sounds.   Abdominal:      General: Abdomen is flat. Bowel sounds are normal.      Palpations: Abdomen is soft.   Musculoskeletal:         General: Normal range of motion.      Cervical back: Normal range of motion and neck supple.   Skin:     General: Skin is warm and dry.      Capillary Refill: Capillary refill takes less than 2 seconds.   Neurological:      General: No focal deficit present.      Mental Status: She is alert, oriented to person, place, and time and easily aroused. Mental status is at baseline.   Psychiatric:         Mood and Affect: Mood normal.         Behavior: Behavior is cooperative.        ASSESSMENT/PLAN:  1. Encounter for wellness examination  -     Lipid Panel  -     CBC Auto Differential  -     Comprehensive Metabolic Panel  -     Urinalysis  -     Hemoglobin A1C  -     TSH  -     T4, Free  -     Hepatitis Panel, Acute  -     HIV 1/2 Ag/Ab (4th Gen)  -     SYPHILIS ANTIBODY (WITH REFLEX RPR)    2. Class 3 severe obesity due to excess calories with body mass index (BMI) of 50.0 to 59.9 in adult, unspecified whether serious comorbidity present  Assessment " & Plan:  Pt requesting referral for bariatric surgery.  Is still smoking at this time and has quit before. Tells me that she had met all requirements up [place] to have surgery. I explained that she would need to be nonsmoker for 6 months prior to referral process here.  Voiced understanding. Will get with MDs up [place] for follow-up on that.      3. Anemia, unspecified type  Overview:  Lab Results   Component Value Date    WBC 7.38 08/04/2021    HGB 12.9 08/04/2021    HCT 39.7 08/04/2021    MCV 87 08/04/2021     08/04/2021           Assessment & Plan:  CBC today      4. TRACE (generalized anxiety disorder)  Assessment & Plan:  States stopped all medications and feels better.  Has life issues she is dealing with now contributing to her anxiety.  Practice deep breathing or abdominal breathing exercises when anxiety occurs.  Exercise daily. Get sunlight daily.  Avoid caffeine, alcohol and stimulants.  Practice positive phrases and repeat throughout the day, yoga, lavender scents or Chamomile tea will help anxiety.  Set healthy boundaries, avoid people and conversations that increase stress.  Reports any symptoms of suicidal or homicidal ideations immediately, if clinic is closed go to nearest emergency room.          5. Nonintractable headache, unspecified chronicity pattern, unspecified headache type  Overview:  Headaches (poorly described) associated with tenderness to palpation on the scalp, w/o signs of injury/inflammation or abnormalities on the recent imaging. Has tried acetaminophen/NSAID with moderate response.        Assessment & Plan:  Chronic, worsening  Pt failed Topamax and Maxalt treatments, failed Elavil by previous provider.  Referred to Neurology by previous provider, could not get in touch with her to schedule appt.   Referral to [facility] neurology.  Headache education provided - including good sleep hygiene, stress management, medication overuse education provided - using more 3 OTC per week may worsen  headaches, High intensity interval training has shown to reduce headache frequency. Low carb, high protein has shown to reduce headache frequency. Patient is instructed to keep headache diary.      Orders:  -     Ambulatory referral/consult to Neurology; Future; Expected date: 11/22/2022    6. History of Bell's palsy  Assessment & Plan:  Referral to neurology at [facility]          No results found for this or any previous visit (from the past 1008 hour(s)).        Follow Up:  Follow up in about 2 weeks (around 11/29/2022) for Virtual Visit, Review of labs.             This note was created with the assistance of a voice recognition software or phone dictation. There may be transcription errors as a result of using this technology however minimal. Effort has been made to assure accuracy of transcription but any obvious errors or omissions should be clarified with the author of the document       none

## 2022-11-15 NOTE — ASSESSMENT & PLAN NOTE
States stopped all medications and feels better.  Has life issues she is dealing with now contributing to her anxiety.  Practice deep breathing or abdominal breathing exercises when anxiety occurs.  Exercise daily. Get sunlight daily.  Avoid caffeine, alcohol and stimulants.  Practice positive phrases and repeat throughout the day, yoga, lavender scents or Chamomile tea will help anxiety.  Set healthy boundaries, avoid people and conversations that increase stress.  Reports any symptoms of suicidal or homicidal ideations immediately, if clinic is closed go to nearest emergency room.

## 2022-11-15 NOTE — ASSESSMENT & PLAN NOTE
Pt requesting referral for bariatric surgery.  Is still smoking at this time and has quit before. Tells me that she had met all requirements up [place] to have surgery. I explained that she would need to be nonsmoker for 6 months prior to referral process here.  Voiced understanding. Will get with MDs up [place] for follow-up on that.

## 2022-11-15 NOTE — TELEPHONE ENCOUNTER
----- Message from Chanel Reeder sent at 11/15/2022  1:54 PM CST -----  Contact: self  Type: Sooner Appointment Request        Caller is requesting a sooner appointment. Caller declined first available appointment listed below. Caller will not accept being placed on the waitlist and is requesting a message be sent to doctor.        Name of Caller: Patient   When is the first available appointment? 1/11/2023  Best Call Back Number: [phone]  Additional Information: Patient wants to see Dr. Wilson about getting weight loss surgery wants the consultation to see if he will do it. Thanks

## 2022-11-17 LAB — PATH REV: NORMAL

## 2022-12-08 ENCOUNTER — OFFICE VISIT (OUTPATIENT)
Dept: FAMILY MEDICINE | Facility: CLINIC | Age: 27
End: 2022-12-08
Payer: COMMERCIAL

## 2022-12-08 DIAGNOSIS — G44.52 NEW DAILY PERSISTENT HEADACHE: Primary | ICD-10-CM

## 2022-12-08 PROCEDURE — 1159F MED LIST DOCD IN RCRD: CPT | Mod: CPTII,95,, | Performed by: NURSE PRACTITIONER

## 2022-12-08 PROCEDURE — 1160F PR REVIEW ALL MEDS BY PRESCRIBER/CLIN PHARMACIST DOCUMENTED: ICD-10-PCS | Mod: CPTII,95,, | Performed by: NURSE PRACTITIONER

## 2022-12-08 PROCEDURE — 1160F RVW MEDS BY RX/DR IN RCRD: CPT | Mod: CPTII,95,, | Performed by: NURSE PRACTITIONER

## 2022-12-08 PROCEDURE — 99442 PR PHYSICIAN TELEPHONE EVALUATION 11-20 MIN: ICD-10-PCS | Mod: 95,,, | Performed by: NURSE PRACTITIONER

## 2022-12-08 PROCEDURE — 1159F PR MEDICATION LIST DOCUMENTED IN MEDICAL RECORD: ICD-10-PCS | Mod: CPTII,95,, | Performed by: NURSE PRACTITIONER

## 2022-12-08 PROCEDURE — 99442 PR PHYSICIAN TELEPHONE EVALUATION 11-20 MIN: CPT | Mod: 95,,, | Performed by: NURSE PRACTITIONER

## 2022-12-08 RX ORDER — ACETAMINOPHEN 500 MG
1000 TABLET ORAL EVERY 6 HOURS PRN
Qty: 240 TABLET | Refills: 6 | Status: SHIPPED | OUTPATIENT
Start: 2022-12-08 | End: 2023-02-10

## 2022-12-08 NOTE — PROGRESS NOTES
Patient Name: Alexandra Martin   : 1995  MRN: [id number]     SUBJECTIVE DATA:    CHIEF COMPLAINT:   Alexandra Martin is a 27 y.o. female who presents to clinic today with No chief complaint on file.        HPI:  22:  Review of labs.  Labs reviewed with patient, no abnormalities noted.  No questions.  Pt is asking for a paper to be signed for drug court stating she is able to take Tylenol for migraines.  She also needs prescription for Tylenol to bring to them.  Is trying to get into Bariatric surgery consultation, needs to change her insurance because it is not accepted where she was referred.      Audio Only Telehealth Visit  The patient location is: [place]  Visit type: Virtual visit with audio only (telephone)    Total time spent with patient: 15 min including time spent talking to the patient about her lab results, reviewing old record, preparing chart for call, med reconciliation, and follow up care.   The reason for the audio only service rather than synchronous audio and video virtual visit was related to technical difficulties or patient preference/necessity.  Each patient to whom I provide medical services by telemedicine is:  (1) informed of the relationship between the physician and patient and the respective role of any other health care provider with respect to management of the patient; and (2) notified that they may decline to receive medical services by telemedicine and may withdraw from such care at any time. Patient verbally consented to receive this service via voice-only telephone call.   This service was not originating from a related E/M service provided within the previous 7 days nor will  to an E/M service or procedure within the next 24 hours or my soonest available appointment.  Prevailing standard of care was able to be met in this audio-only visit.               ALLERGIES:   Review of patient's allergies indicates:   Allergen Reactions    Ceftriaxone Nausea  And Vomiting    Azithromycin Nausea And Vomiting     Hard to breathe     Latex, natural rubber Swelling and Rash         ROS:  Review of Systems   Constitutional: Negative.    HENT: Negative.     Eyes: Negative.    Respiratory: Negative.     Cardiovascular: Negative.    Gastrointestinal: Negative.    Genitourinary: Negative.    Musculoskeletal: Negative.    Skin: Negative.    Neurological: Negative.    Endo/Heme/Allergies: Negative.    Psychiatric/Behavioral: Negative.     All other systems reviewed and are negative.      OBJECTIVE DATA:  Vital signs  There were no vitals filed for this visit.   There is no height or weight on file to calculate BMI.    PHYSICAL EXAM:   Physical Exam  Vitals and nursing note reviewed.   Neurological:      Mental Status: She is alert.   Psychiatric:         Attention and Perception: Attention normal.         Mood and Affect: Mood normal.         Speech: Speech normal.         Behavior: Behavior normal. Behavior is cooperative.         Thought Content: Thought content normal.         Cognition and Memory: Cognition normal.         Judgment: Judgment normal.        ASSESSMENT/PLAN:  1. New daily persistent headache  Overview:  Headaches (poorly described) associated with tenderness to palpation on the scalp, w/o signs of injury/inflammation or abnormalities on the recent imaging. Has tried acetaminophen/NSAID with moderate response.        Orders:  -     acetaminophen (TYLENOL) 500 MG tablet; Take 2 tablets (1,000 mg total) by mouth every 6 (six) hours as needed for Pain.  Dispense: 240 tablet; Refill: 6         RESULTS:  Recent Results (from the past 1008 hour(s))   Lipid Panel    Collection Time: 11/15/22  1:41 PM   Result Value Ref Range    Cholesterol Total 165 <=200 mg/dL    HDL Cholesterol 40 35 - 60 mg/dL    Triglyceride 136 37 - 140 mg/dL    Cholesterol/HDL Ratio 4 0 - 5    Very Low Density Lipoprotein 27     LDL Cholesterol 98.00 50.00 - 140.00 mg/dL   Comprehensive Metabolic  Panel    Collection Time: 11/15/22  1:41 PM   Result Value Ref Range    Sodium Level 139 136 - 145 mmol/L    Potassium Level 4.9 3.5 - 5.1 mmol/L    Chloride 104 98 - 107 mmol/L    Carbon Dioxide 26 22 - 29 mmol/L    Glucose Level 82 74 - 100 mg/dL    Blood Urea Nitrogen 9.8 7.0 - 18.7 mg/dL    Creatinine 0.83 0.55 - 1.02 mg/dL    Calcium Level Total 10.0 8.4 - 10.2 mg/dL    Protein Total 7.4 6.4 - 8.3 gm/dL    Albumin Level 4.0 3.5 - 5.0 gm/dL    Globulin 3.4 2.4 - 3.5 gm/dL    Albumin/Globulin Ratio 1.2 1.1 - 2.0 ratio    Bilirubin Total 0.6 <=1.5 mg/dL    Alkaline Phosphatase 93 40 - 150 unit/L    Alanine Aminotransferase 20 0 - 55 unit/L    Aspartate Aminotransferase 17 5 - 34 unit/L    eGFR >60 mls/min/1.73/m2   Urinalysis    Collection Time: 11/15/22  1:41 PM   Result Value Ref Range    Color, UA Yellow Yellow, Light-Yellow, Dark Yellow, Shell, Straw    Appearance, UA Turbid (A) Clear    Specific Gravity, UA 1.024     pH, UA 7.5 5.0 - 8.5    Protein, UA Trace (A) Negative mg/dL    Glucose, UA Normal Negative, Normal mg/dL    Ketones, UA Negative Negative mg/dL    Blood, UA Negative Negative unit/L    Bilirubin, UA Negative Negative mg/dL    Urobilinogen, UA Normal 0.2, 1.0, Normal mg/dL    Nitrites, UA Negative Negative    Leukocyte Esterase, UA Negative Negative unit/L    WBC, UA None Seen None Seen, 0-2, 3-5, 0-5 /HPF    Bacteria, UA Trace (A) None Seen /HPF    Squamous Epithelial Cells, UA Few (A) None Seen /HPF    Mucous, UA Trace (A) None Seen /LPF    Hyaline Casts, UA None Seen None Seen /lpf    RBC, UA 0-5 None Seen, 0-2, 3-5, 0-5 /HPF   Hemoglobin A1C    Collection Time: 11/15/22  1:41 PM   Result Value Ref Range    Hemoglobin A1c 5.2 <=7.0 %    Estimated Average Glucose 102.5 mg/dL   TSH    Collection Time: 11/15/22  1:41 PM   Result Value Ref Range    Thyroid Stimulating Hormone 1.6122 0.3500 - 4.9400 uIU/mL   T4, Free    Collection Time: 11/15/22  1:41 PM   Result Value Ref Range    Thyroxine Free  0.99 0.70 - 1.48 ng/dL   Hepatitis Panel, Acute    Collection Time: 11/15/22  1:41 PM   Result Value Ref Range    Hepatitis A IgM Nonreactive Nonreactive    Hepatitis B Core IgM Nonreactive Nonreactive    Hepatitis B Surface Antigen Nonreactive Nonreactive    Hepatitis C Antibody Nonreactive Nonreactive   HIV 1/2 Ag/Ab (4th Gen)    Collection Time: 11/15/22  1:41 PM   Result Value Ref Range    HIV Nonreactive Nonreactive   SYPHILIS ANTIBODY (WITH REFLEX RPR)    Collection Time: 11/15/22  1:41 PM   Result Value Ref Range    Syphilis Antibody Nonreactive Nonreactive, Equivocal   CBC with Differential    Collection Time: 11/15/22  1:41 PM   Result Value Ref Range    WBC 9.2 4.5 - 11.5 x10(3)/mcL    RBC 5.24 4.20 - 5.40 x10(6)/mcL    Hgb 13.7 12.0 - 16.0 gm/dL    Hct 43.3 37.0 - 47.0 %    MCV 82.6 80.0 - 94.0 fL    MCH 26.1 (L) 27.0 - 31.0 pg    MCHC 31.6 (L) 33.0 - 36.0 mg/dL    RDW 13.1 11.5 - 17.0 %    Platelet 199 130 - 400 x10(3)/mcL    MPV 11.4 (H) 7.4 - 10.4 fL    Neut % 67.0 %    Lymph % 26.4 %    Mono % 5.2 %    Eos % 0.8 %    Basophil % 0.3 %    Lymph # 2.43 0.6 - 4.6 x10(3)/mcL    Neut # 6.2 2.1 - 9.2 x10(3)/mcL    Mono # 0.48 0.1 - 1.3 x10(3)/mcL    Eos # 0.07 0 - 0.9 x10(3)/mcL    Baso # 0.03 0 - 0.2 x10(3)/mcL    IG# 0.03 0 - 0.04 x10(3)/mcL    IG% 0.3 %    NRBC% 0.0 %   Pathologist Interpretation    Collection Time: 11/15/22  1:41 PM   Result Value Ref Range    Pathology Review       No serological evidence of recent or past hepatitis A, B, or C infection.    Ancelmo Donis M.D.            Follow Up:  Follow up in about 6 months (around 6/8/2023) for Wellness.         This note was created with the assistance of a voice recognition software or phone dictation. There may be transcription errors as a result of using this technology however minimal. Effort has been made to assure accuracy of transcription but any obvious errors or omissions should be clarified with the author of the document

## 2023-02-01 ENCOUNTER — OFFICE VISIT (OUTPATIENT)
Dept: URGENT CARE | Facility: CLINIC | Age: 28
End: 2023-02-01
Payer: MEDICARE

## 2023-02-01 VITALS
TEMPERATURE: 98 F | SYSTOLIC BLOOD PRESSURE: 126 MMHG | HEIGHT: 63 IN | HEART RATE: 86 BPM | BODY MASS INDEX: 51.91 KG/M2 | RESPIRATION RATE: 18 BRPM | DIASTOLIC BLOOD PRESSURE: 76 MMHG | OXYGEN SATURATION: 98 % | WEIGHT: 293 LBS

## 2023-02-01 DIAGNOSIS — R51.9 NONINTRACTABLE HEADACHE, UNSPECIFIED CHRONICITY PATTERN, UNSPECIFIED HEADACHE TYPE: Primary | ICD-10-CM

## 2023-02-01 PROCEDURE — 99213 OFFICE O/P EST LOW 20 MIN: CPT | Mod: S$PBB,,,

## 2023-02-01 PROCEDURE — 99214 OFFICE O/P EST MOD 30 MIN: CPT | Mod: PBBFAC

## 2023-02-01 PROCEDURE — 99213 PR OFFICE/OUTPT VISIT, EST, LEVL III, 20-29 MIN: ICD-10-PCS | Mod: S$PBB,,,

## 2023-02-01 RX ORDER — KETOROLAC TROMETHAMINE 30 MG/ML
30 INJECTION, SOLUTION INTRAMUSCULAR; INTRAVENOUS
Status: COMPLETED | OUTPATIENT
Start: 2023-02-01 | End: 2023-02-01

## 2023-02-01 RX ORDER — ONDANSETRON 8 MG/1
8 TABLET, ORALLY DISINTEGRATING ORAL
Status: COMPLETED | OUTPATIENT
Start: 2023-02-01 | End: 2023-02-01

## 2023-02-01 RX ADMIN — ONDANSETRON 8 MG: 8 TABLET, ORALLY DISINTEGRATING ORAL at 03:02

## 2023-02-01 RX ADMIN — KETOROLAC TROMETHAMINE 30 MG: 30 INJECTION, SOLUTION INTRAMUSCULAR; INTRAVENOUS at 03:02

## 2023-02-01 NOTE — LETTER
February 1, 2023      [facility]  [facility] [facility] [address]  Phone: [phone]       Patient: Alexandra Martin   YOB: 1995  Date of Visit: 02/01/2023    To Whom It May Concern:    New Martin  was at [facility] on 02/01/2023. The patient may return to work/school on 2/2/23. If you have any questions or concerns, or if I can be of further assistance, please do not hesitate to contact me.    Sincerely,    RODOLFO Hawk NP

## 2023-02-01 NOTE — PROGRESS NOTES
"Subjective:       Patient ID: Alexandra Martin is a 27 y.o. female.    Vitals:  height is 5' 3" (1.6 m) and weight is 135.4 kg (298 lb 6.4 oz). Her temperature is 98.4 °F (36.9 °C). Her blood pressure is 126/76 and her pulse is 86. Her respiration is 18 and oxygen saturation is 98%.     Chief Complaint: Medication Refill    Pt states chronic migraines. Out of migraine medication. States nausea and light sensitivity.     Medication Refill  Associated symptoms include headaches and nausea.     Constitution: Negative.   HENT: Negative.     Neck: neck negative.   Cardiovascular: Negative.    Eyes: Negative.    Respiratory: Negative.     Gastrointestinal:  Positive for nausea.   Genitourinary: Negative.    Musculoskeletal: Negative.    Skin: Negative.    Neurological:  Positive for headaches.     Objective:      Physical Exam   HENT:   Head: Normocephalic.   Mouth/Throat: Mucous membranes are moist. Oropharynx is clear.   Eyes: Pupils are equal, round, and reactive to light.   Cardiovascular: Normal rate and normal pulses.   Pulmonary/Chest: Effort normal.   Abdominal: Normal appearance. Soft.   Musculoskeletal: Normal range of motion.         General: Normal range of motion.   Neurological: She is alert.   Skin: Skin is warm and dry.   Vitals reviewed.      Assessment:       1. Nonintractable headache, unspecified chronicity pattern, unspecified headache type            Plan:         Nonintractable headache, unspecified chronicity pattern, unspecified headache type  -     ketorolac injection 30 mg  -     ondansetron disintegrating tablet 8 mg    Pt to see PCP tomorrow for medication refills. Pt is followed by adult drug court, prescription drug form completed.    ER precautions given, patient verbalized understanding.     Please see provided patient education for guidance.    Follow up with PCP or return to clinic if symptoms worsen or do not improve.                     "

## 2023-02-10 ENCOUNTER — OFFICE VISIT (OUTPATIENT)
Dept: FAMILY MEDICINE | Facility: CLINIC | Age: 28
End: 2023-02-10
Payer: MEDICARE

## 2023-02-10 VITALS
SYSTOLIC BLOOD PRESSURE: 107 MMHG | OXYGEN SATURATION: 99 % | HEIGHT: 63 IN | BODY MASS INDEX: 51.91 KG/M2 | HEART RATE: 75 BPM | DIASTOLIC BLOOD PRESSURE: 73 MMHG | TEMPERATURE: 99 F | RESPIRATION RATE: 20 BRPM | WEIGHT: 293 LBS

## 2023-02-10 DIAGNOSIS — R51.9 NONINTRACTABLE HEADACHE, UNSPECIFIED CHRONICITY PATTERN, UNSPECIFIED HEADACHE TYPE: Primary | ICD-10-CM

## 2023-02-10 PROBLEM — F12.90 MARIJUANA USER: Status: ACTIVE | Noted: 2023-02-10

## 2023-02-10 PROBLEM — F12.10 MARIJUANA ABUSE: Status: RESOLVED | Noted: 2018-08-03 | Resolved: 2023-02-10

## 2023-02-10 PROCEDURE — 99214 OFFICE O/P EST MOD 30 MIN: CPT | Mod: PBBFAC,PN | Performed by: NURSE PRACTITIONER

## 2023-02-10 PROCEDURE — 3078F PR MOST RECENT DIASTOLIC BLOOD PRESSURE < 80 MM HG: ICD-10-PCS | Mod: CPTII,,, | Performed by: NURSE PRACTITIONER

## 2023-02-10 PROCEDURE — 3008F PR BODY MASS INDEX (BMI) DOCUMENTED: ICD-10-PCS | Mod: CPTII,,, | Performed by: NURSE PRACTITIONER

## 2023-02-10 PROCEDURE — 1160F RVW MEDS BY RX/DR IN RCRD: CPT | Mod: CPTII,,, | Performed by: NURSE PRACTITIONER

## 2023-02-10 PROCEDURE — 99214 OFFICE O/P EST MOD 30 MIN: CPT | Mod: S$PBB,,, | Performed by: NURSE PRACTITIONER

## 2023-02-10 PROCEDURE — 1160F PR REVIEW ALL MEDS BY PRESCRIBER/CLIN PHARMACIST DOCUMENTED: ICD-10-PCS | Mod: CPTII,,, | Performed by: NURSE PRACTITIONER

## 2023-02-10 PROCEDURE — 3074F SYST BP LT 130 MM HG: CPT | Mod: CPTII,,, | Performed by: NURSE PRACTITIONER

## 2023-02-10 PROCEDURE — 1159F MED LIST DOCD IN RCRD: CPT | Mod: CPTII,,, | Performed by: NURSE PRACTITIONER

## 2023-02-10 PROCEDURE — 99214 PR OFFICE/OUTPT VISIT, EST, LEVL IV, 30-39 MIN: ICD-10-PCS | Mod: S$PBB,,, | Performed by: NURSE PRACTITIONER

## 2023-02-10 PROCEDURE — 1159F PR MEDICATION LIST DOCUMENTED IN MEDICAL RECORD: ICD-10-PCS | Mod: CPTII,,, | Performed by: NURSE PRACTITIONER

## 2023-02-10 PROCEDURE — 3008F BODY MASS INDEX DOCD: CPT | Mod: CPTII,,, | Performed by: NURSE PRACTITIONER

## 2023-02-10 PROCEDURE — 3078F DIAST BP <80 MM HG: CPT | Mod: CPTII,,, | Performed by: NURSE PRACTITIONER

## 2023-02-10 PROCEDURE — 3074F PR MOST RECENT SYSTOLIC BLOOD PRESSURE < 130 MM HG: ICD-10-PCS | Mod: CPTII,,, | Performed by: NURSE PRACTITIONER

## 2023-02-10 RX ORDER — ONDANSETRON 8 MG/1
8 TABLET, ORALLY DISINTEGRATING ORAL EVERY 6 HOURS PRN
Qty: 30 TABLET | Refills: 6 | Status: SHIPPED | OUTPATIENT
Start: 2023-02-10 | End: 2023-05-15

## 2023-02-10 RX ORDER — RIZATRIPTAN BENZOATE 10 MG/1
10 TABLET ORAL
Qty: 30 TABLET | Refills: 6 | Status: SHIPPED | OUTPATIENT
Start: 2023-02-10 | End: 2023-08-25 | Stop reason: SDUPTHER

## 2023-02-10 RX ORDER — RIZATRIPTAN BENZOATE 10 MG/1
10 TABLET ORAL
Qty: 30 TABLET | Refills: 6 | Status: SHIPPED | OUTPATIENT
Start: 2023-02-10 | End: 2023-02-10 | Stop reason: SDUPTHER

## 2023-02-10 NOTE — ASSESSMENT & PLAN NOTE
Maxalt 10mg po prescribed for HA  Pending Neurology appointment  No neuro deficits today  Headache education provided - including good sleep hygiene, stress management, medication overuse education provided - using more 3 OTC per week may worsen headaches, High intensity interval training has shown to reduce headache frequency. Low carb, high protein has shown to reduce headache frequency. Patient is instructed to keep headache diary.

## 2023-02-10 NOTE — PROGRESS NOTES
"Patient Name: Alexandra Martin   : 1995  MRN: [id number]     SUBJECTIVE DATA:    CHIEF COMPLAINT:   Alexandra Martin is a 27 y.o. female who presents to clinic today with Migraine (Patient wanting a rx of Maxalt for migraine)        HPI:  2/10/23:  HA; hx migraines.  Pt is in drug court and needs a paper filled out for any medication prescribed.  She is asking for a prescription for Maxalt today. Has been on in past. Last filled 22 by another provider.  Her neurology appointment is set for May 25, 2023 at [facility].  She states Urgent Care gave her Toradol injection and it only made her more nauseated.  Has tried other medications in the past that do not relieve her HA's.  She states Maxalt works for her.  No neuro deficits visualized.  +photophobia and nausea, +vomiting but not currently symptomatic at this time in clinic.          ALLERGIES:   Review of patient's allergies indicates:   Allergen Reactions    Ceftriaxone Nausea And Vomiting    Azithromycin Nausea And Vomiting     Hard to breathe     Latex, natural rubber Swelling and Rash         ROS:  Review of Systems   Neurological:  Positive for headaches. Negative for dizziness.   All other systems reviewed and are negative.      OBJECTIVE DATA:  Vital signs  Vitals:    02/10/23 0822   BP: 107/73   BP Location: Right arm   Patient Position: Sitting   BP Method: Large (Automatic)   Pulse: 75   Resp: 20   Temp: 98.7 °F (37.1 °C)   TempSrc: Oral   SpO2: 99%   Weight: 134.3 kg (296 lb)   Height: 5' 3" (1.6 m)      Body mass index is 52.43 kg/m².    PHYSICAL EXAM:   Physical Exam  Vitals and nursing note reviewed.   HENT:      Head: Normocephalic and atraumatic.   Cardiovascular:      Rate and Rhythm: Normal rate and regular rhythm.      Pulses: Normal pulses.      Heart sounds: Normal heart sounds.   Pulmonary:      Breath sounds: Normal breath sounds.   Musculoskeletal:         General: Normal range of motion.      Cervical back: Normal " range of motion.   Skin:     General: Skin is warm and dry.   Neurological:      General: No focal deficit present.      Mental Status: She is alert and oriented to person, place, and time. Mental status is at baseline.      Cranial Nerves: Cranial nerves 2-12 are intact.      Sensory: Sensation is intact.      Motor: Motor function is intact.      Coordination: Coordination is intact.      Gait: Gait is intact.      Deep Tendon Reflexes: Reflexes are normal and symmetric.   Psychiatric:         Mood and Affect: Mood normal.        ASSESSMENT/PLAN:  1. Nonintractable headache, unspecified chronicity pattern, unspecified headache type  Overview:        Assessment & Plan:  Maxalt 10mg po prescribed for HA  Pending Neurology appointment  No neuro deficits today  Headache education provided - including good sleep hygiene, stress management, medication overuse education provided - using more 3 OTC per week may worsen headaches, High intensity interval training has shown to reduce headache frequency. Low carb, high protein has shown to reduce headache frequency. Patient is instructed to keep headache diary.      Orders:  -     Discontinue: rizatriptan (MAXALT) 10 MG tablet; Take 1 tablet (10 mg total) by mouth as needed for Migraine.  Dispense: 30 tablet; Refill: 6  -     ondansetron (ZOFRAN-ODT) 8 MG TbDL; Take 1 tablet (8 mg total) by mouth every 6 (six) hours as needed (nausea).  Dispense: 30 tablet; Refill: 6  -     rizatriptan (MAXALT) 10 MG tablet; Take 1 tablet (10 mg total) by mouth as needed for Migraine.  Dispense: 30 tablet; Refill: 6           RESULTS:  No results found for this or any previous visit (from the past 1008 hour(s)).      Follow Up:  Follow up in about 3 months (around 5/15/2023).               This note was created with the assistance of a voice recognition software or phone dictation. There may be transcription errors as a result of using this technology however minimal. Effort has been made to  assure accuracy of transcription but any obvious errors or omissions should be clarified with the author of the document

## 2023-04-21 ENCOUNTER — TELEPHONE (OUTPATIENT)
Dept: FAMILY MEDICINE | Facility: CLINIC | Age: 28
End: 2023-04-21
Payer: MEDICARE

## 2023-04-24 PROBLEM — Z12.4 ENCOUNTER FOR PAPANICOLAOU SMEAR OF CERVIX: Status: ACTIVE | Noted: 2023-04-24

## 2023-05-15 ENCOUNTER — TELEPHONE (OUTPATIENT)
Dept: GYNECOLOGY | Facility: CLINIC | Age: 28
End: 2023-05-15
Payer: MEDICARE

## 2023-05-15 ENCOUNTER — OFFICE VISIT (OUTPATIENT)
Dept: FAMILY MEDICINE | Facility: CLINIC | Age: 28
End: 2023-05-15
Payer: MEDICARE

## 2023-05-15 VITALS
SYSTOLIC BLOOD PRESSURE: 115 MMHG | DIASTOLIC BLOOD PRESSURE: 77 MMHG | BODY MASS INDEX: 51.91 KG/M2 | HEIGHT: 63 IN | TEMPERATURE: 98 F | HEART RATE: 76 BPM | WEIGHT: 293 LBS | RESPIRATION RATE: 20 BRPM | OXYGEN SATURATION: 100 %

## 2023-05-15 DIAGNOSIS — N93.9 ABNORMAL UTERINE BLEEDING: ICD-10-CM

## 2023-05-15 DIAGNOSIS — L02.91 CUTANEOUS ABSCESS, UNSPECIFIED SITE: ICD-10-CM

## 2023-05-15 DIAGNOSIS — D64.9 ANEMIA, UNSPECIFIED TYPE: ICD-10-CM

## 2023-05-15 DIAGNOSIS — Z86.69 HISTORY OF BELL'S PALSY: ICD-10-CM

## 2023-05-15 DIAGNOSIS — E66.01 CLASS 3 SEVERE OBESITY DUE TO EXCESS CALORIES WITH BODY MASS INDEX (BMI) OF 50.0 TO 59.9 IN ADULT, UNSPECIFIED WHETHER SERIOUS COMORBIDITY PRESENT: ICD-10-CM

## 2023-05-15 DIAGNOSIS — G44.52 NEW DAILY PERSISTENT HEADACHE: Primary | ICD-10-CM

## 2023-05-15 DIAGNOSIS — L02.818 CUTANEOUS ABSCESS OF OTHER SITE: ICD-10-CM

## 2023-05-15 LAB
BASOPHILS # BLD AUTO: 0.03 X10(3)/MCL
BASOPHILS NFR BLD AUTO: 0.4 %
EOSINOPHIL # BLD AUTO: 0.16 X10(3)/MCL (ref 0–0.9)
EOSINOPHIL NFR BLD AUTO: 1.9 %
ERYTHROCYTE [DISTWIDTH] IN BLOOD BY AUTOMATED COUNT: 13.5 % (ref 11.5–17)
FERRITIN SERPL-MCNC: 27.81 NG/ML (ref 4.63–204)
HCT VFR BLD AUTO: 41.4 % (ref 37–47)
HGB BLD-MCNC: 13.1 G/DL (ref 12–16)
IMM GRANULOCYTES # BLD AUTO: 0.04 X10(3)/MCL (ref 0–0.04)
IMM GRANULOCYTES NFR BLD AUTO: 0.5 %
IRON SATN MFR SERPL: 13 % (ref 20–50)
IRON SERPL-MCNC: 46 UG/DL (ref 50–170)
LYMPHOCYTES # BLD AUTO: 2.5 X10(3)/MCL (ref 0.6–4.6)
LYMPHOCYTES NFR BLD AUTO: 29.4 %
MCH RBC QN AUTO: 25.9 PG (ref 27–31)
MCHC RBC AUTO-ENTMCNC: 31.6 G/DL (ref 33–36)
MCV RBC AUTO: 82 FL (ref 80–94)
MONOCYTES # BLD AUTO: 0.45 X10(3)/MCL (ref 0.1–1.3)
MONOCYTES NFR BLD AUTO: 5.3 %
NEUTROPHILS # BLD AUTO: 5.31 X10(3)/MCL (ref 2.1–9.2)
NEUTROPHILS NFR BLD AUTO: 62.5 %
NRBC BLD AUTO-RTO: 0 %
PLATELET # BLD AUTO: 204 X10(3)/MCL (ref 130–400)
PMV BLD AUTO: 10.7 FL (ref 7.4–10.4)
RBC # BLD AUTO: 5.05 X10(6)/MCL (ref 4.2–5.4)
RET# (OHS): 0.08 (ref 0.02–0.08)
RETICULOCYTE COUNT AUTOMATED (OLG): 1.58 % (ref 1.1–2.1)
TIBC SERPL-MCNC: 322 UG/DL (ref 70–310)
TIBC SERPL-MCNC: 368 UG/DL (ref 250–450)
TRANSFERRIN SERPL-MCNC: 318 MG/DL (ref 180–382)
WBC # SPEC AUTO: 8.49 X10(3)/MCL (ref 4.5–11.5)

## 2023-05-15 PROCEDURE — 99214 PR OFFICE/OUTPT VISIT, EST, LEVL IV, 30-39 MIN: ICD-10-PCS | Mod: S$PBB,,, | Performed by: NURSE PRACTITIONER

## 2023-05-15 PROCEDURE — 83550 IRON BINDING TEST: CPT | Performed by: NURSE PRACTITIONER

## 2023-05-15 PROCEDURE — 36415 COLL VENOUS BLD VENIPUNCTURE: CPT | Performed by: NURSE PRACTITIONER

## 2023-05-15 PROCEDURE — 85025 COMPLETE CBC W/AUTO DIFF WBC: CPT | Performed by: NURSE PRACTITIONER

## 2023-05-15 PROCEDURE — 99213 OFFICE O/P EST LOW 20 MIN: CPT | Mod: PBBFAC,PN | Performed by: NURSE PRACTITIONER

## 2023-05-15 PROCEDURE — 82728 ASSAY OF FERRITIN: CPT | Performed by: NURSE PRACTITIONER

## 2023-05-15 PROCEDURE — 99214 OFFICE O/P EST MOD 30 MIN: CPT | Mod: S$PBB,,, | Performed by: NURSE PRACTITIONER

## 2023-05-15 PROCEDURE — 85045 AUTOMATED RETICULOCYTE COUNT: CPT | Performed by: NURSE PRACTITIONER

## 2023-05-15 RX ORDER — DOXYCYCLINE 100 MG/1
100 CAPSULE ORAL 2 TIMES DAILY
Qty: 20 CAPSULE | Refills: 0 | Status: SHIPPED | OUTPATIENT
Start: 2023-05-15 | End: 2023-05-25

## 2023-05-15 RX ORDER — MUPIROCIN 20 MG/G
OINTMENT TOPICAL 3 TIMES DAILY
Qty: 15 G | Refills: 3 | Status: SHIPPED | OUTPATIENT
Start: 2023-05-15

## 2023-05-15 NOTE — ASSESSMENT & PLAN NOTE
POCT A1c today  BMI Body mass index is 55.09 kg/m².   Goal BMI <30.  Exercise 5 times a week for 30 minutes per day.  Avoid soda, simple sugars, excessive rice, potatoes or bread. Limit fast foods and fried foods.  Choose complex carbs in moderation (example: green vegetables, beans, oatmeal). Eat plenty of fresh fruits and vegetables with lean meats daily.  Do not skip meals. Eat a balanced portion size.  Avoid fad diets. Consider permanent healthy life style changes.

## 2023-05-15 NOTE — PROGRESS NOTES
Patient Name: Alexandra Martin   : 1995  MRN: [id number]     SUBJECTIVE DATA:    CHIEF COMPLAINT:   Alexandra Martin is a 27 y.o. female who presents to clinic today with Follow-up (3 month f/u, )        HPI:  5/15/23:  6 month FU.  Pt here today because she would like her A1c checked.  Is inquiring about Ozempic treatment for weight loss.  Last A1c in November was 5.2.  She is not having any diabetic symptoms.  Due for PAP but has been bleeding since January daily going through ab out 5-6 pads a day.  She recently had COVID.  Has Nexplanon device in her left arm placed by [facility].  Is not due for appointment until next year. Has not called them about this bleeding issue.  Has developed some skin abscesses in vaginal area below labia.  She has two lesions that are draining.      2/10/23:  HA; hx migraines.  Pt is in drug court and needs a paper filled out for any medication prescribed.  She is asking for a prescription for Maxalt today. Has been on in past. Last filled 22 by another provider.  Her neurology appointment is set for May 25, 2023 at [facility].  She states Urgent Care gave her Toradol injection and it only made her more nauseated.  Has tried other medications in the past that do not relieve her HA's.  She states Maxalt works for her.  No neuro deficits visualized.  +photophobia and nausea, +vomiting but not currently symptomatic at this time in clinic.        22:  Review of labs.  Labs reviewed with patient, no abnormalities noted.  No questions.  Pt is asking for a paper to be signed for drug court stating she is able to take Tylenol for migraines.  She also needs prescription for Tylenol to bring to them.  Is trying to get into Bariatric surgery consultation, needs to change her insurance because it is not accepted where she was referred.      11/15/22:  Here to establish care with new PCP. Pt with previous documented history of obesity, depression/anxiety,  "kidney stones, hearing loss.  Has had bell's palsy in past and was referred to Neurology but no appt attended. [place] [facility] [place].  Concerns about weight loss. Was set up to do bariatric surgery and would like referral for that here but is still smoking cigarettes.  Has quit in past for this reason. Increase anxiety due to life issues currently. Denies drug use.  Stopped all psych meds for anxiety/depression and feels better since stopping.              ALLERGIES:   Review of patient's allergies indicates:   Allergen Reactions    Ceftriaxone Nausea And Vomiting    Azithromycin Nausea And Vomiting     Hard to breathe     Latex, natural rubber Swelling and Rash         ROS:  Review of Systems   Skin:         Abscesses to vaginal area       OBJECTIVE DATA:  Vital signs  Vitals:    05/15/23 0900   BP: 115/77   BP Location: Right arm   Patient Position: Sitting   BP Method: Large (Automatic)   Pulse: 76   Resp: 20   Temp: 98 °F (36.7 °C)   TempSrc: Oral   SpO2: 100%   Weight: (!) 141.1 kg (311 lb)   Height: 5' 3" (1.6 m)      Body mass index is 55.09 kg/m².    PHYSICAL EXAM:   Physical Exam  Vitals and nursing note reviewed.   HENT:      Head: Normocephalic and atraumatic.   Cardiovascular:      Rate and Rhythm: Normal rate and regular rhythm.      Pulses: Normal pulses.      Heart sounds: Normal heart sounds.   Pulmonary:      Breath sounds: Normal breath sounds.   Genitourinary:      Musculoskeletal:         General: Normal range of motion.      Cervical back: Normal range of motion.   Skin:     General: Skin is warm and dry.   Neurological:      General: No focal deficit present.      Mental Status: She is alert and oriented to person, place, and time.   Psychiatric:         Mood and Affect: Mood normal.        ASSESSMENT/PLAN:  1. New daily persistent headache  Assessment & Plan:  Headache education provided - including good sleep hygiene, stress management, medication overuse " education provided - using more 3 OTC per week may worsen headaches, High intensity interval training has shown to reduce headache frequency. Low carb, high protein has shown to reduce headache frequency. Patient is instructed to keep headache diary.        2. Class 3 severe obesity due to excess calories with body mass index (BMI) of 50.0 to 59.9 in adult, unspecified whether serious comorbidity present  Assessment & Plan:  POCT A1c today  BMI Body mass index is 55.09 kg/m².   Goal BMI <30.  Exercise 5 times a week for 30 minutes per day.  Avoid soda, simple sugars, excessive rice, potatoes or bread. Limit fast foods and fried foods.  Choose complex carbs in moderation (example: green vegetables, beans, oatmeal). Eat plenty of fresh fruits and vegetables with lean meats daily.  Do not skip meals. Eat a balanced portion size.  Avoid fad diets. Consider permanent healthy life style changes.         Orders:  -     Discontinue: orlistat (AASHISH) 60 MG capsule; Take 1 capsule (60 mg total) by mouth 3 (three) times daily with meals.  Dispense: 90 capsule; Refill: 11  -     orlistat (AASHISH) 60 MG capsule; Take 2 capsules (120 mg total) by mouth 3 (three) times daily with meals.  Dispense: 90 capsule; Refill: 11    3. Abnormal uterine bleeding  -     Ferritin  -     Reticulocytes  -     Iron and TIBC  -     CBC Auto Differential    4. Cutaneous abscess of other site  Assessment & Plan:  Doxycycline 100mg po BID  Mupirocin ointment TID to area  Sitz baths TID  FU with GYN    Orders:  -     doxycycline (VIBRAMYCIN) 100 MG Cap; Take 1 capsule (100 mg total) by mouth 2 (two) times daily. for 10 days  Dispense: 20 capsule; Refill: 0  -     mupirocin (BACTROBAN) 2 % ointment; Apply topically 3 (three) times daily.  Dispense: 15 g; Refill: 3    5. Cutaneous abscess, unspecified site  Assessment & Plan:  Doxycycline 100mg po BID  Mupirocin ointment TID to area  Sitz baths TID  FU with GYN      6. History of Bell's palsy  Assessment &  Plan:  Referral to Neurology at [facility]      7. Anemia, unspecified type  Overview:  Lab Results   Component Value Date    WBC 9.2 11/15/2022    HGB 13.7 11/15/2022    HCT 43.3 11/15/2022    MCV 82.6 11/15/2022     11/15/2022           Assessment & Plan:  Take iron supplements as prescribed.  Add Vitamin C to your diet.  Take iron and vitamin C on an full stomach for less GI upset.  Add iron rich foods to diet such as liver, lean beef, eggs, dried fruit, dark green leafy vegetables.  .  Do NOT drink milk or take antacids at the same time you take your iron supplement.  Iron supplements can cause constipation; add stool softener or fiber as needed.               RESULTS:  No results found for this or any previous visit (from the past 1008 hour(s)).      Follow Up:  Follow up in about 6 months (around 11/15/2023).               This note was created with the assistance of a voice recognition software or phone dictation. There may be transcription errors as a result of using this technology however minimal. Effort has been made to assure accuracy of transcription but any obvious errors or omissions should be clarified with the author of the document

## 2023-05-15 NOTE — ASSESSMENT & PLAN NOTE
Take iron supplements as prescribed.  Add Vitamin C to your diet.  Take iron and vitamin C on an full stomach for less GI upset.  Add iron rich foods to diet such as liver, lean beef, eggs, dried fruit, dark green leafy vegetables.  .  Do NOT drink milk or take antacids at the same time you take your iron supplement.  Iron supplements can cause constipation; add stool softener or fiber as needed.

## 2023-05-15 NOTE — TELEPHONE ENCOUNTER
----- Message from Ami Romero sent at 5/15/2023  9:49 AM CDT -----  Regarding: appt/pt call back  Leena esquivel/ross servin 's office ([facility] on [address]) requesting pt be seen soon for heavy bleeding. Please contact pt to schedule  Called patient who states that since January she has been having heavy bleeding with dizziness, weakness, fast heart rate intermittently (saturating 6 pads a day everyday).  She states that she has been to ED and [facility] but they told her that they couldn't do anything so she left and did not check in.  Directed her to ED today and explained to have Dr lara.  Verbalizes understanding.  Will make appt for next available.

## 2023-05-15 NOTE — ASSESSMENT & PLAN NOTE
Headache education provided - including good sleep hygiene, stress management, medication overuse education provided - using more 3 OTC per week may worsen headaches, High intensity interval training has shown to reduce headache frequency. Low carb, high protein has shown to reduce headache frequency. Patient is instructed to keep headache diary.

## 2023-05-24 NOTE — PROGRESS NOTES
"[facility] Neurology Initial Office Visit Note    Initial Visit Date: 5/25/2023  Current Visit Date:  05/25/2023    Chief Complaint:     Chief Complaint   Patient presents with    Patient presents today with a hx of headaches    Patient mentioned that she has a growth growing out of her        History of Present Illness:      This is 27 y.o. female with history of obesity, generalized anxiety, substance abuse disorder, history of bilateral Bell's palsy, renal stones who is referred headache disorder.  Patient had previously seen Neurology at [facility].  Was previously thought to have headache disorder associated with fibromyalgia.    Age of Onset : childhood     Headache Description:   frontal, stabbing, severe, lasting "days", with nausea, with photophobia and phonophobia, impeding day to day activity.     Frequency: 23 migraine headache days per month for the past "few years."    Provocation Factors: Denied    Risk Factors  - Family history of headache disorder: Yes mom with headache disorder  - History of focal CNS lesions: No  - History of CNS infections: No  - History head trauma: No  - History of underlying mood disorder: Yes anxiety, depression. Not on medication.  - History of sleep disorder: No  - Recreational drug use: Yes last used fentanyl one year ago.   - Tobacco use: No  - Alcohol use: No  - Weight fluctuation: Yes gaining weight   - Isotretinoin or Tetracycline use:  Not Applicable  - Family planning and contraceptive use: Yes IUD    Medications:     Current Prophylactic  Denied    Current Abortive  Rizatriptan 10 mg twice a day PRN     Prior Prophylactic  Gabapentin 1200 mg at bedtime  Topiramate: ineffective.  Was abusing illicit substances at the time.     Prior Abortive  Denied    Devices:     - VNS:  - TNS  - TMS:     Procedures:     - Botox:  - PSG block:   - Occipital nerve block:     Labs:     Results for orders placed or performed in visit on 05/15/23   Ferritin   Result Value Ref Range "    Ferritin Level 27.81 4.63 - 204.00 ng/mL   Reticulocytes   Result Value Ref Range    Retic Cnt Auto 1.58 1.1 - 2.1 %    RET# 0.0798 (H) 0.016 - 0.078   Iron and TIBC   Result Value Ref Range    Iron Binding Capacity Unsaturated 322 (H) 70 - 310 ug/dL    Iron Level 46 (L) 50 - 170 ug/dL    Transferrin 318 180 - 382 mg/dL    Iron Binding Capacity Total 368 250 - 450 ug/dL    Iron Saturation 13 (L) 20 - 50 %   CBC with Differential   Result Value Ref Range    WBC 8.49 4.50 - 11.50 x10(3)/mcL    RBC 5.05 4.20 - 5.40 x10(6)/mcL    Hgb 13.1 12.0 - 16.0 g/dL    Hct 41.4 37.0 - 47.0 %    MCV 82.0 80.0 - 94.0 fL    MCH 25.9 (L) 27.0 - 31.0 pg    MCHC 31.6 (L) 33.0 - 36.0 g/dL    RDW 13.5 11.5 - 17.0 %    Platelet 204 130 - 400 x10(3)/mcL    MPV 10.7 (H) 7.4 - 10.4 fL    Neut % 62.5 %    Lymph % 29.4 %    Mono % 5.3 %    Eos % 1.9 %    Basophil % 0.4 %    Lymph # 2.50 0.6 - 4.6 x10(3)/mcL    Neut # 5.31 2.1 - 9.2 x10(3)/mcL    Mono # 0.45 0.1 - 1.3 x10(3)/mcL    Eos # 0.16 0 - 0.9 x10(3)/mcL    Baso # 0.03 <=0.2 x10(3)/mcL    IG# 0.04 0 - 0.04 x10(3)/mcL    IG% 0.5 %    NRBC% 0.0 %       Studies:     - MRI Brain without contrast 8/3/2018: I have reviewed the study independently and with the patient.  Unremarkable.    - MRA Head w/o Geovanni:   - MRV Head w/o Geovanni:   - NCHCT:  - Lumbar Puncture:    Review of Systems:     Review of Systems   All other systems reviewed and are negative.    Physical Exams:     Vitals:    05/25/23 1333   BP: 136/76   Pulse: 75   Temp: 97.8 °F (36.6 °C)       Physical Exam  Vitals and nursing note reviewed.   Constitutional:       Appearance: Normal appearance.   HENT:      Head: Normocephalic and atraumatic.      Nose: Nose normal.      Mouth/Throat:      Mouth: Mucous membranes are moist.      Pharynx: Oropharynx is clear.   Eyes:      Conjunctiva/sclera: Conjunctivae normal.   Cardiovascular:      Rate and Rhythm: Normal rate and regular rhythm.      Pulses: Normal pulses.   Pulmonary:      Effort:  Pulmonary effort is normal.      Breath sounds: Normal breath sounds.   Abdominal:      General: Abdomen is flat.   Musculoskeletal:         General: Normal range of motion.      Cervical back: Normal range of motion.   Skin:     General: Skin is warm.   Neurological:      Mental Status: She is alert.       Comprehensive Neurological Exam:  Mental Status: Alert Oriented to Self, Date, and Place. Comprehension wnl. No dysarthria.   CN II - XII: FAROOQ, No APD, Fundus wnl OU, VFFC, No ptosis OU, EOMI without nystagmus LT/Temp symmetric in CN V1-3 distribution, Hearing grossly intact, Face Symmetric, Tongue and Uvula midline, Trapezius symmetric bilateral.   Motor: tone and bulk wnl throughout, no abnormal involuntary or voluntary movements, 5/5 to confrontation, Fine finger movements wnl b/l, No pronator drift.   Sensory: LT, Proprioception, Vibration, PP, Temp symmetric. No sensory simultagnosia.   Reflexes: 2+ throughout  Cerebellar: FNF wnl b/l, RAHM wnl b/l  Romberg: Negative  Gait: normal.     Assessment:     This is 27 y.o. female with history of obesity, generalized anxiety, substance abuse disorder, history of bilateral Bell's palsy, renal stones, substance withdrawal headache who is referred for chronic migraine without aura.     Problem List Items Addressed This Visit          Neuro    Headache - Primary    Relevant Medications    topiramate (TOPAMAX) 50 MG tablet    topiramate (TOPAMAX) 50 MG tablet (Start on 6/24/2023)       Plan:     [] start Topiramate 50 mg at bedtime for 2 weeks then 50 mg twice a day thereafter   [] continue with Rizatriptan 10 mg twice a day as needed     RTC 3 Months     Headache education provided: good sleep hygiene and 7 hours of sleep per night, stress management, medication overuse education provided. Using more 3 OTC per week may worsen headaches, high intensity interval training has shown to reduce headache frequency. Low carb, high protein has shown to reduce headache  frequency. Patient is instructed in keep headache diary.     I have explained the treatment plan, diagnosis, and prognosis to patient. All questions are answered to the best of my knowledge.     Face to face time 45 minutes, including documentation, chart review, counseling, education, review of test results, relevant medical records, and coordination of care.       Deanne Lanza MD   General Neurology  05/25/2023

## 2023-05-25 ENCOUNTER — OFFICE VISIT (OUTPATIENT)
Dept: NEUROLOGY | Facility: CLINIC | Age: 28
End: 2023-05-25
Payer: MEDICARE

## 2023-05-25 VITALS
BODY MASS INDEX: 51.91 KG/M2 | HEART RATE: 75 BPM | DIASTOLIC BLOOD PRESSURE: 76 MMHG | OXYGEN SATURATION: 98 % | HEIGHT: 63 IN | WEIGHT: 293 LBS | SYSTOLIC BLOOD PRESSURE: 136 MMHG | TEMPERATURE: 98 F

## 2023-05-25 DIAGNOSIS — G43.709 CHRONIC MIGRAINE WITHOUT AURA WITHOUT STATUS MIGRAINOSUS, NOT INTRACTABLE: Primary | ICD-10-CM

## 2023-05-25 PROBLEM — G43.719 INTRACTABLE CHRONIC MIGRAINE WITHOUT AURA AND WITHOUT STATUS MIGRAINOSUS: Status: ACTIVE | Noted: 2018-10-10

## 2023-05-25 PROCEDURE — 99214 OFFICE O/P EST MOD 30 MIN: CPT | Mod: PBBFAC | Performed by: PSYCHIATRY & NEUROLOGY

## 2023-05-25 PROCEDURE — 99214 OFFICE O/P EST MOD 30 MIN: CPT | Mod: S$PBB,,, | Performed by: PSYCHIATRY & NEUROLOGY

## 2023-05-25 PROCEDURE — 99214 PR OFFICE/OUTPT VISIT, EST, LEVL IV, 30-39 MIN: ICD-10-PCS | Mod: S$PBB,,, | Performed by: PSYCHIATRY & NEUROLOGY

## 2023-05-25 RX ORDER — TOPIRAMATE 50 MG/1
TABLET, FILM COATED ORAL
Qty: 46 TABLET | Refills: 0 | Status: SHIPPED | OUTPATIENT
Start: 2023-05-25 | End: 2023-06-24

## 2023-05-25 RX ORDER — TOPIRAMATE 50 MG/1
50 TABLET, FILM COATED ORAL 2 TIMES DAILY
Qty: 60 TABLET | Refills: 3 | Status: SHIPPED | OUTPATIENT
Start: 2023-06-24 | End: 2023-11-15

## 2023-06-05 ENCOUNTER — PATIENT MESSAGE (OUTPATIENT)
Dept: ADMINISTRATIVE | Facility: HOSPITAL | Age: 28
End: 2023-06-05
Payer: MEDICARE

## 2023-07-10 ENCOUNTER — PATIENT MESSAGE (OUTPATIENT)
Dept: ADMINISTRATIVE | Facility: HOSPITAL | Age: 28
End: 2023-07-10
Payer: MEDICARE

## 2023-07-31 ENCOUNTER — OFFICE VISIT (OUTPATIENT)
Dept: GYNECOLOGY | Facility: CLINIC | Age: 28
End: 2023-07-31
Payer: MEDICARE

## 2023-07-31 VITALS
RESPIRATION RATE: 20 BRPM | BODY MASS INDEX: 51.91 KG/M2 | HEART RATE: 73 BPM | OXYGEN SATURATION: 97 % | TEMPERATURE: 98 F | WEIGHT: 293 LBS | DIASTOLIC BLOOD PRESSURE: 81 MMHG | SYSTOLIC BLOOD PRESSURE: 127 MMHG | HEIGHT: 63 IN

## 2023-07-31 DIAGNOSIS — N93.9 ABNORMAL UTERINE BLEEDING (AUB): Primary | ICD-10-CM

## 2023-07-31 PROCEDURE — 99213 OFFICE O/P EST LOW 20 MIN: CPT | Mod: PBBFAC

## 2023-07-31 RX ORDER — NORGESTIMATE AND ETHINYL ESTRADIOL 0.25-0.035
1 KIT ORAL DAILY
Qty: 30 TABLET | Refills: 11 | Status: SHIPPED | OUTPATIENT
Start: 2023-07-31 | End: 2023-11-15

## 2023-07-31 NOTE — PROGRESS NOTES
[facility] NOTE  [facility]  [address]  : [place] [id number]  : ALEXANDER, Phone: [phone]  Fax: [phone]    Subjective:     Alexandra Martin is a 27 y.o.  who presents  for AUB s/p Nexplanon placement.       Pt  has Nexplanon placed in 2022 and experienced AUB. Pt represented to clinic in 2022 and was told that the nexplanon needed to be re-inserted due to abnormal placement. Pt has continued to have spotting with episode of  heavy bleeding. There has not been more than 3 days since having the Nexplanon where she has not had bleeding. Pt reports changing pads every hour on her heaviest days.  Pt endorses lightheadedness during bleeding.    Denies vaginal discharge, dysuria, or dyspareunia       OBHx:  OB History    Para Term  AB Living   3 3 3 0 0 3   SAB IAB Ectopic Multiple Live Births   0 0 0 0 3      # Outcome Date GA Lbr Minor/2nd Weight Sex Delivery Anes PTL Lv   3 Term 19 37w6d  2.78 kg (6 lb 2.1 oz) M Vag-Spont EPI N CINTHIA      Complications: Anesthetic Complications   2 Term 17 38w3d  3 kg (6 lb 9.8 oz) F Vag-Spont EPI N CINTHIA   1 Term 08/29/15 39w4d  3.685 kg (8 lb 2 oz) F Vag-Spont EPI N CINTHIA         GynHx:    13/R/3-5 days/ normal   STI: remote hx of gonorrhea, trichomoas , treated   Pap  2019 : normal  Previous Birth control: OCPs and Nexplanon    MedHx:   Past Medical History:   Diagnosis Date    ADD (attention deficit disorder)     Anxiety disorder     Depression     Headache     History of ovarian cyst     MDD (major depressive disorder), recurrent episode, moderate 2021    Obesity     Substance abuse     Hospitalization     Trauma     not at this time. 19    Tympanic membrane perforation, left 3/14/2018    UTI (urinary tract infection) 8/3/2018    Vertigo        SurgHx:   Past Surgical History:   Procedure Laterality Date    CYSTOSCOPY W/ URETERAL STENT PLACEMENT Right 2019    Procedure: CYSTOSCOPY, WITH URETERAL STENT  INSERTION;  Surgeon: Rito Medley MD;  Location: [facility] OR;  Service: Urology;  Laterality: Right;    CYSTOSCOPY W/ URETERAL STENT REMOVAL Right 2020    Procedure: CYSTOSCOPY, WITH URETERAL STENT REMOVAL;  Surgeon: Rito Medley MD;  Location: [facility] OR;  Service: Urology;  Laterality: Right;    LASER LITHOTRIPSY Right 2020    Procedure: LITHOTRIPSY, USING LASER;  Surgeon: Rito Medley MD;  Location: [facility] OR;  Service: Urology;  Laterality: Right;    TONSILLECTOMY, ADENOIDECTOMY, BILATERAL MYRINGOTOMY AND TUBES      URETEROSCOPIC REMOVAL OF URETERIC CALCULUS Right 2020    Procedure: REMOVAL, CALCULUS, URETER, URETEROSCOPIC;  Surgeon: Rito Medley MD;  Location: [facility] OR;  Service: Urology;  Laterality: Right;    URETEROSCOPY Left 2019    Procedure: URETEROSCOPY;  Surgeon: Rito Medley MD;  Location: [facility] OR;  Service: Urology;  Laterality: Left;    URETEROSCOPY Right 2020    Procedure: URETEROSCOPY;  Surgeon: Rito Medley MD;  Location: [facility] OR;  Service: Urology;  Laterality: Right;       Medications:   Current Outpatient Medications   Medication Instructions    etonogestreL (NEXPLANON) 68 mg, Subdermal, Once, A single NEXPLANON implant is inserted subdermally just under the skin at the inner side of the non-dominant upper arm.    mupirocin (BACTROBAN) 2 % ointment Topical (Top), 3 times daily    norgestimate-ethinyl estradioL (ORTHO-CYCLEN) 0.25-35 mg-mcg per tablet 1 tablet, Oral, Daily    orlistat (AASHISH) 120 mg, Oral, 3 times daily with meals    rizatriptan (MAXALT) 10 mg, Oral, As needed (PRN)    topiramate (TOPAMAX) 50 mg, Oral, 2 times daily    VIVITROL 380 mg SSRR kit SMARTSI Milligram(s) IM Every 4 Weeks       FM Hx:   Family History   Problem Relation Age of Onset    Breast cancer Maternal Aunt     Obesity Mother     No Known Problems Father     No Known Problems Brother     Colon cancer Neg Hx     Miscarriages / Stillbirths Neg Hx     Ovarian  "cancer Neg Hx       Denies hx of ovarian, uterine, endometrial, or colon cancer.    Social Hx:   Social History     Tobacco Use    Smoking status: Every Day     Current packs/day: 0.25     Types: Cigarettes    Smokeless tobacco: Never   Substance Use Topics    Alcohol use: Not Currently     Alcohol/week: 0.0 standard drinks of alcohol    Drug use: Not Currently     Types: Marijuana, Fentanyl     Comment: history of opoid abuse      Denies tobacco, alcohol and illicit drug usage.    Review of Systems  Denies fevers, chills, headache, blurry vision, nausea, vomiting, dizziness, or syncope.Denies chest pain, shortness of breath, RUQ pain, or calf pain.    Objective:     Vitals:    23 1042   BP: 127/81   BP Location: Right arm   Patient Position: Sitting   BP Method: Large (Automatic)   Pulse: 73   Resp: 20   Temp: 97.8 °F (36.6 °C)   TempSrc: Oral   SpO2: 97%   Weight: (!) 145.2 kg (320 lb)   Height: 5' 3" (1.6 m)     Body mass index is 56.69 kg/m².    Physical Exam:     General: alert and oriented, in no acute distress  Physical exam deferred     Note: RN chaperone present for entirety of exam.    Labs  No results found for this or any previous visit (from the past 24 hour(s))..    Imaging  No images are attached to the encounter.    HCM:   Paps: 2019 normal   Mammogram: n/a  Colonoscopy: n/a    Assessment:   27 y.o.  here for breakthrough bleeding with Nexplanon     Plan:     Problem List Items Addressed This Visit          Renal/    Abnormal uterine bleeding (AUB) - Primary     AUB since 2022 with Nexplanon insertion    - Counseled the patient that about 1/3 of patients will experience breakthrough bleeding/ AUB while on Nexplanon  - Reviewed risk factors : smoker, BMI 57, migraine without aura  - Discussed options to address breakthrough bleeding. Reviewed bedsider handout with pt   She was not amenable to IUD or nuvaring  Pt BMI us 57, therefore combined hormone patches are not recommended ( " weight limit of 198 lbs), also discussed the increase risk of weight gain with depo provera.  Discussed OCP's with Nexplanon to help with breakthrough bleeding since the patient was worried about compliance on OCPs. Discussed that it takes OCPs about 3-6 months to see its full affects.       Pt is amenable to starting OCPs with nexplanon in place to help with breakthrough bleeding               6-8 week telehealth  foloow up visit to discuss improvements       Patient and plan were discussed with Dr. Marks .    Mary Desai MD   [facility], PGY2

## 2023-08-01 NOTE — ASSESSMENT & PLAN NOTE
AUB since 07/2022 with Nexplanon insertion    - Counseled the patient that about 1/3 of patients will experience breakthrough bleeding/ AUB while on Nexplanon  - Reviewed risk factors : smoker, BMI 57, migraine without aura  - Discussed options to address breakthrough bleeding. Reviewed bedsider handout with pt   · She was not amenable to IUD or nuvaring  · Pt BMI us 57, therefore combined hormone patches are not recommended ( weight limit of 198 lbs), also discussed the increase risk of weight gain with depo provera.  · Discussed OCP's with Nexplanon to help with breakthrough bleeding since the patient was worried about compliance on OCPs. Discussed that it takes OCPs about 3-6 months to see its full affects.       Pt is amenable to starting OCPs with nexplanon in place to help with breakthrough bleeding

## 2023-08-25 ENCOUNTER — HOSPITAL ENCOUNTER (EMERGENCY)
Facility: HOSPITAL | Age: 28
Discharge: HOME | End: 2023-08-25
Attending: FAMILY MEDICINE
Payer: MEDICARE

## 2023-08-25 VITALS
HEIGHT: 68 IN | RESPIRATION RATE: 17 BRPM | HEART RATE: 99 BPM | SYSTOLIC BLOOD PRESSURE: 119 MMHG | OXYGEN SATURATION: 98 % | DIASTOLIC BLOOD PRESSURE: 81 MMHG | WEIGHT: 293 LBS | BODY MASS INDEX: 44.41 KG/M2 | TEMPERATURE: 98 F

## 2023-08-25 DIAGNOSIS — R51.9 NONINTRACTABLE HEADACHE, UNSPECIFIED CHRONICITY PATTERN, UNSPECIFIED HEADACHE TYPE: ICD-10-CM

## 2023-08-25 DIAGNOSIS — G43.809 OTHER MIGRAINE WITHOUT STATUS MIGRAINOSUS, NOT INTRACTABLE: ICD-10-CM

## 2023-08-25 DIAGNOSIS — N30.00 ACUTE CYSTITIS WITHOUT HEMATURIA: Primary | ICD-10-CM

## 2023-08-25 LAB
ALBUMIN SERPL-MCNC: 3.7 G/DL (ref 3.5–5)
ALBUMIN/GLOB SERPL: 1.1 RATIO (ref 1.1–2)
ALP SERPL-CCNC: 104 UNIT/L (ref 40–150)
ALT SERPL-CCNC: 23 UNIT/L (ref 0–55)
APPEARANCE UR: ABNORMAL
AST SERPL-CCNC: 19 UNIT/L (ref 5–34)
B-HCG UR QL: NEGATIVE
BACTERIA #/AREA URNS AUTO: ABNORMAL /HPF
BASOPHILS # BLD AUTO: 0.03 X10(3)/MCL
BASOPHILS NFR BLD AUTO: 0.3 %
BILIRUB SERPL-MCNC: 0.4 MG/DL
BILIRUB UR QL STRIP.AUTO: NEGATIVE
BUN SERPL-MCNC: 10.4 MG/DL (ref 7–18.7)
C TRACH DNA SPEC QL NAA+PROBE: NOT DETECTED
CALCIUM SERPL-MCNC: 9.9 MG/DL (ref 8.4–10.2)
CHLORIDE SERPL-SCNC: 108 MMOL/L (ref 98–107)
CO2 SERPL-SCNC: 27 MMOL/L (ref 22–29)
COLOR UR: ABNORMAL
CREAT SERPL-MCNC: 1.01 MG/DL (ref 0.55–1.02)
CTP QC/QA: YES
EOSINOPHIL # BLD AUTO: 0.2 X10(3)/MCL (ref 0–0.9)
EOSINOPHIL NFR BLD AUTO: 2.1 %
ERYTHROCYTE [DISTWIDTH] IN BLOOD BY AUTOMATED COUNT: 13.2 % (ref 11.5–17)
FLUAV AG UPPER RESP QL IA.RAPID: NOT DETECTED
FLUBV AG UPPER RESP QL IA.RAPID: NOT DETECTED
GFR SERPLBLD CREATININE-BSD FMLA CKD-EPI: >60 MLS/MIN/1.73/M2
GLOBULIN SER-MCNC: 3.4 GM/DL (ref 2.4–3.5)
GLUCOSE SERPL-MCNC: 96 MG/DL (ref 74–100)
GLUCOSE UR QL STRIP.AUTO: NORMAL
HCT VFR BLD AUTO: 41.8 % (ref 37–47)
HGB BLD-MCNC: 13.5 G/DL (ref 12–16)
HYALINE CASTS #/AREA URNS LPF: ABNORMAL /LPF
IMM GRANULOCYTES # BLD AUTO: 0.05 X10(3)/MCL (ref 0–0.04)
IMM GRANULOCYTES NFR BLD AUTO: 0.5 %
KETONES UR QL STRIP.AUTO: NEGATIVE
LEUKOCYTE ESTERASE UR QL STRIP.AUTO: 500
LIPASE SERPL-CCNC: 20 U/L
LYMPHOCYTES # BLD AUTO: 2.72 X10(3)/MCL (ref 0.6–4.6)
LYMPHOCYTES NFR BLD AUTO: 28 %
MAGNESIUM SERPL-MCNC: 2.2 MG/DL (ref 1.6–2.6)
MCH RBC QN AUTO: 27 PG (ref 27–31)
MCHC RBC AUTO-ENTMCNC: 32.3 G/DL (ref 33–36)
MCV RBC AUTO: 83.6 FL (ref 80–94)
MONOCYTES # BLD AUTO: 0.75 X10(3)/MCL (ref 0.1–1.3)
MONOCYTES NFR BLD AUTO: 7.7 %
MUCOUS THREADS URNS QL MICRO: ABNORMAL /LPF
N GONORRHOEA DNA SPEC QL NAA+PROBE: NOT DETECTED
NEUTROPHILS # BLD AUTO: 5.97 X10(3)/MCL (ref 2.1–9.2)
NEUTROPHILS NFR BLD AUTO: 61.4 %
NITRITE UR QL STRIP.AUTO: NEGATIVE
NRBC BLD AUTO-RTO: 0 %
PH UR STRIP.AUTO: 6 [PH]
PLATELET # BLD AUTO: 240 X10(3)/MCL (ref 130–400)
PMV BLD AUTO: 10.4 FL (ref 7.4–10.4)
POTASSIUM SERPL-SCNC: 4 MMOL/L (ref 3.5–5.1)
PROT SERPL-MCNC: 7.1 GM/DL (ref 6.4–8.3)
PROT UR QL STRIP.AUTO: ABNORMAL
RBC # BLD AUTO: 5 X10(6)/MCL (ref 4.2–5.4)
RBC #/AREA URNS AUTO: ABNORMAL /HPF
RBC UR QL AUTO: NEGATIVE
SARS-COV-2 RNA RESP QL NAA+PROBE: NOT DETECTED
SODIUM SERPL-SCNC: 144 MMOL/L (ref 136–145)
SOURCE (OHS): NORMAL
SP GR UR STRIP.AUTO: 1.03
SQUAMOUS #/AREA URNS LPF: ABNORMAL /HPF
UROBILINOGEN UR STRIP-ACNC: NORMAL
WBC # SPEC AUTO: 9.72 X10(3)/MCL (ref 4.5–11.5)
WBC #/AREA URNS AUTO: ABNORMAL /HPF

## 2023-08-25 PROCEDURE — 0240U COVID/FLU A&B PCR: CPT | Performed by: PHYSICIAN ASSISTANT

## 2023-08-25 PROCEDURE — 96372 THER/PROPH/DIAG INJ SC/IM: CPT | Performed by: PHYSICIAN ASSISTANT

## 2023-08-25 PROCEDURE — 83690 ASSAY OF LIPASE: CPT | Performed by: PHYSICIAN ASSISTANT

## 2023-08-25 PROCEDURE — 85025 COMPLETE CBC W/AUTO DIFF WBC: CPT | Performed by: PHYSICIAN ASSISTANT

## 2023-08-25 PROCEDURE — 25000003 PHARM REV CODE 250: Performed by: PHYSICIAN ASSISTANT

## 2023-08-25 PROCEDURE — 87591 N.GONORRHOEAE DNA AMP PROB: CPT | Performed by: PHYSICIAN ASSISTANT

## 2023-08-25 PROCEDURE — 83735 ASSAY OF MAGNESIUM: CPT | Performed by: PHYSICIAN ASSISTANT

## 2023-08-25 PROCEDURE — 80053 COMPREHEN METABOLIC PANEL: CPT | Performed by: PHYSICIAN ASSISTANT

## 2023-08-25 PROCEDURE — 81025 URINE PREGNANCY TEST: CPT | Performed by: PHYSICIAN ASSISTANT

## 2023-08-25 PROCEDURE — 63600175 PHARM REV CODE 636 W HCPCS: Performed by: PHYSICIAN ASSISTANT

## 2023-08-25 PROCEDURE — 81001 URINALYSIS AUTO W/SCOPE: CPT | Performed by: PHYSICIAN ASSISTANT

## 2023-08-25 PROCEDURE — 87088 URINE BACTERIA CULTURE: CPT | Performed by: PHYSICIAN ASSISTANT

## 2023-08-25 PROCEDURE — 99284 EMERGENCY DEPT VISIT MOD MDM: CPT

## 2023-08-25 RX ORDER — KETOROLAC TROMETHAMINE 30 MG/ML
30 INJECTION, SOLUTION INTRAMUSCULAR; INTRAVENOUS
Status: COMPLETED | OUTPATIENT
Start: 2023-08-25 | End: 2023-08-25

## 2023-08-25 RX ORDER — NITROFURANTOIN 25; 75 MG/1; MG/1
100 CAPSULE ORAL 2 TIMES DAILY
Qty: 14 CAPSULE | Refills: 0 | Status: SHIPPED | OUTPATIENT
Start: 2023-08-25 | End: 2023-09-01

## 2023-08-25 RX ORDER — ONDANSETRON 4 MG/1
4 TABLET, ORALLY DISINTEGRATING ORAL EVERY 8 HOURS PRN
Qty: 15 TABLET | Refills: 0 | Status: SHIPPED | OUTPATIENT
Start: 2023-08-25 | End: 2023-11-15

## 2023-08-25 RX ORDER — ONDANSETRON 4 MG/1
4 TABLET, ORALLY DISINTEGRATING ORAL
Status: COMPLETED | OUTPATIENT
Start: 2023-08-25 | End: 2023-08-25

## 2023-08-25 RX ORDER — RIZATRIPTAN BENZOATE 10 MG/1
10 TABLET ORAL DAILY PRN
Qty: 30 TABLET | Refills: 0 | Status: SHIPPED | OUTPATIENT
Start: 2023-08-25

## 2023-08-25 RX ADMIN — ONDANSETRON 4 MG: 4 TABLET, ORALLY DISINTEGRATING ORAL at 08:08

## 2023-08-25 RX ADMIN — KETOROLAC TROMETHAMINE 30 MG: 30 INJECTION, SOLUTION INTRAMUSCULAR; INTRAVENOUS at 08:08

## 2023-08-26 NOTE — DISCHARGE INSTRUCTIONS
Take medication as prescribed with food and water.   Follow up with your primary care provider within 2-3 days.

## 2023-08-26 NOTE — ED PROVIDER NOTES
Encounter Date: 8/25/2023       History     Chief Complaint   Patient presents with    Headache     Pt c/o headache with N/V/D and bodyaches x4 days      27-year-old female with a history of migraines, presents emergency department with complaints of frontal headache, subjective fever and chills, nausea, vomiting, diarrhea, urinary frequency and body aches x4 days.  She rates her pain 7 /10 and has not taken anything for pain.  She denies abdominal pain, dysuria, shortness of breath, chest pain, vaginal discharge.    The history is provided by the patient. No  was used.     Review of patient's allergies indicates:   Allergen Reactions    Ceftriaxone Nausea And Vomiting    Azithromycin Nausea And Vomiting     Hard to breathe     Latex, natural rubber Swelling and Rash     Past Medical History:   Diagnosis Date    ADD (attention deficit disorder)     Anxiety disorder     Depression     Headache     History of ovarian cyst     MDD (major depressive disorder), recurrent episode, moderate 7/29/2021    Obesity     Substance abuse     Hospitalization     Trauma     not at this time. 9/11/19    Tympanic membrane perforation, left 3/14/2018    UTI (urinary tract infection) 8/3/2018    Vertigo      Past Surgical History:   Procedure Laterality Date    CYSTOSCOPY W/ URETERAL STENT PLACEMENT Right 12/26/2019    Procedure: CYSTOSCOPY, WITH URETERAL STENT INSERTION;  Surgeon: Rito Medley MD;  Location: [facility] OR;  Service: Urology;  Laterality: Right;    CYSTOSCOPY W/ URETERAL STENT REMOVAL Right 1/6/2020    Procedure: CYSTOSCOPY, WITH URETERAL STENT REMOVAL;  Surgeon: Rito Medley MD;  Location: [facility] OR;  Service: Urology;  Laterality: Right;    LASER LITHOTRIPSY Right 1/6/2020    Procedure: LITHOTRIPSY, USING LASER;  Surgeon: iRto Medley MD;  Location: [facility] OR;  Service: Urology;  Laterality: Right;    TONSILLECTOMY, ADENOIDECTOMY, BILATERAL MYRINGOTOMY AND TUBES      URETEROSCOPIC  REMOVAL OF URETERIC CALCULUS Right 1/6/2020    Procedure: REMOVAL, CALCULUS, URETER, URETEROSCOPIC;  Surgeon: Rito Medley MD;  Location: [facility] OR;  Service: Urology;  Laterality: Right;    URETEROSCOPY Left 12/26/2019    Procedure: URETEROSCOPY;  Surgeon: Rito Medley MD;  Location: [facility] OR;  Service: Urology;  Laterality: Left;    URETEROSCOPY Right 1/6/2020    Procedure: URETEROSCOPY;  Surgeon: Rito Medley MD;  Location: [facility] OR;  Service: Urology;  Laterality: Right;     Family History   Problem Relation Age of Onset    Breast cancer Maternal Aunt     Obesity Mother     No Known Problems Father     No Known Problems Brother     Colon cancer Neg Hx     Miscarriages / Stillbirths Neg Hx     Ovarian cancer Neg Hx      Social History     Tobacco Use    Smoking status: Every Day     Current packs/day: 0.25     Types: Cigarettes    Smokeless tobacco: Never   Substance Use Topics    Alcohol use: Not Currently     Alcohol/week: 0.0 standard drinks of alcohol    Drug use: Not Currently     Types: Marijuana, Fentanyl     Comment: history of opoid abuse     Review of Systems   Constitutional:  Positive for chills and fever. Negative for appetite change.   HENT:  Negative for congestion, ear pain and sore throat.    Eyes: Negative.    Respiratory:  Negative for cough, chest tightness and shortness of breath.    Cardiovascular:  Negative for chest pain and palpitations.   Gastrointestinal:  Positive for nausea and vomiting. Negative for abdominal pain and diarrhea.   Genitourinary:  Positive for flank pain (right flank) and frequency. Negative for decreased urine volume and dysuria.   Musculoskeletal:  Negative for back pain.   Skin:  Negative for rash and wound.   Neurological:  Positive for headaches. Negative for dizziness and numbness.       Physical Exam     Initial Vitals [08/25/23 2006]   BP Pulse Resp Temp SpO2   123/84 109 18 98.3 °F (36.8 °C) 97 %      MAP       --         Physical  Exam    Nursing note and vitals reviewed.  Constitutional: She appears well-developed and well-nourished.   HENT:   Nose: Nose normal.   Mouth/Throat: Oropharynx is clear and moist.   Eyes: Conjunctivae are normal.   Neck: Neck supple.   Normal range of motion.  Cardiovascular:  Normal rate, regular rhythm, normal heart sounds and intact distal pulses.           Pulmonary/Chest: Breath sounds normal. No respiratory distress. She has no wheezes.   Abdominal: Abdomen is soft. Bowel sounds are normal. There is no abdominal tenderness. There is no rebound and no guarding.   Musculoskeletal:         General: Normal range of motion.      Cervical back: Normal range of motion and neck supple.     Lymphadenopathy:     She has no cervical adenopathy.   Neurological: She is alert and oriented to person, place, and time. She has normal strength.   Skin: Skin is warm. Capillary refill takes less than 2 seconds.         ED Course   Procedures  Labs Reviewed   COMPREHENSIVE METABOLIC PANEL - Abnormal; Notable for the following components:       Result Value    Chloride 108 (*)     All other components within normal limits   URINALYSIS, REFLEX TO URINE CULTURE - Abnormal; Notable for the following components:    Appearance, UA Turbid (*)     Protein, UA Trace (*)     Leukocyte Esterase,  (*)     WBC, UA 11-20 (*)     Squamous Epithelial Cells, UA Moderate (*)     Mucous, UA Trace (*)     All other components within normal limits   CBC WITH DIFFERENTIAL - Abnormal; Notable for the following components:    MCHC 32.3 (*)     IG# 0.05 (*)     All other components within normal limits   LIPASE - Normal   MAGNESIUM - Normal   COVID/FLU A&B PCR - Normal    Narrative:     The Xpert Xpress SARS-CoV-2/FLU/RSV plus is a rapid, multiplexed real-time PCR test intended for the simultaneous qualitative detection and differentiation of SARS-CoV-2, Influenza A, Influenza B, and respiratory syncytial virus (RSV) viral RNA in either  nasopharyngeal swab or nasal swab specimens.         CHLAMYDIA/GONORRHOEAE(GC), PCR    Narrative:     The Xpert CT/NG test, performed on the GeneXpert system is a qualitative in vitro real-time polymerase chain reaction (PCR) test for the automated detected and differentiation for genomic DNA from Chlamydia trachomatis (CT) and/or Neisseria gonorrhoeae (NG).   CULTURE, URINE   CBC W/ AUTO DIFFERENTIAL    Narrative:     The following orders were created for panel order CBC Auto Differential.  Procedure                               Abnormality         Status                     ---------                               -----------         ------                     CBC with Differential[[id number]]        Abnormal            Final result                 Please view results for these tests on the individual orders.   EXTRA TUBES    Narrative:     The following orders were created for panel order EXTRA TUBES.  Procedure                               Abnormality         Status                     ---------                               -----------         ------                     Light Blue Top Hold[[id number]]                              In process                 Gold Top Hold[[id number]]                                    In process                 Gold Top Hold[[id number]]                                    In process                   Please view results for these tests on the individual orders.   LIGHT BLUE TOP HOLD   GOLD TOP HOLD   GOLD TOP HOLD   POCT URINE PREGNANCY          Imaging Results    None          Medications   ondansetron disintegrating tablet 4 mg (4 mg Oral Given 8/25/23 2058)   ketorolac injection 30 mg (30 mg Intramuscular Given 8/25/23 2058)     Medical Decision Making  27-year-old female with a history of migraines, presents emergency department with complaints of frontal headache, nausea, vomiting, diarrhea, urinary frequency and body aches x4 days    DDx:  COVID, influenza, UTI, classic  migraine, STD, viral illness, gastritis    Blood work unremarkable, urinalysis consistent with UTI.  Urine culture pending.  Will treat with Macrobid.    Patient headache and nausea improved after medications given in the ED. Refilled rizatriptan per patient's request for migraines    The patient is resting comfortably and in no acute distress.  She states that her symptoms have improved after treatment in Emergency Department. I personally discussed her test results and treatment plan.  Gave strict ED precautions, discussed specific conditions for return to the emergency department and importance of follow up with her primary care provider.  Patient voices understanding and agrees to the plan discussed. All patients' questions have been answered at this time.   She has remained hemodynamically stable throughout entire stay in ED and is stable for discharge home.            Amount and/or Complexity of Data Reviewed  Labs: ordered. Decision-making details documented in ED Course.     Details: Leukocytes:  500, WBC 11- 20    Risk  Prescription drug management.               ED Course as of 08/26/23 0007   Fri Aug 25, 2023   2155 Leukocytes, UA(!): 500 [ER]   2155 WBC, UA(!): 11-20 [ER]   2155 Bacteria, UA: None Seen [ER]      ED Course User Index  [ER] Janell Eden PA                    Clinical Impression:   Final diagnoses:  [N30.00] Acute cystitis without hematuria (Primary)  [G43.809] Other migraine without status migrainosus, not intractable        ED Disposition Condition    Discharge Stable          ED Prescriptions       Medication Sig Dispense Start Date End Date Auth. Provider    rizatriptan (MAXALT) 10 MG tablet Take 1 tablet (10 mg total) by mouth daily as needed for Migraine. 30 tablet 8/25/2023 -- Janell Eden PA    nitrofurantoin, macrocrystal-monohydrate, (MACROBID) 100 MG capsule Take 1 capsule (100 mg total) by mouth 2 (two) times daily. for 7 days 14 capsule 8/25/2023 9/1/2023 Janell Eden  PA    ondansetron (ZOFRAN-ODT) 4 MG TbDL Take 1 tablet (4 mg total) by mouth every 8 (eight) hours as needed (nausea). 15 tablet 8/25/2023 -- Janell Eden PA          Follow-up Information       Follow up With Specialties Details Why Contact Info    [facility] - Emergency Dept Emergency Medicine  As needed, If symptoms worsen [address] [facility] [id number] [phone]             Janell Eden PA  08/26/23 0007

## 2023-08-28 LAB — BACTERIA UR CULT: NO GROWTH

## 2023-10-05 ENCOUNTER — OFFICE VISIT (OUTPATIENT)
Dept: FAMILY MEDICINE | Facility: CLINIC | Age: 28
End: 2023-10-05
Payer: MEDICARE

## 2023-10-05 VITALS
HEIGHT: 68 IN | DIASTOLIC BLOOD PRESSURE: 76 MMHG | WEIGHT: 293 LBS | SYSTOLIC BLOOD PRESSURE: 110 MMHG | TEMPERATURE: 98 F | BODY MASS INDEX: 44.41 KG/M2 | HEART RATE: 86 BPM | RESPIRATION RATE: 18 BRPM | OXYGEN SATURATION: 98 %

## 2023-10-05 DIAGNOSIS — M54.9 BACK PAIN, UNSPECIFIED BACK LOCATION, UNSPECIFIED BACK PAIN LATERALITY, UNSPECIFIED CHRONICITY: ICD-10-CM

## 2023-10-05 DIAGNOSIS — R20.2 PARESTHESIA OF BOTH FEET: Primary | ICD-10-CM

## 2023-10-05 DIAGNOSIS — R20.2 PARESTHESIA OF BOTH HANDS: ICD-10-CM

## 2023-10-05 DIAGNOSIS — M54.2 NECK PAIN: ICD-10-CM

## 2023-10-05 PROCEDURE — 99215 OFFICE O/P EST HI 40 MIN: CPT | Mod: PBBFAC,PN | Performed by: NURSE PRACTITIONER

## 2023-10-05 PROCEDURE — 99213 OFFICE O/P EST LOW 20 MIN: CPT | Mod: S$PBB,,, | Performed by: NURSE PRACTITIONER

## 2023-10-05 PROCEDURE — 99213 PR OFFICE/OUTPT VISIT, EST, LEVL III, 20-29 MIN: ICD-10-PCS | Mod: S$PBB,,, | Performed by: NURSE PRACTITIONER
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
## 2023-11-15 ENCOUNTER — OFFICE VISIT (OUTPATIENT)
Dept: FAMILY MEDICINE | Facility: CLINIC | Age: 28
End: 2023-11-15
Payer: MEDICARE

## 2023-11-15 VITALS
DIASTOLIC BLOOD PRESSURE: 92 MMHG | OXYGEN SATURATION: 98 % | WEIGHT: 293 LBS | SYSTOLIC BLOOD PRESSURE: 138 MMHG | HEART RATE: 78 BPM | BODY MASS INDEX: 44.41 KG/M2 | TEMPERATURE: 98 F | RESPIRATION RATE: 18 BRPM | HEIGHT: 68 IN

## 2023-11-15 DIAGNOSIS — Z00.00 MEDICARE ANNUAL WELLNESS VISIT, SUBSEQUENT: ICD-10-CM

## 2023-11-15 DIAGNOSIS — E66.01 MORBID OBESITY: ICD-10-CM

## 2023-11-15 DIAGNOSIS — G43.909 MIGRAINE WITHOUT STATUS MIGRAINOSUS, NOT INTRACTABLE, UNSPECIFIED MIGRAINE TYPE: ICD-10-CM

## 2023-11-15 DIAGNOSIS — R79.9 ABNORMAL FINDING OF BLOOD CHEMISTRY, UNSPECIFIED: ICD-10-CM

## 2023-11-15 DIAGNOSIS — F11.11 HISTORY OF OPIOID ABUSE: Primary | ICD-10-CM

## 2023-11-15 PROBLEM — F12.90 MARIJUANA USER: Status: RESOLVED | Noted: 2023-02-10 | Resolved: 2023-11-15

## 2023-11-15 PROBLEM — N93.9 ABNORMAL UTERINE BLEEDING (AUB): Status: RESOLVED | Noted: 2023-07-31 | Resolved: 2023-11-15

## 2023-11-15 PROBLEM — R20.2 PARESTHESIA OF BOTH HANDS: Status: RESOLVED | Noted: 2023-10-05 | Resolved: 2023-11-15

## 2023-11-15 PROBLEM — Z86.69 HISTORY OF BELL'S PALSY: Status: RESOLVED | Noted: 2022-11-15 | Resolved: 2023-11-15

## 2023-11-15 PROBLEM — R20.2 PARESTHESIA OF BOTH FEET: Status: RESOLVED | Noted: 2023-10-05 | Resolved: 2023-11-15

## 2023-11-15 PROBLEM — F41.1 GAD (GENERALIZED ANXIETY DISORDER): Status: RESOLVED | Noted: 2021-04-13 | Resolved: 2023-11-15

## 2023-11-15 PROBLEM — F39 UNSPECIFIED MOOD (AFFECTIVE) DISORDER: Status: RESOLVED | Noted: 2020-05-13 | Resolved: 2023-11-15

## 2023-11-15 PROBLEM — L02.91 SKIN ABSCESS: Status: RESOLVED | Noted: 2023-05-15 | Resolved: 2023-11-15

## 2023-11-15 PROBLEM — G43.719 INTRACTABLE CHRONIC MIGRAINE WITHOUT AURA AND WITHOUT STATUS MIGRAINOSUS: Status: RESOLVED | Noted: 2018-10-10 | Resolved: 2023-11-15

## 2023-11-15 PROBLEM — D64.9 ANEMIA: Status: RESOLVED | Noted: 2019-12-26 | Resolved: 2023-11-15

## 2023-11-15 PROBLEM — E66.813 CLASS 3 SEVERE OBESITY WITH BODY MASS INDEX (BMI) OF 50.0 TO 59.9 IN ADULT: Status: RESOLVED | Noted: 2019-02-28 | Resolved: 2023-11-15

## 2023-11-15 PROCEDURE — 99214 OFFICE O/P EST MOD 30 MIN: CPT | Mod: ,,, | Performed by: STUDENT IN AN ORGANIZED HEALTH CARE EDUCATION/TRAINING PROGRAM

## 2023-11-15 PROCEDURE — 99214 PR OFFICE/OUTPT VISIT, EST, LEVL IV, 30-39 MIN: ICD-10-PCS | Mod: ,,, | Performed by: STUDENT IN AN ORGANIZED HEALTH CARE EDUCATION/TRAINING PROGRAM

## 2023-11-15 RX ORDER — PROPRANOLOL HYDROCHLORIDE 10 MG/1
10 TABLET ORAL 3 TIMES DAILY
Qty: 30 TABLET | Refills: 0 | Status: SHIPPED | OUTPATIENT
Start: 2023-11-15 | End: 2023-11-15 | Stop reason: SDUPTHER

## 2023-11-15 RX ORDER — PROPRANOLOL HYDROCHLORIDE 10 MG/1
10 TABLET ORAL NIGHTLY
Qty: 30 TABLET | Refills: 0 | Status: SHIPPED | OUTPATIENT
Start: 2023-11-15 | End: 2023-12-11 | Stop reason: SDUPTHER

## 2023-11-15 NOTE — PROGRESS NOTES
"Subjective:      Patient ID: Alexandra Martin is a 27 y.o.  female.    Chief Complaint: Establish Care    History of Opioid Abuse: Patient taking Vivitrol.    Migraine: Patient takes Maxalt almost daily. Patient has been on Topamax in the past, but made her, "feel weird."    Preventative Health: Patient has an appointment with OB-GYN for her well-woman exam on 06/12/24.    Review of Systems   Constitutional:  Negative for activity change, appetite change, chills, diaphoresis, fatigue, fever and unexpected weight change.   Respiratory:  Negative for apnea, cough and shortness of breath.    Cardiovascular:  Negative for chest pain, palpitations and leg swelling.   Gastrointestinal:  Negative for abdominal pain, diarrhea and vomiting.   Genitourinary:  Negative for dysuria and hematuria.   Skin:  Negative for rash and wound.   Neurological:  Positive for headaches. Negative for dizziness, syncope and weakness.   Psychiatric/Behavioral:  Negative for agitation, behavioral problems, confusion, decreased concentration, dysphoric mood, hallucinations, self-injury, sleep disturbance and suicidal ideas. The patient is not nervous/anxious.      Objective:   BP (!) 138/92 (BP Location: Right arm, Patient Position: Sitting, BP Method: Medium (Automatic))   Pulse 78   Temp 97.7 °F (36.5 °C)   Resp 18   Ht 5' 8" (1.727 m)   Wt (!) 152.6 kg (336 lb 8 oz)   SpO2 98%   BMI 51.16 kg/m²     Physical Exam  Vitals and nursing note reviewed.   Constitutional:       General: She is not in acute distress.     Appearance: Normal appearance. She is obese. She is not ill-appearing or toxic-appearing.   HENT:      Head: Normocephalic and atraumatic.   Eyes:      Conjunctiva/sclera: Conjunctivae normal.   Cardiovascular:      Rate and Rhythm: Normal rate and regular rhythm.      Heart sounds: Normal heart sounds. No murmur heard.  Pulmonary:      Effort: Pulmonary effort is normal. No respiratory distress.      Breath " sounds: Normal breath sounds.   Abdominal:      General: There is no distension.      Palpations: Abdomen is soft.      Tenderness: There is no abdominal tenderness.   Musculoskeletal:         General: No deformity.      Right lower leg: No edema.      Left lower leg: No edema.   Skin:     General: Skin is warm and dry.      Findings: No lesion or rash.   Neurological:      General: No focal deficit present.      Mental Status: She is alert.      Motor: No weakness.      Coordination: Coordination normal.   Psychiatric:         Mood and Affect: Mood normal.         Behavior: Behavior normal.         Thought Content: Thought content normal.         Judgment: Judgment normal.       Assessment/Plan:   1. History of opioid abuse  Assessment & Plan:  - Patient taking Vivitrol. She gets this from another clinic in town.      2. Migraine without status migrainosus, not intractable, unspecified migraine type  Assessment & Plan:  - Starting Propranolol 10mg nightly.  - Continue Maxalt PRN.  - Re-evaluation in 1 month.    Orders:  -     propranoloL (INDERAL) 10 MG tablet; Take 1 tablet (10 mg total) by mouth every evening.  Dispense: 30 tablet; Refill: 0    3. Morbid obesity  Assessment & Plan:  - BMI 51  - Patient counseled regarding lifestyle modifications with diet and exercise.  - Patient not amenable to any referrals at this time.    Orders:  -     Hemoglobin A1C; Future; Expected date: 11/15/2023    4. Medicare annual wellness visit, subsequent  -     TSH; Future; Expected date: 11/15/2023    5. Abnormal finding of blood chemistry, unspecified  -     Hemoglobin A1C; Future; Expected date: 11/15/2023       Follow up in about 1 month (around 12/15/2023) for Migraine Headaches.

## 2023-11-15 NOTE — ASSESSMENT & PLAN NOTE
- BMI 51  - Patient counseled regarding lifestyle modifications with diet and exercise.  - Patient not amenable to any referrals at this time.

## 2023-12-04 ENCOUNTER — CLINICAL SUPPORT (OUTPATIENT)
Dept: GYNECOLOGY | Facility: CLINIC | Age: 28
End: 2023-12-04
Payer: MEDICARE

## 2023-12-04 DIAGNOSIS — N93.9 ABNORMAL UTERINE BLEEDING (AUB): Primary | ICD-10-CM

## 2023-12-04 NOTE — PROGRESS NOTES
[facility] Progress Note    Primary Site: [facility]  Patient location: Home  Physician location: In office    Chief Complaint: F/u breakthrough bleeding while on Nexplanon    HPI  Alexandra Martin joined me via our telemedicine platform.     who presents for AUB s/p Nexplanon placement.      At time of last visit in , pt reported breakthrough bleeding since Nexplanon placement in  with almost daily spotting. Was started on Sprintec at time of last visit to cover breakthrough bleeding which has now resolved. States that she ran out of the Rx because insurance didn't cover her refill but states that she has been doing well without the OCPs. No other concerns.     I have reviewed family history, social history, medications and allergies as documented in the patient's electronic medical record.    Reports, labs, outside records, and images have been reviewed with pertinent interpretations below. See telemedicine notes records for intervening communications.    Assessment/Plan   who presents for AUB s/p Nexplanon placement.     AUB  - Breakthrough bleeding w/ Nexplanon now resolved s/p OCPs. Pt without other concerns at this time.   - Pt due for pap smear (last in ; NILM, + Trich s/p treatment), pt has follow up scheduled with NP in .     See correspondence above for plan.   Patient's needs assessed and health education provided. Patient understands risks, benefits, and alternatives of treatment prescribed above. Patient verbalizes understanding and agrees to follow plan.    Patient's identity was confirmed and confidentiality/privacy confirmed prior to visit. I certify that this visit was done via secure two-way transmission with informed consent of the patient and/or guardian.  Each patient to whom I provide medical services by telemedicine is:  (1) informed of the relationship between the physician and patient and the respective role of any other health care  provider with respect to management of the patient; and (2) notified that they may decline to receive medical services by telemedicine and may withdraw from such care at any time. Patient verbally consented to receive this service via voice-only telephone call.    Audio only was selected because there was no capability for video conferencing with patient.    50% of the time was counseling or coordinating care.  Start Time: 11:30 AM   End Time: 11:35 AM     Amaris Schneider MD   [employer] [facility] PGY-3

## 2023-12-11 DIAGNOSIS — G43.909 MIGRAINE WITHOUT STATUS MIGRAINOSUS, NOT INTRACTABLE, UNSPECIFIED MIGRAINE TYPE: ICD-10-CM

## 2023-12-11 RX ORDER — PROPRANOLOL HYDROCHLORIDE 10 MG/1
10 TABLET ORAL NIGHTLY
Qty: 90 TABLET | Refills: 1 | Status: SHIPPED | OUTPATIENT
Start: 2023-12-11 | End: 2024-12-10

## 2023-12-21 ENCOUNTER — DOCUMENTATION ONLY (OUTPATIENT)
Dept: FAMILY MEDICINE | Facility: CLINIC | Age: 28
End: 2023-12-21
Payer: MEDICARE

## 2024-01-03 ENCOUNTER — OFFICE VISIT (OUTPATIENT)
Dept: URGENT CARE | Facility: CLINIC | Age: 29
End: 2024-01-03
Payer: MEDICARE

## 2024-01-03 VITALS — BODY MASS INDEX: 53.92 KG/M2 | HEIGHT: 62 IN | WEIGHT: 293 LBS | RESPIRATION RATE: 20 BRPM | OXYGEN SATURATION: 96 %

## 2024-01-03 DIAGNOSIS — J06.9 VIRAL URI: Primary | ICD-10-CM

## 2024-01-03 DIAGNOSIS — R68.89 FLU-LIKE SYMPTOMS: ICD-10-CM

## 2024-01-03 LAB
CTP QC/QA: YES
MOLECULAR STREP A: NEGATIVE
POC MOLECULAR INFLUENZA A AGN: NEGATIVE
POC MOLECULAR INFLUENZA B AGN: NEGATIVE
SARS-COV-2 RDRP RESP QL NAA+PROBE: NEGATIVE

## 2024-01-03 PROCEDURE — 99213 OFFICE O/P EST LOW 20 MIN: CPT | Mod: S$PBB,,, | Performed by: FAMILY MEDICINE

## 2024-01-03 PROCEDURE — 99213 OFFICE O/P EST LOW 20 MIN: CPT | Mod: PBBFAC | Performed by: FAMILY MEDICINE

## 2024-01-03 PROCEDURE — 87502 INFLUENZA DNA AMP PROBE: CPT | Mod: PBBFAC | Performed by: FAMILY MEDICINE

## 2024-01-03 PROCEDURE — 87635 SARS-COV-2 COVID-19 AMP PRB: CPT | Mod: PBBFAC | Performed by: FAMILY MEDICINE

## 2024-01-03 PROCEDURE — 87651 STREP A DNA AMP PROBE: CPT | Mod: PBBFAC | Performed by: FAMILY MEDICINE

## 2024-01-03 RX ORDER — ONDANSETRON 8 MG/1
8 TABLET, ORALLY DISINTEGRATING ORAL EVERY 8 HOURS PRN
Qty: 10 TABLET | Refills: 0 | Status: SHIPPED | OUTPATIENT
Start: 2024-01-03

## 2024-01-03 RX ORDER — ALBUTEROL SULFATE 90 UG/1
2 AEROSOL, METERED RESPIRATORY (INHALATION) EVERY 6 HOURS PRN
Qty: 1 G | Refills: 3 | Status: SHIPPED | OUTPATIENT
Start: 2024-01-03 | End: 2024-02-02

## 2024-01-03 RX ORDER — PREDNISONE 20 MG/1
20 TABLET ORAL 2 TIMES DAILY
Qty: 10 TABLET | Refills: 0 | Status: SHIPPED | OUTPATIENT
Start: 2024-01-03 | End: 2024-01-08

## 2024-01-03 NOTE — PROGRESS NOTES
"Subjective:       Patient ID: Alexandra Martin is a 28 y.o. female.    Vitals:  height is 5' 2" (1.575 m) and weight is 145 kg (319 lb 11.2 oz) (abnormal). Her respiration is 20 and oxygen saturation is 96%.     Chief Complaint: URI (Cough, congestion, SOB since Sunday)    URI   This is a new problem. The current episode started in the past 7 days. There has been no fever. Associated symptoms include congestion, coughing and wheezing. Pertinent negatives include no abdominal pain, chest pain, diarrhea, neck pain, rash, sinus pain, sore throat or vomiting.         Constitution: Negative for fever.   HENT:  Positive for congestion. Negative for ear discharge, drooling, facial swelling, sinus pain, sore throat and trouble swallowing.    Neck: Negative for neck pain and neck stiffness.   Cardiovascular:  Negative for chest pain and sob on exertion.   Respiratory:  Positive for cough and wheezing. Negative for bloody sputum and shortness of breath.    Gastrointestinal:  Negative for abdominal pain, vomiting and diarrhea.   Skin:  Negative for rash.   Neurological:  Negative for altered mental status.   Psychiatric/Behavioral:  Negative for altered mental status.        Objective:   Physical Exam   Constitutional: She appears well-developed.  Non-toxic appearance. She does not appear ill. No distress.   HENT:   Head: Atraumatic.   Nose: Rhinorrhea present. No purulent discharge. Right sinus exhibits no maxillary sinus tenderness and no frontal sinus tenderness. Left sinus exhibits no maxillary sinus tenderness and no frontal sinus tenderness.   Mouth/Throat: Uvula is midline.   Eyes: Right eye exhibits no discharge. Left eye exhibits no discharge. Extraocular movement intact   Neck: Neck supple. No neck rigidity present.   Cardiovascular: Regular rhythm.   Pulmonary/Chest: Effort normal and breath sounds normal. No respiratory distress. She has no wheezes. She has no rales.   Lymphadenopathy:     She has no " cervical adenopathy.   Neurological: She is alert.   Skin: Skin is warm, dry and not diaphoretic.   Psychiatric: Her behavior is normal.   Nursing note and vitals reviewed.    Results for orders placed or performed in visit on 01/03/24   POCT COVID-19 Rapid Screening   Result Value Ref Range    POC Rapid COVID Negative Negative     Acceptable Yes    POCT Influenza A/B MOLECULAR   Result Value Ref Range    POC Molecular Influenza A Ag Negative Negative, Not Reported    POC Molecular Influenza B Ag Negative Negative, Not Reported     Acceptable Yes    POCT Strep A, Molecular   Result Value Ref Range    Molecular Strep A, POC Negative Negative     Acceptable Yes          Assessment:     1. Viral URI    2. Flu-like symptoms          Plan:     Will give a course of prednisone, albuterol.   Zofran if nausea develops.  Increase fluids.  Fill out papers listing medications for drug court.    Viral URI  -     predniSONE (DELTASONE) 20 MG tablet; Take 1 tablet (20 mg total) by mouth 2 (two) times daily. for 5 days  Dispense: 10 tablet; Refill: 0  -     albuterol (PROVENTIL HFA) 90 mcg/actuation inhaler; Inhale 2 puffs into the lungs every 6 (six) hours as needed for Wheezing. Rescue  Dispense: 1 g; Refill: 3    Flu-like symptoms  -     POCT COVID-19 Rapid Screening  -     POCT Influenza A/B MOLECULAR  -     POCT Strep A, Molecular    Other orders  -     ondansetron (ZOFRAN-ODT) 8 MG TbDL; Take 1 tablet (8 mg total) by mouth every 8 (eight) hours as needed (nausea).  Dispense: 10 tablet; Refill: 0        Please note: This chart was completed via voice to text dictation. It may contain typographical/word recognition errors. If there are any questions, please contact the provider for final clarification.

## 2024-01-03 NOTE — LETTER
January 3, 2024      [facility]  [address] [address]  Phone: [phone]       Patient: Alexandra Martin   YOB: 1995  Date of Visit: 01/03/2024    To Whom It May Concern:    New Martin  was at [facility] on 01/03/2024. The patient may return to work/school on 1/5/24 with no restrictions. If you have any questions or concerns, or if I can be of further assistance, please do not hesitate to contact me.    Sincerely,    MARIA ISABEL Sommer MD

## 2024-02-05 ENCOUNTER — PATIENT MESSAGE (OUTPATIENT)
Dept: ADMINISTRATIVE | Facility: HOSPITAL | Age: 29
End: 2024-02-05
Payer: MEDICARE

## 2024-02-27 ENCOUNTER — OFFICE VISIT (OUTPATIENT)
Dept: URGENT CARE | Facility: CLINIC | Age: 29
End: 2024-02-27
Payer: MEDICARE

## 2024-02-27 VITALS
OXYGEN SATURATION: 98 % | DIASTOLIC BLOOD PRESSURE: 88 MMHG | BODY MASS INDEX: 53.92 KG/M2 | RESPIRATION RATE: 18 BRPM | HEART RATE: 87 BPM | SYSTOLIC BLOOD PRESSURE: 134 MMHG | WEIGHT: 293 LBS | HEIGHT: 62 IN | TEMPERATURE: 98 F

## 2024-02-27 DIAGNOSIS — L03.115 CELLULITIS OF RIGHT THIGH: Primary | ICD-10-CM

## 2024-02-27 DIAGNOSIS — K59.00 CONSTIPATION, UNSPECIFIED CONSTIPATION TYPE: ICD-10-CM

## 2024-02-27 PROCEDURE — 99214 OFFICE O/P EST MOD 30 MIN: CPT | Mod: PBBFAC | Performed by: NURSE PRACTITIONER

## 2024-02-27 PROCEDURE — 99213 OFFICE O/P EST LOW 20 MIN: CPT | Mod: S$PBB,,, | Performed by: NURSE PRACTITIONER

## 2024-02-27 RX ORDER — MUPIROCIN 20 MG/G
OINTMENT TOPICAL 3 TIMES DAILY
Qty: 15 G | Refills: 1 | Status: SHIPPED | OUTPATIENT
Start: 2024-02-27 | End: 2024-03-05

## 2024-02-27 RX ORDER — CLINDAMYCIN HYDROCHLORIDE 300 MG/1
300 CAPSULE ORAL 3 TIMES DAILY
Qty: 30 CAPSULE | Refills: 0 | Status: SHIPPED | OUTPATIENT
Start: 2024-02-27 | End: 2024-03-04

## 2024-02-27 RX ORDER — IBUPROFEN 800 MG/1
800 TABLET ORAL 3 TIMES DAILY
Qty: 15 TABLET | Refills: 0 | Status: SHIPPED | OUTPATIENT
Start: 2024-02-27 | End: 2024-03-03

## 2024-02-27 RX ORDER — BISACODYL 5 MG
5 TABLET, DELAYED RELEASE (ENTERIC COATED) ORAL DAILY PRN
Qty: 30 TABLET | Refills: 0 | Status: SHIPPED | OUTPATIENT
Start: 2024-02-27 | End: 2024-03-04

## 2024-02-27 RX ORDER — VENLAFAXINE HYDROCHLORIDE 75 MG/1
75 CAPSULE, EXTENDED RELEASE ORAL
COMMUNITY
Start: 2024-01-30 | End: 2024-03-04

## 2024-02-27 NOTE — PROGRESS NOTES
"Subjective:      Patient ID: Alexandra Martin is a 28 y.o. female.    Vitals:  height is 5' 2" (1.575 m) and weight is 145.6 kg (321 lb) (abnormal). Her oral temperature is 98.1 °F (36.7 °C). Her blood pressure is 134/88 and her pulse is 87. Her respiration is 18 and oxygen saturation is 98%.     Chief Complaint: Mass (Patient reports painful red bump on back of Rt thigh x 4 days Patient states she has tried warm compresses and nothing has helped/)    Mass     As stated in chief complaint.  Patient also complains of constipation.  Patient states it has been hard for her to have a bowel movement or full bowel movement for 1 week.  Patient denies taking any medication for treatment of symptoms.    Skin:  Positive for erythema.      Objective:     Physical Exam   Constitutional: She is oriented to person, place, and time. She appears well-developed.  Non-toxic appearance. She does not appear ill. No distress. obesity  HENT:   Head: Normocephalic and atraumatic.   Ears:   Right Ear: Tympanic membrane normal.   Nose: No purulent discharge. Right sinus exhibits no maxillary sinus tenderness and no frontal sinus tenderness. Left sinus exhibits no maxillary sinus tenderness and no frontal sinus tenderness.   Mouth/Throat: Uvula is midline.   Eyes: Right eye exhibits no discharge. Left eye exhibits no discharge. Extraocular movement intact   Neck: Neck supple. No neck rigidity present.   Cardiovascular: Normal rate, regular rhythm and normal pulses.   Pulmonary/Chest: Effort normal and breath sounds normal. No respiratory distress. She has no wheezes. She has no rales.   Abdominal: Normal appearance and bowel sounds are normal. She exhibits no distension. Soft. There is no abdominal tenderness. There is no rebound, no guarding, no left CVA tenderness and no right CVA tenderness.   Musculoskeletal: Normal range of motion.         General: Normal range of motion.   Lymphadenopathy:     She has no cervical adenopathy. "   Neurological: no focal deficit. She is alert and oriented to person, place, and time.   Skin: Skin is warm, dry, not diaphoretic and rash. erythema         Comments: TTP to a firm, red erythematous 2 cm area to posterior right thigh with surrounding cellulitis, no drainage, no induration, no central clearing   Psychiatric: Her behavior is normal. Mood, judgment and thought content normal.   Nursing note and vitals reviewed.      Assessment:     1. Cellulitis of right thigh    2. Constipation, unspecified constipation type        Plan:   ER precautions given and discussed.  Abdominal exam benign  - do not put hydrogen peroxide, rubbing alcohol, or any items from the kitchen into the wound  - take antibiotics as prescribed  - if you get fever, increasing pain, increasing drainage, go to the ER.  - follow up with your PCP next week for a wound check      Cellulitis of right thigh  -     mupirocin (BACTROBAN) 2 % ointment; Apply topically 3 (three) times daily. for 7 days  Dispense: 15 g; Refill: 1  -     clindamycin (CLEOCIN) 300 MG capsule; Take 1 capsule (300 mg total) by mouth 3 (three) times daily. for 10 days  Dispense: 30 capsule; Refill: 0  -     ibuprofen (ADVIL,MOTRIN) 800 MG tablet; Take 1 tablet (800 mg total) by mouth 3 (three) times daily. for 5 days  Dispense: 15 tablet; Refill: 0    Constipation, unspecified constipation type  -     bisacodyL (DULCOLAX) 5 mg EC tablet; Take 1 tablet (5 mg total) by mouth daily as needed for Constipation.  Dispense: 30 tablet; Refill: 0

## 2024-02-27 NOTE — PATIENT INSTRUCTIONS
You have cellulitis. This is an infection of your skin and the fat tissue under the skin. This can happen with any small knick, scratch or cut in the skin, allowing bacteria to get in.  For constipation you need to increase water intake and information given for high-fiber diet.    - do not put hydrogen peroxide, rubbing alcohol, or any items from the kitchen into the wound  - take antibiotics as prescribed  - if you get fever, increasing pain, increasing drainage, go to the ER.  - follow up with your PCP next week for a wound check    Apply Warm Compresses to your infection 4-5x/day.  Run the warm to hot water in the sink. Wet a wash rag completely. Ring it out. Fold it in half or in fours. Apply to thigh. Leave it there until it reaches room temperature, then throw it in the dirty clothes. Do not use the same rag more than once. Wash them all, including other bath towels and clothes in hot water.    - Increase your water intake to 5-6 bottles per day    - If you start to have fevers 100.4+, significant lower back/lower abdominal pain, vomiting, chills/body aches, or worsening bladder/urination symptoms - go to the ER.

## 2024-03-01 ENCOUNTER — HOSPITAL ENCOUNTER (EMERGENCY)
Facility: HOSPITAL | Age: 29
Discharge: HOME | End: 2024-03-01
Attending: EMERGENCY MEDICINE
Payer: MEDICARE

## 2024-03-01 VITALS
OXYGEN SATURATION: 100 % | HEART RATE: 87 BPM | WEIGHT: 293 LBS | TEMPERATURE: 98 F | BODY MASS INDEX: 58.47 KG/M2 | DIASTOLIC BLOOD PRESSURE: 87 MMHG | RESPIRATION RATE: 17 BRPM | SYSTOLIC BLOOD PRESSURE: 130 MMHG

## 2024-03-01 DIAGNOSIS — Z76.89 ENCOUNTER FOR INCISION AND DRAINAGE PROCEDURE: Primary | ICD-10-CM

## 2024-03-01 DIAGNOSIS — L02.91 ABSCESS: ICD-10-CM

## 2024-03-01 LAB
B-HCG UR QL: NEGATIVE
CTP QC/QA: YES

## 2024-03-01 PROCEDURE — 99283 EMERGENCY DEPT VISIT LOW MDM: CPT | Mod: 25

## 2024-03-01 PROCEDURE — 25000003 PHARM REV CODE 250: Performed by: NURSE PRACTITIONER

## 2024-03-01 PROCEDURE — 81025 URINE PREGNANCY TEST: CPT | Performed by: NURSE PRACTITIONER

## 2024-03-01 PROCEDURE — 10060 I&D ABSCESS SIMPLE/SINGLE: CPT

## 2024-03-01 RX ORDER — IBUPROFEN 400 MG/1
800 TABLET ORAL
Status: COMPLETED | OUTPATIENT
Start: 2024-03-01 | End: 2024-03-01

## 2024-03-01 RX ORDER — LIDOCAINE HYDROCHLORIDE 10 MG/ML
10 INJECTION, SOLUTION EPIDURAL; INFILTRATION; INTRACAUDAL; PERINEURAL
Status: COMPLETED | OUTPATIENT
Start: 2024-03-01 | End: 2024-03-01

## 2024-03-01 RX ADMIN — LIDOCAINE HYDROCHLORIDE 100 MG: 10 INJECTION, SOLUTION EPIDURAL; INFILTRATION; INTRACAUDAL; PERINEURAL at 07:03

## 2024-03-01 RX ADMIN — IBUPROFEN 800 MG: 400 TABLET, FILM COATED ORAL at 07:03

## 2024-03-01 NOTE — ED PROVIDER NOTES
Encounter Date: 3/1/2024       History     Chief Complaint   Patient presents with    Abscess     Recent dx of cellulitis to rt thigh, no improvement w rx meds from UC.       The patient presents with abscess.  The onset was 1 week ago.  The course/duration of symptoms is constant and improving.  Location: right posterior thigh. The character of symptoms is pain, redness, swelling and drainage.  The degree of symptoms is minimal.  Risk factors consist of none.  Prior episodes: none.  Therapy today: clindamycin.  Associated symptoms: none, denies fever and denies chills. She was seen 3 days ago at urgent care and placed on clindamycin without I&D. She is in drug court and requests nonnarcotic pain medication.        Review of patient's allergies indicates:   Allergen Reactions    Ceftriaxone Nausea And Vomiting    Azithromycin Nausea And Vomiting     Hard to breathe     Latex, natural rubber Swelling and Rash     Past Medical History:   Diagnosis Date    ADD (attention deficit disorder)     Anxiety disorder     Depression     Headache     History of ovarian cyst     MDD (major depressive disorder), recurrent episode, moderate 7/29/2021    Obesity     Substance abuse     Hospitalization     Trauma     not at this time. 9/11/19    Tympanic membrane perforation, left 3/14/2018    UTI (urinary tract infection) 8/3/2018    Vertigo      Past Surgical History:   Procedure Laterality Date    CYSTOSCOPY W/ URETERAL STENT PLACEMENT Right 12/26/2019    Procedure: CYSTOSCOPY, WITH URETERAL STENT INSERTION;  Surgeon: Rito Medley MD;  Location: [facility] OR;  Service: Urology;  Laterality: Right;    CYSTOSCOPY W/ URETERAL STENT REMOVAL Right 1/6/2020    Procedure: CYSTOSCOPY, WITH URETERAL STENT REMOVAL;  Surgeon: Rito Medley MD;  Location: [facility] OR;  Service: Urology;  Laterality: Right;    LASER LITHOTRIPSY Right 1/6/2020    Procedure: LITHOTRIPSY, USING LASER;  Surgeon: Rito Medley MD;  Location: [facility] OR;   Service: Urology;  Laterality: Right;    TONSILLECTOMY, ADENOIDECTOMY, BILATERAL MYRINGOTOMY AND TUBES      URETEROSCOPIC REMOVAL OF URETERIC CALCULUS Right 1/6/2020    Procedure: REMOVAL, CALCULUS, URETER, URETEROSCOPIC;  Surgeon: Rito Medley MD;  Location: [facility];  Service: Urology;  Laterality: Right;    URETEROSCOPY Left 12/26/2019    Procedure: URETEROSCOPY;  Surgeon: Rito Medley MD;  Location: [facility] OR;  Service: Urology;  Laterality: Left;    URETEROSCOPY Right 1/6/2020    Procedure: URETEROSCOPY;  Surgeon: Rito Medley MD;  Location: [facility] OR;  Service: Urology;  Laterality: Right;     Family History   Problem Relation Age of Onset    Breast cancer Maternal Aunt     Obesity Mother     No Known Problems Father     No Known Problems Brother     Colon cancer Neg Hx     Miscarriages / Stillbirths Neg Hx     Ovarian cancer Neg Hx      Social History     Tobacco Use    Smoking status: Every Day     Current packs/day: 0.25     Average packs/day: 0.3 packs/day for 14.2 years (3.5 ttl pk-yrs)     Types: Cigarettes     Start date: 2010    Smokeless tobacco: Never   Substance Use Topics    Alcohol use: Not Currently     Alcohol/week: 0.0 standard drinks of alcohol    Drug use: Not Currently     Types: Marijuana, Fentanyl     Comment: history of opoid abuse     Review of Systems   Constitutional:  Negative for fever.   HENT:  Negative for sore throat.    Respiratory:  Negative for shortness of breath.    Cardiovascular:  Negative for chest pain.   Gastrointestinal:  Negative for nausea.   Genitourinary:  Negative for dysuria.   Musculoskeletal:  Negative for back pain.   Skin:  Positive for wound. Negative for rash.   Neurological:  Negative for weakness.   Hematological:  Does not bruise/bleed easily.   All other systems reviewed and are negative.      Physical Exam     Initial Vitals [03/01/24 0722]   BP Pulse Resp Temp SpO2   (!) 137/91 100 16 97.9 °F (36.6 °C) 98 %      MAP       --          Physical Exam    Nursing note and vitals reviewed.  Constitutional: She appears well-developed and well-nourished.   HENT:   Head: Normocephalic and atraumatic.   Neck: Neck supple.   Normal range of motion.  Cardiovascular:  Normal rate, regular rhythm, normal heart sounds and intact distal pulses.           Pulmonary/Chest: Effort normal and breath sounds normal.   Abdominal: Abdomen is soft and flat. Bowel sounds are normal. There is no abdominal tenderness.   Musculoskeletal:         General: Normal range of motion.      Cervical back: Normal range of motion and neck supple.     Neurological: She is alert. She has normal strength.   Skin: Skin is warm and dry.   2cm in diameter abscess to right posterior thigh with mild surrounding erythema, fluctuance without drainage   Psychiatric: She has a normal mood and affect.         ED Course   I & D - Incision and Drainage    Date/Time: 3/1/2024 7:48 AM  Location procedure was performed: [facility] EMERGENCY DEPARTMENT    Performed by: Constantino Porter ACNP  Authorized by: Tereso Sebastian MD  Type: abscess  Body area: lower extremity  Location details: right leg  Anesthesia: local infiltration    Anesthesia:  Local Anesthetic: lidocaine 1% without epinephrine  Scalpel size: 11  Incision type: single straight  Incision depth: dermal  Complexity: simple  Drainage: purulent  Drainage amount: moderate  Wound treatment: wound packed  Packing material: 1/4 in iodoform gauze  Complications: No    Incision depth: dermal        Labs Reviewed   POCT URINE PREGNANCY          Imaging Results    None          Medications   LIDOcaine (PF) 10 mg/ml (1%) injection 100 mg (has no administration in time range)   ibuprofen tablet 800 mg (has no administration in time range)     Medical Decision Making  The patient presents with abscess.  The onset was 1 week ago.  The course/duration of symptoms is constant and improving.  Location: right posterior thigh. The character of symptoms is pain,  redness, swelling and drainage.  The degree of symptoms is minimal.  Risk factors consist of none.  Prior episodes: none.  Therapy today: clindamycin.  Associated symptoms: none, denies fever and denies chills. She was seen 3 days ago at urgent care and placed on clindamycin without I&D. She is in drug court and requests nonnarcotic pain medication.    She will continue clindamycin and take otc pain medication if needed. She will return to ER in 2 days for packing removal and f/u with pcp as needed.    Amount and/or Complexity of Data Reviewed  Labs: ordered.    Risk  Prescription drug management.      Additional MDM:   Differential Diagnosis:   Cellulitis, abscess, mass, cyst among others                                     Clinical Impression:  Final diagnoses:  [Z76.89] Encounter for incision and drainage procedure (Primary)  [L02.91] Abscess          ED Disposition Condition    Discharge Stable          ED Prescriptions    None       Follow-up Information       Follow up With Specialties Details Why Contact Info    Nikki Marks, DO Family Medicine  As needed [address] [address]  [phone]      [facility] Emergency Dept Emergency Medicine In 2 days If symptoms worsen, For wound re-check, packing removal [address] [facility] [id number] [phone]             Constantino Porter, ACNP  03/01/24 0751

## 2024-03-03 ENCOUNTER — HOSPITAL ENCOUNTER (EMERGENCY)
Facility: HOSPITAL | Age: 29
Discharge: HOME | End: 2024-03-03
Attending: INTERNAL MEDICINE
Payer: MEDICARE

## 2024-03-03 VITALS
WEIGHT: 293 LBS | BODY MASS INDEX: 53.92 KG/M2 | TEMPERATURE: 98 F | SYSTOLIC BLOOD PRESSURE: 131 MMHG | RESPIRATION RATE: 17 BRPM | DIASTOLIC BLOOD PRESSURE: 82 MMHG | HEART RATE: 90 BPM | OXYGEN SATURATION: 100 % | HEIGHT: 62 IN

## 2024-03-03 DIAGNOSIS — Z48.00 ENCOUNTER FOR ABSCESS PACKING REMOVAL: Primary | ICD-10-CM

## 2024-03-03 DIAGNOSIS — L02.415 ABSCESS OF RIGHT THIGH: ICD-10-CM

## 2024-03-03 PROCEDURE — 99282 EMERGENCY DEPT VISIT SF MDM: CPT | Mod: 25

## 2024-03-03 PROCEDURE — 10060 I&D ABSCESS SIMPLE/SINGLE: CPT

## 2024-03-03 RX ORDER — IBUPROFEN 800 MG/1
800 TABLET ORAL EVERY 6 HOURS PRN
Qty: 20 TABLET | Refills: 0 | Status: SHIPPED | OUTPATIENT
Start: 2024-03-03 | End: 2024-05-07

## 2024-03-03 NOTE — ED PROVIDER NOTES
Encounter Date: 3/3/2024       History     Chief Complaint   Patient presents with    Abscess     I&D done on Friday; here for packing removal to right thigh     28-year-old female with past medical history significant for obesity, smoking and opioid abuse presents to ED for wound check.  Patient was seen in ED 2 days ago for abscess right posterior thigh and had I&D performed and packing placed.  Patient reports compliance with clindamycin and antibiotic ointment.  Patient reports wound continues to drain, but states area of erythema has decreased in size and pain has improved.  Patient endorses some mild nausea, but denies any vomiting.  Patient has a picture of the original wound which I have compared to today's presentation and it does appear that wound is improving.  Patient denies diarrhea, abdominal pain, body aches, generalized weakness, fatigue, appetite changes, fever, chills.  Vital signs stable on arrival, patient in no acute distress.      Review of patient's allergies indicates:   Allergen Reactions    Ceftriaxone Nausea And Vomiting    Azithromycin Nausea And Vomiting     Hard to breathe     Latex, natural rubber Swelling and Rash     Past Medical History:   Diagnosis Date    ADD (attention deficit disorder)     Anxiety disorder     Depression     Headache     History of ovarian cyst     MDD (major depressive disorder), recurrent episode, moderate 7/29/2021    Obesity     Substance abuse     Hospitalization     Trauma     not at this time. 9/11/19    Tympanic membrane perforation, left 3/14/2018    UTI (urinary tract infection) 8/3/2018    Vertigo      Past Surgical History:   Procedure Laterality Date    CYSTOSCOPY W/ URETERAL STENT PLACEMENT Right 12/26/2019    Procedure: CYSTOSCOPY, WITH URETERAL STENT INSERTION;  Surgeon: Rito Medley MD;  Location: [facility];  Service: Urology;  Laterality: Right;    CYSTOSCOPY W/ URETERAL STENT REMOVAL Right 1/6/2020    Procedure: CYSTOSCOPY, WITH URETERAL  STENT REMOVAL;  Surgeon: Rito Medley MD;  Location: [facility] OR;  Service: Urology;  Laterality: Right;    LASER LITHOTRIPSY Right 1/6/2020    Procedure: LITHOTRIPSY, USING LASER;  Surgeon: Rito Medley MD;  Location: [facility] OR;  Service: Urology;  Laterality: Right;    TONSILLECTOMY, ADENOIDECTOMY, BILATERAL MYRINGOTOMY AND TUBES      URETEROSCOPIC REMOVAL OF URETERIC CALCULUS Right 1/6/2020    Procedure: REMOVAL, CALCULUS, URETER, URETEROSCOPIC;  Surgeon: Rito Medley MD;  Location: [facility] OR;  Service: Urology;  Laterality: Right;    URETEROSCOPY Left 12/26/2019    Procedure: URETEROSCOPY;  Surgeon: Rito Medley MD;  Location: [facility] OR;  Service: Urology;  Laterality: Left;    URETEROSCOPY Right 1/6/2020    Procedure: URETEROSCOPY;  Surgeon: Rito Medley MD;  Location: [facility] OR;  Service: Urology;  Laterality: Right;     Family History   Problem Relation Age of Onset    Breast cancer Maternal Aunt     Obesity Mother     No Known Problems Father     No Known Problems Brother     Colon cancer Neg Hx     Miscarriages / Stillbirths Neg Hx     Ovarian cancer Neg Hx      Social History     Tobacco Use    Smoking status: Every Day     Current packs/day: 0.25     Average packs/day: 0.3 packs/day for 14.2 years (3.5 ttl pk-yrs)     Types: Cigarettes     Start date: 2010    Smokeless tobacco: Never   Substance Use Topics    Alcohol use: Not Currently     Alcohol/week: 0.0 standard drinks of alcohol    Drug use: Not Currently     Types: Marijuana, Fentanyl     Comment: history of opoid abuse     Review of Systems   All other systems reviewed and are negative.      Physical Exam     Initial Vitals [03/03/24 0946]   BP Pulse Resp Temp SpO2   131/82 90 17 98 °F (36.7 °C) 100 %      MAP       --         Physical Exam    Nursing note and vitals reviewed.  Constitutional: She appears well-developed and well-nourished. No distress.   HENT:   Head: Normocephalic and atraumatic.   Mouth/Throat: Oropharynx  is clear and moist.   Eyes: Conjunctivae and EOM are normal. Pupils are equal, round, and reactive to light.   Neck: Neck supple.   Normal range of motion.  Cardiovascular:  Normal rate, regular rhythm, normal heart sounds and intact distal pulses.     Exam reveals no gallop and no friction rub.       No murmur heard.  Pulmonary/Chest: Breath sounds normal. No respiratory distress. She has no wheezes. She has no rhonchi. She has no rales.   Abdominal: Abdomen is soft. Bowel sounds are normal. She exhibits no distension and no mass. There is no abdominal tenderness. There is no rebound and no guarding.   Musculoskeletal:         General: No tenderness or edema. Normal range of motion.      Cervical back: Normal range of motion and neck supple.     Neurological: She is alert and oriented to person, place, and time. She has normal strength.   Skin: Skin is warm and dry. Capillary refill takes less than 2 seconds. Abscess noted. No rash noted.        Psychiatric: She has a normal mood and affect. Thought content normal.         ED Course   Procedures  Labs Reviewed - No data to display       Imaging Results    None          Medications - No data to display  Medical Decision Making  Differential diagnosis: Includes but not limited to abscess, cellulitis, sepsis     ED management:  Proximal skin cleaned with Betadine and then packing removed.  Island dressing placed.      ED course:  Patient is nontoxic appearing with normal vitals and aside from mild nausea denies all systemic symptoms.  Based off what patient tells me and picture patient has on her phone abscess appears to have shown interval improvement in the past 2 days.  Patient reports compliance with antibiotics.  Patient is stable for discharge.  I instructed patient to continue taking clindamycin and applying antibiotic ointment.  Home hygiene instructions given.  I have provided patient with island dressings for home.  Strict ED precautions given for new or  worsening symptoms and patient verbalized understanding.  Instructed patient to contact PCP tomorrow in order to schedule close follow-up.  I will send patient home with 800 mg ibuprofen for symptomatic pain relief.    Amount and/or Complexity of Data Reviewed  External Data Reviewed: notes.     Details: Reviewed notes from ED visit 2 days ago when I&D was performed.                                      Clinical Impression:  Final diagnoses:  [L02.415] Abscess of right thigh  [Z48.00] Encounter for abscess packing removal (Primary)          ED Disposition Condition    Discharge Good          ED Prescriptions       Medication Sig Dispense Start Date End Date Auth. Provider    ibuprofen (ADVIL,MOTRIN) 800 MG tablet Take 1 tablet (800 mg total) by mouth every 6 (six) hours as needed for Pain. 20 tablet 3/3/2024 -- Bhavesh Urena PA          Follow-up Information       Follow up With Specialties Details Why Contact Info    Nikki Marks, DO Family Medicine Call in 1 day  [address] [address]  [phone]      [facility] Emergency Medicine  As needed, If symptoms worsen [address] [facility] [address] [phone]             Bhavesh Urena PA  03/03/24 3178

## 2024-03-03 NOTE — DISCHARGE INSTRUCTIONS
Report to Emergency Department if symptoms return or worsen; [facility] Medicine Clinic Within 1 to 2 days, It is important that you follow up with your primary care provider or specialist if indicated for further evaluation, workup, and treatment as necessary. The exam and treatment you received in Emergency Department was for an urgent problem and NOT INTENDED AS COMPLETE CARE. It is important that you FOLLOW UP with a doctor for ongoing care. If your symptoms become WORSE or you DO NOT IMPROVE and you are unable to reach your health care provider, you should RETURN to the Emergency Department. The Emergency Department provider has provided a PRELIMINARY INTERPRETATION of all your studies. A final interpretation may be done after you are discharged. If a change in your diagnosis or treatment is needed WE WILL CONTACT YOU. It is critical that we have a CURRENT PHONE NUMBER FOR YOU.

## 2024-03-04 ENCOUNTER — OFFICE VISIT (OUTPATIENT)
Dept: FAMILY MEDICINE | Facility: CLINIC | Age: 29
End: 2024-03-04
Payer: MEDICARE

## 2024-03-04 VITALS
HEART RATE: 79 BPM | BODY MASS INDEX: 58.69 KG/M2 | RESPIRATION RATE: 20 BRPM | OXYGEN SATURATION: 97 % | DIASTOLIC BLOOD PRESSURE: 79 MMHG | WEIGHT: 293 LBS | TEMPERATURE: 98 F | SYSTOLIC BLOOD PRESSURE: 124 MMHG

## 2024-03-04 DIAGNOSIS — L29.9 ITCHY SCALP: Primary | ICD-10-CM

## 2024-03-04 DIAGNOSIS — K59.03 DRUG-INDUCED CONSTIPATION: ICD-10-CM

## 2024-03-04 DIAGNOSIS — E66.01 MORBID OBESITY: ICD-10-CM

## 2024-03-04 PROCEDURE — 99214 OFFICE O/P EST MOD 30 MIN: CPT | Mod: S$PBB,,, | Performed by: NURSE PRACTITIONER

## 2024-03-04 PROCEDURE — 1160F RVW MEDS BY RX/DR IN RCRD: CPT | Mod: CPTII,,, | Performed by: NURSE PRACTITIONER

## 2024-03-04 PROCEDURE — 1159F MED LIST DOCD IN RCRD: CPT | Mod: CPTII,,, | Performed by: NURSE PRACTITIONER

## 2024-03-04 PROCEDURE — 3078F DIAST BP <80 MM HG: CPT | Mod: CPTII,,, | Performed by: NURSE PRACTITIONER

## 2024-03-04 PROCEDURE — 99215 OFFICE O/P EST HI 40 MIN: CPT | Mod: PBBFAC,PN | Performed by: NURSE PRACTITIONER

## 2024-03-04 PROCEDURE — 3008F BODY MASS INDEX DOCD: CPT | Mod: CPTII,,, | Performed by: NURSE PRACTITIONER

## 2024-03-04 PROCEDURE — 3074F SYST BP LT 130 MM HG: CPT | Mod: CPTII,,, | Performed by: NURSE PRACTITIONER

## 2024-03-04 RX ORDER — POLYETHYLENE GLYCOL 3350 17 G/17G
17 POWDER, FOR SOLUTION ORAL DAILY
Qty: 510 G | Refills: 11 | Status: SHIPPED | OUTPATIENT
Start: 2024-03-04

## 2024-03-04 RX ORDER — VENLAFAXINE HYDROCHLORIDE 150 MG/1
150 CAPSULE, EXTENDED RELEASE ORAL
COMMUNITY
Start: 2024-02-21

## 2024-03-04 NOTE — ASSESSMENT & PLAN NOTE
2/2 Ozempic use.    Advised Magnesium citrate OTC  Miralax rx sent to pharmacy to take daily once colon cleaned out  Take miralax and stool softener daily.  Educated on high fiber diet and exercise.  Drink at least 64 ozs of water daily.  Eat hot breakfast in morning to help have BM.

## 2024-03-04 NOTE — ASSESSMENT & PLAN NOTE
BMI Body mass index is 58.69 kg/m².   Goal BMI <30.  Exercise 5 times a week for 30 minutes per day.  Avoid soda, simple sugars, excessive rice, potatoes or bread. Limit fast foods and fried foods.  Choose complex carbs in moderation (example: green vegetables, beans, oatmeal). Eat plenty of fresh fruits and vegetables with lean meats daily.  Do not skip meals. Eat a balanced portion size.  Avoid fad diets. Consider permanent healthy life style changes.
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
## 2024-03-04 NOTE — ASSESSMENT & PLAN NOTE
Referral to Dermatology, pt instructed to call her insurance to find provider in network and let us know  Also referred to [facility].  Not sure if that provider is in her network for dermatology.   Advised tea tree shampoo, coal-tar shampoo, selsum blue.

## 2024-03-07 ENCOUNTER — PATIENT MESSAGE (OUTPATIENT)
Dept: FAMILY MEDICINE | Facility: CLINIC | Age: 29
End: 2024-03-07
Payer: MEDICARE

## 2024-03-07 DIAGNOSIS — L29.9 ITCHY SCALP: Primary | ICD-10-CM

## 2024-05-07 ENCOUNTER — OFFICE VISIT (OUTPATIENT)
Dept: FAMILY MEDICINE | Facility: CLINIC | Age: 29
End: 2024-05-07
Payer: MEDICARE

## 2024-05-07 VITALS
DIASTOLIC BLOOD PRESSURE: 78 MMHG | TEMPERATURE: 98 F | SYSTOLIC BLOOD PRESSURE: 134 MMHG | BODY MASS INDEX: 53.92 KG/M2 | HEART RATE: 70 BPM | RESPIRATION RATE: 18 BRPM | OXYGEN SATURATION: 98 % | HEIGHT: 62 IN | WEIGHT: 293 LBS

## 2024-05-07 DIAGNOSIS — L29.9 ITCHY SCALP: ICD-10-CM

## 2024-05-07 DIAGNOSIS — G43.E09 CHRONIC MIGRAINE WITH AURA WITHOUT STATUS MIGRAINOSUS, NOT INTRACTABLE: Primary | ICD-10-CM

## 2024-05-07 PROCEDURE — 1160F RVW MEDS BY RX/DR IN RCRD: CPT | Mod: CPTII,,, | Performed by: NURSE PRACTITIONER

## 2024-05-07 PROCEDURE — 3075F SYST BP GE 130 - 139MM HG: CPT | Mod: CPTII,,, | Performed by: NURSE PRACTITIONER

## 2024-05-07 PROCEDURE — 3008F BODY MASS INDEX DOCD: CPT | Mod: CPTII,,, | Performed by: NURSE PRACTITIONER

## 2024-05-07 PROCEDURE — 1159F MED LIST DOCD IN RCRD: CPT | Mod: CPTII,,, | Performed by: NURSE PRACTITIONER

## 2024-05-07 PROCEDURE — 99214 OFFICE O/P EST MOD 30 MIN: CPT | Mod: PBBFAC,PN | Performed by: NURSE PRACTITIONER

## 2024-05-07 PROCEDURE — 3078F DIAST BP <80 MM HG: CPT | Mod: CPTII,,, | Performed by: NURSE PRACTITIONER

## 2024-05-07 PROCEDURE — 99213 OFFICE O/P EST LOW 20 MIN: CPT | Mod: S$PBB,,, | Performed by: NURSE PRACTITIONER

## 2024-05-07 RX ORDER — FLUTICASONE PROPIONATE 50 MCG
1 SPRAY, SUSPENSION (ML) NASAL DAILY
Qty: 18.2 ML | Refills: 11 | Status: SHIPPED | OUTPATIENT
Start: 2024-05-07

## 2024-05-07 RX ORDER — LORATADINE 10 MG/1
10 TABLET ORAL DAILY
Qty: 30 TABLET | Refills: 3 | Status: SHIPPED | OUTPATIENT
Start: 2024-05-07
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
## 2024-06-07 ENCOUNTER — HOSPITAL ENCOUNTER (EMERGENCY)
Facility: HOSPITAL | Age: 29
Discharge: HOME | End: 2024-06-07
Attending: EMERGENCY MEDICINE
Payer: MEDICARE

## 2024-06-07 VITALS
RESPIRATION RATE: 18 BRPM | HEART RATE: 101 BPM | WEIGHT: 293 LBS | SYSTOLIC BLOOD PRESSURE: 159 MMHG | TEMPERATURE: 98 F | OXYGEN SATURATION: 100 % | BODY MASS INDEX: 62.1 KG/M2 | DIASTOLIC BLOOD PRESSURE: 101 MMHG

## 2024-06-07 DIAGNOSIS — K02.9 PAIN DUE TO DENTAL CARIES: Primary | ICD-10-CM

## 2024-06-07 DIAGNOSIS — K04.7 DENTAL INFECTION: ICD-10-CM

## 2024-06-07 LAB
B-HCG UR QL: NEGATIVE
CTP QC/QA: YES

## 2024-06-07 PROCEDURE — 63600175 PHARM REV CODE 636 W HCPCS: Performed by: PHYSICIAN ASSISTANT

## 2024-06-07 PROCEDURE — 81025 URINE PREGNANCY TEST: CPT | Performed by: PHYSICIAN ASSISTANT

## 2024-06-07 PROCEDURE — 96372 THER/PROPH/DIAG INJ SC/IM: CPT | Performed by: PHYSICIAN ASSISTANT

## 2024-06-07 PROCEDURE — 99284 EMERGENCY DEPT VISIT MOD MDM: CPT | Mod: 25

## 2024-06-07 RX ORDER — DICLOFENAC SODIUM 50 MG/1
50 TABLET, DELAYED RELEASE ORAL 2 TIMES DAILY PRN
Qty: 20 TABLET | Refills: 0 | Status: SHIPPED | OUTPATIENT
Start: 2024-06-07

## 2024-06-07 RX ORDER — AMOXICILLIN AND CLAVULANATE POTASSIUM 875; 125 MG/1; MG/1
1 TABLET, FILM COATED ORAL 2 TIMES DAILY
Qty: 20 TABLET | Refills: 0 | Status: SHIPPED | OUTPATIENT
Start: 2024-06-07 | End: 2024-06-17

## 2024-06-07 RX ORDER — CHLORHEXIDINE GLUCONATE ORAL RINSE 1.2 MG/ML
15 SOLUTION DENTAL 2 TIMES DAILY
Qty: 300 ML | Refills: 0 | Status: SHIPPED | OUTPATIENT
Start: 2024-06-07 | End: 2024-06-17

## 2024-06-07 RX ORDER — KETOROLAC TROMETHAMINE 30 MG/ML
15 INJECTION, SOLUTION INTRAMUSCULAR; INTRAVENOUS
Status: COMPLETED | OUTPATIENT
Start: 2024-06-07 | End: 2024-06-07

## 2024-06-07 RX ADMIN — KETOROLAC TROMETHAMINE 15 MG: 30 INJECTION, SOLUTION INTRAMUSCULAR; INTRAVENOUS at 06:06

## 2024-06-07 NOTE — ED PROVIDER NOTES
Encounter Date: 6/7/2024       History     Chief Complaint   Patient presents with    Dental Pain     Co recurrent dental/ear pain x 1 month.      Alexandra Martin is a 28 y.o. female with a history of anxiety, depression, vertigo who presents to the ED complaining of left upper dental pain x 3 days. Reports she was diagnosed with an abscess over a month ago. Took antibiotics with improvement, however thinks she may have another infection. She was supposed to have tooth pulled, but could not afford the procedure. She reports subjective fever. Denies facial swelling, drainage, sore throat.     The history is provided by the patient.     Review of patient's allergies indicates:   Allergen Reactions    Ceftriaxone Nausea And Vomiting    Azithromycin Nausea And Vomiting     Hard to breathe     Latex, natural rubber Swelling and Rash     Past Medical History:   Diagnosis Date    ADD (attention deficit disorder)     Anxiety disorder     Depression     Headache     History of ovarian cyst     MDD (major depressive disorder), recurrent episode, moderate 7/29/2021    Obesity     Substance abuse     Hospitalization     Trauma     not at this time. 9/11/19    Tympanic membrane perforation, left 3/14/2018    UTI (urinary tract infection) 8/3/2018    Vertigo      Past Surgical History:   Procedure Laterality Date    CYSTOSCOPY W/ URETERAL STENT PLACEMENT Right 12/26/2019    Procedure: CYSTOSCOPY, WITH URETERAL STENT INSERTION;  Surgeon: Rito Medley MD;  Location: [facility] OR;  Service: Urology;  Laterality: Right;    CYSTOSCOPY W/ URETERAL STENT REMOVAL Right 1/6/2020    Procedure: CYSTOSCOPY, WITH URETERAL STENT REMOVAL;  Surgeon: Rito Medley MD;  Location: [facility] OR;  Service: Urology;  Laterality: Right;    LASER LITHOTRIPSY Right 1/6/2020    Procedure: LITHOTRIPSY, USING LASER;  Surgeon: Rito Medley MD;  Location: [facility] OR;  Service: Urology;  Laterality: Right;    TONSILLECTOMY, ADENOIDECTOMY,  BILATERAL MYRINGOTOMY AND TUBES      URETEROSCOPIC REMOVAL OF URETERIC CALCULUS Right 1/6/2020    Procedure: REMOVAL, CALCULUS, URETER, URETEROSCOPIC;  Surgeon: Rito Medley MD;  Location: [facility] OR;  Service: Urology;  Laterality: Right;    URETEROSCOPY Left 12/26/2019    Procedure: URETEROSCOPY;  Surgeon: Rito Medley MD;  Location: [facility] OR;  Service: Urology;  Laterality: Left;    URETEROSCOPY Right 1/6/2020    Procedure: URETEROSCOPY;  Surgeon: Rito Medley MD;  Location: [facility] OR;  Service: Urology;  Laterality: Right;     Family History   Problem Relation Name Age of Onset    Breast cancer Maternal Aunt      Obesity Mother      No Known Problems Father      No Known Problems Brother      Colon cancer Neg Hx      Miscarriages / Stillbirths Neg Hx      Ovarian cancer Neg Hx       Social History     Tobacco Use    Smoking status: Every Day     Current packs/day: 0.25     Average packs/day: 0.3 packs/day for 14.4 years (3.6 ttl pk-yrs)     Types: Cigarettes     Start date: 2010    Smokeless tobacco: Never   Substance Use Topics    Alcohol use: Not Currently     Alcohol/week: 0.0 standard drinks of alcohol    Drug use: Not Currently     Types: Marijuana, Fentanyl     Comment: history of opoid abuse     Review of Systems   Constitutional:  Negative for fever.   HENT:  Positive for dental problem. Negative for sore throat.    Respiratory:  Negative for shortness of breath.    Cardiovascular:  Negative for chest pain.   Gastrointestinal:  Negative for nausea.   Genitourinary:  Negative for dysuria.   Musculoskeletal:  Negative for back pain.   Skin:  Negative for rash.   Neurological:  Negative for weakness.   Hematological:  Does not bruise/bleed easily.       Physical Exam     Initial Vitals [06/07/24 1722]   BP Pulse Resp Temp SpO2   (!) 159/101 101 18 97.9 °F (36.6 °C) 100 %      MAP       --         Physical Exam    Nursing note and vitals reviewed.  Constitutional: She appears well-developed  and well-nourished. No distress.   HENT:   Head: Normocephalic and atraumatic.   Mouth/Throat: Dental abscesses and dental caries present. No oropharyngeal exudate.   Eyes: EOM are normal. No scleral icterus.   Neck: Neck supple.   Normal range of motion.  Cardiovascular:  Normal rate and regular rhythm.           No murmur heard.  Pulmonary/Chest: Breath sounds normal. No respiratory distress. She has no wheezes.   Abdominal: Abdomen is soft. Bowel sounds are normal. She exhibits no distension. There is no abdominal tenderness.   Musculoskeletal:         General: No tenderness. Normal range of motion.      Cervical back: Normal range of motion and neck supple.     Neurological: She is alert and oriented to person, place, and time. No cranial nerve deficit.   Skin: Skin is warm and dry. Capillary refill takes less than 2 seconds. No erythema.   Psychiatric: She has a normal mood and affect. Her behavior is normal. Judgment and thought content normal.         ED Course   Procedures  Labs Reviewed   POCT URINE PREGNANCY          Imaging Results    None          Medications   ketorolac injection 15 mg (15 mg Intramuscular Given 6/7/24 1826)     Medical Decision Making  Pt has dental abscess. Stable for discharge with augmentin, peridex rinse, and dclofenac prn pain. Instructed to follow up with dentist ASAP. ED return precautions given. She verbalized understanding. All questions answered.     Amount and/or Complexity of Data Reviewed  Labs: ordered.    Risk  Prescription drug management.                                      Clinical Impression:  Final diagnoses:  [K02.9] Pain due to dental caries (Primary)  [K04.7] Dental infection          ED Disposition Condition    Discharge Stable          ED Prescriptions       Medication Sig Dispense Start Date End Date Auth. Provider    amoxicillin-clavulanate 875-125mg (AUGMENTIN) 875-125 mg per tablet Take 1 tablet by mouth 2 (two) times daily. for 10 days 20 tablet 6/7/2024  6/17/2024 Yelena Nick PA-C    chlorhexidine (PERIDEX) 0.12 % solution Use as directed 15 mLs in the mouth or throat 2 (two) times daily. for 10 days 300 mL 6/7/2024 6/17/2024 Yelena Nick PA-C    diclofenac (VOLTAREN) 50 MG EC tablet Take 1 tablet (50 mg total) by mouth 2 (two) times daily as needed (pain). 20 tablet 6/7/2024 -- Yelena Nick PA-C          Follow-up Information       Follow up With Specialties Details Why Contact Info    Chanelle Rain, FNP Family Medicine In 3 days Hospital follow up [address] [place] [id number] [phone]      dentist  Schedule an appointment as soon as possible for a visit                Yelena Nick PA-C  06/07/24 1054

## 2024-06-10 ENCOUNTER — OFFICE VISIT (OUTPATIENT)
Dept: URGENT CARE | Facility: CLINIC | Age: 29
End: 2024-06-10
Payer: MEDICARE

## 2024-06-10 VITALS
SYSTOLIC BLOOD PRESSURE: 144 MMHG | TEMPERATURE: 99 F | WEIGHT: 293 LBS | OXYGEN SATURATION: 100 % | BODY MASS INDEX: 53.92 KG/M2 | HEART RATE: 108 BPM | RESPIRATION RATE: 20 BRPM | DIASTOLIC BLOOD PRESSURE: 85 MMHG | HEIGHT: 62 IN

## 2024-06-10 DIAGNOSIS — J20.8 ACUTE BACTERIAL BRONCHITIS: ICD-10-CM

## 2024-06-10 DIAGNOSIS — R50.9 FEVER, UNSPECIFIED FEVER CAUSE: Primary | ICD-10-CM

## 2024-06-10 DIAGNOSIS — B96.89 ACUTE BACTERIAL BRONCHITIS: ICD-10-CM

## 2024-06-10 LAB
CTP QC/QA: YES
MOLECULAR STREP A: NEGATIVE

## 2024-06-10 PROCEDURE — 99213 OFFICE O/P EST LOW 20 MIN: CPT | Mod: ,,, | Performed by: FAMILY MEDICINE

## 2024-06-10 PROCEDURE — 87651 STREP A DNA AMP PROBE: CPT | Mod: QW,,, | Performed by: FAMILY MEDICINE

## 2024-06-10 RX ORDER — BETAMETHASONE SODIUM PHOSPHATE AND BETAMETHASONE ACETATE 3; 3 MG/ML; MG/ML
12 INJECTION, SUSPENSION INTRA-ARTICULAR; INTRALESIONAL; INTRAMUSCULAR; SOFT TISSUE
Status: DISCONTINUED | OUTPATIENT
Start: 2024-06-10 | End: 2024-06-10

## 2024-06-10 RX ORDER — DOXYCYCLINE 100 MG/1
100 CAPSULE ORAL 2 TIMES DAILY
Qty: 14 CAPSULE | Refills: 0 | Status: SHIPPED | OUTPATIENT
Start: 2024-06-10 | End: 2024-06-17

## 2024-06-10 NOTE — PATIENT INSTRUCTIONS
Plan:   Strep negative  Medications sent to pharmacy  Start taking an allergy pill daily such as claritin, zyrtec, allegrea or xyzal. Also start using a nasal steroid spray such as flonase or nasacort daily. If you are not being treated for high blood pressure, you can also take decongestant such as sudafed as needed. They can be purchased over the counter. Monitor for fever. Take tylenol/acetaminophen or ibuprofen as needed. Rest and hydrate. If symptoms persist or worsen, return to clinic or seek medical attention immediately.

## 2024-06-10 NOTE — LETTER
Ayesha 10, 2024      [facility] at [address] [facility] [address]  Phone: [phone]       Patient: Alexandra Martin   YOB: 1995  Date of Visit: 06/10/2024    To Whom It May Concern:    New Martin  was at [facility] on 06/10/2024. The patient may return to work/school on 06/13/2024 with no restrictions. If you have any questions or concerns, or if I can be of further assistance, please do not hesitate to contact me.    Sincerely,    Rachid Sotelo MA

## 2024-06-10 NOTE — PROGRESS NOTES
"Subjective:      Patient ID: Alexandra Martin is a 28 y.o. female.    Vitals:  height is 5' 2" (1.575 m) and weight is 153.8 kg (339 lb) (abnormal). Her temperature is 98.6 °F (37 °C). Her blood pressure is 144/85 (abnormal) and her pulse is 108. Her respiration is 20 and oxygen saturation is 100%.     Chief Complaint: Cough     Patient is a 28 y.o. female who presents to urgent care with complaints of productive cough with green colored phlegm, accompanied with sore throat  and bilateral ear congestion x 2-3 days. Also reports fever. Medicating with ibuprofen for fever--mild amount of relief. Exposure to strep at work, therefore, requesting to be tested. Lastly, details N/V 2 days ago. Patient denies sob.        Constitution: Positive for fever.   HENT: Negative.     Cardiovascular: Negative.    Eyes: Negative.    Respiratory:  Positive for cough.    Gastrointestinal: Negative.    Genitourinary: Negative.    Musculoskeletal: Negative.    Skin: Negative.    Allergic/Immunologic: Negative.    Neurological: Negative.    Hematologic/Lymphatic: Negative.       Objective:     Physical Exam   Constitutional: She is oriented to person, place, and time. She appears well-developed. She is cooperative.  Non-toxic appearance. She does not appear ill. No distress.   HENT:   Head: Normocephalic and atraumatic.   Ears:   Right Ear: Hearing, tympanic membrane and external ear normal.   Left Ear: Hearing, tympanic membrane and external ear normal.   Mouth/Throat: Oropharynx is clear and moist and mucous membranes are normal. No oropharyngeal exudate or posterior oropharyngeal erythema (postnasal drip).   Eyes: Conjunctivae and lids are normal.   Neck: Trachea normal and phonation normal. Neck supple. No edema present. No erythema present. No neck rigidity present.   Cardiovascular: Normal rate.   Pulmonary/Chest: Effort normal and breath sounds normal. No stridor. No respiratory distress. She has no decreased breath " sounds. She has no wheezes. She has no rhonchi. She has no rales.   Abdominal: Normal appearance.   Neurological: She is alert and oriented to person, place, and time. She exhibits normal muscle tone. Coordination normal.   Skin: Skin is warm, dry, intact, not diaphoretic and no rash.   Psychiatric: Her speech is normal and behavior is normal. Mood, judgment and thought content normal.   Nursing note and vitals reviewed.         Previous History      Review of patient's allergies indicates:   Allergen Reactions    Ceftriaxone Nausea And Vomiting    Azithromycin Nausea And Vomiting     Hard to breathe     Latex, natural rubber Swelling and Rash       Past Medical History:   Diagnosis Date    ADD (attention deficit disorder)     Anxiety disorder     Depression     Headache     History of ovarian cyst     MDD (major depressive disorder), recurrent episode, moderate 7/29/2021    Obesity     Substance abuse     Hospitalization     Trauma     not at this time. 9/11/19    Tympanic membrane perforation, left 3/14/2018    UTI (urinary tract infection) 8/3/2018    Vertigo      Current Outpatient Medications   Medication Instructions    amoxicillin-clavulanate 875-125mg (AUGMENTIN) 875-125 mg per tablet 1 tablet, Oral, 2 times daily    chlorhexidine (PERIDEX) 0.12 % solution 15 mLs, Mouth/Throat, 2 times daily    diclofenac (VOLTAREN) 50 mg, Oral, 2 times daily PRN    doxycycline (MONODOX) 100 mg, Oral, 2 times daily    etonogestreL (NEXPLANON) 68 mg, Subdermal, Once, A single NEXPLANON implant is inserted subdermally just under the skin at the inner side of the non-dominant upper arm.    fluticasone propionate (FLONASE) 50 mcg, Each Nostril, Daily    loratadine (CLARITIN) 10 mg, Oral, Daily    mupirocin (BACTROBAN) 2 % ointment Topical (Top), 3 times daily    polyethylene glycol (GLYCOLAX) 17 g, Oral, Daily    propranoloL (INDERAL) 10 mg, Oral, Nightly    rizatriptan (MAXALT) 10 mg, Oral, Daily PRN    venlafaxine (EFFEXOR-XR)  150 mg, Oral     Past Surgical History:   Procedure Laterality Date    CYSTOSCOPY W/ URETERAL STENT PLACEMENT Right 12/26/2019    Procedure: CYSTOSCOPY, WITH URETERAL STENT INSERTION;  Surgeon: Rito Medley MD;  Location: [facility] OR;  Service: Urology;  Laterality: Right;    CYSTOSCOPY W/ URETERAL STENT REMOVAL Right 1/6/2020    Procedure: CYSTOSCOPY, WITH URETERAL STENT REMOVAL;  Surgeon: Rito Medley MD;  Location: [facility] OR;  Service: Urology;  Laterality: Right;    LASER LITHOTRIPSY Right 1/6/2020    Procedure: LITHOTRIPSY, USING LASER;  Surgeon: Rito Medley MD;  Location: [facility] OR;  Service: Urology;  Laterality: Right;    TONSILLECTOMY, ADENOIDECTOMY, BILATERAL MYRINGOTOMY AND TUBES      URETEROSCOPIC REMOVAL OF URETERIC CALCULUS Right 1/6/2020    Procedure: REMOVAL, CALCULUS, URETER, URETEROSCOPIC;  Surgeon: Rito Medley MD;  Location: [facility] OR;  Service: Urology;  Laterality: Right;    URETEROSCOPY Left 12/26/2019    Procedure: URETEROSCOPY;  Surgeon: Rito Medley MD;  Location: [facility] OR;  Service: Urology;  Laterality: Left;    URETEROSCOPY Right 1/6/2020    Procedure: URETEROSCOPY;  Surgeon: Rito Medley MD;  Location: [facility] OR;  Service: Urology;  Laterality: Right;     Family History   Problem Relation Name Age of Onset    Breast cancer Maternal Aunt      Obesity Mother      No Known Problems Father      No Known Problems Brother      Colon cancer Neg Hx      Miscarriages / Stillbirths Neg Hx      Ovarian cancer Neg Hx         Social History     Tobacco Use    Smoking status: Every Day     Current packs/day: 0.25     Average packs/day: 0.3 packs/day for 14.4 years (3.6 ttl pk-yrs)     Types: Cigarettes     Start date: 2010    Smokeless tobacco: Never   Substance Use Topics    Alcohol use: Not Currently     Alcohol/week: 0.0 standard drinks of alcohol    Drug use: Not Currently     Types: Marijuana, Fentanyl     Comment: history of opoid abuse        Physical Exam   "    Vital Signs Reviewed   BP (!) 144/85   Pulse 108   Temp 98.6 °F (37 °C)   Resp 20   Ht 5' 2" (1.575 m)   Wt (!) 153.8 kg (339 lb)   LMP  (LMP Unknown)   SpO2 100%   BMI 62.00 kg/m²        Procedures    Procedures     Labs     Results for orders placed or performed in visit on 06/10/24   POCT Strep A, Molecular   Result Value Ref Range    Molecular Strep A, POC Negative Negative     Acceptable Yes      *Note: Due to a large number of results and/or encounters for the requested time period, some results have not been displayed. A complete set of results can be found in Results Review.       Assessment:     1. Fever, unspecified fever cause    2. Acute bacterial bronchitis        Plan:   Strep negative  Medications sent to pharmacy  Start taking an allergy pill daily such as claritin, zyrtec, allegrea or xyzal. Also start using a nasal steroid spray such as flonase or nasacort daily. If you are not being treated for high blood pressure, you can also take decongestant such as sudafed as needed. They can be purchased over the counter. Monitor for fever. Take tylenol/acetaminophen or ibuprofen as needed. Rest and hydrate. If symptoms persist or worsen, return to clinic or seek medical attention immediately.       Fever, unspecified fever cause  -     POCT Strep A, Molecular    Acute bacterial bronchitis    Other orders  -     betamethasone acetate-betamethasone sodium phosphate injection 12 mg  -     doxycycline (MONODOX) 100 MG capsule; Take 1 capsule (100 mg total) by mouth 2 (two) times daily. for 7 days  Dispense: 14 capsule; Refill: 0                    "

## 2024-07-06 ENCOUNTER — OFFICE VISIT (OUTPATIENT)
Dept: URGENT CARE | Facility: CLINIC | Age: 29
End: 2024-07-06
Payer: MEDICARE

## 2024-07-06 VITALS
DIASTOLIC BLOOD PRESSURE: 76 MMHG | WEIGHT: 293 LBS | BODY MASS INDEX: 53.92 KG/M2 | HEART RATE: 86 BPM | RESPIRATION RATE: 16 BRPM | SYSTOLIC BLOOD PRESSURE: 111 MMHG | OXYGEN SATURATION: 96 % | TEMPERATURE: 98 F | HEIGHT: 62 IN

## 2024-07-06 DIAGNOSIS — R05.1 ACUTE COUGH: ICD-10-CM

## 2024-07-06 DIAGNOSIS — J02.9 SORE THROAT: ICD-10-CM

## 2024-07-06 DIAGNOSIS — J01.40 ACUTE NON-RECURRENT PANSINUSITIS: Primary | ICD-10-CM

## 2024-07-06 LAB
CTP QC/QA: YES
MOLECULAR STREP A: NEGATIVE

## 2024-07-06 PROCEDURE — 99213 OFFICE O/P EST LOW 20 MIN: CPT | Mod: 25,,, | Performed by: FAMILY MEDICINE

## 2024-07-06 PROCEDURE — 96372 THER/PROPH/DIAG INJ SC/IM: CPT | Mod: ,,, | Performed by: FAMILY MEDICINE

## 2024-07-06 PROCEDURE — 87651 STREP A DNA AMP PROBE: CPT | Mod: QW,,, | Performed by: FAMILY MEDICINE

## 2024-07-06 RX ORDER — ALBUTEROL SULFATE 90 UG/1
2 AEROSOL, METERED RESPIRATORY (INHALATION) EVERY 6 HOURS PRN
Qty: 18 G | Refills: 0 | Status: SHIPPED | OUTPATIENT
Start: 2024-07-06 | End: 2025-07-06

## 2024-07-06 RX ORDER — BETAMETHASONE SODIUM PHOSPHATE AND BETAMETHASONE ACETATE 3; 3 MG/ML; MG/ML
12 INJECTION, SUSPENSION INTRA-ARTICULAR; INTRALESIONAL; INTRAMUSCULAR; SOFT TISSUE
Status: COMPLETED | OUTPATIENT
Start: 2024-07-06 | End: 2024-07-06

## 2024-07-06 RX ADMIN — BETAMETHASONE SODIUM PHOSPHATE AND BETAMETHASONE ACETATE 12 MG: 3; 3 INJECTION, SUSPENSION INTRA-ARTICULAR; INTRALESIONAL; INTRAMUSCULAR; SOFT TISSUE at 11:07

## 2024-07-06 NOTE — LETTER
July 6, 2024      [facility] at [address] [facility] [address]  Phone: [phone]       Patient: Alexandra Martin   YOB: 1995  Date of Visit: 07/06/2024    To Whom It May Concern:    New Martin  was at [facility] on 07/06/2024. The patient may return to work/school on 07/08/2024 with no restrictions. If you have any questions or concerns, or if I can be of further assistance, please do not hesitate to contact me.    Sincerely,    Daphne Cr MA

## 2024-07-06 NOTE — PROGRESS NOTES
"Subjective:      Patient ID: Alexandra Martin is a 28 y.o. female.    Vitals:  height is 5' 2" (1.575 m) and weight is 153.8 kg (339 lb) (abnormal). Her temperature is 97.9 °F (36.6 °C). Her blood pressure is 111/76 and her pulse is 86. Her respiration is 16 and oxygen saturation is 96%.     Chief Complaint: Sore Throat     Patient is a 28 y.o. female who presents to urgent care with complaints of sore throat, sinus congestion, ear pain x yesterday, cough x 2 months. Alleviating factors include ibuprofen with no relief. Exposed to strep.       Constitution: Positive for fatigue.   HENT:  Positive for sinus pain, sinus pressure and sore throat.    Respiratory:  Positive for cough.       Objective:     Physical Exam   Constitutional: She is oriented to person, place, and time.  Non-toxic appearance. She appears ill. obesity  HENT:   Mouth/Throat: Oropharyngeal exudate, posterior oropharyngeal edema and cobblestoning present.   Cardiovascular: Normal rate and normal pulses.   Pulmonary/Chest: Effort normal.   Abdominal: flat abdomen   Musculoskeletal: Normal range of motion.         General: Normal range of motion.   Neurological: no focal deficit. She is oriented to person, place, and time.   Skin: Skin is warm and not diaphoretic.       Assessment:     1. Acute non-recurrent pansinusitis    2. Sore throat    3. Acute cough        Plan:       Acute non-recurrent pansinusitis  -     betamethasone acetate-betamethasone sodium phosphate injection 12 mg  Strep negative.   Sore throat  -     POCT Strep A, Molecular  Strep negative.   Acute cough  -     albuterol (PROVENTIL HFA) 90 mcg/actuation inhaler; Inhale 2 puffs into the lungs every 6 (six) hours as needed for Wheezing (cough). Rescue  Dispense: 18 g; Refill: 0  Strep negative.                   "

## 2024-07-08 ENCOUNTER — TELEPHONE (OUTPATIENT)
Dept: BARIATRICS | Facility: CLINIC | Age: 29
End: 2024-07-08
Payer: MEDICARE

## 2024-07-08 NOTE — TELEPHONE ENCOUNTER
----- Message from Haydee Leo MA sent at 7/8/2024  1:32 PM CDT -----  Regarding: FW: appt    ----- Message -----  From: Zhao Eagle  Sent: 7/8/2024   1:16 PM CDT  To: #  Subject: appt                                             Type:  Sooner Apoointment Request      Name of Caller:pt    When is the first available appointment?dept booked     Symptoms:sx      Best Call Back Number:[phone]      Additional Information: pt is looking to get schedule.  please call to discuss.

## 2024-07-22 ENCOUNTER — OFFICE VISIT (OUTPATIENT)
Dept: URGENT CARE | Facility: CLINIC | Age: 29
End: 2024-07-22
Payer: MEDICARE

## 2024-07-22 VITALS
RESPIRATION RATE: 18 BRPM | DIASTOLIC BLOOD PRESSURE: 82 MMHG | WEIGHT: 293 LBS | HEART RATE: 84 BPM | SYSTOLIC BLOOD PRESSURE: 122 MMHG | HEIGHT: 62 IN | TEMPERATURE: 99 F | OXYGEN SATURATION: 100 % | BODY MASS INDEX: 53.92 KG/M2

## 2024-07-22 DIAGNOSIS — J02.9 SORE THROAT: Primary | ICD-10-CM

## 2024-07-22 DIAGNOSIS — R05.9 COUGH, UNSPECIFIED TYPE: ICD-10-CM

## 2024-07-22 LAB
CTP QC/QA: YES
CTP QC/QA: YES
MOLECULAR STREP A: NEGATIVE
SARS-COV-2 AG RESP QL IA.RAPID: NEGATIVE

## 2024-07-22 PROCEDURE — 87811 SARS-COV-2 COVID19 W/OPTIC: CPT | Mod: QW,,, | Performed by: FAMILY MEDICINE

## 2024-07-22 PROCEDURE — 99213 OFFICE O/P EST LOW 20 MIN: CPT | Mod: ,,, | Performed by: FAMILY MEDICINE

## 2024-07-22 PROCEDURE — 87651 STREP A DNA AMP PROBE: CPT | Mod: QW,,, | Performed by: FAMILY MEDICINE

## 2024-07-22 RX ORDER — PROMETHAZINE HYDROCHLORIDE AND DEXTROMETHORPHAN HYDROBROMIDE 6.25; 15 MG/5ML; MG/5ML
5 SYRUP ORAL EVERY 4 HOURS PRN
Qty: 120 ML | Refills: 0 | Status: SHIPPED | OUTPATIENT
Start: 2024-07-22 | End: 2024-08-01

## 2024-07-22 RX ORDER — PREDNISONE 10 MG/1
30 TABLET ORAL DAILY
Qty: 15 TABLET | Refills: 0 | Status: SHIPPED | OUTPATIENT
Start: 2024-07-22 | End: 2024-07-27

## 2024-07-22 RX ORDER — DOXYCYCLINE 100 MG/1
100 CAPSULE ORAL 2 TIMES DAILY
Qty: 14 CAPSULE | Refills: 0 | Status: SHIPPED | OUTPATIENT
Start: 2024-07-22 | End: 2024-07-29

## 2024-07-22 RX ORDER — HYDROXYZINE PAMOATE 25 MG/1
25 CAPSULE ORAL DAILY PRN
COMMUNITY

## 2024-07-22 NOTE — PATIENT INSTRUCTIONS
COVID and strep were negative  Medications sent to pharmacy.   Increase fluids intake to prevent dehydration. You may take Tylenol and ibuprofen as directed if needed. Get plenty of rest.   May use saline nose spray and humidifer at bedtime. Warm saltwater gargles for sore throat. Warm water with honey to help coat the throat. Throat lozenges. Chloraseptic spray for worsening sore throat.   Do not smoke or allow others to smoke around you. Practice good hand hygiene to include frequent hand washing to lessen the likelihood of transmission. Return or seek immediate medical attention for any new or worsening symptoms such as trouble breathing, continued high fever, neck stiffness, rash, or if you do not get better as expected.

## 2024-07-22 NOTE — PROGRESS NOTES
"Subjective:      Patient ID: Alexandra Martin is a 28 y.o. female.    Vitals:  height is 5' 2" (1.575 m) and weight is 158.8 kg (350 lb) (abnormal). Her temperature is 99 °F (37.2 °C). Her blood pressure is 122/82 and her pulse is 84. Her respiration is 18 and oxygen saturation is 100%.     Chief Complaint: Sore Throat    Patient is a 28 y.o. female who presents to urgent care with complaints of sore throat, headache, mild congestion, right ear pain x 2-3 days. Alleviating factors include none. Patient denies fever, chest pain.  She has been using her albuterol inhaler 1 today with mild symptom relief.  Denies shortness of breath, hemoptysis, malaise or fatigue.     Sore Throat   Associated symptoms include coughing and shortness of breath. Pertinent negatives include no congestion, ear pain, stridor or trouble swallowing.     Constitution: Negative for chills, sweating, fatigue and fever.   HENT:  Positive for sore throat. Negative for ear pain, congestion, postnasal drip, sinus pain, sinus pressure and trouble swallowing.    Neck: neck negative.   Cardiovascular: Negative.    Eyes: Negative.    Respiratory:  Positive for cough and shortness of breath. Negative for sputum production, bloody sputum, stridor and wheezing.    Gastrointestinal: Negative.    Endocrine: negative.   Genitourinary: Negative.    Musculoskeletal: Negative.    Skin: Negative.    Allergic/Immunologic: Negative.    Neurological: Negative.  Negative for disorientation and altered mental status.   Hematologic/Lymphatic: Negative.    Psychiatric/Behavioral:  Negative for altered mental status, disorientation and confusion.       Objective:     Physical Exam   Constitutional: She is oriented to person, place, and time. She appears well-developed. She is cooperative.  Non-toxic appearance. She does not appear ill. No distress.   HENT:   Head: Normocephalic and atraumatic.   Ears:   Right Ear: Hearing, tympanic membrane, external ear and ear " canal normal.   Left Ear: Hearing, tympanic membrane, external ear and ear canal normal.   Nose: Nose normal. No mucosal edema, rhinorrhea or nasal deformity. No epistaxis. Right sinus exhibits no maxillary sinus tenderness and no frontal sinus tenderness. Left sinus exhibits no maxillary sinus tenderness and no frontal sinus tenderness.   Mouth/Throat: Uvula is midline, oropharynx is clear and moist and mucous membranes are normal. No trismus in the jaw. Normal dentition. No uvula swelling. No oropharyngeal exudate, posterior oropharyngeal edema or posterior oropharyngeal erythema.   Eyes: Conjunctivae and lids are normal. No scleral icterus.   Neck: Trachea normal and phonation normal. Neck supple. No edema present. No erythema present. No neck rigidity present.   Cardiovascular: Normal rate, regular rhythm, normal heart sounds and normal pulses.   Pulmonary/Chest: Effort normal and breath sounds normal. No respiratory distress. She has no decreased breath sounds. She has no rhonchi.   Abdominal: Normal appearance.   Musculoskeletal: Normal range of motion.         General: No deformity. Normal range of motion.   Neurological: She is alert and oriented to person, place, and time. She exhibits normal muscle tone. Coordination normal.   Skin: Skin is warm, dry, intact, not diaphoretic and not pale.   Psychiatric: Her speech is normal and behavior is normal. Judgment and thought content normal.   Nursing note and vitals reviewed.         Previous History      Review of patient's allergies indicates:   Allergen Reactions    Ceftriaxone Nausea And Vomiting    Azithromycin Nausea And Vomiting     Hard to breathe     Latex, natural rubber Swelling and Rash       Past Medical History:   Diagnosis Date    ADD (attention deficit disorder)     Anxiety disorder     Depression     Headache     History of ovarian cyst     MDD (major depressive disorder), recurrent episode, moderate 7/29/2021    Obesity     Substance abuse      Hospitalization     Trauma     not at this time. 9/11/19    Tympanic membrane perforation, left 3/14/2018    UTI (urinary tract infection) 8/3/2018    Vertigo      Current Outpatient Medications   Medication Instructions    albuterol (PROVENTIL HFA) 90 mcg/actuation inhaler 2 puffs, Inhalation, Every 6 hours PRN, Rescue    doxycycline (VIBRAMYCIN) 100 mg, Oral, 2 times daily    etonogestreL (NEXPLANON) 68 mg, Subdermal, Once, A single NEXPLANON implant is inserted subdermally just under the skin at the inner side of the non-dominant upper arm.    hydrOXYzine pamoate (VISTARIL) 25 mg, Oral, Daily PRN    predniSONE (DELTASONE) 30 mg, Oral, Daily    promethazine-dextromethorphan (PROMETHAZINE-DM) 6.25-15 mg/5 mL Syrp 5 mLs, Oral, Every 4 hours PRN    venlafaxine (EFFEXOR-XR) 150 mg, Oral     Past Surgical History:   Procedure Laterality Date    CYSTOSCOPY W/ URETERAL STENT PLACEMENT Right 12/26/2019    Procedure: CYSTOSCOPY, WITH URETERAL STENT INSERTION;  Surgeon: Rito Medley MD;  Location: [facility] OR;  Service: Urology;  Laterality: Right;    CYSTOSCOPY W/ URETERAL STENT REMOVAL Right 1/6/2020    Procedure: CYSTOSCOPY, WITH URETERAL STENT REMOVAL;  Surgeon: Rito Medley MD;  Location: [facility] OR;  Service: Urology;  Laterality: Right;    LASER LITHOTRIPSY Right 1/6/2020    Procedure: LITHOTRIPSY, USING LASER;  Surgeon: Rito Medley MD;  Location: [facility] OR;  Service: Urology;  Laterality: Right;    TONSILLECTOMY, ADENOIDECTOMY, BILATERAL MYRINGOTOMY AND TUBES      URETEROSCOPIC REMOVAL OF URETERIC CALCULUS Right 1/6/2020    Procedure: REMOVAL, CALCULUS, URETER, URETEROSCOPIC;  Surgeon: Rito Medley MD;  Location: [facility] OR;  Service: Urology;  Laterality: Right;    URETEROSCOPY Left 12/26/2019    Procedure: URETEROSCOPY;  Surgeon: Rito Medley MD;  Location: [facility] OR;  Service: Urology;  Laterality: Left;    URETEROSCOPY Right 1/6/2020    Procedure: URETEROSCOPY;  Surgeon: Rito Medley  "MD;  Location: [facility];  Service: Urology;  Laterality: Right;     Family History   Problem Relation Name Age of Onset    Breast cancer Maternal Aunt      Obesity Mother      No Known Problems Father      No Known Problems Brother      Colon cancer Neg Hx      Miscarriages / Stillbirths Neg Hx      Ovarian cancer Neg Hx         Social History     Tobacco Use    Smoking status: Former     Current packs/day: 0.25     Average packs/day: 0.3 packs/day for 14.6 years (3.6 ttl pk-yrs)     Types: Cigarettes     Start date: 2010    Smokeless tobacco: Never    Tobacco comments:     Quit smoking June 2024    Substance Use Topics    Alcohol use: Not Currently     Alcohol/week: 0.0 standard drinks of alcohol    Drug use: Not Currently     Types: Marijuana, Fentanyl     Comment: history of opoid abuse        Physical Exam      Vital Signs Reviewed   /82   Pulse 84   Temp 99 °F (37.2 °C)   Resp 18   Ht 5' 2" (1.575 m)   Wt (!) 158.8 kg (350 lb)   SpO2 100%   BMI 64.02 kg/m²        Procedures    Procedures     Labs     Results for orders placed or performed in visit on 07/22/24   SARS Coronavirus 2 Antigen, POCT Manual Read   Result Value Ref Range    SARS Coronavirus 2 Antigen Negative Negative     Acceptable Yes    POCT Strep A, Molecular   Result Value Ref Range    Molecular Strep A, POC Negative Negative     Acceptable Yes      *Note: Due to a large number of results and/or encounters for the requested time period, some results have not been displayed. A complete set of results can be found in Results Review.      Assessment:     1. Sore throat    2. Cough, unspecified type        Plan:   COVID and strep were negative  Medications sent to pharmacy.   Increase fluids intake to prevent dehydration. You may take Tylenol and ibuprofen as directed if needed. Get plenty of rest.   May use saline nose spray and humidifer at bedtime. Warm saltwater gargles for sore throat. Warm water with " honey to help coat the throat. Throat lozenges. Chloraseptic spray for worsening sore throat.   Do not smoke or allow others to smoke around you. Practice good hand hygiene to include frequent hand washing to lessen the likelihood of transmission. Return or seek immediate medical attention for any new or worsening symptoms such as trouble breathing, continued high fever, neck stiffness, rash, or if you do not get better as expected.      Sore throat  -     SARS Coronavirus 2 Antigen, POCT Manual Read  -     POCT Strep A, Molecular    Cough, unspecified type    Other orders  -     predniSONE (DELTASONE) 10 MG tablet; Take 3 tablets (30 mg total) by mouth once daily. for 5 days  Dispense: 15 tablet; Refill: 0  -     doxycycline (VIBRAMYCIN) 100 MG Cap; Take 1 capsule (100 mg total) by mouth 2 (two) times daily. for 7 days  Dispense: 14 capsule; Refill: 0  -     promethazine-dextromethorphan (PROMETHAZINE-DM) 6.25-15 mg/5 mL Syrp; Take 5 mLs by mouth every 4 (four) hours as needed (cough).  Dispense: 120 mL; Refill: 0

## 2024-07-23 ENCOUNTER — TELEPHONE (OUTPATIENT)
Dept: BARIATRICS | Facility: CLINIC | Age: 29
End: 2024-07-23
Payer: MEDICARE

## 2024-07-23 NOTE — TELEPHONE ENCOUNTER
Patient information received from Dr. Wilson's office, pt started program in 2021 with them and ready to restart. Spoke with patient, scheduled FS phone appointment with our program for 7/29/2024 to update GN and medical criteria. Once complete, pt will need consult appointments with Dr. Pena and diet. Patient verbalized understanding. Snapshot and Bariatric Navigator updated.

## 2024-08-15 ENCOUNTER — OFFICE VISIT (OUTPATIENT)
Dept: URGENT CARE | Facility: CLINIC | Age: 29
End: 2024-08-15
Payer: MEDICARE

## 2024-08-15 ENCOUNTER — HOSPITAL ENCOUNTER (EMERGENCY)
Facility: HOSPITAL | Age: 29
Discharge: HOME | End: 2024-08-15
Attending: EMERGENCY MEDICINE
Payer: MEDICARE

## 2024-08-15 VITALS
WEIGHT: 293 LBS | DIASTOLIC BLOOD PRESSURE: 81 MMHG | TEMPERATURE: 98 F | RESPIRATION RATE: 20 BRPM | OXYGEN SATURATION: 98 % | SYSTOLIC BLOOD PRESSURE: 121 MMHG | BODY MASS INDEX: 51.91 KG/M2 | HEART RATE: 116 BPM | HEIGHT: 63 IN

## 2024-08-15 VITALS
TEMPERATURE: 98 F | HEIGHT: 63 IN | DIASTOLIC BLOOD PRESSURE: 86 MMHG | BODY MASS INDEX: 44.3 KG/M2 | RESPIRATION RATE: 17 BRPM | OXYGEN SATURATION: 99 % | SYSTOLIC BLOOD PRESSURE: 129 MMHG | WEIGHT: 250 LBS | HEART RATE: 88 BPM

## 2024-08-15 DIAGNOSIS — M54.50 LOW BACK PAIN, UNSPECIFIED BACK PAIN LATERALITY, UNSPECIFIED CHRONICITY, UNSPECIFIED WHETHER SCIATICA PRESENT: Primary | ICD-10-CM

## 2024-08-15 DIAGNOSIS — R39.9 UTI SYMPTOMS: ICD-10-CM

## 2024-08-15 DIAGNOSIS — N39.0 URINARY TRACT INFECTION WITHOUT HEMATURIA, SITE UNSPECIFIED: Primary | ICD-10-CM

## 2024-08-15 LAB
ALBUMIN SERPL-MCNC: 3.7 G/DL (ref 3.5–5)
ALBUMIN/GLOB SERPL: 1.2 RATIO (ref 1.1–2)
ALP SERPL-CCNC: 110 UNIT/L (ref 40–150)
ALT SERPL-CCNC: 21 UNIT/L (ref 0–55)
ANION GAP SERPL CALC-SCNC: 9 MEQ/L
AST SERPL-CCNC: 19 UNIT/L (ref 5–34)
B-HCG UR QL: NEGATIVE
BACTERIA #/AREA URNS AUTO: ABNORMAL /HPF
BASOPHILS # BLD AUTO: 0.04 X10(3)/MCL
BASOPHILS NFR BLD AUTO: 0.4 %
BILIRUB SERPL-MCNC: 0.5 MG/DL
BILIRUB UR QL STRIP.AUTO: NEGATIVE
BILIRUB UR QL STRIP: POSITIVE
BUN SERPL-MCNC: 10.9 MG/DL (ref 7–18.7)
CALCIUM SERPL-MCNC: 9.4 MG/DL (ref 8.4–10.2)
CHLORIDE SERPL-SCNC: 111 MMOL/L (ref 98–107)
CLARITY UR: ABNORMAL
CO2 SERPL-SCNC: 21 MMOL/L (ref 22–29)
COLOR UR AUTO: YELLOW
CREAT SERPL-MCNC: 0.99 MG/DL (ref 0.55–1.02)
CREAT/UREA NIT SERPL: 11
EOSINOPHIL # BLD AUTO: 0.1 X10(3)/MCL (ref 0–0.9)
EOSINOPHIL NFR BLD AUTO: 1 %
ERYTHROCYTE [DISTWIDTH] IN BLOOD BY AUTOMATED COUNT: 13.7 % (ref 11.5–17)
GFR SERPLBLD CREATININE-BSD FMLA CKD-EPI: >60 ML/MIN/1.73/M2
GLOBULIN SER-MCNC: 3.1 GM/DL (ref 2.4–3.5)
GLUCOSE SERPL-MCNC: 95 MG/DL (ref 74–100)
GLUCOSE UR QL STRIP: NEGATIVE
GLUCOSE UR QL STRIP: NORMAL
HCT VFR BLD AUTO: 39.1 % (ref 37–47)
HGB BLD-MCNC: 12.9 G/DL (ref 12–16)
HGB UR QL STRIP: NEGATIVE
IMM GRANULOCYTES # BLD AUTO: 0.05 X10(3)/MCL (ref 0–0.04)
IMM GRANULOCYTES NFR BLD AUTO: 0.5 %
KETONES UR QL STRIP: ABNORMAL
KETONES UR QL STRIP: NEGATIVE
LEUKOCYTE ESTERASE UR QL STRIP: 250
LEUKOCYTE ESTERASE UR QL STRIP: POSITIVE
LYMPHOCYTES # BLD AUTO: 2.26 X10(3)/MCL (ref 0.6–4.6)
LYMPHOCYTES NFR BLD AUTO: 21.8 %
MCH RBC QN AUTO: 27.6 PG (ref 27–31)
MCHC RBC AUTO-ENTMCNC: 33 G/DL (ref 33–36)
MCV RBC AUTO: 83.7 FL (ref 80–94)
MONOCYTES # BLD AUTO: 0.68 X10(3)/MCL (ref 0.1–1.3)
MONOCYTES NFR BLD AUTO: 6.6 %
MUCOUS THREADS URNS QL MICRO: ABNORMAL /LPF
NEUTROPHILS # BLD AUTO: 7.24 X10(3)/MCL (ref 2.1–9.2)
NEUTROPHILS NFR BLD AUTO: 69.7 %
NITRITE UR QL STRIP: NEGATIVE
NRBC BLD AUTO-RTO: 0 %
PH UR STRIP: 6 [PH]
PH, POC UA: 5
PLATELET # BLD AUTO: 215 X10(3)/MCL (ref 130–400)
PMV BLD AUTO: 10.1 FL (ref 7.4–10.4)
POC BLOOD, URINE: NEGATIVE
POC NITRATES, URINE: NEGATIVE
POTASSIUM SERPL-SCNC: 4.3 MMOL/L (ref 3.5–5.1)
PROT SERPL-MCNC: 6.8 GM/DL (ref 6.4–8.3)
PROT UR QL STRIP: ABNORMAL
PROT UR QL STRIP: POSITIVE
RBC # BLD AUTO: 4.67 X10(6)/MCL (ref 4.2–5.4)
RBC #/AREA URNS AUTO: ABNORMAL /HPF
SODIUM SERPL-SCNC: 141 MMOL/L (ref 136–145)
SP GR UR STRIP.AUTO: 1.04 (ref 1–1.03)
SP GR UR STRIP: 1.02 (ref 1–1.03)
SQUAMOUS #/AREA URNS LPF: ABNORMAL /HPF
UROBILINOGEN UR STRIP-ACNC: 2
UROBILINOGEN UR STRIP-ACNC: POSITIVE (ref 0.1–1.1)
WBC # BLD AUTO: 10.37 X10(3)/MCL (ref 4.5–11.5)
WBC #/AREA URNS AUTO: ABNORMAL /HPF

## 2024-08-15 PROCEDURE — 99284 EMERGENCY DEPT VISIT MOD MDM: CPT | Mod: 25

## 2024-08-15 PROCEDURE — 80053 COMPREHEN METABOLIC PANEL: CPT

## 2024-08-15 PROCEDURE — 87086 URINE CULTURE/COLONY COUNT: CPT

## 2024-08-15 PROCEDURE — 81001 URINALYSIS AUTO W/SCOPE: CPT

## 2024-08-15 PROCEDURE — 85025 COMPLETE CBC W/AUTO DIFF WBC: CPT

## 2024-08-15 PROCEDURE — 81025 URINE PREGNANCY TEST: CPT

## 2024-08-15 RX ORDER — CEFDINIR 300 MG/1
300 CAPSULE ORAL 2 TIMES DAILY
Qty: 20 CAPSULE | Refills: 0 | Status: SHIPPED | OUTPATIENT
Start: 2024-08-15 | End: 2024-08-25

## 2024-08-15 RX ORDER — KETOROLAC TROMETHAMINE 30 MG/ML
60 INJECTION, SOLUTION INTRAMUSCULAR; INTRAVENOUS
Status: COMPLETED | OUTPATIENT
Start: 2024-08-15 | End: 2024-08-15

## 2024-08-15 RX ORDER — PHENAZOPYRIDINE HYDROCHLORIDE 100 MG/1
100 TABLET, FILM COATED ORAL 3 TIMES DAILY PRN
Qty: 9 TABLET | Refills: 0 | Status: SHIPPED | OUTPATIENT
Start: 2024-08-15 | End: 2024-08-18

## 2024-08-15 RX ADMIN — KETOROLAC TROMETHAMINE 60 MG: 30 INJECTION, SOLUTION INTRAMUSCULAR; INTRAVENOUS at 01:08

## 2024-08-15 NOTE — PROGRESS NOTES
Patient ID: Alexandra Martin is a 28 y.o. female.  Chief Complaint: Emesis    HPI:   Patient presents here today for above reason.        Patient is a 28 y.o. female who presents to urgent care with complaints of lower back pain on right side and emesis  started since Yesterday. Reports she has hx of kidney stones. Alleviating factors include Ibuprofen  with no relief.      female presents to urgent care today with complaints of right-sided abdominal and lower back pain.  Onset proximally 24 hours ago.  Conservative management including with OTC NSAIDs with 0 relief.  Associated symptoms include nausea and vomiting.  Does carry with her history of urolithiasis requiring lithotripsy and ureteral stents.  She reports today that pain is a 9/10.  Additional associated symptoms include right lower quadrant abdominal/bladder spasming.      The patient's Health Maintenance was reviewed and the following appears to be due at this time:   Health Maintenance Due   Topic Date Due    Cervical Cancer Screening  08/14/2022     Past Medical History:  Past Medical History:   Diagnosis Date    ADD (attention deficit disorder)     Anxiety disorder     Depression     Headache     History of ovarian cyst     MDD (major depressive disorder), recurrent episode, moderate 7/29/2021    Obesity     Substance abuse     Hospitalization     Trauma     not at this time. 9/11/19    Tympanic membrane perforation, left 3/14/2018    UTI (urinary tract infection) 8/3/2018    Vertigo      Past Surgical History:   Procedure Laterality Date    CYSTOSCOPY W/ URETERAL STENT PLACEMENT Right 12/26/2019    Procedure: CYSTOSCOPY, WITH URETERAL STENT INSERTION;  Surgeon: Rito Medley MD;  Location: [facility] OR;  Service: Urology;  Laterality: Right;    CYSTOSCOPY W/ URETERAL STENT REMOVAL Right 1/6/2020    Procedure: CYSTOSCOPY, WITH URETERAL STENT REMOVAL;  Surgeon: Rito Medley MD;  Location: [facility] OR;  Service: Urology;   Laterality: Right;    LASER LITHOTRIPSY Right 1/6/2020    Procedure: LITHOTRIPSY, USING LASER;  Surgeon: Rito Medley MD;  Location: [facility] OR;  Service: Urology;  Laterality: Right;    TONSILLECTOMY, ADENOIDECTOMY, BILATERAL MYRINGOTOMY AND TUBES      URETEROSCOPIC REMOVAL OF URETERIC CALCULUS Right 1/6/2020    Procedure: REMOVAL, CALCULUS, URETER, URETEROSCOPIC;  Surgeon: Rito Medley MD;  Location: [facility] OR;  Service: Urology;  Laterality: Right;    URETEROSCOPY Left 12/26/2019    Procedure: URETEROSCOPY;  Surgeon: Rito Medley MD;  Location: [facility] OR;  Service: Urology;  Laterality: Left;    URETEROSCOPY Right 1/6/2020    Procedure: URETEROSCOPY;  Surgeon: Rito Medley MD;  Location: [facility] OR;  Service: Urology;  Laterality: Right;     Review of patient's allergies indicates:   Allergen Reactions    Ceftriaxone Nausea And Vomiting    Azithromycin Nausea And Vomiting     Hard to breathe     Latex, natural rubber Swelling and Rash     Current Outpatient Medications on File Prior to Visit   Medication Sig Dispense Refill    albuterol (PROVENTIL HFA) 90 mcg/actuation inhaler Inhale 2 puffs into the lungs every 6 (six) hours as needed for Wheezing (cough). Rescue 18 g 0    etonogestreL (NEXPLANON) 68 mg Impl subdermal device 68 mg by Subdermal route once. A single NEXPLANON implant is inserted subdermally just under the skin at the inner side of the non-dominant upper arm.      hydrOXYzine pamoate (VISTARIL) 25 MG Cap Take 25 mg by mouth daily as needed.      venlafaxine (EFFEXOR-XR) 150 MG Cp24 Take 150 mg by mouth.       No current facility-administered medications on file prior to visit.     Social History     Socioeconomic History    Marital status: Single   Tobacco Use    Smoking status: Former     Current packs/day: 0.25     Average packs/day: 0.3 packs/day for 14.6 years (3.7 ttl pk-yrs)     Types: Cigarettes     Start date: 2010    Smokeless tobacco: Never    Tobacco comments:     Quit  "smoking June 2024    Substance and Sexual Activity    Alcohol use: Not Currently     Alcohol/week: 0.0 standard drinks of alcohol    Drug use: Not Currently     Types: Marijuana, Fentanyl     Comment: history of opoid abuse    Sexual activity: Not Currently     Partners: Male     Birth control/protection: Implant     Family History   Problem Relation Name Age of Onset    Breast cancer Maternal Aunt      Obesity Mother      No Known Problems Father      No Known Problems Brother      Colon cancer Neg Hx      Miscarriages / Stillbirths Neg Hx      Ovarian cancer Neg Hx       ROS:   Review of Systems   Constitutional:  Positive for activity change, appetite change and fatigue. Negative for fever and unexpected weight change.   Respiratory: Negative.     Cardiovascular: Negative.    Gastrointestinal:  Positive for abdominal pain, nausea and vomiting.   Genitourinary:  Positive for dysuria and flank pain.   Skin: Negative.    Psychiatric/Behavioral: Negative.         Vitals/PE:   /81   Pulse (!) 116   Temp 98.2 °F (36.8 °C) (Oral)   Resp 20   Ht 5' 3" (1.6 m)   Wt (!) 158.8 kg (350 lb)   LMP  (LMP Unknown)   SpO2 98%   BMI 62.00 kg/m²   Physical Exam  Vitals and nursing note reviewed.   Constitutional:       General: She is not in acute distress.     Appearance: She is obese. She is ill-appearing. She is not toxic-appearing or diaphoretic.   HENT:      Head: Normocephalic and atraumatic.      Right Ear: Tympanic membrane normal.      Left Ear: Tympanic membrane normal.      Mouth/Throat:      Mouth: Mucous membranes are dry.      Pharynx: Oropharynx is clear.   Eyes:      Extraocular Movements: Extraocular movements intact.      Conjunctiva/sclera: Conjunctivae normal.      Pupils: Pupils are equal, round, and reactive to light.   Neck:      Vascular: No carotid bruit.   Cardiovascular:      Rate and Rhythm: Regular rhythm. Tachycardia present.      Pulses: Normal pulses.      Heart sounds: Normal heart " sounds. No murmur heard.     No friction rub. No gallop.   Pulmonary:      Effort: Pulmonary effort is normal. No respiratory distress.      Breath sounds: No stridor. No wheezing or rhonchi.   Abdominal:      General: There is no distension.      Palpations: Abdomen is soft. There is no shifting dullness, fluid wave, splenomegaly, mass or pulsatile mass.      Tenderness: There is abdominal tenderness in the right upper quadrant and right lower quadrant. There is right CVA tenderness and rebound. There is no left CVA tenderness or guarding. Positive signs include Solis's sign.      Hernia: No hernia is present.   Musculoskeletal:         General: No swelling or signs of injury. Normal range of motion.      Cervical back: Normal range of motion and neck supple. No rigidity or tenderness.      Right lower leg: No edema.      Left lower leg: No edema.   Lymphadenopathy:      Cervical: No cervical adenopathy.   Skin:     Capillary Refill: Capillary refill takes less than 2 seconds.      Coloration: Skin is not jaundiced.      Findings: No bruising, lesion or rash.   Neurological:      General: No focal deficit present.      Mental Status: She is alert and oriented to person, place, and time. Mental status is at baseline.      Cranial Nerves: No cranial nerve deficit.      Sensory: No sensory deficit.      Motor: No weakness.      Coordination: Coordination normal.      Gait: Gait normal.   Psychiatric:         Mood and Affect: Mood normal.         Behavior: Behavior normal.         Thought Content: Thought content normal.         Judgment: Judgment normal.         Assessment/Plan:   Low back pain, unspecified back pain laterality, unspecified chronicity, unspecified whether sciatica present  -     POCT Urinalysis, Dipstick, Manual, w/o Scope  -     ketorolac injection 60 mg   XR ABDOMEN FLAT AND ERECT; Future; Expected date: 08/15/2024  Refer to ED.   Highly suspicious for cholecystitis vs urolithiasis vs other.    Patient verbalizes understanding.   Minimal to no improvement with IM NSAID.     She will proceed to [facility].   UTI symptoms  Urinalysis + for WBCs and bilirubin as well as protein.        Orders Placed This Encounter   Procedures    Refer to Emergency Dept.    POCT Urinalysis, Dipstick, Manual, w/o Scope       Education and counseling done face to face regarding medical conditions and plan. Contact office if new symptoms develop. Should any symptoms ever significantly worsen seek emergency medical attention/go to ER. Follow up at least yearly for wellness or sooner PRN. Nurse to call patient with any results. The patient is receptive, expresses understanding and is agreeable to plan. All questions have been answered.

## 2024-08-15 NOTE — ED PROVIDER NOTES
Encounter Date: 8/15/2024       History     Chief Complaint   Patient presents with    Abdominal Pain     POV, RLQ pain that radiates into back x 1 day. Denies any urinary complaints or fever. C/o nausea and vomiting. Hx kidney stones     See MDM    The history is provided by the patient. No  was used.     Review of patient's allergies indicates:   Allergen Reactions    Ceftriaxone Nausea And Vomiting    Azithromycin Nausea And Vomiting     Hard to breathe     Latex, natural rubber Swelling and Rash     Past Medical History:   Diagnosis Date    ADD (attention deficit disorder)     Anxiety disorder     Depression     Headache     History of ovarian cyst     MDD (major depressive disorder), recurrent episode, moderate 7/29/2021    Obesity     Substance abuse     Hospitalization     Trauma     not at this time. 9/11/19    Tympanic membrane perforation, left 3/14/2018    UTI (urinary tract infection) 8/3/2018    Vertigo      Past Surgical History:   Procedure Laterality Date    CYSTOSCOPY W/ URETERAL STENT PLACEMENT Right 12/26/2019    Procedure: CYSTOSCOPY, WITH URETERAL STENT INSERTION;  Surgeon: Rito Medley MD;  Location: [facility] OR;  Service: Urology;  Laterality: Right;    CYSTOSCOPY W/ URETERAL STENT REMOVAL Right 1/6/2020    Procedure: CYSTOSCOPY, WITH URETERAL STENT REMOVAL;  Surgeon: Rito Medley MD;  Location: [facility] OR;  Service: Urology;  Laterality: Right;    LASER LITHOTRIPSY Right 1/6/2020    Procedure: LITHOTRIPSY, USING LASER;  Surgeon: Rito Medley MD;  Location: [facility] OR;  Service: Urology;  Laterality: Right;    TONSILLECTOMY, ADENOIDECTOMY, BILATERAL MYRINGOTOMY AND TUBES      URETEROSCOPIC REMOVAL OF URETERIC CALCULUS Right 1/6/2020    Procedure: REMOVAL, CALCULUS, URETER, URETEROSCOPIC;  Surgeon: Rito Medley MD;  Location: [facility] OR;  Service: Urology;  Laterality: Right;    URETEROSCOPY Left 12/26/2019    Procedure: URETEROSCOPY;  Surgeon: Rito PINA  MD Jasmyne;  Location: [facility] OR;  Service: Urology;  Laterality: Left;    URETEROSCOPY Right 1/6/2020    Procedure: URETEROSCOPY;  Surgeon: Rito Medley MD;  Location: [facility] OR;  Service: Urology;  Laterality: Right;     Family History   Problem Relation Name Age of Onset    Breast cancer Maternal Aunt      Obesity Mother      No Known Problems Father      No Known Problems Brother      Colon cancer Neg Hx      Miscarriages / Stillbirths Neg Hx      Ovarian cancer Neg Hx       Social History     Tobacco Use    Smoking status: Former     Current packs/day: 0.25     Average packs/day: 0.3 packs/day for 14.6 years (3.7 ttl pk-yrs)     Types: Cigarettes     Start date: 2010    Smokeless tobacco: Never    Tobacco comments:     Quit smoking June 2024    Substance Use Topics    Alcohol use: Not Currently     Alcohol/week: 0.0 standard drinks of alcohol    Drug use: Not Currently     Types: Marijuana, Fentanyl     Comment: history of opoid abuse     Review of Systems   Constitutional:  Negative for fever.   Respiratory:  Negative for cough and shortness of breath.    Cardiovascular:  Negative for chest pain.   Gastrointestinal:  Positive for abdominal pain.   Genitourinary:  Negative for difficulty urinating and dysuria.   Musculoskeletal:  Negative for gait problem.   Skin:  Negative for color change.   Neurological:  Negative for dizziness, speech difficulty and headaches.   Psychiatric/Behavioral:  Negative for hallucinations and suicidal ideas.    All other systems reviewed and are negative.      Physical Exam     Initial Vitals   BP Pulse Resp Temp SpO2   08/15/24 1437 08/15/24 1435 08/15/24 1435 08/15/24 1435 08/15/24 1435   100/76 104 20 98.4 °F (36.9 °C) 98 %      MAP       --                Physical Exam    Nursing note and vitals reviewed.  Constitutional: She appears well-developed and well-nourished.   HENT:   Head: Normocephalic.   Eyes: EOM are normal.   Neck:   Normal range of motion.  Cardiovascular:   Normal rate, regular rhythm, normal heart sounds and intact distal pulses.           Pulmonary/Chest: Breath sounds normal. No respiratory distress.   Abdominal: Abdomen is soft. Bowel sounds are normal. There is no abdominal tenderness.   Musculoskeletal:         General: Normal range of motion.      Cervical back: Normal range of motion.     Neurological: She is alert and oriented to person, place, and time. She has normal strength.   Skin: Skin is warm and dry.   Psychiatric: She has a normal mood and affect. Her behavior is normal. Judgment and thought content normal.         ED Course   Procedures  Labs Reviewed   COMPREHENSIVE METABOLIC PANEL - Abnormal       Result Value    Sodium 141      Potassium 4.3      Chloride 111 (*)     CO2 21 (*)     Glucose 95      Blood Urea Nitrogen 10.9      Creatinine 0.99      Calcium 9.4      Protein Total 6.8      Albumin 3.7      Globulin 3.1      Albumin/Globulin Ratio 1.2      Bilirubin Total 0.5            ALT 21      AST 19      eGFR >60      Anion Gap 9.0      BUN/Creatinine Ratio 11     URINALYSIS, REFLEX TO URINE CULTURE - Abnormal    Color, UA Yellow      Appearance, UA Turbid (*)     Specific Gravity, UA 1.038 (*)     pH, UA 6.0      Protein, UA 1+ (*)     Glucose, UA Normal      Ketones, UA Trace (*)     Blood, UA Negative      Bilirubin, UA Negative      Urobilinogen, UA 2.0 (*)     Nitrites, UA Negative      Leukocyte Esterase,  (*)     RBC, UA 0-5      WBC, UA 11-20 (*)     Bacteria, UA None Seen      Squamous Epithelial Cells, UA Moderate (*)     Mucous, UA Few (*)    CBC WITH DIFFERENTIAL - Abnormal    WBC 10.37      RBC 4.67      Hgb 12.9      Hct 39.1      MCV 83.7      MCH 27.6      MCHC 33.0      RDW 13.7      Platelet 215      MPV 10.1      Neut % 69.7      Lymph % 21.8      Mono % 6.6      Eos % 1.0      Basophil % 0.4      Lymph # 2.26      Neut # 7.24      Mono # 0.68      Eos # 0.10      Baso # 0.04      IG# 0.05 (*)     IG% 0.5       NRBC% 0.0     PREGNANCY TEST, URINE RAPID - Normal    hCG Qualitative, Urine Negative     CULTURE, URINE   CBC W/ AUTO DIFFERENTIAL    Narrative:     The following orders were created for panel order CBC Auto Differential.  Procedure                               Abnormality         Status                     ---------                               -----------         ------                     CBC with Differential[[id number]]       Abnormal            Final result                 Please view results for these tests on the individual orders.          Imaging Results              CT Renal Stone Study ABD Pelvis WO (Final result)  Result time 08/15/24 16:36:59      Final result by Esteban Burgos MD (08/15/24 16:36:59)                   Impression:      Punctate nonobstructing medullary calculi seen in both kidneys with      Electronically signed by: Mat Burgos  Date:    08/15/2024  Time:    16:36               Narrative:    EXAMINATION:  CT RENAL STONE STUDY ABD PELVIS WO    CLINICAL HISTORY:  Flank pain, kidney stone suspected;    TECHNIQUE:  Low dose axial images, sagittal and coronal reformations were obtained from the lung bases to the pubic symphysis.  No contrast was administered.  Automatic exposure control is utilized to reduce patient radiation exposure.    COMPARISON:  None    FINDINGS:  The lung bases are clear.    The liver appears normal.  No obvious liver mass or lesion is seen.    Gallbladder appears normal.  No gallstones are seen.    The pancreas appears normal.  No inflammatory changes are seen in the pancreatic region.    The spleen appears normal and adrenal glands appear normal.  No adrenal nodule is seen.    There is some punctate nonobstructing medullary calculi seen in both kidneys..  No hydronephrosis is seen.  No hydroureter is seen.  No ureteral stone is seen.    No colitis is seen.  No diverticulitis is seen.  No appendicitis is seen.    No free air seen.  No free fluid is  seen.  The urinary bladder appears normal.    Bones show no acute abnormality.                                       Medications - No data to display  Medical Decision Making  Historian:  Patient.  Patient is a White 28 y.o. female that presents with lower abdominal pain that has been present few days. Associated symptoms nothing. Surrounding information is went to urgent care. Exacerbated by nothing. Relieved by nothing. Patient treatment prior to arrival none. Risk factors include none. Other history pertaining to this complaint nothing.   Assessment:  See physical exam.  DD:  Kidney stone, appendicitis, cholecystitis, UTI  ED Course: History was obtained.  Physical was performed.  Patient appears to have a UTI.  I will put her on cefdinir and Pyridium. Medical or surgical consults:  None. Social determinants that affect healthcare:  None.       Amount and/or Complexity of Data Reviewed  Labs:      Details: UTI  Radiology:      Details: CT showed no acute findings    Risk  Prescription drug management.  Risk Details: Cefdinir and Pyridium                                      Clinical Impression:  Final diagnoses:  [N39.0] Urinary tract infection without hematuria, site unspecified (Primary)          ED Disposition Condition    Discharge Stable          ED Prescriptions       Medication Sig Dispense Start Date End Date Auth. Provider    cefdinir (OMNICEF) 300 MG capsule Take 1 capsule (300 mg total) by mouth 2 (two) times daily. for 10 days 20 capsule 8/15/2024 8/25/2024 Enmanuel Montalvo FNP    phenazopyridine (PYRIDIUM) 100 MG tablet Take 1 tablet (100 mg total) by mouth 3 (three) times daily as needed for Pain. 9 tablet 8/15/2024 8/18/2024 Enmanuel Montalvo FNP          Follow-up Information       Follow up With Specialties Details Why Contact Info    Your Primary Care Provider  Call in 3 days ed follow up              Enmanuel Montalvo FNP  08/15/24 8742

## 2024-08-15 NOTE — FIRST PROVIDER EVALUATION
"Medical screening examination initiated.  I have conducted a focused provider triage encounter, findings are as follows:    Brief history of present illness:  28 year old female presents to the ER for evaluation of right flank and RLQ pain x 1 day. Associated nausea, vomiting. History of renal stones. Denies fevers. Sent by . IM Toradol at urgent with some improvement. Denies urinary symptoms.     Vitals:    08/15/24 1435 08/15/24 1437   BP:  100/76   Pulse: 104    Resp: 20    Temp: 98.4 °F (36.9 °C)    TempSrc: Oral    SpO2: 98%    Weight: 113.4 kg (250 lb)    Height: 5' 3" (1.6 m)        Pertinent physical exam:  alert, non-labored, ambulatory     Brief workup plan:  labs, urine, imaging     Preliminary workup initiated; this workup will be continued and followed by the physician or advanced practice provider that is assigned to the patient when roomed.  "

## 2024-08-17 LAB — BACTERIA UR CULT: NORMAL

## 2024-08-23 ENCOUNTER — TELEPHONE (OUTPATIENT)
Dept: BARIATRICS | Facility: CLINIC | Age: 29
End: 2024-08-23
Payer: MEDICARE

## 2024-08-23 NOTE — TELEPHONE ENCOUNTER
Dr. Pena requested pt's appt on 8/26/2024 moved to [facility].  Called pt and LVM.  Will follow up with a portal message.

## 2024-08-26 ENCOUNTER — CLINICAL SUPPORT (OUTPATIENT)
Dept: BARIATRICS | Facility: CLINIC | Age: 29
End: 2024-08-26
Payer: MEDICARE

## 2024-08-26 ENCOUNTER — HOSPITAL ENCOUNTER (OUTPATIENT)
Dept: CARDIOLOGY | Facility: CLINIC | Age: 29
Discharge: HOME | End: 2024-08-26
Payer: MEDICARE

## 2024-08-26 ENCOUNTER — PATIENT MESSAGE (OUTPATIENT)
Dept: BARIATRICS | Facility: CLINIC | Age: 29
End: 2024-08-26

## 2024-08-26 ENCOUNTER — OFFICE VISIT (OUTPATIENT)
Dept: BARIATRICS | Facility: CLINIC | Age: 29
End: 2024-08-26
Payer: MEDICARE

## 2024-08-26 VITALS
HEART RATE: 86 BPM | OXYGEN SATURATION: 97 % | SYSTOLIC BLOOD PRESSURE: 130 MMHG | WEIGHT: 293 LBS | HEIGHT: 63 IN | DIASTOLIC BLOOD PRESSURE: 77 MMHG | BODY MASS INDEX: 51.91 KG/M2

## 2024-08-26 DIAGNOSIS — G47.33 OSA (OBSTRUCTIVE SLEEP APNEA): Primary | ICD-10-CM

## 2024-08-26 DIAGNOSIS — E66.01 MORBID OBESITY: ICD-10-CM

## 2024-08-26 DIAGNOSIS — Z86.39 HISTORY OF OBESITY IN ADULTHOOD: ICD-10-CM

## 2024-08-26 DIAGNOSIS — G47.33 OSA (OBSTRUCTIVE SLEEP APNEA): ICD-10-CM

## 2024-08-26 DIAGNOSIS — Z98.84 OBESITY SURGERY STATUS: ICD-10-CM

## 2024-08-26 DIAGNOSIS — F11.11 HISTORY OF OPIOID ABUSE: ICD-10-CM

## 2024-08-26 DIAGNOSIS — E66.01 MORBID OBESITY WITH BMI OF 60.0-69.9, ADULT: ICD-10-CM

## 2024-08-26 DIAGNOSIS — Z71.3 DIETARY COUNSELING AND SURVEILLANCE: Primary | ICD-10-CM

## 2024-08-26 PROCEDURE — 99999PBSHW PR PBB SHADOW TECHNICAL ONLY FILED TO HB: Mod: PBBFAC,,,

## 2024-08-26 PROCEDURE — 93005 ELECTROCARDIOGRAM TRACING: CPT | Mod: PBBFAC | Performed by: STUDENT IN AN ORGANIZED HEALTH CARE EDUCATION/TRAINING PROGRAM

## 2024-08-26 PROCEDURE — 99211 OFF/OP EST MAY X REQ PHY/QHP: CPT | Mod: PBBFAC,27,25 | Performed by: DIETITIAN, REGISTERED

## 2024-08-26 PROCEDURE — 99999 PR PBB SHADOW E&M-EST. PATIENT-LVL V: CPT | Mod: PBBFAC,,, | Performed by: NURSE PRACTITIONER

## 2024-08-26 PROCEDURE — 99999 PR PBB SHADOW E&M-EST. PATIENT-LVL I: CPT | Mod: PBBFAC,,, | Performed by: DIETITIAN, REGISTERED

## 2024-08-26 PROCEDURE — 93010 ELECTROCARDIOGRAM REPORT: CPT | Mod: S$PBB,,, | Performed by: STUDENT IN AN ORGANIZED HEALTH CARE EDUCATION/TRAINING PROGRAM

## 2024-08-26 PROCEDURE — 97802 MEDICAL NUTRITION INDIV IN: CPT | Mod: PBBFAC | Performed by: DIETITIAN, REGISTERED

## 2024-08-26 PROCEDURE — 99215 OFFICE O/P EST HI 40 MIN: CPT | Mod: PBBFAC,25 | Performed by: NURSE PRACTITIONER

## 2024-08-26 NOTE — PROGRESS NOTES
BARIATRIC NEW PATIENT EVALUATION    CHIEF COMPLAINT:   Morbid obesity, body mass index is 62.27 kg/m². and inability to lose weight.    HPI:  Alexandra Martin is a 28 y.o. morbidly obese female. Her current body mass index is 62.27 kg/m². She has no associated comorbidities. She has struggled with excess weight since childhood.  Her highest adult weight was 351 lbs at age 28, and her lowest adult weight was 180 lbs at age 18.  The patient has tried Weight Watchers, Atkins Diet, calorie counting, Keto diet, phentermine (Adipex-P), Exercise, and GLP 1 .  The patient was most successful with adipex with a weight loss of 30-40 lbs.  Her current exercise includes walking 3 times a week. She denies any history of eating disorder such as anorexia, bulimia, or taking laxatives for weight loss, and endorses a addiction including illicit substances, alcohol, or gambling.  Patient states she has a good  support system.  She lives with alone.  She is currently disabled .  She  denies a history of GERD.  The patient's goal is 190 lbs.    ESS: Score of 6, reviewed 08/26/2024.  Does not need Sleep Study.    Pre op weight- 351  IBW-135    CXR 7/11/24 FINDINGS: Frontal and lateral views were obtained of the chest. There are no previous studies for comparison. The heart and mediastinum are not widened. The lungs are clear and well aerated. There is no infiltrate effusion or pneumothorax. Bony structures are intact.     Hx of Fentanyl abuse was admitted in 2022 and has been clean and sober since.  [facility] inpatient treatment.     PAST MEDICAL HISTORY:  Past Medical History:   Diagnosis Date    ADD (attention deficit disorder)     Anxiety disorder     Depression     Headache     History of ovarian cyst     MDD (major depressive disorder), recurrent episode, moderate 7/29/2021    Obesity     Substance abuse     Hospitalization     Trauma     not at this time. 9/11/19    Tympanic membrane perforation, left  3/14/2018    UTI (urinary tract infection) 8/3/2018    Vertigo        PAST SURGICAL HISTORY:  Past Surgical History:   Procedure Laterality Date    CYSTOSCOPY W/ URETERAL STENT PLACEMENT Right 12/26/2019    Procedure: CYSTOSCOPY, WITH URETERAL STENT INSERTION;  Surgeon: Rito Medley MD;  Location: [facility] OR;  Service: Urology;  Laterality: Right;    CYSTOSCOPY W/ URETERAL STENT REMOVAL Right 1/6/2020    Procedure: CYSTOSCOPY, WITH URETERAL STENT REMOVAL;  Surgeon: Rito Medley MD;  Location: [facility] OR;  Service: Urology;  Laterality: Right;    LASER LITHOTRIPSY Right 1/6/2020    Procedure: LITHOTRIPSY, USING LASER;  Surgeon: Rito Medley MD;  Location: [facility] OR;  Service: Urology;  Laterality: Right;    TONSILLECTOMY, ADENOIDECTOMY, BILATERAL MYRINGOTOMY AND TUBES      URETEROSCOPIC REMOVAL OF URETERIC CALCULUS Right 1/6/2020    Procedure: REMOVAL, CALCULUS, URETER, URETEROSCOPIC;  Surgeon: Rito Medley MD;  Location: [facility] OR;  Service: Urology;  Laterality: Right;    URETEROSCOPY Left 12/26/2019    Procedure: URETEROSCOPY;  Surgeon: Rito Medley MD;  Location: [facility] OR;  Service: Urology;  Laterality: Left;    URETEROSCOPY Right 1/6/2020    Procedure: URETEROSCOPY;  Surgeon: Rito Medley MD;  Location: [facility] OR;  Service: Urology;  Laterality: Right;       FAMILY HISTORY:  Family History   Problem Relation Name Age of Onset    Breast cancer Maternal Aunt      Obesity Mother      No Known Problems Father      No Known Problems Brother      Colon cancer Neg Hx      Miscarriages / Stillbirths Neg Hx      Ovarian cancer Neg Hx          SOCIAL HISTORY:  Social History     Socioeconomic History    Marital status: Single   Tobacco Use    Smoking status: Former     Current packs/day: 0.25     Average packs/day: 0.3 packs/day for 14.7 years (3.7 ttl pk-yrs)     Types: Cigarettes     Start date: 2010    Smokeless tobacco: Never    Tobacco comments:     Quit smoking June 2024    Substance  and Sexual Activity    Alcohol use: Not Currently     Alcohol/week: 0.0 standard drinks of alcohol    Drug use: Not Currently     Types: Marijuana, Fentanyl     Comment: history of opoid abuse    Sexual activity: Not Currently     Partners: Male     Birth control/protection: Implant     Social Determinants of Health     Financial Resource Strain: Low Risk  (8/25/2024)    Overall Financial Resource Strain (CARDIA)     Difficulty of Paying Living Expenses: Not very hard   Food Insecurity: No Food Insecurity (8/25/2024)    Hunger Vital Sign     Worried About Running Out of Food in the Last Year: Never true     Ran Out of Food in the Last Year: Never true   Physical Activity: Unknown (8/25/2024)    Exercise Vital Sign     Days of Exercise per Week: Patient declined     Minutes of Exercise per Session: 30 min   Stress: No Stress Concern Present (8/25/2024)    [employer] - Occupational Stress Questionnaire     Feeling of Stress : Only a little   Housing Stability: Unknown (8/25/2024)    Housing Stability Vital Sign     Unable to Pay for Housing in the Last Year: No       MEDICATIONS:    Current Outpatient Medications:     albuterol (PROVENTIL HFA) 90 mcg/actuation inhaler, Inhale 2 puffs into the lungs every 6 (six) hours as needed for Wheezing (cough). Rescue, Disp: 18 g, Rfl: 0    etonogestreL (NEXPLANON) 68 mg Impl subdermal device, 68 mg by Subdermal route once. A single NEXPLANON implant is inserted subdermally just under the skin at the inner side of the non-dominant upper arm., Disp: , Rfl:     hydrOXYzine pamoate (VISTARIL) 25 MG Cap, Take 25 mg by mouth daily as needed., Disp: , Rfl:     venlafaxine (EFFEXOR-XR) 150 MG Cp24, Take 150 mg by mouth., Disp: , Rfl:     ALLERGIES:  Review of patient's allergies indicates:   Allergen Reactions    Ceftriaxone Nausea And Vomiting    Azithromycin Nausea And Vomiting     Hard to breathe     Latex, natural rubber Swelling and Rash       Review  "of Systems   Constitutional:  Negative for chills and fever.   HENT:  Negative for ear pain, nosebleeds and sore throat.    Eyes:  Negative for blurred vision and double vision.   Respiratory:  Negative for cough and shortness of breath.    Cardiovascular:  Negative for chest pain, palpitations, orthopnea, claudication and leg swelling.   Gastrointestinal:  Negative for abdominal pain, constipation, diarrhea, heartburn, nausea and vomiting.   Genitourinary:  Negative for dysuria and urgency.   Musculoskeletal:  Negative for back pain and joint pain.   Skin:  Negative for rash.   Neurological:  Negative for dizziness, tingling, focal weakness and headaches.   Endo/Heme/Allergies:  Does not bruise/bleed easily.   Psychiatric/Behavioral:  Negative for depression and suicidal ideas.        Vitals:    08/26/24 1219   BP: 130/77   Pulse: 86   SpO2: 97%   Weight: (!) 159.4 kg (351 lb 8 oz)   Height: 5' 3" (1.6 m)   PainSc: 0-No pain       Physical Exam  Vitals and nursing note reviewed.   Constitutional:       Appearance: She is well-developed. She is morbidly obese.   HENT:      Head: Normocephalic.      Nose: Nose normal.      Mouth/Throat:      Mouth: Mucous membranes are moist.   Eyes:      Extraocular Movements: Extraocular movements intact.   Cardiovascular:      Rate and Rhythm: Normal rate and regular rhythm.      Heart sounds: Normal heart sounds.   Pulmonary:      Effort: Pulmonary effort is normal.      Breath sounds: Normal breath sounds.   Abdominal:      General: Bowel sounds are normal.      Palpations: Abdomen is soft.   Musculoskeletal:         General: Normal range of motion.      Cervical back: Normal range of motion.   Skin:     General: Skin is warm and dry.      Capillary Refill: Capillary refill takes less than 2 seconds.   Neurological:      Mental Status: She is alert and oriented to person, place, and time.   Psychiatric:         Mood and Affect: Mood normal.          DIAGNOSIS:  1. Morbid " obesity, body mass index is 62.27 kg/m². and inability to lose weight.  2. Co-morbidities: none    PLAN:  The patient is a good candidate for Bariatric Surgery. The patient is interested in [facility]OM[facility]-S with Dr. Pena. The surgery and post-op care was discussed in detail with the patient. All questions were answered.    The patient understands that bariatric surgery is a tool to aid in weight loss and that they need to be committed to the diet and exercise post-operatively for successful weight loss. Discussed with patient that bariatric surgery is not the easy way out and that it will take plenty of dedication on the patient's part to be successful. Also discussed the possibility of weight regain if the patient strays from the diet guidelines or exercise requirements. Patient verbalized understanding and wishes to proceed with the work-up.    Estimated Goal weight is 240-245 lbs.    Discussed hyper fertility and no NSAIDS post op     ORDERS:  1. EKG, UGI, EGD  2. Psychological Consult and Bariatric Dietician Consult  3. Bariatric Labs: Per orders.    PCP: Chanelle Rain FNP  RTC: As scheduled.    This includes 60 mins face to face time and non-face to face time preparing to see the patient (eg, review of tests), obtaining and/or reviewing separately obtained history, documenting clinical information in the electronic or other health record, independently interpreting results and communicating results to the patient/family/caregiver, or care coordinator.

## 2024-08-26 NOTE — PATIENT INSTRUCTIONS
Prior to surgery you will need to complete:  - Dietitian consult and follow up appointments as needed  - Labs  - EKG  - Psychological evaluation, Please call psychiatry [phone] to schedule  - UGI  - EGD      In preparation for bariatric surgery, please complete the following:   Discuss your current medications with your primary care provider, remember your medications will need to be crushed, chewable, or in liquid form for the first 2-4 weeks after a gastric bypass or sleeve.  For a gastric band, your medications will need to be crushed indefinitely.    If you take a blood thinner such as: Coumadin (warfarin), Pradaxa (dabigatran), or Plavix (clopidogrel), you will need to speak with your prescribing provider on how or if this medication can be stopped before surgery.   If you take a medication for depression or anxiety, remember to discuss this with the psychologist or psychiatrist that you see.   If you take medication for arthritis on a daily basis that is considered a non-steroidal anti-inflammatory (NSAID), please discuss with your prescribing physician an alternative medication.  After having gastric bypass or gastric sleeve, this group of medications is not appropriate to take due to increased risk of bleeding stomach ulcers.      DEFINITIONS  Appointments: Pre-scheduled meetings or consultations with any physician, advanced practice provider, dietitian, or psychologist, and labs, imaging studies, sleep studies, etc.   Late cancellation: Cancelling an appointment 24-48 hours prior to scheduled time.  No-Show appointment:  is when   You do NOT arrive to your appointment at the time its scheduled.  You call to cancel or cancel via [facility] less than 24 hours in advance of your scheduled appointment  You show up 15 minutes AFTER your scheduled appointment time without any notification of being late.     POLICY  You are allowed up to 3 cancellations for appointments.   After 3 cancellations your case  will be placed on hold for 2 months and after that time you can resume the program.   You are allowed only 1 no-show for an appointment.   You will be re-scheduled one time and if there is a 2nd no-show at any point, your case will be placed on hold for 3 months.  After 3 months you can resume the program.     Upon resuming the program after being placed on hold for either above mentioned reasons, if you have a late cancel or no show for any appointment, the bariatric team will review if youre an appropriate candidate for surgery at the monthly meeting.

## 2024-08-26 NOTE — PROGRESS NOTES
"NUTRITIONAL CONSULT    Referring Physician: Chivo Pena M.D.   Reason for MNT Referral: Initial assessment for TOMMIE-S work-up    PAST MEDICAL HISTORY:   28 y.o. female    Weight history includes:  Highest adult weight was 351 lbs at age 28, and her lowest adult weight was 180 lbs at age 18.    Dieting attempts include: Weight Watchers, Atkins Diet, calorie counting, Keto diet, phentermine (Adipex-P), Exercise, and GLP-1.  The patient was most successful with Adipex with a weight loss of 30-40 lbs.     Past Medical History:   Diagnosis Date    ADD (attention deficit disorder)     Anxiety disorder     Depression     Headache     History of ovarian cyst     MDD (major depressive disorder), recurrent episode, moderate 7/29/2021    Obesity     Substance abuse     Hospitalization     Trauma     not at this time. 9/11/19    Tympanic membrane perforation, left 3/14/2018    UTI (urinary tract infection) 8/3/2018    Vertigo      CLINICAL DATA:  28 y.o.-year-old White female.  Height: 5' 3"  Weight: 351 lbs  IBW: 135 lbs  BMI: 62.27  The patient's goal weight (50% EBW): 243 lbs  Personal goal weight: 190 lbs    Goal for Bariatric Surgery: to improve health, to improve quality of life, to lose weight, and to prevent future medical conditions    DAILY NUTRITIONAL NEEDS: pre-op nutritional bariatric guidelines to promote weight loss  2784-4257 Calories    Grams Protein    NUTRITION & HEALTH HISTORY:  Greatest challenge: eating out/delivery frequency, portion control, irregular meal patterns, high-fat diet, and sugar-sweetened beverages    Current diet recall:     Non-working  Wakes 6:30/7am  B: Skips or protein shake  L:  Banana, grapes, strawberries, 2 packs [place] Balanced Breaks with Ritz crackers and cheese  D: Fast food  S: Power Crunch bar, ice cream, crackers, beef jerky, fruit    Working day  B: Protein shake or eggs  L: Skips  D: Fast food or air fried chicken and vegetables    Current Diet:  Meal pattern: 1 " meal, 1 snack  Protein supplements: Usually drinks [employer] Core Power Elite, Premier Protein  Snackin / day  Vegetables: Likes a few. Eats almost daily. Broccoli, green beans, zucchini, squash, onion,   Fruits: Likes a variety. Eats daily. Any  Beverages: water, sugar-free beverages/powders/drops, juices, and Coke 4-5 mini cans  Dining out/delivery:  5 x week  Mostly fast food. [employer]aco [employer], sushi, [employer], Chick[employer]radha[employer] - chicken sandwich, 3 chicken strips  Cooking at home:  2 x week  Mostly baked/roast, boiled, air-fried, and microwaved fish/seafood and chicken/turkey Cabbage, green beans, baked chicken, salmon, shrimp    Food Allergies:   NKFA  Lactose intolerance - no milk or yogurt, cheese ok    Vitamins / Minerals / Herbs:   Multivitamin    Labs:   No recent bariatric labs    Exercise:  Past exercise:   Used to competitive dance 10 yrs ago    Current exercise: Fair  Walking about 10-15 mins, 2-3 x week  Weight lifting 10 mins, 3 x week    Restrictions to exercise: back, legs, feet    Social:  2-4 days 6pm-6am , including weekends  Lives with 9 year old daughter.  Grocery shopping and food prep PT.  Patient believes the household will be supportive after surgery.  Alcohol: None.  Smoking: None.    ASSESSMENT:  Patient reports attempts at weight loss, only to regain lost weight.  Patient demonstrated knowledge of healthy eating behaviors and exercise patterns; admits to not eating healthy and not exercising at this point.  Patient states willingness to change lifestyle and make behavior modifications     Barriers to Education: none    Stage of change: determination    NUTRITION DIAGNOSIS:    Morbid Obesity related to Excessive carbohydrate intake, Excessive calorie intake, and Inadequate protein intake as evidence by BMI.    BARIATRIC DIET DISCUSSION/PLAN:  Discussed diet after surgery and related to patient's food record.  Reviewed nutrition guidelines for before and after surgery.  Answered all  "questions.  Discussed protein and calorie goals  Discussed tracking - Baritastic amanuel provided  Reviewed bariatric plate method  Reviewed portion sizing  PT complained of feeling hungry within an hour of eating. Suggested PT eat more slowly, take smaller bites, chew thoroughly, and also add vegetables to bulk up meals  Work on gradually cutting back on starchy CHO in the diet.  1200-calorie diet.  1500-calorie diet.  Reduce frequency of eating out/delivery.  More grocery shopping and meal preparation at home.  Increase exercise  Work on cutting out sugar-sweetened beverages  Work on cutting out "junk foods"    PT Plan  Cook more, eat out less    RECOMMENDATIONS:  Pt is a potential candidate for bariatric surgery and needs additional medically supervised diet counseling and surveillance    Follow up in one month. Needs additional visits with RD.    Patient verbalized understanding.    Communicated nutrition plan with bariatric team.    SESSION TIME:  60 minutes  "

## 2024-08-27 ENCOUNTER — HOSPITAL ENCOUNTER (OUTPATIENT)
Dept: RADIOLOGY | Facility: HOSPITAL | Age: 29
Discharge: HOME | End: 2024-08-27
Attending: NURSE PRACTITIONER
Payer: MEDICARE

## 2024-08-27 DIAGNOSIS — G47.33 OSA (OBSTRUCTIVE SLEEP APNEA): ICD-10-CM

## 2024-08-27 DIAGNOSIS — R79.89 LOW VITAMIN D LEVEL: Primary | ICD-10-CM

## 2024-08-27 DIAGNOSIS — F11.11 HISTORY OF OPIOID ABUSE: ICD-10-CM

## 2024-08-27 DIAGNOSIS — E66.01 MORBID OBESITY: ICD-10-CM

## 2024-08-27 LAB
OHS QRS DURATION: 86 MS
OHS QTC CALCULATION: 408 MS

## 2024-08-27 PROCEDURE — A9698 NON-RAD CONTRAST MATERIALNOC: HCPCS | Performed by: NURSE PRACTITIONER

## 2024-08-27 PROCEDURE — 74240 X-RAY XM UPR GI TRC 1CNTRST: CPT | Mod: TC

## 2024-08-27 PROCEDURE — 25500020 PHARM REV CODE 255: Performed by: NURSE PRACTITIONER

## 2024-08-27 RX ORDER — ERGOCALCIFEROL 1.25 MG/1
50000 CAPSULE ORAL
Qty: 24 CAPSULE | Refills: 0 | Status: SHIPPED | OUTPATIENT
Start: 2024-08-29

## 2024-08-27 RX ADMIN — BARIUM SULFATE 100 ML: 0.6 SUSPENSION ORAL at 10:08

## 2024-08-27 RX ADMIN — BARIUM SULFATE 100 ML: 980 POWDER, FOR SUSPENSION ORAL at 10:08

## 2024-08-29 ENCOUNTER — PATIENT MESSAGE (OUTPATIENT)
Dept: BARIATRICS | Facility: CLINIC | Age: 29
End: 2024-08-29
Payer: MEDICARE

## 2024-08-29 NOTE — TELEPHONE ENCOUNTER
Called [facility] [phone] and stated I have an EGD referral and the pt is requesting for it to be completed at [facility].  The staff member was unfamiliar with EGDs or Endoscopy and transferred me. I was sent to an automated message which provided a fax number for referrals and orders: [phone].    Called and spoke with the pt.  Updated her.  She will reach out to the Bariatric clinic if [facility] does not have an option for EGD at that location or she does not hear back from them. Informed her that I will send her the referral in a portal message if needed to have scheduled at her preferred location.

## 2024-09-03 ENCOUNTER — PATIENT MESSAGE (OUTPATIENT)
Dept: BARIATRICS | Facility: CLINIC | Age: 29
End: 2024-09-03
Payer: MEDICARE

## 2024-09-09 ENCOUNTER — HOSPITAL ENCOUNTER (OUTPATIENT)
Dept: RADIOLOGY | Facility: HOSPITAL | Age: 29
Discharge: HOME | End: 2024-09-09
Attending: NURSE PRACTITIONER
Payer: MEDICARE

## 2024-09-09 ENCOUNTER — OFFICE VISIT (OUTPATIENT)
Dept: FAMILY MEDICINE | Facility: CLINIC | Age: 29
End: 2024-09-09
Payer: MEDICARE

## 2024-09-09 VITALS
RESPIRATION RATE: 18 BRPM | TEMPERATURE: 98 F | WEIGHT: 293 LBS | OXYGEN SATURATION: 98 % | SYSTOLIC BLOOD PRESSURE: 138 MMHG | BODY MASS INDEX: 51.91 KG/M2 | HEIGHT: 63 IN | DIASTOLIC BLOOD PRESSURE: 88 MMHG | HEART RATE: 74 BPM

## 2024-09-09 DIAGNOSIS — R79.9 ABNORMAL FINDING OF BLOOD CHEMISTRY, UNSPECIFIED: ICD-10-CM

## 2024-09-09 DIAGNOSIS — F11.11 HISTORY OF OPIOID ABUSE: ICD-10-CM

## 2024-09-09 DIAGNOSIS — E66.01 MORBID OBESITY: ICD-10-CM

## 2024-09-09 DIAGNOSIS — Z01.818 PREOPERATIVE CLEARANCE: Primary | ICD-10-CM

## 2024-09-09 DIAGNOSIS — R79.89 LOW VITAMIN D LEVEL: ICD-10-CM

## 2024-09-09 DIAGNOSIS — G43.E09 CHRONIC MIGRAINE WITH AURA WITHOUT STATUS MIGRAINOSUS, NOT INTRACTABLE: ICD-10-CM

## 2024-09-09 DIAGNOSIS — E55.9 VITAMIN D DEFICIENCY: ICD-10-CM

## 2024-09-09 DIAGNOSIS — G47.33 OSA (OBSTRUCTIVE SLEEP APNEA): ICD-10-CM

## 2024-09-09 PROBLEM — K59.03 DRUG-INDUCED CONSTIPATION: Status: RESOLVED | Noted: 2024-03-04 | Resolved: 2024-09-09

## 2024-09-09 LAB
25(OH)D3+25(OH)D2 SERPL-MCNC: 19 NG/ML (ref 30–80)
ALBUMIN SERPL-MCNC: 3.6 G/DL (ref 3.5–5)
ALBUMIN/GLOB SERPL: 0.9 RATIO (ref 1.1–2)
ALP SERPL-CCNC: 106 UNIT/L (ref 40–150)
ALT SERPL-CCNC: 20 UNIT/L (ref 0–55)
ANION GAP SERPL CALC-SCNC: 10 MEQ/L
AST SERPL-CCNC: 19 UNIT/L (ref 5–34)
BACTERIA #/AREA URNS AUTO: ABNORMAL /HPF
BASOPHILS # BLD AUTO: 0.03 X10(3)/MCL
BASOPHILS NFR BLD AUTO: 0.4 %
BILIRUB SERPL-MCNC: 0.4 MG/DL
BILIRUB UR QL STRIP.AUTO: NEGATIVE
BUN SERPL-MCNC: 14.8 MG/DL (ref 7–18.7)
CALCIUM SERPL-MCNC: 9.9 MG/DL (ref 8.4–10.2)
CHLORIDE SERPL-SCNC: 106 MMOL/L (ref 98–107)
CHOLEST SERPL-MCNC: 227 MG/DL
CHOLEST/HDLC SERPL: 5 {RATIO} (ref 0–5)
CLARITY UR: ABNORMAL
CO2 SERPL-SCNC: 23 MMOL/L (ref 22–29)
COLOR UR AUTO: ABNORMAL
CREAT SERPL-MCNC: 0.98 MG/DL (ref 0.55–1.02)
CREAT/UREA NIT SERPL: 15
EOSINOPHIL # BLD AUTO: 0.15 X10(3)/MCL (ref 0–0.9)
EOSINOPHIL NFR BLD AUTO: 1.8 %
ERYTHROCYTE [DISTWIDTH] IN BLOOD BY AUTOMATED COUNT: 13.2 % (ref 11.5–17)
EST. AVERAGE GLUCOSE BLD GHB EST-MCNC: 105.4 MG/DL
GFR SERPLBLD CREATININE-BSD FMLA CKD-EPI: >60 ML/MIN/1.73/M2
GLOBULIN SER-MCNC: 3.9 GM/DL (ref 2.4–3.5)
GLUCOSE SERPL-MCNC: 102 MG/DL (ref 74–100)
GLUCOSE UR QL STRIP: NORMAL
HBA1C MFR BLD: 5.3 %
HCT VFR BLD AUTO: 40.3 % (ref 37–47)
HDLC SERPL-MCNC: 42 MG/DL (ref 35–60)
HGB BLD-MCNC: 13.4 G/DL (ref 12–16)
HGB UR QL STRIP: NEGATIVE
HIV 1+2 AB+HIV1 P24 AG SERPL QL IA: NONREACTIVE
HYALINE CASTS #/AREA URNS LPF: ABNORMAL /LPF
IMM GRANULOCYTES # BLD AUTO: 0.05 X10(3)/MCL (ref 0–0.04)
IMM GRANULOCYTES NFR BLD AUTO: 0.6 %
KETONES UR QL STRIP: NEGATIVE
LDLC SERPL CALC-MCNC: 114 MG/DL (ref 50–140)
LEUKOCYTE ESTERASE UR QL STRIP: 250
LYMPHOCYTES # BLD AUTO: 2.1 X10(3)/MCL (ref 0.6–4.6)
LYMPHOCYTES NFR BLD AUTO: 25 %
MCH RBC QN AUTO: 28 PG (ref 27–31)
MCHC RBC AUTO-ENTMCNC: 33.3 G/DL (ref 33–36)
MCV RBC AUTO: 84.3 FL (ref 80–94)
MONOCYTES # BLD AUTO: 0.62 X10(3)/MCL (ref 0.1–1.3)
MONOCYTES NFR BLD AUTO: 7.4 %
NEUTROPHILS # BLD AUTO: 5.45 X10(3)/MCL (ref 2.1–9.2)
NEUTROPHILS NFR BLD AUTO: 64.8 %
NITRITE UR QL STRIP: NEGATIVE
NRBC BLD AUTO-RTO: 0 %
PH UR STRIP: 6 [PH]
PLATELET # BLD AUTO: 259 X10(3)/MCL (ref 130–400)
PMV BLD AUTO: 10.7 FL (ref 7.4–10.4)
POTASSIUM SERPL-SCNC: 4.2 MMOL/L (ref 3.5–5.1)
PROT SERPL-MCNC: 7.5 GM/DL (ref 6.4–8.3)
PROT UR QL STRIP: NEGATIVE
RBC # BLD AUTO: 4.78 X10(6)/MCL (ref 4.2–5.4)
RBC #/AREA URNS AUTO: ABNORMAL /HPF
SODIUM SERPL-SCNC: 139 MMOL/L (ref 136–145)
SP GR UR STRIP.AUTO: 1.02 (ref 1–1.03)
SQUAMOUS #/AREA URNS LPF: ABNORMAL /HPF
T PALLIDUM AB SER QL: NONREACTIVE
TRIGL SERPL-MCNC: 353 MG/DL (ref 37–140)
UROBILINOGEN UR STRIP-ACNC: NORMAL
VLDLC SERPL CALC-MCNC: 71 MG/DL
WBC # BLD AUTO: 8.4 X10(3)/MCL (ref 4.5–11.5)
WBC #/AREA URNS AUTO: ABNORMAL /HPF

## 2024-09-09 PROCEDURE — 36415 COLL VENOUS BLD VENIPUNCTURE: CPT | Performed by: NURSE PRACTITIONER

## 2024-09-09 PROCEDURE — 71046 X-RAY EXAM CHEST 2 VIEWS: CPT | Mod: TC,PN

## 2024-09-09 PROCEDURE — 99213 OFFICE O/P EST LOW 20 MIN: CPT | Mod: PBBFAC,PN | Performed by: NURSE PRACTITIONER

## 2024-09-09 PROCEDURE — 83036 HEMOGLOBIN GLYCOSYLATED A1C: CPT | Performed by: NURSE PRACTITIONER

## 2024-09-09 PROCEDURE — 82306 VITAMIN D 25 HYDROXY: CPT | Performed by: NURSE PRACTITIONER

## 2024-09-09 PROCEDURE — 99214 OFFICE O/P EST MOD 30 MIN: CPT | Mod: S$PBB,,, | Performed by: NURSE PRACTITIONER

## 2024-09-09 PROCEDURE — 80061 LIPID PANEL: CPT | Performed by: NURSE PRACTITIONER

## 2024-09-09 PROCEDURE — 87389 HIV-1 AG W/HIV-1&-2 AB AG IA: CPT | Performed by: NURSE PRACTITIONER

## 2024-09-09 PROCEDURE — 81001 URINALYSIS AUTO W/SCOPE: CPT | Performed by: NURSE PRACTITIONER

## 2024-09-09 PROCEDURE — 85025 COMPLETE CBC W/AUTO DIFF WBC: CPT | Performed by: NURSE PRACTITIONER

## 2024-09-09 PROCEDURE — 86780 TREPONEMA PALLIDUM: CPT | Performed by: NURSE PRACTITIONER

## 2024-09-09 PROCEDURE — 80053 COMPREHEN METABOLIC PANEL: CPT | Performed by: NURSE PRACTITIONER

## 2024-09-09 RX ORDER — ERGOCALCIFEROL 1.25 MG/1
50000 CAPSULE ORAL
Qty: 24 CAPSULE | Refills: 0 | Status: SHIPPED | OUTPATIENT
Start: 2024-09-09

## 2024-09-09 NOTE — ASSESSMENT & PLAN NOTE
Educated on increasing foods high in Vitamin D such as fish oil, cod liver oil, salmon, milk fortified with vitamin D.  RX Vitamin D3 10212 IU weekly x 12 weeks.  Complete entire 12 weeks of Vitamin D prescription.  After completion of prescription (12 weeks/3 months), begin taking Vitamin D 2000 I.U. tablets daily (purchase over the counter).  Repeat Vitamin D level as ordered.

## 2024-09-09 NOTE — PROGRESS NOTES
Patient Name: Alexandra Martin     : 1995    MRN: [id number]     Subjective:     Patient ID: Alexandra Martin is a 28 y.o. female.    Chief Complaint:   Chief Complaint   Patient presents with    Follow-up     Pt is here for follow up. Pt request a letter for bariatric surgery.         HPI: 24:  Pt here for clearance letter for bariatric surgery.          ROS:      Review of Systems   Constitutional: Negative.    HENT: Negative.     Eyes: Negative.    Respiratory: Negative.     Cardiovascular: Negative.    Gastrointestinal: Negative.    Genitourinary: Negative.    Musculoskeletal: Negative.    Skin: Negative.    Neurological: Negative.    Endo/Heme/Allergies: Negative.    Psychiatric/Behavioral: Negative.     All other systems reviewed and are negative.           History:     Past Medical History:   Diagnosis Date    ADD (attention deficit disorder)     Anxiety disorder     Depression     Drug-induced constipation 2024    Headache     History of ovarian cyst     MDD (major depressive disorder), recurrent episode, moderate 2021    Obesity     Substance abuse     Hospitalization     Trauma     not at this time. 19    Tympanic membrane perforation, left 2018    UTI (urinary tract infection) 2018    Vertigo         Past Surgical History:   Procedure Laterality Date    CYSTOSCOPY W/ URETERAL STENT PLACEMENT Right 2019    Procedure: CYSTOSCOPY, WITH URETERAL STENT INSERTION;  Surgeon: Rito Medley MD;  Location: [facility] OR;  Service: Urology;  Laterality: Right;    CYSTOSCOPY W/ URETERAL STENT REMOVAL Right 2020    Procedure: CYSTOSCOPY, WITH URETERAL STENT REMOVAL;  Surgeon: Rito Medley MD;  Location: [facility] OR;  Service: Urology;  Laterality: Right;    LASER LITHOTRIPSY Right 2020    Procedure: LITHOTRIPSY, USING LASER;  Surgeon: Rito Medley MD;  Location: [facility] OR;  Service: Urology;  Laterality: Right;    TONSILLECTOMY,  ADENOIDECTOMY, BILATERAL MYRINGOTOMY AND TUBES      URETEROSCOPIC REMOVAL OF URETERIC CALCULUS Right 1/6/2020    Procedure: REMOVAL, CALCULUS, URETER, URETEROSCOPIC;  Surgeon: Rito Medley MD;  Location: [facility] OR;  Service: Urology;  Laterality: Right;    URETEROSCOPY Left 12/26/2019    Procedure: URETEROSCOPY;  Surgeon: Rito Medley MD;  Location: [facility] OR;  Service: Urology;  Laterality: Left;    URETEROSCOPY Right 1/6/2020    Procedure: URETEROSCOPY;  Surgeon: Rito Medley MD;  Location: [facility] OR;  Service: Urology;  Laterality: Right;       Family History   Problem Relation Name Age of Onset    Breast cancer Maternal Aunt      Obesity Mother      No Known Problems Father      No Known Problems Brother      Colon cancer Neg Hx      Miscarriages / Stillbirths Neg Hx      Ovarian cancer Neg Hx          Social History     Tobacco Use    Smoking status: Former     Current packs/day: 0.25     Average packs/day: 0.3 packs/day for 14.7 years (3.7 ttl pk-yrs)     Types: Cigarettes     Start date: 2010    Smokeless tobacco: Never    Tobacco comments:     Quit smoking June 2024    Substance and Sexual Activity    Alcohol use: Not Currently     Alcohol/week: 0.0 standard drinks of alcohol    Drug use: Not Currently     Types: Marijuana, Fentanyl     Comment: history of opoid abuse    Sexual activity: Not Currently     Partners: Male     Birth control/protection: Implant       Current Outpatient Medications   Medication Instructions    ergocalciferol (ERGOCALCIFEROL) 50,000 Units, Oral, Twice weekly    etonogestreL (NEXPLANON) 68 mg, Subdermal, Once, A single NEXPLANON implant is inserted subdermally just under the skin at the inner side of the non-dominant upper arm.    hydrOXYzine pamoate (VISTARIL) 25 mg, Oral, Daily PRN    venlafaxine (EFFEXOR-XR) 150 mg, Oral        Review of patient's allergies indicates:   Allergen Reactions    Ceftriaxone Nausea And Vomiting    Azithromycin Nausea And Vomiting      "Hard to breathe     Latex, natural rubber Swelling and Rash       Objective:     Visit Vitals  /88 (BP Location: Left arm, Patient Position: Sitting, BP Method: Large (Automatic))   Pulse 74   Temp 98.1 °F (36.7 °C) (Oral)   Resp 18   Ht 5' 3" (1.6 m)   Wt (!) 160.1 kg (353 lb)   LMP  (LMP Unknown)   SpO2 98%   BMI 62.53 kg/m²       Physical Examination:     Physical Exam  Vitals reviewed.   Constitutional:       Appearance: Normal appearance. She is normal weight.   HENT:      Head: Normocephalic.   Cardiovascular:      Rate and Rhythm: Normal rate and regular rhythm.      Pulses: Normal pulses.      Heart sounds: Normal heart sounds.   Pulmonary:      Effort: Pulmonary effort is normal.      Breath sounds: Normal breath sounds.   Abdominal:      General: Abdomen is flat.      Palpations: Abdomen is soft.   Musculoskeletal:         General: Normal range of motion.      Cervical back: Normal range of motion.   Skin:     General: Skin is warm and dry.   Neurological:      Mental Status: She is alert.   Psychiatric:         Mood and Affect: Mood normal.         Lab Results:     Chemistry:  Lab Results   Component Value Date     08/27/2024    K 4.5 08/27/2024    BUN 11.1 08/27/2024    CREATININE 0.83 08/27/2024    EGFRNORACEVR >60 08/27/2024    GLUCOSE 100 08/27/2024    CALCIUM 9.4 08/27/2024    ALKPHOS 109 08/27/2024    LABPROT 6.7 08/27/2024    ALBUMIN 3.6 08/27/2024    BILIDIR 0.2 08/27/2024    IBILI 0.60 08/27/2024    AST 25 08/27/2024    ALT 27 08/27/2024    MG 2.00 08/27/2024    PHOS 3.7 08/27/2024    HAMULAPT63RP 16 (L) 08/27/2024    TSH 1.195 08/27/2024    DIHABZ4SYVH 0.87 08/27/2024        Lab Results   Component Value Date    HGBA1C 5.3 08/27/2024        Hematology:  Lab Results   Component Value Date    WBC 8.06 08/27/2024    HGB 13.0 08/27/2024    HCT 40.2 08/27/2024     08/27/2024       Lipid Panel:  Lab Results   Component Value Date    CHOL 158 01/25/2024    HDL 34 (L) 01/25/2024    " .00 01/25/2024    TRIG 112 01/25/2024    TOTALCHOLEST 5 01/25/2024        Urine:  Lab Results   Component Value Date    APPEARANCEUA Turbid (A) 08/15/2024    SGUA 1.038 (H) 08/15/2024    PROTEINUA 1+ (A) 08/15/2024    KETONESUA Trace (A) 08/15/2024    LEUKOCYTESUR 250 (A) 08/15/2024    RBCUA 0-5 08/15/2024    WBCUA 11-20 (A) 08/15/2024    BACTERIA None Seen 08/15/2024    SQEPUA Moderate (A) 08/15/2024    HYALINECASTS None Seen 08/25/2023    CREATRANDUR 78.5 02/27/2019        Assessment:          ICD-10-CM ICD-9-CM   1. Preoperative clearance  Z01.818 V72.84   2. Abnormal finding of blood chemistry, unspecified  R79.9 790.6   3. Morbid obesity  E66.01 278.01   4. Chronic migraine with aura without status migrainosus, not intractable  G43.E09 346.00   5. History of opioid abuse  F11.11 305.53   6. SAM (obstructive sleep apnea)  G47.33 327.23   7. Vitamin D deficiency  E55.9 268.9   8. Low vitamin D level  R79.89 790.6        Plan:     1. Preoperative clearance  -     Comprehensive Metabolic Panel  -     Hemoglobin A1C  -     IN OFFICE EKG 12-LEAD (to Muse)  -     X-Ray Chest PA And Lateral  -     CBC Auto Differential    2. Abnormal finding of blood chemistry, unspecified  -     Hemoglobin A1C  -     Urinalysis  -     HIV 1/2 Ag/Ab (4th Gen)  -     SYPHILIS ANTIBODY (WITH REFLEX RPR)  -     Lipid Panel    3. Morbid obesity  Overview:  Wt Readings from Last 3 Encounters:   09/09/24 1250 (!) 160.1 kg (353 lb)   08/26/24 1219 (!) 159.4 kg (351 lb 8 oz)   08/15/24 1435 113.4 kg (250 lb)         Assessment & Plan:  Is going through bariatric program for surgery currently.  Pt needs clearance.    BMI Body mass index is 62.53 kg/m².   Goal BMI <30.  Exercise 5 times a week for 30 minutes per day.  Avoid soda, simple sugars, excessive rice, potatoes or bread. Limit fast foods and fried foods.  Choose complex carbs in moderation (example: green vegetables, beans, oatmeal). Eat plenty of fresh fruits and vegetables with  lean meats daily.  Do not skip meals. Eat a balanced portion size.  Avoid fad diets. Consider permanent healthy life style changes.         Orders:  -     Urinalysis  -     HIV 1/2 Ag/Ab (4th Gen)  -     SYPHILIS ANTIBODY (WITH REFLEX RPR)  -     Lipid Panel    4. Chronic migraine with aura without status migrainosus, not intractable  Assessment & Plan:  Sees Neurology    Orders:  -     Urinalysis  -     HIV 1/2 Ag/Ab (4th Gen)  -     SYPHILIS ANTIBODY (WITH REFLEX RPR)  -     Lipid Panel    5. History of opioid abuse  Assessment & Plan:  Stable. In drug court    Orders:  -     Urinalysis  -     HIV 1/2 Ag/Ab (4th Gen)  -     SYPHILIS ANTIBODY (WITH REFLEX RPR)  -     Lipid Panel    6. SAM (obstructive sleep apnea)  Assessment & Plan:  Not treated.    Orders:  -     Urinalysis  -     HIV 1/2 Ag/Ab (4th Gen)  -     SYPHILIS ANTIBODY (WITH REFLEX RPR)  -     Lipid Panel    7. Vitamin D deficiency  Assessment & Plan:  Educated on increasing foods high in Vitamin D such as fish oil, cod liver oil, salmon, milk fortified with vitamin D.  RX Vitamin D3 60646 IU weekly x 12 weeks.  Complete entire 12 weeks of Vitamin D prescription.  After completion of prescription (12 weeks/3 months), begin taking Vitamin D 2000 I.U. tablets daily (purchase over the counter).  Repeat Vitamin D level as ordered.      Orders:  -     Vitamin D    8. Low vitamin D level  -     ergocalciferol (ERGOCALCIFEROL) 50,000 unit Cap; Take 1 capsule (50,000 Units total) by mouth twice a week.  Dispense: 24 capsule; Refill: 0         Follow up in about 3 months (around 12/9/2024) for Wellness.    Future Appointments   Date Time Provider Department Center   9/11/2024 11:30 AM DEMAR, PSYCHIATRY TESTING [facility] Geovany Levine   9/17/2024  3:00 PM Paul Perez [facility]   9/26/2024  9:00 AM Elizabeth Mann RD [facility] YUE Cortes

## 2024-09-09 NOTE — ASSESSMENT & PLAN NOTE
Is going through bariatric program for surgery currently.  Pt needs clearance.    BMI Body mass index is 62.53 kg/m².   Goal BMI <30.  Exercise 5 times a week for 30 minutes per day.  Avoid soda, simple sugars, excessive rice, potatoes or bread. Limit fast foods and fried foods.  Choose complex carbs in moderation (example: green vegetables, beans, oatmeal). Eat plenty of fresh fruits and vegetables with lean meats daily.  Do not skip meals. Eat a balanced portion size.  Avoid fad diets. Consider permanent healthy life style changes.

## 2024-09-10 ENCOUNTER — PATIENT MESSAGE (OUTPATIENT)
Dept: BARIATRICS | Facility: CLINIC | Age: 29
End: 2024-09-10
Payer: MEDICARE

## 2024-09-10 DIAGNOSIS — E78.2 MIXED HYPERLIPIDEMIA: Primary | ICD-10-CM

## 2024-09-10 RX ORDER — ROSUVASTATIN CALCIUM 10 MG/1
10 TABLET, COATED ORAL DAILY
Qty: 30 TABLET | Refills: 11 | Status: SHIPPED | OUTPATIENT
Start: 2024-09-10 | End: 2025-09-10

## 2024-09-10 NOTE — PROGRESS NOTES
I have sent medications and/or lab orders in for this patient.  Please notify the patient.     No orders of the defined types were placed in this encounter.      Medications Ordered This Encounter   Medications    rosuvastatin (CRESTOR) 10 MG tablet     Sig: Take 1 tablet (10 mg total) by mouth once daily.     Dispense:  30 tablet     Refill:  11

## 2024-09-17 ENCOUNTER — OFFICE VISIT (OUTPATIENT)
Dept: PSYCHIATRY | Facility: CLINIC | Age: 29
End: 2024-09-17
Payer: MEDICARE

## 2024-09-17 DIAGNOSIS — E66.01 MORBID OBESITY: Primary | ICD-10-CM

## 2024-09-17 NOTE — PROGRESS NOTES
The patient location is: [place]  The chief complaint leading to consultation is: Psychological evaluation to rule out contraindications to bariatric surgery    Visit type: audiovisual    Face to Face time with patient: 5  10 minutes of total time spent on the encounter, which includes face to face time and non-face to face time preparing to see the patient (eg, review of tests), Obtaining and/or reviewing separately obtained history, Documenting clinical information in the electronic or other health record, Independently interpreting results (not separately reported) and communicating results to the patient/family/caregiver, or Care coordination (not separately reported).         Each patient to whom he or she provides medical services by telemedicine is:  (1) informed of the relationship between the physician and patient and the respective role of any other health care provider with respect to management of the patient; and (2) notified that he or she may decline to receive medical services by telemedicine and may withdraw from such care at any time.    Notes: Patient was scheduled to complete MMPI assessment on Wednesday 9/10 but had to reschedule on account of inclement weather. Discussed with patient rescheduling the assessment and then continuing the psychological evaluation once the clinician had access to the MMPI assessment report. Patient was agreeable to this solution.

## 2024-09-23 ENCOUNTER — CLINICAL SUPPORT (OUTPATIENT)
Dept: PSYCHIATRY | Facility: CLINIC | Age: 29
End: 2024-09-23
Payer: MEDICARE

## 2024-09-23 DIAGNOSIS — Z00.8 ENCOUNTER FOR PRE-SURGICAL PSYCHOLOGICAL ASSESSMENT: Primary | ICD-10-CM
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
## 2024-09-24 ENCOUNTER — OFFICE VISIT (OUTPATIENT)
Dept: PSYCHIATRY | Facility: CLINIC | Age: 29
End: 2024-09-24
Payer: MEDICARE

## 2024-09-24 DIAGNOSIS — E66.01 MORBID OBESITY: ICD-10-CM

## 2024-09-24 DIAGNOSIS — Z01.818 PREOPERATIVE EVALUATION TO RULE OUT SURGICAL CONTRAINDICATION: Primary | ICD-10-CM

## 2024-09-26 ENCOUNTER — CLINICAL SUPPORT (OUTPATIENT)
Dept: BARIATRICS | Facility: CLINIC | Age: 29
End: 2024-09-26
Payer: MEDICARE

## 2024-09-26 DIAGNOSIS — G47.33 OSA (OBSTRUCTIVE SLEEP APNEA): ICD-10-CM

## 2024-09-26 DIAGNOSIS — Z71.3 DIETARY COUNSELING AND SURVEILLANCE: Primary | ICD-10-CM

## 2024-09-26 DIAGNOSIS — E66.01 MORBID OBESITY WITH BMI OF 60.0-69.9, ADULT: ICD-10-CM

## 2024-09-26 NOTE — PROGRESS NOTES
The patient location is: home ([place])  The chief complaint leading to consultation is: 3 months medically supervised diet counseling and surveillance pending laproscopic TOMMIE-S  Visit type: audiovisual     Face to Face time with patient: 20 min    30 minutes of total time spent on the encounter, which includes face to face time and non-face to face time preparing to see the patient (eg, review of tests), Obtaining and/or reviewing separately obtained history, Documenting clinical information in the electronic or other health record, Independently interpreting results (not separately reported) and communicating results to the patient/family/caregiver, or Care coordination (not separately reported).       Each patient to whom he or she provides medical services by telemedicine is:  (1) informed of the relationship between the physician and patient and the respective role of any other health care provider with respect to management of the patient; and (2) notified that he or she may decline to receive medical services by telemedicine and may withdraw from such care at any time.    NUTRITION NOTE    Referring Physician: Chivo Pena M.D.   Reason for MNT Referral: 3 months medically supervised diet counseling and surveillance pending laproscopic TOMMIE-S    Patient presents for follow-up visit for MSD with weight not available over the past month    CLINICAL DATA:  28 y.o. female.    Initial weight: 351 lbs  Current weight: NA lbs  Weight change since last visit: NA lbs  BMI: NA    DAILY NUTRITIONAL NEEDS: pre-op nutritional bariatric guidelines to promote weight loss  2682-3806 Calories    Grams Protein    CURRENT DIET:  Regular diet.  Diet Recall: Food records are not present.  Greatest challenge: eating out/delivery frequency, portion control, irregular meal patterns, high-fat diet, and sugar-sweetened beverages  Update: PT reports drinking more water, noticed that eating more protein resulted in feeling less  hunger, eating out less/cooking more    Current diet recall:      B: Protein shake, or eggs or oatmeal  L:  Healthy Choice Cafe [employer]  D: Pot roast, broccoli, baked chicken, green beans, or protein pasta with mushrooms, zucchini  S: Power Crunch bar, protein chips     Diet Includes: 3 meals  Meal Pattern: Improved.  Protein Supplements: 1 per day.  Snacking: Adequate. Snacks include healthy choices.  Vegetables: Likes a few. Eats almost daily. Broccoli, green beans, zucchini, squash, onion, cabbage  Fruits: Likes a variety. Eats daily. Any  Beverages: water, sugar-free beverages/powders/drops, juices, and Coke 4-5 mini cans  Dining out/delivery:  1 x week  Mostly fast food. Chicken caesar salad, garlic knots  Cooking at home:  Almost daily;  Mostly baked/roast, boiled, air-fried, and microwaved fish/seafood and chicken/turkey, vegetables Cabbage, green beans, baked chicken, salmon, shrimp, pot roast, protein pasta with mushrooms and zucchini     Food Allergies:   NKFA  Lactose intolerance - no milk or yogurt, cheese ok     Vitamins / Minerals / Herbs:   Multivitamin  Vit D    Labs:   Reviewed    Exercise:  Walking about 10-15 mins, 2-3 x week     Restrictions to exercise: back, legs, feet     Social:  2-4 days 6pm-6am , including weekends  Lives with 9 year old daughter.  Grocery shopping and food prep PT.  Patient believes the household will be supportive after surgery.  Alcohol: None.  Smoking: None.    ASSESSMENT:  Patient demonstrates some willingness/progress to change lifestyle habits as evidenced by dietary changes, including protein drinks, reduced eating out/delivery, better choices when eating out/delivery, more food preparation at home, and healthier snacking    Doing well with working on greatest challenges ( eating out/delivery frequency, portion control, irregular meal patterns, high-fat diet, and sugar-sweetened beverages ).    Barriers to Education:  none  Stage of Change:  action    NUTRITION  DIAGNOSIS:  Morbid Obesity related to Excessive carbohydrate intake, Excessive calorie intake, and Physical inactivity as evidence by BMI.  Obesity Status: Same    BARIATRIC DIET DISCUSSION:  Discussed diet after surgery and related to patient's food record.  Reviewed nutrition guidelines for before and after surgery.  Answered all questions.  Discussed tracking 2-3 days per week to ensure PT is meeting protein goal and staying within calorie range  Looked at nutrition facts for Healthy Choice meals and Pure Protein pop chips    PT plan  Increase physical activity    BARIATRIC PLAN/RECOMMENDATIONS:  Pt is a potential candidate for bariatric surgery and needs additional medically supervised diet counseling and surveillance    Diet: Maintain diet plan.  Continue to review Bariatric Nutrition Guidebook at home and call with any questions.    Exercise: Increase.    Behavior Modification: Begin to document food & activity logs daily.    Return to clinic in one month.    Needs additional visits with RD.    Communicated nutrition plan with bariatric team.    SESSION TIME:  30 minutes

## 2024-10-01 ENCOUNTER — PATIENT MESSAGE (OUTPATIENT)
Dept: BARIATRICS | Facility: CLINIC | Age: 29
End: 2024-10-01
Payer: MEDICARE

## 2024-10-03 ENCOUNTER — DOCUMENTATION ONLY (OUTPATIENT)
Dept: BARIATRICS | Facility: CLINIC | Age: 29
End: 2024-10-03
Payer: MEDICARE

## 2024-10-08 ENCOUNTER — PATIENT MESSAGE (OUTPATIENT)
Dept: BARIATRICS | Facility: CLINIC | Age: 29
End: 2024-10-08
Payer: MEDICARE

## 2024-10-11 ENCOUNTER — OFFICE VISIT (OUTPATIENT)
Dept: URGENT CARE | Facility: CLINIC | Age: 29
End: 2024-10-11
Payer: MEDICARE

## 2024-10-11 VITALS
HEIGHT: 63 IN | WEIGHT: 293 LBS | TEMPERATURE: 98 F | HEART RATE: 99 BPM | SYSTOLIC BLOOD PRESSURE: 140 MMHG | OXYGEN SATURATION: 99 % | BODY MASS INDEX: 51.91 KG/M2 | DIASTOLIC BLOOD PRESSURE: 80 MMHG | RESPIRATION RATE: 20 BRPM

## 2024-10-11 DIAGNOSIS — R19.7 DIARRHEA, UNSPECIFIED TYPE: ICD-10-CM

## 2024-10-11 DIAGNOSIS — R11.0 NAUSEA: Primary | ICD-10-CM

## 2024-10-11 RX ORDER — ONDANSETRON 4 MG/1
8 TABLET, ORALLY DISINTEGRATING ORAL
Status: COMPLETED | OUTPATIENT
Start: 2024-10-11 | End: 2024-10-11

## 2024-10-11 RX ORDER — ONDANSETRON 4 MG/1
8 TABLET, ORALLY DISINTEGRATING ORAL EVERY 8 HOURS PRN
Qty: 10 TABLET | Refills: 0 | Status: SHIPPED | OUTPATIENT
Start: 2024-10-11

## 2024-10-11 RX ADMIN — ONDANSETRON 8 MG: 4 TABLET, ORALLY DISINTEGRATING ORAL at 05:10

## 2024-10-11 NOTE — PATIENT INSTRUCTIONS
Medication sent to pharmacy  Hydrate with Pedialyte, Gatorade or powerade. When you decide to eat, keep your diet simple and bland. Bread, crackers, bananas, rice or broth for example. You can take immodium over the counter for non-bloody diarrhea. If there is blood in your diarrhea or vomit, seek medical attention immediately. If you have abdominal pain or worsening of your abdominal pain, seek medical attention immediately in the emergency department. If you have worsening diarrhea, seek medical attention immediately, you will need stool studies at that time.

## 2024-10-11 NOTE — PROGRESS NOTES
"Subjective:      Patient ID: Alexandra Martin is a 28 y.o. female.    Vitals:  height is 5' 3" (1.6 m) and weight is 160.1 kg (353 lb) (abnormal). Her temperature is 97.6 °F (36.4 °C). Her blood pressure is 140/80 (abnormal) and her pulse is 99. Her respiration is 20 and oxygen saturation is 99%.     Chief Complaint: Nausea     Patient is a 28 y.o. female who presents to urgent care with complaints of headache, nausea, diarrhea x Sunday . Alleviating factors include none.  Since Sunday she reports 2-3 episodes of diarrhea per day.  Denies blood or mucus in stool.  She has had only 1 episode of vomiting that was today, but has had nausea intermittently each day since Sunday.  Denies any abdominal pain, fever, recent travel, recent antibiotic use, syncope or malaise.         Constitution: Negative for chills, sweating, fatigue and fever.   HENT: Negative.     Neck: neck negative.   Cardiovascular: Negative.    Eyes: Negative.    Respiratory: Negative.     Gastrointestinal:  Positive for nausea, vomiting, diarrhea and hemorrhoids. Negative for abdominal pain, abdominal bloating, constipation, bright red blood in stool, dark colored stools and bowel incontinence.   Endocrine: negative.   Genitourinary: Negative.    Musculoskeletal: Negative.    Skin: Negative.    Allergic/Immunologic: Negative.    Neurological: Negative.  Negative for disorientation and altered mental status.   Hematologic/Lymphatic: Negative.    Psychiatric/Behavioral:  Negative for altered mental status, disorientation and confusion.       Objective:     Physical Exam   Constitutional: She is oriented to person, place, and time. She appears well-developed.   HENT:   Head: Normocephalic and atraumatic.   Ears:   Right Ear: External ear normal.   Left Ear: External ear normal.   Nose: Nose normal.   Mouth/Throat: Mucous membranes are normal.   Eyes: Conjunctivae and lids are normal.   Neck: Trachea normal. Neck supple.   Cardiovascular: Normal " rate, regular rhythm and normal heart sounds.   Pulmonary/Chest: Effort normal and breath sounds normal. No respiratory distress.   Abdominal: Normal appearance and bowel sounds are normal. She exhibits no distension and no mass. Soft. There is no abdominal tenderness. There is no rebound, no guarding, no left CVA tenderness and no right CVA tenderness.   Musculoskeletal: Normal range of motion.         General: Normal range of motion.   Neurological: She is alert and oriented to person, place, and time. She has normal strength.   Skin: Skin is warm, dry, intact, not diaphoretic and not pale.   Psychiatric: Her speech is normal and behavior is normal. Judgment and thought content normal.   Nursing note and vitals reviewed.         Previous History      Review of patient's allergies indicates:   Allergen Reactions    Ceftriaxone Nausea And Vomiting    Azithromycin Nausea And Vomiting     Hard to breathe     Latex, natural rubber Swelling and Rash       Past Medical History:   Diagnosis Date    ADD (attention deficit disorder)     Anxiety disorder     Depression     Drug-induced constipation 03/04/2024    Headache     History of ovarian cyst     MDD (major depressive disorder), recurrent episode, moderate 07/29/2021    Obesity     Substance abuse     Hospitalization     Trauma     not at this time. 9/11/19    Tympanic membrane perforation, left 03/14/2018    UTI (urinary tract infection) 08/03/2018    Vertigo      Current Outpatient Medications   Medication Instructions    ergocalciferol (ERGOCALCIFEROL) 50,000 Units, Oral, Twice weekly    etonogestreL (NEXPLANON) 68 mg, Once    hydrOXYzine pamoate (VISTARIL) 25 mg, Daily PRN    ondansetron (ZOFRAN-ODT) 8 mg, Oral, Every 8 hours PRN    rosuvastatin (CRESTOR) 10 mg, Oral, Daily    venlafaxine (EFFEXOR-XR) 150 mg     Past Surgical History:   Procedure Laterality Date    CYSTOSCOPY W/ URETERAL STENT PLACEMENT Right 12/26/2019    Procedure: CYSTOSCOPY, WITH URETERAL STENT  "INSERTION;  Surgeon: Rito Medley MD;  Location: [facility] OR;  Service: Urology;  Laterality: Right;    CYSTOSCOPY W/ URETERAL STENT REMOVAL Right 1/6/2020    Procedure: CYSTOSCOPY, WITH URETERAL STENT REMOVAL;  Surgeon: Rito Medley MD;  Location: [facility] OR;  Service: Urology;  Laterality: Right;    LASER LITHOTRIPSY Right 1/6/2020    Procedure: LITHOTRIPSY, USING LASER;  Surgeon: Rito Medley MD;  Location: [facility];  Service: Urology;  Laterality: Right;    TONSILLECTOMY, ADENOIDECTOMY, BILATERAL MYRINGOTOMY AND TUBES      URETEROSCOPIC REMOVAL OF URETERIC CALCULUS Right 1/6/2020    Procedure: REMOVAL, CALCULUS, URETER, URETEROSCOPIC;  Surgeon: Rito Medley MD;  Location: [facility] OR;  Service: Urology;  Laterality: Right;    URETEROSCOPY Left 12/26/2019    Procedure: URETEROSCOPY;  Surgeon: Rito Medley MD;  Location: [facility];  Service: Urology;  Laterality: Left;    URETEROSCOPY Right 1/6/2020    Procedure: URETEROSCOPY;  Surgeon: Rito Medley MD;  Location: [facility] OR;  Service: Urology;  Laterality: Right;     Family History   Problem Relation Name Age of Onset    Breast cancer Maternal Aunt      Obesity Mother      No Known Problems Father      No Known Problems Brother      Colon cancer Neg Hx      Miscarriages / Stillbirths Neg Hx      Ovarian cancer Neg Hx         Social History     Tobacco Use    Smoking status: Former     Current packs/day: 0.25     Average packs/day: 0.3 packs/day for 14.8 years (3.7 ttl pk-yrs)     Types: Cigarettes     Start date: 2010    Smokeless tobacco: Never    Tobacco comments:     Quit smoking June 2024    Substance Use Topics    Alcohol use: Not Currently     Alcohol/week: 0.0 standard drinks of alcohol    Drug use: Not Currently     Types: Marijuana, Fentanyl     Comment: history of opoid abuse        Physical Exam      Vital Signs Reviewed   BP (!) 140/80   Pulse 99   Temp 97.6 °F (36.4 °C)   Resp 20   Ht 5' 3" (1.6 m)   Wt (!) 160.1 kg (353 " lb)   SpO2 99%   BMI 62.53 kg/m²        Procedures    Procedures     Labs     Results for orders placed or performed in visit on 09/09/24   Comprehensive Metabolic Panel    Collection Time: 09/09/24  1:35 PM   Result Value Ref Range    Sodium 139 136 - 145 mmol/L    Potassium 4.2 3.5 - 5.1 mmol/L    Chloride 106 98 - 107 mmol/L    CO2 23 22 - 29 mmol/L    Glucose 102 (H) 74 - 100 mg/dL    Blood Urea Nitrogen 14.8 7.0 - 18.7 mg/dL    Creatinine 0.98 0.55 - 1.02 mg/dL    Calcium 9.9 8.4 - 10.2 mg/dL    Protein Total 7.5 6.4 - 8.3 gm/dL    Albumin 3.6 3.5 - 5.0 g/dL    Globulin 3.9 (H) 2.4 - 3.5 gm/dL    Albumin/Globulin Ratio 0.9 (L) 1.1 - 2.0 ratio    Bilirubin Total 0.4 <=1.5 mg/dL     40 - 150 unit/L    ALT 20 0 - 55 unit/L    AST 19 5 - 34 unit/L    eGFR >60 mL/min/1.73/m2    Anion Gap 10.0 mEq/L    BUN/Creatinine Ratio 15    Hemoglobin A1C    Collection Time: 09/09/24  1:35 PM   Result Value Ref Range    Hemoglobin A1c 5.3 <=7.0 %    Estimated Average Glucose 105.4 mg/dL   CBC with Differential    Collection Time: 09/09/24  1:35 PM   Result Value Ref Range    WBC 8.40 4.50 - 11.50 x10(3)/mcL    RBC 4.78 4.20 - 5.40 x10(6)/mcL    Hgb 13.4 12.0 - 16.0 g/dL    Hct 40.3 37.0 - 47.0 %    MCV 84.3 80.0 - 94.0 fL    MCH 28.0 27.0 - 31.0 pg    MCHC 33.3 33.0 - 36.0 g/dL    RDW 13.2 11.5 - 17.0 %    Platelet 259 130 - 400 x10(3)/mcL    MPV 10.7 (H) 7.4 - 10.4 fL    Neut % 64.8 %    Lymph % 25.0 %    Mono % 7.4 %    Eos % 1.8 %    Basophil % 0.4 %    Lymph # 2.10 0.6 - 4.6 x10(3)/mcL    Neut # 5.45 2.1 - 9.2 x10(3)/mcL    Mono # 0.62 0.1 - 1.3 x10(3)/mcL    Eos # 0.15 0 - 0.9 x10(3)/mcL    Baso # 0.03 <=0.2 x10(3)/mcL    IG# 0.05 (H) 0 - 0.04 x10(3)/mcL    IG% 0.6 %    NRBC% 0.0 %   Urinalysis    Collection Time: 09/09/24  1:35 PM   Result Value Ref Range    Color, UA Light-Orange (A) Yellow, Light-Yellow, Dark Yellow, Shell, Straw    Appearance, UA Turbid (A) Clear    Specific Gravity, UA 1.024 1.005 - 1.030     pH, UA 6.0 5.0 - 8.5    Protein, UA Negative Negative    Glucose, UA Normal Negative, Normal    Ketones, UA Negative Negative    Blood, UA Negative Negative    Bilirubin, UA Negative Negative    Urobilinogen, UA Normal 0.2, 1.0, Normal    Nitrites, UA Negative Negative    Leukocyte Esterase,  (A) Negative    RBC, UA None Seen None Seen, 0-2, 3-5, 0-5 /HPF    WBC, UA 6-10 (A) None Seen, 0-2, 3-5, 0-5 /HPF    Bacteria, UA Occ (A) None Seen /HPF    Squamous Epithelial Cells, UA Moderate (A) None Seen /HPF    Hyaline Casts, UA None Seen None Seen /lpf   Vitamin D    Collection Time: 09/09/24  1:35 PM   Result Value Ref Range    Vitamin D 19 (L) 30 - 80 ng/mL   HIV 1/2 Ag/Ab (4th Gen)    Collection Time: 09/09/24  1:35 PM   Result Value Ref Range    HIV Nonreactive Nonreactive   SYPHILIS ANTIBODY (WITH REFLEX RPR)    Collection Time: 09/09/24  1:35 PM   Result Value Ref Range    Syphilis Antibody Nonreactive Nonreactive, Equivocal   Lipid Panel    Collection Time: 09/09/24  1:35 PM   Result Value Ref Range    Cholesterol Total 227 (H) <=200 mg/dL    HDL Cholesterol 42 35 - 60 mg/dL    Triglyceride 353 (H) 37 - 140 mg/dL    Cholesterol/HDL Ratio 5 0 - 5    Very Low Density Lipoprotein 71     LDL Cholesterol 114.00 50.00 - 140.00 mg/dL     *Note: Due to a large number of results and/or encounters for the requested time period, some results have not been displayed. A complete set of results can be found in Results Review.      Assessment:     1. Nausea    2. Diarrhea, unspecified type        Plan:       Medication sent to pharmacy  Hydrate with Pedialyte, Gatorade or powerade. When you decide to eat, keep your diet simple and bland. Bread, crackers, bananas, rice or broth for example. You can take immodium over the counter for non-bloody diarrhea. If there is blood in your diarrhea or vomit, seek medical attention immediately. If you have abdominal pain or worsening of your abdominal pain, seek medical attention  immediately in the emergency department. If you have worsening diarrhea, seek medical attention immediately, you will need stool studies at that time.      Nausea    Diarrhea, unspecified type    Other orders  -     ondansetron (ZOFRAN-ODT) 4 MG TbDL; Take 2 tablets (8 mg total) by mouth every 8 (eight) hours as needed (Nausea).  Dispense: 10 tablet; Refill: 0  -     ondansetron disintegrating tablet 8 mg

## 2024-10-15 ENCOUNTER — TELEPHONE (OUTPATIENT)
Dept: BARIATRICS | Facility: CLINIC | Age: 29
End: 2024-10-15
Payer: MEDICARE

## 2024-10-18 NOTE — TELEPHONE ENCOUNTER
Called and spoke with the pt. She requested what workup was left.  Reviewed wit her.  She would like her EGD moved up.  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  [employer] says insurance requires: (Letter from Pcp recommend weight loss surg).    Messaged Ashley Fajardo RN for follow up.

## 2024-10-24 ENCOUNTER — TELEPHONE (OUTPATIENT)
Dept: BARIATRICS | Facility: CLINIC | Age: 29
End: 2024-10-24
Payer: MEDICARE

## 2024-10-25 ENCOUNTER — CLINICAL SUPPORT (OUTPATIENT)
Dept: BARIATRICS | Facility: CLINIC | Age: 29
End: 2024-10-25
Payer: MEDICARE

## 2024-10-25 ENCOUNTER — TELEPHONE (OUTPATIENT)
Dept: ENDOSCOPY | Facility: HOSPITAL | Age: 29
End: 2024-10-25
Payer: MEDICARE

## 2024-10-25 DIAGNOSIS — Z12.11 SPECIAL SCREENING FOR MALIGNANT NEOPLASMS, COLON: Primary | ICD-10-CM

## 2024-10-25 DIAGNOSIS — E66.01 MORBID OBESITY WITH BMI OF 60.0-69.9, ADULT: ICD-10-CM

## 2024-10-25 DIAGNOSIS — G47.33 OSA (OBSTRUCTIVE SLEEP APNEA): ICD-10-CM

## 2024-10-25 DIAGNOSIS — Z71.3 DIETARY COUNSELING AND SURVEILLANCE: Primary | ICD-10-CM

## 2024-10-25 DIAGNOSIS — R11.0 NAUSEA WITHOUT VOMITING: ICD-10-CM

## 2024-10-25 NOTE — PROGRESS NOTES
The patient location is: home ([place])  The chief complaint leading to consultation is: 3 months medically supervised diet counseling and surveillance pending laproscopic TOMMIE-S  Visit type: audiovisual     Face to Face time with patient: 20 min    30 minutes of total time spent on the encounter, which includes face to face time and non-face to face time preparing to see the patient (eg, review of tests), Obtaining and/or reviewing separately obtained history, Documenting clinical information in the electronic or other health record, Independently interpreting results (not separately reported) and communicating results to the patient/family/caregiver, or Care coordination (not separately reported).       Each patient to whom he or she provides medical services by telemedicine is:  (1) informed of the relationship between the physician and patient and the respective role of any other health care provider with respect to management of the patient; and (2) notified that he or she may decline to receive medical services by telemedicine and may withdraw from such care at any time.    NUTRITION NOTE    Referring Physician: Chivo Pena M.D.   Reason for MNT Referral: 3 months medically supervised diet counseling and surveillance pending laproscopic TOMMIE-S    Patient presents for follow-up visit for MSD with weight gain over the past two months    PT reports schedule has changed and is now working day shifts    CLINICAL DATA:  28 y.o. female.    Initial weight: 351 lbs  Current weight: 353 lbs on 10/11/24  Weight change since last visit: +2 lbs  BMI: 62.53    DAILY NUTRITIONAL NEEDS: pre-op nutritional bariatric guidelines to promote weight loss  0820-9453 Calories    Grams Protein    CURRENT DIET:  Regular diet.  Diet Recall: Food records are not present.  Greatest challenge: eating out/delivery frequency, portion control, irregular meal patterns, high-fat diet, and sugar-sweetened beverages  Update: Increased  exercise    Current diet recall:      B: Protein shake  L:  Protein bar or chips (30 minute break) and a Coke Zero or diet Coke  D: Healthy Choice meal or protein pasta with meat sauce or baked fish with vegetables  S: Protein cookies     Diet Includes: 3 meals  Meal Pattern: Improved.  Protein Supplements: 1 per day. Fairlife, Fit Crunch, Built Bars, Power Crunch, Legendary chips, Quest protein cookies  Snacking: Adequate. Snacks include healthy choices.  Vegetables: Likes a few. Eats almost daily. Broccoli, green beans, zucchini, squash, onion, cabbage  Fruits: Likes a variety. Eats almost daily. Bananas, oranges  Beverages: water, sugar-free beverages/powders/drops, juices, and Coke 4-5 mini cans  Dining out/delivery: 1 x week  Mostly fast food. Chicken caesar salad, garlic knots  Cooking at home: Almost daily;  Mostly baked/roast, boiled, air-fried, and microwaved fish/seafood and chicken/turkey, vegetables Cabbage, green beans, baked chicken, salmon, shrimp, pot roast, protein pasta with mushrooms and zucchini     Food Allergies:   NKFA  Lactose intolerance - no milk or yogurt, cheese ok     Vitamins / Minerals / Herbs:   Multivitamin  Vit D     Labs:   Reviewed     Exercise:  Walking about 10-15 mins, 5 x week  Lifting weights 1-2 x week watching You Tube videos     Restrictions to exercise: back, legs, feet     Social:  6 days 7am-3:30pm  Lives with 9 year old daughter.  Grocery shopping and food prep PT.  Patient believes the household will be supportive after surgery.  Alcohol: None.  Smoking: None.    ASSESSMENT:  Patient demonstrates some willingness/progress to change lifestyle habits as evidenced by good/increased exercise, dietary changes, and including protein drinks    Doing well with working on greatest challenges ( eating out/delivery frequency, portion control, irregular meal patterns, high-fat diet, and sugar-sweetened beverages ).    Barriers to Education:  none  Stage of Change:   action    NUTRITION DIAGNOSIS:  Morbid Obesity related to Excessive carbohydrate intake, Excessive calorie intake, and Physical inactivity as evidence by BMI.  Obesity Status: Same    BARIATRIC DIET DISCUSSION:  Discussed diet after surgery and related to patient's food record.  Reviewed nutrition guidelines for before and after surgery.  Discussed having protein after working out  Answered all questions.    BARIATRIC PLAN/RECOMMENDATIONS:  Pt is a potential candidate for bariatric surgery and needs additional medically supervised diet counseling and surveillance    Diet: Maintain diet plan.  Continue to review Bariatric Nutrition Guidebook at home and call with any questions.    Exercise: Increase.    Behavior Modification: Begin to document food & activity logs daily.    Return to clinic in one month.    Needs additional visits with RD.    Communicated nutrition plan with bariatric team.    SESSION TIME:  30 minutes

## 2024-10-31 ENCOUNTER — TELEPHONE (OUTPATIENT)
Dept: BARIATRICS | Facility: CLINIC | Age: 29
End: 2024-10-31
Payer: MEDICARE

## 2024-10-31 ENCOUNTER — TELEPHONE (OUTPATIENT)
Dept: BARIATRICS | Facility: CLINIC | Age: 29
End: 2024-10-31

## 2024-10-31 NOTE — TELEPHONE ENCOUNTER
----- Message from Majo sent at 10/31/2024 12:20 PM CDT -----  Regarding: Missed call  Contact: [phone]  Caller is returning a missed called from Ashley Fajardo RN in the  office. Please call back as soon as possible.

## 2024-11-02 ENCOUNTER — PATIENT MESSAGE (OUTPATIENT)
Dept: BARIATRICS | Facility: CLINIC | Age: 29
End: 2024-11-02
Payer: MEDICARE

## 2024-11-07 ENCOUNTER — TELEPHONE (OUTPATIENT)
Dept: ENDOSCOPY | Facility: HOSPITAL | Age: 29
End: 2024-11-07
Payer: MEDICARE

## 2024-11-07 NOTE — TELEPHONE ENCOUNTER
Telephoned pt for precall for EGD scheduled 11/13/24.  Spoke with pt and she states she needs to reschedule procedure.  She had cholecystectomy on 11/5/24 with Dr. Gillespie at [facility] in [place].  Message sent to Endoscopy clearance nurse per protocol.  EGD rescheduled as below with Dr. Pena at [facility]:    Referral for procedure from [facility] originally     ----- Message -----  From: Ashley Fajardo RN  Sent: 10/24/2024  10:32 AM CDT  To: [facility] Endoscopy Schedulers  Subject: EGD for Dr. Pena - BARIATRIC SURGERY PRE T*    Good morning,    I reached out last week regarding scheduling Ms. Alexandra Martin for an EGD with Dr. Pena for Bariatric Surgery pre-testing. I originally requested a [place] day, however, this patient's BMI is too high for that facility. I am now requesting that she please be scheduled for her EGD at [facility] on 11/13/24. It appears as though he may have openings on that day. Please let me know if this is possible.    Thank you very much!      Ashley Fajardo, DEX  [facility] Coordinator      Spoke to pt to reschedule procedure(s) Upper Endoscopy (EGD)       Physician to perform procedure(s) Dr. RODOLFO Pena  Date of Procedure (s) 11/27/24  Arrival Time 11:30 AM  Time of Procedure(s) 12:30 PM   Location of Procedure(s) [address]  Type of Rx Prep sent to patient: N/A  Instructions provided to patient via [facility]    Patient was informed on the following information and verbalized understanding. Screening questionnaire reviewed with patient and complete. If procedure requires anesthesia, a responsible adult needs to be present to accompany the patient home, patient cannot drive after receiving anesthesia. Appointment details are tentative, especially check-in time. Patient will receive a prep-op call 7 days prior to confirm check-in time for procedure. If applicable the patient should contact their pharmacy to verify Rx for  procedure prep is ready for pick-up. Patient was advised to call the scheduling department at [phone] if pharmacy states no Rx is available. Patient was advised to call the endoscopy scheduling department if any questions or concerns arise.      [facility]oscopy Scheduling Department

## 2024-11-12 ENCOUNTER — PATIENT MESSAGE (OUTPATIENT)
Dept: BARIATRICS | Facility: CLINIC | Age: 29
End: 2024-11-12
Payer: MEDICARE

## 2024-11-18 ENCOUNTER — TELEPHONE (OUTPATIENT)
Dept: ENDOSCOPY | Facility: HOSPITAL | Age: 29
End: 2024-11-18
Payer: MEDICARE

## 2024-11-18 ENCOUNTER — TELEPHONE (OUTPATIENT)
Dept: BARIATRICS | Facility: CLINIC | Age: 29
End: 2024-11-18

## 2024-11-18 NOTE — TELEPHONE ENCOUNTER
----- Message from DEX Hernandez sent at 2024 11:33 AM CST -----  Regardin/27 CL  The patient is currently under an external Surgeon's Dr. Shobha Gillespie care and requires a surgical clearance for their upcoming scheduled Upper Endoscopy (EGD) on 24.     External provider information:    Physician name: Dr. Shobha Gillespie with General and Bariatric Surgery  Facility/Location: [facility], [address] [place] [address] Mandeep Vasquez, Erik. [address] [place] [id number]  Phone number: [phone]    Notes: Pt had cholecystectomy on 24 and has follow-up visit scheduled with Dr. Gillespie on 24

## 2024-11-18 NOTE — TELEPHONE ENCOUNTER
----- Message from Juliette sent at 11/18/2024 11:50 AM CST -----  Contact: pt @ [phone]  Alexandra Martin calling regarding Patient Advice (message) for #pt is returning call from nurse, asking for call back

## 2024-11-19 NOTE — TELEPHONE ENCOUNTER
Spoke with patient regarding Dr. Pena's need to change the date of her EGD. She asked once more for the purpose of the EGD. After explaining, she said that she has had issues with reflux in the past and recently and wishes to have a Bypass instead of the TOMMIE. Will advise Dr. Pena of patient's change of procedure as well as call endoscopy to cancel her EGD.

## 2024-11-20 ENCOUNTER — TELEPHONE (OUTPATIENT)
Dept: ENDOSCOPY | Facility: HOSPITAL | Age: 29
End: 2024-11-20
Payer: MEDICARE

## 2024-11-20 NOTE — TELEPHONE ENCOUNTER
Called pt to confirm egd for 11/27/24. Pt stated md cancelled this procedure see telephone encounter 11/18/24. Case request cancelled

## 2024-11-20 NOTE — TELEPHONE ENCOUNTER
----- Message from Renu sent at 11/20/2024 11:30 AM CST -----  Regarding: FW: Cancelling EGD    ----- Message -----  From: Ashley Fajardo RN  Sent: 11/19/2024   3:18 PM CST  To: [facility] Endoscopy Schedulers  Subject: Cancelling EGD                                   Good afternoon,    Ms. Martin is scheduled for an EGD with Dr. Pena on 11/27/24. It has been recently determined that this EGD is no longer necessary and can be cancelled.     Thank you very much.      Ashley Fajardo, DEX   [facility] Coordinator

## 2024-11-26 ENCOUNTER — CLINICAL SUPPORT (OUTPATIENT)
Dept: BARIATRICS | Facility: CLINIC | Age: 29
End: 2024-11-26
Payer: MEDICARE

## 2024-11-26 DIAGNOSIS — Z71.3 DIETARY COUNSELING AND SURVEILLANCE: Primary | ICD-10-CM

## 2024-11-26 DIAGNOSIS — G47.33 OSA (OBSTRUCTIVE SLEEP APNEA): ICD-10-CM

## 2024-11-26 DIAGNOSIS — E66.01 MORBID OBESITY WITH BMI OF 60.0-69.9, ADULT: ICD-10-CM

## 2024-11-26 NOTE — PROGRESS NOTES
NUTRITION NOTE    Referring Physician: Chivo Pena M.D.   Reason for MNT Referral: 3 months medically supervised diet counseling and surveillance pending laproscopic TOMMIE-S    Patient presents for follow-up visit for MSD with weight loss over the past month; 3 lbs total weight loss    PT reports she had her gallbladder removed on 11/5/24. PT states she has been eating bland foods and drinking more protein shakes since the surgery.    CLINICAL DATA:  29 y.o. female.    Initial weight: 351 lbs  Current weight: 350 lbs (PT reported)  Weight change since last visit: -3 lbs  BMI: 62.0    DAILY NUTRITIONAL NEEDS: pre-op nutritional bariatric guidelines to promote weight loss  [id number] Calories    Grams Protein    CURRENT DIET:  Regular diet.  Diet Recall: Food records are not present.  Greatest challenge: eating out/delivery frequency, portion control, irregular meal patterns, high-fat diet, and sugar-sweetened beverages  Update: stopped drinking Coke, increased physical activity     Current diet recall:      B: Protein shake  S: 1/2-1 protein shake  L: Air fried chicken or salmon and green beans or broccoli or soup  S: Protein shake  D: Same as lunch     Diet Includes: 3 meals  Meal Pattern: Improved.  Protein Supplements: 3 per day. Fairlife, Fit Crunch, Built Bars, Power Crunch, Legendary chips, Quest protein cookies  Snacking: Adequate. Snacks include healthy choices.  Vegetables: Likes a few. Eats almost daily. Broccoli, green beans, zucchini, squash, onion, cabbage  Fruits: Likes a variety. Eats almost daily. Bananas, oranges  Beverages: water, sugar-free beverages/powders/drops, juices  Dining out/delivery: 1 x week  Mostly fast food. Sushi  Cooking at home: Almost daily;  Mostly baked/roast, boiled, air-fried, and microwaved fish/seafood and chicken/turkey, vegetables Cabbage, green beans, baked chicken, salmon, shrimp, pot roast, protein pasta with mushrooms and zucchini     Food Allergies:    NKFA  Lactose intolerance - no milk or yogurt, cheese ok     Vitamins / Minerals / Herbs:   Multivitamin gummy  Vit D     Labs:   Reviewed     Exercise:  Walking about 45 mins, 3 x week  Lifting weights 1-2 x week, watching You Tube videos     Restrictions to exercise: back, legs, feet     Social:  6 days 7am-3:30pm  Lives with 9 year old daughter.  Grocery shopping and food prep PT.  Patient believes the household will be supportive after surgery.  Alcohol: None.  Smoking: None.    ASSESSMENT:  Patient demonstrates some willingness/progress to change lifestyle habits as evidenced by dietary changes, including protein drinks, and reduced sugar-sweetened beverages    Doing well with working on greatest challenges ( eating out/delivery frequency, portion control, irregular meal patterns, high-fat diet, and sugar-sweetened beverages ).    Barriers to Education:  none  Stage of Change:  action    NUTRITION DIAGNOSIS:  Morbid Obesity related to Excessive carbohydrate intake, Excessive calorie intake, and Physical inactivity as evidence by BMI.  Obesity Status: Same    BARIATRIC DIET DISCUSSION:  Discussed diet after surgery and related to patient's food record.  Reviewed nutrition guidelines for before and after surgery.  Answered all questions.  Reviewed pre and post op liquid diet, vitamins, and minerals    BARIATRIC PLAN/RECOMMENDATIONS:  Pt has completed required medically supervised diet counseling and surveillance  and may proceed with bariatric surgery    Diet: Maintain diet plan.  Start shopping for bariatric vitamins & minerals    Exercise: Increase.    Behavior Modification: Begin to document food & activity logs daily.    Return to clinic in one month.    Needs additional visits with RD.    Communicated nutrition plan with bariatric team.    SESSION TIME:  30 minutes

## 2024-11-29 ENCOUNTER — TELEPHONE (OUTPATIENT)
Dept: BARIATRICS | Facility: CLINIC | Age: 29
End: 2024-11-29
Payer: MEDICARE

## 2024-11-29 DIAGNOSIS — E66.01 MORBID OBESITY: ICD-10-CM

## 2024-11-29 DIAGNOSIS — Z79.899 LONG-TERM USE OF HIGH-RISK MEDICATION: Primary | ICD-10-CM

## 2024-11-29 DIAGNOSIS — Z91.89 AT RISK FOR POLYPHARMACY: ICD-10-CM

## 2024-11-29 DIAGNOSIS — G47.33 OBSTRUCTIVE SLEEP APNEA: ICD-10-CM

## 2024-12-02 NOTE — TELEPHONE ENCOUNTER
"Spoke with patient and confirmed the surgical procedure of Laparoscopic Bypass with Dr Cee on 1/06/25.  Scheduled preop appts/surgery date/2 week and 8 week post op appts. All dates and times agreed upon. Pt aware that if they are required to have PCP clearance, it must be within 6 months of surgery, unless their medical history has changed, it should be dated, signed and in chart for preop appointment. All medications have been reviewed regarding the necessity to be crushed or broken into pieces smaller that the tip of a pencil eraser for 2 weeks following gastric sleeve surgery and 4 weeks following gastric bypass surgery. Pt instructed to stop taking all NSAIDS 1 week before surgery and for life after surgery. Pt aware that protein liquid diet start date is 1223/24. Patient is doing well with their diet. Patient was instructed about the progression of the diet phases. The patient's current weight is 353 lbs, height is 5'3", and BMI is 62.5. Refer to medical letter of necessity from Surgeon. Discussed the importance of increased physical activity and dieting lifestyle changes to improve weight loss and meet goals. Screened patient for history of UTI per protocol. Discussed with patient to avoid antibiotics and elective procedures involving sedation/anesthesia within 30 days of surgery unless cleared by the bariatric department. Patient instructed to call the bariatric clinic post op for any s/s of UTI. Patient's mailing address confirmed and informed to expect a manilla envelop containing bariatric surgery pre op booklet, appointment reminders, protein and fluid log sheets, and liquid diet and vitamin information sheets. Pt aware that all appts can be seen in my [facility] patient portal at this time. Confirmed email address and informed patient that they will be enrolled in the Patient Reported Outcomes program to track their progress and successes. The first email will be sent 2-3 weeks before surgery and then " every year on your surgery anniversary date. Office phone and fax number given to patient for any future questions/concerns. Discussed the pre-surgery complex carbohydrate beverage to purchase in [facility] pharmacy to drink 30 minutes before the surgery arrival time. Reviewed the policy of scheduling a covid test 72 hours prior to surgery if necessary.

## 2024-12-03 ENCOUNTER — OFFICE VISIT (OUTPATIENT)
Dept: FAMILY MEDICINE | Facility: CLINIC | Age: 29
End: 2024-12-03
Payer: MEDICARE

## 2024-12-03 VITALS
WEIGHT: 293 LBS | OXYGEN SATURATION: 96 % | HEIGHT: 63 IN | SYSTOLIC BLOOD PRESSURE: 138 MMHG | TEMPERATURE: 98 F | HEART RATE: 79 BPM | BODY MASS INDEX: 51.91 KG/M2 | DIASTOLIC BLOOD PRESSURE: 85 MMHG

## 2024-12-03 DIAGNOSIS — J40 BRONCHITIS: ICD-10-CM

## 2024-12-03 DIAGNOSIS — Z41.9 ELECTIVE SURGERY: Primary | ICD-10-CM

## 2024-12-03 PROCEDURE — 99213 OFFICE O/P EST LOW 20 MIN: CPT | Mod: PBBFAC,PN | Performed by: STUDENT IN AN ORGANIZED HEALTH CARE EDUCATION/TRAINING PROGRAM

## 2024-12-03 NOTE — ASSESSMENT & PLAN NOTE
Lungs clear to auscultation bilaterally no wheezing on exam   Advised patient to complete Medrol Dosepak and gave patient returned to clinic precautions

## 2024-12-03 NOTE — PROGRESS NOTES
Patient Name: Alexandra Martin     : 1995    MRN: [id number]     Subjective:     Patient ID: Alexandra Martin is a 29 y.o. female.    Chief Complaint:   Chief Complaint   Patient presents with    Follow-up     Patient here for surgery clearance for bariatric surgery. Bp 138/85        HPI: HPI  29-year-old female presents to clinic for surgical risk stratification for gastric bypass surgery to take place 2025.    Patient currently is completing a Medrol Dosepak for bronchitis.  No shortness a breath or chest pain    Patient reports no issues with anesthesia from previous surgeries.   Able to walk up a flight of stairs  Able to take care of her own activities of daily living.  ROS:      ROS     12 point review of systems conducted, negative except as stated in the history of present illness. See HPI for details.    History:     Past Medical History:   Diagnosis Date    ADD (attention deficit disorder)     Anxiety disorder     Depression     Drug-induced constipation 2024    Headache     History of ovarian cyst     MDD (major depressive disorder), recurrent episode, moderate 2021    Obesity     Substance abuse     Hospitalization     Trauma     not at this time. 19    Tympanic membrane perforation, left 2018    UTI (urinary tract infection) 2018    Vertigo         Past Surgical History:   Procedure Laterality Date    CHOLECYSTECTOMY  2024    CYSTOSCOPY W/ URETERAL STENT PLACEMENT Right 2019    Procedure: CYSTOSCOPY, WITH URETERAL STENT INSERTION;  Surgeon: Rito Medley MD;  Location: [facility] OR;  Service: Urology;  Laterality: Right;    CYSTOSCOPY W/ URETERAL STENT REMOVAL Right 2020    Procedure: CYSTOSCOPY, WITH URETERAL STENT REMOVAL;  Surgeon: Rito Medley MD;  Location: [facility] OR;  Service: Urology;  Laterality: Right;    LASER LITHOTRIPSY Right 2020    Procedure: LITHOTRIPSY, USING LASER;  Surgeon: Rito Medley  MD;  Location: [facility] OR;  Service: Urology;  Laterality: Right;    TONSILLECTOMY, ADENOIDECTOMY, BILATERAL MYRINGOTOMY AND TUBES      URETEROSCOPIC REMOVAL OF URETERIC CALCULUS Right 01/06/2020    Procedure: REMOVAL, CALCULUS, URETER, URETEROSCOPIC;  Surgeon: Rito Medley MD;  Location: [facility] OR;  Service: Urology;  Laterality: Right;    URETEROSCOPY Left 12/26/2019    Procedure: URETEROSCOPY;  Surgeon: Rito Medley MD;  Location: [facility] OR;  Service: Urology;  Laterality: Left;    URETEROSCOPY Right 01/06/2020    Procedure: URETEROSCOPY;  Surgeon: Rito Medley MD;  Location: [facility] OR;  Service: Urology;  Laterality: Right;       Family History   Problem Relation Name Age of Onset    Breast cancer Maternal Aunt      Obesity Mother      No Known Problems Father      No Known Problems Brother      Colon cancer Neg Hx      Miscarriages / Stillbirths Neg Hx      Ovarian cancer Neg Hx          Social History     Tobacco Use    Smoking status: Former     Current packs/day: 0.25     Average packs/day: 0.3 packs/day for 14.9 years (3.7 ttl pk-yrs)     Types: Cigarettes     Start date: 2010    Smokeless tobacco: Never    Tobacco comments:     Quit smoking June 2024    Substance and Sexual Activity    Alcohol use: Not Currently     Alcohol/week: 0.0 standard drinks of alcohol    Drug use: Not Currently     Types: Marijuana, Fentanyl     Comment: history of opoid abuse    Sexual activity: Not Currently     Partners: Male     Birth control/protection: Implant       Current Outpatient Medications   Medication Instructions    ergocalciferol (ERGOCALCIFEROL) 50,000 Units, Oral, Twice weekly    etonogestreL (NEXPLANON) 68 mg, Once    hydrOXYzine pamoate (VISTARIL) 25 mg, Daily PRN    ondansetron (ZOFRAN-ODT) 8 mg, Oral, Every 8 hours PRN    rosuvastatin (CRESTOR) 10 mg, Oral, Daily    venlafaxine (EFFEXOR-XR) 150 mg        Review of patient's allergies indicates:   Allergen Reactions    Ceftriaxone Nausea And  "Vomiting    Azithromycin Nausea And Vomiting     Hard to breathe     Latex, natural rubber Swelling and Rash       Objective:     Visit Vitals  /85 (BP Location: Right forearm, Patient Position: Sitting)   Pulse 79   Temp 98 °F (36.7 °C) (Oral)   Ht 5' 3" (1.6 m)   Wt (!) 161 kg (355 lb)   SpO2 96%   BMI 62.89 kg/m²       Physical Examination:     Physical Exam  Constitutional:       General: She is not in acute distress.     Appearance: Normal appearance. She is obese. She is not ill-appearing or diaphoretic.   HENT:      Nose: No congestion.   Eyes:      Conjunctiva/sclera: Conjunctivae normal.      Pupils: Pupils are equal, round, and reactive to light.   Cardiovascular:      Rate and Rhythm: Normal rate and regular rhythm.      Heart sounds: No murmur heard.  Pulmonary:      Effort: Pulmonary effort is normal. No respiratory distress.      Breath sounds: Normal breath sounds. No wheezing.   Musculoskeletal:         General: No swelling, tenderness, deformity or signs of injury. Normal range of motion.      Cervical back: Normal range of motion.   Skin:     General: Skin is warm and dry.      Coloration: Skin is not jaundiced.   Neurological:      General: No focal deficit present.      Mental Status: She is alert and oriented to person, place, and time. Mental status is at baseline.      Gait: Gait normal.   Psychiatric:         Behavior: Behavior normal.         Thought Content: Thought content normal.         Lab Results:     Chemistry:  Lab Results   Component Value Date     11/02/2024    K 4.2 11/02/2024    BUN 9 11/02/2024    CREATININE 0.89 11/02/2024    EGFRNORACEVR >60 09/09/2024    GLUCOSE 102 (H) 09/09/2024    CALCIUM 8.7 (L) 11/02/2024    ALKPHOS 106 09/09/2024    LABPROT 7.5 09/09/2024    ALBUMIN 3.6 09/09/2024    BILIDIR 0.2 08/27/2024    IBILI 0.60 08/27/2024    AST 19 09/09/2024    ALT 20 09/09/2024    MG 2.00 08/27/2024    PHOS 3.7 08/27/2024    SARYNUQM69FC 19 (L) 09/09/2024    TSH " 1.195 08/27/2024    WPGNXY6TXMX 0.87 08/27/2024        Lab Results   Component Value Date    HGBA1C 5.3 09/09/2024        Hematology:  Lab Results   Component Value Date    WBC 8.40 09/09/2024    HGB 13.4 09/09/2024    HCT 40.3 09/09/2024     09/09/2024       Lipid Panel:  Lab Results   Component Value Date    CHOL 227 (H) 09/09/2024    HDL 42 09/09/2024    .00 09/09/2024    TRIG 353 (H) 09/09/2024    TOTALCHOLEST 5 09/09/2024        Urine:  Lab Results   Component Value Date    APPEARANCEUA Turbid (A) 09/09/2024    SGUA 1.024 09/09/2024    PROTEINUA Negative 09/09/2024    KETONESUA Negative 09/09/2024    LEUKOCYTESUR 250 (A) 09/09/2024    RBCUA None Seen 09/09/2024    WBCUA 6-10 (A) 09/09/2024    BACTERIA Occ (A) 09/09/2024    SQEPUA Moderate (A) 09/09/2024    HYALINECASTS None Seen 09/09/2024    CREATRANDUR 78.5 02/27/2019        Assessment:          ICD-10-CM ICD-9-CM   1. Elective surgery  Z41.9 V50.9   2. Bronchitis  J40 490        Plan:     1. Elective surgery  Assessment & Plan:  Patient is average risk for procedure   Reviewed EKG and previous labs.      2. Bronchitis  Assessment & Plan:  Lungs clear to auscultation bilaterally no wheezing on exam   Advised patient to complete Medrol Dosepak and gave patient returned to clinic precautions           Follow up if symptoms worsen or fail to improve.    Future Appointments   Date Time Provider Department Center   12/9/2024 10:00 AM Chanelle Rain FNP [facility]   12/18/2024 11:00 AM Bibi Edmondson MD [facility]   12/23/2024  1:30 PM [facility] [place] [facility]   1/13/2025 11:30 [facility] [address]   1/22/2025  9:30 AM LAB, APPOINTMENT [facility] LAB VNP [facility]   1/22/2025 10:00 AM Josi Ybarra NP [facility]   1/22/2025 10:30 AM Elizabeth Mann RD [facility]   3/3/2025  9:30 AM LAB, PHILIP [facility] LAB [facility]   3/3/2025 10:00 AM  Josi Ybarra, NP [facility] BORIS [facility]   3/3/2025 10:30 AM [facility]   7/7/2025  9:30 AM LAB, PHILIP [facility] LAB [facility]   7/7/2025 10:00 AM Josi Ybarra, NP [facility]        Flor Aldrich MD

## 2024-12-09 ENCOUNTER — PATIENT MESSAGE (OUTPATIENT)
Dept: FAMILY MEDICINE | Facility: CLINIC | Age: 29
End: 2024-12-09

## 2024-12-09 ENCOUNTER — OFFICE VISIT (OUTPATIENT)
Dept: FAMILY MEDICINE | Facility: CLINIC | Age: 29
End: 2024-12-09
Payer: MEDICARE

## 2024-12-09 VITALS
TEMPERATURE: 98 F | HEIGHT: 63 IN | DIASTOLIC BLOOD PRESSURE: 80 MMHG | HEART RATE: 72 BPM | WEIGHT: 293 LBS | OXYGEN SATURATION: 98 % | SYSTOLIC BLOOD PRESSURE: 123 MMHG | BODY MASS INDEX: 51.91 KG/M2 | RESPIRATION RATE: 18 BRPM

## 2024-12-09 DIAGNOSIS — L98.9 SKIN LESION: ICD-10-CM

## 2024-12-09 DIAGNOSIS — F11.11 HISTORY OF OPIOID ABUSE: ICD-10-CM

## 2024-12-09 DIAGNOSIS — B37.2 CANDIDAL INTERTRIGO: ICD-10-CM

## 2024-12-09 DIAGNOSIS — G43.E09 CHRONIC MIGRAINE WITH AURA WITHOUT STATUS MIGRAINOSUS, NOT INTRACTABLE: ICD-10-CM

## 2024-12-09 DIAGNOSIS — R79.89 LOW VITAMIN D LEVEL: ICD-10-CM

## 2024-12-09 DIAGNOSIS — Z12.4 ENCOUNTER FOR PAPANICOLAOU SMEAR OF CERVIX: ICD-10-CM

## 2024-12-09 DIAGNOSIS — G47.33 OSA (OBSTRUCTIVE SLEEP APNEA): ICD-10-CM

## 2024-12-09 DIAGNOSIS — L29.9 ITCHY SCALP: ICD-10-CM

## 2024-12-09 DIAGNOSIS — E66.01 MORBID OBESITY: ICD-10-CM

## 2024-12-09 DIAGNOSIS — D64.9 ANEMIA, UNSPECIFIED TYPE: ICD-10-CM

## 2024-12-09 DIAGNOSIS — R79.9 ABNORMAL FINDING OF BLOOD CHEMISTRY, UNSPECIFIED: ICD-10-CM

## 2024-12-09 DIAGNOSIS — E78.2 MIXED HYPERLIPIDEMIA: Primary | ICD-10-CM

## 2024-12-09 DIAGNOSIS — E55.9 VITAMIN D DEFICIENCY: ICD-10-CM

## 2024-12-09 PROBLEM — J40 BRONCHITIS: Status: RESOLVED | Noted: 2024-12-03 | Resolved: 2024-12-09

## 2024-12-09 LAB
25(OH)D3+25(OH)D2 SERPL-MCNC: 12 NG/ML (ref 30–80)
ALBUMIN SERPL-MCNC: 3.4 G/DL (ref 3.5–5)
ALBUMIN/GLOB SERPL: 1 RATIO (ref 1.1–2)
ALP SERPL-CCNC: 107 UNIT/L (ref 40–150)
ALT SERPL-CCNC: 19 UNIT/L (ref 0–55)
AMORPH URATE CRY URNS QL MICRO: ABNORMAL /UL
ANION GAP SERPL CALC-SCNC: 9 MEQ/L
AST SERPL-CCNC: 15 UNIT/L (ref 5–34)
BACTERIA #/AREA URNS AUTO: ABNORMAL /HPF
BASOPHILS # BLD AUTO: 0.03 X10(3)/MCL
BASOPHILS NFR BLD AUTO: 0.3 %
BILIRUB SERPL-MCNC: 0.5 MG/DL
BILIRUB UR QL STRIP.AUTO: NEGATIVE
BUN SERPL-MCNC: 6.9 MG/DL (ref 7–18.7)
CALCIUM SERPL-MCNC: 9.4 MG/DL (ref 8.4–10.2)
CHLORIDE SERPL-SCNC: 109 MMOL/L (ref 98–107)
CHOLEST SERPL-MCNC: 196 MG/DL
CHOLEST/HDLC SERPL: 5 {RATIO} (ref 0–5)
CLARITY UR: ABNORMAL
CO2 SERPL-SCNC: 22 MMOL/L (ref 22–29)
COLOR UR AUTO: ABNORMAL
CREAT SERPL-MCNC: 0.85 MG/DL (ref 0.55–1.02)
CREAT/UREA NIT SERPL: 8
EOSINOPHIL # BLD AUTO: 0.19 X10(3)/MCL (ref 0–0.9)
EOSINOPHIL NFR BLD AUTO: 1.9 %
ERYTHROCYTE [DISTWIDTH] IN BLOOD BY AUTOMATED COUNT: 13.4 % (ref 11.5–17)
EST. AVERAGE GLUCOSE BLD GHB EST-MCNC: 105.4 MG/DL
GFR SERPLBLD CREATININE-BSD FMLA CKD-EPI: >60 ML/MIN/1.73/M2
GLOBULIN SER-MCNC: 3.4 GM/DL (ref 2.4–3.5)
GLUCOSE SERPL-MCNC: 115 MG/DL (ref 74–100)
GLUCOSE UR QL STRIP: NORMAL
HBA1C MFR BLD: 5.3 %
HCT VFR BLD AUTO: 39.7 % (ref 37–47)
HDLC SERPL-MCNC: 39 MG/DL (ref 35–60)
HGB BLD-MCNC: 13 G/DL (ref 12–16)
HGB UR QL STRIP: NEGATIVE
HIV 1+2 AB+HIV1 P24 AG SERPL QL IA: NONREACTIVE
HYALINE CASTS #/AREA URNS LPF: ABNORMAL /LPF
IMM GRANULOCYTES # BLD AUTO: 0.05 X10(3)/MCL (ref 0–0.04)
IMM GRANULOCYTES NFR BLD AUTO: 0.5 %
KETONES UR QL STRIP: ABNORMAL
LDLC SERPL CALC-MCNC: 112 MG/DL (ref 50–140)
LEUKOCYTE ESTERASE UR QL STRIP: NEGATIVE
LYMPHOCYTES # BLD AUTO: 2.11 X10(3)/MCL (ref 0.6–4.6)
LYMPHOCYTES NFR BLD AUTO: 21.6 %
MCH RBC QN AUTO: 26.6 PG (ref 27–31)
MCHC RBC AUTO-ENTMCNC: 32.7 G/DL (ref 33–36)
MCV RBC AUTO: 81.4 FL (ref 80–94)
MONOCYTES # BLD AUTO: 0.62 X10(3)/MCL (ref 0.1–1.3)
MONOCYTES NFR BLD AUTO: 6.4 %
MUCOUS THREADS URNS QL MICRO: ABNORMAL /LPF
NEUTROPHILS # BLD AUTO: 6.76 X10(3)/MCL (ref 2.1–9.2)
NEUTROPHILS NFR BLD AUTO: 69.3 %
NITRITE UR QL STRIP: NEGATIVE
NRBC BLD AUTO-RTO: 0 %
PH UR STRIP: 6.5 [PH]
PLATELET # BLD AUTO: 234 X10(3)/MCL (ref 130–400)
PMV BLD AUTO: 10.6 FL (ref 7.4–10.4)
POTASSIUM SERPL-SCNC: 3.9 MMOL/L (ref 3.5–5.1)
PROT SERPL-MCNC: 6.8 GM/DL (ref 6.4–8.3)
PROT UR QL STRIP: NEGATIVE
RBC # BLD AUTO: 4.88 X10(6)/MCL (ref 4.2–5.4)
RBC #/AREA URNS AUTO: ABNORMAL /HPF
SODIUM SERPL-SCNC: 140 MMOL/L (ref 136–145)
SP GR UR STRIP.AUTO: 1.03 (ref 1–1.03)
SQUAMOUS #/AREA URNS LPF: ABNORMAL /HPF
T PALLIDUM AB SER QL: NONREACTIVE
T4 FREE SERPL-MCNC: 0.83 NG/DL (ref 0.7–1.48)
TRIGL SERPL-MCNC: 226 MG/DL (ref 37–140)
TSH SERPL-ACNC: 3.29 UIU/ML (ref 0.35–4.94)
UNIDENT CRYS #/AREA URNS HPF: ABNORMAL /HPF
UROBILINOGEN UR STRIP-ACNC: NORMAL
VLDLC SERPL CALC-MCNC: 45 MG/DL
WBC # BLD AUTO: 9.76 X10(3)/MCL (ref 4.5–11.5)
WBC #/AREA URNS AUTO: ABNORMAL /HPF

## 2024-12-09 PROCEDURE — 10160 PNXR ASPIR ABSC HMTMA BULLA: CPT | Mod: PBBFAC,PN | Performed by: NURSE PRACTITIONER

## 2024-12-09 PROCEDURE — 99213 OFFICE O/P EST LOW 20 MIN: CPT | Mod: 25,S$PBB,, | Performed by: NURSE PRACTITIONER

## 2024-12-09 PROCEDURE — 83036 HEMOGLOBIN GLYCOSYLATED A1C: CPT | Performed by: NURSE PRACTITIONER

## 2024-12-09 PROCEDURE — 82306 VITAMIN D 25 HYDROXY: CPT | Performed by: NURSE PRACTITIONER

## 2024-12-09 PROCEDURE — 88174 CYTOPATH C/V AUTO IN FLUID: CPT | Performed by: NURSE PRACTITIONER

## 2024-12-09 PROCEDURE — 85025 COMPLETE CBC W/AUTO DIFF WBC: CPT | Performed by: NURSE PRACTITIONER

## 2024-12-09 PROCEDURE — 84439 ASSAY OF FREE THYROXINE: CPT | Performed by: NURSE PRACTITIONER

## 2024-12-09 PROCEDURE — 99395 PREV VISIT EST AGE 18-39: CPT | Mod: S$PBB,25,GZ, | Performed by: NURSE PRACTITIONER

## 2024-12-09 PROCEDURE — 36415 COLL VENOUS BLD VENIPUNCTURE: CPT | Performed by: NURSE PRACTITIONER

## 2024-12-09 PROCEDURE — 81001 URINALYSIS AUTO W/SCOPE: CPT | Performed by: NURSE PRACTITIONER

## 2024-12-09 PROCEDURE — 10160 PNXR ASPIR ABSC HMTMA BULLA: CPT | Mod: S$PBB,,, | Performed by: NURSE PRACTITIONER

## 2024-12-09 PROCEDURE — 80053 COMPREHEN METABOLIC PANEL: CPT | Performed by: NURSE PRACTITIONER

## 2024-12-09 PROCEDURE — 86780 TREPONEMA PALLIDUM: CPT | Performed by: NURSE PRACTITIONER

## 2024-12-09 PROCEDURE — 87389 HIV-1 AG W/HIV-1&-2 AB AG IA: CPT | Performed by: NURSE PRACTITIONER

## 2024-12-09 PROCEDURE — 80061 LIPID PANEL: CPT | Performed by: NURSE PRACTITIONER

## 2024-12-09 PROCEDURE — 99214 OFFICE O/P EST MOD 30 MIN: CPT | Mod: PBBFAC,PN,25 | Performed by: NURSE PRACTITIONER

## 2024-12-09 PROCEDURE — 84443 ASSAY THYROID STIM HORMONE: CPT | Performed by: NURSE PRACTITIONER

## 2024-12-09 RX ORDER — DOXYCYCLINE HYCLATE 100 MG
100 TABLET ORAL 2 TIMES DAILY
Qty: 20 TABLET | Refills: 0 | Status: SHIPPED | OUTPATIENT
Start: 2024-12-09 | End: 2024-12-19

## 2024-12-09 RX ORDER — NYSTATIN 100000 [USP'U]/G
POWDER TOPICAL 2 TIMES DAILY
Qty: 60 G | Refills: 11 | Status: SHIPPED | OUTPATIENT
Start: 2024-12-09

## 2024-12-09 NOTE — PROGRESS NOTES
Patient Name: Alexandra Martin     : 1995    MRN: [id number]     Subjective:     Patient ID: Alexandra Martin is a 29 y.o. female.    Chief Complaint:   Chief Complaint   Patient presents with    Follow-up     Pt is here for pap. Pt has nexplanon and doesn't know LMP        HPI: 24:  Pt here for wellness and PAP.  Has Nexplanon and does not know LMP.  No female issues reported.  She does have lesion on mons pubis that is pustule with pain and erythema to area.  She also has rash to inner thigh right groin area that is red, painful, itchy and has odor.  Bariatric surgery scheduled for 25.             ROS:      Review of Systems   Constitutional: Negative.    HENT: Negative.     Eyes: Negative.    Respiratory: Negative.     Cardiovascular: Negative.    Gastrointestinal: Negative.    Genitourinary: Negative.    Musculoskeletal: Negative.    Skin:  Positive for itching and rash.   Neurological: Negative.    Endo/Heme/Allergies: Negative.    Psychiatric/Behavioral: Negative.     All other systems reviewed and are negative.           History:     Past Medical History:   Diagnosis Date    ADD (attention deficit disorder)     Anxiety disorder     Bronchitis 2024    Depression     Drug-induced constipation 2024    Headache     History of ovarian cyst     MDD (major depressive disorder), recurrent episode, moderate 2021    Obesity     Substance abuse     Hospitalization     Trauma     not at this time. 19    Tympanic membrane perforation, left 2018    UTI (urinary tract infection) 2018    Vertigo         Past Surgical History:   Procedure Laterality Date    CHOLECYSTECTOMY  2024    CYSTOSCOPY W/ URETERAL STENT PLACEMENT Right 2019    Procedure: CYSTOSCOPY, WITH URETERAL STENT INSERTION;  Surgeon: Rito Medley MD;  Location: [facility];  Service: Urology;  Laterality: Right;    CYSTOSCOPY W/ URETERAL STENT REMOVAL Right 2020    Procedure:  CYSTOSCOPY, WITH URETERAL STENT REMOVAL;  Surgeon: Rito Medley MD;  Location: [facility];  Service: Urology;  Laterality: Right;    LASER LITHOTRIPSY Right 01/06/2020    Procedure: LITHOTRIPSY, USING LASER;  Surgeon: Rito Medley MD;  Location: [facility];  Service: Urology;  Laterality: Right;    TONSILLECTOMY, ADENOIDECTOMY, BILATERAL MYRINGOTOMY AND TUBES      URETEROSCOPIC REMOVAL OF URETERIC CALCULUS Right 01/06/2020    Procedure: REMOVAL, CALCULUS, URETER, URETEROSCOPIC;  Surgeon: Rito Medley MD;  Location: [facility];  Service: Urology;  Laterality: Right;    URETEROSCOPY Left 12/26/2019    Procedure: URETEROSCOPY;  Surgeon: Rito Medley MD;  Location: [facility];  Service: Urology;  Laterality: Left;    URETEROSCOPY Right 01/06/2020    Procedure: URETEROSCOPY;  Surgeon: Rito Medley MD;  Location: [facility];  Service: Urology;  Laterality: Right;       Family History   Problem Relation Name Age of Onset    Breast cancer Maternal Aunt      Obesity Mother      No Known Problems Father      No Known Problems Brother      Colon cancer Neg Hx      Miscarriages / Stillbirths Neg Hx      Ovarian cancer Neg Hx          Social History     Tobacco Use    Smoking status: Former     Current packs/day: 0.25     Average packs/day: 0.3 packs/day for 14.9 years (3.7 ttl pk-yrs)     Types: Cigarettes     Start date: 2010    Smokeless tobacco: Never    Tobacco comments:     Quit smoking June 2024    Substance and Sexual Activity    Alcohol use: Not Currently     Alcohol/week: 0.0 standard drinks of alcohol    Drug use: Not Currently     Types: Marijuana, Fentanyl     Comment: history of opoid abuse    Sexual activity: Not Currently     Partners: Male     Birth control/protection: Implant       Current Outpatient Medications   Medication Instructions    doxycycline (VIBRA-TABS) 100 mg, Oral, 2 times daily    ergocalciferol (ERGOCALCIFEROL) 50,000 Units, Oral, Twice weekly    etonogestreL (NEXPLANON) 68  "mg, Once    hydrOXYzine pamoate (VISTARIL) 25 mg, Daily PRN    nystatin (MYCOSTATIN) powder Topical (Top), 2 times daily    ondansetron (ZOFRAN-ODT) 8 mg, Oral, Every 8 hours PRN    rosuvastatin (CRESTOR) 10 mg, Oral, Daily    venlafaxine (EFFEXOR-XR) 150 mg        Review of patient's allergies indicates:   Allergen Reactions    Ceftriaxone Nausea And Vomiting    Azithromycin Nausea And Vomiting     Hard to breathe     Latex, natural rubber Swelling and Rash       Objective:     Visit Vitals  /80 (BP Location: Left arm, Patient Position: Sitting)   Pulse 72   Temp 98.1 °F (36.7 °C) (Oral)   Resp 18   Ht 5' 3" (1.6 m)   Wt (!) 163.3 kg (360 lb)   SpO2 98%   BMI 63.77 kg/m²       Physical Examination:     Physical Exam  Vitals reviewed. Exam conducted with a chaperone present.   Constitutional:       Appearance: Normal appearance. She is normal weight.   HENT:      Head: Normocephalic.   Cardiovascular:      Rate and Rhythm: Normal rate and regular rhythm.      Pulses: Normal pulses.      Heart sounds: Normal heart sounds.   Pulmonary:      Effort: Pulmonary effort is normal.      Breath sounds: Normal breath sounds.   Abdominal:      General: Abdomen is flat.      Palpations: Abdomen is soft.      Hernia: There is no hernia in the left inguinal area or right inguinal area.   Genitourinary:     Marco A stage (genital): 5.      Labia:         Right: No rash, tenderness, lesion or injury.         Left: No rash, tenderness, lesion or injury.       Vagina: Normal.      Cervix: Discharge present.      Uterus: Normal.       Adnexa: Right adnexa normal.      Rectum: Normal.      Comments: White discharge, no odor, no abnormalities  Musculoskeletal:         General: Normal range of motion.      Cervical back: Normal range of motion.   Lymphadenopathy:      Lower Body: No right inguinal adenopathy. No left inguinal adenopathy.   Skin:     General: Skin is warm and dry.      Findings: Lesion present.             Comments: " +erythema, +TTP, +odor to right groin-rash  Lesion to mons pubis-pustular with erythema surrounding   Neurological:      Mental Status: She is alert.   Psychiatric:         Mood and Affect: Mood normal.         Lab Results:     Chemistry:  Lab Results   Component Value Date     11/02/2024    K 4.2 11/02/2024    BUN 9 11/02/2024    CREATININE 0.89 11/02/2024    EGFRNORACEVR >60 09/09/2024    GLUCOSE 102 (H) 09/09/2024    CALCIUM 8.7 (L) 11/02/2024    ALKPHOS 106 09/09/2024    LABPROT 7.5 09/09/2024    ALBUMIN 3.6 09/09/2024    BILIDIR 0.2 08/27/2024    IBILI 0.60 08/27/2024    AST 19 09/09/2024    ALT 20 09/09/2024    MG 2.00 08/27/2024    PHOS 3.7 08/27/2024    BCNVSNAR53LH 19 (L) 09/09/2024    TSH 1.195 08/27/2024    PXDVHE8HGPS 0.87 08/27/2024        Lab Results   Component Value Date    HGBA1C 5.3 09/09/2024        Hematology:  Lab Results   Component Value Date    WBC 8.40 09/09/2024    HGB 13.4 09/09/2024    HCT 40.3 09/09/2024     09/09/2024       Lipid Panel:  Lab Results   Component Value Date    CHOL 227 (H) 09/09/2024    HDL 42 09/09/2024    .00 09/09/2024    TRIG 353 (H) 09/09/2024    TOTALCHOLEST 5 09/09/2024        Urine:  Lab Results   Component Value Date    APPEARANCEUA Turbid (A) 09/09/2024    SGUA 1.024 09/09/2024    PROTEINUA Negative 09/09/2024    KETONESUA Negative 09/09/2024    LEUKOCYTESUR 250 (A) 09/09/2024    RBCUA None Seen 09/09/2024    WBCUA 6-10 (A) 09/09/2024    BACTERIA Occ (A) 09/09/2024    SQEPUA Moderate (A) 09/09/2024    HYALINECASTS None Seen 09/09/2024    CREATRANDUR 78.5 02/27/2019        Assessment:          ICD-10-CM ICD-9-CM   1. Mixed hyperlipidemia  E78.2 272.2   2. Encounter for Papanicolaou smear of cervix  Z12.4 V76.2   3. Abnormal finding of blood chemistry, unspecified  R79.9 790.6   4. Morbid obesity  E66.01 278.01   5. Chronic migraine with aura without status migrainosus, not intractable  G43.E09 346.00   6. Low vitamin D level  R79.89 790.6   7.  Anemia, unspecified type  D64.9 285.9   8. Vitamin D deficiency  E55.9 268.9   9. SAM (obstructive sleep apnea)  G47.33 327.23   10. Itchy scalp  L29.9 698.9   11. History of opioid abuse  F11.11 305.53   12. Skin lesion  L98.9 709.9   13. Candidal intertrigo  B37.2 112.3        Plan:     1. Mixed hyperlipidemia  -     CBC Auto Differential  -     Comprehensive Metabolic Panel  -     Urinalysis  -     Hemoglobin A1C  -     TSH  -     T4, Free  -     HIV 1/2 Ag/Ab (4th Gen)  -     SYPHILIS ANTIBODY (WITH REFLEX RPR)  -     Lipid Panel    2. Encounter for Papanicolaou smear of cervix  Assessment & Plan:  PAP obtained    Orders:  -     Liquid-Based Pap Smear, Screening    3. Abnormal finding of blood chemistry, unspecified  -     CBC Auto Differential  -     Comprehensive Metabolic Panel  -     Urinalysis  -     Hemoglobin A1C  -     TSH  -     T4, Free  -     HIV 1/2 Ag/Ab (4th Gen)  -     SYPHILIS ANTIBODY (WITH REFLEX RPR)    4. Morbid obesity  Overview:  Wt Readings from Last 3 Encounters:   12/09/24 1307 (!) 163.3 kg (360 lb)   12/03/24 1231 (!) 161 kg (355 lb)   10/11/24 1719 (!) 160.1 kg (353 lb)         Assessment & Plan:  Is going through bariatric program for surgery currently.  Pt to have surgery 1/6/25    BMI Body mass index is 63.77 kg/m².   Goal BMI <30.  Exercise 5 times a week for 30 minutes per day.  Avoid soda, simple sugars, excessive rice, potatoes or bread. Limit fast foods and fried foods.  Choose complex carbs in moderation (example: green vegetables, beans, oatmeal). Eat plenty of fresh fruits and vegetables with lean meats daily.  Do not skip meals. Eat a balanced portion size.  Avoid fad diets. Consider permanent healthy life style changes.         Orders:  -     CBC Auto Differential  -     Comprehensive Metabolic Panel  -     Urinalysis  -     Hemoglobin A1C  -     TSH  -     T4, Free  -     HIV 1/2 Ag/Ab (4th Gen)  -     SYPHILIS ANTIBODY (WITH REFLEX RPR)    5. Chronic migraine with aura  without status migrainosus, not intractable  Assessment & Plan:  Sees Neurology    Orders:  -     CBC Auto Differential  -     Comprehensive Metabolic Panel  -     Urinalysis  -     Hemoglobin A1C  -     TSH  -     T4, Free  -     HIV 1/2 Ag/Ab (4th Gen)  -     SYPHILIS ANTIBODY (WITH REFLEX RPR)    6. Low vitamin D level  -     CBC Auto Differential  -     Comprehensive Metabolic Panel  -     Urinalysis  -     Hemoglobin A1C  -     TSH  -     T4, Free  -     HIV 1/2 Ag/Ab (4th Gen)  -     SYPHILIS ANTIBODY (WITH REFLEX RPR)    7. Anemia, unspecified type  -     CBC Auto Differential  -     Comprehensive Metabolic Panel  -     Urinalysis  -     Hemoglobin A1C  -     TSH  -     T4, Free  -     HIV 1/2 Ag/Ab (4th Gen)  -     SYPHILIS ANTIBODY (WITH REFLEX RPR)    8. Vitamin D deficiency  Assessment & Plan:  Vitamin D repeat today    Orders:  -     Vitamin D    9. SAM (obstructive sleep apnea)  Assessment & Plan:  Not treated.      10. Itchy scalp  Assessment & Plan:  Pt has seen Dermatology.    Advised tea tree shampoo, coal-tar shampoo, selsum blue.        11. History of opioid abuse  Assessment & Plan:  Stable. In drug court      12. Skin lesion  Assessment & Plan:  Mons pubis with abscess punctured with needle and expressed pus and blood from lesion  Doxycycline 100mg po BID x 10 days prescribed    21 gauge needle used to puncture abscess and expressed with hands serosanguinous fluid from lesion. Covered with gauze    Orders:  -     doxycycline (VIBRA-TABS) 100 MG tablet; Take 1 tablet (100 mg total) by mouth 2 (two) times daily. for 10 days  Dispense: 20 tablet; Refill: 0    13. Candidal intertrigo  Assessment & Plan:  Nystatin powder to area BID    Orders:  -     nystatin (MYCOSTATIN) powder; Apply topically 2 (two) times daily.  Dispense: 60 g; Refill: 11         Follow up in about 6 months (around 6/9/2025) for Routine FU.    Future Appointments   Date Time Provider Department Center   12/18/2024 11:00 AM  Bibi Edmondson MD [facility] B[facility]   12/23/2024  1:30 PM MitraGenna valentin [facility]   1/13/2025 11:30 AM MitraGenna [facility]   1/22/2025  9:30 AM LAB, APPOINTMENT [facility] LAB [facility]   1/22/2025 10:00 AM Josi Ybarra, NP [facility]   1/22/2025 10:30 AM Elizabeth Mann, AMBER [facility]   3/3/2025  9:30 AM LAB, APPOINTMENT [facility] LAB [facility]   3/3/2025 10:00 AM Josi Ybarra, NP [facility]   3/3/2025 10:30 AM Elizabeth Mann, AMBER [facility]   7/7/2025  9:30 AM LAB, APPOINTMENT [facility]   7/7/2025 10:00 AM Josi Ybarra, NP [facility]        YUE Snow

## 2024-12-09 NOTE — ASSESSMENT & PLAN NOTE
Is going through bariatric program for surgery currently.  Pt to have surgery 1/6/25    BMI Body mass index is 63.77 kg/m².   Goal BMI <30.  Exercise 5 times a week for 30 minutes per day.  Avoid soda, simple sugars, excessive rice, potatoes or bread. Limit fast foods and fried foods.  Choose complex carbs in moderation (example: green vegetables, beans, oatmeal). Eat plenty of fresh fruits and vegetables with lean meats daily.  Do not skip meals. Eat a balanced portion size.  Avoid fad diets. Consider permanent healthy life style changes.

## 2024-12-09 NOTE — ASSESSMENT & PLAN NOTE
Mons pubis with abscess punctured with needle and expressed pus and blood from lesion  Doxycycline 100mg po BID x 10 days prescribed    21 gauge needle used to puncture abscess and expressed with hands serosanguinous fluid from lesion. Covered with gauze

## 2024-12-10 ENCOUNTER — TELEPHONE (OUTPATIENT)
Dept: FAMILY MEDICINE | Facility: CLINIC | Age: 29
End: 2024-12-10
Payer: MEDICARE

## 2024-12-10 ENCOUNTER — PATIENT MESSAGE (OUTPATIENT)
Dept: BARIATRICS | Facility: CLINIC | Age: 29
End: 2024-12-10
Payer: MEDICARE

## 2024-12-10 DIAGNOSIS — E55.9 VITAMIN D DEFICIENCY: Primary | ICD-10-CM

## 2024-12-10 RX ORDER — ERGOCALCIFEROL 1.25 MG/1
50000 CAPSULE ORAL
Qty: 8 CAPSULE | Refills: 0 | Status: SHIPPED | OUTPATIENT
Start: 2024-12-10

## 2024-12-10 NOTE — PROGRESS NOTES
I have sent medications and/or lab orders in for this patient.  Please notify the patient.     No orders of the defined types were placed in this encounter.      Medications Ordered This Encounter   Medications    ergocalciferol (ERGOCALCIFEROL) 50,000 unit Cap     Sig: Take 1 capsule (50,000 Units total) by mouth every 7 days.     Dispense:  8 capsule     Refill:  0

## 2024-12-11 LAB — PSYCHE PATHOLOGY RESULT: NORMAL

## 2024-12-18 ENCOUNTER — OFFICE VISIT (OUTPATIENT)
Dept: BARIATRICS | Facility: CLINIC | Age: 29
End: 2024-12-18
Payer: MEDICARE

## 2024-12-18 ENCOUNTER — TELEPHONE (OUTPATIENT)
Dept: BARIATRICS | Facility: CLINIC | Age: 29
End: 2024-12-18
Payer: MEDICARE

## 2024-12-18 VITALS
HEART RATE: 80 BPM | WEIGHT: 293 LBS | HEIGHT: 63 IN | DIASTOLIC BLOOD PRESSURE: 90 MMHG | OXYGEN SATURATION: 98 % | BODY MASS INDEX: 51.91 KG/M2 | SYSTOLIC BLOOD PRESSURE: 130 MMHG

## 2024-12-18 DIAGNOSIS — E66.01 MORBID OBESITY: Primary | ICD-10-CM

## 2024-12-18 DIAGNOSIS — Z98.84 S/P BARIATRIC SURGERY: ICD-10-CM

## 2024-12-18 DIAGNOSIS — G47.33 OSA (OBSTRUCTIVE SLEEP APNEA): ICD-10-CM

## 2024-12-18 PROCEDURE — 99215 OFFICE O/P EST HI 40 MIN: CPT | Mod: S$PBB,,, | Performed by: SURGERY

## 2024-12-18 PROCEDURE — 99213 OFFICE O/P EST LOW 20 MIN: CPT | Mod: PBBFAC | Performed by: SURGERY

## 2024-12-18 PROCEDURE — 99999 PR PBB SHADOW E&M-EST. PATIENT-LVL III: CPT | Mod: PBBFAC,,, | Performed by: SURGERY

## 2024-12-18 RX ORDER — GABAPENTIN 300 MG/1
300 CAPSULE ORAL 2 TIMES DAILY
Qty: 10 CAPSULE | Refills: 0 | Status: SHIPPED | OUTPATIENT
Start: 2024-12-18

## 2024-12-18 NOTE — TELEPHONE ENCOUNTER
Called patient to see if she could come in earlier for her appointment. Patient states she will be here around 10 (1 hour early).

## 2024-12-18 NOTE — TELEPHONE ENCOUNTER
Called patient to let her know that Dr. Cee is putting her prescriptions in the system and they should be ready shortly.

## 2024-12-23 ENCOUNTER — CLINICAL SUPPORT (OUTPATIENT)
Dept: BARIATRICS | Facility: CLINIC | Age: 29
End: 2024-12-23
Payer: MEDICARE

## 2024-12-23 DIAGNOSIS — Z71.3 DIETARY COUNSELING AND SURVEILLANCE: Primary | ICD-10-CM

## 2024-12-23 PROCEDURE — 99499 UNLISTED E&M SERVICE: CPT | Mod: 95,,, | Performed by: DIETITIAN, REGISTERED

## 2024-12-23 NOTE — PROGRESS NOTES
Established Patient - Audio Only Telehealth Visit     The patient location is: home ([place])  The chief complaint leading to consultation is: pre op  Visit type: Virtual visit with audio only (telephone)  Total time spent with patient: 15 min       The reason for the audio only service rather than synchronous audio and video virtual visit was related to technical difficulties or patient preference/necessity.     Each patient to whom I provide medical services by telemedicine is:  (1) informed of the relationship between the physician and patient and the respective role of any other health care provider with respect to management of the patient; and (2) notified that they may decline to receive medical services by telemedicine and may withdraw from such care at any time. Patient verbally consented to receive this service via voice-only telephone call.       NUTRITION NOTE    Bariatric Surgeon: Chivo Pena M.D.  Reason for MNT Referral: Pre-op liquid diet and nutrition instructions  Bypass  Date of Surgery 1/6/25    Pre-op liquid diet  Pt using [employer] shakes for preop liquid diet    Discussion:  -  gms of protein per day  - 600-800 calories per day   - Less than 4gm sugar per shake  - SF, Decaf, non-carbonated Fluids  - No Fruits, juices, yogurt or pudding on liquid diet  - No vitamins for 1 week prior to surgery  - No herbal supplements including green tea and fish oils for 2 weeks prior to surgery    Remind pt per nursing and medical team to inform our department if taking antibiotics within the 30 days post bariatric surgery or it any other surgeries/procedures are scheduled within 30 days after bariatric surgery.    2 week post-op instructions  Reviewed nutritional guidelines for protein and fluid requirements for week 1 and week 2 post-surgery.  Handout provided to log protein and fluid daily.  1 ounce medicine cups provided for patient to measure fluid intake after surgery.    Pt has the following vitamins  and minerals to start taking 1 week after surgery  Multivitamin with 18 mg iron take one tablet or chewable twice a day  B-complex with 50 mg thiamin taken once daily  Calcium citrate 500 mg with vitamin D three times per day  Vitamin B-12 500 mcg  Sublingually daily  Reviewed dosage and timing of vitamin/mineral guidelines.    Reviewed common nutritional concerns and prevention tips after bariatric surgery.  Reminded not to lift anything greater than 10 lbs for 6 week post-surgery  Pt verbalized understanding of information provided with appropriate questions and comments.         SESSION TIME: 15 minutes                          This service was not originating from a related E/M service provided within the previous 7 days nor will  to an E/M service or procedure within the next 24 hours or my soonest available appointment.  Prevailing standard of care was able to be met in this audio-only visit.

## 2025-01-03 ENCOUNTER — TELEPHONE (OUTPATIENT)
Dept: BARIATRICS | Facility: CLINIC | Age: 30
End: 2025-01-03
Payer: MEDICARE

## 2025-01-03 ENCOUNTER — ANESTHESIA EVENT (OUTPATIENT)
Dept: SURGERY | Facility: HOSPITAL | Age: 30
End: 2025-01-03
Payer: MEDICARE

## 2025-01-03 PROBLEM — K82.8 BILIARY DYSKINESIA: Status: RESOLVED | Noted: 2024-11-02 | Resolved: 2025-01-03

## 2025-01-03 PROBLEM — Z12.4 ENCOUNTER FOR PAPANICOLAOU SMEAR OF CERVIX: Status: RESOLVED | Noted: 2024-12-03 | Resolved: 2025-01-03

## 2025-01-03 RX ORDER — ENOXAPARIN SODIUM 100 MG/ML
60 INJECTION SUBCUTANEOUS EVERY 12 HOURS
OUTPATIENT
Start: 2025-01-03

## 2025-01-03 RX ORDER — KETOROLAC TROMETHAMINE 15 MG/ML
15 INJECTION, SOLUTION INTRAMUSCULAR; INTRAVENOUS ONCE
OUTPATIENT
Start: 2025-01-03 | End: 2025-01-03

## 2025-01-03 RX ORDER — SODIUM CHLORIDE, SODIUM LACTATE, POTASSIUM CHLORIDE, CALCIUM CHLORIDE 600; 310; 30; 20 MG/100ML; MG/100ML; MG/100ML; MG/100ML
INJECTION, SOLUTION INTRAVENOUS CONTINUOUS
OUTPATIENT
Start: 2025-01-03

## 2025-01-03 RX ORDER — OMEPRAZOLE 40 MG/1
40 CAPSULE, DELAYED RELEASE ORAL EVERY MORNING
Qty: 30 CAPSULE | Refills: 2 | Status: SHIPPED | OUTPATIENT
Start: 2025-01-03

## 2025-01-03 RX ORDER — ONDANSETRON HYDROCHLORIDE 2 MG/ML
8 INJECTION, SOLUTION INTRAVENOUS EVERY 6 HOURS
OUTPATIENT
Start: 2025-01-03

## 2025-01-03 RX ORDER — PROCHLORPERAZINE EDISYLATE 5 MG/ML
5 INJECTION INTRAMUSCULAR; INTRAVENOUS EVERY 6 HOURS PRN
OUTPATIENT
Start: 2025-01-03

## 2025-01-03 RX ORDER — ACETAMINOPHEN 10 MG/ML
1000 INJECTION, SOLUTION INTRAVENOUS ONCE
OUTPATIENT
Start: 2025-01-03 | End: 2025-01-03

## 2025-01-03 RX ORDER — ONDANSETRON 8 MG/1
8 TABLET, ORALLY DISINTEGRATING ORAL EVERY 6 HOURS PRN
Qty: 30 TABLET | Refills: 0 | Status: SHIPPED | OUTPATIENT
Start: 2025-01-03

## 2025-01-03 RX ORDER — DEXTROMETHORPHAN/PSEUDOEPHED 2.5-7.5/.8
40 DROPS ORAL 4 TIMES DAILY PRN
OUTPATIENT
Start: 2025-01-03

## 2025-01-03 RX ORDER — HEPARIN SODIUM 5000 [USP'U]/ML
5000 INJECTION, SOLUTION INTRAVENOUS; SUBCUTANEOUS ONCE
OUTPATIENT
Start: 2025-01-03 | End: 2025-01-03

## 2025-01-03 RX ORDER — FAMOTIDINE 10 MG/ML
20 INJECTION INTRAVENOUS ONCE
OUTPATIENT
Start: 2025-01-03 | End: 2025-01-03

## 2025-01-03 RX ORDER — ACETAMINOPHEN 650 MG/20.3ML
500 LIQUID ORAL
OUTPATIENT
Start: 2025-01-03

## 2025-01-03 RX ORDER — SODIUM CHLORIDE 9 MG/ML
INJECTION, SOLUTION INTRAVENOUS CONTINUOUS
OUTPATIENT
Start: 2025-01-03

## 2025-01-03 RX ORDER — ENOXAPARIN SODIUM 100 MG/ML
60 INJECTION SUBCUTANEOUS 2 TIMES DAILY
Qty: 15.6 ML | Refills: 0 | Status: SHIPPED | OUTPATIENT
Start: 2025-01-03 | End: 2025-01-16

## 2025-01-03 RX ORDER — MUPIROCIN 20 MG/G
OINTMENT TOPICAL
OUTPATIENT
Start: 2025-01-03

## 2025-01-03 RX ORDER — ACETAMINOPHEN 650 MG/20.3ML
1000 LIQUID ORAL EVERY 8 HOURS
OUTPATIENT
Start: 2025-01-03

## 2025-01-03 RX ORDER — PANTOPRAZOLE SODIUM 40 MG/10ML
40 INJECTION, POWDER, LYOPHILIZED, FOR SOLUTION INTRAVENOUS 2 TIMES DAILY
OUTPATIENT
Start: 2025-01-03

## 2025-01-03 RX ORDER — GABAPENTIN 250 MG/5ML
300 SOLUTION ORAL 2 TIMES DAILY
OUTPATIENT
Start: 2025-01-03

## 2025-01-03 RX ORDER — METRONIDAZOLE 500 MG/100ML
500 INJECTION, SOLUTION INTRAVENOUS
OUTPATIENT
Start: 2025-01-03

## 2025-01-03 RX ORDER — LIDOCAINE HYDROCHLORIDE 10 MG/ML
1 INJECTION, SOLUTION EPIDURAL; INFILTRATION; INTRACAUDAL; PERINEURAL ONCE
OUTPATIENT
Start: 2025-01-03 | End: 2025-01-03

## 2025-01-03 RX ORDER — SCOLOPAMINE TRANSDERMAL SYSTEM 1 MG/1
1 PATCH, EXTENDED RELEASE TRANSDERMAL ONCE
OUTPATIENT
Start: 2025-01-03 | End: 2025-01-03

## 2025-01-03 NOTE — H&P (VIEW-ONLY)
History & Physical    SUBJECTIVE:     History of Present Illness:  Alexandra Martin is a 29 year-old morbidly obese female with BMI 63.42 kg/m² and multiple associated comorbidities including SAM and migraines, who presents for pre-operative exam for weight loss surgery. She has failed multiple attempts at non-surgical methods of weight loss and has [place] [facility] [place] which she started on 8/26/24. She meets NIH consensus criteria for bariatric surgery and is interested in undergoing laparoscopic rosa-en-y gastric bypass. Her medical history includes opioid abuse and she requests no exposure to opioids throughout the duration of her perioperative care.      Clearances:  Psych: Mao 9/24/24: There are no overt psychological contraindications for proceeding with bariatric surgery at present. Although Ms. Martin endorsed past psychiatric history, treatment for substance abuse, suicidality, and hospitalizations for mental health concerns, these risk factors are mitigated by her continued engagement in outpatient psychotherapy support services, use of psychopharmacological medications, continued maintenance of her sobriety, and more recent history of stability. However, based on the established guidelines, patients who have been hospitalized for psychiatric reasons within the last 12 months are not suitable candidates for bariatric surgery. If the patient continues with the protective factors referenced above (maintaining sobriety, compliance with psychiatric medications, participation in outpatient psychotherapy) surgery could be appropriate in January 2025.    Chief Complaint   Patient presents with    Pre-op Exam     RGYB 1/6/25     Review of patient's allergies indicates:   Allergen Reactions    Ceftriaxone Nausea And Vomiting    Azithromycin Nausea And Vomiting     Hard to breathe     Latex, natural rubber Swelling and Rash     Current Outpatient Medications   Medication Sig     ergocalciferol (ERGOCALCIFEROL) 50,000 unit Cap Take 1 capsule (50,000 Units total) by mouth every 7 days.    etonogestreL (NEXPLANON) 68 mg Impl subdermal device 68 mg by Subdermal route once. A single NEXPLANON implant is inserted subdermally just under the skin at the inner side of the non-dominant upper arm.    hydrOXYzine pamoate (VISTARIL) 25 MG Cap Take 25 mg by mouth daily as needed.    nystatin (MYCOSTATIN) powder Apply topically 2 (two) times daily.    ondansetron (ZOFRAN-ODT) 4 MG TbDL Take 2 tablets (8 mg total) by mouth every 8 (eight) hours as needed (Nausea). (Patient not taking: Reported on 12/9/2024)    rosuvastatin (CRESTOR) 10 MG tablet Take 1 tablet (10 mg total) by mouth once daily.    venlafaxine (EFFEXOR-XR) 150 MG Cp24 Take 150 mg by mouth.     No current facility-administered medications for this visit.     Past Medical History:   Diagnosis Date    ADD (attention deficit disorder)     Anxiety disorder     Biliary dyskinesia 11/02/2024    Bronchitis 12/03/2024    Depression     Drug-induced constipation 03/04/2024    Headache     MDD (major depressive disorder), recurrent episode, moderate 07/29/2021    Morbid obesity with BMI of 60.0-69.9, adult 06/10/2024    Nephrolithiasis     Ovarian cyst     Substance abuse     Hospitalization     Trauma 09/11/2019    Tympanic membrane perforation, left 03/14/2018    UTI (urinary tract infection) 08/03/2018    Vertigo      Past Surgical History:   Procedure Laterality Date    CYSTOSCOPY W/ URETERAL STENT PLACEMENT Right 12/26/2019    Procedure: CYSTOSCOPY, WITH URETERAL STENT INSERTION;  Surgeon: Rito Medley MD;  Location: [facility] OR;  Service: Urology;  Laterality: Right;    CYSTOSCOPY W/ URETERAL STENT REMOVAL Right 01/06/2020    Procedure: CYSTOSCOPY, WITH URETERAL STENT REMOVAL;  Surgeon: Rito Medley MD;  Location: [facility] OR;  Service: Urology;  Laterality: Right;    LAPAROSCOPIC CHOLECYSTECTOMY  11/05/2024    LASER LITHOTRIPSY Right  "01/06/2020    Procedure: LITHOTRIPSY, USING LASER;  Surgeon: Rito Medley MD;  Location: [facility] OR;  Service: Urology;  Laterality: Right;    TONSILLECTOMY, ADENOIDECTOMY, BILATERAL MYRINGOTOMY AND TUBES      URETEROSCOPIC REMOVAL OF URETERIC CALCULUS Right 01/06/2020    Procedure: REMOVAL, CALCULUS, URETER, URETEROSCOPIC;  Surgeon: Rito Medley MD;  Location: [facility] OR;  Service: Urology;  Laterality: Right;    URETEROSCOPY Left 12/26/2019    Procedure: URETEROSCOPY;  Surgeon: Rito Medley MD;  Location: [facility] OR;  Service: Urology;  Laterality: Left;    URETEROSCOPY Right 01/06/2020    Procedure: URETEROSCOPY;  Surgeon: Rito Medley MD;  Location: [facility] OR;  Service: Urology;  Laterality: Right;     Review of Systems   Constitutional:  Negative for chills, diaphoresis, fever and malaise/fatigue.   HENT:  Negative for congestion, hearing loss and sore throat.    Eyes:  Negative for blurred vision and double vision.   Respiratory:  Negative for cough and shortness of breath.    Cardiovascular:  Negative for chest pain, palpitations, orthopnea, claudication, leg swelling and PND.   Gastrointestinal:  Negative for abdominal pain, blood in stool, constipation, diarrhea, heartburn, nausea and vomiting.   Genitourinary:  Negative for dysuria and urgency.   Musculoskeletal:  Negative for back pain, falls, joint pain, myalgias and neck pain.   Skin:  Negative for itching and rash.   Neurological:  Negative for weakness and headaches.   Endo/Heme/Allergies:  Does not bruise/bleed easily.   Psychiatric/Behavioral:  Negative for depression and substance abuse.      OBJECTIVE:     Vitals:    12/18/24 1057   BP: (!) 130/90   Pulse: 80   SpO2: 98%   Weight: (!) 162.4 kg (358 lb)   Height: 5' 3" (1.6 m)     Physical Exam  Vitals reviewed.   Constitutional:       General: She is not in acute distress.     Appearance: Normal appearance. She is obese.   HENT:      Head: Normocephalic and atraumatic.   Eyes:     "  Conjunctiva/sclera: Conjunctivae normal.   Neck:      Thyroid: No thyromegaly.   Cardiovascular:      Rate and Rhythm: Normal rate and regular rhythm.      Heart sounds: Normal heart sounds.   Pulmonary:      Effort: Pulmonary effort is normal. No respiratory distress.      Breath sounds: Normal breath sounds.   Abdominal:      General: There is no distension.      Palpations: Abdomen is soft.      Tenderness: There is no abdominal tenderness. There is no guarding or rebound.   Musculoskeletal:         General: Normal range of motion.      Cervical back: Normal range of motion and neck supple.      Right lower leg: No edema.      Left lower leg: No edema.   Skin:     General: Skin is warm and dry.      Findings: No rash.   Neurological:      General: No focal deficit present.      Mental Status: She is alert and oriented to person, place, and time.      Gait: Gait is intact.   Psychiatric:         Mood and Affect: Mood and affect normal.         Behavior: Behavior normal.         Cognition and Memory: Memory normal.        Laboratory  CBC: Reviewed  BMP: Reviewed  LFTs: Reviewed  Bariatric Labs: Reviewed    Diagnostic Results:  Labs: Reviewed  ECG: Reviewed  X-Ray: Reviewed     Dietitian: Patient has participated in pre-operative nutritional program with understanding of necessary lifelong dietary changes required with surgery.    Psych: No overt contraindications to bariatric surgery, patient has completed psychological evaluation and is able to give informed consent.    PCP: Medically cleared for surgery.     ASSESSMENT/PLAN:     Morbid obesity with failure of medical conservative therapy.    Co Morbid Conditions:   Patient Active Problem List   Diagnosis    Morbid obesity    Migraine without status migrainosus, not intractable    History of opioid abuse    Itchy scalp    SAM (obstructive sleep apnea)    Vitamin D deficiency    Skin lesion    Candidal intertrigo     Patient wishes to undergo laparoscopic  rosa-en-y gastric bypass. She is scheduled for 1/6/25. She will start the pre-op liquid diet on Monday 12/23/24.     The patient was informed that risks of bariatric surgery include, but are not limited to: bleeding, infection, injury to intraabdominal structures such as bowel, stomach, esophagus, liver, and spleen; DVT/PE, cardiopulmonary complications such as MI, stroke or PNA; marginal ulcers which can cause pain, bleed or perforate, stricture requiring dilations, incisional hernia, gallstones, exacerbation of mood disorders, vitamin/mineral deficiencies, dumping syndrome, failure of weight loss or weight regain, possible conversion to an open operation, and anastomotic or staple-line leak which may require surgical or endoscopic interventions, drains, antibiotics and NPO status with IV nutrition. Risks of general anesthesia with endotracheal intubation including but are not limited to heart attack, stroke, inability to wean off ventilator, and neurologic disability. We discussed increased risk of marginal ulcer in the setting of smoking, NSAID use, and immunosuppression including steroids.      We discussed that our goal is to ameliorate her medical problems and not to obtain a specific body mass index. All questions were answered to her satisfaction and she has elected to proceed with surgery. Consent for surgery and blood transfusion were signed. Prescriptions for ondansetron, omeprazole, and acetaminophen were sent to the pharmacy for bedside delivery. Perioperative gabapentin was sent to the pharmacy for pick-up. Patient discussed perioperative instructions with the Bariatric RN.     Bibi Cee  12/18/24

## 2025-01-03 NOTE — TELEPHONE ENCOUNTER
Notified patient of arrival time to the [facility] 2nd floor [facility] at 0730 with expected surgery start time 0925 on 1/6/2025. Instructed patient regarding pre-op instructions including no protein shakes or sugar free clear liquids after midnight but can have a rare sip of water for comfort, showering and preop medications to hold/take per anesthesia/preop. Reminded pt to drink pre-surgery beverage or 8 oz water and take one dose of Gabapentin at 0700. Instructed patient on the s/s of dehydration and for patient to call at the first sign of dehydration. Informed patient that someone from bariatrics will call them 1 week post op to review diet/fluid intake and to ensure adequate hydration. Reminded patient not to take antibiotics for 30 days following surgery or schedule elective procedures involving anesthesia/sedation for 30 days following surgery unless checking with the bariatric clinic first. Pt verbalized understanding. Pt given office phone number for any additional questions/concerns.

## 2025-01-03 NOTE — PRE-PROCEDURE INSTRUCTIONS
PreOp Instructions given:   - Verbal medication information (what to hold and what to take)   - NPO guidelines   - Arrival place directions given; time to be given the day before procedure by the   Surgeon's Office 0730 DOSC  - Bathing with antibacterial soap   - Don't wear any jewelry or bring any valuables AM of surgery   - No makeup or moisturizer to face   - No perfume/cologne, powder, lotions or aftershave   Pt. verbalized understanding.   Pt denies any h/o Anesthesia/Sedation complications or side effects.

## 2025-01-03 NOTE — PROGRESS NOTES
History & Physical    SUBJECTIVE:     History of Present Illness:  Alexandra Martin is a 29 year-old morbidly obese female with BMI 63.42 kg/m² and multiple associated comorbidities including SAM and migraines, who presents for pre-operative exam for weight loss surgery. She has failed multiple attempts at non-surgical methods of weight loss and has completed [place] [facility] [place] which she started on 8/26/24. She meets NIH consensus criteria for bariatric surgery and is interested in undergoing laparoscopic rosa-en-y gastric bypass. Her medical history includes opioid abuse and she requests no exposure to opioids throughout the duration of her perioperative care.      Clearances:  Psych: Mao 9/24/24: There are no overt psychological contraindications for proceeding with bariatric surgery at present. Although Ms. Martin endorsed past psychiatric history, treatment for substance abuse, suicidality, and hospitalizations for mental health concerns, these risk factors are mitigated by her continued engagement in outpatient psychotherapy support services, use of psychopharmacological medications, continued maintenance of her sobriety, and more recent history of stability. However, based on the established guidelines, patients who have been hospitalized for psychiatric reasons within the last 12 months are not suitable candidates for bariatric surgery. If the patient continues with the protective factors referenced above (maintaining sobriety, compliance with psychiatric medications, participation in outpatient psychotherapy) surgery could be appropriate in January 2025.    Chief Complaint   Patient presents with    Pre-op Exam     RGYB 1/6/25     Review of patient's allergies indicates:   Allergen Reactions    Ceftriaxone Nausea And Vomiting    Azithromycin Nausea And Vomiting     Hard to breathe     Latex, natural rubber Swelling and Rash     Current Outpatient Medications   Medication Sig     ergocalciferol (ERGOCALCIFEROL) 50,000 unit Cap Take 1 capsule (50,000 Units total) by mouth every 7 days.    etonogestreL (NEXPLANON) 68 mg Impl subdermal device 68 mg by Subdermal route once. A single NEXPLANON implant is inserted subdermally just under the skin at the inner side of the non-dominant upper arm.    hydrOXYzine pamoate (VISTARIL) 25 MG Cap Take 25 mg by mouth daily as needed.    nystatin (MYCOSTATIN) powder Apply topically 2 (two) times daily.    ondansetron (ZOFRAN-ODT) 4 MG TbDL Take 2 tablets (8 mg total) by mouth every 8 (eight) hours as needed (Nausea). (Patient not taking: Reported on 12/9/2024)    rosuvastatin (CRESTOR) 10 MG tablet Take 1 tablet (10 mg total) by mouth once daily.    venlafaxine (EFFEXOR-XR) 150 MG Cp24 Take 150 mg by mouth.     No current facility-administered medications for this visit.     Past Medical History:   Diagnosis Date    ADD (attention deficit disorder)     Anxiety disorder     Biliary dyskinesia 11/02/2024    Bronchitis 12/03/2024    Depression     Drug-induced constipation 03/04/2024    Headache     MDD (major depressive disorder), recurrent episode, moderate 07/29/2021    Morbid obesity with BMI of 60.0-69.9, adult 06/10/2024    Nephrolithiasis     Ovarian cyst     Substance abuse     Hospitalization     Trauma 09/11/2019    Tympanic membrane perforation, left 03/14/2018    UTI (urinary tract infection) 08/03/2018    Vertigo      Past Surgical History:   Procedure Laterality Date    CYSTOSCOPY W/ URETERAL STENT PLACEMENT Right 12/26/2019    Procedure: CYSTOSCOPY, WITH URETERAL STENT INSERTION;  Surgeon: Rito Medley MD;  Location: [facility] OR;  Service: Urology;  Laterality: Right;    CYSTOSCOPY W/ URETERAL STENT REMOVAL Right 01/06/2020    Procedure: CYSTOSCOPY, WITH URETERAL STENT REMOVAL;  Surgeon: Rito Medley MD;  Location: [facility] OR;  Service: Urology;  Laterality: Right;    LAPAROSCOPIC CHOLECYSTECTOMY  11/05/2024    LASER LITHOTRIPSY Right  "01/06/2020    Procedure: LITHOTRIPSY, USING LASER;  Surgeon: Rito Medley MD;  Location: [facility] OR;  Service: Urology;  Laterality: Right;    TONSILLECTOMY, ADENOIDECTOMY, BILATERAL MYRINGOTOMY AND TUBES      URETEROSCOPIC REMOVAL OF URETERIC CALCULUS Right 01/06/2020    Procedure: REMOVAL, CALCULUS, URETER, URETEROSCOPIC;  Surgeon: Rito Medley MD;  Location: [facility] OR;  Service: Urology;  Laterality: Right;    URETEROSCOPY Left 12/26/2019    Procedure: URETEROSCOPY;  Surgeon: Rito Medley MD;  Location: [facility] OR;  Service: Urology;  Laterality: Left;    URETEROSCOPY Right 01/06/2020    Procedure: URETEROSCOPY;  Surgeon: Rito Medley MD;  Location: [facility] OR;  Service: Urology;  Laterality: Right;     Review of Systems   Constitutional:  Negative for chills, diaphoresis, fever and malaise/fatigue.   HENT:  Negative for congestion, hearing loss and sore throat.    Eyes:  Negative for blurred vision and double vision.   Respiratory:  Negative for cough and shortness of breath.    Cardiovascular:  Negative for chest pain, palpitations, orthopnea, claudication, leg swelling and PND.   Gastrointestinal:  Negative for abdominal pain, blood in stool, constipation, diarrhea, heartburn, nausea and vomiting.   Genitourinary:  Negative for dysuria and urgency.   Musculoskeletal:  Negative for back pain, falls, joint pain, myalgias and neck pain.   Skin:  Negative for itching and rash.   Neurological:  Negative for weakness and headaches.   Endo/Heme/Allergies:  Does not bruise/bleed easily.   Psychiatric/Behavioral:  Negative for depression and substance abuse.      OBJECTIVE:     Vitals:    12/18/24 1057   BP: (!) 130/90   Pulse: 80   SpO2: 98%   Weight: (!) 162.4 kg (358 lb)   Height: 5' 3" (1.6 m)     Physical Exam  Vitals reviewed.   Constitutional:       General: She is not in acute distress.     Appearance: Normal appearance. She is obese.   HENT:      Head: Normocephalic and atraumatic.   Eyes:     "  Conjunctiva/sclera: Conjunctivae normal.   Neck:      Thyroid: No thyromegaly.   Cardiovascular:      Rate and Rhythm: Normal rate and regular rhythm.      Heart sounds: Normal heart sounds.   Pulmonary:      Effort: Pulmonary effort is normal. No respiratory distress.      Breath sounds: Normal breath sounds.   Abdominal:      General: There is no distension.      Palpations: Abdomen is soft.      Tenderness: There is no abdominal tenderness. There is no guarding or rebound.   Musculoskeletal:         General: Normal range of motion.      Cervical back: Normal range of motion and neck supple.      Right lower leg: No edema.      Left lower leg: No edema.   Skin:     General: Skin is warm and dry.      Findings: No rash.   Neurological:      General: No focal deficit present.      Mental Status: She is alert and oriented to person, place, and time.      Gait: Gait is intact.   Psychiatric:         Mood and Affect: Mood and affect normal.         Behavior: Behavior normal.         Cognition and Memory: Memory normal.        Laboratory  CBC: Reviewed  BMP: Reviewed  LFTs: Reviewed  Bariatric Labs: Reviewed    Diagnostic Results:  Labs: Reviewed  ECG: Reviewed  X-Ray: Reviewed     Dietitian: Patient has participated in pre-operative nutritional program with understanding of necessary lifelong dietary changes required with surgery.    Psych: No overt contraindications to bariatric surgery, patient has completed psychological evaluation and is able to give informed consent.    PCP: Medically cleared for surgery.     ASSESSMENT/PLAN:     Morbid obesity with failure of medical conservative therapy.    Co Morbid Conditions:   Patient Active Problem List   Diagnosis    Morbid obesity    Migraine without status migrainosus, not intractable    History of opioid abuse    Itchy scalp    SAM (obstructive sleep apnea)    Vitamin D deficiency    Skin lesion    Candidal intertrigo     Patient wishes to undergo laparoscopic  rosa-en-y gastric bypass. She is scheduled for 1/6/25. She will start the pre-op liquid diet on Monday 12/23/24.     The patient was informed that risks of bariatric surgery include, but are not limited to: bleeding, infection, injury to intraabdominal structures such as bowel, stomach, esophagus, liver, and spleen; DVT/PE, cardiopulmonary complications such as MI, stroke or PNA; marginal ulcers which can cause pain, bleed or perforate, stricture requiring dilations, incisional hernia, gallstones, exacerbation of mood disorders, vitamin/mineral deficiencies, dumping syndrome, failure of weight loss or weight regain, possible conversion to an open operation, and anastomotic or staple-line leak which may require surgical or endoscopic interventions, drains, antibiotics and NPO status with IV nutrition. Risks of general anesthesia with endotracheal intubation including but are not limited to heart attack, stroke, inability to wean off ventilator, and neurologic disability. We discussed increased risk of marginal ulcer in the setting of smoking, NSAID use, and immunosuppression including steroids.      We discussed that our goal is to ameliorate her medical problems and not to obtain a specific body mass index. All questions were answered to her satisfaction and she has elected to proceed with surgery. Consent for surgery and blood transfusion were signed. Prescriptions for ondansetron, omeprazole, and acetaminophen were sent to the pharmacy for bedside delivery. Perioperative gabapentin was sent to the pharmacy for pick-up. Patient discussed perioperative instructions with the Bariatric RN.     Bibi Cee  12/18/24

## 2025-01-05 RX ORDER — FENTANYL CITRATE 50 UG/ML
25 INJECTION, SOLUTION INTRAMUSCULAR; INTRAVENOUS EVERY 5 MIN PRN
Status: CANCELLED | OUTPATIENT
Start: 2025-01-05

## 2025-01-05 NOTE — ANESTHESIA PREPROCEDURE EVALUATION
[facility]  Anesthesia Pre-Operative Evaluation         Patient Name: Alexandra Martin  YOB: 1995  MRN: [id number]    SUBJECTIVE:     Pre-operative evaluation for Procedure(s) (LRB):  GASTROENTEROSTOMY, LAPAROSCOPIC with inraop EGD (N/A)     01/05/2025    Alexandra Martin is a 29 y.o. female with a significant medical history of morbid obesity, SAM, migraines who presents for the above procedure.    LDA: None documented.       Prev airway:   Method of Intubation Direct laryngoscopy, Rapid Sequence Induction; Inserted by CRNA; Airway Device Endotracheal Tube; Induction type IV - routine; Mask Ventilation Easy; Intubated Postinduction; Blade Jacques #2; Airway Device Size 7.5; Style Cuffed; Cuff Inflation Minimal occlusive pressure; Inflation Amount 4; Placement Verified by Auscultation, Capnometry, ETT Condensation; Grade Grade I; Complicating Factors None;     Drips: None documented.      Patient Active Problem List   Diagnosis    Morbid obesity    Migraine without status migrainosus, not intractable    History of opioid abuse    Itchy scalp    SAM (obstructive sleep apnea)    Vitamin D deficiency    Skin lesion    Candidal intertrigo       Review of patient's allergies indicates:   Allergen Reactions    Ceftriaxone Nausea And Vomiting    Azithromycin Nausea And Vomiting     Hard to breathe     Latex, natural rubber Swelling and Rash       Current Inpatient Medications:      No current facility-administered medications on file prior to encounter.     Current Outpatient Medications on File Prior to Encounter   Medication Sig Dispense Refill    etonogestreL (NEXPLANON) 68 mg Impl subdermal device 68 mg by Subdermal route once. A single NEXPLANON implant is inserted subdermally just under the skin at the inner side of the non-dominant upper arm.      hydrOXYzine  [FreeTextEntry1] : Mr. Leyva presents with above history and imaging.  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  We discussed in detail smoking cessation.  I discussed the risks, benefits, and alternatives of surgical decompression with or without fusion as below:\par \par without fusion:\par \par benefit: hopeful decompression, hopeful improvement in symptoms, hopeful prevention of progression of deficit\par alternative: no surgical intervention; continued surveillance\par risks: bleeding, infection, CSF leak/pseudomeningocele, failure of procedure, destabilization of the spine/craniocervical junction, need for re-operation, need for  shunt, seizure, stroke, coma, death, DVT, PE, MI, PNA, UTI, difficulty/failure to intubate or extubate, new or worsening numbness, tingling, weakness, paralysis, sensory changes, difficulty/inability to ambulate, difficulty with expressive or receptive speech, altered personality or judgment, sexual dysfunction, incontinence\par \par I explained that with O-C fusion, there is a risk of failure of hardware, adjacent level degeneration, and need for re-operation.  I explained that smoking will increase his risk of fusion failure.  I explained the associated morbidity of loss of ROM of the neck.\par \par The patient verbalizes his understanding of the above.  I have answered all his questions.  He wishes to proceed with Chiari decompression without fusion.  As such, I will obtain flexion-extension x-rays of the cervical spine to ascertain if this is an appropriate option.\par    \par I, Dr. Nash Araiza, personally performed the evaluation and management (E/M) services for this patient.  That E/M includes assessment of the initial examination, assessing the pertinent medical and surgical history, and establishing the plan of care.  At the time of the visit, my ACP, Sandy Lamar, actively participated in the evaluation and management services for this patient to be followed going forward in accordance with the plan documented in the clinical note.\par \par  pamoate (VISTARIL) 25 MG Cap Take 25 mg by mouth daily as needed. (Patient not taking: Reported on 1/3/2025)      ondansetron (ZOFRAN-ODT) 4 MG TbDL Take 2 tablets (8 mg total) by mouth every 8 (eight) hours as needed (Nausea). (Patient not taking: Reported on 12/9/2024) 10 tablet 0    rosuvastatin (CRESTOR) 10 MG tablet Take 1 tablet (10 mg total) by mouth once daily. 30 tablet 11    venlafaxine (EFFEXOR-XR) 150 MG Cp24 Take 150 mg by mouth. (Patient not taking: Reported on 1/3/2025)         Past Surgical History:   Procedure Laterality Date    CYSTOSCOPY W/ URETERAL STENT PLACEMENT Right 12/26/2019    Procedure: CYSTOSCOPY, WITH URETERAL STENT INSERTION;  Surgeon: Rito Medley MD;  Location: [facility] OR;  Service: Urology;  Laterality: Right;    CYSTOSCOPY W/ URETERAL STENT REMOVAL Right 01/06/2020    Procedure: CYSTOSCOPY, WITH URETERAL STENT REMOVAL;  Surgeon: Rito Medley MD;  Location: [facility] OR;  Service: Urology;  Laterality: Right;    LAPAROSCOPIC CHOLECYSTECTOMY  11/05/2024    LASER LITHOTRIPSY Right 01/06/2020    Procedure: LITHOTRIPSY, USING LASER;  Surgeon: Rito Medley MD;  Location: [facility];  Service: Urology;  Laterality: Right;    TONSILLECTOMY, ADENOIDECTOMY, BILATERAL MYRINGOTOMY AND TUBES      URETEROSCOPIC REMOVAL OF URETERIC CALCULUS Right 01/06/2020    Procedure: REMOVAL, CALCULUS, URETER, URETEROSCOPIC;  Surgeon: Rito Medley MD;  Location: [facility] OR;  Service: Urology;  Laterality: Right;    URETEROSCOPY Left 12/26/2019    Procedure: URETEROSCOPY;  Surgeon: Rito Medley MD;  Location: [facility] OR;  Service: Urology;  Laterality: Left;    URETEROSCOPY Right 01/06/2020    Procedure: URETEROSCOPY;  Surgeon: Rito Medley MD;  Location: [facility] OR;  Service: Urology;  Laterality: Right;       Social History     Socioeconomic History    Marital status: Single   Tobacco Use    Smoking status: Former     Current packs/day: 0.25     Average packs/day: 0.3 packs/day for 15.0  "years (3.8 ttl pk-yrs)     Types: Cigarettes     Start date: 2010    Smokeless tobacco: Never    Tobacco comments:     Quit smoking June 2024    Substance and Sexual Activity    Alcohol use: Not Currently     Alcohol/week: 0.0 standard drinks of alcohol    Drug use: Not Currently     Types: Marijuana, Fentanyl     Comment: history of opoid abuse    Sexual activity: Not Currently     Partners: Male     Birth control/protection: Implant     Social Drivers of Health     Financial Resource Strain: Low Risk  (8/25/2024)    Overall Financial Resource Strain (CARDIA)     Difficulty of Paying Living Expenses: Not very hard   Food Insecurity: No Food Insecurity (8/25/2024)    Hunger Vital Sign     Worried About Running Out of Food in the Last Year: Never true     Ran Out of Food in the Last Year: Never true   Physical Activity: Unknown (8/25/2024)    Exercise Vital Sign     Days of Exercise per Week: Patient declined     Minutes of Exercise per Session: 30 min   Stress: No Stress Concern Present (8/25/2024)    [employer] - Occupational Stress Questionnaire     Feeling of Stress : Only a little   Housing Stability: Unknown (8/25/2024)    Housing Stability Vital Sign     Unable to Pay for Housing in the Last Year: No       OBJECTIVE:     Vital Signs Range:      12/3/2024    12:31 PM 12/9/2024     1:07 PM 12/18/2024    10:57 AM   Vitals - 1 value per visit   SYSTOLIC 138 123 130   DIASTOLIC 85 80 90   Pulse 79 72 80   Temp 36.7 °C (98 °F) 36.7 °C (98.1 °F)    Resp  18    SPO2 96 % 98 % 98 %   Weight (lb) 355 360 358   Weight (kg) 161.027 163.295 162.388   Height 5' 3" (1.6 m) 5' 3" (1.6 m) 5' 3" (1.6 m)   BMI (Calculated) 62.9 63.8 63.4   Pain Score Zero Zero Zero         CBC:   Lab Results   Component Value Date    WBC 9.76 12/09/2024    HGB 13.0 12/09/2024    HCT 39.7 12/09/2024    MCV 81.4 12/09/2024     12/09/2024         CMP:   Sodium   Date Value Ref Range Status   12/09/2024 140 136 - " 145 mmol/L Final   11/02/2024 137 136 - 145 mmol/L Final   08/04/2021 139 136 - 145 mmol/L Final     Potassium   Date Value Ref Range Status   12/09/2024 3.9 3.5 - 5.1 mmol/L Final   11/02/2024 4.2 3.5 - 5.1 mmol/L Final   08/04/2021 4.1 3.5 - 5.1 mmol/L Final     Chloride   Date Value Ref Range Status   12/09/2024 109 (H) 98 - 107 mmol/L Final   11/02/2024 107 100 - 109 mmol/L Final   08/04/2021 108 95 - 110 mmol/L Final     CO2   Date Value Ref Range Status   12/09/2024 22 22 - 29 mmol/L Final   08/04/2021 26 23 - 29 mmol/L Final     Carbon Dioxide   Date Value Ref Range Status   11/02/2024 22 22 - 33 mmol/L Final     Glucose   Date Value Ref Range Status   08/04/2021 77 70 - 110 mg/dL Final     BUN   Date Value Ref Range Status   08/04/2021 7 6 - 20 mg/dL Final     Blood Urea Nitrogen   Date Value Ref Range Status   12/09/2024 6.9 (L) 7.0 - 18.7 mg/dL Final   11/02/2024 9 5 - 25 mg/dL Final     Creatinine   Date Value Ref Range Status   12/09/2024 0.85 0.55 - 1.02 mg/dL Final   11/02/2024 0.89 0.55 - 1.02 mg/dL Final   08/04/2021 0.8 0.5 - 1.4 mg/dL Final     Calcium   Date Value Ref Range Status   12/09/2024 9.4 8.4 - 10.2 mg/dL Final   11/02/2024 8.7 (L) 8.8 - 10.6 mg/dL Final   08/04/2021 9.5 8.7 - 10.5 mg/dL Final     Total Protein   Date Value Ref Range Status   08/04/2021 6.5 6.0 - 8.4 g/dL Final     Albumin   Date Value Ref Range Status   12/09/2024 3.4 (L) 3.5 - 5.0 g/dL Final   08/04/2021 3.6 3.5 - 5.2 g/dL Final     Total Bilirubin   Date Value Ref Range Status   08/04/2021 0.5 0.1 - 1.0 mg/dL Final     Comment:     For infants and newborns, interpretation of results should be based  on gestational age, weight and in agreement with clinical  observations.    Premature Infant recommended reference ranges:  Up to 24 hours.............<8.0 mg/dL  Up to 48 hours............<12.0 mg/dL  3-5 days..................<15.0 mg/dL  6-29 days.................<15.0 mg/dL       Bilirubin Total   Date Value Ref Range  Status   12/09/2024 0.5 <=1.5 mg/dL Final     Alkaline Phosphatase   Date Value Ref Range Status   08/04/2021 83 55 - 135 U/L Final     ALP   Date Value Ref Range Status   12/09/2024 107 40 - 150 unit/L Final     AST   Date Value Ref Range Status   12/09/2024 15 5 - 34 unit/L Final   08/04/2021 24 10 - 40 U/L Final     ALT   Date Value Ref Range Status   12/09/2024 19 0 - 55 unit/L Final   08/04/2021 21 10 - 44 U/L Final     Anion Gap   Date Value Ref Range Status   11/02/2024 8 8 - 16 mmol/L Final   08/04/2021 5 (L) 8 - 16 mmol/L Final     eGFR if    Date Value Ref Range Status   08/04/2021 >60.0 >60 mL/min/1.73 m^2 Final     eGFR    Date Value Ref Range Status   11/02/2024 90 mL/min/1.73mSq Final     Comment:     In accordance with NKF-ASN Task Force recommendation, calculation based on the Chronic Kidney Disease Epidemiology Collaboration (CKD-EPI) equation without adjustment for race. eGFR adjusted for gender and age and calculated in ml/min/1.73mSquared. eGFR cannot be calculated if patient is under 18 years of age.     Reference Range:   >= 60 ml/min/1.73mSquared.     eGFR if non    Date Value Ref Range Status   08/04/2021 >60.0 >60 mL/min/1.73 m^2 Final     Comment:     Calculation used to obtain the estimated glomerular filtration  rate (eGFR) is the CKD-EPI equation.          INR:  Lab Results   Component Value Date    INR 1.4 02/27/2019    INR 1.0 08/04/2018    INR 0.9 08/03/2018       EKG:   Results for orders placed or performed during the hospital encounter of 08/26/24   EKG    Collection Time: 08/26/24  2:48 PM   Result Value Ref Range    QRS Duration 86 ms    [facility] QTC Calculation 408 ms    Narrative    Test Reason : G47.33,E66.01,F11.11,    Vent. Rate : 081 BPM     Atrial Rate : 081 BPM     P-R Int : 124 ms          QRS Dur : 086 ms      QT Int : 352 ms       P-R-T Axes : 032 049 030 degrees     QTc Int : 408 ms    Normal sinus rhythm  Normal ECG  When  compared with ECG of 31-MAR-2021 15:06,  No significant change was found  Confirmed by Pierce Jj MD ([id number]) on 8/27/2024 8:51:35 AM    Referred By: PITA OWUSU           Confirmed By:Saroj Jj MD        2D ECHO:   No results found for this or any previous visit.         ASSESSMENT/PLAN:           Pre-op Assessment    I have reviewed the Patient Summary Reports.     I have reviewed the Nursing Notes. I have reviewed the NPO Status.   I have reviewed the Medications.     Review of Systems  Anesthesia Hx:  No problems with previous Anesthesia   History of prior surgery of interest to airway management or planning:  Previous anesthesia: General        Denies Family Hx of Anesthesia complications.    Denies Personal Hx of Anesthesia complications.                    Cardiovascular:      Denies Hypertension.   Denies MI.  Denies CAD.       Denies Angina.  Denies CHF.                                   Pulmonary:    Denies COPD.  Denies Asthma.    Sleep Apnea     Obstructive Sleep Apnea (SAM).           Renal/:  Chronic Renal Disease renal calculi       Kidney Function/Disease             Hepatic/GI:      Denies GERD. Denies Liver Disease.               Neurological:    Denies CVA. Neuromuscular Disease,  Headaches Denies Seizures.     Dx of Headaches                         Neuromuscular Disease   Endocrine:        Morbid Obesity / BMI > 40  Psych:  Psychiatric History anxiety depression                Physical Exam  General: Well nourished, Cooperative and Alert    Airway:  Mallampati: III / II  Mouth Opening: Normal  TM Distance: Normal  Tongue: Normal  Neck ROM: Normal ROM    Chest/Lungs:  Normal Respiratory Rate    Heart:  Rate: Normal        Anesthesia Plan  Type of Anesthesia, risks & benefits discussed:    Anesthesia Type: Gen ETT  Intra-op Monitoring Plan: Standard ASA Monitors  Post Op Pain Control Plan: multimodal analgesia  Induction:  IV  Airway Plan: Direct, Post-Induction with  ramp  Informed Consent: Informed consent signed with the Patient and all parties understand the risks and agree with anesthesia plan.  All questions answered.   ASA Score: 3  Day of Surgery Review of History & Physical: H&P Update referred to the surgeon/provider.    Ready For Surgery From Anesthesia Perspective.     .

## 2025-01-06 ENCOUNTER — ANESTHESIA (OUTPATIENT)
Dept: SURGERY | Facility: HOSPITAL | Age: 30
End: 2025-01-06
Payer: MEDICARE

## 2025-01-06 ENCOUNTER — HOSPITAL ENCOUNTER (INPATIENT)
Facility: HOSPITAL | Age: 30
LOS: 2 days | Discharge: HOME | DRG: 621 | End: 2025-01-08
Attending: SURGERY | Admitting: SURGERY
Payer: MEDICARE

## 2025-01-06 ENCOUNTER — PATIENT MESSAGE (OUTPATIENT)
Dept: BARIATRICS | Facility: CLINIC | Age: 30
End: 2025-01-06
Payer: MEDICARE

## 2025-01-06 DIAGNOSIS — E66.01 MORBID OBESITY: ICD-10-CM

## 2025-01-06 DIAGNOSIS — G47.33 OSA (OBSTRUCTIVE SLEEP APNEA): ICD-10-CM

## 2025-01-06 DIAGNOSIS — Z98.84 S/P BARIATRIC SURGERY: ICD-10-CM

## 2025-01-06 DIAGNOSIS — E66.9 OBESITY: Primary | ICD-10-CM

## 2025-01-06 LAB
B-HCG UR QL: NEGATIVE
CTP QC/QA: YES

## 2025-01-06 PROCEDURE — 63600175 PHARM REV CODE 636 W HCPCS: Performed by: SURGERY

## 2025-01-06 PROCEDURE — 36000711: Performed by: SURGERY

## 2025-01-06 PROCEDURE — 63600175 PHARM REV CODE 636 W HCPCS: Mod: TB

## 2025-01-06 PROCEDURE — 71000016 HC POSTOP RECOV ADDL HR: Performed by: SURGERY

## 2025-01-06 PROCEDURE — 25000003 PHARM REV CODE 250: Performed by: SURGERY

## 2025-01-06 PROCEDURE — 25000003 PHARM REV CODE 250

## 2025-01-06 PROCEDURE — 37000009 HC ANESTHESIA EA ADD 15 MINS: Performed by: SURGERY

## 2025-01-06 PROCEDURE — 36000710: Performed by: SURGERY

## 2025-01-06 PROCEDURE — 0DJ08ZZ INSPECTION OF UPPER INTESTINAL TRACT, VIA NATURAL OR ARTIFICIAL OPENING ENDOSCOPIC: ICD-10-PCS | Performed by: SURGERY

## 2025-01-06 PROCEDURE — 81025 URINE PREGNANCY TEST: CPT | Performed by: SURGERY

## 2025-01-06 PROCEDURE — 63600175 PHARM REV CODE 636 W HCPCS

## 2025-01-06 PROCEDURE — 43644 LAP GASTRIC BYPASS/ROUX-EN-Y: CPT | Mod: ,,, | Performed by: SURGERY

## 2025-01-06 PROCEDURE — 27201423 OPTIME MED/SURG SUP & DEVICES STERILE SUPPLY: Performed by: SURGERY

## 2025-01-06 PROCEDURE — 37000008 HC ANESTHESIA 1ST 15 MINUTES: Performed by: SURGERY

## 2025-01-06 PROCEDURE — 11000001 HC ACUTE MED/SURG PRIVATE ROOM

## 2025-01-06 PROCEDURE — 99900035 HC TECH TIME PER 15 MIN (STAT)

## 2025-01-06 PROCEDURE — 71000015 HC POSTOP RECOV 1ST HR: Performed by: SURGERY

## 2025-01-06 PROCEDURE — D9220A PRA ANESTHESIA: Mod: ,,, | Performed by: STUDENT IN AN ORGANIZED HEALTH CARE EDUCATION/TRAINING PROGRAM

## 2025-01-06 PROCEDURE — 71000033 HC RECOVERY, INTIAL HOUR: Performed by: SURGERY

## 2025-01-06 PROCEDURE — 0D164ZA BYPASS STOMACH TO JEJUNUM, PERCUTANEOUS ENDOSCOPIC APPROACH: ICD-10-PCS | Performed by: SURGERY

## 2025-01-06 RX ORDER — METHOCARBAMOL 100 MG/ML
500 INJECTION, SOLUTION INTRAMUSCULAR; INTRAVENOUS EVERY 8 HOURS
Status: DISCONTINUED | OUTPATIENT
Start: 2025-01-06 | End: 2025-01-08 | Stop reason: HOSPADM

## 2025-01-06 RX ORDER — KETAMINE HCL IN 0.9 % NACL 50 MG/5 ML
10 SYRINGE (ML) INTRAVENOUS EVERY 10 MIN PRN
Status: DISCONTINUED | OUTPATIENT
Start: 2025-01-06 | End: 2025-01-06 | Stop reason: HOSPADM

## 2025-01-06 RX ORDER — ONDANSETRON HYDROCHLORIDE 2 MG/ML
8 INJECTION, SOLUTION INTRAVENOUS EVERY 6 HOURS
Status: DISCONTINUED | OUTPATIENT
Start: 2025-01-06 | End: 2025-01-08 | Stop reason: HOSPADM

## 2025-01-06 RX ORDER — PROCHLORPERAZINE EDISYLATE 5 MG/ML
5 INJECTION INTRAMUSCULAR; INTRAVENOUS EVERY 6 HOURS PRN
Status: DISCONTINUED | OUTPATIENT
Start: 2025-01-06 | End: 2025-01-08 | Stop reason: HOSPADM

## 2025-01-06 RX ORDER — ACETAMINOPHEN 650 MG/20.3ML
1000 LIQUID ORAL EVERY 8 HOURS
Status: DISCONTINUED | OUTPATIENT
Start: 2025-01-06 | End: 2025-01-08 | Stop reason: HOSPADM

## 2025-01-06 RX ORDER — KETOROLAC TROMETHAMINE 15 MG/ML
15 INJECTION, SOLUTION INTRAMUSCULAR; INTRAVENOUS ONCE
Status: COMPLETED | OUTPATIENT
Start: 2025-01-06 | End: 2025-01-06

## 2025-01-06 RX ORDER — DEXMEDETOMIDINE HYDROCHLORIDE 100 UG/ML
INJECTION, SOLUTION INTRAVENOUS
Status: DISCONTINUED | OUTPATIENT
Start: 2025-01-06 | End: 2025-01-06

## 2025-01-06 RX ORDER — GABAPENTIN 250 MG/5ML
300 SOLUTION ORAL 2 TIMES DAILY
Status: DISCONTINUED | OUTPATIENT
Start: 2025-01-06 | End: 2025-01-08 | Stop reason: HOSPADM

## 2025-01-06 RX ORDER — LIDOCAINE HYDROCHLORIDE 20 MG/ML
INJECTION, SOLUTION EPIDURAL; INFILTRATION; INTRACAUDAL; PERINEURAL
Status: DISCONTINUED | OUTPATIENT
Start: 2025-01-06 | End: 2025-01-06

## 2025-01-06 RX ORDER — ROCURONIUM BROMIDE 10 MG/ML
INJECTION, SOLUTION INTRAVENOUS
Status: DISCONTINUED | OUTPATIENT
Start: 2025-01-06 | End: 2025-01-06

## 2025-01-06 RX ORDER — BUPIVACAINE HYDROCHLORIDE 2.5 MG/ML
INJECTION, SOLUTION EPIDURAL; INFILTRATION; INTRACAUDAL
Status: DISCONTINUED | OUTPATIENT
Start: 2025-01-06 | End: 2025-01-06 | Stop reason: HOSPADM

## 2025-01-06 RX ORDER — METRONIDAZOLE 500 MG/100ML
500 INJECTION, SOLUTION INTRAVENOUS
Status: COMPLETED | OUTPATIENT
Start: 2025-01-06 | End: 2025-01-06

## 2025-01-06 RX ORDER — SCOLOPAMINE TRANSDERMAL SYSTEM 1 MG/1
1 PATCH, EXTENDED RELEASE TRANSDERMAL ONCE
Status: DISCONTINUED | OUTPATIENT
Start: 2025-01-06 | End: 2025-01-08 | Stop reason: HOSPADM

## 2025-01-06 RX ORDER — LIDOCAINE HYDROCHLORIDE 10 MG/ML
1 INJECTION, SOLUTION EPIDURAL; INFILTRATION; INTRACAUDAL; PERINEURAL ONCE
Status: COMPLETED | OUTPATIENT
Start: 2025-01-06 | End: 2025-01-06

## 2025-01-06 RX ORDER — ESMOLOL HYDROCHLORIDE 10 MG/ML
INJECTION INTRAVENOUS
Status: DISCONTINUED | OUTPATIENT
Start: 2025-01-06 | End: 2025-01-06

## 2025-01-06 RX ORDER — MIDAZOLAM HYDROCHLORIDE 1 MG/ML
INJECTION INTRAMUSCULAR; INTRAVENOUS
Status: DISCONTINUED | OUTPATIENT
Start: 2025-01-06 | End: 2025-01-06

## 2025-01-06 RX ORDER — ONDANSETRON HYDROCHLORIDE 2 MG/ML
INJECTION, SOLUTION INTRAVENOUS
Status: DISCONTINUED | OUTPATIENT
Start: 2025-01-06 | End: 2025-01-06

## 2025-01-06 RX ORDER — DEXAMETHASONE SODIUM PHOSPHATE 4 MG/ML
INJECTION, SOLUTION INTRA-ARTICULAR; INTRALESIONAL; INTRAMUSCULAR; INTRAVENOUS; SOFT TISSUE
Status: DISCONTINUED | OUTPATIENT
Start: 2025-01-06 | End: 2025-01-06

## 2025-01-06 RX ORDER — PHENYLEPHRINE HYDROCHLORIDE 10 MG/ML
INJECTION INTRAVENOUS
Status: DISCONTINUED | OUTPATIENT
Start: 2025-01-06 | End: 2025-01-06

## 2025-01-06 RX ORDER — KETAMINE HCL IN 0.9 % NACL 50 MG/5 ML
SYRINGE (ML) INTRAVENOUS
Status: DISCONTINUED | OUTPATIENT
Start: 2025-01-06 | End: 2025-01-06

## 2025-01-06 RX ORDER — HEPARIN SODIUM 5000 [USP'U]/ML
5000 INJECTION, SOLUTION INTRAVENOUS; SUBCUTANEOUS ONCE
Status: COMPLETED | OUTPATIENT
Start: 2025-01-06 | End: 2025-01-06

## 2025-01-06 RX ORDER — PROPOFOL 10 MG/ML
VIAL (ML) INTRAVENOUS
Status: DISCONTINUED | OUTPATIENT
Start: 2025-01-06 | End: 2025-01-06

## 2025-01-06 RX ORDER — FAMOTIDINE 10 MG/ML
20 INJECTION INTRAVENOUS ONCE
Status: COMPLETED | OUTPATIENT
Start: 2025-01-06 | End: 2025-01-06

## 2025-01-06 RX ORDER — HALOPERIDOL 5 MG/ML
0.5 INJECTION INTRAMUSCULAR EVERY 10 MIN PRN
Status: DISCONTINUED | OUTPATIENT
Start: 2025-01-06 | End: 2025-01-06 | Stop reason: HOSPADM

## 2025-01-06 RX ORDER — ACETAMINOPHEN 650 MG/20.3ML
500 LIQUID ORAL
Status: DISCONTINUED | OUTPATIENT
Start: 2025-01-06 | End: 2025-01-08 | Stop reason: HOSPADM

## 2025-01-06 RX ORDER — KETAMINE HCL IN 0.9 % NACL 50 MG/5 ML
SYRINGE (ML) INTRAVENOUS
Status: COMPLETED
Start: 2025-01-06 | End: 2025-01-06

## 2025-01-06 RX ORDER — METHOCARBAMOL 100 MG/ML
1000 INJECTION, SOLUTION INTRAMUSCULAR; INTRAVENOUS ONCE AS NEEDED
Status: DISCONTINUED | OUTPATIENT
Start: 2025-01-06 | End: 2025-01-06

## 2025-01-06 RX ORDER — ACETAMINOPHEN 10 MG/ML
1000 INJECTION, SOLUTION INTRAVENOUS ONCE
Status: COMPLETED | OUTPATIENT
Start: 2025-01-06 | End: 2025-01-06

## 2025-01-06 RX ORDER — PANTOPRAZOLE SODIUM 40 MG/10ML
40 INJECTION, POWDER, LYOPHILIZED, FOR SOLUTION INTRAVENOUS 2 TIMES DAILY
Status: DISCONTINUED | OUTPATIENT
Start: 2025-01-06 | End: 2025-01-08 | Stop reason: HOSPADM

## 2025-01-06 RX ORDER — METHOCARBAMOL 100 MG/ML
1000 INJECTION, SOLUTION INTRAMUSCULAR; INTRAVENOUS ONCE
Status: COMPLETED | OUTPATIENT
Start: 2025-01-06 | End: 2025-01-06

## 2025-01-06 RX ORDER — HYDROMORPHONE HYDROCHLORIDE 1 MG/ML
0.5 INJECTION, SOLUTION INTRAMUSCULAR; INTRAVENOUS; SUBCUTANEOUS ONCE
Status: COMPLETED | OUTPATIENT
Start: 2025-01-06 | End: 2025-01-06

## 2025-01-06 RX ORDER — VENLAFAXINE 75 MG/1
75 TABLET ORAL 2 TIMES DAILY
Status: DISCONTINUED | OUTPATIENT
Start: 2025-01-07 | End: 2025-01-08 | Stop reason: HOSPADM

## 2025-01-06 RX ORDER — SODIUM CHLORIDE 9 MG/ML
INJECTION, SOLUTION INTRAVENOUS CONTINUOUS
Status: DISCONTINUED | OUTPATIENT
Start: 2025-01-06 | End: 2025-01-06

## 2025-01-06 RX ORDER — ENOXAPARIN SODIUM 100 MG/ML
60 INJECTION SUBCUTANEOUS EVERY 12 HOURS
Status: DISCONTINUED | OUTPATIENT
Start: 2025-01-06 | End: 2025-01-08 | Stop reason: HOSPADM

## 2025-01-06 RX ORDER — GLUCAGON 1 MG
1 KIT INJECTION
Status: DISCONTINUED | OUTPATIENT
Start: 2025-01-06 | End: 2025-01-06 | Stop reason: HOSPADM

## 2025-01-06 RX ORDER — SODIUM CHLORIDE 0.9 % (FLUSH) 0.9 %
10 SYRINGE (ML) INJECTION
Status: DISCONTINUED | OUTPATIENT
Start: 2025-01-06 | End: 2025-01-06 | Stop reason: HOSPADM

## 2025-01-06 RX ORDER — MUPIROCIN 20 MG/G
OINTMENT TOPICAL
Status: DISCONTINUED | OUTPATIENT
Start: 2025-01-06 | End: 2025-01-06 | Stop reason: HOSPADM

## 2025-01-06 RX ORDER — SODIUM CHLORIDE, SODIUM LACTATE, POTASSIUM CHLORIDE, CALCIUM CHLORIDE 600; 310; 30; 20 MG/100ML; MG/100ML; MG/100ML; MG/100ML
INJECTION, SOLUTION INTRAVENOUS CONTINUOUS
Status: DISCONTINUED | OUTPATIENT
Start: 2025-01-06 | End: 2025-01-08 | Stop reason: HOSPADM

## 2025-01-06 RX ADMIN — HYDROMORPHONE HYDROCHLORIDE 0.5 MG: 1 INJECTION, SOLUTION INTRAMUSCULAR; INTRAVENOUS; SUBCUTANEOUS at 02:01

## 2025-01-06 RX ADMIN — MIDAZOLAM HYDROCHLORIDE 2 MG: 2 INJECTION, SOLUTION INTRAMUSCULAR; INTRAVENOUS at 10:01

## 2025-01-06 RX ADMIN — PROCHLORPERAZINE EDISYLATE 5 MG: 5 INJECTION INTRAMUSCULAR; INTRAVENOUS at 01:01

## 2025-01-06 RX ADMIN — SODIUM CHLORIDE, POTASSIUM CHLORIDE, SODIUM LACTATE AND CALCIUM CHLORIDE: 600; 310; 30; 20 INJECTION, SOLUTION INTRAVENOUS at 12:01

## 2025-01-06 RX ADMIN — Medication 25 MG: at 01:01

## 2025-01-06 RX ADMIN — DEXMEDETOMIDINE HYDROCHLORIDE 4 MCG: 100 INJECTION, SOLUTION INTRAVENOUS at 10:01

## 2025-01-06 RX ADMIN — ACETAMINOPHEN 999.01 MG: 160 SOLUTION ORAL at 02:01

## 2025-01-06 RX ADMIN — ROCURONIUM BROMIDE 20 MG: 10 INJECTION, SOLUTION INTRAVENOUS at 11:01

## 2025-01-06 RX ADMIN — ENOXAPARIN SODIUM 60 MG: 60 INJECTION SUBCUTANEOUS at 08:01

## 2025-01-06 RX ADMIN — SUGAMMADEX 200 MG: 100 INJECTION, SOLUTION INTRAVENOUS at 12:01

## 2025-01-06 RX ADMIN — SCOPOLAMINE 1 PATCH: 1.5 PATCH, EXTENDED RELEASE TRANSDERMAL at 08:01

## 2025-01-06 RX ADMIN — METHOCARBAMOL 750 MG: 1000 INJECTION, SOLUTION INTRAMUSCULAR; INTRAVENOUS at 12:01

## 2025-01-06 RX ADMIN — DEXMEDETOMIDINE HYDROCHLORIDE 4 MCG: 100 INJECTION, SOLUTION INTRAVENOUS at 11:01

## 2025-01-06 RX ADMIN — GABAPENTIN 300 MG: 250 SOLUTION ORAL at 01:01

## 2025-01-06 RX ADMIN — SODIUM CHLORIDE 3 G: 9 INJECTION, SOLUTION INTRAVENOUS at 10:01

## 2025-01-06 RX ADMIN — ONDANSETRON 8 MG: 2 INJECTION INTRAMUSCULAR; INTRAVENOUS at 11:01

## 2025-01-06 RX ADMIN — METRONIDAZOLE 500 MG: 500 INJECTION, SOLUTION INTRAVENOUS at 10:01

## 2025-01-06 RX ADMIN — MUPIROCIN: 20 OINTMENT TOPICAL at 08:01

## 2025-01-06 RX ADMIN — KETOROLAC TROMETHAMINE 15 MG: 15 INJECTION, SOLUTION INTRAMUSCULAR; INTRAVENOUS at 08:01

## 2025-01-06 RX ADMIN — SODIUM CHLORIDE, SODIUM ACETATE ANHYDROUS, SODIUM GLUCONATE, POTASSIUM CHLORIDE, AND MAGNESIUM CHLORIDE: 526; 222; 502; 37; 30 INJECTION, SOLUTION INTRAVENOUS at 09:01

## 2025-01-06 RX ADMIN — ACETAMINOPHEN 999.01 MG: 160 SOLUTION ORAL at 08:01

## 2025-01-06 RX ADMIN — METHOCARBAMOL 500 MG: 1000 INJECTION, SOLUTION INTRAMUSCULAR; INTRAVENOUS at 08:01

## 2025-01-06 RX ADMIN — Medication 50 MG: at 10:01

## 2025-01-06 RX ADMIN — HEPARIN SODIUM 5000 UNITS: 5000 INJECTION INTRAVENOUS; SUBCUTANEOUS at 08:01

## 2025-01-06 RX ADMIN — LIDOCAINE HYDROCHLORIDE 100 MG: 20 INJECTION, SOLUTION EPIDURAL; INFILTRATION; INTRACAUDAL; PERINEURAL at 10:01

## 2025-01-06 RX ADMIN — LIDOCAINE HYDROCHLORIDE 10 MG: 10 INJECTION, SOLUTION EPIDURAL; INFILTRATION; INTRACAUDAL; PERINEURAL at 08:01

## 2025-01-06 RX ADMIN — DEXMEDETOMIDINE HYDROCHLORIDE 12 MCG: 100 INJECTION, SOLUTION INTRAVENOUS at 10:01

## 2025-01-06 RX ADMIN — GABAPENTIN 300 MG: 250 SOLUTION ORAL at 08:01

## 2025-01-06 RX ADMIN — FAMOTIDINE 20 MG: 10 INJECTION, SOLUTION INTRAVENOUS at 08:01

## 2025-01-06 RX ADMIN — PROPOFOL 30 MG: 10 INJECTION, EMULSION INTRAVENOUS at 10:01

## 2025-01-06 RX ADMIN — Medication 25 MG: at 02:01

## 2025-01-06 RX ADMIN — ONDANSETRON 4 MG: 2 INJECTION INTRAMUSCULAR; INTRAVENOUS at 12:01

## 2025-01-06 RX ADMIN — ONDANSETRON 8 MG: 2 INJECTION INTRAMUSCULAR; INTRAVENOUS at 06:01

## 2025-01-06 RX ADMIN — PHENYLEPHRINE HYDROCHLORIDE 100 MCG: 10 INJECTION INTRAVENOUS at 10:01

## 2025-01-06 RX ADMIN — DEXAMETHASONE SODIUM PHOSPHATE 4 MG: 4 INJECTION, SOLUTION INTRAMUSCULAR; INTRAVENOUS at 10:01

## 2025-01-06 RX ADMIN — PANTOPRAZOLE SODIUM 40 MG: 40 INJECTION, POWDER, FOR SOLUTION INTRAVENOUS at 01:01

## 2025-01-06 RX ADMIN — PROPOFOL 300 MG: 10 INJECTION, EMULSION INTRAVENOUS at 10:01

## 2025-01-06 RX ADMIN — ACETAMINOPHEN 1000 MG: 10 INJECTION, SOLUTION INTRAVENOUS at 08:01

## 2025-01-06 RX ADMIN — PANTOPRAZOLE SODIUM 40 MG: 40 INJECTION, POWDER, FOR SOLUTION INTRAVENOUS at 08:01

## 2025-01-06 RX ADMIN — ESMOLOL HYDROCHLORIDE 10 MG: 100 INJECTION, SOLUTION INTRAVENOUS at 10:01

## 2025-01-06 RX ADMIN — ROCURONIUM BROMIDE 50 MG: 10 INJECTION, SOLUTION INTRAVENOUS at 10:01

## 2025-01-06 RX ADMIN — DEXMEDETOMIDINE HYDROCHLORIDE 0.2 MCG/KG/HR: 100 INJECTION, SOLUTION INTRAVENOUS at 10:01

## 2025-01-06 NOTE — ANESTHESIA POSTPROCEDURE EVALUATION
Anesthesia Post Evaluation    Patient: Alexandra Martin    Procedure(s) Performed: Procedure(s) (LRB):  GASTROENTEROSTOMY, LAPAROSCOPIC with inraop EGD (N/A)  BLOCK, TRANSVERSUS ABDOMINIS PLANE    Final Anesthesia Type: general      Patient location during evaluation: PACU  Patient participation: Yes- Able to Participate  Level of consciousness: awake  Post-procedure vital signs: reviewed and stable  Pain management: adequate  Airway patency: patent    PONV status at discharge: No PONV  Anesthetic complications: no      Cardiovascular status: hemodynamically stable  Respiratory status: unassisted and spontaneous ventilation  Hydration status: euvolemic  Follow-up not needed.              Vitals Value Taken Time   /57 01/06/25 1302   Temp 36.6 °C (97.9 °F) 01/06/25 1253   Pulse 89 01/06/25 1302   Resp 22 01/06/25 1302   SpO2 98 % 01/06/25 1302   Vitals shown include unfiled device data.      No case tracking events are documented in the log.      Pain/Lisbeth Score: Pain Rating Prior to Med Admin: 0 (1/6/2025  8:19 AM)  Pain Rating Post Med Admin: 0 (1/6/2025 12:53 PM)  Lisbeth Score: 8 (1/6/2025 12:53 PM)

## 2025-01-06 NOTE — OP NOTE
DATE OF PROCEDURE: 1/6/2025    PRE OP DIAGNOSIS: Morbid obesity [E66.01]  BMI 60.0-69.9, adult [Z68.44]  Obstructive sleep apnea [G47.33]  Migraines  History of substance abuse    POST OP DIAGNOSIS: Morbid obesity [E66.01]  BMI 60.0-69.9, adult [Z68.44]  Obstructive sleep apnea [G47.33]  Migraines  History of substance abuse    PROCEDURE: Procedure(s) (LRB):  GASTROENTEROSTOMY, LAPAROSCOPIC with inraop EGD (N/A)  BLOCK, TRANSVERSUS ABDOMINIS PLANE (Bilateral)    Surgeons and Role:     * Bibi Edmondson MD - Primary     * Suma Coffman MD - Resident - Assisting    ANESTHESIA: General    INDICATION: Patient is a  29 year-old morbidly obese female with BMI 63.42 kg/m² and multiple associated comorbidities including SAM and migraines. After discussing the risks, benefits and alternatives to bariatric surgery, she elected to proceed with laparoscopic rosa-en-y gastric bypass. Informed consent was obtained.     FINDINGS:  Antecolic-antegastric rosa-en-y gastroenterostomy with 40-cm BP limb and 150-cm rosa-limb  Negative gastrojejunostomy air-leak test under saline     DESCRIPTION OF PROCEDURE: The patient was met in the pre-operative area and her identity and consent confirmed. She was then brought back to the Operating Room and placed in the supine position. General anesthesia was induced and she was intubated without incident. SCDs and a warming blanket were placed and pre-operative antibiotics were administered within 30 minutes of the incision. Her abdomen was prepped and draped in sterile fashion and a pre-procedural pause was performed by all members of the surgical and anesthesia teams. Access to the abdomen was gained via 11-mm Optiview trocar approximately 18-cm below the xiphoid to the left of midline. Pneumoperitoneum to 15 mmHg was obtained and inspection yielded no injury. Bilateral transversus abdominal plane blocks were performed under direct vision instilling 10 mL 0.25% bupivacaine on each  side.  Two RUQ 5-mm and two LUQ 5-mm ports were placed under direct vision as well as a 12-mm port to the right of midline. The liver retractor was placed via the right lateral trocar exposing the hiatus. An area on the lesser curve at the second bridging vein was identified and using the Harmonic scalpel, the gastrohepatic ligament was opened and the lesser sac was exposed. A blue load stapler was placed transversely across the stomach after ensuring there was nothing in the stomach from anesthesia perspective and fired. Two additional blue staple loads were fired towards the angle of His completing the pouch. The transverse colon and omentum were elevated and the ligament of Treitz identified. The small bowel was transected 40 cm from the ligament of Treitz with a white staple load. A couple centimeters of mesentery were divided with the Harmonic to allow extra length on the Catalina limb. The small bowel was run 150 cm distal to the transection. At this level the bowel was elevated and a small enterotomy was made on adjacent antimesenteric sides of the BP limb and distal jejunum. A white load stapler was placed in either limb of the small bowel and fired creating a jejunojejunostomy. The jejunojejunostomy was then elevated and the common enterotomy was closed with a 2-0 Vicryl suture in running 2-layer fashion with full-thickness inner layer and Lembert outer layer. We then turned our attention to the creation of the gastrojejunostomy. The proximal catalina limb was brought up to the pouch ensuring there were no kinks or twists in the mesentery and secured with two 2-0 Ticron stay-sutures. The gastrojejunostomy was then made in a similar fashion to the jejunojejunostomy on the posterior surface of the pouch using a 30-mm firing of the blue staple load. A 30Fr Bougie was advanced through the gastroenterotomy into the catalina limb. The common gastroenterotomy was closed over the Bougie with a 2-0 Vicryl suture in a 2-layer  running fashion. EGD was performed and revealed an appropriately sized pouch and patent gastrojejunostomy. The upper abdomen was filled with saline to submerge the gastrojejunostomy and the proximal rosa-limb held closed with the bowel clamp. The bowel was insufflated such that it was distended and air escaped from the patient's mouth. Given the negative leak test, the intraluminal air was evacuated and the intraperitoneal saline aspirated. The jejunojejunostomy mesenteric defect was closed with a running 2-0 Ticron suture. The liver retractor was removed. The trocars were removed under direct visualization, allowing the abdomen to desufflate prior to removing the last. The skin incisions were closed with 4-0 Monocryl and reinforced with Dermabond. The patient was awoken from anesthesia and extubated without complication. She was brought to the PACU in good condition having tolerated the operation well.     EBL: 2 mL  Specimens: None  Drains: None  Complications: None apparent  Dispo: Surgical floor     Surgical sponge and needle count was correct at the end of the case. I was present and scrubbed for the entirety of the operation.     Bibi Cee  1/6/2025

## 2025-01-06 NOTE — TRANSFER OF CARE
Anesthesia Transfer of Care Note    Patient: Alexandra Martin    Procedure(s) Performed: Procedure(s) (LRB):  GASTROENTEROSTOMY, LAPAROSCOPIC with inraop EGD (N/A)  BLOCK, TRANSVERSUS ABDOMINIS PLANE    Patient location: PACU    Anesthesia Type: general    Transport from OR: Transported from OR on 6-10 L/min O2 by face mask with adequate spontaneous ventilation    Post pain: adequate analgesia    Post assessment: no apparent anesthetic complications and tolerated procedure well    Post vital signs: stable    Level of consciousness: awake    Nausea/Vomiting: no nausea/vomiting    Complications: none    Transfer of care protocol was followed      Last vitals: Visit Vitals  BP (!) 129/56   Pulse 81   Temp 36.6 °C (97.9 °F) (Temporal)   Resp 20   Wt (!) 154.4 kg (340 lb 6.2 oz)   SpO2 100%   Breastfeeding No   BMI 60.30 kg/m²

## 2025-01-06 NOTE — NURSING TRANSFER
Nursing Transfer Note      1/6/2025   5:14 PM    Nurse giving handoff:dex Villegas  Nurse receiving handoff:DEX Katz    Reason patient is being transferred: admit    Transfer To: 544    Transfer via bed    Transfer with     Transported by [facility]    Telemetry:   Order for Tele Monitor? No    Additional Lines:     Medicines sent: na    Any special needs or follow-up needed: na    Patient belongings transferred with patient: Yes    Chart send with patient: Yes    Notified: mother

## 2025-01-06 NOTE — ANESTHESIA PROCEDURE NOTES
Intubation    Date/Time: 1/6/2025 10:11 AM    Performed by: Patrice Lucero MD  Authorized by: Shelia Saleem MD    Intubation:     Induction:  Intravenous    Intubated:  Postinduction    Mask Ventilation:  Easy with oral airway    Attempts:  1    Attempted By:  Resident anesthesiologist    Method of Intubation:  Video laryngoscopy    Blade:  Bran 3    Laryngeal View Grade: Grade I - full view of cords      Difficult Airway Encountered?: No      Complications:  None    Airway Device:  Oral endotracheal tube    Airway Device Size:  7.0    Style/Cuff Inflation:  Cuffed (inflated to minimal occlusive pressure)    Tube secured:  22    Secured at:  The lips    Placement Verified By:  Capnometry and Revisualization with laryngoscopy    Complicating Factors:  Obesity, short neck, poor neck/head extension and small mouth    Findings Post-Intubation:  BS equal bilateral and atraumatic/condition of teeth unchanged

## 2025-01-06 NOTE — PLAN OF CARE
Tried to send text messages four times that procedure had started. Messages were not received. Called waiting room and word left with  at 11:00, 11:51

## 2025-01-06 NOTE — INTERVAL H&P NOTE
The patient has been examined and the H&P has been reviewed:    I concur with the findings and no changes have occurred since H&P was written.    Procedure risks, benefits and alternative options discussed and understood by patient/family.          Active Hospital Problems    Diagnosis  POA    *Morbid obesity [E66.01]  Yes     Wt Readings from Last 3 Encounters:   12/09/24 1307 (!) 163.3 kg (360 lb)   12/03/24 1231 (!) 161 kg (355 lb)   10/11/24 1719 (!) 160.1 kg (353 lb)             Resolved Hospital Problems   No resolved problems to display.

## 2025-01-06 NOTE — BRIEF OP NOTE
[facility] ([facility])  Brief Operative Note    SUMMARY     Surgery Date: 1/6/2025     Surgeons and Role:     * Bibi Edmondson MD - Primary     * Suma Coffman MD - Resident - Assisting        Pre-op Diagnosis:  Morbid obesity [E66.01]  BMI 60.0-69.9, adult [Z68.44]  Obstructive sleep apnea [G47.33]    Post-op Diagnosis:  Post-Op Diagnosis Codes:     * Morbid obesity [E66.01]     * BMI 60.0-69.9, adult [Z68.44]     * Obstructive sleep apnea [G47.33]    Procedure(s) (LRB):  GASTROENTEROSTOMY, LAPAROSCOPIC with inraop EGD (N/A)  BLOCK, TRANSVERSUS ABDOMINIS PLANE    Anesthesia: General/Regional    Implants:  * No implants in log *    Operative Findings: Laparoscopic Catalina en Y gastric bypass and EGD.    Estimated Blood Loss: 10 cc             Specimens:   Specimen (24h ago, onward)      None            [id number]

## 2025-01-07 ENCOUNTER — TELEPHONE (OUTPATIENT)
Dept: BARIATRICS | Facility: CLINIC | Age: 30
End: 2025-01-07
Payer: MEDICARE

## 2025-01-07 ENCOUNTER — DOCUMENTATION ONLY (OUTPATIENT)
Dept: BARIATRICS | Facility: CLINIC | Age: 30
End: 2025-01-07
Payer: MEDICARE

## 2025-01-07 LAB
ANION GAP SERPL CALC-SCNC: 11 MMOL/L (ref 8–16)
BASOPHILS # BLD AUTO: 0 K/UL (ref 0–0.2)
BASOPHILS # BLD AUTO: 0.01 K/UL (ref 0–0.2)
BASOPHILS NFR BLD: 0 % (ref 0–1.9)
BASOPHILS NFR BLD: 0.1 % (ref 0–1.9)
BUN SERPL-MCNC: 5 MG/DL (ref 6–20)
CALCIUM SERPL-MCNC: 9.1 MG/DL (ref 8.7–10.5)
CHLORIDE SERPL-SCNC: 110 MMOL/L (ref 95–110)
CO2 SERPL-SCNC: 20 MMOL/L (ref 23–29)
CREAT SERPL-MCNC: 0.8 MG/DL (ref 0.5–1.4)
DIFFERENTIAL METHOD BLD: ABNORMAL
DIFFERENTIAL METHOD BLD: ABNORMAL
EOSINOPHIL # BLD AUTO: 0 K/UL (ref 0–0.5)
EOSINOPHIL # BLD AUTO: 0 K/UL (ref 0–0.5)
EOSINOPHIL NFR BLD: 0 % (ref 0–8)
EOSINOPHIL NFR BLD: 0.1 % (ref 0–8)
ERYTHROCYTE [DISTWIDTH] IN BLOOD BY AUTOMATED COUNT: 14.5 % (ref 11.5–14.5)
ERYTHROCYTE [DISTWIDTH] IN BLOOD BY AUTOMATED COUNT: 14.5 % (ref 11.5–14.5)
EST. GFR  (NO RACE VARIABLE): >60 ML/MIN/1.73 M^2
GLUCOSE SERPL-MCNC: 99 MG/DL (ref 70–110)
HCT VFR BLD AUTO: 36.5 % (ref 37–48.5)
HCT VFR BLD AUTO: 37 % (ref 37–48.5)
HGB BLD-MCNC: 11.5 G/DL (ref 12–16)
HGB BLD-MCNC: 12.2 G/DL (ref 12–16)
IMM GRANULOCYTES # BLD AUTO: 0.03 K/UL (ref 0–0.04)
IMM GRANULOCYTES # BLD AUTO: 0.14 K/UL (ref 0–0.04)
IMM GRANULOCYTES NFR BLD AUTO: 0.4 % (ref 0–0.5)
IMM GRANULOCYTES NFR BLD AUTO: 1.5 % (ref 0–0.5)
LYMPHOCYTES # BLD AUTO: 1.1 K/UL (ref 1–4.8)
LYMPHOCYTES # BLD AUTO: 1.7 K/UL (ref 1–4.8)
LYMPHOCYTES NFR BLD: 12.7 % (ref 18–48)
LYMPHOCYTES NFR BLD: 18.5 % (ref 18–48)
MAGNESIUM SERPL-MCNC: 1.9 MG/DL (ref 1.6–2.6)
MCH RBC QN AUTO: 26.2 PG (ref 27–31)
MCH RBC QN AUTO: 27.2 PG (ref 27–31)
MCHC RBC AUTO-ENTMCNC: 31.5 G/DL (ref 32–36)
MCHC RBC AUTO-ENTMCNC: 33 G/DL (ref 32–36)
MCV RBC AUTO: 82 FL (ref 82–98)
MCV RBC AUTO: 83 FL (ref 82–98)
MONOCYTES # BLD AUTO: 0.5 K/UL (ref 0.3–1)
MONOCYTES # BLD AUTO: 0.7 K/UL (ref 0.3–1)
MONOCYTES NFR BLD: 5.9 % (ref 4–15)
MONOCYTES NFR BLD: 7.4 % (ref 4–15)
NEUTROPHILS # BLD AUTO: 6.8 K/UL (ref 1.8–7.7)
NEUTROPHILS # BLD AUTO: 6.9 K/UL (ref 1.8–7.7)
NEUTROPHILS NFR BLD: 72.4 % (ref 38–73)
NEUTROPHILS NFR BLD: 81 % (ref 38–73)
NRBC BLD-RTO: 0 /100 WBC
NRBC BLD-RTO: 0 /100 WBC
PHOSPHATE SERPL-MCNC: 3.3 MG/DL (ref 2.7–4.5)
PLATELET # BLD AUTO: 195 K/UL (ref 150–450)
PLATELET # BLD AUTO: 197 K/UL (ref 150–450)
PMV BLD AUTO: 11.4 FL (ref 9.2–12.9)
PMV BLD AUTO: 11.5 FL (ref 9.2–12.9)
POTASSIUM SERPL-SCNC: 4.3 MMOL/L (ref 3.5–5.1)
RBC # BLD AUTO: 4.39 M/UL (ref 4–5.4)
RBC # BLD AUTO: 4.49 M/UL (ref 4–5.4)
SODIUM SERPL-SCNC: 141 MMOL/L (ref 136–145)
WBC # BLD AUTO: 8.48 K/UL (ref 3.9–12.7)
WBC # BLD AUTO: 9.36 K/UL (ref 3.9–12.7)

## 2025-01-07 PROCEDURE — 83735 ASSAY OF MAGNESIUM: CPT | Performed by: SURGERY

## 2025-01-07 PROCEDURE — 84100 ASSAY OF PHOSPHORUS: CPT | Performed by: SURGERY

## 2025-01-07 PROCEDURE — 94761 N-INVAS EAR/PLS OXIMETRY MLT: CPT

## 2025-01-07 PROCEDURE — 63600175 PHARM REV CODE 636 W HCPCS

## 2025-01-07 PROCEDURE — 36415 COLL VENOUS BLD VENIPUNCTURE: CPT | Performed by: SURGERY

## 2025-01-07 PROCEDURE — 94799 UNLISTED PULMONARY SVC/PX: CPT

## 2025-01-07 PROCEDURE — 80048 BASIC METABOLIC PNL TOTAL CA: CPT | Performed by: SURGERY

## 2025-01-07 PROCEDURE — 36415 COLL VENOUS BLD VENIPUNCTURE: CPT | Performed by: STUDENT IN AN ORGANIZED HEALTH CARE EDUCATION/TRAINING PROGRAM

## 2025-01-07 PROCEDURE — 25000242 PHARM REV CODE 250 ALT 637 W/ HCPCS

## 2025-01-07 PROCEDURE — 85025 COMPLETE CBC W/AUTO DIFF WBC: CPT | Mod: 91 | Performed by: STUDENT IN AN ORGANIZED HEALTH CARE EDUCATION/TRAINING PROGRAM

## 2025-01-07 PROCEDURE — 25000003 PHARM REV CODE 250: Performed by: SURGERY

## 2025-01-07 PROCEDURE — 85025 COMPLETE CBC W/AUTO DIFF WBC: CPT | Performed by: SURGERY

## 2025-01-07 PROCEDURE — 11000001 HC ACUTE MED/SURG PRIVATE ROOM

## 2025-01-07 PROCEDURE — 63600175 PHARM REV CODE 636 W HCPCS: Performed by: SURGERY

## 2025-01-07 RX ORDER — SIMETHICONE 80 MG
1 TABLET,CHEWABLE ORAL 4 TIMES DAILY PRN
Status: DISCONTINUED | OUTPATIENT
Start: 2025-01-07 | End: 2025-01-08 | Stop reason: HOSPADM

## 2025-01-07 RX ORDER — OXYCODONE HCL 5 MG/5 ML
5 SOLUTION, ORAL ORAL ONCE AS NEEDED
Status: COMPLETED | OUTPATIENT
Start: 2025-01-07 | End: 2025-01-07

## 2025-01-07 RX ADMIN — GABAPENTIN 300 MG: 250 SOLUTION ORAL at 09:01

## 2025-01-07 RX ADMIN — METHOCARBAMOL 500 MG: 1000 INJECTION, SOLUTION INTRAMUSCULAR; INTRAVENOUS at 09:01

## 2025-01-07 RX ADMIN — ONDANSETRON 8 MG: 2 INJECTION INTRAMUSCULAR; INTRAVENOUS at 06:01

## 2025-01-07 RX ADMIN — PANTOPRAZOLE SODIUM 40 MG: 40 INJECTION, POWDER, FOR SOLUTION INTRAVENOUS at 09:01

## 2025-01-07 RX ADMIN — METHOCARBAMOL 500 MG: 1000 INJECTION, SOLUTION INTRAMUSCULAR; INTRAVENOUS at 02:01

## 2025-01-07 RX ADMIN — ENOXAPARIN SODIUM 60 MG: 60 INJECTION SUBCUTANEOUS at 09:01

## 2025-01-07 RX ADMIN — SIMETHICONE 80 MG: 80 TABLET, CHEWABLE ORAL at 09:01

## 2025-01-07 RX ADMIN — ACETAMINOPHEN 999.01 MG: 160 SOLUTION ORAL at 05:01

## 2025-01-07 RX ADMIN — ACETAMINOPHEN 999.01 MG: 160 SOLUTION ORAL at 09:01

## 2025-01-07 RX ADMIN — OXYCODONE HYDROCHLORIDE 5 MG: 5 SOLUTION ORAL at 05:01

## 2025-01-07 RX ADMIN — ONDANSETRON 8 MG: 2 INJECTION INTRAMUSCULAR; INTRAVENOUS at 12:01

## 2025-01-07 RX ADMIN — VENLAFAXINE 75 MG: 75 TABLET ORAL at 09:01

## 2025-01-07 RX ADMIN — ACETAMINOPHEN 999.01 MG: 160 SOLUTION ORAL at 02:01

## 2025-01-07 RX ADMIN — SODIUM CHLORIDE, POTASSIUM CHLORIDE, SODIUM LACTATE AND CALCIUM CHLORIDE: 600; 310; 30; 20 INJECTION, SOLUTION INTRAVENOUS at 05:01

## 2025-01-07 RX ADMIN — METHOCARBAMOL 500 MG: 1000 INJECTION, SOLUTION INTRAMUSCULAR; INTRAVENOUS at 05:01

## 2025-01-07 NOTE — PLAN OF CARE
[facility]  Initial Discharge Assessment       Primary Care Provider: Chanelle Rain FNP    Admission Diagnosis: Morbid obesity [E66.01]  BMI 60.0-69.9, adult [Z68.44]  Obstructive sleep apnea [G47.33]  S/P bariatric surgery [Z98.84]  Obesity [E66.9]    Admission Date: 1/6/2025  Expected Discharge Date: 1/7/2025         Payor: MEDICARE / Plan: MEDICARE PART A & B / Product Type: Government /     Extended Emergency Contact Information  Primary Emergency Contact: Elizabeth Chery  Address: [address]  Mobile Phone: [phone]  Relation: Mother    Discharge Plan A: Home with family  Discharge Plan B: Home Health, Home with family      [id number] - [place], [place] - [address]  [email or web address] [facility] [place] [address]  Phone: [phone] Fax: [phone]      Initial Assessment (most recent)       Adult Discharge Assessment - 01/07/25 1512          Discharge Assessment    Assessment Type Discharge Planning Assessment     Confirmed/corrected address, phone number and insurance Yes     Confirmed Demographics Correct on Facesheet     Source of Information patient     Communicated CYRUS with patient/caregiver Yes     People in Home child(travis), dependent;parent(s)     Do you expect to return to your current living situation? Yes     Do you have help at home or someone to help you manage your care at home? Yes     Who are your caregiver(s) and their phone number(s)? - Elizabeth     Prior to hospitilization cognitive status: Alert/Oriented     Current cognitive status: Alert/Oriented     Home Layout Able to live on 1st floor     Equipment Currently Used at Home none     Do you currently have service(s) that help you manage your care at home? No     Do you take prescription medications? Yes     Do you have prescription coverage? Yes     Do you have any problems affording any of your prescribed medications? No     Is the patient taking medications as  prescribed? yes     How do you get to doctors appointments? car, drives self     Are you on dialysis? No     Do you take coumadin? No     Discharge Plan A Home with family     Discharge Plan B Home Health;Home with family     DME Needed Upon Discharge  none     Discharge Plan discussed with: Patient                   Patient lives with her mother, her mothers spouse, and her own 8y/o daughter in a single story home with no entry steps. Patient does not feel she will need any post acute services upon hospital discharge. Patient mother is primary caregiver and will assist in her needs once home. She will also provide patient transportation home.

## 2025-01-07 NOTE — PROGRESS NOTES
[facility]  General Surgery  Progress Note    Subjective:     History of Present Illness:  No notes on file    Post-Op Info:  Procedure(s) (LRB):  GASTROENTEROSTOMY, LAPAROSCOPIC with inraop EGD (N/A)  BLOCK, TRANSVERSUS ABDOMINIS PLANE   1 Day Post-Op     Interval History: NAEON. Afebrile, HDS. Reporting abdominal pain this morning. Denies nausea or vomiting. Tolerating water protocol. Ambulating.    Medications:  Continuous Infusions:   lactated ringers   Intravenous Continuous 125 mL/hr at 01/07/25 0531 New Bag at 01/07/25 0531     Scheduled Meds:   acetaminophen  999.0148 mg Oral Q8H    enoxparin  60 mg Subcutaneous Q12H    gabapentin  300 mg Oral BID    methocarbamol injection  500 mg Intravenous Q8H    ondansetron  8 mg Intravenous Q6H    pantoprazole  40 mg Intravenous BID    scopolamine  1 patch Transdermal Once    venlafaxine  75 mg Oral BID     PRN Meds:  Current Facility-Administered Medications:     acetaminophen, 499.5074 mg, Oral, Q24H PRN    prochlorperazine, 5 mg, Intravenous, Q6H PRN    simethicone, 40 mg, Oral, QID PRN     Review of patient's allergies indicates:   Allergen Reactions    Ceftriaxone Nausea And Vomiting    Azithromycin Nausea And Vomiting     Hard to breathe     Latex, natural rubber Swelling and Rash     Objective:     Vital Signs (Most Recent):  Temp: 97.4 °F (36.3 °C) (01/07/25 0521)  Pulse: 60 (01/07/25 0521)  Resp: 20 (01/07/25 0521)  BP: 114/63 (01/07/25 0521)  SpO2: 98 % (01/07/25 0521) Vital Signs (24h Range):  Temp:  [97.4 °F (36.3 °C)-98.5 °F (36.9 °C)] 97.4 °F (36.3 °C)  Pulse:  [] 60  Resp:  [14-30] 20  SpO2:  [95 %-100 %] 98 %  BP: (113-137)/(56-88) 114/63     Weight: (!) 155.2 kg (342 lb 2.5 oz)  Body mass index is 60.61 kg/m².    Intake/Output - Last 3 Shifts         01/05 0700 01/06 0659 01/06 0700 01/07 0659    I.V. (mL/kg)  1000 (6.4)    IV Piggyback  200    Total Intake(mL/kg)  1200 (7.7)    Urine (mL/kg/hr)  0    Total Output  0    Net  +1200           Urine Occurrence  3 x             Physical Exam  Constitutional:       General: She is not in acute distress.     Appearance: Normal appearance.   HENT:      Head: Normocephalic and atraumatic.   Eyes:      General: No scleral icterus.     Conjunctiva/sclera: Conjunctivae normal.      Pupils: Pupils are equal, round, and reactive to light.   Neck:      Trachea: No tracheal deviation.   Cardiovascular:      Rate and Rhythm: Normal rate and regular rhythm.   Pulmonary:      Effort: Pulmonary effort is normal. No respiratory distress.      Breath sounds: No stridor.   Abdominal:      General: Abdomen is flat. There is no distension.      Palpations: Abdomen is soft.      Tenderness: There is no abdominal tenderness. There is no guarding.      Comments: Soft, appropriately tender  Port site incisions with Dermabond   Musculoskeletal:         General: No deformity or signs of injury. Normal range of motion.      Cervical back: No edema.   Skin:     General: Skin is warm and dry.      Coloration: Skin is not jaundiced.   Neurological:      General: No focal deficit present.      Mental Status: She is alert and oriented to person, place, and time.   Psychiatric:         Mood and Affect: Mood normal.         Behavior: Behavior normal.          Significant Labs:  I have reviewed all pertinent lab results within the past 24 hours.  CBC:   Recent Labs   Lab 01/07/25  0317   WBC 8.48   RBC 4.39   HGB 11.5*   HCT 36.5*      MCV 83   MCH 26.2*   MCHC 31.5*     BMP:   Recent Labs   Lab 01/07/25  0317   GLU 99      K 4.3      CO2 20*   BUN 5*   CREATININE 0.8   CALCIUM 9.1   MG 1.9       Significant Diagnostics:  I have reviewed all pertinent imaging results/findings within the past 24 hours.  Assessment/Plan:     * Morbid obesity  Alexandra Martin is a 29 y.o. female with PMHx of obesity now s/p lap Catalina en Y gastric bypass on 1/6.    - tolerating water protocol, continue on pathway   - advance to  bariatric CLD when water protocol concluded  - CRUSH or open all meds. Liquid or crushed meds only. No pills  - scheduled anti-emetics with PRN breakthrough  - MMPC  - home meds started as appropriate  - OOB, ambulate in halls  - encourage IS  - DVT ppx    Dispo: possible PM discharge pending tolerating liquid diet          Suma Coffman MD  General Surgery  [facility]

## 2025-01-07 NOTE — ASSESSMENT & PLAN NOTE
Alexandra Martin is a 29 y.o. female with PMHx of obesity now s/p lap Catalina en Y gastric bypass on 1/6.    - tolerating water protocol, continue on pathway   - advance to bariatric CLD when water protocol concluded  - CRUSH or open all meds. Liquid or crushed meds only. No pills  - scheduled anti-emetics with PRN breakthrough  - MMPC  - home meds started as appropriate  - OOB, ambulate in halls  - encourage IS  - DVT ppx    Dispo: possible PM discharge pending tolerating liquid diet

## 2025-01-07 NOTE — TELEPHONE ENCOUNTER
----- Message from Yelena sent at 1/7/2025 11:47 AM CST -----  Regarding: POST OP SURG QUESTIONS  Contact: [phone]  TIMMY ROBERTSON calling regarding Patient Advice (message) for # pt mom is calling to speak with nurse regarding pt just had surg and is asking if pt get any pain medication pls advise

## 2025-01-07 NOTE — SUBJECTIVE & OBJECTIVE
Interval History: NAEON. Afebrile, HDS. Reporting abdominal pain this morning. Denies nausea or vomiting. Tolerating water protocol. Ambulating.    Medications:  Continuous Infusions:   lactated ringers   Intravenous Continuous 125 mL/hr at 01/07/25 0531 New Bag at 01/07/25 0531     Scheduled Meds:   acetaminophen  999.0148 mg Oral Q8H    enoxparin  60 mg Subcutaneous Q12H    gabapentin  300 mg Oral BID    methocarbamol injection  500 mg Intravenous Q8H    ondansetron  8 mg Intravenous Q6H    pantoprazole  40 mg Intravenous BID    scopolamine  1 patch Transdermal Once    venlafaxine  75 mg Oral BID     PRN Meds:  Current Facility-Administered Medications:     acetaminophen, 499.5074 mg, Oral, Q24H PRN    prochlorperazine, 5 mg, Intravenous, Q6H PRN    simethicone, 40 mg, Oral, QID PRN     Review of patient's allergies indicates:   Allergen Reactions    Ceftriaxone Nausea And Vomiting    Azithromycin Nausea And Vomiting     Hard to breathe     Latex, natural rubber Swelling and Rash     Objective:     Vital Signs (Most Recent):  Temp: 97.4 °F (36.3 °C) (01/07/25 0521)  Pulse: 60 (01/07/25 0521)  Resp: 20 (01/07/25 0521)  BP: 114/63 (01/07/25 0521)  SpO2: 98 % (01/07/25 0521) Vital Signs (24h Range):  Temp:  [97.4 °F (36.3 °C)-98.5 °F (36.9 °C)] 97.4 °F (36.3 °C)  Pulse:  [] 60  Resp:  [14-30] 20  SpO2:  [95 %-100 %] 98 %  BP: (113-137)/(56-88) 114/63     Weight: (!) 155.2 kg (342 lb 2.5 oz)  Body mass index is 60.61 kg/m².    Intake/Output - Last 3 Shifts         01/05 0700 01/06 0659 01/06 0700 01/07 0659    I.V. (mL/kg)  1000 (6.4)    IV Piggyback  200    Total Intake(mL/kg)  1200 (7.7)    Urine (mL/kg/hr)  0    Total Output  0    Net  +1200          Urine Occurrence  3 x             Physical Exam  Constitutional:       General: She is not in acute distress.     Appearance: Normal appearance.   HENT:      Head: Normocephalic and atraumatic.   Eyes:      General: No scleral icterus.     Conjunctiva/sclera:  Conjunctivae normal.      Pupils: Pupils are equal, round, and reactive to light.   Neck:      Trachea: No tracheal deviation.   Cardiovascular:      Rate and Rhythm: Normal rate and regular rhythm.   Pulmonary:      Effort: Pulmonary effort is normal. No respiratory distress.      Breath sounds: No stridor.   Abdominal:      General: Abdomen is flat. There is no distension.      Palpations: Abdomen is soft.      Tenderness: There is no abdominal tenderness. There is no guarding.      Comments: Soft, appropriately tender  Port site incisions with Dermabond   Musculoskeletal:         General: No deformity or signs of injury. Normal range of motion.      Cervical back: No edema.   Skin:     General: Skin is warm and dry.      Coloration: Skin is not jaundiced.   Neurological:      General: No focal deficit present.      Mental Status: She is alert and oriented to person, place, and time.   Psychiatric:         Mood and Affect: Mood normal.         Behavior: Behavior normal.          Significant Labs:  I have reviewed all pertinent lab results within the past 24 hours.  CBC:   Recent Labs   Lab 01/07/25  0317   WBC 8.48   RBC 4.39   HGB 11.5*   HCT 36.5*      MCV 83   MCH 26.2*   MCHC 31.5*     BMP:   Recent Labs   Lab 01/07/25  0317   GLU 99      K 4.3      CO2 20*   BUN 5*   CREATININE 0.8   CALCIUM 9.1   MG 1.9       Significant Diagnostics:  I have reviewed all pertinent imaging results/findings within the past 24 hours.

## 2025-01-07 NOTE — PROGRESS NOTES
"[facility]  Adult Nutrition  Progress Note    SUMMARY   Recommendations  When appropriate consider advancing diet to FLD, then low fiber/residue as tolerated  Current CLD/Bariatric diet insufficient for needs  Consider adding MVI  Monitor labs, meds, skin, weight    Goals: Meet % een/epn by next RD f/u  Nutrition Goal Status: new  Communication of RD Recs: other (comment) (poc)    Assessment and Plan    Nutrition Problem  Inadequate (suboptimal) protein-energy intake    Related to (etiology):   Decreased ability to consume sufficient energy/nutrients secondary to gastroenterostomy     Signs and Symptoms (as evidenced by):   1 day post op, Bariatric High protein CLD     Interventions/Recommendations (treatment strategy):  Collaboration with other providers  Vitamin Therapy    Nutrition Diagnosis Status:   New     Reason for Assessment    Reason For Assessment: consult  Diagnosis: other (see comments) (morbid obesity)    General Information Comments: 29 year-old morbidly obese female with BMI 63.42 kg/m² and multiple associated comorbidities including SAM and migraines.     RD assessment for MST 3. Possible d/c today. Pt's 1 day post op gastroenterostomy. No significant wounds. Saw pt at bedside, endorses some intake of CLD. Endorses some nausea and diarrhea. PTA good appetite/intake. States she gained weight rapidly in 2024. 2yrs ago UBW was 250-260#. 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 RD discussed slowly adding back in regular foods, eating 4-6 small meals, chewing food well, avoiding foods that cause discomfort like beans, cruciferous vegetables, cabbage, oily food. Pt endorses understanding.     Nutrition Discharge Planning: General healtful diet, 4-6 small meals, avoid gastric irritants, chew food slowly - reviewed w/ pt 1/7/25    Nutrition Related Social Determinants of Health: SDOH: Adequate food in home environment    Food Insecurity: No Food Insecurity (1/6/2025)    " "Hunger Vital Sign     Worried About Running Out of Food in the Last Year: Never true     Ran Out of Food in the Last Year: Never true       Nutrition/Diet History    Nutrition Intake History: Eats 4 meals/day- Breakfast: Protein shake; oatmeals;eggs - Lunch: Lean cuisine - Snack: protein shake or protein bar - Dinner: cooked meal like spaghetti or grilled chicken & veggies  Spiritual, Cultural Beliefs, Catholic Practices, Values that Affect Care: no  Food Allergies: NKFA    Anthropometrics    Temp: 97.9 °F (36.6 °C)  Height Method: Stated  Height: 5' 3" (160 cm)  Height (inches): 63 in  Weight Method: Bed Scale  Weight: (!) 155.2 kg (342 lb 2.5 oz)  Weight (lb): (!) 342.16 lb  Ideal Body Weight (IBW), Female: 115 lb  % Ideal Body Weight, Female (lb): 297.53 %  BMI (Calculated): 60.6  BMI Grade: greater than 40 - morbid obesity       Lab/Procedures/Meds    Pertinent Labs Reviewed: reviewed  Pertinent Labs Comments: BUN: 5  Pertinent Medications Reviewed: reviewed  Pertinent Medications Comments: Enoxaparin, gabapentin, pantoprazole, lactated ringers    Estimated/Assessed Needs    Weight Used For Calorie Calculations: (!) 155.2 kg (342 lb 2.5 oz)  Energy Calorie Requirements (kcal): 2236 (MSJ)  Energy Need Method: Yancey-St Anupor  Protein Requirements: 124-155 (.8-1 g/kg)  Weight Used For Protein Calculations: (!) 155.2 kg (342 lb 2.5 oz)     Estimated Fluid Requirement Method: RDA Method  RDA Method (mL): 2236         Nutrition Prescription Ordered    Current Diet Order: Bariatric High Protein CLD  Nutrition Order Comments: No soft drinks, no straws    Evaluation of Received Nutrient/Fluid Intake    I/O: +1.2 L since admit  Comments: LBM 1/6  % Intake of Estimated Energy Needs: 0 - 25 %  % Meal Intake: 25 - 50 %    Nutrition Risk    Level of Risk/Frequency of Follow-up: moderate (f/u 1-2x/week)     Monitor and Evaluation    Food and Nutrient Intake: energy intake, food and beverage intake  Food and Nutrient " Adminstration: diet order  Knowledge/Beliefs/Attitudes: food and nutrition knowledge/skill, beliefs and attitudes  Physical Activity and Function: nutrition-related ADLs and IADLs  Anthropometric Measurements: height/length, weight, weight change, body mass index  Biochemical Data, Medical Tests and Procedures: electrolyte and renal panel, gastrointestinal profile, glucose/endocrine profile, inflammatory profile, lipid profile  Nutrition-Focused Physical Findings: overall appearance, extremities, muscles and bones, head and eyes, skin     Nutrition Follow-Up    RD Follow-up?: Yes    Martha Saenz RD, LDN

## 2025-01-07 NOTE — PROGRESS NOTES
Spoke with patient who stated that she had some pain in the night and was given a muscle relaxer which helped. She stated that she was currently experiencing pain of a 5 on the pain scale and just told her nurse who was bringing something to help.     Discussed with her the importance of consistent hydration and ambulation. Also discussed the importance of proper pain management and strategy to stay ahead of pain the first few days especially. Patient stated understanding and thanked me for all of my help.    Advised her to please call us if she has any questions or concerns.

## 2025-01-07 NOTE — TELEPHONE ENCOUNTER
Discussed with tory Fajardo RN who rounded on pt today and reinforced teaching about postop pain medication regimen with pt.     Discussed with Dr. Cee who stated this was taken care of.

## 2025-01-07 NOTE — PLAN OF CARE
Recommendations  When appropriate consider advancing diet to FLD, then low fiber/residue as tolerated  Current CLD/Bariatric diet insufficient for needs  Consider adding MVI  Monitor labs, meds, skin, weight    Goals: Meet % een/epn by next RD f/u  Nutrition Goal Status: new  Communication of RD Recs: other (comment) (poc)

## 2025-01-08 VITALS
RESPIRATION RATE: 18 BRPM | TEMPERATURE: 98 F | SYSTOLIC BLOOD PRESSURE: 132 MMHG | BODY MASS INDEX: 51.91 KG/M2 | OXYGEN SATURATION: 97 % | WEIGHT: 293 LBS | HEIGHT: 63 IN | HEART RATE: 67 BPM | DIASTOLIC BLOOD PRESSURE: 70 MMHG

## 2025-01-08 LAB
ANION GAP SERPL CALC-SCNC: 13 MMOL/L (ref 8–16)
BASOPHILS # BLD AUTO: 0.02 K/UL (ref 0–0.2)
BASOPHILS NFR BLD: 0.2 % (ref 0–1.9)
BUN SERPL-MCNC: 6 MG/DL (ref 6–20)
CALCIUM SERPL-MCNC: 9.1 MG/DL (ref 8.7–10.5)
CHLORIDE SERPL-SCNC: 109 MMOL/L (ref 95–110)
CO2 SERPL-SCNC: 19 MMOL/L (ref 23–29)
CREAT SERPL-MCNC: 0.8 MG/DL (ref 0.5–1.4)
DIFFERENTIAL METHOD BLD: ABNORMAL
EOSINOPHIL # BLD AUTO: 0 K/UL (ref 0–0.5)
EOSINOPHIL NFR BLD: 0.2 % (ref 0–8)
ERYTHROCYTE [DISTWIDTH] IN BLOOD BY AUTOMATED COUNT: 14.8 % (ref 11.5–14.5)
EST. GFR  (NO RACE VARIABLE): >60 ML/MIN/1.73 M^2
GLUCOSE SERPL-MCNC: 81 MG/DL (ref 70–110)
HCT VFR BLD AUTO: 38.6 % (ref 37–48.5)
HGB BLD-MCNC: 12.4 G/DL (ref 12–16)
IMM GRANULOCYTES # BLD AUTO: 0.03 K/UL (ref 0–0.04)
IMM GRANULOCYTES NFR BLD AUTO: 0.4 % (ref 0–0.5)
LYMPHOCYTES # BLD AUTO: 2.7 K/UL (ref 1–4.8)
LYMPHOCYTES NFR BLD: 33.4 % (ref 18–48)
MAGNESIUM SERPL-MCNC: 1.8 MG/DL (ref 1.6–2.6)
MCH RBC QN AUTO: 26.8 PG (ref 27–31)
MCHC RBC AUTO-ENTMCNC: 32.1 G/DL (ref 32–36)
MCV RBC AUTO: 84 FL (ref 82–98)
MONOCYTES # BLD AUTO: 0.6 K/UL (ref 0.3–1)
MONOCYTES NFR BLD: 7.5 % (ref 4–15)
NEUTROPHILS # BLD AUTO: 4.7 K/UL (ref 1.8–7.7)
NEUTROPHILS NFR BLD: 58.3 % (ref 38–73)
NRBC BLD-RTO: 0 /100 WBC
PHOSPHATE SERPL-MCNC: 2.1 MG/DL (ref 2.7–4.5)
PLATELET # BLD AUTO: 207 K/UL (ref 150–450)
PMV BLD AUTO: 11.6 FL (ref 9.2–12.9)
POTASSIUM SERPL-SCNC: 3.6 MMOL/L (ref 3.5–5.1)
RBC # BLD AUTO: 4.62 M/UL (ref 4–5.4)
SODIUM SERPL-SCNC: 141 MMOL/L (ref 136–145)
WBC # BLD AUTO: 8.12 K/UL (ref 3.9–12.7)

## 2025-01-08 PROCEDURE — 63600175 PHARM REV CODE 636 W HCPCS: Performed by: SURGERY

## 2025-01-08 PROCEDURE — 83735 ASSAY OF MAGNESIUM: CPT | Performed by: SURGERY

## 2025-01-08 PROCEDURE — 25000003 PHARM REV CODE 250

## 2025-01-08 PROCEDURE — 84100 ASSAY OF PHOSPHORUS: CPT | Performed by: SURGERY

## 2025-01-08 PROCEDURE — 36415 COLL VENOUS BLD VENIPUNCTURE: CPT | Performed by: SURGERY

## 2025-01-08 PROCEDURE — 63600175 PHARM REV CODE 636 W HCPCS

## 2025-01-08 PROCEDURE — 25000003 PHARM REV CODE 250: Performed by: SURGERY

## 2025-01-08 PROCEDURE — 85025 COMPLETE CBC W/AUTO DIFF WBC: CPT | Performed by: SURGERY

## 2025-01-08 PROCEDURE — 80048 BASIC METABOLIC PNL TOTAL CA: CPT | Performed by: SURGERY

## 2025-01-08 RX ORDER — SODIUM,POTASSIUM PHOSPHATES 280-250MG
2 POWDER IN PACKET (EA) ORAL EVERY 4 HOURS
Status: DISCONTINUED | OUTPATIENT
Start: 2025-01-08 | End: 2025-01-08 | Stop reason: HOSPADM

## 2025-01-08 RX ADMIN — METHOCARBAMOL 500 MG: 1000 INJECTION, SOLUTION INTRAMUSCULAR; INTRAVENOUS at 06:01

## 2025-01-08 RX ADMIN — POTASSIUM & SODIUM PHOSPHATES POWDER PACK 280-160-250 MG 2 PACKET: 280-160-250 PACK at 08:01

## 2025-01-08 RX ADMIN — ONDANSETRON 8 MG: 2 INJECTION INTRAMUSCULAR; INTRAVENOUS at 12:01

## 2025-01-08 RX ADMIN — VENLAFAXINE 75 MG: 75 TABLET ORAL at 08:01

## 2025-01-08 RX ADMIN — GABAPENTIN 300 MG: 250 SOLUTION ORAL at 08:01

## 2025-01-08 RX ADMIN — ACETAMINOPHEN 999.01 MG: 160 SOLUTION ORAL at 06:01

## 2025-01-08 RX ADMIN — SODIUM CHLORIDE, POTASSIUM CHLORIDE, SODIUM LACTATE AND CALCIUM CHLORIDE: 600; 310; 30; 20 INJECTION, SOLUTION INTRAVENOUS at 12:01

## 2025-01-08 RX ADMIN — ONDANSETRON 8 MG: 2 INJECTION INTRAMUSCULAR; INTRAVENOUS at 06:01

## 2025-01-08 RX ADMIN — PANTOPRAZOLE SODIUM 40 MG: 40 INJECTION, POWDER, FOR SOLUTION INTRAVENOUS at 08:01

## 2025-01-08 RX ADMIN — ENOXAPARIN SODIUM 60 MG: 60 INJECTION SUBCUTANEOUS at 08:01

## 2025-01-08 NOTE — DISCHARGE INSTRUCTIONS
Post-op instructions:    - No dressing needed  - OK for post-operative bariatric surgery diet. Liquids for two weeks  - No driving while taking narcotic medication  - No lifting more than 10 pounds for 4 weeks  - OK to shower with warm soap and water. No soaking in any water  - Clinic visit to be scheduled in two weeks

## 2025-01-08 NOTE — PLAN OF CARE
Problem: Adult Inpatient Plan of Care  Goal: Plan of Care Review  Outcome: Progressing  Goal: Patient-Specific Goal (Individualized)  Outcome: Progressing  Goal: Absence of Hospital-Acquired Illness or Injury  Outcome: Progressing  Goal: Optimal Comfort and Wellbeing  Outcome: Progressing  Goal: Readiness for Transition of Care  Outcome: Progressing     Problem: Bariatric Environmental Safety  Goal: Safety Maintained with Care  Outcome: Progressing     Problem: Wound  Goal: Optimal Coping  Outcome: Progressing  Goal: Optimal Functional Ability  Outcome: Progressing  Goal: Absence of Infection Signs and Symptoms  Outcome: Progressing  Goal: Improved Oral Intake  Outcome: Progressing  Goal: Optimal Pain Control and Function  Outcome: Progressing  Goal: Skin Health and Integrity  Outcome: Progressing  Goal: Optimal Wound Healing  Outcome: Progressing     Problem: Bariatric Surgery  Goal: Optimal Coping with Surgery  Outcome: Progressing  Goal: Absence of Bleeding  Outcome: Progressing  Goal: Fluid and Electrolyte Balance  Outcome: Progressing  Goal: Effective Gastrointestinal Motility and Elimination  Outcome: Progressing  Goal: Blood Glucose Level Within Desired Range  Outcome: Progressing  Goal: Absence of Infection Signs and Symptoms  Outcome: Progressing  Goal: Anesthesia/Sedation Recovery  Outcome: Progressing  Goal: Optimal Pain Control and Function  Outcome: Progressing  Goal: Nausea and Vomiting Relief  Outcome: Progressing  Goal: Effective Urinary Elimination  Outcome: Progressing  Goal: Effective Oxygenation and Ventilation  Outcome: Progressing     Pt is lying in bed as care is being received. Communication with RN was established and maintained during shift. Pt c/o of pain of the abdomen as scheduled medications were admin. Pt was reassessed following admin. Assessment of IV site was clean, dry, intact as it is patent when admin IV medication (Zofran) and the infusion of LR at 125 ml/hr. Safety  precautions were implemented as bed is low, side rails up x2 with wheels locked and call light in reach. Continue plan of care.

## 2025-01-08 NOTE — PLAN OF CARE
Problem: Adult Inpatient Plan of Care  Goal: Plan of Care Review  Outcome: Met  Goal: Patient-Specific Goal (Individualized)  Outcome: Met  Goal: Absence of Hospital-Acquired Illness or Injury  Outcome: Met  Goal: Optimal Comfort and Wellbeing  Outcome: Met  Goal: Readiness for Transition of Care  Outcome: Met     Problem: Bariatric Environmental Safety  Goal: Safety Maintained with Care  Outcome: Met     Problem: Wound  Goal: Optimal Coping  Outcome: Met  Goal: Optimal Functional Ability  Outcome: Met  Goal: Absence of Infection Signs and Symptoms  Outcome: Met  Goal: Improved Oral Intake  Outcome: Met  Goal: Optimal Pain Control and Function  Outcome: Met  Goal: Skin Health and Integrity  Outcome: Met  Goal: Optimal Wound Healing  Outcome: Met     Problem: Bariatric Surgery  Goal: Optimal Coping with Surgery  Outcome: Met  Goal: Absence of Bleeding  Outcome: Met  Goal: Fluid and Electrolyte Balance  Outcome: Met  Goal: Effective Gastrointestinal Motility and Elimination  Outcome: Met  Goal: Blood Glucose Level Within Desired Range  Outcome: Met  Goal: Absence of Infection Signs and Symptoms  Outcome: Met  Goal: Anesthesia/Sedation Recovery  Outcome: Met  Goal: Optimal Pain Control and Function  Outcome: Met  Goal: Nausea and Vomiting Relief  Outcome: Met  Goal: Effective Urinary Elimination  Outcome: Met  Goal: Effective Oxygenation and Ventilation  Outcome: Met

## 2025-01-08 NOTE — ASSESSMENT & PLAN NOTE
Alexandra Martin is a 29 y.o. female with PMHx of obesity now s/p lap Catalina en Y gastric bypass on 1/6.    - CLD as tolerated  - CRUSH or open all meds. Liquid or crushed meds only. No pills  - scheduled anti-emetics with PRN breakthrough  - MMPC  - home meds started as appropriate  - OOB, ambulate in halls  - encourage IS  - DVT ppx    Dispo: possible PM discharge

## 2025-01-08 NOTE — PROGRESS NOTES
[facility]  General Surgery  Progress Note    Subjective:     History of Present Illness:  No notes on file    Post-Op Info:  Procedure(s) (LRB):  GASTROENTEROSTOMY, LAPAROSCOPIC with inraop EGD (N/A)  BLOCK, TRANSVERSUS ABDOMINIS PLANE   2 Days Post-Op     Interval History: NAEON. AF. HDS. Pain well controlled on MMPC. Tolerating clear liquid diet; denies N/V/bloating. Hgb 12.4 from 12.2.       Medications:  Continuous Infusions:   lactated ringers   Intravenous Continuous 125 mL/hr at 01/08/25 0056 New Bag at 01/08/25 0056     Scheduled Meds:   acetaminophen  999.0148 mg Oral Q8H    enoxparin  60 mg Subcutaneous Q12H    gabapentin  300 mg Oral BID    methocarbamol injection  500 mg Intravenous Q8H    ondansetron  8 mg Intravenous Q6H    pantoprazole  40 mg Intravenous BID    potassium, sodium phosphates  2 packet Oral Q4H    scopolamine  1 patch Transdermal Once    venlafaxine  75 mg Oral BID     PRN Meds:  Current Facility-Administered Medications:     acetaminophen, 499.5074 mg, Oral, Q24H PRN    prochlorperazine, 5 mg, Intravenous, Q6H PRN    simethicone, 1 tablet, Oral, QID PRN     Review of patient's allergies indicates:   Allergen Reactions    Ceftriaxone Nausea And Vomiting    Azithromycin Nausea And Vomiting     Hard to breathe     Latex, natural rubber Swelling and Rash     Objective:     Vital Signs (Most Recent):  Temp: 97.9 °F (36.6 °C) (01/08/25 0507)  Pulse: 64 (01/08/25 0507)  Resp: 20 (01/08/25 0507)  BP: (!) 126/58 (01/08/25 0507)  SpO2: 99 % (01/08/25 0507) Vital Signs (24h Range):  Temp:  [97.5 °F (36.4 °C)-98 °F (36.7 °C)] 97.9 °F (36.6 °C)  Pulse:  [57-76] 64  Resp:  [17-20] 20  SpO2:  [96 %-100 %] 99 %  BP: (126-134)/(58-71) 126/58     Weight: (!) 155.2 kg (342 lb 2.5 oz)  Body mass index is 60.61 kg/m².    Intake/Output - Last 3 Shifts         01/06 0700 01/07 0659 01/07 0700 01/08 0659    I.V. (mL/kg) 1000 (6.4)     IV Piggyback 200     Total Intake(mL/kg) 1200 (7.7)     Urine  (mL/kg/hr) 0     Total Output 0     Net +1200           Urine Occurrence 3 x 2 x             Physical Exam  Constitutional:       General: She is not in acute distress.     Appearance: Normal appearance.   HENT:      Head: Normocephalic and atraumatic.   Eyes:      General: No scleral icterus.     Conjunctiva/sclera: Conjunctivae normal.      Pupils: Pupils are equal, round, and reactive to light.   Neck:      Trachea: No tracheal deviation.   Cardiovascular:      Rate and Rhythm: Normal rate and regular rhythm.   Pulmonary:      Effort: Pulmonary effort is normal. No respiratory distress.      Breath sounds: No stridor.   Abdominal:      General: Abdomen is flat. There is no distension.      Palpations: Abdomen is soft.      Tenderness: There is no abdominal tenderness. There is no guarding.      Comments: Soft, appropriately tender  Port site incisions with Dermabond   Musculoskeletal:         General: No deformity or signs of injury. Normal range of motion.      Cervical back: No edema.   Skin:     General: Skin is warm and dry.      Coloration: Skin is not jaundiced.   Neurological:      General: No focal deficit present.      Mental Status: She is alert and oriented to person, place, and time.   Psychiatric:         Mood and Affect: Mood normal.         Behavior: Behavior normal.          Significant Labs:  I have reviewed all pertinent lab results within the past 24 hours.  CBC:   Recent Labs   Lab 01/08/25  0225   WBC 8.12   RBC 4.62   HGB 12.4   HCT 38.6      MCV 84   MCH 26.8*   MCHC 32.1     CMP:   Recent Labs   Lab 01/08/25  0225   GLU 81   CALCIUM 9.1      K 3.6   CO2 19*      BUN 6   CREATININE 0.8       Significant Diagnostics:  I have reviewed all pertinent imaging results/findings within the past 24 hours.  Assessment/Plan:     * Morbid obesity  Alexandra Martin is a 29 y.o. female with PMHx of obesity now s/p lap Catalina en Y gastric bypass on 1/6.    - CLD as tolerated  -  CRUSH or open all meds. Liquid or crushed meds only. No pills  - scheduled anti-emetics with PRN breakthrough  - MMPC  - home meds started as appropriate  - OOB, ambulate in halls  - encourage IS  - DVT ppx    Dispo: possible PM discharge         Anne Foster MD  General Surgery  [facility]

## 2025-01-08 NOTE — DISCHARGE SUMMARY
[facility]  General Surgery  Discharge Summary      Patient Name: Alexandra Martin  MRN: [id number]  Admission Date: 1/6/2025  Hospital Length of Stay: 2 days  Discharge Date and Time:  01/08/2025 10:21 AM  Attending Physician: Bibi Edmondson   Discharging Provider: Alejandro Malcolm MD  Primary Care Provider: Chanelle Rain, YUE     HPI: Alexandra Martin is a 29 year-old morbidly obese female with BMI 63.42 kg/m² and multiple associated comorbidities including SAM and migraines, who presents for pre-operative exam for weight loss surgery. She has failed multiple attempts at non-surgical methods of weight loss and has completed the [facility] which she started on 8/26/24. She meets NIH consensus criteria for bariatric surgery and is interested in undergoing laparoscopic rosa-en-y gastric bypass. Her medical history includes opioid abuse and she requests no exposure to opioids throughout the duration of her perioperative care.      Clearances:  Psych: Mao 9/24/24: There are no overt psychological contraindications for proceeding with bariatric surgery at present. Although Ms. Martin endorsed past psychiatric history, treatment for substance abuse, suicidality, and hospitalizations for mental health concerns, these risk factors are mitigated by her continued engagement in outpatient psychotherapy support services, use of psychopharmacological medications, continued maintenance of her sobriety, and more recent history of stability. However, based on the established guidelines, patients who have been hospitalized for psychiatric reasons within the last 12 months are not suitable candidates for bariatric surgery. If the patient continues with the protective factors referenced above (maintaining sobriety, compliance with psychiatric medications, participation in outpatient psychotherapy) surgery could be appropriate in January 2025.    Procedure(s)  (LRB):  GASTROENTEROSTOMY, LAPAROSCOPIC with inraop EGD (N/A)  BLOCK, TRANSVERSUS ABDOMINIS PLANE     Hospital Course: Alex[facility] the morning of 1/6/2025 for the procedure listed above. The procedure was performed without complication and she was transferred to the floor for further post op care and monitoring. Their postoperative course was uneventful and she progressed according to plan.  Her pain was controlled with a combination of IV and PO narcotic pain medications. Her diet has been advanced throughout her hospital stay. She was maintained an additional night in the hospital due to reports of loose stools and to ensure that her labwork showed appropriate post-operative values. She is now ambulating without assistance, tolerating her ordered post-bariatric surgery diet, pain is well controlled with PO pain medication, and she is voiding appropriately. She now meets all criteria for discharge.     Consults:   Consults (From admission, onward)          Status Ordering Provider     Inpatient consult to Midline team  Once        Provider:  (Not yet assigned)    Acknowledged BIBI EDMONDSON            Significant Diagnostic Studies: N/A    Pending Diagnostic Studies:       None          Final Active Diagnoses:    Diagnosis Date Noted POA    PRINCIPAL PROBLEM:  Morbid obesity [E66.01] 02/28/2019 Yes    Obesity [E66.9] 01/06/2025 Yes      Problems Resolved During this Admission:      Discharged Condition: good    Disposition: Home or Self Care    Follow Up:   Follow-up Information       Bibi Edmondson MD Follow up in 2 week(s).    Specialties: General Surgery, Bariatrics  [facility]  [facility] [id number] [phone]                           Patient Instructions:      Diet Bariatric High Protein Clear Liquid   Order Comments: No soft drinks     Lifting restrictions (nothing greater than 10lbs)   Scheduling Instructions: Lifting  restrictions:  nothing greater than 10lbs     No driving until:   Order Comments: No driving until off narcotic pain medication.  Can turn around without pain off narcotics.  At least 1 week.     Notify your health care provider if you experience any of the following:   Order Comments: temperature > 100.4, persistent nausea and vomiting or diarrhea, severe uncontrolled pain, redness, tenderness, or signs of infection (pain, swelling, redness, odor or green/yellow discharge around incision site), difficulty breathing or increased cough, severe persistent headache, worsening rash, persistent dizziness, light-headedness, or visual disturbances, increased confusion or weakness     No driving until:   Order Comments: No Driving while taking narcotic pain medication     Notify your health care provider if you experience any of the following:  temperature >100.4     Notify your health care provider if you experience any of the following:  persistent nausea and vomiting or diarrhea     Notify your health care provider if you experience any of the following:  severe uncontrolled pain     Notify your health care provider if you experience any of the following:  redness, tenderness, or signs of infection (pain, swelling, redness, odor or green/yellow discharge around incision site)     No dressing needed     Activity as tolerated     Shower on day two and no bath     Medications:  Reconciled Home Medications:      Medication List        CONTINUE taking these medications      acetaminophen 500 mg Pwpk  Take 1,000 mg by mouth every 8 (eight) hours. Take 1g (2 packets) by mouth every 8 hours for at least 3 days and up to 5 days as needed. May take one additional 500mg dose (1 packet) per day for breakthrough pain. Do not exceed 4g in 24 hours.     enoxaparin 60 mg/0.6 mL Syrg  Commonly known as: LOVENOX  Inject 0.6 mLs (60 mg total) into the skin 2 (two) times daily. for 13 days     ergocalciferol 50,000 unit Cap  Commonly known  as: ERGOCALCIFEROL  Take 1 capsule (50,000 Units total) by mouth every 7 days.     etonogestreL 68 mg Impl intradermal implant  Commonly known as: Nexplanon  68 mg by Subdermal route once. A single NEXPLANON implant is inserted subdermally just under the skin at the inner side of the non-dominant upper arm.     gabapentin 300 MG capsule  Commonly known as: NEURONTIN  Take 1 capsule 2 times daily. Open capsule and empty prior to taking. Take one dose on morning of surgery prior to arrival. Take 1 capsule twice a day for 3 days post-op. Continue for an additional 2 days as needed.     nystatin powder  Commonly known as: MYCOSTATIN  Apply topically 2 (two) times daily.     omeprazole 40 MG capsule  Commonly known as: PRILOSEC  Take 1 capsule (40 mg total) by mouth every morning. Open capsule and sprinkle granules for 2 weeks post-op     * ondansetron 8 MG Tbdl  Commonly known as: ZOFRAN-ODT  Dissolve 1 tablet (8 mg total) by mouth every 6 (six) hours as needed (nausea).     rosuvastatin 10 MG tablet  Commonly known as: CRESTOR  Take 1 tablet (10 mg total) by mouth once daily.     venlafaxine 150 MG Cp24  Commonly known as: EFFEXOR-XR  Take 150 mg by mouth.           * This list has 1 medication(s) that are the same as other medications prescribed for you. Read the directions carefully, and ask your doctor or other care provider to review them with you.                ASK your doctor about these medications      hydrOXYzine pamoate 25 MG Cap  Commonly known as: VISTARIL  Take 25 mg by mouth daily as needed.     * ondansetron 4 MG Tbdl  Commonly known as: ZOFRAN-ODT  Take 2 tablets (8 mg total) by mouth every 8 (eight) hours as needed (Nausea).           * This list has 1 medication(s) that are the same as other medications prescribed for you. Read the directions carefully, and ask your doctor or other care provider to review them with you.                  Alejandro Malcolm MD  General Surgery  [facility] - Surgery

## 2025-01-08 NOTE — SUBJECTIVE & OBJECTIVE
Interval History: NAEON. AF. HDS. Pain well controlled on MMPC. Tolerating clear liquid diet; denies N/V/bloating. Hgb 12.4 from 12.2.       Medications:  Continuous Infusions:   lactated ringers   Intravenous Continuous 125 mL/hr at 01/08/25 0056 New Bag at 01/08/25 0056     Scheduled Meds:   acetaminophen  999.0148 mg Oral Q8H    enoxparin  60 mg Subcutaneous Q12H    gabapentin  300 mg Oral BID    methocarbamol injection  500 mg Intravenous Q8H    ondansetron  8 mg Intravenous Q6H    pantoprazole  40 mg Intravenous BID    potassium, sodium phosphates  2 packet Oral Q4H    scopolamine  1 patch Transdermal Once    venlafaxine  75 mg Oral BID     PRN Meds:  Current Facility-Administered Medications:     acetaminophen, 499.5074 mg, Oral, Q24H PRN    prochlorperazine, 5 mg, Intravenous, Q6H PRN    simethicone, 1 tablet, Oral, QID PRN     Review of patient's allergies indicates:   Allergen Reactions    Ceftriaxone Nausea And Vomiting    Azithromycin Nausea And Vomiting     Hard to breathe     Latex, natural rubber Swelling and Rash     Objective:     Vital Signs (Most Recent):  Temp: 97.9 °F (36.6 °C) (01/08/25 0507)  Pulse: 64 (01/08/25 0507)  Resp: 20 (01/08/25 0507)  BP: (!) 126/58 (01/08/25 0507)  SpO2: 99 % (01/08/25 0507) Vital Signs (24h Range):  Temp:  [97.5 °F (36.4 °C)-98 °F (36.7 °C)] 97.9 °F (36.6 °C)  Pulse:  [57-76] 64  Resp:  [17-20] 20  SpO2:  [96 %-100 %] 99 %  BP: (126-134)/(58-71) 126/58     Weight: (!) 155.2 kg (342 lb 2.5 oz)  Body mass index is 60.61 kg/m².    Intake/Output - Last 3 Shifts         01/06 0700  01/07 0659 01/07 0700  01/08 0659    I.V. (mL/kg) 1000 (6.4)     IV Piggyback 200     Total Intake(mL/kg) 1200 (7.7)     Urine (mL/kg/hr) 0     Total Output 0     Net +1200           Urine Occurrence 3 x 2 x             Physical Exam  Constitutional:       General: She is not in acute distress.     Appearance: Normal appearance.   HENT:      Head: Normocephalic and atraumatic.   Eyes:       General: No scleral icterus.     Conjunctiva/sclera: Conjunctivae normal.      Pupils: Pupils are equal, round, and reactive to light.   Neck:      Trachea: No tracheal deviation.   Cardiovascular:      Rate and Rhythm: Normal rate and regular rhythm.   Pulmonary:      Effort: Pulmonary effort is normal. No respiratory distress.      Breath sounds: No stridor.   Abdominal:      General: Abdomen is flat. There is no distension.      Palpations: Abdomen is soft.      Tenderness: There is no abdominal tenderness. There is no guarding.      Comments: Soft, appropriately tender  Port site incisions with Dermabond   Musculoskeletal:         General: No deformity or signs of injury. Normal range of motion.      Cervical back: No edema.   Skin:     General: Skin is warm and dry.      Coloration: Skin is not jaundiced.   Neurological:      General: No focal deficit present.      Mental Status: She is alert and oriented to person, place, and time.   Psychiatric:         Mood and Affect: Mood normal.         Behavior: Behavior normal.          Significant Labs:  I have reviewed all pertinent lab results within the past 24 hours.  CBC:   Recent Labs   Lab 01/08/25 0225   WBC 8.12   RBC 4.62   HGB 12.4   HCT 38.6      MCV 84   MCH 26.8*   MCHC 32.1     CMP:   Recent Labs   Lab 01/08/25 0225   GLU 81   CALCIUM 9.1      K 3.6   CO2 19*      BUN 6   CREATININE 0.8       Significant Diagnostics:  I have reviewed all pertinent imaging results/findings within the past 24 hours.

## 2025-01-10 NOTE — PLAN OF CARE
[facility]  Discharge Final Note    Primary Care Provider: Chanelle Rain FNP    Expected Discharge Date: 1/8/2025    Final Discharge Note (most recent)       Final Note - 01/08/25 1220          Final Note    Assessment Type Final Discharge Note     Anticipated Discharge Disposition Home or Self Care     Hospital Resources/Appts/Education Provided Provided patient/caregiver with written discharge plan information;Provided education on problems/symptoms using teachback                   Future Appointments   Date Time Provider Department Center   1/13/2025 11:30 AM Genna Manriquez [facility]   1/22/2025  9:30 AM LAB, APPOINTMENT [place] [facility]   1/22/2025 10:00 AM Josi Ybarra, NP [facility]   1/22/2025 10:30 [facility]   3/3/2025  9:30 AM LAB, APPOINTMENT [place] [facility]   3/3/2025 10:00 AM [facility]   3/3/2025 10:30 AM Elizabeth Mann, RD [facility]   6/9/2025 10:00 AM Chanelle Rain FNP [facility]   7/7/2025  9:30 AM LAB, APPOINTMENT [facility]   7/7/2025 10:00 AM Josi Ybarra, NP [facility]     Important Message from Medicare  Important Message from Medicare regarding Discharge Appeal Rights: Explained to patient/caregiver     Date IMM was signed: 01/08/25  Time IMM was signed: 1043    Contact Info       Bibi Edmondson MD   Specialty: General Surgery, Bariatrics    [address] [address]   Phone: [phone]       Next Steps: Follow up in 2 week(s)

## 2025-01-13 ENCOUNTER — CLINICAL SUPPORT (OUTPATIENT)
Dept: BARIATRICS | Facility: CLINIC | Age: 30
End: 2025-01-13
Payer: MEDICARE

## 2025-01-13 DIAGNOSIS — Z71.3 DIETARY COUNSELING AND SURVEILLANCE: Primary | ICD-10-CM

## 2025-01-13 PROCEDURE — 99499 UNLISTED E&M SERVICE: CPT | Mod: 93,,, | Performed by: DIETITIAN, REGISTERED

## 2025-01-13 NOTE — PROGRESS NOTES
Established Patient - Audio Only Telehealth Visit     The patient location is: home ([place])  The chief complaint leading to consultation is: s/p bariatric sx  Visit type: Virtual visit with audio only (telephone)  Total time spent with patient: 15 min       The reason for the audio only service rather than synchronous audio and video virtual visit was related to technical difficulties or patient preference/necessity.     Each patient to whom I provide medical services by telemedicine is:  (1) informed of the relationship between the physician and patient and the respective role of any other health care provider with respect to management of the patient; and (2) notified that they may decline to receive medical services by telemedicine and may withdraw from such care at any time. Patient verbally consented to receive this service via voice-only telephone call.       NUTRITION NOTE    Referring Physician: Bibi Cee M.D.   Reason for MNT Referral: Follow-up 1 Week s/p laproscopic Catalina-en-Y gastric bypass    CURRENT DIET:  Bariatric Liquid Diet    Dehydration assessment:  Urine output/color: normal  Chest pain: n  Persistent increased heart rate: n  Fatigue: n  Dizzy/weak: n  N/V: n  BM: diarrhea    Protein and fluid intake assessment: (food diary)    Fluids include: variety including gatorade zero  Fluid intake yesterday: 48floz  Protein supplements: fairlife shakes 1/day  Protein intake yesterday: 30g    Vitamins - plans to start  As directed    Medications  Omeprazole: y  How are you tolerating pain at this time? (rate on a scale from 1 to 10; >7 notify PA/MD): 1  Did you take your acetaminophen and gabapentin for 3 days? y  Did you have to take additional acetaminophen for break through pain (pain scale 4-6)? n  Did you have any severe pain that required oxycodone? n    How is the support system at home? good  Exercise reminder (light exercise at this time, no lifting above 10 lbs)     Questions for  nurse/MA/PA: no    BARIATRIC DIET DISCUSSION:  Reinforced post-op nutrition guidelines.  Continue to work on fluid and protein intake.    PLAN/RECOMMONDATIONS:  Continue bariatric high protein liquid diet.  Maintain protein intake or increase gradually to goal of 60 g prot/day.  Maintain fluid intake or increase gradually to goal of 64 floz/day.  Begin appropriate vitamins & minerals.  Begin or continue light exercise.     Confirmed date and time for 2 week po labs and clinic visit     SESSION TIME: 15 minutes                          This service was not originating from a related E/M service provided within the previous 7 days nor will  to an E/M service or procedure within the next 24 hours or my soonest available appointment.  Prevailing standard of care was able to be met in this audio-only visit.

## 2025-01-14 ENCOUNTER — PATIENT MESSAGE (OUTPATIENT)
Dept: BARIATRICS | Facility: CLINIC | Age: 30
End: 2025-01-14
Payer: MEDICARE

## 2025-01-15 ENCOUNTER — TELEPHONE (OUTPATIENT)
Dept: BARIATRICS | Facility: CLINIC | Age: 30
End: 2025-01-15
Payer: MEDICARE

## 2025-01-15 NOTE — TELEPHONE ENCOUNTER
----- Message from Renuka sent at 1/15/2025  4:50 PM CST -----  Regarding: Reschedule Post Op  Contact: [phone]    Reschedule    Caller:The pt     Call back number: [phone]    Current Appt Date: 01/22/25    Type of Appt:Post Op       Provider:      Reason for Rescheduling:Due to wintry mix       Additional Information:Thank you.

## 2025-01-15 NOTE — TELEPHONE ENCOUNTER
Called and spoke with the pt.  She is concerned with the predicted snow and ice for when she is to come in for her 2 week postop.  Rescheduled appts with her.

## 2025-01-16 NOTE — PROGRESS NOTES
NUTRITION NOTE    Referring Physician: Bibi Cee M.D.   Reason for MNT Referral: Follow-up 2 Weeks s/p laparoscopic Catalina-en-Y gastric bypass    PAST MEDICAL HISTORY:  Denies nausea, vomiting, and constipation.  Reports problems, including diarrhea daily .    Past Medical History:   Diagnosis Date    ADD (attention deficit disorder)     Anxiety disorder     Biliary dyskinesia 11/02/2024    Bronchitis 12/03/2024    Depression     Drug-induced constipation 03/04/2024    Headache     MDD (major depressive disorder), recurrent episode, moderate 07/29/2021    Morbid obesity with BMI of 60.0-69.9, adult 06/10/2024    Nephrolithiasis     Ovarian cyst     Substance abuse     Hospitalization     Trauma 09/11/2019    Tympanic membrane perforation, left 03/14/2018    UTI (urinary tract infection) 08/03/2018    Vertigo      CLINICAL DATA:  29 y.o.-year-old White female.    Current Weight: 326 lbs  BMI: 57.92  Total Weight Loss: 32 lbs    Excess Weight Loss: 14%      CURRENT DIET:  Bariatric Liquid Diet    Diet Recall: 70 grams of protein/day; 60 oz of fluids/day    Diet Includes:  Protein Supplements: InVisM Nutrition Plan, Isopure unflavored protein powder in chicken broth   Fluids include: Water, Gatorade Zero    EXERCISE:  Walking     Restrictions to exercise:    LABS:  None available at time of visit    VITAMINS/MINERALS:  Barimelt Multivitamin, 2 per day  Barimelt Calcium, 2 melts, 3 x day  Barimelt B-1, 3 per day  Barimelt B-12, 1 per week    ASSESSMENT:  Doing fairly well overall.  Adequate protein intake.  Adequate fluid intake.    BARIATRIC DIET DISCUSSION:  Instructed and provided written materials on bariatric puree diet plan   Bariatric soft diet plan to start in 2 weeks as arthur  Pt may swallow tablets and pills at 2 week post op   Reinforced post-op nutrition guidelines.    PLAN/RECOMMONDATIONS:  Advance to bariatric puree diet  Increase protein intake.  Maintain fluid intake.  Continue light  exercise.  Begin appropriate vitamins & minerals.    Return to clinic in 6 weeks.    SESSION TIME: 30 minutes

## 2025-01-17 ENCOUNTER — OFFICE VISIT (OUTPATIENT)
Dept: BARIATRICS | Facility: CLINIC | Age: 30
End: 2025-01-17
Payer: MEDICARE

## 2025-01-17 ENCOUNTER — LAB VISIT (OUTPATIENT)
Dept: LAB | Facility: HOSPITAL | Age: 30
End: 2025-01-17
Payer: MEDICARE

## 2025-01-17 ENCOUNTER — TELEPHONE (OUTPATIENT)
Dept: BARIATRICS | Facility: CLINIC | Age: 30
End: 2025-01-17

## 2025-01-17 ENCOUNTER — CLINICAL SUPPORT (OUTPATIENT)
Dept: BARIATRICS | Facility: CLINIC | Age: 30
End: 2025-01-17
Payer: MEDICARE

## 2025-01-17 VITALS
SYSTOLIC BLOOD PRESSURE: 134 MMHG | DIASTOLIC BLOOD PRESSURE: 86 MMHG | OXYGEN SATURATION: 98 % | HEART RATE: 100 BPM | WEIGHT: 293 LBS | TEMPERATURE: 98 F | BODY MASS INDEX: 57.92 KG/M2

## 2025-01-17 DIAGNOSIS — Z98.84 S/P BARIATRIC SURGERY: Primary | ICD-10-CM

## 2025-01-17 DIAGNOSIS — G47.33 OSA (OBSTRUCTIVE SLEEP APNEA): ICD-10-CM

## 2025-01-17 DIAGNOSIS — Z79.899 LONG-TERM USE OF HIGH-RISK MEDICATION: ICD-10-CM

## 2025-01-17 DIAGNOSIS — Z91.89 AT RISK FOR POLYPHARMACY: ICD-10-CM

## 2025-01-17 DIAGNOSIS — E66.01 MORBID OBESITY WITH BMI OF 50.0-59.9, ADULT: ICD-10-CM

## 2025-01-17 DIAGNOSIS — Z71.3 DIETARY COUNSELING AND SURVEILLANCE: Primary | ICD-10-CM

## 2025-01-17 DIAGNOSIS — Z98.84 S/P BARIATRIC SURGERY: ICD-10-CM

## 2025-01-17 LAB
ALBUMIN SERPL BCP-MCNC: 3.8 G/DL (ref 3.5–5.2)
ALP SERPL-CCNC: 117 U/L (ref 40–150)
ALT SERPL W/O P-5'-P-CCNC: 61 U/L (ref 10–44)
ANION GAP SERPL CALC-SCNC: 16 MMOL/L (ref 8–16)
AST SERPL-CCNC: 41 U/L (ref 10–40)
BASOPHILS # BLD AUTO: 0.04 K/UL (ref 0–0.2)
BASOPHILS NFR BLD: 0.5 % (ref 0–1.9)
BILIRUB SERPL-MCNC: 1.1 MG/DL (ref 0.1–1)
BUN SERPL-MCNC: 4 MG/DL (ref 6–20)
CALCIUM SERPL-MCNC: 10.1 MG/DL (ref 8.7–10.5)
CHLORIDE SERPL-SCNC: 108 MMOL/L (ref 95–110)
CO2 SERPL-SCNC: 15 MMOL/L (ref 23–29)
CREAT SERPL-MCNC: 0.9 MG/DL (ref 0.5–1.4)
DIFFERENTIAL METHOD BLD: ABNORMAL
EOSINOPHIL # BLD AUTO: 0.2 K/UL (ref 0–0.5)
EOSINOPHIL NFR BLD: 2.1 % (ref 0–8)
ERYTHROCYTE [DISTWIDTH] IN BLOOD BY AUTOMATED COUNT: 14.9 % (ref 11.5–14.5)
EST. GFR  (NO RACE VARIABLE): >60 ML/MIN/1.73 M^2
GLUCOSE SERPL-MCNC: 93 MG/DL (ref 70–110)
HCT VFR BLD AUTO: 46.7 % (ref 37–48.5)
HGB BLD-MCNC: 14.6 G/DL (ref 12–16)
IMM GRANULOCYTES # BLD AUTO: 0.05 K/UL (ref 0–0.04)
IMM GRANULOCYTES NFR BLD AUTO: 0.6 % (ref 0–0.5)
LYMPHOCYTES # BLD AUTO: 2.3 K/UL (ref 1–4.8)
LYMPHOCYTES NFR BLD: 27 % (ref 18–48)
MCH RBC QN AUTO: 26 PG (ref 27–31)
MCHC RBC AUTO-ENTMCNC: 31.3 G/DL (ref 32–36)
MCV RBC AUTO: 83 FL (ref 82–98)
MONOCYTES # BLD AUTO: 0.8 K/UL (ref 0.3–1)
MONOCYTES NFR BLD: 9 % (ref 4–15)
NEUTROPHILS # BLD AUTO: 5.3 K/UL (ref 1.8–7.7)
NEUTROPHILS NFR BLD: 60.8 % (ref 38–73)
NRBC BLD-RTO: 0 /100 WBC
PLATELET # BLD AUTO: 276 K/UL (ref 150–450)
PMV BLD AUTO: 11.2 FL (ref 9.2–12.9)
POTASSIUM SERPL-SCNC: 3.8 MMOL/L (ref 3.5–5.1)
PROT SERPL-MCNC: 7.9 G/DL (ref 6–8.4)
RBC # BLD AUTO: 5.61 M/UL (ref 4–5.4)
SODIUM SERPL-SCNC: 139 MMOL/L (ref 136–145)
VIT B12 SERPL-MCNC: 995 PG/ML (ref 210–950)
WBC # BLD AUTO: 8.64 K/UL (ref 3.9–12.7)

## 2025-01-17 PROCEDURE — 3079F DIAST BP 80-89 MM HG: CPT | Mod: CPTII,S$GLB,, | Performed by: NURSE PRACTITIONER

## 2025-01-17 PROCEDURE — 1160F RVW MEDS BY RX/DR IN RCRD: CPT | Mod: CPTII,S$GLB,, | Performed by: NURSE PRACTITIONER

## 2025-01-17 PROCEDURE — 84425 ASSAY OF VITAMIN B-1: CPT | Performed by: NURSE PRACTITIONER

## 2025-01-17 PROCEDURE — 80053 COMPREHEN METABOLIC PANEL: CPT | Performed by: NURSE PRACTITIONER

## 2025-01-17 PROCEDURE — 85025 COMPLETE CBC W/AUTO DIFF WBC: CPT | Performed by: NURSE PRACTITIONER

## 2025-01-17 PROCEDURE — 99024 POSTOP FOLLOW-UP VISIT: CPT | Mod: S$GLB,,, | Performed by: NURSE PRACTITIONER

## 2025-01-17 PROCEDURE — 99999 PR PBB SHADOW E&M-EST. PATIENT-LVL IV: CPT | Mod: PBBFAC,,, | Performed by: NURSE PRACTITIONER

## 2025-01-17 PROCEDURE — 82607 VITAMIN B-12: CPT | Performed by: NURSE PRACTITIONER

## 2025-01-17 PROCEDURE — 3075F SYST BP GE 130 - 139MM HG: CPT | Mod: CPTII,S$GLB,, | Performed by: NURSE PRACTITIONER

## 2025-01-17 PROCEDURE — 1159F MED LIST DOCD IN RCRD: CPT | Mod: CPTII,S$GLB,, | Performed by: NURSE PRACTITIONER

## 2025-01-17 RX ORDER — BENZONATATE 200 MG/1
200 CAPSULE ORAL 3 TIMES DAILY PRN
Qty: 30 CAPSULE | Refills: 0 | Status: SHIPPED | OUTPATIENT
Start: 2025-01-17 | End: 2025-01-27

## 2025-01-17 NOTE — PROGRESS NOTES
BARIATRIC POST-OPERATIVE VISIT:    HPI:  Alexandra Martin is a 29 y.o. year old female presents for 2 week post op visit following LRNY.  she is doing well and tolerating the diet without difficulty.  she has no complaints.    Denies: nausea, vomiting, abdominal pain, changes in bowel movement pattern, fever, chills, dysphagia, chest pain, and shortness of breath.    Review of Systems   Constitutional:  Negative for activity change and fatigue.   Respiratory:  Negative for cough and shortness of breath.    Cardiovascular:  Negative for chest pain, palpitations and leg swelling.   Gastrointestinal:  Negative for abdominal pain, nausea and vomiting.   Endocrine: Negative for polydipsia, polyphagia and polyuria.   Genitourinary:  Negative for dysuria.   Musculoskeletal:  Negative for gait problem.   Skin:  Negative for rash.   Allergic/Immunologic: Negative for immunocompromised state.   Neurological:  Negative for dizziness, syncope and weakness.   Hematological:  Does not bruise/bleed easily.   Psychiatric/Behavioral:  Negative for behavioral problems.        EXERCISE & VITAMINS:  See Bariatric Assessment    MEDICATIONS/ALLERGIES:  Have been reviewed.    DIET: Liquid Bariatric Diet.  1.5 protein shakes daily, ~45 grams protein.  60 fl oz SF clear beverage.  See Dietician note from today for a more detailed assessment.      Physical Exam  Vitals and nursing note reviewed.   Constitutional:       Appearance: She is well-developed. She is morbidly obese.   HENT:      Head: Normocephalic.      Nose: Nose normal.      Mouth/Throat:      Mouth: Mucous membranes are moist.   Eyes:      Extraocular Movements: Extraocular movements intact.   Cardiovascular:      Rate and Rhythm: Normal rate and regular rhythm.      Heart sounds: Normal heart sounds.   Pulmonary:      Effort: Pulmonary effort is normal.      Breath sounds: Normal breath sounds.   Abdominal:      General: Bowel sounds are normal.      Palpations:  Abdomen is soft.   Musculoskeletal:         General: Normal range of motion.      Cervical back: Normal range of motion.   Skin:     General: Skin is warm and dry.      Capillary Refill: Capillary refill takes less than 2 seconds.   Neurological:      Mental Status: She is alert and oriented to person, place, and time.   Psychiatric:         Mood and Affect: Mood normal.       ASSESSMENT:  - Morbid obesity s/p laparoscopic Catalina-en-Y on 1/6/25.  - Co-morbidities: obstructive sleep apnea  -  Weight loss, 32#'s and 14% EWL  -  Exercise routine walking x4 days  - Good Diet  - Good Vitamin regimen    PLAN:  - Anti-Acid medication,  daily for 3 months  - No lifting more than 10 lbs for 6 weeks  - Miralax daily for constipation  - Emphasized the importance of regular exercise and adherence to bariatric diet to achieve maximum weight loss.  - Encouraged patient to start regular exercise.  - Follow-up with dietician to advance diet.  - Continue daily vitamins and medications.  - RTC in 6 weeks or sooner if needed.  - Call the office for any issues.  - Check labs today.  - F/u with Amaris MORRISON   - Start taking depression and anxiety meds today  - F/u with PCP for chronic cough     Post Discharge     2 Weeks     Total Opioids Used (Outpatient): not given any at d/c

## 2025-01-17 NOTE — TELEPHONE ENCOUNTER
Reached out to pt to schedule individual therapy appointment. Pt to be seen on 1/22/25. Pt denied HI/SI at this time.  LCSW informed to that if she began to experience HI/SI to go to her local emergency room or call 911. Pt expressed understanding.     Amaris Laura, DANIELAW  Department of Surgery/ Bariatric Surgery LCSW

## 2025-01-17 NOTE — PATIENT INSTRUCTIONS
High Protein Pureed Diet    2 weeks after gastric bypass and sleeve you may be ready to add pureed food to your diet.  All food should be the consistency of baby food, or thinner.  Follow pureed diet for the next 2 weeks.    Protein - It is very important to pay attention to protein intake during this time.      Inadequate protein intake can cause:  Delayed Wound Healing  Hair Loss  Muscle Breakdown    Meal Plan - Eat 3-4 meals per day (2-4 tbsp each), with protein supplements in between to meet protein needs.  Meeting protein needs daily will help increase healing, decrease muscle loss, and increase weight loss.  Your goal is  grams of protein a day.    Protein First - Always eat the foods with the highest protein first.  Foods high in protein include milk, yogurt, cheese, egg whites, and blenderized meat, seafood, and beans.    Fluids - Keep track in your journal of how much you are drinking; you should try to drink at least 64oz of fluids every day.      Foods allowed: Portion size Protein (g)   Sugar-free clear liquids As desired 0   Skim or 1% milk ½ cup 4   Sugar free pudding, light yogurt, custard (use skim or 1% milk in preparation) 3 oz 2.5   Strained baby food meats, or home-made pureed lean meats and shrimp 1 oz 7   Beans (red, white, black, lima, saini, fat free refried, hummus) and lentils ¼ cup 4   Low-fat/fat free cheese.(cottage cheese, mozzarella string cheese, ricotta cheese, Laughing Cow, Baby Bell, cheddar, etc) ¼ cup 7-8   Scrambled eggs or Egg Beaters 1 or ¼ cup 6   Edamame or Tofu, mashed ¼ cup 5   Unflavored protein powder (add to 1 scoop to  98% fat free soups or SF pudding) 3 Tbsp 9   *PB2: peanut powder (45 calories) 2 Tbsp 5     *PB2 powdered peanut butter: 45 calories vs. 190 calories in 2 tbsp of regular peanut butter. Purchase online at [employer], or  at various [employer], [employer], [employer] and [employer].        Bariatric Liquid/Pureed Sample Menu    3-4 small meals  plus 2-3 protein drinks per day.    8am 1 egg or ¼ cup Egg Beaters   9am 1 cup water, or decaf coffee or tea   10am Protein drink, 30g protein   11am 2 tbsp low-fat cottage cheese, and 1 tbsp pureed peaches   12pm 1 cup water, or sugar-free lemonade    1pm 2 tbsp pureed chicken, and 1 tbsp pureed carrots    2pm 1 cup water, or sugar-free lemonade   3pm Protein drink, 30g protein   5pm 1 cup water    6pm 1 cup hi-protein creamy chicken soup 14g protein (see Recipe below)   7pm 1 cup water, or sugar-free fruit punch    8pm 1 cup water     This sample menu provides approx. 80g protein and 64oz fluids.  Liquid protein supplements should contain 20-30g protein and less than 4 grams of sugar each.    Sip fluids continuously in between meals.  Drink at least ¼ cup every 15 minutes.  For fluids: ¼ cup = 2 oz = 4 tbsp       RECIPE IDEAS for Bariatric Pureed Diet:    Hi-Protein Creamy Chicken Soup: (10g protein per 1 cup serving)  Empty 1 can of 98% fat free cream of chicken soup into saucepan. Then  blend 1 scoop of unflavored protein powder with 1 can of skim milk until smooth.  Add protein milk to saucepan and heat to warm. (Note: Do NOT boil. Protein powder may clump if heated too hot).     Hi-Protein Pudding: (14g protein per ½ cup serving)  Add 2 scoops protein powder to 2 cups cold skim milk and mix well.  Stir in dry Jell-O Sugar-Free Instant Pudding mix.  Chill and Enjoy!    Tuna Mousse (12g protein per ¼ cup serving) Page 135 in book Eating Well After Weight Loss Surgery.  In a  or , combine all ingredients and pulse until smooth.  2 6-ounce cans tuna packed in water, drained  2 tbsp low-fat mayonnaise  2 tbsp fat-free sour cream  2 tbsp fat-free cream cheese, softened  ½ cup shallots, finely chopped  1 tbsp lemon juice  ¼ tsp ground pepper  ½ tsp celery seed    Chocolate Peanut Butter Mousse  (28g protein total)  6oz plain Greek yogurt  4 tbsp chocolate PB2

## 2025-01-21 ENCOUNTER — PATIENT MESSAGE (OUTPATIENT)
Dept: BARIATRICS | Facility: CLINIC | Age: 30
End: 2025-01-21
Payer: MEDICARE

## 2025-01-22 ENCOUNTER — PATIENT MESSAGE (OUTPATIENT)
Dept: BARIATRICS | Facility: CLINIC | Age: 30
End: 2025-01-22
Payer: MEDICARE

## 2025-01-22 LAB — VIT B1 BLD-MCNC: 49 UG/L (ref 38–122)

## 2025-01-24 ENCOUNTER — OFFICE VISIT (OUTPATIENT)
Dept: FAMILY MEDICINE | Facility: CLINIC | Age: 30
End: 2025-01-24
Payer: MEDICARE

## 2025-01-24 VITALS — OXYGEN SATURATION: 100 % | BODY MASS INDEX: 57.72 KG/M2 | WEIGHT: 293 LBS | HEART RATE: 117 BPM

## 2025-01-24 DIAGNOSIS — Z98.84 S/P BARIATRIC SURGERY: ICD-10-CM

## 2025-01-24 DIAGNOSIS — E66.01 MORBID OBESITY: Primary | ICD-10-CM

## 2025-01-24 PROCEDURE — 99999 PR PBB SHADOW E&M-EST. PATIENT-LVL III: CPT | Mod: PBBFAC,,, | Performed by: STUDENT IN AN ORGANIZED HEALTH CARE EDUCATION/TRAINING PROGRAM

## 2025-01-24 PROCEDURE — 99499 UNLISTED E&M SERVICE: CPT | Mod: S$GLB,,, | Performed by: STUDENT IN AN ORGANIZED HEALTH CARE EDUCATION/TRAINING PROGRAM

## 2025-01-29 ENCOUNTER — OFFICE VISIT (OUTPATIENT)
Dept: BARIATRICS | Facility: CLINIC | Age: 30
End: 2025-01-29
Payer: MEDICARE

## 2025-01-29 DIAGNOSIS — E66.01 MORBID OBESITY WITH BMI OF 50.0-59.9, ADULT: Primary | ICD-10-CM

## 2025-01-29 DIAGNOSIS — F11.21: ICD-10-CM

## 2025-01-29 DIAGNOSIS — F33.1 MODERATE EPISODE OF RECURRENT MAJOR DEPRESSIVE DISORDER: ICD-10-CM
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
## 2025-02-07 PROBLEM — F41.9 ANXIETY: Status: ACTIVE | Noted: 2025-02-07

## 2025-02-07 PROBLEM — R07.9 CHEST PAIN: Status: ACTIVE | Noted: 2025-02-07

## 2025-02-07 PROBLEM — R29.90 NEUROLOGICAL COMPLAINT: Status: ACTIVE | Noted: 2025-02-07

## 2025-02-10 ENCOUNTER — PATIENT MESSAGE (OUTPATIENT)
Dept: BARIATRICS | Facility: CLINIC | Age: 30
End: 2025-02-10
Payer: MEDICARE

## 2025-02-11 PROBLEM — E87.6 HYPOKALEMIA: Status: ACTIVE | Noted: 2025-02-11

## 2025-02-12 ENCOUNTER — HOSPITAL ENCOUNTER (EMERGENCY)
Facility: HOSPITAL | Age: 30
Discharge: HOME | End: 2025-02-13
Attending: STUDENT IN AN ORGANIZED HEALTH CARE EDUCATION/TRAINING PROGRAM
Payer: MEDICARE

## 2025-02-12 ENCOUNTER — PATIENT MESSAGE (OUTPATIENT)
Dept: BARIATRICS | Facility: CLINIC | Age: 30
End: 2025-02-12

## 2025-02-12 ENCOUNTER — TELEPHONE (OUTPATIENT)
Dept: BARIATRICS | Facility: CLINIC | Age: 30
End: 2025-02-12
Payer: MEDICARE

## 2025-02-12 DIAGNOSIS — R06.02 SHORTNESS OF BREATH: ICD-10-CM

## 2025-02-12 DIAGNOSIS — R07.9 CHEST PAIN: ICD-10-CM

## 2025-02-12 DIAGNOSIS — R10.13 EPIGASTRIC PAIN: Primary | ICD-10-CM

## 2025-02-12 LAB
ALBUMIN SERPL BCP-MCNC: 3.2 G/DL (ref 3.5–5.2)
ALP SERPL-CCNC: 94 U/L (ref 40–150)
ALT SERPL W/O P-5'-P-CCNC: 78 U/L (ref 10–44)
ANION GAP SERPL CALC-SCNC: 9 MMOL/L (ref 8–16)
AST SERPL-CCNC: 49 U/L (ref 10–40)
B-HCG UR QL: NEGATIVE
BASOPHILS # BLD AUTO: 0.02 K/UL (ref 0–0.2)
BASOPHILS NFR BLD: 0.5 % (ref 0–1.9)
BILIRUB SERPL-MCNC: 0.7 MG/DL (ref 0.1–1)
BNP SERPL-MCNC: 80 PG/ML (ref 0–99)
BUN SERPL-MCNC: 5 MG/DL (ref 6–20)
CALCIUM SERPL-MCNC: 9 MG/DL (ref 8.7–10.5)
CHLORIDE SERPL-SCNC: 110 MMOL/L (ref 95–110)
CO2 SERPL-SCNC: 22 MMOL/L (ref 23–29)
CREAT SERPL-MCNC: 0.8 MG/DL (ref 0.5–1.4)
CTP QC/QA: YES
D DIMER PPP IA.FEU-MCNC: 1.21 MG/L FEU
DIFFERENTIAL METHOD BLD: ABNORMAL
EOSINOPHIL # BLD AUTO: 0.2 K/UL (ref 0–0.5)
EOSINOPHIL NFR BLD: 3.4 % (ref 0–8)
ERYTHROCYTE [DISTWIDTH] IN BLOOD BY AUTOMATED COUNT: 15.2 % (ref 11.5–14.5)
EST. GFR  (NO RACE VARIABLE): >60 ML/MIN/1.73 M^2
GLUCOSE SERPL-MCNC: 107 MG/DL (ref 70–110)
HCT VFR BLD AUTO: 40.2 % (ref 37–48.5)
HCV AB SERPL QL IA: NORMAL
HGB BLD-MCNC: 12.6 G/DL (ref 12–16)
IMM GRANULOCYTES # BLD AUTO: 0.01 K/UL (ref 0–0.04)
IMM GRANULOCYTES NFR BLD AUTO: 0.2 % (ref 0–0.5)
LYMPHOCYTES # BLD AUTO: 1.4 K/UL (ref 1–4.8)
LYMPHOCYTES NFR BLD: 30.6 % (ref 18–48)
MCH RBC QN AUTO: 26.7 PG (ref 27–31)
MCHC RBC AUTO-ENTMCNC: 31.3 G/DL (ref 32–36)
MCV RBC AUTO: 85 FL (ref 82–98)
MONOCYTES # BLD AUTO: 0.4 K/UL (ref 0.3–1)
MONOCYTES NFR BLD: 7.9 % (ref 4–15)
NEUTROPHILS # BLD AUTO: 2.6 K/UL (ref 1.8–7.7)
NEUTROPHILS NFR BLD: 57.4 % (ref 38–73)
NRBC BLD-RTO: 0 /100 WBC
PLATELET # BLD AUTO: 159 K/UL (ref 150–450)
PMV BLD AUTO: 10.9 FL (ref 9.2–12.9)
POTASSIUM SERPL-SCNC: 3.7 MMOL/L (ref 3.5–5.1)
PROT SERPL-MCNC: 6.1 G/DL (ref 6–8.4)
RBC # BLD AUTO: 4.72 M/UL (ref 4–5.4)
SODIUM SERPL-SCNC: 141 MMOL/L (ref 136–145)
TROPONIN I SERPL DL<=0.01 NG/ML-MCNC: <3 NG/L (ref 0–14)
WBC # BLD AUTO: 4.44 K/UL (ref 3.9–12.7)

## 2025-02-12 PROCEDURE — 85379 FIBRIN DEGRADATION QUANT: CPT | Performed by: PHYSICIAN ASSISTANT

## 2025-02-12 PROCEDURE — 93005 ELECTROCARDIOGRAM TRACING: CPT

## 2025-02-12 PROCEDURE — 25500020 PHARM REV CODE 255: Performed by: STUDENT IN AN ORGANIZED HEALTH CARE EDUCATION/TRAINING PROGRAM

## 2025-02-12 PROCEDURE — 80053 COMPREHEN METABOLIC PANEL: CPT | Performed by: PHYSICIAN ASSISTANT

## 2025-02-12 PROCEDURE — 84484 ASSAY OF TROPONIN QUANT: CPT | Performed by: PHYSICIAN ASSISTANT

## 2025-02-12 PROCEDURE — 83880 ASSAY OF NATRIURETIC PEPTIDE: CPT | Performed by: PHYSICIAN ASSISTANT

## 2025-02-12 PROCEDURE — 81025 URINE PREGNANCY TEST: CPT | Performed by: PHYSICIAN ASSISTANT

## 2025-02-12 PROCEDURE — 96361 HYDRATE IV INFUSION ADD-ON: CPT

## 2025-02-12 PROCEDURE — 25000003 PHARM REV CODE 250: Performed by: STUDENT IN AN ORGANIZED HEALTH CARE EDUCATION/TRAINING PROGRAM

## 2025-02-12 PROCEDURE — 86803 HEPATITIS C AB TEST: CPT | Performed by: PHYSICIAN ASSISTANT

## 2025-02-12 PROCEDURE — 85025 COMPLETE CBC W/AUTO DIFF WBC: CPT | Performed by: PHYSICIAN ASSISTANT

## 2025-02-12 PROCEDURE — 63600175 PHARM REV CODE 636 W HCPCS: Performed by: STUDENT IN AN ORGANIZED HEALTH CARE EDUCATION/TRAINING PROGRAM

## 2025-02-12 PROCEDURE — 96374 THER/PROPH/DIAG INJ IV PUSH: CPT

## 2025-02-12 PROCEDURE — 99285 EMERGENCY DEPT VISIT HI MDM: CPT | Mod: 25

## 2025-02-12 RX ORDER — ALUMINUM HYDROXIDE, MAGNESIUM HYDROXIDE, AND SIMETHICONE 1200; 120; 1200 MG/30ML; MG/30ML; MG/30ML
30 SUSPENSION ORAL EVERY 6 HOURS PRN
Status: DISCONTINUED | OUTPATIENT
Start: 2025-02-12 | End: 2025-02-13 | Stop reason: HOSPADM

## 2025-02-12 RX ORDER — FAMOTIDINE 10 MG/ML
20 INJECTION INTRAVENOUS
Status: COMPLETED | OUTPATIENT
Start: 2025-02-12 | End: 2025-02-12

## 2025-02-12 RX ORDER — FAMOTIDINE 40 MG/1
40 TABLET, FILM COATED ORAL DAILY
Qty: 30 TABLET | Refills: 11 | Status: SHIPPED | OUTPATIENT
Start: 2025-02-12 | End: 2025-02-14

## 2025-02-12 RX ADMIN — FAMOTIDINE 20 MG: 10 INJECTION, SOLUTION INTRAVENOUS at 07:02

## 2025-02-12 RX ADMIN — SODIUM CHLORIDE, POTASSIUM CHLORIDE, SODIUM LACTATE AND CALCIUM CHLORIDE 1000 ML: 600; 310; 30; 20 INJECTION, SOLUTION INTRAVENOUS at 07:02

## 2025-02-12 RX ADMIN — IOHEXOL 75 ML: 350 INJECTION, SOLUTION INTRAVENOUS at 08:02

## 2025-02-12 NOTE — TELEPHONE ENCOUNTER
Called pt's emergency contact who placed the pt on the phone. Pt stated she has her HR in the 150s, chest pain and light headedness.  She is s/p  Coleman Lap bypass on 1/6/25 but then went to ED and had a Left heart cath on 2/10/25, released today.  I alerted her Cardiologist and the doctor who performed her heart cath. Advised pt to go to the ED.  She initially did not want to because she stated she was released in the same condition.  I stated with the symptoms that she is providing, my advice is for her to go tot he ED.  She stated she will go.     Called [facility] ED and provided report to Helena KOWALSKI.    Alerted Josi Ybarra NP and and Dr. Cee.

## 2025-02-12 NOTE — TELEPHONE ENCOUNTER
----- Message from Emily sent at 2/12/2025 10:35 AM CST -----  Regarding: Patient Advice  Contact: [phone]  Patient calling to speak to Dr. Hill regarding chest pains and elevated rate. Patient is unable to stand. Patient was admitted on 2/7 and was discharge this morning 2/12. Pls call to discuss plan of care for patient. Pls call

## 2025-02-12 NOTE — FIRST PROVIDER EVALUATION
Emergency Department TeleTriage Encounter Note      CHIEF COMPLAINT    Chief Complaint   Patient presents with    Chest Pain     Sent from clinic for high heart rate and chest pain; recent bariatric surgery.        VITAL SIGNS   Initial Vitals [02/12/25 1548]   BP Pulse Resp Temp SpO2   (!) 165/88 90 20 97.5 °F (36.4 °C) 97 %      MAP       --            ALLERGIES    Review of patient's allergies indicates:   Allergen Reactions    Ceftriaxone Nausea And Vomiting    Azithromycin Nausea And Vomiting     Hard to breathe     Latex, natural rubber Swelling and Rash       PROVIDER TRIAGE NOTE  Patient presents with constant chest pain worse when standing or walking. Also reports SOB when standing or walking. Bariatric surgery Jan 6th      ORDERS  Labs Reviewed   HEPATITIS C ANTIBODY       ED Orders (720h ago, onward)      Start Ordered     Status Ordering Provider    02/12/25 1558 02/12/25 1557  Hepatitis C Antibody  STAT         Acknowledged LANDON VAIL    02/12/25 1551 02/12/25 1550  EKG 12-lead  Once         Completed by ALFREDA CORREIA on 2/12/2025 at  4:01 PM KLARISSA LOPEZ              Virtual Visit Note: The provider triage portion of this emergency department evaluation and documentation was performed via [employer], a HIPAA-compliant telemedicine application, in concert with a tele-presenter in the room. A face to face patient evaluation with one of my colleagues will occur once the patient is placed in an emergency department room.      DISCLAIMER: This note was prepared with [employer] voice recognition transcription software. Garbled syntax, mangled pronouns, and other bizarre constructions may be attributed to that software system.

## 2025-02-13 VITALS
SYSTOLIC BLOOD PRESSURE: 131 MMHG | DIASTOLIC BLOOD PRESSURE: 85 MMHG | HEIGHT: 63 IN | HEART RATE: 71 BPM | TEMPERATURE: 98 F | BODY MASS INDEX: 51.91 KG/M2 | RESPIRATION RATE: 20 BRPM | OXYGEN SATURATION: 99 % | WEIGHT: 293 LBS

## 2025-02-13 LAB
OHS QRS DURATION: 76 MS
OHS QTC CALCULATION: 397 MS

## 2025-02-13 NOTE — TELEPHONE ENCOUNTER
Josi Ybarra NP reviewed and included CT of abdomen.  She advises no further medical testing at this time.    Amaris Laura called the pt.  She confirmed that pt is no longer on her psych medications and will wait to discuss with her doctor at her appointment tomorrow.     Called pt and provided Josi Ybarra'sJESSA advice.  Pt to establish care with PCP tomorrow.

## 2025-02-13 NOTE — ED NOTES
Patient states she had gastric bypass Jan 6th, since then chest tightness, pain, lightheaded. Seen in ED Friday , sent home today , called bariatric Doc, told to come to ED. Had echo and heart cath last week

## 2025-02-13 NOTE — ED NOTES
Patient identifiers verified and correct for  MS Martin  C/C: Elevated HR upon standing SEE NN  APPEARANCE: awake and alert in NAD. PAIN  7/10  SKIN: warm, dry and intact. No breakdown or bruising.  MUSCULOSKELETAL: Patient moving all extremities spontaneously, no obvious swelling or deformities noted. Ambulates independently.  RESPIRATORY: Positive  shortness of breath.Respirations unlabored.   CARDIAC: Positive elevtaed HR, , 2+ distal pulses; no peripheral edema  ABDOMEN: S/ND/NT, Denies nausea  : voids spontaneously, denies difficulty, reports pain with urination   Neurologic: AAO x 4; follows commands equal strength in all extremities; denies numbness/tingling. Denies dizziness  Denies new wekaness

## 2025-02-13 NOTE — TELEPHONE ENCOUNTER
Pt called in through the phone room. She stated that she had the same cardiac testing and was told her heart was fine.  She stated her HR was 200 while in the ED.  Her ED notes:      Reaching out to Josi Ybarra NP and Amaris Laura LCSW.

## 2025-02-13 NOTE — ED PROVIDER NOTES
Encounter Date: 2/12/2025       History     Chief Complaint   Patient presents with    Chest Pain     Sent from clinic for high heart rate and chest pain; recent bariatric surgery.      HPI    30 yo F w/GERD, gastric bypass surgery 2/2 obesity (1/6/25), cholecystectomy, recent LHC on 2/10/25 2/2 ST changes during treadmill stress test, with clear coronaries, sent in by MD OK Cee (bariatric surgeon) for work up 2/2 high heart rate (when standing, chest/epigastric pain x 3 weeks.    Patient was discharged from [facility] 2 days ago after her left heart catheterization, reports her symptoms of lightheadedness with standing, as well as chest pain did not change after the left heart catheterization.      Patient reports lightheadedness, and chest pain when standing up and walking for the past 3 weeks that began a week after her gastric bypass surgery.  Reports some worsening of chest pain with deep breaths.  Chest pain is substernal.  Denies any coughing.  Denies fever/chills denies leg pain.    Also reports a few episodes of vomiting yesterday, and 1 today, as well as postprandial nausea since the gastric bypass surgery.  Reports compliance with omeprazole BID.    Review of patient's allergies indicates:   Allergen Reactions    Ceftriaxone Nausea And Vomiting    Azithromycin Nausea And Vomiting     Hard to breathe     Latex, natural rubber Swelling and Rash     Past Medical History:   Diagnosis Date    ADD (attention deficit disorder)     Anxiety disorder     Biliary dyskinesia 11/02/2024    Bronchitis 12/03/2024    Depression     Drug-induced constipation 03/04/2024    Headache     MDD (major depressive disorder), recurrent episode, moderate 07/29/2021    Morbid obesity with BMI of 60.0-69.9, adult 06/10/2024    Nephrolithiasis     Ovarian cyst     Substance abuse     Hospitalization     Trauma 09/11/2019    Tympanic membrane perforation, left 03/14/2018    UTI (urinary tract infection) 08/03/2018     Vertigo      Past Surgical History:   Procedure Laterality Date    ANGIOGRAM, CORONARY, WITH LEFT HEART CATHETERIZATION  2/10/2025    Procedure: Left Heart Cath;  Surgeon: Garcia De Luna MD;  Location: [facility] CATH;  Service: Cardiovascular;;    CYSTOSCOPY W/ URETERAL STENT PLACEMENT Right 12/26/2019    Procedure: CYSTOSCOPY, WITH URETERAL STENT INSERTION;  Surgeon: Rito Medley MD;  Location: [facility] OR;  Service: Urology;  Laterality: Right;    CYSTOSCOPY W/ URETERAL STENT REMOVAL Right 01/06/2020    Procedure: CYSTOSCOPY, WITH URETERAL STENT REMOVAL;  Surgeon: Rito Medley MD;  Location: [facility] OR;  Service: Urology;  Laterality: Right;    INJECTION OF ANESTHETIC AGENT INTO TISSUE PLANE DEFINED BY TRANSVERSUS ABDOMINIS MUSCLE  1/6/2025    Procedure: BLOCK, TRANSVERSUS ABDOMINIS PLANE;  Surgeon: Bibi Edmondson MD;  Location: [facility] OR [address] [place] [address];  Service: General;;    LAPAROSCOPIC CHOLECYSTECTOMY  11/05/2024    LAPAROSCOPIC GASTROENTEROSTOMY N/A 1/6/2025    Procedure: GASTROENTEROSTOMY, LAPAROSCOPIC with inraop EGD;  Surgeon: Bibi Edmondson MD;  [facility];  Service: General;  Laterality: N/A;  Surgeon to perform Tap Block itraoperatively    LASER LITHOTRIPSY Right 01/06/2020    Procedure: LITHOTRIPSY, USING LASER;  Surgeon: Rito Medley MD;  Location: [facility] OR;  Service: Urology;  Laterality: Right;    TONSILLECTOMY, ADENOIDECTOMY, BILATERAL MYRINGOTOMY AND TUBES      URETEROSCOPIC REMOVAL OF URETERIC CALCULUS Right 01/06/2020    Procedure: REMOVAL, CALCULUS, URETER, URETEROSCOPIC;  Surgeon: Rito Medley MD;  Location: [facility] OR;  Service: Urology;  Laterality: Right;    URETEROSCOPY Left 12/26/2019    Procedure: URETEROSCOPY;  Surgeon: Rito Medley MD;  Location: [facility] OR;  Service: Urology;  Laterality: Left;    URETEROSCOPY Right 01/06/2020    Procedure: URETEROSCOPY;  Surgeon: Rito Medley MD;  Location: [facility] OR;  Service: Urology;   Laterality: Right;     Family History   Problem Relation Name Age of Onset    Breast cancer Maternal Aunt      Obesity Mother      No Known Problems Father      No Known Problems Brother      Colon cancer Neg Hx      Miscarriages / Stillbirths Neg Hx      Ovarian cancer Neg Hx       Social History     Tobacco Use    Smoking status: Former     Current packs/day: 0.25     Average packs/day: 0.3 packs/day for 15.1 years (3.8 ttl pk-yrs)     Types: Cigarettes     Start date: 2010    Smokeless tobacco: Never    Tobacco comments:     Quit smoking June 2024    Substance Use Topics    Alcohol use: Not Currently     Alcohol/week: 0.0 standard drinks of alcohol    Drug use: Not Currently     Types: Marijuana, Fentanyl     Comment: history of opoid abuse     Review of Systems    Physical Exam     Initial Vitals [02/12/25 1548]   BP Pulse Resp Temp SpO2   (!) 165/88 90 20 97.5 °F (36.4 °C) 97 %      MAP       --         Physical Exam    ED Course   Procedures  Labs Reviewed   CBC W/ AUTO DIFFERENTIAL - Abnormal       Result Value    WBC 4.44      RBC 4.72      Hemoglobin 12.6      Hematocrit 40.2      MCV 85      MCH 26.7 (*)     MCHC 31.3 (*)     RDW 15.2 (*)     Platelets 159      MPV 10.9      Immature Granulocytes 0.2      Gran # (ANC) 2.6      Immature Grans (Abs) 0.01      Lymph # 1.4      Mono # 0.4      Eos # 0.2      Baso # 0.02      nRBC 0      Gran % 57.4      Lymph % 30.6      Mono % 7.9      Eosinophil % 3.4      Basophil % 0.5      Differential Method Automated     COMPREHENSIVE METABOLIC PANEL - Abnormal    Sodium 141      Potassium 3.7      Chloride 110      CO2 22 (*)     Glucose 107      BUN 5 (*)     Creatinine 0.8      Calcium 9.0      Total Protein 6.1      Albumin 3.2 (*)     Total Bilirubin 0.7      Alkaline Phosphatase 94      AST 49 (*)     ALT 78 (*)     eGFR >60.0      Anion Gap 9     D DIMER, QUANTITATIVE - Abnormal    D-Dimer 1.21 (*)    POCT URINE PREGNANCY - Normal    POC Preg Test, Ur Negative        Acceptable Yes     HEPATITIS C ANTIBODY    Hepatitis C Ab Non-reactive      Narrative:     Release to patient->Immediate   B-TYPE NATRIURETIC PEPTIDE    BNP 80     TROPONIN I HIGH SENSITIVITY    Troponin I High Sensitivity <3            Imaging Results              CTA Chest Non-Coronary (PE Studies) (Final result)  Result time 02/12/25 22:51:16      Final result by Jarvis Torres MD (02/12/25 22:51:16)                   Impression:      1. No evidence of acute pulmonary thromboembolism.  2. No acute findings elsewhere in the chest or upper abdomen as imaged.      Electronically signed by: Jarvis Torres  Date:    02/12/2025  Time:    22:51               Narrative:    EXAMINATION:  CTA CHEST NON CORONARY (PE STUDIES)    CLINICAL HISTORY:  Pulmonary embolism (PE) suspected, positive D-dimer;    TECHNIQUE:  Following the intravenous administration of 75 cc of Omnipaque 350 contrast material, 1.25 mm contiguous axial images were acquired through the chest utilizing the CT pulmonary angiogram protocol.  2-D coronal and sagittal reconstructed images of the chest and sagittal oblique MIP images of the pulmonary arterial system were generated from the source data.    COMPARISON:  None.    FINDINGS:  Pulmonary arterial system: This is a good quality examination for the evaluation of pulmonary thromboembolism with good opacification of the pulmonary arteries to the level of the segmental branches of the pulmonary arterial system. There is no evidence of acute pulmonary thromboembolism. No large saddle pulmonary embolism, enlargement of the main pulmonary artery, or secondary findings of right ventricular strain.    Aorta and heart: The heart is normal in size.  No pericardial fluid.  No thoracic aortic aneurysm.  No thoracic aortic dissection.    Airways: Unremarkable.    Lymph nodes: No pathologically enlarged lymph nodes in the chest or axilla.    Lungs: The lungs are clear with no evidence of  airspace consolidation, mass, or bronchiectasis.  No emphysematous lung architecture.    Pleura: No significant volume of pleural fluid or pneumothorax.  No pleural based masses.    Visualized upper abdominal soft tissues: Cholecystectomy clips.  ZAYRA staples suggesting gastric weight loss surgery...    Bones: There is degenerative change of the thoracic spine.                                       X-Ray Chest AP Portable (In process)  Result time 02/12/25 19:27:52                     Medications   aluminum-magnesium hydroxide-simethicone 200-200-20 mg/5 mL suspension 30 mL (has no administration in time range)   famotidine (PF) injection 20 mg (20 mg Intravenous Given 2/12/25 1910)   lactated ringers bolus 1,000 mL (0 mLs Intravenous Stopped 2/12/25 2010)   iohexoL (OMNIPAQUE 350) injection 75 mL (75 mLs Intravenous Given 2/12/25 2009)     Medical Decision Making  30 yo F w/GERD, catalina en y gastric bypass surgery 2/2 obesity (1/6/25), cholecystectomy, recent LHC on 2/10/25 2/2 ST changes during treadmill stress test, with clear coronaries, sent in by MD OK Cee (bariatric surgeon) for work up 2/2 high heart rate (when standing, chest/epigastric pain x 3 weeks.        Differential diagnosis includes but is not limited to: gastric ulcer, catalina en y bypass complication, coronary dissection or other complication 2/2 LHC, arrhythmia, electrolyte abnormality    D-dimer was found to be elevated at 1.21, CTA obtained with no pulmonary embolism seen.  High sensitivity troponin was less than 3, with no ischemic changes on EKG or arrhythmias.  Electrolytes including potassium were normal, slight elevation AST/ALT, seen on prior labs.  Pain is epigastric, given recent left heart catheterization with clean coronaries, CTA negative for PE, recommended follow up with Gastroenterology/ bariatric surgeon for epigastric pain after Catalina-en-Y bypass.  No dysphagia, no frequent NSAID use, is compliant with PPI, may need endoscopy for  further work up of epigastric pain.  Given epigastric pain is chronic and normal labs, with normal p.o. challenge in the ER, and patient being greater than 1 month out from surgery, no indication for emergent consultation postop.    Chest x-ray independently interpreted by me with no consolidation, no pneumothorax, no pleural effusion, no free air under diaphragm.    I discussed with the patient/family the diagnosis, treatment plan, indications for return to the emergency department, as well as for expected follow-up. The patient/family verbalized an understanding. The patient/family is asked if there were any questions or concerns, which were addressed to patient/family satisfaction. Patient/family understands and is agreeable to the plan.               Amount and/or Complexity of Data Reviewed  Labs:  Decision-making details documented in ED Course.  Radiology: ordered.    Risk  OTC drugs.  Prescription drug management.               ED Course as of 02/12/25 2341 Wed Feb 12, 2025 1835 D-Dimer(!): 1.21 [LF]   1836 EKG independently interpreted by me with normal sinus rhythm, rate of 57, similar morphology to EKG from 2 days ago, with T-wave inversion in lead III, no STEMI, no IA shortening,  [LF]   1850 D-Dimer(!): 1.21 [LF]   1850 Troponin I High Sensitivity: <3 [LF]   1850 BNP: 80 [LF]      ED Course User Index  [LF] Rocio Fernandez MD                           Clinical Impression:  Final diagnoses:  [R07.9] Chest pain  [R10.13] Epigastric pain (Primary)          ED Disposition Condition    Discharge Stable          ED Prescriptions    None       Follow-up Information    None          Rocio Fernandez MD  02/12/25 2341

## 2025-02-13 NOTE — DISCHARGE INSTRUCTIONS

## 2025-02-14 ENCOUNTER — OFFICE VISIT (OUTPATIENT)
Dept: BARIATRICS | Facility: CLINIC | Age: 30
End: 2025-02-14
Payer: MEDICARE

## 2025-02-14 ENCOUNTER — PATIENT MESSAGE (OUTPATIENT)
Dept: FAMILY MEDICINE | Facility: CLINIC | Age: 30
End: 2025-02-14

## 2025-02-14 ENCOUNTER — OFFICE VISIT (OUTPATIENT)
Dept: FAMILY MEDICINE | Facility: CLINIC | Age: 30
End: 2025-02-14
Payer: MEDICARE

## 2025-02-14 VITALS
OXYGEN SATURATION: 96 % | HEART RATE: 86 BPM | RESPIRATION RATE: 9 BRPM | HEIGHT: 63 IN | BODY MASS INDEX: 51.91 KG/M2 | SYSTOLIC BLOOD PRESSURE: 118 MMHG | WEIGHT: 293 LBS | DIASTOLIC BLOOD PRESSURE: 84 MMHG

## 2025-02-14 DIAGNOSIS — Z87.19 HISTORY OF MELENA: ICD-10-CM

## 2025-02-14 DIAGNOSIS — Z87.19 HISTORY OF HEMORRHOIDS: ICD-10-CM

## 2025-02-14 DIAGNOSIS — D50.8 OTHER IRON DEFICIENCY ANEMIAS: ICD-10-CM

## 2025-02-14 DIAGNOSIS — F11.21: ICD-10-CM

## 2025-02-14 DIAGNOSIS — F33.1 MODERATE EPISODE OF RECURRENT MAJOR DEPRESSIVE DISORDER: ICD-10-CM

## 2025-02-14 DIAGNOSIS — F11.11 HISTORY OF OPIOID ABUSE: ICD-10-CM

## 2025-02-14 DIAGNOSIS — Z76.89 ENCOUNTER TO ESTABLISH CARE WITH NEW DOCTOR: Primary | ICD-10-CM

## 2025-02-14 DIAGNOSIS — B37.2 CANDIDAL INTERTRIGO: ICD-10-CM

## 2025-02-14 DIAGNOSIS — E66.01 MORBID OBESITY WITH BMI OF 50.0-59.9, ADULT: Primary | ICD-10-CM

## 2025-02-14 DIAGNOSIS — Z09 HOSPITAL DISCHARGE FOLLOW-UP: ICD-10-CM

## 2025-02-14 DIAGNOSIS — R07.2 SUBSTERNAL CHEST PAIN: ICD-10-CM

## 2025-02-14 PROCEDURE — 99999 PR PBB SHADOW E&M-EST. PATIENT-LVL IV: CPT | Mod: PBBFAC,,, | Performed by: STUDENT IN AN ORGANIZED HEALTH CARE EDUCATION/TRAINING PROGRAM

## 2025-02-14 RX ORDER — VENLAFAXINE 37.5 MG/1
TABLET ORAL 2 TIMES DAILY
COMMUNITY

## 2025-02-14 RX ORDER — NYSTATIN 100000 [USP'U]/G
POWDER TOPICAL 2 TIMES DAILY
Qty: 60 G | Refills: 1 | Status: SHIPPED | OUTPATIENT
Start: 2025-02-14

## 2025-02-14 RX ORDER — PANTOPRAZOLE SODIUM 40 MG/1
40 TABLET, DELAYED RELEASE ORAL 2 TIMES DAILY
Qty: 180 TABLET | Refills: 0 | Status: SHIPPED | OUTPATIENT
Start: 2025-02-14 | End: 2026-02-14

## 2025-02-14 NOTE — PROGRESS NOTES
"Subjective:       Patient ID: Alexandra Martin is a 29 y.o. female.    Chief Complaint: Establish Care, Rash (Breast fold), and Dizziness (Upon standing with high pulse rate)    Rash    29 year old female presents to establish care and for hospital follow up. She was seen by me once before and recommended to go to the ER. Since then she has established with Dr. Hameed and followed up with her bariatric surgeon. Extensive work up so far has been negative. Of note she stopped taking diltiazem on hospital discharge as directed. It was started empirically by Dr. Hameed on 2/4/25 for symptoms. She has not noticed change in symptoms while on or since being off of it. Of note effexor was discontinued on the discharge summary as well but the patient admits she never stopped it because she does not want to become suicidal. She needs her effexor. There is history of liquid diarrhea multiple times daily while in the hospital described as "dark tar". It has resolved. She admits history of hemorrhoids. There is also history of taking up to 12 ibuprofen a day - not since November. She has had "ulcers and polyps" related to that before. She was prescribed pantoprazole and took it for a few weeks after recent bariatric surgery 1/6/25 but she completed it. She had GERD before surgery and not since. The symptoms she has now are substernal chest pain, dizziness on standing, and intermittent nausea - not necessary always post prandial. There is also reported paresthesia of the right side of her body from the face down. She was not able to tolerate MRI as ordered due to claustrophobia while in patient.    No thoughts of self harm on Effexor.    She has a history of opioid abuse with fentanyl and marijuana abuse. She admits she will not have used Fentanyl or marijuana for three years this April. She follows with drug court in [place] with counselor Renetta and . She requests letter to continue with drug " court and a letter regarding administration of morphine while hospitalized. She politely declines referral to Addiction Medicine today but will reach out if this is needed as she anticipates completion of drug court soon. She was not able to complete her community service as she is not physically able. The letter should include whether she received morphine while in the hospital. Review of the record shows she received IV morphine.    Review of systems is positive for intertrigo inferior to right breast - erythematous macular rash with satellite lesions. She has had similar rashes in other places that nystatin has helped before.    The patient is vitally stable today. On standing to leave she admits symptoms of dizziness.    Normal mood and affect. Normal gait. Skin warm and dry with intertrigo of the breast.    Plan:  For history of dark tarry stool, occult stool ordered x 3.  Continue to avoid NSAIDs.  Restart pantoprazole 40 mg one tab by mouth twice daily.  If bleeding occurs, go to ER.  Establish with GI next week as scheduled.    Continue to follow up with Cardiology as scheduled.  I have reached out to Dr. Hameed to update him that she is off of Diltiazem should he decide she needs to restart.    Continue Effexor.    Letter written to continue drug court.    Nystatin powder for intertrigo.    Past Medical History:   Diagnosis Date    ADD (attention deficit disorder)     Anxiety disorder     Biliary dyskinesia 11/02/2024    Bronchitis 12/03/2024    Depression     Drug-induced constipation 03/04/2024    Headache     MDD (major depressive disorder), recurrent episode, moderate 07/29/2021    Morbid obesity with BMI of 60.0-69.9, adult 06/10/2024    Nephrolithiasis     Ovarian cyst     Substance abuse     Hospitalization     Trauma 09/11/2019    Tympanic membrane perforation, left 03/14/2018    UTI (urinary tract infection) 08/03/2018    Vertigo        Past Surgical History:   Procedure Laterality Date     ANGIOGRAM, CORONARY, WITH LEFT HEART CATHETERIZATION  2/10/2025    Procedure: Left Heart Cath;  Surgeon: Garcia De Luna MD;  Location: [facility] CATH;  Service: Cardiovascular;;    CYSTOSCOPY W/ URETERAL STENT PLACEMENT Right 12/26/2019    Procedure: CYSTOSCOPY, WITH URETERAL STENT INSERTION;  Surgeon: Rito Medley MD;  Location: [facility] OR;  Service: Urology;  Laterality: Right;    CYSTOSCOPY W/ URETERAL STENT REMOVAL Right 01/06/2020    Procedure: CYSTOSCOPY, WITH URETERAL STENT REMOVAL;  Surgeon: Rito Medley MD;  Location: [facility] OR;  Service: Urology;  Laterality: Right;    INJECTION OF ANESTHETIC AGENT INTO TISSUE PLANE DEFINED BY TRANSVERSUS ABDOMINIS MUSCLE  1/6/2025    Procedure: BLOCK, TRANSVERSUS ABDOMINIS PLANE;  Surgeon: Bibi Edmondson MD;  Location: [facility] OR 2ND FLR;  Service: General;;    LAPAROSCOPIC CHOLECYSTECTOMY  11/05/2024    LAPAROSCOPIC GASTROENTEROSTOMY N/A 1/6/2025    Procedure: GASTROENTEROSTOMY, LAPAROSCOPIC with inraop EGD;  Surgeon: Bibi Edmondson MD;  Location: [facility] OR 2ND FLR;  Service: General;  Laterality: N/A;  Surgeon to perform Tap Block itraoperatively    LASER LITHOTRIPSY Right 01/06/2020    Procedure: LITHOTRIPSY, USING LASER;  Surgeon: Rito Medley MD;  Location: [facility] OR;  Service: Urology;  Laterality: Right;    TONSILLECTOMY, ADENOIDECTOMY, BILATERAL MYRINGOTOMY AND TUBES      URETEROSCOPIC REMOVAL OF URETERIC CALCULUS Right 01/06/2020    Procedure: REMOVAL, CALCULUS, URETER, URETEROSCOPIC;  Surgeon: Rito Medley MD;  Location: [facility] OR;  Service: Urology;  Laterality: Right;    URETEROSCOPY Left 12/26/2019    Procedure: URETEROSCOPY;  Surgeon: Rito Medley MD;  Location: [facility] OR;  Service: Urology;  Laterality: Left;    URETEROSCOPY Right 01/06/2020    Procedure: URETEROSCOPY;  Surgeon: Rito Medley MD;  Location: [facility] OR;  Service: Urology;  Laterality: Right;       Review of patient's allergies indicates:    Allergen Reactions    Ceftriaxone Nausea And Vomiting    Azithromycin Nausea And Vomiting     Hard to breathe     Latex, natural rubber Swelling and Rash       Social History     Socioeconomic History    Marital status: Single   Tobacco Use    Smoking status: Former     Current packs/day: 0.25     Average packs/day: 0.3 packs/day for 15.1 years (3.8 ttl pk-yrs)     Types: Cigarettes     Start date: 2010    Smokeless tobacco: Never    Tobacco comments:     Quit smoking June 2024    Substance and Sexual Activity    Alcohol use: Not Currently     Alcohol/week: 0.0 standard drinks of alcohol    Drug use: Not Currently     Types: Marijuana, Fentanyl     Comment: history of opoid abuse    Sexual activity: Not Currently     Partners: Male     Birth control/protection: Implant     Social Drivers of Health     Financial Resource Strain: Low Risk  (2/12/2025)    Overall Financial Resource Strain (CARDIA)     Difficulty of Paying Living Expenses: Not hard at all   Food Insecurity: No Food Insecurity (2/12/2025)    Hunger Vital Sign     Worried About Running Out of Food in the Last Year: Never true     Ran Out of Food in the Last Year: Never true   Transportation Needs: No Transportation Needs (2/8/2025)    TRANSPORTATION NEEDS     Transportation : No   Physical Activity: Unknown (8/25/2024)    Exercise Vital Sign     Days of Exercise per Week: Patient declined     Minutes of Exercise per Session: 30 min   Stress: No Stress Concern Present (2/12/2025)    [employer] - Occupational Stress Questionnaire     Feeling of Stress : Not at all   Housing Stability: Low Risk  (2/12/2025)    Housing Stability Vital Sign     Unable to Pay for Housing in the Last Year: No     Homeless in the Last Year: No       Current Outpatient Medications on File Prior to Visit   Medication Sig Dispense Refill    CALCIUM ORAL Take 1 Dose by mouth 2 (two) times a day.      etonogestreL (NEXPLANON) 68 mg Impl subdermal device  "68 mg by Subdermal route once. A single NEXPLANON implant is inserted subdermally just under the skin at the inner side of the non-dominant upper arm.      hydrOXYzine pamoate (VISTARIL) 25 MG Cap Take 25 mg by mouth daily as needed (anxiety).      multivit-min/iron/folic acid/K (BARIATRIC MULTIVITAMINS ORAL) Take 1 tablet by mouth 2 (two) times a day.      ondansetron (ZOFRAN-ODT) 8 MG TbDL Dissolve 1 tablet (8 mg total) by mouth every 6 (six) hours as needed (nausea). 30 tablet 0    venlafaxine (EFFEXOR) 37.5 MG Tab Take by mouth 2 (two) times daily.      diltiaZEM (DILACOR XR) 120 MG CDCR Take 1 capsule (120 mg total) by mouth once daily. (Patient not taking: Reported on 2/14/2025) 30 capsule 11    [DISCONTINUED] famotidine (PEPCID) 40 MG tablet Take 1 tablet (40 mg total) by mouth once daily. (Patient not taking: Reported on 2/14/2025) 30 tablet 11     No current facility-administered medications on file prior to visit.       Family History   Problem Relation Name Age of Onset    Breast cancer Maternal Aunt      Obesity Mother      No Known Problems Father      No Known Problems Brother      Colon cancer Neg Hx      Miscarriages / Stillbirths Neg Hx      Ovarian cancer Neg Hx         Review of Systems   Integumentary:  Positive for rash.   Neurological:  Positive for dizziness.         Objective:      /84   Pulse 86   Resp (!) 9   Ht 5' 3" (1.6 m)   Wt (!) 150 kg (330 lb 11 oz)   LMP 01/21/2025 (Approximate)   SpO2 96%   BMI 58.58 kg/m²   Physical Exam    Assessment:       1. Encounter to establish care with new doctor    2. Hospital discharge follow-up    3. History of melena    4. History of hemorrhoids    5. Other iron deficiency anemias    6. Substernal chest pain    7. Candidal intertrigo    8. History of opioid abuse        Plan:       Encounter to establish care with new doctor    Hospital discharge follow-up    History of melena  -     Occult blood x 1, stool; Future; Expected date: " 02/14/2025  -     Occult blood x 1, stool; Future; Expected date: 02/14/2025  -     Occult blood x 1, stool; Future; Expected date: 02/14/2025    History of hemorrhoids  -     Occult blood x 1, stool; Future; Expected date: 02/14/2025  -     Occult blood x 1, stool; Future; Expected date: 02/14/2025  -     Occult blood x 1, stool; Future; Expected date: 02/14/2025    Other iron deficiency anemias  -     Occult blood x 1, stool; Future; Expected date: 02/14/2025  -     Occult blood x 1, stool; Future; Expected date: 02/14/2025  -     Occult blood x 1, stool; Future; Expected date: 02/14/2025    Substernal chest pain  -     pantoprazole (PROTONIX) 40 MG tablet; Take 1 tablet (40 mg total) by mouth 2 (two) times daily.  Dispense: 180 tablet; Refill: 0    Candidal intertrigo  -     nystatin (MYCOSTATIN) powder; Apply topically 2 (two) times daily.  Dispense: 60 g; Refill: 1    History of opioid abuse  - Continue to abstain. Let me know if and when she would like to be referred to Addiction Medicine.      Return in one month.

## 2025-02-14 NOTE — PROGRESS NOTES
"  Alexandra Martin  29 y.o.  02/14/2025       Met with patient for 45 minutes. Pt presented virtually in her car outside of her PCP office.         Narrative: Pt stated that she was feeling " annoyed, anxious, overwhelmed and stressed out, " at start of session. Pt reported that since last session she has had multiple medical appointments and ER visits to address rapid heart rate, chest pain and numbness in her arm. Pt stated that during each visit " the doctors couldn't find anything wrong," which has caused pt to experience increased anxiey and frustration with medical professionals. Pt stated " I feel that because I am an addict, people in medicine don't take me seriously, like my pain or my issues. But also I feel like people ( medical professionals) don't take me seriously either because I'm fat!" Pt stated that she has spent a lot of time in her recovery journey learning how to self advocate regardless of frustration. " I know something is wrong so I am going to fight for myself." LCSW praised pt's ability to engage in self-advocacy.     LCSW engaged pt in a discussion around self-care activities to aid in lessening feelings of frustration and anxiety, specifically related to medical issues. Pt stated " I don't really know how to do that. Like when I was getting sober my only goals was to get sober. And then when I got sober my only goal was tog et my daughter back. Then I gained all this weight and my goals has been get to surgery." LCSW validated pt's experience and engaged pt in safety planning around self-care. Pt was open to intervention. Pt denied HI/SI at this time. Pt denied urges to use at this time. LCSW informed pt that if she experienced SI/HI to call 911 or got to local ER. Pt expressed understanding.       LCSW would like to note that pt reported that she recently began her psychotropic medications on 2/13/24. Effexor- 37. Vistaril-50    Themes   Anger, " helplessness,aggression,confusion,self-esteem,worth,fear,anxiety self care         MENTAL STATUS EXAM:  Appearance:  grooming is appropriate for session   Behavior: Cooperative, without agitaiton or psychomotor retardation.  Eye contact is good  Speech: normal rate and volume.  No pressure.  No latency.  Good prosidy.  Language: no neologisms, or errors  Mood: open   Affect: congruent with development  Thought Process:  Coherence: Yes  Logic:yes  Stream: goal-directed  Perseveration: no  Neologism:no   Blocking:no  Attention:good concentration  Thought Content:  No suicidal or homicidal ideation.  No obsessions.  No delusions.  No paranoid ideation  Perceptions:  no hallucinations, visual, auditory or tactile  Cognitive:  Oriented to person, place and time.  No difficulty with recall of recent or remote events during interactions.  Able to attend.  Concentration adequate.  Fund of knowledge is average and in keeping with educational background.    Insight:  fair  Judgment: adequate to circumstances    Gait:  normal    MEDICATIONS:      RISK PARAMETER:   Patient reports no suicidal ideation.  Patient reports no homicidal ideation.  Patient reports no self injurious behavior.  Patient reports no violent behavior.        ASSESSMENT AND GENERAL IMPRESSION:     MODALITY:LCSW utilized cognitive processing and supportive therapy. LCSW utilized introspective therapy to support pt's self-reflection. LCSW utilized reality orientation to support pt's objectivity, reframed perspectives, and thought challenging processes. LCSW utilized reality orientation and introspective therapy to support pt's objective reflection of their aforementioned events. LCSW utilized reality orientation, psychoeducation, and DBT to educate the pt on cognitive distortions, discussed their impact on personal mental health outlook, and supported the pt's objective identification/refutation of cognitive distortions. LCSW utilized CBT and psychoeducation  to support pt's understanding of behavioral change via the CBT behavioral model. LCSW utilized CBT and solution focused therapy to discuss the identification of personal locus of control and utilization of critical thinking/problem solving skills in its application. LCSW utilized psychoeducation to review pt's historical coping skills and discussed their application for further mental health progress. LCSW utilized compassion focused therapy to support pt's self-acceptance. LCSW utilized person centered and strengths based perspectives to further empower and edify pt. LCSW and pt practiced aforementioned skills in session. Pt responded appropriately to aforementioned interventions. Pt denied SI/HI/AVH.     DIAGNOSIS: Z68.43 (BMI) of 50.0-59.9 in adults   F33.1- Major Depressive Disorder, Moderate, recurring episode  F11.21-Opioid Use Disorder, Sever, in sustained remission    PLAN/E&M:   Medication Management/Testing/Labs/Imaging:      Medications were noted. Patient reports taking-  Effexor- 37. Vistaril-50    Risks and benefits of intervention  was discussed with the patient.    RECOMMENDATIONS:         Psychotherapy - supportive therapy provided during session    Follow up:  2/28/2025 Amaris Laura, EDDIE Laura LCSW-[facility]  Department of Surgery/ Bariatric Surgery [facility]

## 2025-02-14 NOTE — LETTER
February 14, 2025    Alexandra Martin  [id number] [address]   [address]  [facility] [address]             [facility] [address] [place] [address]  Phone: [phone]  Fax: [phone] To Whom This May Concern:    Ms. Martin is a patient under my care and was last seen on 2/14/2025. I reviewed hospitalization documentation from 2/7/25 through 2/12/25 and during that time the patient received morphine by intravenous route. She may participate in drug court and testing as indicated.    If you have any questions or concerns, please don't hesitate to call.    Sincerely,        Nicolette Andrade, DO

## 2025-02-20 ENCOUNTER — TELEPHONE (OUTPATIENT)
Dept: GASTROENTEROLOGY | Facility: CLINIC | Age: 30
End: 2025-02-20
Payer: MEDICARE

## 2025-02-20 ENCOUNTER — OFFICE VISIT (OUTPATIENT)
Dept: GASTROENTEROLOGY | Facility: CLINIC | Age: 30
End: 2025-02-20
Payer: MEDICARE

## 2025-02-20 VITALS — WEIGHT: 293 LBS | HEIGHT: 63 IN | BODY MASS INDEX: 51.91 KG/M2

## 2025-02-20 DIAGNOSIS — K59.00 CONSTIPATION, UNSPECIFIED CONSTIPATION TYPE: ICD-10-CM

## 2025-02-20 DIAGNOSIS — R10.30 LOWER ABDOMINAL PAIN: ICD-10-CM

## 2025-02-20 DIAGNOSIS — Z90.49 S/P CHOLECYSTECTOMY: ICD-10-CM

## 2025-02-20 DIAGNOSIS — R14.2 BELCHING: ICD-10-CM

## 2025-02-20 DIAGNOSIS — R79.89 ELEVATED LFTS: ICD-10-CM

## 2025-02-20 DIAGNOSIS — K21.9 GASTROESOPHAGEAL REFLUX DISEASE, UNSPECIFIED WHETHER ESOPHAGITIS PRESENT: ICD-10-CM

## 2025-02-20 DIAGNOSIS — Z98.84 S/P GASTRIC BYPASS: ICD-10-CM

## 2025-02-20 DIAGNOSIS — R07.89 OTHER CHEST PAIN: Primary | ICD-10-CM

## 2025-02-20 RX ORDER — SUCRALFATE 1 G/1
1 TABLET ORAL
Qty: 40 TABLET | Refills: 0 | Status: SHIPPED | OUTPATIENT
Start: 2025-02-20 | End: 2025-03-02

## 2025-02-20 RX ORDER — HYDROXYZINE PAMOATE 50 MG/1
50 CAPSULE ORAL 2 TIMES DAILY
COMMUNITY
Start: 2025-02-05

## 2025-02-20 NOTE — PROGRESS NOTES
"Subjective:       Patient ID: Alexandra Martin is a 29 y.o. female Body mass index is 56.28 kg/m².    Chief Complaint: Gastroesophageal Reflux, Abdominal Pain, Chest Pain, and Constipation    This patient is new to me.  Referring Provider: Dr. Rocio Fernandez for epigastric pain.     Reviewed hospital ER report from 02/12/25,  "Medical Decision Making  28 yo F w/GERD, rosa en y gastric bypass surgery 2/2 obesity (1/6/25), cholecystectomy, recent LHC on 2/10/25 2/2 ST changes during treadmill stress test, with clear coronaries, sent in by MD OK Cee (bariatric surgeon) for work up 2/2 high heart rate (when standing, chest/epigastric pain x 3 weeks.    Differential diagnosis includes but is not limited to: gastric ulcer, rosa en y bypass complication, coronary dissection or other complication 2/2 LHC, arrhythmia, electrolyte abnormality     D-dimer was found to be elevated at 1.21, CTA obtained with no pulmonary embolism seen.  High sensitivity troponin was less than 3, with no ischemic changes on EKG or arrhythmias.  Electrolytes including potassium were normal, slight elevation AST/ALT, seen on prior labs.  Pain is epigastric, given recent left heart catheterization with clean coronaries, CTA negative for PE, recommended follow up with Gastroenterology/ bariatric surgeon for epigastric pain after Rosa-en-Y bypass.  No dysphagia, no frequent NSAID use, is compliant with PPI, may need endoscopy for further work up of epigastric pain.  Given epigastric pain is chronic and normal labs, with normal p.o. challenge in the ER, and patient being greater than 1 month out from surgery, no indication for emergent consultation postop.     Chest x-ray independently interpreted by me with no consolidation, no pneumothorax, no pleural effusion, no free air under diaphragm.     I discussed with the patient/family the diagnosis, treatment plan, indications for return to the emergency department, as well as for expected " "follow-up. The patient/family verbalized an understanding. The patient/family is asked if there were any questions or concerns, which were addressed to patient/family satisfaction. Patient/family understands and is agreeable to the plan."    Gastroesophageal Reflux  She complains of abdominal pain (constant lower abdominal pain/ache; currently rated 5/10; worsens prior to BMs), belching (occurs when full), chest pain (constant chest pain currently rated 7/10 - started 2 weeks after gastric bypass 01/6/25; worsens with exertion; appt scheduled with cardiology), coughing (intermittent dry cough since 10/2024) and nausea (chronic). She reports no choking, no dysphagia, no early satiety, no globus sensation, no heartburn, no hoarse voice, no sore throat or no water brash. This is a chronic problem. The current episode started more than 1 year ago. The problem occurs rarely. The problem has been gradually improving. Nothing aggravates the symptoms. Associated symptoms include fatigue, melena (Occurred after bariatric surgery, then resolved) and weight loss (S/p gastric bypass 01/06/25). Pertinent negatives include no anemia. Chronic constipation - sometimes goes weeks wtihout BMs then has 3-4 in a day rated 6-7 on Tyrrell scale; intermittently notices varied amounts of BRBPR in toilet bowl, on stool, and on tissue paper with BMs; attributes rectal bleeding to hemorrhoids. Risk factors include obesity. She has tried a PPI (Recently started Protonix 40 mg b.i.d.; past treatments: Omeprazole) for the symptoms. The treatment provided significant relief. Past procedures include an abdominal ultrasound (10/30/24), an EGD (years ago) and a UGI. Past procedures do not include esophageal manometry, esophageal pH monitoring or H. pylori antibody titer. S/p cholecystectomy. Past invasive treatments do not include gastroplasty, gastroplication or reflux surgery.     Review of Systems   Constitutional:  Positive for activity change, " appetite change, fatigue and weight loss (S/p gastric bypass 01/06/25). Negative for chills, diaphoresis, fever and unexpected weight change.   HENT:  Negative for hoarse voice, sore throat and trouble swallowing.    Respiratory:  Positive for cough (intermittent dry cough since 10/2024). Negative for choking and shortness of breath.    Cardiovascular:  Positive for chest pain (constant chest pain currently rated 7/10 - started 2 weeks after gastric bypass 01/6/25; worsens with exertion; appt scheduled with cardiology).   Gastrointestinal:  Positive for abdominal pain (constant lower abdominal pain/ache; currently rated 5/10; worsens prior to BMs), anal bleeding, constipation, melena (Occurred after bariatric surgery, then resolved) and nausea (chronic). Negative for abdominal distention, blood in stool, diarrhea, dysphagia, heartburn, rectal pain and vomiting.       Patient's last menstrual period was 01/21/2025 (approximate).  Past Medical History:   Diagnosis Date    ADD (attention deficit disorder)     Anxiety disorder     Biliary dyskinesia 11/02/2024    Bronchitis 12/03/2024    Depression     Drug-induced constipation 03/04/2024    Headache     MDD (major depressive disorder), recurrent episode, moderate 07/29/2021    Morbid obesity with BMI of 60.0-69.9, adult 06/10/2024    Nephrolithiasis     Ovarian cyst     Substance abuse     Hospitalization     Trauma 09/11/2019    Tympanic membrane perforation, left 03/14/2018    UTI (urinary tract infection) 08/03/2018    Vertigo      Past Surgical History:   Procedure Laterality Date    ANGIOGRAM, CORONARY, WITH LEFT HEART CATHETERIZATION  02/10/2025    Procedure: Left Heart Cath;  Surgeon: Garcia De Luna MD;  Location: [facility] CATH;  Service: Cardiovascular;;    CHOLECYSTECTOMY      CYSTOSCOPY W/ URETERAL STENT PLACEMENT Right 12/26/2019    Procedure: CYSTOSCOPY, WITH URETERAL STENT INSERTION;  Surgeon: Rito Medley MD;  Location: [facility] OR;  Service:  Urology;  Laterality: Right;    CYSTOSCOPY W/ URETERAL STENT REMOVAL Right 01/06/2020    Procedure: CYSTOSCOPY, WITH URETERAL STENT REMOVAL;  Surgeon: Rito Medley MD;  Location: [facility] OR;  Service: Urology;  Laterality: Right;    INJECTION OF ANESTHETIC AGENT INTO TISSUE PLANE DEFINED BY TRANSVERSUS ABDOMINIS MUSCLE  01/06/2025    Procedure: BLOCK, TRANSVERSUS ABDOMINIS PLANE;  Surgeon: Bibi Edmondson MD;  Location: [facility] OR [address] [place] [address];  Service: General;;    LAPAROSCOPIC CHOLECYSTECTOMY  11/05/2024    LAPAROSCOPIC GASTROENTEROSTOMY N/A 01/06/2025    Procedure: GASTROENTEROSTOMY, LAPAROSCOPIC with inraop EGD;  Surgeon: Bibi Edmondson MD;  [facility];  Service: General;  Laterality: N/A;  Surgeon to perform Tap Block itraoperatively    LASER LITHOTRIPSY Right 01/06/2020    Procedure: LITHOTRIPSY, USING LASER;  Surgeon: Rito Medley MD;  Location: [facility] OR;  Service: Urology;  Laterality: Right;    STOMACH SURGERY  01/06/2025    TONSILLECTOMY, ADENOIDECTOMY, BILATERAL MYRINGOTOMY AND TUBES      UPPER GASTROINTESTINAL ENDOSCOPY      URETEROSCOPIC REMOVAL OF URETERIC CALCULUS Right 01/06/2020    Procedure: REMOVAL, CALCULUS, URETER, URETEROSCOPIC;  Surgeon: Rito Medley MD;  Location: [facility] OR;  Service: Urology;  Laterality: Right;    URETEROSCOPY Left 12/26/2019    Procedure: URETEROSCOPY;  Surgeon: Rito Medley MD;  Location: [facility] OR;  Service: Urology;  Laterality: Left;    URETEROSCOPY Right 01/06/2020    Procedure: URETEROSCOPY;  Surgeon: Rito Medley MD;  Location: [facility] OR;  Service: Urology;  Laterality: Right;     Family History   Problem Relation Name Age of Onset    Obesity Mother      Stomach cancer Father      Ulcerative colitis Brother      Breast cancer Maternal Aunt      Colon cancer Neg Hx      Miscarriages / Stillbirths Neg Hx      Ovarian cancer Neg Hx      Crohn's disease Neg Hx      Esophageal cancer Neg Hx       Social  History[1]  Wt Readings from Last 10 Encounters:   02/20/25 (!) 144.1 kg (317 lb 10.9 oz)   02/14/25 (!) 150 kg (330 lb 11 oz)   02/12/25 (!) 145.2 kg (320 lb)   02/08/25 (!) 145.2 kg (320 lb)   02/04/25 (!) 145.2 kg (320 lb)   01/24/25 (!) 147.4 kg (325 lb)   01/24/25 (!) 147.8 kg (325 lb 13.4 oz)   01/17/25 (!) 148.3 kg (326 lb 15.1 oz)   01/10/25 (!) 156.4 kg (344 lb 12.8 oz)   01/06/25 (!) 155.2 kg (342 lb 2.5 oz)     Lab Results   Component Value Date    WBC 4.44 02/12/2025    HGB 12.6 02/12/2025    HCT 40.2 02/12/2025    MCV 85 02/12/2025     02/12/2025     CMP  Sodium   Date Value Ref Range Status   02/12/2025 141 136 - 145 mmol/L Final     Potassium   Date Value Ref Range Status   02/12/2025 3.7 3.5 - 5.1 mmol/L Final     Chloride   Date Value Ref Range Status   02/12/2025 110 95 - 110 mmol/L Final     CO2   Date Value Ref Range Status   02/12/2025 22 (L) 23 - 29 mmol/L Final     Glucose   Date Value Ref Range Status   02/12/2025 107 70 - 110 mg/dL Final     BUN   Date Value Ref Range Status   02/12/2025 5 (L) 6 - 20 mg/dL Final     Creatinine   Date Value Ref Range Status   02/12/2025 0.8 0.5 - 1.4 mg/dL Final     Calcium   Date Value Ref Range Status   02/12/2025 9.0 8.7 - 10.5 mg/dL Final     Total Protein   Date Value Ref Range Status   02/12/2025 6.1 6.0 - 8.4 g/dL Final     Albumin   Date Value Ref Range Status   02/12/2025 3.2 (L) 3.5 - 5.2 g/dL Final     Total Bilirubin   Date Value Ref Range Status   02/12/2025 0.7 0.1 - 1.0 mg/dL Final     Comment:     For infants and newborns, interpretation of results should be based  on gestational age, weight and in agreement with clinical  observations.    Premature Infant recommended reference ranges:  Up to 24 hours.............<8.0 mg/dL  Up to 48 hours............<12.0 mg/dL  3-5 days..................<15.0 mg/dL  6-29 days.................<15.0 mg/dL       Alkaline Phosphatase   Date Value Ref Range Status   02/12/2025 94 40 - 150 U/L Final     AST    Date Value Ref Range Status   02/12/2025 49 (H) 10 - 40 U/L Final     ALT   Date Value Ref Range Status   02/12/2025 78 (H) 10 - 44 U/L Final     Anion Gap   Date Value Ref Range Status   02/12/2025 9 8 - 16 mmol/L Final     eGFR if    Date Value Ref Range Status   08/04/2021 >60.0 >60 mL/min/1.73 m^2 Final     eGFR    Date Value Ref Range Status   11/02/2024 90 mL/min/1.73mSq Final     Comment:     In accordance with NKF-ASN Task Force recommendation, calculation based on the Chronic Kidney Disease Epidemiology Collaboration (CKD-EPI) equation without adjustment for race. eGFR adjusted for gender and age and calculated in ml/min/1.73mSquared. eGFR cannot be calculated if patient is under 18 years of age.     Reference Range:   >= 60 ml/min/1.73mSquared.     eGFR if non    Date Value Ref Range Status   08/04/2021 >60.0 >60 mL/min/1.73 m^2 Final     Comment:     Calculation used to obtain the estimated glomerular filtration  rate (eGFR) is the CKD-EPI equation.        Lab Results   Component Value Date    AMYLASE 41 05/08/2017     Lab Results   Component Value Date    LIPASE 13 10/30/2024     Lab Results   Component Value Date    LIPASERES 11 01/10/2025     Lab Results   Component Value Date    TSH 2.421 02/07/2025     Reviewed prior medical records including radiology report of CT abdomen and pelvis 01/10/2025, HIDA scan 10/31/2024, abdominal ultrasound 10/30/2024, CT abdomen and pelvis 10/13/2024, upper GI 08/27/2024 & endoscopy history (see surgical history).    Objective:      Physical Exam  Vitals and nursing note reviewed.   Constitutional:       General: She is not in acute distress.     Appearance: Normal appearance. She is obese. She is not ill-appearing.   HENT:      Mouth/Throat:      Lips: Pink. No lesions.   Cardiovascular:      Pulses: Normal pulses.      Heart sounds: Normal heart sounds.   Pulmonary:      Effort: Pulmonary effort is normal. No  respiratory distress.      Breath sounds: Normal breath sounds.   Abdominal:      General: Abdomen is protuberant. Bowel sounds are normal. There is no distension or abdominal bruit. There are no signs of injury.      Palpations: Abdomen is soft. There is no shifting dullness, fluid wave, hepatomegaly, splenomegaly or mass.      Tenderness: There is abdominal tenderness in the right lower quadrant, suprapubic area and left lower quadrant. There is no guarding or rebound. Negative signs include Solis's sign, Rovsing's sign and McBurney's sign.   Skin:     General: Skin is warm and dry.      Coloration: Skin is not jaundiced or pale.   Neurological:      Mental Status: She is alert and oriented to person, place, and time.   Psychiatric:         Attention and Perception: Attention normal.         Mood and Affect: Mood normal.         Speech: Speech normal.         Behavior: Behavior normal.         Assessment:       1. Other chest pain    2. Gastroesophageal reflux disease, unspecified whether esophagitis present    3. Belching    4. Constipation, unspecified constipation type    5. Lower abdominal pain    6. S/P gastric bypass    7. Elevated LFTs    8. S/P cholecystectomy        Plan:       Obtain cardiac clearance prior to scheduling scopes.    Other chest pain  - schedule EGD, discussed procedure with patient, including risks and benefits, patient verbalized understanding  -Avoid/limit use of NSAID's, since they can cause GI upset, bleeding, and/or ulcers. If needed, take with food.  -continue pantoprazole 40 mg b.i.d. for now  -START: sucralfate (CARAFATE) 1 gram tablet; Take 1 tablet (1 g total) by mouth 4 (four) times daily before meals and nightly. for 10 days  Dispense: 40 tablet; Refill: 0  - continue with cardiology appointment  - if pain worsens or does not improve go to the ER    Gastroesophageal reflux disease, unspecified whether esophagitis present & Belching  - schedule EGD, discussed procedure with  patient, including risks and benefits, patient verbalized understanding  -discussed about the different types of medications used to treat reflux and how to use them, antacids can be used PRN for breakthrough heartburn symptoms by reducing stomach acid that is already produced, H2 blockers work by limiting the amount acid production, & PPI's work to block acid production and are taken daily, patient verbalized understanding.  -Avoid large meals, avoid eating within 2-3 hours of bedtime (avoid late night eating & lying down soon after eating), elevate head of bed if nocturnal symptoms are present, smoking cessation (if current smoker), & weight loss (if overweight).   -Avoid known foods which trigger reflux symptoms & to minimize/avoid high-fat foods, chocolate, caffeine, citrus, alcohol, & tomato products.  -Avoid/limit use of NSAID's, since they can cause GI upset, bleeding, and/or ulcers. If needed, take with food.  -continue pantoprazole 40 mg b.i.d. for now  -START: sucralfate (CARAFATE) 1 gram tablet; Take 1 tablet (1 g total) by mouth 4 (four) times daily before meals and nightly. for 10 days  Dispense: 40 tablet; Refill: 0    Constipation, unspecified constipation type  - schedule Colonoscopy, discussed procedure with the patient, including risks and benefits, patient verbalized understanding  Recommend daily exercise as tolerated, adequate water intake (six 8-oz glasses of water daily), and high fiber diet. OTC fiber supplements are recommended if diet does not reach daily fiber goal (20-30 grams daily), such as Metamucil, Citrucel, or FiberCon (take as directed, separate from other oral medications by >2 hours).  -START: linaCLOtide (LINZESS) 145 mcg Cap capsule; Take 1 capsule (145 mcg total) by mouth before breakfast.  Dispense: 90 capsule; Refill: 0    Lower abdominal pain  - schedule Colonoscopy, discussed procedure with the patient, including risks and benefits, patient verbalized understanding    S/P  gastric bypass  - schedule EGD, discussed procedure with patient, including risks and benefits, patient verbalized understanding  - follow-up with bariatric surgeon for continued evaluation and management    Elevated LFTs  -recommend: low fat, low cholesterol diet, maintain good control of blood sugars and cholesterol levels, exercise, weight loss (if overweight), minimize/avoid alcohol and tylenol products, & follow-up with PCP for continued evaluation and management; if specialist is needed, recommend seeing hepatology.  -     Hepatic Function Panel; Future; Expected date: 02/20/2025    S/P cholecystectomy    Follow up in about 4 weeks (around 3/20/2025), or if symptoms worsen or fail to improve.      If no improvement in symptoms or symptoms worsen, call/follow-up at clinic or go to ER.        Total time spent on the encounter includes face to face time and non-face to face time preparing to see the patient (eg, review of tests), Obtaining and/or reviewing separately obtained history, Documenting clinical information in the electronic or other health record, Independently interpreting results (not separately reported) and communicating results to the patient/family/caregiver, or Care coordination (not separately reported).     A dictation software program was used for this note. Please expect some simple typographical  errors in this note.         [1]   Social History  Tobacco Use    Smoking status: Former     Current packs/day: 0.25     Average packs/day: 0.3 packs/day for 15.1 years (3.8 ttl pk-yrs)     Types: Cigarettes     Start date: 2010    Smokeless tobacco: Never    Tobacco comments:     Quit smoking June 2024    Substance Use Topics    Alcohol use: Not Currently     Alcohol/week: 0.0 standard drinks of alcohol    Drug use: Not Currently     Types: Marijuana, Fentanyl     Comment: history of opoid abuse

## 2025-02-20 NOTE — PATIENT INSTRUCTIONS
-Avoid large meals, avoid eating within 2-3 hours of bedtime (avoid late night eating & lying down soon after eating), elevate head of bed if nocturnal symptoms are present, smoking cessation (if current smoker), & weight loss (if overweight).   -Avoid known foods which trigger reflux symptoms & to minimize/avoid high-fat foods, chocolate, caffeine, citrus, alcohol, & tomato products.  -Avoid/limit use of NSAID's, since they can cause GI upset, bleeding, and/or ulcers. If needed, take with food.

## 2025-02-20 NOTE — TELEPHONE ENCOUNTER
Danny Hameed... We are reaching out in regard to this mutual pt- Alexandra Martin MRN: [id number]. JESSA Carrera here at the [facility] in [place] is wanting to order an EGD and colon on this pt but want to have her cleared by you first.   Please advise if medically this pt can be cleared. Once cleared we will set her an appt for her procedures with one of our providers.    Thank you

## 2025-02-21 NOTE — TELEPHONE ENCOUNTER
Debi Salazar NP to Marie HARE Staff        2/20/25  4:39 PM  Cleared by cardiologist. Please schedule scopes at [facility].  Sincerely,  JESSA Jones Jasmine, LPN to Debi Salazar NP JW      2/20/25  3:45 PM  Please review msg and advise if you would like for us to move fwd with scheduling this pt for an EGD and colon  Rita Martin LPN to Gerber Hameed MD JW      2/20/25  3:44 PM  Thank you, I'll fwd this to JESSA Carrera at this time.  Kind regards,   Gerber Hameed MD to Rita Martin LPN        2/20/25  3:42 PM  She recently had cardiac catheterization which showed normal coronary arteries and echocardiogram which showed normal cardiac structure and function, so no contraindication from my standpoint.     Gerber Hameed MD  [facility]

## 2025-02-24 ENCOUNTER — TELEPHONE (OUTPATIENT)
Dept: BARIATRICS | Facility: CLINIC | Age: 30
End: 2025-02-24
Payer: MEDICARE

## 2025-02-24 NOTE — TELEPHONE ENCOUNTER
----- Message from Beth sent at 2/24/2025 10:16 AM CST -----  Regarding: Consult/Advisory  Contact: [phone]  Consult/AdvisoryName Of Caller:Cecilia Preference: [phone] of call: Pt has been experiencing Heart palpitations, light headedness , high blood pressure,chest pressure, confusion, and unbalance when standing which started a week after surgery , which she expressed to  at her two week follow up ,  referred her to Internal medicine providers, then the Internal medicine referred her to the ER and then the ER referred her to a Cardiologist (stress test was performed, EKG was abnormal) sent pt to ER and she was admitted for 5 days (angiogram and US of heart was performed, pt was later sent home) pt called 02/12 and Matilde Gibbons RN told her go to [facility] and the ER told pt she had acid reflux and referred her to Gastroenterology and Gastro told pt her heart palpitations are not G.I problems and referred her back to ER and told pt to see her Cardiologist .Pt does not want to go back to ER, pt states she knows something is wrong and feels she's is being dismissed at the ER but her symptoms are worsening , within last week the pt has been experiencing numbness on left side of face,stomach and leg, blurry vision, and headaches. Pt would like to speak with provider about a pan of care Please Call to Advise,Thank you.

## 2025-02-24 NOTE — TELEPHONE ENCOUNTER
Discussed with Josi Ybarra NP who stated that Bariatrics cannot assist at this standpoint.  GI is currently caring for the reflux. Referred to Amaris Laura.

## 2025-02-27 ENCOUNTER — PATIENT MESSAGE (OUTPATIENT)
Dept: FAMILY MEDICINE | Facility: CLINIC | Age: 30
End: 2025-02-27
Payer: MEDICARE

## 2025-02-28 ENCOUNTER — OFFICE VISIT (OUTPATIENT)
Dept: BARIATRICS | Facility: CLINIC | Age: 30
End: 2025-02-28
Payer: MEDICARE

## 2025-02-28 DIAGNOSIS — F33.1 MODERATE EPISODE OF RECURRENT MAJOR DEPRESSIVE DISORDER: ICD-10-CM

## 2025-02-28 DIAGNOSIS — E66.01 MORBID OBESITY WITH BMI OF 50.0-59.9, ADULT: Primary | ICD-10-CM

## 2025-02-28 DIAGNOSIS — F11.21: ICD-10-CM

## 2025-02-28 NOTE — PROGRESS NOTES
"  Alexandra Martin  29 y.o.  02/28/2025       Met with patient for 50 minutes. Pt presented for her virtual appointment via her bedroom at her home.         Narrative:Pt began session expressing frustration with her cardiologist. Pt stated she is still experiencing mild chest pain, rapid heart rate and tingling in her feet. Pt stated that she felt the cardiologist was not listening to her. " He kept saying that I needed to get surgery and I kept saying I already did. It was like he couldn't remember." Pt stated she is a heart monitor and has a follow up soon. She also stated she has a neurology appointment as well. LCSW asked pt is current symptoms were similar to past panic attack symptoms. Pt denied similarity. LCSW encouraged pt to self-advocate for her medical care.     Pt also expressed frustration because she feels she cannot be as physical as she'd like. "I am getting really bored. I want to go find a job and get back to my life." Pt stated that she has been thinking on going back to school for mortuary science. Pt stated she needs to take a math course before applying, but is hesitant to do so because her family has stated "I shouldn't do that for a job."  LCSW and pt discussed goals and how to build boundaries. LCSW encouraged pt to do research on her math course prior to next session.            Themes   Family relationships, self-esteem worth, anxiety, self care         MENTAL STATUS EXAM:  Appearance:  grooming is appropriate for session   Behavior: Cooperative, without agitaiton or psychomotor retardation.  Eye contact is good  Speech: normal rate and volume.  No pressure.  No latency.  Good prosidy.  Language: no neologisms, or errors  Mood: open   Affect: congruent with development  Thought Process:  Coherence: Yes  Logic:yes  Stream: goal-directed  Perseveration: no  Neologism:no   Blocking:no  Attention:good concentration  Thought Content:  No suicidal or homicidal ideation.  No obsessions. " " No delusions.  No paranoid ideation  Perceptions:  no hallucinations, visual, auditory or tactile  Cognitive:  Oriented to person, place and time.  No difficulty with recall of recent or remote events during interactions.  Able to attend.  Concentration adequate.  Fund of knowledge is average and in keeping with educational background.    Insight:  fair  Judgment: adequate to circumstances    Gait:  normal    MEDICATIONS:      RISK PARAMETER:   Patient reports no suicidal ideation.  Patient reports no homicidal ideation.  Patient reports no self injurious behavior.  Patient reports no violent behavior.        ASSESSMENT AND GENERAL IMPRESSION:       MODALITY: " LCSW utilized cognitive processing and supportive therapy. LCSW utilized introspective therapy to support pt's self-reflection. LCSW utilized reality orientation to support pt's objectivity, reframed perspectives, and thought challenging processes. LCSW utilized reality orientation and introspective therapy to support pt's objective reflection of their aforementioned events. LCSW utilized CBT and psychoeducation to support pt's understanding of behavioral change via the CBT behavioral model. LCSW utilized CBT and solution focused therapy to discuss the identification of personal locus of control and utilization of critical thinking/problem solving skills in its application. LCSW utilized interpersonal therapy to educate the pt's on communication of personal needs/thoughts and discussed boundary setting as a supportive application skill. . LCSW utilized person centered and strengths based perspectives to further empower and edify pt. LCSW and pt practiced aforementioned skills in session. Pt responded appropriately to aforementioned interventions. Pt denied SI/HI/AVH.     DIAGNOSIS:  Z68.43 (BMI) of 50.0-59.9 in adults   F33.1- Major Depressive Disorder, Moderate, recurring episode  F11.21-Opioid Use Disorder, Sever, in sustained remission    PLAN/E&M: "   Medication Management/Testing/Labs/Imaging:      Medications were noted. Patient reports taking Effexor- 37. Vistaril-50     Risks and benefits of intervention  was discussed with the patient.    RECOMMENDATIONS:         Psychotherapy - supportive therapy provided during session    Follow up:  3/6/2025 Elizabeth Mann, RD   3/14/25 Rajwinder, [facility]    Amaris Laura, [facility]  Department of Surgery/ Bariatric Surgery [facility]

## 2025-03-06 ENCOUNTER — OFFICE VISIT (OUTPATIENT)
Dept: FAMILY MEDICINE | Facility: CLINIC | Age: 30
End: 2025-03-06
Payer: MEDICARE

## 2025-03-06 ENCOUNTER — CLINICAL SUPPORT (OUTPATIENT)
Dept: BARIATRICS | Facility: CLINIC | Age: 30
End: 2025-03-06
Payer: MEDICARE

## 2025-03-06 ENCOUNTER — LAB VISIT (OUTPATIENT)
Dept: LAB | Facility: HOSPITAL | Age: 30
End: 2025-03-06
Payer: MEDICARE

## 2025-03-06 ENCOUNTER — INFUSION (OUTPATIENT)
Dept: INFECTIOUS DISEASES | Facility: HOSPITAL | Age: 30
End: 2025-03-06
Payer: MEDICARE

## 2025-03-06 ENCOUNTER — TELEPHONE (OUTPATIENT)
Dept: FAMILY MEDICINE | Facility: CLINIC | Age: 30
End: 2025-03-06
Payer: MEDICARE

## 2025-03-06 ENCOUNTER — OFFICE VISIT (OUTPATIENT)
Dept: BARIATRICS | Facility: CLINIC | Age: 30
End: 2025-03-06
Payer: MEDICARE

## 2025-03-06 ENCOUNTER — RESULTS FOLLOW-UP (OUTPATIENT)
Dept: BARIATRICS | Facility: CLINIC | Age: 30
End: 2025-03-06

## 2025-03-06 VITALS
TEMPERATURE: 98 F | OXYGEN SATURATION: 98 % | BODY MASS INDEX: 51.91 KG/M2 | HEIGHT: 63 IN | WEIGHT: 293 LBS | HEART RATE: 72 BPM | SYSTOLIC BLOOD PRESSURE: 124 MMHG | DIASTOLIC BLOOD PRESSURE: 78 MMHG

## 2025-03-06 VITALS — WEIGHT: 293 LBS | BODY MASS INDEX: 55.45 KG/M2

## 2025-03-06 VITALS
HEIGHT: 63 IN | WEIGHT: 293 LBS | TEMPERATURE: 98 F | RESPIRATION RATE: 18 BRPM | SYSTOLIC BLOOD PRESSURE: 131 MMHG | BODY MASS INDEX: 51.91 KG/M2 | HEART RATE: 71 BPM | DIASTOLIC BLOOD PRESSURE: 63 MMHG | OXYGEN SATURATION: 97 %

## 2025-03-06 DIAGNOSIS — R11.2 NAUSEA AND VOMITING, UNSPECIFIED VOMITING TYPE: ICD-10-CM

## 2025-03-06 DIAGNOSIS — Z91.89 AT RISK FOR POLYPHARMACY: ICD-10-CM

## 2025-03-06 DIAGNOSIS — E66.01 MORBID OBESITY WITH BMI OF 50.0-59.9, ADULT: ICD-10-CM

## 2025-03-06 DIAGNOSIS — R00.0 TACHYCARDIA: Primary | ICD-10-CM

## 2025-03-06 DIAGNOSIS — Z79.899 LONG-TERM USE OF HIGH-RISK MEDICATION: ICD-10-CM

## 2025-03-06 DIAGNOSIS — Z98.84 S/P BARIATRIC SURGERY: ICD-10-CM

## 2025-03-06 DIAGNOSIS — R20.2 PARESTHESIA: ICD-10-CM

## 2025-03-06 DIAGNOSIS — R30.0 DYSURIA: ICD-10-CM

## 2025-03-06 DIAGNOSIS — G43.E09 CHRONIC MIGRAINE WITH AURA WITHOUT STATUS MIGRAINOSUS, NOT INTRACTABLE: ICD-10-CM

## 2025-03-06 DIAGNOSIS — R10.9 ABDOMINAL PAIN, UNSPECIFIED ABDOMINAL LOCATION: ICD-10-CM

## 2025-03-06 DIAGNOSIS — R11.2 NAUSEA AND VOMITING, UNSPECIFIED VOMITING TYPE: Primary | ICD-10-CM

## 2025-03-06 DIAGNOSIS — R00.0 TACHYCARDIA: ICD-10-CM

## 2025-03-06 DIAGNOSIS — E66.01 MORBID OBESITY: Primary | ICD-10-CM

## 2025-03-06 DIAGNOSIS — R79.89 LOW VITAMIN D LEVEL: Primary | ICD-10-CM

## 2025-03-06 DIAGNOSIS — Z71.3 DIETARY COUNSELING AND SURVEILLANCE: Primary | ICD-10-CM

## 2025-03-06 DIAGNOSIS — E86.0 DEHYDRATION: ICD-10-CM

## 2025-03-06 DIAGNOSIS — R07.2 SUBSTERNAL CHEST PAIN: ICD-10-CM

## 2025-03-06 LAB
25(OH)D3+25(OH)D2 SERPL-MCNC: 10 NG/ML (ref 30–96)
ALBUMIN SERPL BCP-MCNC: 3.7 G/DL (ref 3.5–5.2)
ALP SERPL-CCNC: 104 U/L (ref 40–150)
ALT SERPL W/O P-5'-P-CCNC: 83 U/L (ref 10–44)
ANION GAP SERPL CALC-SCNC: 9 MMOL/L (ref 8–16)
AST SERPL-CCNC: 62 U/L (ref 10–40)
BASOPHILS # BLD AUTO: 0.02 K/UL (ref 0–0.2)
BASOPHILS NFR BLD: 0.3 % (ref 0–1.9)
BILIRUB SERPL-MCNC: 1 MG/DL (ref 0.1–1)
BUN SERPL-MCNC: 8 MG/DL (ref 6–20)
CALCIUM SERPL-MCNC: 9.5 MG/DL (ref 8.7–10.5)
CHLORIDE SERPL-SCNC: 108 MMOL/L (ref 95–110)
CHOLEST SERPL-MCNC: 181 MG/DL (ref 120–199)
CHOLEST/HDLC SERPL: 5.8 {RATIO} (ref 2–5)
CO2 SERPL-SCNC: 24 MMOL/L (ref 23–29)
CREAT SERPL-MCNC: 0.7 MG/DL (ref 0.5–1.4)
DIFFERENTIAL METHOD BLD: ABNORMAL
EOSINOPHIL # BLD AUTO: 0.1 K/UL (ref 0–0.5)
EOSINOPHIL NFR BLD: 1.8 % (ref 0–8)
ERYTHROCYTE [DISTWIDTH] IN BLOOD BY AUTOMATED COUNT: 14.6 % (ref 11.5–14.5)
EST. GFR  (NO RACE VARIABLE): >60 ML/MIN/1.73 M^2
GLUCOSE SERPL-MCNC: 98 MG/DL (ref 70–110)
HCT VFR BLD AUTO: 44.5 % (ref 37–48.5)
HDLC SERPL-MCNC: 31 MG/DL (ref 40–75)
HDLC SERPL: 17.1 % (ref 20–50)
HGB BLD-MCNC: 13.9 G/DL (ref 12–16)
IMM GRANULOCYTES # BLD AUTO: 0.02 K/UL (ref 0–0.04)
IMM GRANULOCYTES NFR BLD AUTO: 0.3 % (ref 0–0.5)
IRON SERPL-MCNC: 79 UG/DL (ref 30–160)
LDLC SERPL CALC-MCNC: 107.8 MG/DL (ref 63–159)
LYMPHOCYTES # BLD AUTO: 1.9 K/UL (ref 1–4.8)
LYMPHOCYTES NFR BLD: 28.4 % (ref 18–48)
MCH RBC QN AUTO: 26.5 PG (ref 27–31)
MCHC RBC AUTO-ENTMCNC: 31.2 G/DL (ref 32–36)
MCV RBC AUTO: 85 FL (ref 82–98)
MONOCYTES # BLD AUTO: 0.4 K/UL (ref 0.3–1)
MONOCYTES NFR BLD: 6.4 % (ref 4–15)
NEUTROPHILS # BLD AUTO: 4.1 K/UL (ref 1.8–7.7)
NEUTROPHILS NFR BLD: 62.8 % (ref 38–73)
NONHDLC SERPL-MCNC: 150 MG/DL
NRBC BLD-RTO: 0 /100 WBC
PLATELET # BLD AUTO: 190 K/UL (ref 150–450)
PMV BLD AUTO: 11 FL (ref 9.2–12.9)
POTASSIUM SERPL-SCNC: 3.9 MMOL/L (ref 3.5–5.1)
PROT SERPL-MCNC: 7 G/DL (ref 6–8.4)
RBC # BLD AUTO: 5.24 M/UL (ref 4–5.4)
SATURATED IRON: 17 % (ref 20–50)
SODIUM SERPL-SCNC: 141 MMOL/L (ref 136–145)
TOTAL IRON BINDING CAPACITY: 471 UG/DL (ref 250–450)
TRANSFERRIN SERPL-MCNC: 318 MG/DL (ref 200–375)
TRIGL SERPL-MCNC: 211 MG/DL (ref 30–150)
VIT B12 SERPL-MCNC: 308 PG/ML (ref 210–950)
WBC # BLD AUTO: 6.58 K/UL (ref 3.9–12.7)

## 2025-03-06 PROCEDURE — 25000003 PHARM REV CODE 250: Performed by: PHYSICIAN ASSISTANT

## 2025-03-06 PROCEDURE — 1159F MED LIST DOCD IN RCRD: CPT | Mod: CPTII,95,, | Performed by: STUDENT IN AN ORGANIZED HEALTH CARE EDUCATION/TRAINING PROGRAM

## 2025-03-06 PROCEDURE — 99999 PR PBB SHADOW E&M-EST. PATIENT-LVL III: CPT | Mod: PBBFAC,,, | Performed by: PHYSICIAN ASSISTANT

## 2025-03-06 PROCEDURE — 84466 ASSAY OF TRANSFERRIN: CPT | Performed by: NURSE PRACTITIONER

## 2025-03-06 PROCEDURE — 85025 COMPLETE CBC W/AUTO DIFF WBC: CPT | Performed by: NURSE PRACTITIONER

## 2025-03-06 PROCEDURE — 80061 LIPID PANEL: CPT | Performed by: NURSE PRACTITIONER

## 2025-03-06 PROCEDURE — 3078F DIAST BP <80 MM HG: CPT | Mod: CPTII,S$GLB,, | Performed by: PHYSICIAN ASSISTANT

## 2025-03-06 PROCEDURE — 96374 THER/PROPH/DIAG INJ IV PUSH: CPT

## 2025-03-06 PROCEDURE — 84425 ASSAY OF VITAMIN B-1: CPT | Performed by: NURSE PRACTITIONER

## 2025-03-06 PROCEDURE — 63600175 PHARM REV CODE 636 W HCPCS: Performed by: PHYSICIAN ASSISTANT

## 2025-03-06 PROCEDURE — 1160F RVW MEDS BY RX/DR IN RCRD: CPT | Mod: CPTII,95,, | Performed by: STUDENT IN AN ORGANIZED HEALTH CARE EDUCATION/TRAINING PROGRAM

## 2025-03-06 PROCEDURE — 82607 VITAMIN B-12: CPT | Performed by: NURSE PRACTITIONER

## 2025-03-06 PROCEDURE — 98004 SYNCH AUDIO-VIDEO EST SF 10: CPT | Mod: 95,,, | Performed by: STUDENT IN AN ORGANIZED HEALTH CARE EDUCATION/TRAINING PROGRAM

## 2025-03-06 PROCEDURE — 80053 COMPREHEN METABOLIC PANEL: CPT | Performed by: NURSE PRACTITIONER

## 2025-03-06 PROCEDURE — 3074F SYST BP LT 130 MM HG: CPT | Mod: CPTII,S$GLB,, | Performed by: PHYSICIAN ASSISTANT

## 2025-03-06 PROCEDURE — 96360 HYDRATION IV INFUSION INIT: CPT

## 2025-03-06 PROCEDURE — 99499 UNLISTED E&M SERVICE: CPT | Mod: 95,,, | Performed by: DIETITIAN, REGISTERED

## 2025-03-06 PROCEDURE — 82306 VITAMIN D 25 HYDROXY: CPT | Performed by: NURSE PRACTITIONER

## 2025-03-06 PROCEDURE — 99024 POSTOP FOLLOW-UP VISIT: CPT | Mod: S$GLB,,, | Performed by: PHYSICIAN ASSISTANT

## 2025-03-06 RX ORDER — PROMETHAZINE HYDROCHLORIDE 25 MG/1
25 TABLET ORAL 3 TIMES DAILY PRN
Qty: 28 TABLET | Refills: 0 | Status: SHIPPED | OUTPATIENT
Start: 2025-03-06

## 2025-03-06 RX ORDER — SODIUM CHLORIDE 0.9 % (FLUSH) 0.9 %
10 SYRINGE (ML) INJECTION
OUTPATIENT
Start: 2025-03-10

## 2025-03-06 RX ORDER — TRAMADOL HYDROCHLORIDE 50 MG/1
50 TABLET ORAL EVERY 6 HOURS
Qty: 15 EACH | Refills: 0 | Status: SHIPPED | OUTPATIENT
Start: 2025-03-06 | End: 2025-03-06 | Stop reason: CLARIF

## 2025-03-06 RX ORDER — LINACLOTIDE 145 UG/1
145 CAPSULE, GELATIN COATED ORAL EVERY MORNING
COMMUNITY
Start: 2025-02-28

## 2025-03-06 RX ORDER — ERGOCALCIFEROL 1.25 MG/1
50000 CAPSULE ORAL
Qty: 24 CAPSULE | Refills: 0 | Status: ON HOLD | OUTPATIENT
Start: 2025-03-06 | End: 2025-04-04 | Stop reason: HOSPADM

## 2025-03-06 RX ORDER — SODIUM CHLORIDE 0.9 % (FLUSH) 0.9 %
10 SYRINGE (ML) INJECTION
Status: CANCELLED | OUTPATIENT
Start: 2025-03-06

## 2025-03-06 RX ORDER — SODIUM CHLORIDE 0.9 % (FLUSH) 0.9 %
10 SYRINGE (ML) INJECTION
Status: DISCONTINUED | OUTPATIENT
Start: 2025-03-06 | End: 2025-03-06 | Stop reason: HOSPADM

## 2025-03-06 RX ORDER — ONDANSETRON HCL IN 0.9 % NACL 8 MG/50 ML
8 INTRAVENOUS SOLUTION, PIGGYBACK (ML) INTRAVENOUS
Status: CANCELLED
Start: 2025-03-10 | End: 2025-03-10

## 2025-03-06 RX ORDER — METOCLOPRAMIDE 5 MG/1
TABLET ORAL
Qty: 70 TABLET | Refills: 1 | Status: SHIPPED | OUTPATIENT
Start: 2025-03-06 | End: 2025-03-06

## 2025-03-06 RX ORDER — HEPARIN 100 UNIT/ML
500 SYRINGE INTRAVENOUS
OUTPATIENT
Start: 2025-03-10

## 2025-03-06 RX ORDER — ONDANSETRON HYDROCHLORIDE 2 MG/ML
8 INJECTION, SOLUTION INTRAVENOUS
Status: CANCELLED
Start: 2025-03-06

## 2025-03-06 RX ORDER — HEPARIN 100 UNIT/ML
500 SYRINGE INTRAVENOUS
Status: CANCELLED | OUTPATIENT
Start: 2025-03-06

## 2025-03-06 RX ORDER — ONDANSETRON HCL IN 0.9 % NACL 8 MG/50 ML
8 INTRAVENOUS SOLUTION, PIGGYBACK (ML) INTRAVENOUS
Status: DISCONTINUED | OUTPATIENT
Start: 2025-03-06 | End: 2025-03-06

## 2025-03-06 RX ORDER — ONDANSETRON HYDROCHLORIDE 2 MG/ML
8 INJECTION, SOLUTION INTRAVENOUS ONCE
Status: COMPLETED | OUTPATIENT
Start: 2025-03-06 | End: 2025-03-06

## 2025-03-06 RX ORDER — ONDANSETRON HYDROCHLORIDE 2 MG/ML
8 INJECTION, SOLUTION INTRAVENOUS
Start: 2025-03-10

## 2025-03-06 RX ORDER — PANTOPRAZOLE SODIUM 40 MG/1
40 TABLET, DELAYED RELEASE ORAL 2 TIMES DAILY
COMMUNITY

## 2025-03-06 RX ORDER — HEPARIN 100 UNIT/ML
500 SYRINGE INTRAVENOUS
Status: DISCONTINUED | OUTPATIENT
Start: 2025-03-06 | End: 2025-03-06 | Stop reason: HOSPADM

## 2025-03-06 RX ORDER — ONDANSETRON HCL IN 0.9 % NACL 8 MG/50 ML
8 INTRAVENOUS SOLUTION, PIGGYBACK (ML) INTRAVENOUS
Status: CANCELLED
Start: 2025-03-06

## 2025-03-06 RX ADMIN — ONDANSETRON 8 MG: 2 INJECTION INTRAMUSCULAR; INTRAVENOUS at 11:03

## 2025-03-06 RX ADMIN — SODIUM CHLORIDE 1000 ML: 9 INJECTION, SOLUTION INTRAVENOUS at 11:03

## 2025-03-06 NOTE — PROGRESS NOTES
BARIATRIC POST-OPERATIVE VISIT:    HPI:  Alexandra Martin is a 29 y.o. year old female presents for 8 week post op visit following LRNY.  she is doing well and tolerating the diet without difficulty.  she has no complaints.    Pt states that she is nauseated all the time worse when she stands  States that she vomits intermittently   Zofran does not work   Abdominal pain on RUQ and goes to her back.   Does not have a GB   Decreased urination per pt (goes around one time a day )  Takes able to take in fluids but no enough   Vomits without having anything   Currently has a holter monitor on states new symptoms of facial and leg numbness     Has EGD scheduled for 3/11    Denies:  abdominal pain, changes in bowel movement pattern, fever, chills, dysphagia, chest pain, and shortness of breath.    Review of Systems   Constitutional:  Negative for activity change and fatigue.   Respiratory:  Negative for cough and shortness of breath.    Cardiovascular:  Negative for chest pain, palpitations and leg swelling.   Gastrointestinal:  Negative for abdominal pain, nausea and vomiting.   Endocrine: Negative for polydipsia, polyphagia and polyuria.   Genitourinary:  Negative for dysuria.   Musculoskeletal:  Negative for gait problem.   Skin:  Negative for rash.   Allergic/Immunologic: Negative for immunocompromised state.   Neurological:  Negative for dizziness, syncope and weakness.   Hematological:  Does not bruise/bleed easily.   Psychiatric/Behavioral:  Negative for behavioral problems.        EXERCISE & VITAMINS:  See Bariatric Assessment    MEDICATIONS/ALLERGIES:  Have been reviewed.    DIET: Liquid Bariatric Diet.  1.5 protein shakes daily, ~45 grams protein.  60 fl oz SF clear beverage.  See Dietician note from today for a more detailed assessment.      Physical Exam  Vitals and nursing note reviewed.   Constitutional:       Appearance: She is well-developed. She is morbidly obese.   HENT:      Head: Normocephalic.       Nose: Nose normal.      Mouth/Throat:      Mouth: Mucous membranes are moist.   Eyes:      Extraocular Movements: Extraocular movements intact.   Cardiovascular:      Rate and Rhythm: Normal rate and regular rhythm.      Heart sounds: Normal heart sounds.   Pulmonary:      Effort: Pulmonary effort is normal.      Breath sounds: Normal breath sounds.   Abdominal:      General: Bowel sounds are normal.      Palpations: Abdomen is soft.   Musculoskeletal:         General: Normal range of motion.      Cervical back: Normal range of motion.   Skin:     General: Skin is warm and dry.      Capillary Refill: Capillary refill takes less than 2 seconds.   Neurological:      Mental Status: She is alert and oriented to person, place, and time.   Psychiatric:         Mood and Affect: Mood normal.       ASSESSMENT:  - Morbid obesity s/p laparoscopic Catalina-en-Y on 1/6/25.  - Co-morbidities: obstructive sleep apnea  -  Weight loss, 32#'s and 14% EWL  -  Exercise routine walking x4 days  - Good Diet  - Good Vitamin regimen    PLAN:  - Reglan   - Fluids   - diet as tolerated   - fluids may be intermittent as needed  - R/o pots?   - Anti-Acid medication,  daily for 3 months  - No lifting more than 10 lbs for 6 weeks  - Miralax daily for constipation  - Emphasized the importance of regular exercise and adherence to bariatric diet to achieve maximum weight loss.  - Encouraged patient to start regular exercise.  - Follow-up with dietician to advance diet.  - Continue daily vitamins and medications.  - RTC in 6 weeks or sooner if needed.  - Call the office for any issues.  - Check labs today.  - F/u with Amaris MORRISON   - Start taking depression and anxiety meds today  - F/u with PCP for chronic cough     Post Discharge     8 Weeks     Total Opioids Used (Outpatient): not given any at d/c

## 2025-03-06 NOTE — PROGRESS NOTES
Pt arrived to infusion center for NS 1000 ml bolus to infuse over 1 hour along with IV Zofran 8 mg. Pt. Tolerated infusion well.    Limited head-to-toe assessment due to privacy issues and visit reason though the opportunity was given for patient to express any concerns

## 2025-03-06 NOTE — PROGRESS NOTES
The patient location is:  Patient Home   The chief complaint leading to consultation is: morbid obesity  bariatric surgerypost op nutrition visit  Visit type: Virtual visit with synchronous audio and video  Total time spent with patient: 30  Each patient to whom he or she provides medical services by telemedicine is:  (1) informed of the relationship between the physician and patient and the respective role of any other health care provider with respect to management of the patient; and (2) notified that he or she may decline to receive medical services by telemedicine and may withdraw from such care at any time.  Nutrition Handout located in AVS section of pt's [facility] and/or sent as a message via [facility] portal.  Pt informed of handout and how to access this information in [facility].  NUTRITION NOTE  Referring Physician: Bibi Cee M.D.   Reason for MNT Referral: Follow-up 8 Weeks s/p Gastric Bypass    PAST MEDICAL HISTORY:    Reports problems, including n/v/c/d  Constipation and diarrhea referred to GI for EGD and colonoscopy.  .  N/V started after bariatric surgery.  Nausea every day.  Vomiting does not occur daily.    Past Medical History:   Diagnosis Date    ADD (attention deficit disorder)     Anxiety disorder     Biliary dyskinesia 11/02/2024    Bronchitis 12/03/2024    Depression     Drug-induced constipation 03/04/2024    Headache     MDD (major depressive disorder), recurrent episode, moderate 07/29/2021    Morbid obesity with BMI of 60.0-69.9, adult 06/10/2024    Nephrolithiasis     Ovarian cyst     Substance abuse     Hospitalization     Trauma 09/11/2019    Tympanic membrane perforation, left 03/14/2018    UTI (urinary tract infection) 08/03/2018    Vertigo          CLINICAL DATA:  29 y.o.-year-old White female.      Current Weight: 313 lbs  BMI: Body mass index is 55.45 kg/m².   Total Weight Loss: Total Weight Loss: (P) 45 lbs   Excess Weight Loss: EWL: (P) 0.2 lbs      LABS:  None available at time of visit    CURRENT DIET:  Bariatric Soft Diet    Diet Recall: 24 grams of protein/day; 32-40 oz of fluids/day  B: 1-2 eggs with turkey sausage in wrap no carb small tortilla with reduced fat cheese no nausea 24 gm protein  L and D: skips nausea with abdominal pain  Water with sf drops 32-40 oz  Diet Includes: 1 meal per day  Meal Pattern: 1 meal(s) + 0-1 snack(s) + 0 protein supplement(s)- tried fairlife, Isopure unflavored powder  Inadequate protein supplement intake.  Adequate dairy intake.  Cheese is ok if eats with something  Inadequate vegetable intake.  Adequate fruit intake.  Most can have except with peel and   Starchy CHO: low carb tortilla  Beverages water    EXERCISE:   Heart rate 150-200s when gets up      Restrictions to Exercise:  Increased heart rate    VITAMINS/MINERALS:   Not taking any due to nausea.  Recommended to get barimelts B-1, mvi, and b-12.  Recommend to get in b-1 and mvi first    ASSESSMENT:  Doing poorly overall.  Inadequate protein intake.  Inadequate fluid intake.  Advancing diet appropriately.  Not exercising.  Inadequate vitamins & minerals.  Ordered calcium chews    BARIATRIC DIET DISCUSSION:  Instructed and provided written materials on bariatric regular diet plan.  Reinforced post-op nutrition guidelines.    PLAN / RECOMMENDATIONS:  Advance to bariatric regular diet.  Increase protein intake.  Increase fluid intake.  As recommended by MD  light exercise.  Begin appropriate vitamins & minerals.  Adjust vitamins & minerals by start with taking B-1, then mvi, then B-12 then calcium chews    Return to clinic in 4 months.    SESSION TIME: 30 minutes

## 2025-03-07 ENCOUNTER — RESULTS FOLLOW-UP (OUTPATIENT)
Dept: FAMILY MEDICINE | Facility: CLINIC | Age: 30
End: 2025-03-07

## 2025-03-07 ENCOUNTER — TELEPHONE (OUTPATIENT)
Dept: FAMILY MEDICINE | Facility: CLINIC | Age: 30
End: 2025-03-07
Payer: MEDICARE

## 2025-03-07 ENCOUNTER — LAB VISIT (OUTPATIENT)
Dept: LAB | Facility: HOSPITAL | Age: 30
End: 2025-03-07
Payer: MEDICARE

## 2025-03-07 DIAGNOSIS — R10.9 ABDOMINAL PAIN, UNSPECIFIED ABDOMINAL LOCATION: ICD-10-CM

## 2025-03-07 DIAGNOSIS — R11.2 NAUSEA AND VOMITING, UNSPECIFIED VOMITING TYPE: ICD-10-CM

## 2025-03-07 DIAGNOSIS — R30.0 DYSURIA: ICD-10-CM

## 2025-03-07 DIAGNOSIS — R30.0 DYSURIA: Primary | ICD-10-CM

## 2025-03-07 LAB
BACTERIA #/AREA URNS HPF: ABNORMAL /HPF
BILIRUB UR QL STRIP: NEGATIVE
CAOX CRY URNS QL MICRO: ABNORMAL
CLARITY UR: ABNORMAL
COLOR UR: YELLOW
GLUCOSE UR QL STRIP: NEGATIVE
HGB UR QL STRIP: NEGATIVE
KETONES UR QL STRIP: NEGATIVE
LEUKOCYTE ESTERASE UR QL STRIP: ABNORMAL
MICROSCOPIC COMMENT: ABNORMAL
NITRITE UR QL STRIP: NEGATIVE
PH UR STRIP: 6 [PH] (ref 5–8)
PROT UR QL STRIP: NEGATIVE
SP GR UR STRIP: 1.02 (ref 1–1.03)
SQUAMOUS #/AREA URNS HPF: 15 /HPF
URN SPEC COLLECT METH UR: ABNORMAL
WBC #/AREA URNS HPF: 15 /HPF (ref 0–5)

## 2025-03-07 PROCEDURE — 81000 URINALYSIS NONAUTO W/SCOPE: CPT | Mod: PO | Performed by: STUDENT IN AN ORGANIZED HEALTH CARE EDUCATION/TRAINING PROGRAM

## 2025-03-07 PROCEDURE — 87086 URINE CULTURE/COLONY COUNT: CPT | Performed by: STUDENT IN AN ORGANIZED HEALTH CARE EDUCATION/TRAINING PROGRAM

## 2025-03-07 RX ORDER — SULFAMETHOXAZOLE AND TRIMETHOPRIM 800; 160 MG/1; MG/1
1 TABLET ORAL 2 TIMES DAILY
Qty: 10 TABLET | Refills: 0 | Status: SHIPPED | OUTPATIENT
Start: 2025-03-07 | End: 2025-03-12

## 2025-03-07 NOTE — TELEPHONE ENCOUNTER
Called the patient who confirmed date of birth.    Let her know that her urine looks suspicious for infection but the culture will take days.    She has not checked her temperature but has had the shaking chills - they come and go. Not all day. She has not had advil or tylenol and yesterday in clinic she did not have fever or chills. She feels the same as yesterday - still not able to tolerate anything by mouth, reportedly worsened by reglan. She plans to  the phenergan from the pharmacy today to try it.    I checked with the pharmacy - Bactrim is a crushable medicine. We will try this twice a day. I will update her on the culture results if we need to change anything.    I recommend she check her temperature - if 100.4 or 38, this means fever and makes her symptoms more suspicious for kidney infection.    If she gets two doses and feels the same or worse, if she feels much worse, if she is not able to tolerate the antibiotic (crush and mix the medicine with apple sauce) she needs to go to the ER and say I can't tolerate anything by mouth, my urine culture is not back yet, my doctor thinks I may have a UTI or kidney infection. 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    Grateful for the call. No further questions. She verbalizes understanding and agrees with the plan.

## 2025-03-07 NOTE — TELEPHONE ENCOUNTER
----- Message from Beronica sent at 3/7/2025 10:59 AM CST -----   pt is here to do a urine sample from her virtual visit on yesterday, but I do not see an active order. Can someone check about an order please?

## 2025-03-08 LAB
BACTERIA UR CULT: NORMAL
BACTERIA UR CULT: NORMAL

## 2025-03-10 ENCOUNTER — INFUSION (OUTPATIENT)
Dept: INFECTIOUS DISEASES | Facility: HOSPITAL | Age: 30
End: 2025-03-10
Payer: MEDICARE

## 2025-03-10 ENCOUNTER — PATIENT MESSAGE (OUTPATIENT)
Dept: BARIATRICS | Facility: CLINIC | Age: 30
End: 2025-03-10
Payer: MEDICARE

## 2025-03-10 VITALS
HEIGHT: 63 IN | WEIGHT: 293 LBS | DIASTOLIC BLOOD PRESSURE: 74 MMHG | TEMPERATURE: 98 F | RESPIRATION RATE: 20 BRPM | SYSTOLIC BLOOD PRESSURE: 127 MMHG | HEART RATE: 86 BPM | BODY MASS INDEX: 51.91 KG/M2 | OXYGEN SATURATION: 95 %

## 2025-03-10 DIAGNOSIS — R00.0 TACHYCARDIA: ICD-10-CM

## 2025-03-10 DIAGNOSIS — G43.E09 CHRONIC MIGRAINE WITH AURA WITHOUT STATUS MIGRAINOSUS, NOT INTRACTABLE: ICD-10-CM

## 2025-03-10 DIAGNOSIS — R07.2 SUBSTERNAL CHEST PAIN: ICD-10-CM

## 2025-03-10 DIAGNOSIS — E86.0 DEHYDRATION: ICD-10-CM

## 2025-03-10 DIAGNOSIS — E66.01 MORBID OBESITY: Primary | ICD-10-CM

## 2025-03-10 DIAGNOSIS — Z98.84 S/P BARIATRIC SURGERY: ICD-10-CM

## 2025-03-10 PROCEDURE — 63600175 PHARM REV CODE 636 W HCPCS: Performed by: PHYSICIAN ASSISTANT

## 2025-03-10 PROCEDURE — 25000003 PHARM REV CODE 250: Performed by: PHYSICIAN ASSISTANT

## 2025-03-10 PROCEDURE — 96374 THER/PROPH/DIAG INJ IV PUSH: CPT

## 2025-03-10 PROCEDURE — 96360 HYDRATION IV INFUSION INIT: CPT

## 2025-03-10 RX ORDER — HEPARIN 100 UNIT/ML
500 SYRINGE INTRAVENOUS
Status: CANCELLED | OUTPATIENT
Start: 2025-03-12

## 2025-03-10 RX ORDER — SODIUM CHLORIDE 0.9 % (FLUSH) 0.9 %
10 SYRINGE (ML) INJECTION
Status: CANCELLED | OUTPATIENT
Start: 2025-03-12

## 2025-03-10 RX ORDER — SODIUM CHLORIDE 0.9 % (FLUSH) 0.9 %
10 SYRINGE (ML) INJECTION
Status: DISCONTINUED | OUTPATIENT
Start: 2025-03-10 | End: 2025-03-10 | Stop reason: HOSPADM

## 2025-03-10 RX ORDER — ONDANSETRON HYDROCHLORIDE 2 MG/ML
8 INJECTION, SOLUTION INTRAVENOUS
Status: CANCELLED
Start: 2025-03-12

## 2025-03-10 RX ORDER — ONDANSETRON HYDROCHLORIDE 2 MG/ML
8 INJECTION, SOLUTION INTRAVENOUS
Status: COMPLETED | OUTPATIENT
Start: 2025-03-10 | End: 2025-03-10

## 2025-03-10 RX ADMIN — SODIUM CHLORIDE 1000 ML: 9 INJECTION, SOLUTION INTRAVENOUS at 01:03

## 2025-03-10 RX ADMIN — ONDANSETRON 8 MG: 2 INJECTION INTRAMUSCULAR; INTRAVENOUS at 01:03

## 2025-03-10 NOTE — PROGRESS NOTES
Pt arrived to infusion center for NS 1000 ml bolus to infuse over 1 hour along with IV Zofran 8 mg.     Limited head-to-toe assessment due to privacy issues and visit reason though the opportunity was given for patient to express any concerns

## 2025-03-11 ENCOUNTER — TELEPHONE (OUTPATIENT)
Dept: FAMILY MEDICINE | Facility: CLINIC | Age: 30
End: 2025-03-11
Payer: MEDICARE

## 2025-03-11 DIAGNOSIS — R30.0 DYSURIA: ICD-10-CM

## 2025-03-11 DIAGNOSIS — R11.2 NAUSEA AND VOMITING, UNSPECIFIED VOMITING TYPE: Primary | ICD-10-CM

## 2025-03-11 LAB — VIT B1 BLD-MCNC: 36 UG/L (ref 38–122)

## 2025-03-11 RX ORDER — PROMETHAZINE HYDROCHLORIDE 25 MG/1
25 SUPPOSITORY RECTAL EVERY 6 HOURS PRN
Qty: 12 SUPPOSITORY | Refills: 0 | Status: ON HOLD | OUTPATIENT
Start: 2025-03-11 | End: 2025-04-04 | Stop reason: HOSPADM

## 2025-03-12 ENCOUNTER — INFUSION (OUTPATIENT)
Dept: INFECTIOUS DISEASES | Facility: HOSPITAL | Age: 30
End: 2025-03-12
Payer: MEDICARE

## 2025-03-12 VITALS
OXYGEN SATURATION: 96 % | BODY MASS INDEX: 51.91 KG/M2 | RESPIRATION RATE: 20 BRPM | WEIGHT: 293 LBS | HEIGHT: 63 IN | HEART RATE: 95 BPM | SYSTOLIC BLOOD PRESSURE: 118 MMHG | TEMPERATURE: 98 F | DIASTOLIC BLOOD PRESSURE: 79 MMHG

## 2025-03-12 DIAGNOSIS — E86.0 DEHYDRATION: ICD-10-CM

## 2025-03-12 DIAGNOSIS — E66.01 MORBID OBESITY: Primary | ICD-10-CM

## 2025-03-12 DIAGNOSIS — R07.2 SUBSTERNAL CHEST PAIN: ICD-10-CM

## 2025-03-12 DIAGNOSIS — R00.0 TACHYCARDIA: ICD-10-CM

## 2025-03-12 DIAGNOSIS — Z98.84 S/P BARIATRIC SURGERY: ICD-10-CM

## 2025-03-12 DIAGNOSIS — G43.E09 CHRONIC MIGRAINE WITH AURA WITHOUT STATUS MIGRAINOSUS, NOT INTRACTABLE: ICD-10-CM

## 2025-03-12 PROCEDURE — 96361 HYDRATE IV INFUSION ADD-ON: CPT

## 2025-03-12 PROCEDURE — 63600175 PHARM REV CODE 636 W HCPCS: Performed by: PHYSICIAN ASSISTANT

## 2025-03-12 PROCEDURE — 96374 THER/PROPH/DIAG INJ IV PUSH: CPT

## 2025-03-12 PROCEDURE — 25000003 PHARM REV CODE 250: Performed by: PHYSICIAN ASSISTANT

## 2025-03-12 RX ORDER — ONDANSETRON HYDROCHLORIDE 2 MG/ML
8 INJECTION, SOLUTION INTRAVENOUS
Status: CANCELLED
Start: 2025-03-14

## 2025-03-12 RX ORDER — HEPARIN 100 UNIT/ML
500 SYRINGE INTRAVENOUS
Status: DISCONTINUED | OUTPATIENT
Start: 2025-03-12 | End: 2025-03-12 | Stop reason: HOSPADM

## 2025-03-12 RX ORDER — SODIUM CHLORIDE 0.9 % (FLUSH) 0.9 %
10 SYRINGE (ML) INJECTION
Status: DISCONTINUED | OUTPATIENT
Start: 2025-03-12 | End: 2025-03-12 | Stop reason: HOSPADM

## 2025-03-12 RX ORDER — HEPARIN 100 UNIT/ML
500 SYRINGE INTRAVENOUS
Status: CANCELLED | OUTPATIENT
Start: 2025-03-14

## 2025-03-12 RX ORDER — SODIUM CHLORIDE 0.9 % (FLUSH) 0.9 %
10 SYRINGE (ML) INJECTION
Status: CANCELLED | OUTPATIENT
Start: 2025-03-14

## 2025-03-12 RX ORDER — ONDANSETRON HYDROCHLORIDE 2 MG/ML
8 INJECTION, SOLUTION INTRAVENOUS
Status: COMPLETED | OUTPATIENT
Start: 2025-03-12 | End: 2025-03-12

## 2025-03-12 RX ADMIN — ONDANSETRON 8 MG: 2 INJECTION INTRAMUSCULAR; INTRAVENOUS at 09:03

## 2025-03-12 RX ADMIN — SODIUM CHLORIDE 1000 ML: 9 INJECTION, SOLUTION INTRAVENOUS at 09:03

## 2025-03-12 NOTE — PROGRESS NOTES
The patient location is: [place]  The chief complaint leading to consultation is: nausea and vomiting    Visit type: audiovisual    Face to Face time with patient: 15 minutes  15 minutes of total time spent on the encounter, which includes face to face time and non-face to face time preparing to see the patient (eg, review of tests), Obtaining and/or reviewing separately obtained history, Documenting clinical information in the electronic or other health record, Independently interpreting results (not separately reported) and communicating results to the patient/family/caregiver, or Care coordination (not separately reported).     Each patient to whom he or she provides medical services by telemedicine is:  (1) informed of the relationship between the physician and patient and the respective role of any other health care provider with respect to management of the patient; and (2) notified that he or she may decline to receive medical services by telemedicine and may withdraw from such care at any time.    Subjective:       Patient ID: Alexandra Martin is a 29 y.o. female.    Chief Complaint: No chief complaint on file.    HPI  29 year old female known to me presents for audiovisual virtual visit. She had an appointment with her Bariatrics team this morning with the same concern. She was prescribed reglan taper and scheduled IV fluids. She has tried the reglan and it made her feel worse. No blood in the vomit. Foamy vomitus. She also has discomfort with urination. She has felt that since the hospital. She has had UTI before and needed IV antibiotics for resistant bacteria. She has felt the chills and not checked her temperature. Though, she was afebrile at her Bariatrics appointment this morning. On review of the record, this was several years ago for ESBL E.coli with normal urine cultures since then. She has been referred to Neurology before and I will reorder for numbness and tingling.    Plan:  She will come  to drop off a urine sample for urinalysis. I will update her through the portal on results.  We will try phenergan.  I have reached out to Bariatrics team.  Refer to Neurology.  Follow up in one week.  If fever and abdominal pain, this would be concerning for possible kidney infection and may need ER for IV antibiotics.    Past Medical History:   Diagnosis Date    ADD (attention deficit disorder)     Anxiety disorder     Biliary dyskinesia 11/02/2024    Bronchitis 12/03/2024    Depression     Drug-induced constipation 03/04/2024    Headache     MDD (major depressive disorder), recurrent episode, moderate 07/29/2021    Morbid obesity with BMI of 60.0-69.9, adult 06/10/2024    Nephrolithiasis     Ovarian cyst     Substance abuse     Hospitalization     Trauma 09/11/2019    Tympanic membrane perforation, left 03/14/2018    UTI (urinary tract infection) 08/03/2018    Vertigo        Past Surgical History:   Procedure Laterality Date    ANGIOGRAM, CORONARY, WITH LEFT HEART CATHETERIZATION  02/10/2025    Procedure: Left Heart Cath;  Surgeon: Garcia De Luna MD;  Location: [facility] CATH;  Service: Cardiovascular;;    CHOLECYSTECTOMY      CYSTOSCOPY W/ URETERAL STENT PLACEMENT Right 12/26/2019    Procedure: CYSTOSCOPY, WITH URETERAL STENT INSERTION;  Surgeon: Rito Medley MD;  Location: [facility] OR;  Service: Urology;  Laterality: Right;    CYSTOSCOPY W/ URETERAL STENT REMOVAL Right 01/06/2020    Procedure: CYSTOSCOPY, WITH URETERAL STENT REMOVAL;  Surgeon: Rito Medley MD;  Location: [facility] OR;  Service: Urology;  Laterality: Right;    ESOPHAGOGASTRODUODENOSCOPY N/A 3/11/2025    Procedure: EGD (ESOPHAGOGASTRODUODENOSCOPY);  Surgeon: Vernon Pike DO;  Location: [facility] ENDO;  Service: Endoscopy;  Laterality: N/A;    INJECTION OF ANESTHETIC AGENT INTO TISSUE PLANE DEFINED BY TRANSVERSUS ABDOMINIS MUSCLE  01/06/2025    Procedure: BLOCK, TRANSVERSUS ABDOMINIS PLANE;  Surgeon: Bibi Edmondson MD;   Location: [facility] OR 2ND FLR;  Service: General;;    LAPAROSCOPIC CHOLECYSTECTOMY  11/05/2024    LAPAROSCOPIC GASTROENTEROSTOMY N/A 01/06/2025    Procedure: GASTROENTEROSTOMY, LAPAROSCOPIC with inraop EGD;  Surgeon: Bibi Edmondson MD;  Location: [facility] OR 2ND FLR;  Service: General;  Laterality: N/A;  Surgeon to perform Tap Block itraoperatively    LASER LITHOTRIPSY Right 01/06/2020    Procedure: LITHOTRIPSY, USING LASER;  Surgeon: Rito Medley MD;  Location: [facility] OR;  Service: Urology;  Laterality: Right;    STOMACH SURGERY  01/06/2025    TONSILLECTOMY, ADENOIDECTOMY, BILATERAL MYRINGOTOMY AND TUBES      UPPER GASTROINTESTINAL ENDOSCOPY      URETEROSCOPIC REMOVAL OF URETERIC CALCULUS Right 01/06/2020    Procedure: REMOVAL, CALCULUS, URETER, URETEROSCOPIC;  Surgeon: Rito Medley MD;  Location: [facility] OR;  Service: Urology;  Laterality: Right;    URETEROSCOPY Left 12/26/2019    Procedure: URETEROSCOPY;  Surgeon: Rito Medley MD;  Location: [facility] OR;  Service: Urology;  Laterality: Left;    URETEROSCOPY Right 01/06/2020    Procedure: URETEROSCOPY;  Surgeon: Rito Medley MD;  Location: [facility] OR;  Service: Urology;  Laterality: Right;       Review of patient's allergies indicates:   Allergen Reactions    Ceftriaxone Nausea And Vomiting    Azithromycin Nausea And Vomiting     Hard to breathe     Latex, natural rubber Swelling and Rash       Social History[1]    Medications Ordered Prior to Encounter[2]    Family History   Problem Relation Name Age of Onset    Obesity Mother      Stomach cancer Father      Ulcerative colitis Brother      Breast cancer Maternal Aunt      Colon cancer Neg Hx      Miscarriages / Stillbirths Neg Hx      Ovarian cancer Neg Hx      Crohn's disease Neg Hx      Esophageal cancer Neg Hx         Review of Systems    Objective:      No acute distress noted.    Assessment:       1. Nausea and vomiting, unspecified vomiting type    2. Abdominal pain, unspecified  abdominal location    3. Dysuria    4. Paresthesia        Plan:       Nausea and vomiting, unspecified vomiting type  -     promethazine (PHENERGAN) 25 MG tablet; Take 1 tablet (25 mg total) by mouth 3 (three) times daily as needed for Nausea.  Dispense: 28 tablet; Refill: 0    Abdominal pain, unspecified abdominal location  -     Cancel: Urinalysis, Reflex to Urine Culture Urine, Clean Catch    Dysuria  -     Cancel: Urinalysis, Reflex to Urine Culture Urine, Clean Catch    Paresthesia  -     Ambulatory referral/consult to Neurology; Future; Expected date: 03/13/2025          Plan:  She will come to drop off a urine sample for urinalysis. I will update her through the portal on results.  We will try phenergan.  I have reached out to Bariatrics team.  Refer to Neurology.  Follow up in one week.  If fever and abdominal pain, this would be concerning for possible kidney infection and may need ER for IV antibiotics.  Call with urgent concerns.       [1]   Social History  Socioeconomic History    Marital status: Single   Tobacco Use    Smoking status: Former     Current packs/day: 0.25     Average packs/day: 0.3 packs/day for 15.2 years (3.8 ttl pk-yrs)     Types: Cigarettes     Start date: 2010    Smokeless tobacco: Never    Tobacco comments:     Quit smoking June 2024    Substance and Sexual Activity    Alcohol use: Not Currently     Alcohol/week: 0.0 standard drinks of alcohol    Drug use: Not Currently     Types: Marijuana, Fentanyl     Comment: history of opoid abuse    Sexual activity: Not Currently     Partners: Male     Birth control/protection: Implant     Social Drivers of Health     Financial Resource Strain: Low Risk  (2/14/2025)    Overall Financial Resource Strain (CARDIA)     Difficulty of Paying Living Expenses: Not hard at all   Food Insecurity: No Food Insecurity (2/14/2025)    Hunger Vital Sign     Worried About Running Out of Food in the Last Year: Never true     Ran Out of Food in the Last Year:  Never true   Transportation Needs: No Transportation Needs (2/14/2025)    PRAPARE - Transportation     Lack of Transportation (Medical): No     Lack of Transportation (Non-Medical): No   Physical Activity: Inactive (2/14/2025)    Exercise Vital Sign     Days of Exercise per Week: 0 days     Minutes of Exercise per Session: 0 min   Stress: Patient Declined (2/14/2025)    [employer] - Occupational Stress Questionnaire     Feeling of Stress : Patient declined   Housing Stability: Low Risk  (2/14/2025)    Housing Stability Vital Sign     Unable to Pay for Housing in the Last Year: No     Number of Times Moved in the Last Year: 1     Homeless in the Last Year: No   [2]   Current Outpatient Medications on File Prior to Visit   Medication Sig Dispense Refill    LINZESS 145 mcg Cap capsule Take 145 mcg by mouth every morning.      pantoprazole (PROTONIX) 40 MG tablet Take 40 mg by mouth 2 (two) times daily.      venlafaxine (EFFEXOR) 37.5 MG Tab Take by mouth 2 (two) times daily.      CALCIUM ORAL Take 1 Dose by mouth 2 (two) times a day.      etonogestreL (NEXPLANON) 68 mg Impl subdermal device 68 mg by Subdermal route once. A single NEXPLANON implant is inserted subdermally just under the skin at the inner side of the non-dominant upper arm.      metoprolol succinate (TOPROL-XL) 50 MG 24 hr tablet Take 1 tablet (50 mg total) by mouth 2 (two) times daily. 60 tablet 11    multivit-min/iron/folic acid/K (BARIATRIC MULTIVITAMINS ORAL) Take 1 tablet by mouth 2 (two) times a day.      nystatin (MYCOSTATIN) powder Apply topically 2 (two) times daily. 60 g 1     No current facility-administered medications on file prior to visit.

## 2025-03-12 NOTE — TELEPHONE ENCOUNTER
Called to speak with the patient. She has checked her temperature and not had fever. She was able to hold down phenergan once and it helped but not the second time. She has not been able to tolerate the antibiotic.    She underwent EGD today with pathology results pending.    She had an ultrasound of the legs with outside cardiology on Friday but those results are not available for review.    Plan:  Since no fever, less likely kidney infection.  Repeat UA with reflex to culture.    Prescribed rectal phenergan - do not take both rectal and oral formulations.    Will update Bariatrics team.

## 2025-03-13 ENCOUNTER — LAB VISIT (OUTPATIENT)
Dept: LAB | Facility: HOSPITAL | Age: 30
End: 2025-03-13
Attending: STUDENT IN AN ORGANIZED HEALTH CARE EDUCATION/TRAINING PROGRAM
Payer: MEDICARE

## 2025-03-13 DIAGNOSIS — R30.0 DYSURIA: ICD-10-CM

## 2025-03-13 LAB
BACTERIA #/AREA URNS HPF: ABNORMAL /HPF
BILIRUB UR QL STRIP: NEGATIVE
CLARITY UR: ABNORMAL
COLOR UR: YELLOW
GLUCOSE UR QL STRIP: NEGATIVE
HGB UR QL STRIP: NEGATIVE
KETONES UR QL STRIP: ABNORMAL
LEUKOCYTE ESTERASE UR QL STRIP: ABNORMAL
MICROSCOPIC COMMENT: ABNORMAL
NITRITE UR QL STRIP: NEGATIVE
PH UR STRIP: 6 [PH] (ref 5–8)
PROT UR QL STRIP: NEGATIVE
SP GR UR STRIP: 1.02 (ref 1–1.03)
SQUAMOUS #/AREA URNS HPF: 20 /HPF
URN SPEC COLLECT METH UR: ABNORMAL
WBC #/AREA URNS HPF: 15 /HPF (ref 0–5)

## 2025-03-13 PROCEDURE — 87086 URINE CULTURE/COLONY COUNT: CPT | Performed by: STUDENT IN AN ORGANIZED HEALTH CARE EDUCATION/TRAINING PROGRAM

## 2025-03-13 PROCEDURE — 81000 URINALYSIS NONAUTO W/SCOPE: CPT | Mod: PO | Performed by: STUDENT IN AN ORGANIZED HEALTH CARE EDUCATION/TRAINING PROGRAM

## 2025-03-14 ENCOUNTER — RESULTS FOLLOW-UP (OUTPATIENT)
Dept: FAMILY MEDICINE | Facility: CLINIC | Age: 30
End: 2025-03-14

## 2025-03-14 ENCOUNTER — INFUSION (OUTPATIENT)
Dept: INFECTIOUS DISEASES | Facility: HOSPITAL | Age: 30
End: 2025-03-14
Payer: MEDICARE

## 2025-03-14 ENCOUNTER — OFFICE VISIT (OUTPATIENT)
Dept: BARIATRICS | Facility: CLINIC | Age: 30
End: 2025-03-14
Payer: MEDICARE

## 2025-03-14 VITALS
TEMPERATURE: 98 F | RESPIRATION RATE: 18 BRPM | WEIGHT: 293 LBS | OXYGEN SATURATION: 100 % | SYSTOLIC BLOOD PRESSURE: 137 MMHG | DIASTOLIC BLOOD PRESSURE: 80 MMHG | HEART RATE: 94 BPM | HEIGHT: 63 IN | BODY MASS INDEX: 51.91 KG/M2

## 2025-03-14 DIAGNOSIS — E66.01 MORBID OBESITY: Primary | ICD-10-CM

## 2025-03-14 DIAGNOSIS — Z98.84 S/P BARIATRIC SURGERY: ICD-10-CM

## 2025-03-14 DIAGNOSIS — R07.2 SUBSTERNAL CHEST PAIN: ICD-10-CM

## 2025-03-14 DIAGNOSIS — G43.E09 CHRONIC MIGRAINE WITH AURA WITHOUT STATUS MIGRAINOSUS, NOT INTRACTABLE: ICD-10-CM

## 2025-03-14 DIAGNOSIS — E86.0 DEHYDRATION: ICD-10-CM

## 2025-03-14 DIAGNOSIS — E66.01 MORBID OBESITY WITH BMI OF 50.0-59.9, ADULT: Primary | ICD-10-CM

## 2025-03-14 DIAGNOSIS — R00.0 TACHYCARDIA: ICD-10-CM

## 2025-03-14 PROCEDURE — 63600175 PHARM REV CODE 636 W HCPCS: Performed by: PHYSICIAN ASSISTANT

## 2025-03-14 PROCEDURE — 96375 TX/PRO/DX INJ NEW DRUG ADDON: CPT

## 2025-03-14 PROCEDURE — 96361 HYDRATE IV INFUSION ADD-ON: CPT

## 2025-03-14 PROCEDURE — 25000003 PHARM REV CODE 250: Performed by: PHYSICIAN ASSISTANT

## 2025-03-14 RX ORDER — SODIUM CHLORIDE 0.9 % (FLUSH) 0.9 %
10 SYRINGE (ML) INJECTION
OUTPATIENT
Start: 2025-03-17

## 2025-03-14 RX ORDER — SODIUM CHLORIDE 0.9 % (FLUSH) 0.9 %
10 SYRINGE (ML) INJECTION
Status: DISCONTINUED | OUTPATIENT
Start: 2025-03-14 | End: 2025-03-14 | Stop reason: HOSPADM

## 2025-03-14 RX ORDER — ONDANSETRON HYDROCHLORIDE 2 MG/ML
8 INJECTION, SOLUTION INTRAVENOUS
Start: 2025-03-17

## 2025-03-14 RX ORDER — HEPARIN 100 UNIT/ML
500 SYRINGE INTRAVENOUS
OUTPATIENT
Start: 2025-03-17

## 2025-03-14 RX ORDER — ONDANSETRON HYDROCHLORIDE 2 MG/ML
8 INJECTION, SOLUTION INTRAVENOUS
Status: COMPLETED | OUTPATIENT
Start: 2025-03-14 | End: 2025-03-14

## 2025-03-14 RX ADMIN — SODIUM CHLORIDE 1000 ML: 9 INJECTION, SOLUTION INTRAVENOUS at 02:03

## 2025-03-14 RX ADMIN — ONDANSETRON 8 MG: 2 INJECTION INTRAMUSCULAR; INTRAVENOUS at 02:03

## 2025-03-14 NOTE — PROGRESS NOTES
Pt requested that appointment be rescheduled due to her being on her way to an infusion. Appt rescheduled for 3/20/25.     Amaris Laura, LCSW-BACS  Department of Surgery/ Bariatric Surgery LCSW-BACS

## 2025-03-14 NOTE — PROGRESS NOTES
Patient arrived to infusion suite for IVF NS 1L over 1 hour with Zofran 8 mg IVP per MD order. Patient tolerated.     Limited head-to-toe assessment due to privacy issues and visit reason though the opportunity was given for patient to express any concerns.    Per patient her next 3 treatments are already scheduled in [place].

## 2025-03-15 LAB
BACTERIA UR CULT: NORMAL
BACTERIA UR CULT: NORMAL

## 2025-03-16 ENCOUNTER — RESULTS FOLLOW-UP (OUTPATIENT)
Dept: GASTROENTEROLOGY | Facility: CLINIC | Age: 30
End: 2025-03-16

## 2025-03-16 NOTE — PROGRESS NOTES
Please notify patient that the biopsies were reviewed and showed no bacteria. Please have her schedule a follow up appointment with Debi Salazar to be seen in the next 1-2 weeks for further evaluation and management of her symptoms.

## 2025-03-17 ENCOUNTER — OFFICE VISIT (OUTPATIENT)
Dept: FAMILY MEDICINE | Facility: CLINIC | Age: 30
End: 2025-03-17
Payer: MEDICARE

## 2025-03-17 ENCOUNTER — HOSPITAL ENCOUNTER (OUTPATIENT)
Dept: RADIOLOGY | Facility: HOSPITAL | Age: 30
Discharge: HOME | End: 2025-03-17
Attending: NURSE PRACTITIONER
Payer: MEDICARE

## 2025-03-17 ENCOUNTER — PATIENT MESSAGE (OUTPATIENT)
Dept: BARIATRICS | Facility: CLINIC | Age: 30
End: 2025-03-17
Payer: MEDICARE

## 2025-03-17 ENCOUNTER — INFUSION (OUTPATIENT)
Dept: INFUSION THERAPY | Facility: HOSPITAL | Age: 30
End: 2025-03-17
Attending: PHYSICIAN ASSISTANT
Payer: MEDICARE

## 2025-03-17 ENCOUNTER — PATIENT MESSAGE (OUTPATIENT)
Dept: FAMILY MEDICINE | Facility: CLINIC | Age: 30
End: 2025-03-17

## 2025-03-17 ENCOUNTER — RESULTS FOLLOW-UP (OUTPATIENT)
Dept: FAMILY MEDICINE | Facility: CLINIC | Age: 30
End: 2025-03-17

## 2025-03-17 VITALS
DIASTOLIC BLOOD PRESSURE: 75 MMHG | OXYGEN SATURATION: 96 % | RESPIRATION RATE: 16 BRPM | TEMPERATURE: 98 F | BODY MASS INDEX: 51.91 KG/M2 | WEIGHT: 293 LBS | HEIGHT: 63 IN | SYSTOLIC BLOOD PRESSURE: 120 MMHG | HEART RATE: 77 BPM

## 2025-03-17 VITALS
WEIGHT: 293 LBS | SYSTOLIC BLOOD PRESSURE: 136 MMHG | HEART RATE: 99 BPM | BODY MASS INDEX: 51.91 KG/M2 | OXYGEN SATURATION: 95 % | DIASTOLIC BLOOD PRESSURE: 80 MMHG | HEIGHT: 63 IN

## 2025-03-17 DIAGNOSIS — R00.0 TACHYCARDIA: ICD-10-CM

## 2025-03-17 DIAGNOSIS — E66.01 MORBID OBESITY: Primary | ICD-10-CM

## 2025-03-17 DIAGNOSIS — N30.00 ACUTE CYSTITIS WITHOUT HEMATURIA: Primary | ICD-10-CM

## 2025-03-17 DIAGNOSIS — Z98.84 S/P BARIATRIC SURGERY: ICD-10-CM

## 2025-03-17 DIAGNOSIS — R07.2 SUBSTERNAL CHEST PAIN: ICD-10-CM

## 2025-03-17 DIAGNOSIS — R20.2 PARESTHESIA: Primary | ICD-10-CM

## 2025-03-17 DIAGNOSIS — K59.00 CONSTIPATION, UNSPECIFIED CONSTIPATION TYPE: ICD-10-CM

## 2025-03-17 DIAGNOSIS — R30.0 DYSURIA: ICD-10-CM

## 2025-03-17 DIAGNOSIS — G43.E09 CHRONIC MIGRAINE WITH AURA WITHOUT STATUS MIGRAINOSUS, NOT INTRACTABLE: ICD-10-CM

## 2025-03-17 DIAGNOSIS — E86.0 DEHYDRATION: ICD-10-CM

## 2025-03-17 LAB
BACTERIA #/AREA URNS HPF: ABNORMAL /HPF
BILIRUB UR QL STRIP: ABNORMAL
CLARITY UR: CLEAR
COLOR UR: YELLOW
GLUCOSE UR QL STRIP: NEGATIVE
HGB UR QL STRIP: NEGATIVE
KETONES UR QL STRIP: ABNORMAL
LEUKOCYTE ESTERASE UR QL STRIP: ABNORMAL
MICROSCOPIC COMMENT: ABNORMAL
NITRITE UR QL STRIP: NEGATIVE
PH UR STRIP: 6 [PH] (ref 5–8)
PROT UR QL STRIP: ABNORMAL
SP GR UR STRIP: >=1.03 (ref 1–1.03)
SQUAMOUS #/AREA URNS HPF: 5 /HPF
URN SPEC COLLECT METH UR: ABNORMAL
WBC #/AREA URNS HPF: 15 /HPF (ref 0–5)

## 2025-03-17 PROCEDURE — A4216 STERILE WATER/SALINE, 10 ML: HCPCS | Mod: PN | Performed by: PHYSICIAN ASSISTANT

## 2025-03-17 PROCEDURE — 96361 HYDRATE IV INFUSION ADD-ON: CPT | Mod: PN

## 2025-03-17 PROCEDURE — 99999 PR PBB SHADOW E&M-EST. PATIENT-LVL IV: CPT | Mod: PBBFAC,,, | Performed by: NURSE PRACTITIONER

## 2025-03-17 PROCEDURE — 74019 RADEX ABDOMEN 2 VIEWS: CPT | Mod: TC,FY,PO

## 2025-03-17 PROCEDURE — 3079F DIAST BP 80-89 MM HG: CPT | Mod: CPTII,S$GLB,, | Performed by: NURSE PRACTITIONER

## 2025-03-17 PROCEDURE — 63600175 PHARM REV CODE 636 W HCPCS: Mod: PN | Performed by: PHYSICIAN ASSISTANT

## 2025-03-17 PROCEDURE — 87086 URINE CULTURE/COLONY COUNT: CPT | Performed by: NURSE PRACTITIONER

## 2025-03-17 PROCEDURE — 25000003 PHARM REV CODE 250: Mod: PN | Performed by: PHYSICIAN ASSISTANT

## 2025-03-17 PROCEDURE — 3008F BODY MASS INDEX DOCD: CPT | Mod: CPTII,S$GLB,, | Performed by: NURSE PRACTITIONER

## 2025-03-17 PROCEDURE — 99214 OFFICE O/P EST MOD 30 MIN: CPT | Mod: S$GLB,,, | Performed by: NURSE PRACTITIONER

## 2025-03-17 PROCEDURE — 1159F MED LIST DOCD IN RCRD: CPT | Mod: CPTII,S$GLB,, | Performed by: NURSE PRACTITIONER

## 2025-03-17 PROCEDURE — 81000 URINALYSIS NONAUTO W/SCOPE: CPT | Mod: PO | Performed by: NURSE PRACTITIONER

## 2025-03-17 PROCEDURE — 3075F SYST BP GE 130 - 139MM HG: CPT | Mod: CPTII,S$GLB,, | Performed by: NURSE PRACTITIONER

## 2025-03-17 PROCEDURE — 74019 RADEX ABDOMEN 2 VIEWS: CPT | Mod: 26,,, | Performed by: RADIOLOGY

## 2025-03-17 RX ORDER — SODIUM CHLORIDE 0.9 % (FLUSH) 0.9 %
10 SYRINGE (ML) INJECTION
Status: CANCELLED | OUTPATIENT
Start: 2025-03-19

## 2025-03-17 RX ORDER — HEPARIN 100 UNIT/ML
500 SYRINGE INTRAVENOUS
Status: CANCELLED | OUTPATIENT
Start: 2025-03-19

## 2025-03-17 RX ORDER — ONDANSETRON HYDROCHLORIDE 2 MG/ML
8 INJECTION, SOLUTION INTRAVENOUS
Status: CANCELLED
Start: 2025-03-19

## 2025-03-17 RX ORDER — SODIUM CHLORIDE 0.9 % (FLUSH) 0.9 %
10 SYRINGE (ML) INJECTION
Status: DISCONTINUED | OUTPATIENT
Start: 2025-03-17 | End: 2025-03-17 | Stop reason: HOSPADM

## 2025-03-17 RX ORDER — NITROFURANTOIN 25; 75 MG/1; MG/1
100 CAPSULE ORAL 2 TIMES DAILY
Qty: 10 CAPSULE | Refills: 0 | Status: SHIPPED | OUTPATIENT
Start: 2025-03-17 | End: 2025-03-22

## 2025-03-17 RX ORDER — METOCLOPRAMIDE 5 MG/1
TABLET ORAL
COMMUNITY
Start: 2025-03-06

## 2025-03-17 RX ORDER — ONDANSETRON HYDROCHLORIDE 2 MG/ML
8 INJECTION, SOLUTION INTRAVENOUS
Status: COMPLETED | OUTPATIENT
Start: 2025-03-17 | End: 2025-03-17

## 2025-03-17 RX ADMIN — Medication 10 ML: at 10:03

## 2025-03-17 RX ADMIN — SODIUM CHLORIDE 1000 ML: 9 INJECTION, SOLUTION INTRAVENOUS at 11:03

## 2025-03-17 RX ADMIN — ONDANSETRON 8 MG: 2 INJECTION INTRAMUSCULAR; INTRAVENOUS at 11:03

## 2025-03-17 NOTE — PROGRESS NOTES
"Subjective     Patient ID: Alexandra Martin is a 29 y.o. female.    Chief Complaint: 1 month f/u      HPI      Patient is new to me. PCP is Dr. Andrade.     30 y/o F with migraines, anxiety, SAM, s/p gastric bypass 1/6/25. She reports since surgery has had multiple issues. She feels worse now than prior to surgery. She has had issues with constipation. This was ongoing prior to surgery. She takes miralax, linzess, and otc laxatives. She has not had a BM in about a week.  She has been having ongoing numbness in her legs. She saw cardiology and had US which showed: No evidence of hemodynamically significant lower extremity inflow stenosis.   Suspected infrapopliteal disease in right PT/AT. Patent left PT. Left AT not imaged. Dr. Hameed recommended starting baby aspirin daily. She denies any loss of bowel/bladder control. She does feel like the numbness is causing her to trip. She is thiamine deficient and was recently started on a supplement. She gets occasional back pain which she contributes to her weight but denies any shooting pains or radiation into the legs.     Review of Systems   Gastrointestinal:  Positive for constipation, nausea and reflux.   Genitourinary:  Positive for dysuria.   Neurological:  Positive for numbness.          Objective   Vitals:    03/17/25 0903   BP: 136/80   Pulse: 99   SpO2: 95%   Weight: (!) 141.4 kg (311 lb 11.7 oz)   Height: 5' 3" (1.6 m)      Physical Exam  Constitutional:       General: She is not in acute distress.     Appearance: Normal appearance. She is morbidly obese. She is not ill-appearing, toxic-appearing or diaphoretic.   HENT:      Head: Normocephalic and atraumatic.   Pulmonary:      Effort: No respiratory distress.   Musculoskeletal:      Lumbar back: Tenderness (lumbar paraspinal ttp) present. Negative right straight leg raise test and negative left straight leg raise test.   Skin:     General: Skin is warm and dry.      Capillary Refill: Capillary " refill takes less than 2 seconds.   Neurological:      General: No focal deficit present.      Mental Status: She is alert and oriented to person, place, and time.      Sensory: Sensory deficit (diminished sensation to anterior legs) present.      Motor: No weakness.      Gait: Gait normal.   Psychiatric:         Mood and Affect: Mood normal.         Behavior: Behavior normal.         Thought Content: Thought content normal.         Judgment: Judgment normal.         1. Paresthesia  - EMG W/ ULTRASOUND AND NERVE CONDUCTION TEST 2 Extremities; Future  -consider MRI of lumbar spine- pt reports will need to have anesthesia for an MRI due to severe claustrophobia.   -ED precautions advised.     2. Dysuria  - Urinalysis, Reflex to Urine Culture Urine, Clean Catch    3. Constipation, unspecified constipation type  - X-Ray Abdomen Flat And Erect; Future     4. S/P bariatric surgery    RTC/ER precautions given. F/U with PCP in 1-2 weeks.    Counseled on regular exercise, maintenance of a healthy weight, balanced diet rich in fruits/vegetables and lean protein, and avoidance of unhealthy habits like smoking and excessive alcohol intake.    MUKESH CobbC

## 2025-03-17 NOTE — PLAN OF CARE
Problem: Adult Inpatient Plan of Care  Goal: Plan of Care Review  Outcome: Progressing  Flowsheets (Taken 3/17/2025 1204)  Plan of Care Reviewed With: patient  Goal: Patient-Specific Goal (Individualized)  Outcome: Progressing  Flowsheets (Taken 3/17/2025 1204)  Individualized Care Needs: education, conversation, recliner  Anxieties, Fears or Concerns: not feeling well  Goal: Optimal Comfort and Wellbeing  Outcome: Progressing  Intervention: Provide Person-Centered Care  Flowsheets (Taken 3/17/2025 1204)  Trust Relationship/Rapport:   care explained   empathic listening provided   choices provided   questions answered   thoughts/feelings acknowledged   reassurance provided   questions encouraged   emotional support provided     Problem: Fall Injury Risk  Goal: Absence of Fall and Fall-Related Injury  Outcome: Progressing  Intervention: Promote Injury-Free Environment  Flowsheets (Taken 3/17/2025 1204)  Safety Promotion/Fall Prevention:   Fall Risk reviewed with patient/family   in recliner, wheels locked   instructed to call staff for mobility   lighting adjusted   supervised activity   room near unit station   patient expresses understanding of fall risk and prevention     Problem: Fatigue  Goal: Improved Activity Tolerance  Outcome: Progressing  Intervention: Promote Improved Energy  Flowsheets (Taken 3/17/2025 1204)  Fatigue Management:   fatigue-related activity identified   frequent rest breaks encouraged   paced activity encouraged  Sleep/Rest Enhancement:   relaxation techniques promoted   noise level reduced   family presence promoted   natural light exposure provided   therapeutic touch utilized  Activity Management:   Ambulated -L4   Up in chair - L3  Environmental Support:   rest periods encouraged   distractions minimized   calm environment promoted   personal routine supported  Pt arrived to clinic for hydration fluids and tolerated well with no changes throughout therapy other than feeling a little  stronger and not so weak. Pt encouraged to drink plenty of clear fluids at home. Pt aware of number to call for any concerns and f/u appts and discharged to home in NAD.

## 2025-03-18 LAB
BACTERIA UR CULT: NORMAL
BACTERIA UR CULT: NORMAL

## 2025-03-19 ENCOUNTER — INFUSION (OUTPATIENT)
Dept: INFUSION THERAPY | Facility: HOSPITAL | Age: 30
End: 2025-03-19
Attending: PHYSICIAN ASSISTANT
Payer: MEDICARE

## 2025-03-19 VITALS
RESPIRATION RATE: 20 BRPM | SYSTOLIC BLOOD PRESSURE: 140 MMHG | HEIGHT: 63 IN | DIASTOLIC BLOOD PRESSURE: 81 MMHG | WEIGHT: 293 LBS | HEART RATE: 85 BPM | OXYGEN SATURATION: 95 % | BODY MASS INDEX: 51.91 KG/M2 | TEMPERATURE: 98 F

## 2025-03-19 DIAGNOSIS — Z98.84 S/P BARIATRIC SURGERY: ICD-10-CM

## 2025-03-19 DIAGNOSIS — E66.01 MORBID OBESITY: Primary | ICD-10-CM

## 2025-03-19 DIAGNOSIS — E86.0 DEHYDRATION: ICD-10-CM

## 2025-03-19 DIAGNOSIS — G43.E09 CHRONIC MIGRAINE WITH AURA WITHOUT STATUS MIGRAINOSUS, NOT INTRACTABLE: ICD-10-CM

## 2025-03-19 DIAGNOSIS — R00.0 TACHYCARDIA: ICD-10-CM

## 2025-03-19 DIAGNOSIS — R07.2 SUBSTERNAL CHEST PAIN: ICD-10-CM

## 2025-03-19 PROCEDURE — 63600175 PHARM REV CODE 636 W HCPCS: Mod: PN | Performed by: PHYSICIAN ASSISTANT

## 2025-03-19 PROCEDURE — 96361 HYDRATE IV INFUSION ADD-ON: CPT

## 2025-03-19 PROCEDURE — 96374 THER/PROPH/DIAG INJ IV PUSH: CPT | Mod: PN

## 2025-03-19 PROCEDURE — 25000003 PHARM REV CODE 250: Mod: PN | Performed by: PHYSICIAN ASSISTANT

## 2025-03-19 RX ORDER — HEPARIN 100 UNIT/ML
500 SYRINGE INTRAVENOUS
OUTPATIENT
Start: 2025-03-21

## 2025-03-19 RX ORDER — ONDANSETRON HYDROCHLORIDE 2 MG/ML
8 INJECTION, SOLUTION INTRAVENOUS
Status: COMPLETED | OUTPATIENT
Start: 2025-03-19 | End: 2025-03-19

## 2025-03-19 RX ORDER — ONDANSETRON HYDROCHLORIDE 2 MG/ML
8 INJECTION, SOLUTION INTRAVENOUS
Start: 2025-03-21

## 2025-03-19 RX ORDER — SODIUM CHLORIDE 0.9 % (FLUSH) 0.9 %
10 SYRINGE (ML) INJECTION
OUTPATIENT
Start: 2025-03-21

## 2025-03-19 RX ADMIN — SODIUM CHLORIDE 1000 ML: 9 INJECTION, SOLUTION INTRAVENOUS at 10:03

## 2025-03-19 RX ADMIN — ONDANSETRON 8 MG: 2 INJECTION INTRAMUSCULAR; INTRAVENOUS at 10:03

## 2025-03-19 NOTE — PLAN OF CARE
.Pt tolerated ivf infusion well.  No adverse reaction noted.   IV flushed with NS and D/C per protocol.  Patient left clinic in no acute distress.

## 2025-03-20 ENCOUNTER — TELEPHONE (OUTPATIENT)
Dept: INFUSION THERAPY | Facility: HOSPITAL | Age: 30
End: 2025-03-20
Payer: MEDICARE

## 2025-03-20 ENCOUNTER — OFFICE VISIT (OUTPATIENT)
Dept: BARIATRICS | Facility: CLINIC | Age: 30
End: 2025-03-20
Payer: MEDICARE

## 2025-03-20 DIAGNOSIS — E66.01 MORBID OBESITY WITH BMI OF 50.0-59.9, ADULT: Primary | ICD-10-CM

## 2025-03-20 DIAGNOSIS — F11.21: ICD-10-CM

## 2025-03-20 DIAGNOSIS — F33.1 MODERATE EPISODE OF RECURRENT MAJOR DEPRESSIVE DISORDER: ICD-10-CM

## 2025-03-20 NOTE — PROGRESS NOTES
"  Alexandra Martin  29 y.o.  03/20/2025       Met with patient for 40 minutes. Pt presented for her virtual appointment in her car, parked in a parking lot.         Narrative: Pt stated she was felling "ok." Pt stated she continues t feel frustration around her ongoing health issues, expressing annoyance with the lengthy wait times for "answers" to her symptoms. Pt reported significant improvement in her depressive symptoms, " which makes me more mad because I want to go do things! I want to work and go back to school but I feel like I can't because I get so tired!" Pt also expressed frustration with her over all weight loss." I feel like I soul have lost more sylvester by now." LCSW engaged pt in re framing frustration around weight loss, pt has lost 50 pounds in 3 months. LCSW engaged pt in empathetic lisitening around health concerns and aid pt in safety planning around on going health concerns.      Themes    Inadequacy, confusion, self-esteem , anxiety, self care         MENTAL STATUS EXAM:  Appearance:  grooming is appropriate for session   Behavior: Cooperative, without agitaiton or psychomotor retardation.  Eye contact is good  Speech: normal rate and volume.  No pressure.  No latency.  Good prosidy.  Language: no neologisms, or errors  Mood: open   Affect: congruent with development  Thought Process:  Coherence: Yes  Logic:yes  Stream: goal-directed  Perseveration: no  Neologism:no   Blocking:no  Attention:good concentration  Thought Content:  No suicidal or homicidal ideation.  No obsessions.  No delusions.  No paranoid ideation  Perceptions:  no hallucinations, visual, auditory or tactile  Cognitive:  Oriented to person, place and time.  No difficulty with recall of recent or remote events during interactions.  Able to attend.  Concentration adequate.  Fund of knowledge is average and in keeping with educational background.    Insight:  fair  Judgment: adequate to circumstances    Gait:  " normal    MEDICATIONS:  Effexor- 37. Vistaril-50     RISK PARAMETER:   Patient reports no suicidal ideation.  Patient reports no homicidal ideation.  Patient reports no self injurious behavior.  Patient reports no violent behavior.        ASSESSMENT AND GENERAL IMPRESSION:     MODALITY: LCSW utilized cognitive processing and supportive therapy. LCSW utilized introspective therapy to support pt's self-reflection. LCSW utilized reality orientation to support pt's objectivity, reframed perspectives, and thought challenging processes. LCSW utilized CBT and psychoeducation to support pt's understanding of behavioral change via the CBT behavioral model. LCSW utilized CBT and solution focused therapy to discuss the identification of personal locus of control and utilization of critical thinking/problem solving skills in its application. LCSW utilized psychoeducation to review pt's historical coping skills and discussed their application for further mental health progress. LCSW utilized compassion focused therapy to support pt's self-acceptance. LCSW utilized person centered and strengths based perspectives to further empower and edify pt. LCSW and pt practiced aforementioned skills in session. LCSW provided pt with supplemental resources via [employer]. Pt responded appropriately to aforementioned interventions. Pt denied SI/HI/AVH.         DIAGNOSIS: Z68.43 (BMI) of 50.0-59.9 in adults   F33.1- Major Depressive Disorder, Moderate, recurring episode  F11.21-Opioid Use Disorder, Sever, in sustained remission    PLAN/E&M:   Medication Management/Testing/Labs/Imaging:      Medications were noted. Patient reports taking -Effexor- 37. Vistaril-50     Risks and benefits of intervention  was discussed with the patient.    RECOMMENDATIONS:         Psychotherapy - supportive therapy provided during session    Follow up:  7/7/2025 Josi Ybarra, NP   4/3/25 Rajwinder [facility]

## 2025-03-20 NOTE — TELEPHONE ENCOUNTER
----- Message from [employer] sent at 3/19/2025  3:57 PM CDT -----  Type: Needs Medical AdviceWho Called:  diana Best Call Back Number: [phone] Information: diana needs to reschedule her 3/21 Zofran to next week please call to further assist thank you .

## 2025-03-21 NOTE — ADDENDUM NOTE
Addended by: SHARON SHEEHAN on: 2/14/2019 04:08 PM     Modules accepted: Orders     Placed form in scan bin to be entered into patient chart.

## 2025-03-24 ENCOUNTER — INFUSION (OUTPATIENT)
Dept: INFUSION THERAPY | Facility: HOSPITAL | Age: 30
End: 2025-03-24
Attending: PHYSICIAN ASSISTANT
Payer: MEDICARE

## 2025-03-24 VITALS
TEMPERATURE: 98 F | HEIGHT: 63 IN | HEART RATE: 74 BPM | DIASTOLIC BLOOD PRESSURE: 77 MMHG | OXYGEN SATURATION: 98 % | BODY MASS INDEX: 51.91 KG/M2 | SYSTOLIC BLOOD PRESSURE: 138 MMHG | WEIGHT: 293 LBS | RESPIRATION RATE: 18 BRPM

## 2025-03-24 DIAGNOSIS — Z98.84 S/P BARIATRIC SURGERY: ICD-10-CM

## 2025-03-24 DIAGNOSIS — G43.E09 CHRONIC MIGRAINE WITH AURA WITHOUT STATUS MIGRAINOSUS, NOT INTRACTABLE: ICD-10-CM

## 2025-03-24 DIAGNOSIS — E66.01 MORBID OBESITY: Primary | ICD-10-CM

## 2025-03-24 DIAGNOSIS — R07.2 SUBSTERNAL CHEST PAIN: ICD-10-CM

## 2025-03-24 DIAGNOSIS — E86.0 DEHYDRATION: ICD-10-CM

## 2025-03-24 DIAGNOSIS — R00.0 TACHYCARDIA: ICD-10-CM

## 2025-03-24 PROCEDURE — 25000003 PHARM REV CODE 250: Mod: PN | Performed by: PHYSICIAN ASSISTANT

## 2025-03-24 PROCEDURE — 96374 THER/PROPH/DIAG INJ IV PUSH: CPT | Mod: PN

## 2025-03-24 PROCEDURE — 96361 HYDRATE IV INFUSION ADD-ON: CPT | Mod: PN

## 2025-03-24 PROCEDURE — 63600175 PHARM REV CODE 636 W HCPCS: Mod: PN | Performed by: PHYSICIAN ASSISTANT

## 2025-03-24 RX ORDER — HEPARIN 100 UNIT/ML
500 SYRINGE INTRAVENOUS
Status: DISCONTINUED | OUTPATIENT
Start: 2025-03-24 | End: 2025-03-24 | Stop reason: HOSPADM

## 2025-03-24 RX ORDER — SODIUM CHLORIDE 0.9 % (FLUSH) 0.9 %
10 SYRINGE (ML) INJECTION
Status: CANCELLED | OUTPATIENT
Start: 2025-03-24

## 2025-03-24 RX ORDER — HEPARIN 100 UNIT/ML
500 SYRINGE INTRAVENOUS
Status: CANCELLED | OUTPATIENT
Start: 2025-03-24

## 2025-03-24 RX ORDER — ONDANSETRON HYDROCHLORIDE 2 MG/ML
8 INJECTION, SOLUTION INTRAVENOUS
Status: COMPLETED | OUTPATIENT
Start: 2025-03-24 | End: 2025-03-24

## 2025-03-24 RX ORDER — ONDANSETRON HYDROCHLORIDE 2 MG/ML
8 INJECTION, SOLUTION INTRAVENOUS
Start: 2025-03-24

## 2025-03-24 RX ORDER — SODIUM CHLORIDE 0.9 % (FLUSH) 0.9 %
10 SYRINGE (ML) INJECTION
Status: DISCONTINUED | OUTPATIENT
Start: 2025-03-24 | End: 2025-03-24 | Stop reason: HOSPADM

## 2025-03-24 RX ADMIN — ONDANSETRON 8 MG: 2 INJECTION INTRAMUSCULAR; INTRAVENOUS at 03:03

## 2025-03-24 RX ADMIN — SODIUM CHLORIDE 1000 ML: 9 INJECTION, SOLUTION INTRAVENOUS at 03:03

## 2025-03-24 NOTE — PLAN OF CARE
Problem: Fatigue  Goal: Improved Activity Tolerance  Intervention: Promote Improved Energy  Flowsheets (Taken 3/24/2025 1612)  Fatigue Management:   activity assistance provided   fatigue-related activity identified   paced activity encouraged   frequent rest breaks encouraged  Sleep/Rest Enhancement:   natural light exposure provided   relaxation techniques promoted  Activity Management:   Ambulated -L4   Up in chair - L3  Environmental Support:   environmental consistency promoted   personal routine supported   rooming-in facilitated   rest periods encouraged     Problem: Adult Inpatient Plan of Care  Goal: Patient-Specific Goal (Individualized)  Outcome: Progressing  Flowsheets (Taken 3/24/2025 1612)  Individualized Care Needs: Education, conversation, recliner  Anxieties, Fears or Concerns: Not feeling well  Patient/Family-Specific Goals (Include Timeframe): Improvment with fatigue     Problem: Adult Inpatient Plan of Care  Goal: Plan of Care Review  Outcome: Progressing  Flowsheets (Taken 3/24/2025 1612)  Plan of Care Reviewed With: patient  Pt tolerated IVF infusion well, NAD. Pt education reinforced on potential side effects, what to expect and when to call the physician. Pt given a schedule and reviewed, pt verbalized understanding. Pt ambulated out of the clinic without difficulty independently.

## 2025-03-28 ENCOUNTER — TELEPHONE (OUTPATIENT)
Dept: FAMILY MEDICINE | Facility: CLINIC | Age: 30
End: 2025-03-28
Payer: MEDICARE

## 2025-03-28 PROBLEM — N30.00 ACUTE CYSTITIS: Status: ACTIVE | Noted: 2025-03-28

## 2025-03-28 PROBLEM — R29.898 BILATERAL LEG WEAKNESS: Status: ACTIVE | Noted: 2025-03-28

## 2025-03-28 NOTE — TELEPHONE ENCOUNTER
Spoke w Ms. Martin - Advised pt Since she was having leg numbness, uncontrolled bladder & urinating on self, & also hasn't urinated since yesterday afternoon - 24 hrs even though she drinking fluids she should go get evaluated in the ER. Pt BAR.    Will also send message to advise Dr. Andrade.

## 2025-03-28 NOTE — TELEPHONE ENCOUNTER
----- Message from Ashley sent at 3/27/2025  4:15 PM CDT -----  Type:  Needs Medical AdviceWho Called: ptSymptoms (please be specific): numbing feeling and then bladder empties How long has patient had these symptoms:  1 dayWould the patient rather a call back or a response via [facility]? Please callBest Call Back Number: [phone] Information: pt has been having strange bladder sensations and has been urinating on self   Please call back to advise. Thanks!

## 2025-03-31 PROBLEM — R82.90 ABNORMAL URINALYSIS: Status: ACTIVE | Noted: 2025-03-28

## 2025-03-31 PROBLEM — Z73.6 LIMITATION OF ACTIVITY DUE TO DISABILITY: Status: ACTIVE | Noted: 2025-03-31

## 2025-04-02 ENCOUNTER — TELEPHONE (OUTPATIENT)
Dept: GASTROENTEROLOGY | Facility: CLINIC | Age: 30
End: 2025-04-02
Payer: MEDICARE

## 2025-04-03 ENCOUNTER — OFFICE VISIT (OUTPATIENT)
Dept: BARIATRICS | Facility: CLINIC | Age: 30
End: 2025-04-03
Payer: MEDICARE

## 2025-04-03 DIAGNOSIS — E66.01 MORBID OBESITY WITH BMI OF 50.0-59.9, ADULT: Primary | ICD-10-CM

## 2025-04-03 DIAGNOSIS — F11.11 HISTORY OF OPIOID ABUSE: ICD-10-CM

## 2025-04-03 DIAGNOSIS — F33.1 MAJOR DEPRESSIVE DISORDER, RECURRENT EPISODE, MODERATE: ICD-10-CM

## 2025-04-03 PROBLEM — E51.11 THIAMINE DEFICIENCY NEUROPATHY: Status: ACTIVE | Noted: 2025-04-03

## 2025-04-03 NOTE — PROGRESS NOTES
Individual Psychotherapy (PhD/LCSW)     1/30/2025     Site: Met with pt virtually.      Therapeutic Intervention: Met with patient. Outpatient - Insight oriented psychotherapy 45 min - CPT code 56624        Chief complaint/reason for encounter: Depression and Anxiety      Interval history and content of current session: Pt reported that she was currently hospitalized for leg numbness that began on 3/28/25. Pt stated that she is currently in pain and cannot walk. She stated that she is hoping to be transferred to an inpatient rehab. Pt expressed frustration over continual health issues,as well as anxiety around her lack of ability to walk and complete ADLs. LCSW provided empathetic listening and emotional support. LCSW and pt engaged in safety planning around anxiety around health issues and discussed self-care activities to engage in while at rehab.      Treatment plan:  Target symptoms: mood swings, grief  Why chosen therapy is appropriate versus another modality: relevant to diagnosis  Outcome monitoring methods: self-report, observation  Therapeutic intervention type: insight oriented psychotherapy, supportive psychotherapy, interactive psychotherapy     Risk parameters:  Patient reports no suicidal ideation  Patient reports no homicidal ideation  Patient reports no self-injurious behavior  Patient reports no violent behavior     Verbal deficits: None     Patient's response to intervention:  The patient's response to intervention is accepting, motivated.     Progress toward goals and other mental status changes:  The patient's progress toward goals is fair .     Diagnosis:   Z68.43 (BMI) of 50.0-59.9 in adults   F33.1- Major Depressive Disorder, Moderate, recurring episode  F11.21-Opioid Use Disorder, Sever, in sustained remission     Plan:  individual psychotherapy and medication management by physician     Return to clinic: as scheduled     Length of Service (minutes): 40    Amaris Laura LCSW-[facility]  Department  of Surgery/ Bariatric Surgery LCSW-BACS

## 2025-04-08 ENCOUNTER — PATIENT MESSAGE (OUTPATIENT)
Dept: BARIATRICS | Facility: CLINIC | Age: 30
End: 2025-04-08
Payer: MEDICARE

## 2025-04-10 ENCOUNTER — TELEPHONE (OUTPATIENT)
Dept: BARIATRICS | Facility: CLINIC | Age: 30
End: 2025-04-10
Payer: MEDICARE

## 2025-04-10 NOTE — TELEPHONE ENCOUNTER
Called pt to inquire about being ongoing leg numbness and anxiety related to on going health issues.     Amaris Laura, LCSW-BACS  Department of Surgery/ Bariatric Surgery LCSW-BACS

## 2025-04-14 ENCOUNTER — TELEPHONE (OUTPATIENT)
Dept: FAMILY MEDICINE | Facility: CLINIC | Age: 30
End: 2025-04-14

## 2025-04-14 ENCOUNTER — OFFICE VISIT (OUTPATIENT)
Dept: FAMILY MEDICINE | Facility: CLINIC | Age: 30
End: 2025-04-14
Payer: MEDICARE

## 2025-04-14 DIAGNOSIS — E51.11 THIAMINE DEFICIENCY NEUROPATHY: ICD-10-CM

## 2025-04-14 DIAGNOSIS — E66.813 CLASS 3 SEVERE OBESITY DUE TO EXCESS CALORIES WITH SERIOUS COMORBIDITY AND BODY MASS INDEX (BMI) OF 50.0 TO 59.9 IN ADULT: ICD-10-CM

## 2025-04-14 DIAGNOSIS — R29.898 BILATERAL LEG WEAKNESS: ICD-10-CM

## 2025-04-14 DIAGNOSIS — R20.2 PARESTHESIA: ICD-10-CM

## 2025-04-14 DIAGNOSIS — R82.90 ABNORMAL URINALYSIS: ICD-10-CM

## 2025-04-14 DIAGNOSIS — E66.01 CLASS 3 SEVERE OBESITY DUE TO EXCESS CALORIES WITH SERIOUS COMORBIDITY AND BODY MASS INDEX (BMI) OF 50.0 TO 59.9 IN ADULT: ICD-10-CM

## 2025-04-14 DIAGNOSIS — Z09 HOSPITAL DISCHARGE FOLLOW-UP: Primary | ICD-10-CM

## 2025-04-14 DIAGNOSIS — F41.1 GAD (GENERALIZED ANXIETY DISORDER): ICD-10-CM

## 2025-04-14 DIAGNOSIS — Z73.6 LIMITATION OF ACTIVITY DUE TO DISABILITY: ICD-10-CM

## 2025-04-14 DIAGNOSIS — Z98.84 S/P BARIATRIC SURGERY: ICD-10-CM

## 2025-04-14 PROCEDURE — 1160F RVW MEDS BY RX/DR IN RCRD: CPT | Mod: CPTII,95,, | Performed by: STUDENT IN AN ORGANIZED HEALTH CARE EDUCATION/TRAINING PROGRAM

## 2025-04-14 PROCEDURE — 98004 SYNCH AUDIO-VIDEO EST SF 10: CPT | Mod: 95,,, | Performed by: STUDENT IN AN ORGANIZED HEALTH CARE EDUCATION/TRAINING PROGRAM

## 2025-04-14 PROCEDURE — G2211 COMPLEX E/M VISIT ADD ON: HCPCS | Mod: 95,,, | Performed by: STUDENT IN AN ORGANIZED HEALTH CARE EDUCATION/TRAINING PROGRAM

## 2025-04-14 PROCEDURE — 1159F MED LIST DOCD IN RCRD: CPT | Mod: CPTII,95,, | Performed by: STUDENT IN AN ORGANIZED HEALTH CARE EDUCATION/TRAINING PROGRAM

## 2025-04-14 PROCEDURE — 3044F HG A1C LEVEL LT 7.0%: CPT | Mod: CPTII,95,, | Performed by: STUDENT IN AN ORGANIZED HEALTH CARE EDUCATION/TRAINING PROGRAM

## 2025-04-14 NOTE — PROGRESS NOTES
Subjective:       Patient ID: Alexandra Martin is a 29 y.o. female.    HPI    The patient location is: [place]  The chief complaint leading to consultation is: worsening weakness    Visit type: audiovisual    Face to Face time with patient: 16 minutes  16 minutes of total time spent on the encounter, which includes face to face time and non-face to face time preparing to see the patient (eg, review of tests), Obtaining and/or reviewing separately obtained history, Documenting clinical information in the electronic or other health record, Independently interpreting results (not separately reported) and communicating results to the patient/family/caregiver, or Care coordination (not separately reported).         Each patient to whom he or she provides medical services by telemedicine is:  (1) informed of the relationship between the physician and patient and the respective role of any other health care provider with respect to management of the patient; and (2) notified that he or she may decline to receive medical services by telemedicine and may withdraw from such care at any time.    Notes:   29-year-old female presents for audiovisual virtual visit for worsening weakness.  She is known to me in her last visit here was on 03/17/2025 for paresthesia by Carla Rivera, JESSA.  At that time EMG was ordered.  However, since then the patient was admitted to [facility] for 5 days or worsening bilateral lower extremity weakness.  She underwent MRI of the brain, cervical, thoracic, and lumbar spine which were unremarkable neurology was consulted and recommended IV thiamine.  With very slow progress with PT, Neurology was reconsulted who recommended high-intensity regimen physical therapy and patient was discharged to [facility] rehab facility and [place].  She is still at [facility]"" and anticipates discharge home with home health on Wednesday.  She is scheduled to establish with Neurology in May with EMG the  day before.  Despite IV and oral supplementation of thiamine the patient admits her symptoms are worsened have progressed to numbness and tingling in her face.  With unclear etiology she requests referral to Rheumatology.  She denies recurrent rashes, fevers, or stiff, red, or painful joints.  I recommend follow up with Neurology and complete EMG as scheduled.  If weakness follows rising pattern of paresthesia, this could be Guillain [place] syndrome. Denies recent illness. If difficulty breathing, recommend call 911.  For earlier appointment she may try outside of [facility].  If so she will let me know if she needs referral sent elsewhere.    Plan:  Establish with Neurology as scheduled.  If difficulty breathing, call 911.  Referral to Rheumatology placed per request, though JN was negative inpatient.    Past Medical History:   Diagnosis Date    ADD (attention deficit disorder)     Anxiety disorder     Biliary dyskinesia 11/02/2024    Bronchitis 12/03/2024    Depression     Drug-induced constipation 03/04/2024    Headache     MDD (major depressive disorder), recurrent episode, moderate 07/29/2021    Morbid obesity with BMI of 60.0-69.9, adult 06/10/2024    Nephrolithiasis     Ovarian cyst     Substance abuse     Hospitalization     Trauma 09/11/2019    Tympanic membrane perforation, left 03/14/2018    UTI (urinary tract infection) 08/03/2018    Vertigo        Past Surgical History:   Procedure Laterality Date    ANGIOGRAM, CORONARY, WITH LEFT HEART CATHETERIZATION  02/10/2025    Procedure: Left Heart Cath;  Surgeon: Garcia De Luna MD;  Location: [facility] CATH;  Service: Cardiovascular;;    CHOLECYSTECTOMY      CYSTOSCOPY W/ URETERAL STENT PLACEMENT Right 12/26/2019    Procedure: CYSTOSCOPY, WITH URETERAL STENT INSERTION;  Surgeon: Rito Medley MD;  Location: [facility] OR;  Service: Urology;  Laterality: Right;    CYSTOSCOPY W/ URETERAL STENT REMOVAL Right 01/06/2020    Procedure: CYSTOSCOPY, WITH URETERAL  STENT REMOVAL;  Surgeon: Rito Medley MD;  Location: [facility] OR;  Service: Urology;  Laterality: Right;    ESOPHAGOGASTRODUODENOSCOPY N/A 03/11/2025    Procedure: EGD (ESOPHAGOGASTRODUODENOSCOPY);  Surgeon: Vernon Pike DO;  Location: [facility] ENDO;  Service: Endoscopy;  Laterality: N/A;    INJECTION OF ANESTHETIC AGENT INTO TISSUE PLANE DEFINED BY TRANSVERSUS ABDOMINIS MUSCLE  01/06/2025    Procedure: BLOCK, TRANSVERSUS ABDOMINIS PLANE;  Surgeon: Bibi Edmondson MD;  Location: [facility] OR 2ND FLR;  Service: General;;    LAPAROSCOPIC CHOLECYSTECTOMY  11/05/2024    LAPAROSCOPIC GASTROENTEROSTOMY N/A 01/06/2025    Procedure: GASTROENTEROSTOMY, LAPAROSCOPIC with inraop EGD;  Surgeon: Bibi Edmondson MD;  Location: [facility] OR 2ND FLR;  Service: General;  Laterality: N/A;  Surgeon to perform Tap Block itraoperatively    LASER LITHOTRIPSY Right 01/06/2020    Procedure: LITHOTRIPSY, USING LASER;  Surgeon: Rito Medley MD;  Location: [facility] OR;  Service: Urology;  Laterality: Right;    MAGNETIC RESONANCE IMAGING N/A 03/29/2025    Procedure: MRI (MAGNETIC RESONANCE IMAGING);  Surgeon: Dory Kirk;  Location: [facility];  Service: Anesthesiology;  Laterality: N/A;    STOMACH SURGERY  01/06/2025    TONSILLECTOMY, ADENOIDECTOMY, BILATERAL MYRINGOTOMY AND TUBES      UPPER GASTROINTESTINAL ENDOSCOPY      URETEROSCOPIC REMOVAL OF URETERIC CALCULUS Right 01/06/2020    Procedure: REMOVAL, CALCULUS, URETER, URETEROSCOPIC;  Surgeon: Rito Medley MD;  Location: [facility] OR;  Service: Urology;  Laterality: Right;    URETEROSCOPY Left 12/26/2019    Procedure: URETEROSCOPY;  Surgeon: Rito Medley MD;  Location: [facility] OR;  Service: Urology;  Laterality: Left;    URETEROSCOPY Right 01/06/2020    Procedure: URETEROSCOPY;  Surgeon: Rito Medley MD;  Location: [facility] OR;  Service: Urology;  Laterality: Right;       Review of patient's allergies indicates:   Allergen Reactions    Ceftriaxone Nausea  And Vomiting    Azithromycin Nausea And Vomiting     Hard to breathe     Latex, natural rubber Swelling and Rash       Social History[1]    Medications Ordered Prior to Encounter[2]    Family History   Problem Relation Name Age of Onset    Obesity Mother      Stomach cancer Father      Ulcerative colitis Brother      Breast cancer Maternal Aunt      Colon cancer Neg Hx      Miscarriages / Stillbirths Neg Hx      Ovarian cancer Neg Hx      Crohn's disease Neg Hx      Esophageal cancer Neg Hx         Review of Systems   Constitutional:  Positive for activity change. Negative for unexpected weight change.   HENT:  Negative for hearing loss, rhinorrhea and trouble swallowing.    Eyes:  Negative for discharge and visual disturbance.   Respiratory:  Negative for chest tightness and wheezing.    Cardiovascular:  Negative for chest pain and palpitations.   Gastrointestinal:  Negative for blood in stool, constipation, diarrhea and vomiting.   Endocrine: Negative for polydipsia and polyuria.   Genitourinary:  Negative for difficulty urinating, dysuria, hematuria and menstrual problem.   Musculoskeletal:  Negative for arthralgias, joint swelling and neck pain.   Neurological:  Negative for weakness and headaches.   Psychiatric/Behavioral:  Negative for confusion and dysphoric mood.          Objective:      There were no vitals taken for this visit.  Physical Exam   Calm, cooperative. No acute distress. No increased work of breathing. No dysarthria. Symmetric facies.    Assessment:       1. Hospital discharge follow-up    2. Bilateral leg weakness    3. Thiamine deficiency neuropathy    4. Paresthesia    5. S/P bariatric surgery    6. Limitation of activity due to disability    7. TRACE (generalized anxiety disorder)    8. Abnormal urinalysis    9. Class 3 severe obesity due to excess calories with serious comorbidity and body mass index (BMI) of 50.0 to 59.9 in adult        Plan:       Hospital discharge follow-up  - Of note,  after conclusion of the encounter, I see that oxycodone is listed in medication list. I tried to call the patient and have not reached her, yet.  - This patient his history of opioid dependence. Would avoid opioids.  - Will try to call her back again to go over medication list.    Bilateral leg weakness  - Establish with neurology as scheduled.  - If shortness of breath, call 911.    Thiamine deficiency neuropathy  - Continue supplement as prescribed.    Paresthesia  -     Ambulatory referral/consult to Rheumatology; Future; Expected date: 04/21/2025  - JN negative while inpatient.  - Attributed to thiamine deficiency in setting of bariatric surgery, however patient reports worsening symptoms despite IV supplementation in the hospital and continued oral supplement.    S/P bariatric surgery  - Follow up with Bariatrics as scheduled.    Limitation of activity due to disability  - Current in rehab facility anticipating discharge home with home health soon.    TRACE (generalized anxiety disorder)  - Continue long time home med venlafaxine.    Abnormal urinalysis  - Treated for UTI while inpatient.    Class 3 severe obesity due to excess calories with serious comorbidity and body mass index (BMI) of 50.0 to 59.9 in adult  - S/p bariatric surgery.      Visit today included increased complexity associated with the care of the episodic problem bilateral leg weakness addressed and managing the longitudinal care of the patient due to the serious and/or complex managed problem(s) generalized anxiety disorder.           [1]   Social History  Socioeconomic History    Marital status: Single   Tobacco Use    Smoking status: Former     Current packs/day: 0.25     Average packs/day: 0.3 packs/day for 15.3 years (3.8 ttl pk-yrs)     Types: Cigarettes     Start date: 2010    Smokeless tobacco: Never    Tobacco comments:     Quit smoking June 2024    Substance and Sexual Activity    Alcohol use: Not Currently     Alcohol/week: 0.0  standard drinks of alcohol    Drug use: Not Currently     Types: Marijuana, Fentanyl     Comment: history of opoid abuse    Sexual activity: Not Currently     Partners: Male     Birth control/protection: Implant     Social Drivers of Health     Financial Resource Strain: Low Risk  (4/21/2025)    Overall Financial Resource Strain (CARDIA)     Difficulty of Paying Living Expenses: Not hard at all   Food Insecurity: No Food Insecurity (4/21/2025)    Hunger Vital Sign     Worried About Running Out of Food in the Last Year: Never true     Ran Out of Food in the Last Year: Never true   Transportation Needs: No Transportation Needs (4/21/2025)    PRAPARE - Transportation     Lack of Transportation (Medical): No     Lack of Transportation (Non-Medical): No   Physical Activity: Inactive (4/21/2025)    Exercise Vital Sign     Days of Exercise per Week: 0 days     Minutes of Exercise per Session: 0 min   Stress: No Stress Concern Present (4/21/2025)    [employer] - Occupational Stress Questionnaire     Feeling of Stress : Not at all   Housing Stability: Low Risk  (4/21/2025)    Housing Stability Vital Sign     Unable to Pay for Housing in the Last Year: No     Number of Times Moved in the Last Year: 1     Homeless in the Last Year: No   [2]   No current facility-administered medications on file prior to visit.     Current Outpatient Medications on File Prior to Visit   Medication Sig Dispense Refill    acetaminophen (TYLENOL) 325 MG tablet Take 2 tablets (650 mg total) by mouth every 4 (four) hours as needed for Pain.      calcium-vitamin D tablet 600 mg-200 units Take 1 tablet by mouth 2 (two) times daily.      ergocalciferol (VITAMIN D2) 50,000 unit Cap Take 1 capsule (50,000 Units total) by mouth every 7 days.      etonogestreL (NEXPLANON) 68 mg Impl subdermal device 68 mg by Subdermal route once. A single NEXPLANON implant is inserted subdermally just under the skin at the inner side of the  non-dominant upper arm.      folic acid-vit B6-vit B12 2.5-25-2 mg (FOLBIC OR EQUIV) 2.5-25-2 mg Tab Take 1 tablet by mouth once daily.      metoprolol succinate (TOPROL-XL) 50 MG 24 hr tablet Take 1 tablet (50 mg total) by mouth 2 (two) times daily. 60 tablet 11    multivit-min/iron/folic acid/K (BARIATRIC MULTIVITAMINS ORAL) Take 1 tablet by mouth 2 (two) times a day.      pantoprazole (PROTONIX) 40 MG tablet Take 40 mg by mouth 2 (two) times daily.      polyethylene glycol (GLYCOLAX) 17 gram PwPk Take 17 g by mouth daily as needed for Constipation.      thiamine 100 MG tablet Take 5 tablets (500 mg total) by mouth 2 (two) times a day for 3 days, THEN 1 tablet (100 mg total) 2 (two) times a day. 90 tablet 0    venlafaxine (EFFEXOR-XR) 37.5 MG 24 hr capsule Take 37.5 mg by mouth once daily.      [DISCONTINUED] enoxaparin (LOVENOX) 40 mg/0.4 mL Syrg Inject 0.4 mLs (40 mg total) into the skin 2 (two) times daily.      [DISCONTINUED] oxyCODONE-acetaminophen (PERCOCET) 5-325 mg per tablet Take 1 tablet by mouth every 6 (six) hours as needed for Pain.

## 2025-04-20 ENCOUNTER — HOSPITAL ENCOUNTER (INPATIENT)
Facility: HOSPITAL | Age: 30
LOS: 2 days | Discharge: HOME | DRG: 641 | End: 2025-04-22
Attending: STUDENT IN AN ORGANIZED HEALTH CARE EDUCATION/TRAINING PROGRAM | Admitting: STUDENT IN AN ORGANIZED HEALTH CARE EDUCATION/TRAINING PROGRAM
Payer: MEDICARE

## 2025-04-20 DIAGNOSIS — R53.1 WEAKNESS: ICD-10-CM

## 2025-04-20 DIAGNOSIS — Z98.84 HISTORY OF GASTRIC BYPASS: ICD-10-CM

## 2025-04-20 DIAGNOSIS — R20.0 NUMBNESS: ICD-10-CM

## 2025-04-20 DIAGNOSIS — F41.9 ANXIETY: ICD-10-CM

## 2025-04-20 DIAGNOSIS — F41.1 GAD (GENERALIZED ANXIETY DISORDER): ICD-10-CM

## 2025-04-20 DIAGNOSIS — R29.898 BILATERAL LEG WEAKNESS: ICD-10-CM

## 2025-04-20 DIAGNOSIS — R53.1 GENERALIZED WEAKNESS: ICD-10-CM

## 2025-04-20 DIAGNOSIS — E51.11 THIAMINE DEFICIENCY NEUROPATHY: ICD-10-CM

## 2025-04-20 DIAGNOSIS — E66.01 MORBID OBESITY: ICD-10-CM

## 2025-04-20 DIAGNOSIS — M62.81 MUSCLE WEAKNESS OF EXTREMITY: Primary | ICD-10-CM

## 2025-04-20 LAB
ABSOLUTE EOSINOPHIL (OHS): 0.07 K/UL
ABSOLUTE MONOCYTE (OHS): 0.43 K/UL (ref 0.3–1)
ABSOLUTE NEUTROPHIL COUNT (OHS): 3.43 K/UL (ref 1.8–7.7)
ALBUMIN SERPL BCP-MCNC: 3.3 G/DL (ref 3.5–5.2)
ALP SERPL-CCNC: 97 UNIT/L (ref 40–150)
ALT SERPL W/O P-5'-P-CCNC: 40 UNIT/L (ref 10–44)
ANION GAP (OHS): 10 MMOL/L (ref 8–16)
AST SERPL-CCNC: 31 UNIT/L (ref 11–45)
B-HCG UR QL: NEGATIVE
BASOPHILS # BLD AUTO: 0.02 K/UL
BASOPHILS NFR BLD AUTO: 0.3 %
BILIRUB SERPL-MCNC: 0.5 MG/DL (ref 0.1–1)
BILIRUB UR QL STRIP.AUTO: NEGATIVE
BUN SERPL-MCNC: 5 MG/DL (ref 6–20)
CALCIUM SERPL-MCNC: 9 MG/DL (ref 8.7–10.5)
CHLORIDE SERPL-SCNC: 110 MMOL/L (ref 95–110)
CHOLEST SERPL-MCNC: 156 MG/DL (ref 120–199)
CHOLEST/HDLC SERPL: 4.6 {RATIO} (ref 2–5)
CK SERPL-CCNC: 22 U/L (ref 20–180)
CLARITY UR: CLEAR
CO2 SERPL-SCNC: 21 MMOL/L (ref 23–29)
COLOR UR AUTO: YELLOW
CREAT SERPL-MCNC: 0.7 MG/DL (ref 0.5–1.4)
CRP SERPL-MCNC: 5.7 MG/L
CTP QC/QA: YES
ERYTHROCYTE [DISTWIDTH] IN BLOOD BY AUTOMATED COUNT: 13.1 % (ref 11.5–14.5)
ERYTHROCYTE [SEDIMENTATION RATE] IN BLOOD BY PHOTOMETRIC METHOD: 34 MM/HR
GFR SERPLBLD CREATININE-BSD FMLA CKD-EPI: >60 ML/MIN/1.73/M2
GLUCOSE SERPL-MCNC: 93 MG/DL (ref 70–110)
GLUCOSE UR QL STRIP: NEGATIVE
HCT VFR BLD AUTO: 39.2 % (ref 37–48.5)
HDLC SERPL-MCNC: 34 MG/DL (ref 40–75)
HDLC SERPL: 21.8 % (ref 20–50)
HGB BLD-MCNC: 12 GM/DL (ref 12–16)
HGB UR QL STRIP: NEGATIVE
IMM GRANULOCYTES # BLD AUTO: 0.02 K/UL (ref 0–0.04)
IMM GRANULOCYTES NFR BLD AUTO: 0.3 % (ref 0–0.5)
KETONES UR QL STRIP: NEGATIVE
LDLC SERPL CALC-MCNC: 80.4 MG/DL (ref 63–159)
LEUKOCYTE ESTERASE UR QL STRIP: NEGATIVE
LYMPHOCYTES # BLD AUTO: 1.79 K/UL (ref 1–4.8)
MAGNESIUM SERPL-MCNC: 2 MG/DL (ref 1.6–2.6)
MCH RBC QN AUTO: 26.8 PG (ref 27–31)
MCHC RBC AUTO-ENTMCNC: 30.6 G/DL (ref 32–36)
MCV RBC AUTO: 88 FL (ref 82–98)
NITRITE UR QL STRIP: NEGATIVE
NONHDLC SERPL-MCNC: 122 MG/DL
NUCLEATED RBC (/100WBC) (OHS): 0 /100 WBC
PH UR STRIP: 8 [PH]
PLATELET # BLD AUTO: 197 K/UL (ref 150–450)
PMV BLD AUTO: 10.5 FL (ref 9.2–12.9)
POTASSIUM SERPL-SCNC: 4 MMOL/L (ref 3.5–5.1)
PROCALCITONIN SERPL-MCNC: 0.02 NG/ML
PROT SERPL-MCNC: 6.5 GM/DL (ref 6–8.4)
PROT UR QL STRIP: NEGATIVE
RBC # BLD AUTO: 4.47 M/UL (ref 4–5.4)
RELATIVE EOSINOPHIL (OHS): 1.2 %
RELATIVE LYMPHOCYTE (OHS): 31.1 % (ref 18–48)
RELATIVE MONOCYTE (OHS): 7.5 % (ref 4–15)
RELATIVE NEUTROPHIL (OHS): 59.6 % (ref 38–73)
SODIUM SERPL-SCNC: 141 MMOL/L (ref 136–145)
SP GR UR STRIP: 1.02
T4 FREE SERPL-MCNC: 0.94 NG/DL (ref 0.71–1.51)
TRIGL SERPL-MCNC: 208 MG/DL (ref 30–150)
TSH SERPL-ACNC: 0.29 UIU/ML (ref 0.4–4)
UROBILINOGEN UR STRIP-ACNC: ABNORMAL EU/DL
WBC # BLD AUTO: 5.76 K/UL (ref 3.9–12.7)

## 2025-04-20 PROCEDURE — 81025 URINE PREGNANCY TEST: CPT | Performed by: PHYSICIAN ASSISTANT

## 2025-04-20 PROCEDURE — 84591 ASSAY OF NOS VITAMIN: CPT | Performed by: STUDENT IN AN ORGANIZED HEALTH CARE EDUCATION/TRAINING PROGRAM

## 2025-04-20 PROCEDURE — 84439 ASSAY OF FREE THYROXINE: CPT | Performed by: PHYSICIAN ASSISTANT

## 2025-04-20 PROCEDURE — 12000002 HC ACUTE/MED SURGE SEMI-PRIVATE ROOM

## 2025-04-20 PROCEDURE — 84252 ASSAY OF VITAMIN B-2: CPT | Performed by: STUDENT IN AN ORGANIZED HEALTH CARE EDUCATION/TRAINING PROGRAM

## 2025-04-20 PROCEDURE — 84425 ASSAY OF VITAMIN B-1: CPT | Performed by: PHYSICIAN ASSISTANT

## 2025-04-20 PROCEDURE — 84443 ASSAY THYROID STIM HORMONE: CPT | Performed by: PHYSICIAN ASSISTANT

## 2025-04-20 PROCEDURE — 94010 BREATHING CAPACITY TEST: CPT

## 2025-04-20 PROCEDURE — 83921 ORGANIC ACID SINGLE QUANT: CPT | Performed by: STUDENT IN AN ORGANIZED HEALTH CARE EDUCATION/TRAINING PROGRAM

## 2025-04-20 PROCEDURE — 25000003 PHARM REV CODE 250: Performed by: PHYSICIAN ASSISTANT

## 2025-04-20 PROCEDURE — 63600175 PHARM REV CODE 636 W HCPCS: Performed by: PHYSICIAN ASSISTANT

## 2025-04-20 PROCEDURE — 82550 ASSAY OF CK (CPK): CPT | Performed by: STUDENT IN AN ORGANIZED HEALTH CARE EDUCATION/TRAINING PROGRAM

## 2025-04-20 PROCEDURE — 99291 CRITICAL CARE FIRST HOUR: CPT | Mod: ,,,

## 2025-04-20 PROCEDURE — 83735 ASSAY OF MAGNESIUM: CPT | Performed by: PHYSICIAN ASSISTANT

## 2025-04-20 PROCEDURE — 80053 COMPREHEN METABOLIC PANEL: CPT | Performed by: PHYSICIAN ASSISTANT

## 2025-04-20 PROCEDURE — 81003 URINALYSIS AUTO W/O SCOPE: CPT | Performed by: PHYSICIAN ASSISTANT

## 2025-04-20 PROCEDURE — 99285 EMERGENCY DEPT VISIT HI MDM: CPT | Mod: 25

## 2025-04-20 PROCEDURE — 25000003 PHARM REV CODE 250

## 2025-04-20 PROCEDURE — 82607 VITAMIN B-12: CPT | Performed by: STUDENT IN AN ORGANIZED HEALTH CARE EDUCATION/TRAINING PROGRAM

## 2025-04-20 PROCEDURE — 84145 PROCALCITONIN (PCT): CPT | Performed by: PHYSICIAN ASSISTANT

## 2025-04-20 PROCEDURE — 25000003 PHARM REV CODE 250: Performed by: EMERGENCY MEDICINE

## 2025-04-20 PROCEDURE — 80061 LIPID PANEL: CPT

## 2025-04-20 PROCEDURE — 86140 C-REACTIVE PROTEIN: CPT | Performed by: PHYSICIAN ASSISTANT

## 2025-04-20 PROCEDURE — 94150 VITAL CAPACITY TEST: CPT

## 2025-04-20 PROCEDURE — 00JU3ZZ INSPECTION OF SPINAL CANAL, PERCUTANEOUS APPROACH: ICD-10-PCS | Performed by: STUDENT IN AN ORGANIZED HEALTH CARE EDUCATION/TRAINING PROGRAM

## 2025-04-20 PROCEDURE — 85025 COMPLETE CBC W/AUTO DIFF WBC: CPT | Performed by: PHYSICIAN ASSISTANT

## 2025-04-20 PROCEDURE — 86901 BLOOD TYPING SEROLOGIC RH(D): CPT

## 2025-04-20 PROCEDURE — 93010 ELECTROCARDIOGRAM REPORT: CPT | Mod: ,,, | Performed by: INTERNAL MEDICINE

## 2025-04-20 PROCEDURE — 93005 ELECTROCARDIOGRAM TRACING: CPT

## 2025-04-20 PROCEDURE — 85652 RBC SED RATE AUTOMATED: CPT | Performed by: PHYSICIAN ASSISTANT

## 2025-04-20 RX ORDER — PANTOPRAZOLE SODIUM 20 MG/1
40 TABLET, DELAYED RELEASE ORAL 2 TIMES DAILY
Status: DISCONTINUED | OUTPATIENT
Start: 2025-04-21 | End: 2025-04-22 | Stop reason: HOSPADM

## 2025-04-20 RX ORDER — POLYETHYLENE GLYCOL 3350 17 G/17G
17 POWDER, FOR SOLUTION ORAL DAILY
Status: DISCONTINUED | OUTPATIENT
Start: 2025-04-21 | End: 2025-04-22 | Stop reason: HOSPADM

## 2025-04-20 RX ORDER — LIDOCAINE HYDROCHLORIDE 10 MG/ML
10 INJECTION, SOLUTION INFILTRATION; PERINEURAL
Status: COMPLETED | OUTPATIENT
Start: 2025-04-20 | End: 2025-04-20

## 2025-04-20 RX ORDER — BISACODYL 10 MG/1
10 SUPPOSITORY RECTAL DAILY PRN
Status: DISCONTINUED | OUTPATIENT
Start: 2025-04-20 | End: 2025-04-22 | Stop reason: HOSPADM

## 2025-04-20 RX ORDER — SODIUM,POTASSIUM PHOSPHATES 280-250MG
2 POWDER IN PACKET (EA) ORAL
Status: DISCONTINUED | OUTPATIENT
Start: 2025-04-20 | End: 2025-04-21

## 2025-04-20 RX ORDER — ACETAMINOPHEN 500 MG
1000 TABLET ORAL
Status: COMPLETED | OUTPATIENT
Start: 2025-04-20 | End: 2025-04-20

## 2025-04-20 RX ORDER — LANOLIN ALCOHOL/MO/W.PET/CERES
800 CREAM (GRAM) TOPICAL
Status: DISCONTINUED | OUTPATIENT
Start: 2025-04-20 | End: 2025-04-21

## 2025-04-20 RX ORDER — LABETALOL HCL 20 MG/4 ML
10 SYRINGE (ML) INTRAVENOUS EVERY 4 HOURS PRN
Status: DISCONTINUED | OUTPATIENT
Start: 2025-04-20 | End: 2025-04-22 | Stop reason: HOSPADM

## 2025-04-20 RX ORDER — HYDROCODONE BITARTRATE AND ACETAMINOPHEN 5; 325 MG/1; MG/1
1 TABLET ORAL
Refills: 0 | Status: COMPLETED | OUTPATIENT
Start: 2025-04-20 | End: 2025-04-20

## 2025-04-20 RX ORDER — ACETAMINOPHEN 325 MG/1
650 TABLET ORAL EVERY 6 HOURS PRN
Status: DISCONTINUED | OUTPATIENT
Start: 2025-04-20 | End: 2025-04-22 | Stop reason: HOSPADM

## 2025-04-20 RX ORDER — METOPROLOL TARTRATE 50 MG/1
50 TABLET ORAL 2 TIMES DAILY
Status: DISCONTINUED | OUTPATIENT
Start: 2025-04-21 | End: 2025-04-21

## 2025-04-20 RX ORDER — SODIUM CHLORIDE 0.9 % (FLUSH) 0.9 %
10 SYRINGE (ML) INJECTION
Status: DISCONTINUED | OUTPATIENT
Start: 2025-04-20 | End: 2025-04-22 | Stop reason: HOSPADM

## 2025-04-20 RX ORDER — OXYCODONE HYDROCHLORIDE 5 MG/1
5 TABLET ORAL EVERY 6 HOURS PRN
Refills: 0 | Status: DISCONTINUED | OUTPATIENT
Start: 2025-04-21 | End: 2025-04-21

## 2025-04-20 RX ORDER — AMOXICILLIN 250 MG
1 CAPSULE ORAL 2 TIMES DAILY
Status: DISCONTINUED | OUTPATIENT
Start: 2025-04-20 | End: 2025-04-22 | Stop reason: HOSPADM

## 2025-04-20 RX ORDER — METOPROLOL SUCCINATE 50 MG/1
50 TABLET, EXTENDED RELEASE ORAL 2 TIMES DAILY
Status: DISCONTINUED | OUTPATIENT
Start: 2025-04-21 | End: 2025-04-20

## 2025-04-20 RX ORDER — VENLAFAXINE 37.5 MG/1
37.5 TABLET ORAL DAILY
Status: DISCONTINUED | OUTPATIENT
Start: 2025-04-21 | End: 2025-04-21

## 2025-04-20 RX ORDER — SUCRALFATE 1 G/10ML
1 SUSPENSION ORAL EVERY 6 HOURS
Status: DISCONTINUED | OUTPATIENT
Start: 2025-04-21 | End: 2025-04-20

## 2025-04-20 RX ADMIN — LIDOCAINE HYDROCHLORIDE 10 ML: 10 INJECTION, SOLUTION INFILTRATION; PERINEURAL at 08:04

## 2025-04-20 RX ADMIN — ACETAMINOPHEN 1000 MG: 500 TABLET ORAL at 10:04

## 2025-04-20 RX ADMIN — HYDROCODONE BITARTRATE AND ACETAMINOPHEN 1 TABLET: 5; 325 TABLET ORAL at 07:04

## 2025-04-20 RX ADMIN — OXYCODONE 5 MG: 5 TABLET ORAL at 11:04

## 2025-04-20 RX ADMIN — SENNOSIDES AND DOCUSATE SODIUM 1 TABLET: 50; 8.6 TABLET ORAL at 11:04

## 2025-04-20 NOTE — ED NOTES
Pt identifiers Alexandra Martin were checked and are correct  LOC: The patient is awake, alert, aware of environment with a flat affect  affect. Oriented to person and place Reoriented to time and people  APPEARANCE: Pt rates left upper thigh pain  headache a 10/10 , in no acute distress, pt is clean and well groomed, clothing properly fastened  SKIN: Skin warm, dry and intact, normal skin turgor, moist mucus membranes  RESPIRATORY: Airway is open and patent, respirations are spontaneous, even and unlabored, normal effort and rate  CARDIAC: Normal rate and rhythm, no peripheral edema noted, capillary refill < 3 seconds, bilateral radial pulses 2+  ABDOMEN: Soft, nontender, nondistended. Bowel sounds present   NEUROLOGIC: PERRL, facial expression is symmetrical, patient moving upper extremities spontaneously, will note moved legs, complains of numbness to all extremities  Follows all commands appropriately  MUSCULOSKELETAL: No obvious deformities.

## 2025-04-20 NOTE — ED NOTES
"Pt assisted by OZ Zurita and Tia RN to bed  Pt was able to stand with help and walk to the stretcher Pt was now able to move both arms up to her face  Pt started crying stating " I am  sorry"  and crying as she was getting in the bed  Pt  both arms and placed her hands on her face while crying   "

## 2025-04-20 NOTE — ED NOTES
Mother of pt states pt had gastric bypass surgery in January 2025  Three weeks ago pt was admitted to [facility] with numbness to her legs  but the symptoms have gotten worse  Pt [facility] and discharged 4 days ago  Pt was able to was able to walk since discharge from rehab but the weakness has gotten progressively worse  Pt complains of pain to arms , lower back and upper left thigh   Mother states pt had Bell's Palsy  approximately 6 years ago and left sided facial droop noted Pt complains of headache starting 6 days ago rating pain a 10/10

## 2025-04-20 NOTE — ED PROVIDER NOTES
"Encounter Date: 4/20/2025      Physician Attestation Statement for PA:  As the supervising MD   Physician Attestation Statement: I have personally seen and examined this patient.       I agree with the history, exam, and treatment plan.    Critical care time of 35 minutes.  LP was performed by myself, unsuccessful attempt. Needle with insufficient length. NeuroICU consulted. To be admitted.             History     Chief Complaint   Patient presents with    Weakness     Bilateral numbness to arms and leg.  "My mind feels fuzzy" x4 days     The history is provided by the patient and medical records. No  was used.       Alexandra Martin is a 29 y.o. female with medical history of gastric bypass 1/2025, ADHD, anxiety presenting to the ED with the chief complaint of weakness and numbness.     Recently discharged 16 days ago from [facility] after admission for BLE weakness and numbness. She first noticed tingling sensation in her feet after standing for long periods of time after her bypass surgery in January. The tingling sensation changed into numbness and started traveling up her legs and started to involve both hands. Reports having numbness up to her chest and shoulders. She also has had progressively worsening weakness to both legs and arms. She was sent to rehab when discharged and got out of rehab 4 days ago. She was able to walk with a walker when she first got home. She has needed adult depends as she is not able to tell when she needs to use the restroom. She was not able to get out of bed which prompted her ED visit today. Her parents lifted her into a rolling walker and brought her to her car. She has an associated headache. She additionally notes having L sided facial weakness that is similar to when she had [place] Palsy.     She had MRI brain, C/T/L spine w/o contrast that did not show any significant findings. Thiamine level was mildly low 36 and was started on IV replacement " per Neurology recs.       Review of patient's allergies indicates:   Allergen Reactions    Ceftriaxone Nausea And Vomiting    Azithromycin Nausea And Vomiting     Hard to breathe     Latex, natural rubber Swelling and Rash     Past Medical History:   Diagnosis Date    ADD (attention deficit disorder)     Anxiety disorder     Biliary dyskinesia 11/02/2024    Bronchitis 12/03/2024    Depression     Drug-induced constipation 03/04/2024    Headache     MDD (major depressive disorder), recurrent episode, moderate 07/29/2021    Morbid obesity with BMI of 60.0-69.9, adult 06/10/2024    Nephrolithiasis     Ovarian cyst     Substance abuse     Hospitalization     Trauma 09/11/2019    Tympanic membrane perforation, left 03/14/2018    UTI (urinary tract infection) 08/03/2018    Vertigo      Past Surgical History:   Procedure Laterality Date    ANGIOGRAM, CORONARY, WITH LEFT HEART CATHETERIZATION  02/10/2025    Procedure: Left Heart Cath;  Surgeon: Garcia De Luna MD;  Location: [facility] CATH;  Service: Cardiovascular;;    CHOLECYSTECTOMY      CYSTOSCOPY W/ URETERAL STENT PLACEMENT Right 12/26/2019    Procedure: CYSTOSCOPY, WITH URETERAL STENT INSERTION;  Surgeon: Rito Medley MD;  Location: [facility] OR;  Service: Urology;  Laterality: Right;    CYSTOSCOPY W/ URETERAL STENT REMOVAL Right 01/06/2020    Procedure: CYSTOSCOPY, WITH URETERAL STENT REMOVAL;  Surgeon: Rito Medley MD;  Location: [facility] OR;  Service: Urology;  Laterality: Right;    ESOPHAGOGASTRODUODENOSCOPY N/A 03/11/2025    Procedure: EGD (ESOPHAGOGASTRODUODENOSCOPY);  Surgeon: Vernon Pike DO;  Location: [facility] ENDO;  Service: Endoscopy;  Laterality: N/A;    INJECTION OF ANESTHETIC AGENT INTO TISSUE PLANE DEFINED BY TRANSVERSUS ABDOMINIS MUSCLE  01/06/2025    Procedure: BLOCK, TRANSVERSUS ABDOMINIS PLANE;  Surgeon: Bibi Edmondson MD;  Location: [facility] OR [address] [place] [address];  Service: General;;    LAPAROSCOPIC CHOLECYSTECTOMY  11/05/2024     LAPAROSCOPIC GASTROENTEROSTOMY N/A 01/06/2025    Procedure: GASTROENTEROSTOMY, LAPAROSCOPIC with inraop EGD;  Surgeon: Bibi Edmondson MD;  Location: [facility] OR [address] [place] [address];  Service: General;  Laterality: N/A;  Surgeon to perform Tap Block itraoperatively    LASER LITHOTRIPSY Right 01/06/2020    Procedure: LITHOTRIPSY, USING LASER;  Surgeon: Rito Medley MD;  Location: [facility] OR;  Service: Urology;  Laterality: Right;    MAGNETIC RESONANCE IMAGING N/A 03/29/2025    Procedure: MRI (MAGNETIC RESONANCE IMAGING);  Surgeon: Dory Kirk;  Location: [facility];  Service: Anesthesiology;  Laterality: N/A;    STOMACH SURGERY  01/06/2025    TONSILLECTOMY, ADENOIDECTOMY, BILATERAL MYRINGOTOMY AND TUBES      UPPER GASTROINTESTINAL ENDOSCOPY      URETEROSCOPIC REMOVAL OF URETERIC CALCULUS Right 01/06/2020    Procedure: REMOVAL, CALCULUS, URETER, URETEROSCOPIC;  Surgeon: Rito Medley MD;  Location: [facility] OR;  Service: Urology;  Laterality: Right;    URETEROSCOPY Left 12/26/2019    Procedure: URETEROSCOPY;  Surgeon: Rito Medley MD;  Location: [facility] OR;  Service: Urology;  Laterality: Left;    URETEROSCOPY Right 01/06/2020    Procedure: URETEROSCOPY;  Surgeon: Rito Medley MD;  Location: [facility] OR;  Service: Urology;  Laterality: Right;     Family History   Problem Relation Name Age of Onset    Obesity Mother      Stomach cancer Father      Ulcerative colitis Brother      Breast cancer Maternal Aunt      Colon cancer Neg Hx      Miscarriages / Stillbirths Neg Hx      Ovarian cancer Neg Hx      Crohn's disease Neg Hx      Esophageal cancer Neg Hx       Social History[1]  Review of Systems   Constitutional:  Negative for fever.       Physical Exam     Initial Vitals [04/20/25 1740]   BP Pulse Resp Temp SpO2   137/77 74 18 98.2 °F (36.8 °C) 98 %      MAP       --         Physical Exam    Constitutional: She appears well-developed and well-nourished. She is not diaphoretic. She is Obese . No distress.    HENT:   Head: Normocephalic and atraumatic. Mouth/Throat: Oropharynx is clear and moist. No oropharyngeal exudate.   Eyes: Conjunctivae and EOM are normal. Pupils are equal, round, and reactive to light. No scleral icterus.   Neck: Neck supple.   Normal range of motion.  Cardiovascular:  Normal rate and regular rhythm.           Pulmonary/Chest: Breath sounds normal. No respiratory distress. She has no wheezes.   Abdominal: Abdomen is soft. She exhibits no distension. There is no abdominal tenderness. There is no rebound.   Musculoskeletal:         General: No tenderness or edema. Normal range of motion.      Cervical back: Normal range of motion and neck supple.     Neurological: She is alert and oriented to person, place, and time. A sensory deficit is present.   L facial weakness including forehead  Decreased sensation to BLE up to middle chest and BUE past her shoulders  Unable to lift either leg off bed  Reduced plantar reflex BL  4/5 strength BUE   Skin: Skin is warm and dry. No rash noted. No erythema.   Psychiatric: She has a normal mood and affect.         ED Course   Lumbar Puncture    Date/Time: 4/20/2025 8:30 PM  Location procedure was performed: [facility] EMERGENCY DEPARTMENT    Performed by: Koko Lanza MD  Authorized by: Koko Lanza MD  Consent Done: Yes  Indications: evaluation for infection  Anesthesia: local infiltration    Anesthesia:  Local Anesthetic: lidocaine 1% without epinephrine    Patient sedated: no  Preparation: Patient was prepped and draped in the usual sterile fashion.  Lumbar space: L3-L4 interspace  Patient's position: sitting  Needle gauge: 20  Needle length: 3.5 in  Number of attempts: 2  Post-procedure: adhesive bandage applied  Complications: No  Patient tolerance: Patient tolerated the procedure well with no immediate complications  Comments: Tolerated well. Several attempts made to obtain CSF, unable to reach space.         Labs Reviewed   COMPREHENSIVE METABOLIC  PANEL - Abnormal       Result Value    Sodium 141      Potassium 4.0      Chloride 110      CO2 21 (*)     Glucose 93      BUN 5 (*)     Creatinine 0.7      Calcium 9.0      Protein Total 6.5      Albumin 3.3 (*)     Bilirubin Total 0.5      ALP 97      AST 31      ALT 40      Anion Gap 10      eGFR >60     TSH - Abnormal    TSH 0.294 (*)    CBC WITH DIFFERENTIAL - Abnormal    WBC 5.76      RBC 4.47      HGB 12.0      HCT 39.2      MCV 88      MCH 26.8 (*)     MCHC 30.6 (*)     RDW 13.1      Platelet Count 197      MPV 10.5      Nucleated RBC 0      Neut % 59.6      Lymph % 31.1      Mono % 7.5      Eos % 1.2      Basophil % 0.3      Imm Grans % 0.3      Neut # 3.43      Lymph # 1.79      Mono # 0.43      Eos # 0.07      Baso # 0.02      Imm Grans # 0.02     MAGNESIUM - Normal    Magnesium  2.0     C-REACTIVE PROTEIN - Normal    CRP 5.7     SEDIMENTATION RATE - Normal    Sed Rate 34     PROCALCITONIN - Normal    Procalcitonin 0.02     CK - Normal    CPK 22     T4, FREE - Normal    Free T4 0.94     CBC W/ AUTO DIFFERENTIAL    Narrative:     The following orders were created for panel order CBC auto differential.  Procedure                               Abnormality         Status                     ---------                               -----------         ------                     CBC with Differential[[id number]]       Abnormal            Final result                 Please view results for these tests on the individual orders.   URINALYSIS, REFLEX TO URINE CULTURE   VITAMIN B1   POCT URINE PREGNANCY          Imaging Results              X-Ray Chest AP Portable (Final result)  Result time 04/20/25 20:24:22      Final result by Jg Agee MD (04/20/25 20:24:22)                   Impression:      No radiographic acute abnormality seen.      Electronically signed by: Jg Agee MD  Date:    04/20/2025  Time:    20:24               Narrative:    EXAMINATION:  XR CHEST AP PORTABLE    CLINICAL  HISTORY:  Weakness    TECHNIQUE:  Single frontal view of the chest was performed.    COMPARISON:  Chest radiograph 02/12/2025 and CTA chest 02/12/2025    FINDINGS:  Monitoring leads overlie the chest.  Patient is slightly rotated.  Resolution is limited by body habitus with underpenetration.    Trachea is midline and patent.Lungs are symmetrically well expanded without consolidation, pleural effusion or pneumothorax definitively seen.    The cardiac silhouette is normal in size. The hilar and mediastinal contours are midline and within normal limits for age allowing for magnification by chest wall and AP portable technique, stable.    No acute osseous process seen.  PA and lateral views can be obtained.                                       Medications   acetaminophen tablet 1,000 mg (has no administration in time range)   HYDROcodone-acetaminophen 5-325 mg per tablet 1 tablet (1 tablet Oral Given 4/20/25 1942)   LIDOcaine HCL 10 mg/ml (1%) injection 10 mL (10 mLs Infiltration Given 4/20/25 2015)     Medical Decision Making  29 y.o. female with medical history of gastric bypass 1/2025, ADHD, anxiety presenting to the ED c/o progressively worsening weakness and numbness in her extremities. Also having L facial weakness. Recently discharged from [facility] days ago to SNF for similar.     DDx includes but not limited to peripheral neuropathy, transverse myelitis, GBS, MG, MS, electrolyte abnormality, vitamin deficiency    Amount and/or Complexity of Data Reviewed  Independent Historian: guardian  External Data Reviewed: labs, radiology and notes.  Labs: ordered. Decision-making details documented in ED Course.  Radiology: ordered and independent interpretation performed.    Risk  OTC drugs.  Prescription drug management.  Decision regarding hospitalization.               ED Course as of 04/20/25 2129   Sun Apr 20, 2025 1947 NIF -18  FVC 1L [BA]   2126 LP attempted at bedside in the ED. Patient is obese and  unable to complete as spinal needle was not long enough.  [BA]   2127 Repeat NIF -18 and FVC 1200 [BA]   2127 Discussed with Neurology who recommends repeat MRI brain and spine with contrast for further evaluation [BA]   2127 Discussed with  who recommended Neuro ICU evaluation given reduced NIF. Neuro ICU consulted.     Patient signed out to my colleague at the end of my shift with instructions to follow-up pending work-up. Patient signed out with Neuro ICU recommendations pending.     I have discussed the care of this patient with my supervising physician.      [BA]      ED Course User Index  [BA] Jg Rajput PA-C                           Clinical Impression:  Final diagnoses:  [R53.1] Weakness  [M62.81] Muscle weakness of extremity (Primary)  [R20.0] Numbness  [Z98.84] History of gastric bypass          ED Disposition Condition    Admit                   Jg Rajput PA-C  04/20/25 2129         [1]   Social History  Tobacco Use    Smoking status: Former     Current packs/day: 0.25     Average packs/day: 0.3 packs/day for 15.3 years (3.8 ttl pk-yrs)     Types: Cigarettes     Start date: 2010    Smokeless tobacco: Never    Tobacco comments:     Quit smoking June 2024    Substance Use Topics    Alcohol use: Not Currently     Alcohol/week: 0.0 standard drinks of alcohol    Drug use: Not Currently     Types: Marijuana, Fentanyl     Comment: history of opoid abuse        Koko Lanza MD  04/22/25 3227

## 2025-04-20 NOTE — ED TRIAGE NOTES
Pt complains of numbness in arms and legs  Pt states she does not remember when the numbness started

## 2025-04-21 DIAGNOSIS — E63.9 NEUROPATHY DUE TO NUTRITIONAL DEFICIENCY: Primary | ICD-10-CM

## 2025-04-21 DIAGNOSIS — G63 NEUROPATHY DUE TO NUTRITIONAL DEFICIENCY: Primary | ICD-10-CM

## 2025-04-21 PROBLEM — R53.1 GENERALIZED WEAKNESS: Status: ACTIVE | Noted: 2025-04-21

## 2025-04-21 PROBLEM — R29.898 BILATERAL ARM WEAKNESS: Status: ACTIVE | Noted: 2025-04-21

## 2025-04-21 LAB
ABSOLUTE EOSINOPHIL (OHS): 0.09 K/UL
ABSOLUTE MONOCYTE (OHS): 0.56 K/UL (ref 0.3–1)
ABSOLUTE NEUTROPHIL COUNT (OHS): 3.47 K/UL (ref 1.8–7.7)
ALBUMIN SERPL BCP-MCNC: 3.4 G/DL (ref 3.5–5.2)
ALLENS TEST: ABNORMAL
ALP SERPL-CCNC: 97 UNIT/L (ref 40–150)
ALT SERPL W/O P-5'-P-CCNC: 36 UNIT/L (ref 10–44)
ANION GAP (OHS): 10 MMOL/L (ref 8–16)
APTT PPP: 24.7 SECONDS (ref 21–32)
AST SERPL-CCNC: 32 UNIT/L (ref 11–45)
AV AREA BY CONTINUOUS VTI: 3.4 CM2
AV INDEX (PROSTH): 1.06
AV LVOT MEAN GRADIENT: 2 MMHG
AV LVOT PEAK GRADIENT: 3 MMHG
AV MEAN GRADIENT: 3 MMHG
AV PEAK GRADIENT: 5 MMHG
AV VALVE AREA BY VELOCITY RATIO: 2.6 CM²
AV VALVE AREA: 3.3 CM2
AV VELOCITY RATIO: 0.82
BASOPHILS # BLD AUTO: 0.01 K/UL
BASOPHILS NFR BLD AUTO: 0.1 %
BILIRUB SERPL-MCNC: 0.5 MG/DL (ref 0.1–1)
BSA FOR ECHO PROCEDURE: 2.49 M2
BUN SERPL-MCNC: 5 MG/DL (ref 6–20)
CALCIUM SERPL-MCNC: 9.1 MG/DL (ref 8.7–10.5)
CHLORIDE SERPL-SCNC: 110 MMOL/L (ref 95–110)
CO2 SERPL-SCNC: 20 MMOL/L (ref 23–29)
CREAT SERPL-MCNC: 0.6 MG/DL (ref 0.5–1.4)
CV ECHO LV RWT: 0.41 CM
DELSYS: ABNORMAL
DOP CALC AO PEAK VEL: 1.1 M/S
DOP CALC AO VTI: 20.9 CM
DOP CALC LVOT AREA: 3.1 CM2
DOP CALC LVOT DIAMETER: 2 CM
DOP CALC LVOT PEAK VEL: 0.9 M/S
DOP CALC LVOT STROKE VOLUME: 69.7 CM3
DOP CALCLVOT PEAK VEL VTI: 22.2 CM
E WAVE DECELERATION TIME: 206 MS
E WAVE DECELERATION TIME: 211 MS
E/A RATIO: 1.07
E/E' RATIO: 5 M/S
ECHO EF ESTIMATED: 51 %
ECHO LV POSTERIOR WALL: 0.9 CM (ref 0.6–1.1)
ERYTHROCYTE [DISTWIDTH] IN BLOOD BY AUTOMATED COUNT: 13.2 % (ref 11.5–14.5)
FIO2: 21
FRACTIONAL SHORTENING: 25 % (ref 28–44)
GFR SERPLBLD CREATININE-BSD FMLA CKD-EPI: >60 ML/MIN/1.73/M2
GLUCOSE SERPL-MCNC: 95 MG/DL (ref 70–110)
HCO3 UR-SCNC: 24.1 MMOL/L (ref 24–28)
HCT VFR BLD AUTO: 38.6 % (ref 37–48.5)
HCT VFR BLD CALC: 34 %PCV (ref 36–54)
HGB BLD-MCNC: 12.3 GM/DL (ref 12–16)
HOLD SPECIMEN: NORMAL
IMM GRANULOCYTES # BLD AUTO: 0.01 K/UL (ref 0–0.04)
IMM GRANULOCYTES NFR BLD AUTO: 0.1 % (ref 0–0.5)
INDIRECT COOMBS: NORMAL
INR PPP: 1 (ref 0.8–1.2)
INTERVENTRICULAR SEPTUM: 0.7 CM (ref 0.6–1.1)
LA MAJOR: 5.1 CM
LA MINOR: 5.2 CM
LA WIDTH: 3.4 CM
LEFT ATRIUM SIZE: 3.4 CM
LEFT ATRIUM VOLUME INDEX: 22 ML/M2
LEFT ATRIUM VOLUME: 51 CM3
LEFT INTERNAL DIMENSION IN SYSTOLE: 3.3 CM (ref 2.1–4)
LEFT VENTRICLE DIASTOLIC VOLUME INDEX: 38.79 ML/M2
LEFT VENTRICLE DIASTOLIC VOLUME: 90 ML
LEFT VENTRICLE MASS INDEX: 47.2 G/M2
LEFT VENTRICLE SYSTOLIC VOLUME INDEX: 19 ML/M2
LEFT VENTRICLE SYSTOLIC VOLUME: 44 ML
LEFT VENTRICULAR INTERNAL DIMENSION IN DIASTOLE: 4.4 CM (ref 3.5–6)
LEFT VENTRICULAR MASS: 109.4 G
LV LATERAL E/E' RATIO: 4.4
LV SEPTAL E/E' RATIO: 6.2
LYMPHOCYTES # BLD AUTO: 3.36 K/UL (ref 1–4.8)
MAGNESIUM SERPL-MCNC: 2 MG/DL (ref 1.6–2.6)
MCH RBC QN AUTO: 27.3 PG (ref 27–31)
MCHC RBC AUTO-ENTMCNC: 31.9 G/DL (ref 32–36)
MCV RBC AUTO: 86 FL (ref 82–98)
MODE: ABNORMAL
MV PEAK A VEL: 0.75 M/S
MV PEAK E VEL: 0.8 M/S
NUCLEATED RBC (/100WBC) (OHS): 0 /100 WBC
OHS CV RV/LV RATIO: 0.61 CM
OHS QRS DURATION: 78 MS
OHS QTC CALCULATION: 414 MS
PCO2 BLDA: 39.4 MMHG (ref 35–45)
PH SMN: 7.39 [PH] (ref 7.35–7.45)
PHOSPHATE SERPL-MCNC: 3.2 MG/DL (ref 2.7–4.5)
PISA TR MAX VEL: 2.2 M/S
PLATELET # BLD AUTO: 190 K/UL (ref 150–450)
PMV BLD AUTO: 10.4 FL (ref 9.2–12.9)
PO2 BLDA: 95 MMHG (ref 80–100)
POC BE: -1 MMOL/L
POC IONIZED CALCIUM: 1.26 MMOL/L (ref 1.06–1.42)
POC SATURATED O2: 97 % (ref 95–100)
POC TCO2: 25 MMOL/L (ref 23–27)
POCT GLUCOSE: 103 MG/DL (ref 70–110)
POTASSIUM BLD-SCNC: 3.7 MMOL/L (ref 3.5–5.1)
POTASSIUM SERPL-SCNC: 3.6 MMOL/L (ref 3.5–5.1)
PROT SERPL-MCNC: 6.5 GM/DL (ref 6–8.4)
PROTHROMBIN TIME: 11.1 SECONDS (ref 9–12.5)
RA MAJOR: 4.65 CM
RA PRESSURE ESTIMATED: 3 MMHG
RA WIDTH: 3.27 CM
RBC # BLD AUTO: 4.5 M/UL (ref 4–5.4)
RELATIVE EOSINOPHIL (OHS): 1.2 %
RELATIVE LYMPHOCYTE (OHS): 44.8 % (ref 18–48)
RELATIVE MONOCYTE (OHS): 7.5 % (ref 4–15)
RELATIVE NEUTROPHIL (OHS): 46.3 % (ref 38–73)
RH BLD: NORMAL
RIGHT VENTRICLE DIASTOLIC BASEL DIMENSION: 2.7 CM
RV TB RVSP: 5 MMHG
SAMPLE: ABNORMAL
SINUS: 2.55 CM
SITE: ABNORMAL
SODIUM BLD-SCNC: 141 MMOL/L (ref 136–145)
SODIUM SERPL-SCNC: 140 MMOL/L (ref 136–145)
SPECIMEN OUTDATE: NORMAL
STJ: 2.33 CM
TDI LATERAL: 0.18 M/S
TDI SEPTAL: 0.13 M/S
TDI: 0.16 M/S
TV PEAK GRADIENT: 19 MMHG
TV REST PULMONARY ARTERY PRESSURE: 22 MMHG
WBC # BLD AUTO: 7.5 K/UL (ref 3.9–12.7)
Z-SCORE OF LEFT VENTRICULAR DIMENSION IN END DIASTOLE: -7.44
Z-SCORE OF LEFT VENTRICULAR DIMENSION IN END SYSTOLE: -4.15

## 2025-04-21 PROCEDURE — 82330 ASSAY OF CALCIUM: CPT

## 2025-04-21 PROCEDURE — 25000003 PHARM REV CODE 250: Performed by: PHYSICIAN ASSISTANT

## 2025-04-21 PROCEDURE — 97530 THERAPEUTIC ACTIVITIES: CPT

## 2025-04-21 PROCEDURE — 63600175 PHARM REV CODE 636 W HCPCS: Performed by: PHYSICIAN ASSISTANT

## 2025-04-21 PROCEDURE — 82800 BLOOD PH: CPT

## 2025-04-21 PROCEDURE — 97166 OT EVAL MOD COMPLEX 45 MIN: CPT

## 2025-04-21 PROCEDURE — 99223 1ST HOSP IP/OBS HIGH 75: CPT | Mod: GC,,, | Performed by: PSYCHIATRY & NEUROLOGY

## 2025-04-21 PROCEDURE — 94010 BREATHING CAPACITY TEST: CPT

## 2025-04-21 PROCEDURE — 94761 N-INVAS EAR/PLS OXIMETRY MLT: CPT | Mod: XB

## 2025-04-21 PROCEDURE — 99233 SBSQ HOSP IP/OBS HIGH 50: CPT | Mod: FS,,, | Performed by: PSYCHIATRY & NEUROLOGY

## 2025-04-21 PROCEDURE — 36415 COLL VENOUS BLD VENIPUNCTURE: CPT

## 2025-04-21 PROCEDURE — 85730 THROMBOPLASTIN TIME PARTIAL: CPT

## 2025-04-21 PROCEDURE — 36600 WITHDRAWAL OF ARTERIAL BLOOD: CPT

## 2025-04-21 PROCEDURE — 97116 GAIT TRAINING THERAPY: CPT

## 2025-04-21 PROCEDURE — 84100 ASSAY OF PHOSPHORUS: CPT

## 2025-04-21 PROCEDURE — 82803 BLOOD GASES ANY COMBINATION: CPT

## 2025-04-21 PROCEDURE — 84132 ASSAY OF SERUM POTASSIUM: CPT

## 2025-04-21 PROCEDURE — 20600001 HC STEP DOWN PRIVATE ROOM

## 2025-04-21 PROCEDURE — 97162 PT EVAL MOD COMPLEX 30 MIN: CPT

## 2025-04-21 PROCEDURE — 25000003 PHARM REV CODE 250

## 2025-04-21 PROCEDURE — 25000003 PHARM REV CODE 250: Performed by: STUDENT IN AN ORGANIZED HEALTH CARE EDUCATION/TRAINING PROGRAM

## 2025-04-21 PROCEDURE — 84255 ASSAY OF SELENIUM: CPT

## 2025-04-21 PROCEDURE — 99900035 HC TECH TIME PER 15 MIN (STAT)

## 2025-04-21 PROCEDURE — 85610 PROTHROMBIN TIME: CPT

## 2025-04-21 PROCEDURE — 85025 COMPLETE CBC W/AUTO DIFF WBC: CPT

## 2025-04-21 PROCEDURE — 63600175 PHARM REV CODE 636 W HCPCS

## 2025-04-21 PROCEDURE — 82040 ASSAY OF SERUM ALBUMIN: CPT

## 2025-04-21 PROCEDURE — 99499 UNLISTED E&M SERVICE: CPT | Mod: ,,, | Performed by: PHYSICIAN ASSISTANT

## 2025-04-21 PROCEDURE — 83735 ASSAY OF MAGNESIUM: CPT

## 2025-04-21 PROCEDURE — 84630 ASSAY OF ZINC: CPT

## 2025-04-21 PROCEDURE — 94150 VITAL CAPACITY TEST: CPT

## 2025-04-21 PROCEDURE — 82525 ASSAY OF COPPER: CPT

## 2025-04-21 PROCEDURE — 84295 ASSAY OF SERUM SODIUM: CPT

## 2025-04-21 PROCEDURE — 84446 ASSAY OF VITAMIN E: CPT

## 2025-04-21 PROCEDURE — 97535 SELF CARE MNGMENT TRAINING: CPT

## 2025-04-21 RX ORDER — THIAMINE HCL 100 MG
100 TABLET ORAL DAILY
Status: DISCONTINUED | OUTPATIENT
Start: 2025-04-21 | End: 2025-04-22 | Stop reason: HOSPADM

## 2025-04-21 RX ORDER — HYDROXYZINE PAMOATE 50 MG/1
50 CAPSULE ORAL 2 TIMES DAILY
COMMUNITY

## 2025-04-21 RX ORDER — VENLAFAXINE HYDROCHLORIDE 37.5 MG/1
37.5 CAPSULE, EXTENDED RELEASE ORAL DAILY
Status: DISCONTINUED | OUTPATIENT
Start: 2025-04-22 | End: 2025-04-22 | Stop reason: HOSPADM

## 2025-04-21 RX ORDER — OXYCODONE HYDROCHLORIDE 5 MG/1
5 TABLET ORAL EVERY 6 HOURS PRN
Refills: 0 | Status: DISCONTINUED | OUTPATIENT
Start: 2025-04-22 | End: 2025-04-22 | Stop reason: HOSPADM

## 2025-04-21 RX ORDER — METOCLOPRAMIDE 5 MG/1
5 TABLET ORAL 4 TIMES DAILY
COMMUNITY
End: 2025-04-28

## 2025-04-21 RX ORDER — HALOPERIDOL LACTATE 5 MG/ML
5 INJECTION, SOLUTION INTRAMUSCULAR ONCE
Status: COMPLETED | OUTPATIENT
Start: 2025-04-21 | End: 2025-04-21

## 2025-04-21 RX ORDER — ENOXAPARIN SODIUM 100 MG/ML
60 INJECTION SUBCUTANEOUS EVERY 12 HOURS
Status: DISCONTINUED | OUTPATIENT
Start: 2025-04-21 | End: 2025-04-21

## 2025-04-21 RX ORDER — OXYCODONE HYDROCHLORIDE 5 MG/1
5 TABLET ORAL ONCE
Refills: 0 | Status: COMPLETED | OUTPATIENT
Start: 2025-04-21 | End: 2025-04-21

## 2025-04-21 RX ORDER — THIAMINE HCL 100 MG
100 TABLET ORAL DAILY
Status: CANCELLED | OUTPATIENT
Start: 2025-04-21

## 2025-04-21 RX ADMIN — Medication 100 MG: at 04:04

## 2025-04-21 RX ADMIN — PANTOPRAZOLE SODIUM 40 MG: 20 TABLET, DELAYED RELEASE ORAL at 09:04

## 2025-04-21 RX ADMIN — FOLIC ACID-PYRIDOXINE-CYANOCOBALAMIN TAB 2.5-25-2 MG 1 TABLET: 2.5-25-2 TAB at 01:04

## 2025-04-21 RX ADMIN — OXYCODONE 5 MG: 5 TABLET ORAL at 04:04

## 2025-04-21 RX ADMIN — OXYCODONE 5 MG: 5 TABLET ORAL at 08:04

## 2025-04-21 RX ADMIN — PANTOPRAZOLE SODIUM 40 MG: 20 TABLET, DELAYED RELEASE ORAL at 08:04

## 2025-04-21 RX ADMIN — VENLAFAXINE 37.5 MG: 37.5 TABLET ORAL at 09:04

## 2025-04-21 RX ADMIN — ACETAMINOPHEN 650 MG: 325 TABLET ORAL at 07:04

## 2025-04-21 RX ADMIN — HALOPERIDOL LACTATE 5 MG: 5 INJECTION, SOLUTION INTRAMUSCULAR at 12:04

## 2025-04-21 RX ADMIN — ENOXAPARIN SODIUM 60 MG: 60 INJECTION SUBCUTANEOUS at 01:04

## 2025-04-21 RX ADMIN — OXYCODONE 5 MG: 5 TABLET ORAL at 10:04

## 2025-04-21 NOTE — ASSESSMENT & PLAN NOTE
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 Recently discharged 16 days ago from [facility] after admission for less severe BLE weakness and numbness. While admitted, she had a MRI Brain and C/T/L spine w/o contrast that did not show any significant findings. Symptom were attributed to a thiamine deficiency, and was started on IV thiamine. She was sent to rehab when discharged and got out of rehab last week. Over the last two days, she has had progressive weakness and numbness. She was not able to get out of bed today which prompted her ED visit today. NCC was consulted due to NIF of -18 and VC of 1200. She is has no complaints of shortness of breath and has good SpO2 on RA. She is admitted to [facility] for further monitoring and higher level of care.    Admitted to [facility] for close respiratory monitoring overnight. ABG with CO2 wnl, normal respiratory effort and WOB without accessory muscle use. NIF and VC adequate this AM. OK to TTF today out of ICU.   Q4h neuro checks, vitals, I/Os  MRI brain with and without contrast and MRI C/T/L spine with and without contrast pending  Patient very anxious in MRI tonight and refused scan before scan started, unable to complete with 5 of IV Haldol. Case request placed for imaging to be done under anesthesia.   LP request placed to be done under fluoroscopy, attempted unsuccessfully in Emergency Department. Will not be able to do until Wednesday.   Repeat vitamin levels pending, continue B-complex supplementation. Previously found to have thiamine deficiency earlier this year at OSH   EMG ordered  General Neurology following along   Daily CBC, CMP, mag, phos  SBP < 180  PRN labetalol, hydralazine  HOB 30°   Bariatric diet, nutrution consulted. NPO at this time given anesthesia case request for MRI, pending scheduling of procedure.    PT/OT/SLP  VTE Prophylaxis: Resume chemical DVT ppx as earlier LP may be done is Wednesday

## 2025-04-21 NOTE — PLAN OF CARE
Problem: Stroke, Intracerebral Hemorrhage  Goal: Optimal Cognitive Function  Outcome: Progressing  Goal: Optimal Pain Control and Function  Outcome: Progressing   [facility] Care Plan    POC reviewed with Alexandra Bernard Mairo and family at 0300. Patient verbalized understanding. Questions and concerns addressed. No acute events today. Pt progressing toward goals. Will continue to monitor. See below and flowsheets for full assessment and VS info.             Is this a stroke patient? no    Neuro:  Lonnie Coma Scale  Best Eye Response: 4-->(E4) spontaneous  Best Motor Response: 6-->(M6) obeys commands  Best Verbal Response: 5-->(V5) oriented  Lonnie Coma Scale Score: 15  Pupil PERRLA: yes     24 hr Temp:  [97.9 °F (36.6 °C)-98.2 °F (36.8 °C)]     CV:   Rhythm: normal sinus rhythm  BP goals:   SBP < 180  MAP > 65    Resp:           Plan: N/A    GI/:     Diet/Nutrition Received: NPO  Last Bowel Movement: 04/20/25  Voiding Characteristics: external catheter  No intake or output data in the 24 hours ending 04/21/25 0606       Labs/Accuchecks:  Recent Labs   Lab 04/21/25  0119   WBC 7.50   RBC 4.50   HGB 12.3   HCT 38.6         Recent Labs   Lab 04/21/25  0119      K 3.6   CO2 20*      BUN 5*   CREATININE 0.6   ALKPHOS 97   ALT 36   AST 32   BILITOT 0.5      Recent Labs   Lab 04/21/25  0119   INR 1.0   APTT 24.7      Recent Labs   Lab 04/20/25  1919   CPK 22       Electrolytes: N/A - electrolytes WDL  Accuchecks: none    Gtts:      LDA/Wounds:    Lanette Risk Assessment  Sensory Perception: 3-->slightly limited  Moisture: 3-->occasionally moist  Activity: 3-->walks occasionally  Mobility: 3-->slightly limited  Nutrition: 3-->adequate  Friction and Shear: 3-->no apparent problem  Lanette Score: 18  Is your lanette score 12 or less? no          Restraints:        WCTM

## 2025-04-21 NOTE — EICU
Virtual ICU Quality Rounds    Admit Date: 4/20/2025  Hospital Day: 1    ICU Day: 9h    24H Vital Sign Range:  Temp:  [97.9 °F (36.6 °C)-98.2 °F (36.8 °C)]   Pulse:  [52-87]   Resp:  [12-49]   BP: ()/()   SpO2:  [95 %-100 %]     VICU Surveillance Screening                    LDA reconciliation : Yes

## 2025-04-21 NOTE — PROVIDER TRANSFER
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 She was recently discharged 16 days ago from [facility] after admission for less severe BLE weakness and numbness. She first noticed tingling sensation in her feet after standing for long periods of time after her bypass surgery in January. The tingling sensation changed into numbness and started traveling up her legs and started to involve both hands. She reports having numbness up to her chest and shoulders. She also has had progressively worsening weakness to both legs and arms. While admitted, she had a MRI Brain and C/T/L spine w/o contrast that did not show any significant findings. Symptom were attributed to a thiamine deficiency, for which she was started on IV thiamine to treat. She was sent to rehab when discharged and got out of rehab 4 days ago. She was able to walk with a walker when she first got home. She has been incontinent of urine. Over the last two days, she has had progressive weakness and numbness. She was not able to get out of bed today due to her muscle weakness and numbness which prompted her ED visit today. She additionally notes having L sided facial weakness that is similar to when she had [place] Palsy. MRI brain and C/T/L spine with and without contrast pending. Vitamin labs ordered. NCC was consulted due to NIF of -18 and VC of 1200. She is has no complaints of shortness of breath and is not in respiratory distress. She is sating in the high 90s on RA. A LP was performed but was unsuccessful due to the size of the needle and her body habitus. Given her NIF and progressive weakness, she  will be admitted to [facility] for further monitoring and higher level of care.     Hospital Course: 04/21/2025: NIF -32, patient stable for stepdown to . Case request for anesthesia placed for MRI, General Neurology following along    Hospital Course By Problem with Pertinent Findings:     1. Neuropathy, generalized weakness, thiamine deficiency   Admitted to [facility] for close respiratory monitoring overnight. ABG with CO2 wnl, normal respiratory effort and WOB without accessory muscle use. NIF and VC adequate this AM. OK to TTF today out of ICU.   MRI brain with and without contrast and MRI C/T/L spine with and without contrast pending  Patient very anxious in MRI overnight and refused scan before scan started, unable to complete with 5 of IV Haldol. Case request placed for imaging to be done under anesthesia.   LP request placed to be done under fluoroscopy, attempted unsuccessfully in Emergency Department. Will not be able to do until Wednesday.   Repeat vitamin levels pending, continue B-complex supplementation. Previously found to have thiamine deficiency earlier this year at OSH   EMG ordered  General Neurology following along   NPO at this time given anesthesia case request for MRI, pending scheduling of procedure.         Consultants and Procedures:     Consultants:  General neurology   IR   Anesthesia     Procedures:    LP attempted 4/20    Transfer Information:     Diet:  NPO at this time given anesthesia case request for MRI, pending scheduling of procedure.      Physical Activity:  PT/OT ordered     To Do / Pending Studies / Follow ups:  - MRI (anesthesia case request placed)  - LP (fluoro request placed)  - EMG (neurology ordered)   - Nutritional deficiency labs ordered, pending results       Josi Alvarado  Neuro Crt[facility]     yes

## 2025-04-21 NOTE — PLAN OF CARE
[facility] - Neuro Critical Care  Initial Discharge Assessment       Primary Care Provider: Nicolette Andrade DO    Admission Diagnosis: Numbness [R20.0]  Weakness [R53.1]  History of gastric bypass [Z98.84]  Muscle weakness of extremity [M62.81]    Admission Date: 4/20/2025  Expected Discharge Date:     Transition of Care Barriers: None    Payor: HUMANA MANAGED MEDICARE / Plan: HUMANA MEDICARE HMO / Product Type: Capitation /     Extended Emergency Contact Information  Primary Emergency Contact: Elizabeth Chery  Address: [address] [place] [address]  Home Phone: [phone]  Mobile Phone: [phone]  Relation: Mother    Discharge Plan A: Home with family  Discharge Plan B: Home      [id number] - [place], [place] - [address] [facility]  [address] [place] [id number]  Phone: [phone] Fax: [phone]    [facility] [address] [place] [address]  Phone: [phone] Fax: [phone]      Initial Assessment (most recent)       Adult Discharge Assessment - 04/21/25 1121          Discharge Assessment    Assessment Type Discharge Planning Assessment     Confirmed/corrected address, phone number and insurance Yes     Confirmed Demographics Correct on Facesheet     Source of Information patient     Communicated CYRUS with patient/caregiver Yes     Reason For Admission Bilateral leg weakness     People in Home parent(s)     Do you expect to return to your current living situation? Yes     Do you have help at home or someone to help you manage your care at home? No     Prior to hospitilization cognitive status: Alert/Oriented     Current cognitive status: Alert/Oriented     Walking or Climbing Stairs Difficulty yes     Walking or Climbing Stairs ambulation difficulty, requires equipment     Mobility Management wheelchair , walker      Dressing/Bathing Difficulty yes     Dressing/Bathing bathing difficulty, requires equipment     Dressing/Bathing Management shower chair     Home Layout Able to live on 1st floor     Equipment Currently Used at Home shower chair;wheelchair;walker, rolling     Readmission within 30 days? Yes     Patient currently being followed by outpatient case management? No     Do you currently have service(s) that help you manage your care at home? No     Do you take prescription medications? Yes     Do you have prescription coverage? Yes     Coverage Payor: HUMANA MANAGED MEDICARE - HUMANA MEDICARE O -     Do you have any problems affording any of your prescribed medications? No     Is the patient taking medications as prescribed? yes     Who is going to help you get home at discharge? Elizabeth Chery     How do you get to doctors appointments? family or friend will provide     Are you on dialysis? No     Do you take coumadin? No     Discharge Plan A Home with family     Discharge Plan B Home     DME Needed Upon Discharge  none     Discharge Plan discussed with: Patient     Transition of Care Barriers None        Physical Activity    On average, how many days per week do you engage in moderate to strenuous exercise (like a brisk walk)? 0 days     On average, how many minutes do you engage in exercise at this level? 0 min        Financial Resource Strain    How hard is it for you to pay for the very basics like food, housing, medical care, and heating? Not hard at all        Housing Stability    In the last 12 months, was there a time when you were not able to pay the mortgage or rent on time? No     At any time in the past 12 months, were you homeless or living in a shelter (including now)? No        Transportation Needs    In the past 12 months, has lack of transportation kept you from medical appointments or from getting medications? No     In the past 12 months, has lack of transportation kept you from meetings, work, or from  getting things needed for daily living? No        Food Insecurity    Within the past 12 months, you worried that your food would run out before you got the money to buy more. Never true     Within the past 12 months, the food you bought just didn't last and you didn't have money to get more. Never true        Stress    Do you feel stress - tense, restless, nervous, or anxious, or unable to sleep at night because your mind is troubled all the time - these days? Not at all        Social Isolation    How often do you feel lonely or isolated from those around you?  Never        Alcohol Use    Q1: How often do you have a drink containing alcohol? Never     Q2: How many drinks containing alcohol do you have on a typical day when you are drinking? Patient does not drink     Q3: How often do you have six or more drinks on one occasion? Never        Utilities    In the past 12 months has the electric, gas, oil, or water company threatened to shut off services in your home? No        Health Literacy    How often do you need to have someone help you when you read instructions, pamphlets, or other written material from your doctor or pharmacy? Never        OTHER    Name(s) of People in Home Elizabeth Chery                     Readmission Assessment (most recent)       Readmission Assessment - 04/21/25 1124          Readmission    Was this a planned readmission? No     Why were you hospitalized in the last 30 days? Bilateral leg weakness     Why were you readmitted? Related to previous admission     When you left the hospital how did you feel? fair     When you left the hospital where did you go? IRF     Did patient/caregiver refused recommended DC plan? No     Did you try to manage your symptoms your self? No     Did you call anyone? Yes     Who did you call? Emergency Room     Did you try to see or did see a doctor or nurse before you came? No     Did you have  a follow-up appointment on discharge? No                    The [facility]  met with the patient at bedside. The SW placed name and contact information on the blackboard in the patient's room. Use preferred pharmacy / bedside delivery for any necessary medications at the time of discharge. The patient is not independent with all ADLs. The patient is not on Dialysis or Coumadin. The Patient uses a wheelchair, walker and shower chair  but does not use  home oxygen, The patient's , mother  will provide assistance to the patient upon discharge. The patient's mother will provide transportation upon discharge. SW will continue to follow for course of hospitalization.  A discharge planning booklet was left at bedside.     Discharge Plan A and Plan B have been determined by review of patient's clinical status, future medical and therapeutic needs, and coverage/benefits for post-acute care in coordination with multidisciplinary team members.         Griselda Najera MSW, DANIELAW  [facility]  Case Management Dept.

## 2025-04-21 NOTE — ASSESSMENT & PLAN NOTE
History of  Was being treated for at [facility] with IV and transitioned to PO after discharge to rehab  Thiamine level pending  Home thiamine resumed

## 2025-04-21 NOTE — HPI
Alexandra Martin is a 29 y.o. female with a PMHx of obesity, gastric bypass 1/2025, ADHD, anxiety who presents to [facility] for evaluation of presenting for worsening numbness and weakness in her extremities for a few weeks. Just discharged from [facility] for similar and it was contributed to Thiamine deficiency from her recent gastric bypass. She had w/o contrast MRI brain and spine with no acute findings. She was d/c from SNF to home 4 days ago. She was using a walker to get around at first but couldn't get out of bed today. She has been wearing a diaper because she dont not know when she is going to the bathroom. She is additionally having L facial weakness over the last few days similar to when she had [place] Palsy.     She has limited sensation up to her mid chest and shoulders on my exam. She has a reduced plantar reflex. She had a NIF -18 and FVC 1L for me on exam. I discussed with neurology who will see her tomorrow but we are concerned for GBS, MG, MS, transverse myelitis. We are going to attempt an LP on her right now. I ordered repeat MRIs with contrast but she needed anesthesia for sedation for her last one.       ED:

## 2025-04-21 NOTE — HPI
28 y/o F with PMH of thiamine deficiency neuropathy and BLE and BUE weakness/numbness for which she was recently hospitalized for at [facility], s/p bariatric surgery (01/2025), TRACE, and morbid obesity admitted now to [facility] for ascending weakness and numbness. She was recently discharged 16 days ago from [facility] after admission for less severe BLE weakness and numbness. She first noticed tingling sensation in her feet after standing for long periods of time after her bypass surgery in January. The tingling sensation changed into numbness and started traveling up her legs and started to involve both hands. She reports having numbness up to her chest and shoulders. She also has had progressively worsening weakness to both legs and arms. While admitted, she had a MRI Brain and C/T/L spine w/o contrast that did not show any significant findings. Symptom were attributed to a thiamine deficiency, for which she was started on IV thiamine to treat. She was sent to rehab when discharged and got out of rehab 4 days ago. She was able to walk with a walker when she first got home. She has been incontinent of urine. Over the last two days, she has had progressive weakness and numbness. She was not able to get out of bed today due to her muscle weakness and numbness which prompted her ED visit today. She additionally notes having L sided facial weakness that is similar to when she had [place] Palsy. MRI brain and C/T/L spine with and without contrast pending. Vitamin labs ordered. NCC was consulted due to NIF of -18 and VC of 1200. She is has no complaints of shortness of breath and is not in respiratory distress. She is sating in the high 90s on RA. A LP was performed but was unsuccessful due to the size of the needle and her body habitus. Given her NIF and progressive weakness, she will be admitted to [facility] for further monitoring and higher level of care.

## 2025-04-21 NOTE — HPI
Ms. Alexandra Martin is a 29 y.o. patient here for generalized weakness. History of bariatric surgery (1/2025), L [place] palsy, peripheral neuropathy and vertigo with occasional headaches. Transferred to [facility] for ascending weakness.     Initially presented to [facility] for worsening tingling sensation on her feet following her gastric bypass surgery. Partesthesias have been more distal to proximal with some ataxia up to the point that she started using a walker at home. Evaluated by Neurology at OSH with presentation mostly of nutritional deficiency given prior surgery and started on thiamine and multi-vitamin replacement. Workup at the OSH negative (MRI B/C/T/L spine) and unable to perform LP given BMI.     Noted some urinary incontinence after being discharged from rehab and worsening weakness on the last 24hs. Admitted to [facility] from the ED given respiratory mechanics. On my initial examination, some spinothalamic tract sensory deficits, but no dorsal column (e.g.,vibratory) findings noted, in addition to ataxia. Neurology consulted for EMG assistance in the setting of subacute/chronic weakness.

## 2025-04-21 NOTE — SUBJECTIVE & OBJECTIVE
Past Medical History:   Diagnosis Date    ADD (attention deficit disorder)     Anxiety disorder     Biliary dyskinesia 11/02/2024    Bronchitis 12/03/2024    Depression     Drug-induced constipation 03/04/2024    Headache     MDD (major depressive disorder), recurrent episode, moderate 07/29/2021    Morbid obesity with BMI of 60.0-69.9, adult 06/10/2024    Nephrolithiasis     Ovarian cyst     Substance abuse     Hospitalization     Trauma 09/11/2019    Tympanic membrane perforation, left 03/14/2018    UTI (urinary tract infection) 08/03/2018    Vertigo        Past Surgical History:   Procedure Laterality Date    ANGIOGRAM, CORONARY, WITH LEFT HEART CATHETERIZATION  02/10/2025    Procedure: Left Heart Cath;  Surgeon: Garcia De Luna MD;  Location: [facility] CATH;  Service: Cardiovascular;;    CHOLECYSTECTOMY      CYSTOSCOPY W/ URETERAL STENT PLACEMENT Right 12/26/2019    Procedure: CYSTOSCOPY, WITH URETERAL STENT INSERTION;  Surgeon: Rito Medley MD;  Location: [facility] OR;  Service: Urology;  Laterality: Right;    CYSTOSCOPY W/ URETERAL STENT REMOVAL Right 01/06/2020    Procedure: CYSTOSCOPY, WITH URETERAL STENT REMOVAL;  Surgeon: Rito Medley MD;  Location: [facility] OR;  Service: Urology;  Laterality: Right;    ESOPHAGOGASTRODUODENOSCOPY N/A 03/11/2025    Procedure: EGD (ESOPHAGOGASTRODUODENOSCOPY);  Surgeon: Vernon Pike DO;  Location: [facility] ENDO;  Service: Endoscopy;  Laterality: N/A;    INJECTION OF ANESTHETIC AGENT INTO TISSUE PLANE DEFINED BY TRANSVERSUS ABDOMINIS MUSCLE  01/06/2025    Procedure: BLOCK, TRANSVERSUS ABDOMINIS PLANE;  Surgeon: Bibi Edmondson MD;  Location: [facility] OR 2ND FLR;  Service: General;;    LAPAROSCOPIC CHOLECYSTECTOMY  11/05/2024    LAPAROSCOPIC GASTROENTEROSTOMY N/A 01/06/2025    Procedure: GASTROENTEROSTOMY, LAPAROSCOPIC with inraop EGD;  Surgeon: Bibi Edmondson MD;  Location: [facility] OR 2ND FLR;  Service: General;  Laterality: N/A;  Surgeon to  perform Tap Block itraoperatively    LASER LITHOTRIPSY Right 01/06/2020    Procedure: LITHOTRIPSY, USING LASER;  Surgeon: Rito Medley MD;  Location: [facility] OR;  Service: Urology;  Laterality: Right;    MAGNETIC RESONANCE IMAGING N/A 03/29/2025    Procedure: MRI (MAGNETIC RESONANCE IMAGING);  Surgeon: Bertha Kirk;  Location: [facility] BERTHA;  Service: Anesthesiology;  Laterality: N/A;    STOMACH SURGERY  01/06/2025    TONSILLECTOMY, ADENOIDECTOMY, BILATERAL MYRINGOTOMY AND TUBES      UPPER GASTROINTESTINAL ENDOSCOPY      URETEROSCOPIC REMOVAL OF URETERIC CALCULUS Right 01/06/2020    Procedure: REMOVAL, CALCULUS, URETER, URETEROSCOPIC;  Surgeon: Rito Medley MD;  Location: [facility] OR;  Service: Urology;  Laterality: Right;    URETEROSCOPY Left 12/26/2019    Procedure: URETEROSCOPY;  Surgeon: Rito Medley MD;  Location: [facility] OR;  Service: Urology;  Laterality: Left;    URETEROSCOPY Right 01/06/2020    Procedure: URETEROSCOPY;  Surgeon: Rito Medley MD;  Location: [facility] OR;  Service: Urology;  Laterality: Right;       Review of patient's allergies indicates:   Allergen Reactions    Ceftriaxone Nausea And Vomiting    Azithromycin Nausea And Vomiting     Hard to breathe     Latex, natural rubber Swelling and Rash       No current facility-administered medications on file prior to encounter.     Current Outpatient Medications on File Prior to Encounter   Medication Sig    acetaminophen (TYLENOL) 325 MG tablet Take 2 tablets (650 mg total) by mouth every 4 (four) hours as needed for Pain.    calcium-vitamin D tablet 600 mg-200 units Take 1 tablet by mouth 2 (two) times daily.    ergocalciferol (VITAMIN D2) 50,000 unit Cap Take 1 capsule (50,000 Units total) by mouth every 7 days.    metoprolol succinate (TOPROL-XL) 50 MG 24 hr tablet Take 1 tablet (50 mg total) by mouth 2 (two) times daily.    multivit-min/iron/folic acid/K (BARIATRIC MULTIVITAMINS ORAL) Take 1 tablet by mouth 2 (two) times a day.     oxyCODONE-acetaminophen (PERCOCET) 5-325 mg per tablet Take 1 tablet by mouth every 6 (six) hours as needed for Pain.    pantoprazole (PROTONIX) 40 MG tablet Take 40 mg by mouth 2 (two) times daily.    thiamine 100 MG tablet Take 5 tablets (500 mg total) by mouth 2 (two) times a day for 3 days, THEN 1 tablet (100 mg total) 2 (two) times a day.    venlafaxine (EFFEXOR) 37.5 MG Tab Take 37.5 mg by mouth once daily.    enoxaparin (LOVENOX) 40 mg/0.4 mL Syrg Inject 0.4 mLs (40 mg total) into the skin 2 (two) times daily.    etonogestreL (NEXPLANON) 68 mg Impl subdermal device 68 mg by Subdermal route once. A single NEXPLANON implant is inserted subdermally just under the skin at the inner side of the non-dominant upper arm.    folic acid-vit B6-vit B12 2.5-25-2 mg (FOLBIC OR EQUIV) 2.5-25-2 mg Tab Take 1 tablet by mouth once daily.    polyethylene glycol (GLYCOLAX) 17 gram PwPk Take 17 g by mouth daily as needed for Constipation.     Family History       Problem Relation (Age of Onset)    Breast cancer Maternal Aunt    Obesity Mother    Stomach cancer Father    Ulcerative colitis Brother          Tobacco Use    Smoking status: Former     Current packs/day: 0.25     Average packs/day: 0.3 packs/day for 15.3 years (3.8 ttl pk-yrs)     Types: Cigarettes     Start date: 2010    Smokeless tobacco: Never    Tobacco comments:     Quit smoking June 2024    Substance and Sexual Activity    Alcohol use: Not Currently     Alcohol/week: 0.0 standard drinks of alcohol    Drug use: Not Currently     Types: Marijuana, Fentanyl     Comment: history of opoid abuse    Sexual activity: Not Currently     Partners: Male     Birth control/protection: Implant     Review of Systems   Neurological:  Positive for weakness and numbness.     Objective:     Vital Signs (Most Recent):  Temp: 98 °F (36.7 °C) (04/21/25 1100)  Pulse: 77 (04/21/25 1100)  Resp: (!) 21 (04/21/25 1100)  BP: 138/73 (04/21/25 1100)  SpO2: 97 % (04/21/25 1100) Vital Signs  (24h Range):  Temp:  [97.9 °F (36.6 °C)-98.2 °F (36.8 °C)] 98 °F (36.7 °C)  Pulse:  [52-87] 77  Resp:  [12-49] 21  SpO2:  [95 %-100 %] 97 %  BP: ()/() 138/73     Weight: (!) 139.7 kg (308 lb)  Body mass index is 54.56 kg/m².     Physical Exam  Neurological:      Mental Status: She is oriented to person, place, and time.      Cranial Nerves: Cranial nerves 2-12 are intact.      Coordination: Finger-Nose-Finger Test abnormal and Heel to Randhawa Test abnormal.      Deep Tendon Reflexes:      Reflex Scores:       Tricep reflexes are 2+ on the right side and 2+ on the left side.       Bicep reflexes are 2+ on the right side and 2+ on the left side.       Patellar reflexes are 2+ on the right side and 2+ on the left side.       Achilles reflexes are 2+ on the right side and 2+ on the left side.     Comments: See Neurological Exam.           NEUROLOGICAL EXAMINATION:     MENTAL STATUS   Oriented to person, place, and time.   Level of consciousness: alert    CRANIAL NERVES   Cranial nerves II through XII intact.     MOTOR EXAM   Muscle bulk: normal  Overall muscle tone: normal    Strength   Right biceps: 4/5  Left biceps: 4/5  Right triceps: 4/5  Left triceps: 4/5  Right interossei: 4/5  Left interossei: 4/5  Right quadriceps: 3/5  Left quadriceps: 4/5  Right hamstring: 3/5  Left hamstrin/5  Right anterior tibial: 3/5  Left anterior tibial: 4/5  Right posterior tibial: 3/5  Left posterior tibial: 4/5    REFLEXES     Reflexes   Right biceps: 2+  Left biceps: 2+  Right triceps: 2+  Left triceps: 2+  Right patellar: 2+  Left patellar: 2+  Right achilles: 2+  Left achilles: 2+  Right ankle clonus: absent  Left ankle clonus: absent    SENSORY EXAM   Light touch normal.   Vibration normal.   Proprioception normal.   Right arm pinprick: normal  Left arm pinprick: normal  Right leg pinprick: normal  Left leg pinprick: decreased from knee    GAIT AND COORDINATION      Coordination   Finger to nose coordination:  abnormal  Heel to shin coordination: abnormal       Ataxia.        Significant Labs: All pertinent lab results from the past 24 hours have been reviewed.    Significant Imaging: I have reviewed all pertinent imaging results/findings within the past 24 hours.

## 2025-04-21 NOTE — H&P
[facility] - Neuro Critical Care  Neurocritical Care  History & Physical    Admit Date: 4/20/2025  Service Date: 04/21/2025  Length of Stay: 1    Subjective:     Chief Complaint: Bilateral leg weakness    History of Present Illness: 30 y/o F with PMH of thiamine deficiency neuropathy and BLE and BUE weakness/numbness for which she was recently hospitalized for at [facility], s/p bariatric surgery (01/2025), TRACE, and morbid obesity [place] [facility] [place] and [place]ness. She was recently discharged 16 days ago from [facility] after admission for less severe BLE weakness and numbness. She first noticed tingling sensation in her feet after standing for long periods of time after her bypass surgery in January. The tingling sensation changed into numbness and started traveling up her legs and started to involve both hands. She reports having numbness up to her chest and shoulders. She also has had progressively worsening weakness to both legs and arms. While admitted, she had a MRI Brain and C/T/L spine w/o contrast that did not show any significant findings. Symptom were attributed to a thiamine deficiency, for which she was started on IV thiamine to treat. She was sent to rehab when discharged and got out of rehab 4 days ago. She was able to walk with a walker when she first got home. She has been incontinent of urine. Over the last two days, she has had progressive weakness and numbness. She was not able to get out of bed today due to her muscle weakness and numbness which prompted her ED visit today. She additionally notes having L sided facial weakness that is similar to when she had [place] Palsy. MRI brain and C/T/L spine with and without contrast pending. Vitamin labs ordered. NCC was consulted due to NIF of -18 and VC of 1200. She is has no complaints of shortness of breath and is not in respiratory distress. She is sating in the high 90s on RA. A LP was performed but was unsuccessful due to the size of  the needle and her body habitus. Given her NIF and progressive weakness, she will be admitted to [facility] for further monitoring and higher level of care.      Past Medical History:   Diagnosis Date    ADD (attention deficit disorder)     Anxiety disorder     Biliary dyskinesia 11/02/2024    Bronchitis 12/03/2024    Depression     Drug-induced constipation 03/04/2024    Headache     MDD (major depressive disorder), recurrent episode, moderate 07/29/2021    Morbid obesity with BMI of 60.0-69.9, adult 06/10/2024    Nephrolithiasis     Ovarian cyst     Substance abuse     Hospitalization     Trauma 09/11/2019    Tympanic membrane perforation, left 03/14/2018    UTI (urinary tract infection) 08/03/2018    Vertigo      Past Surgical History:   Procedure Laterality Date    ANGIOGRAM, CORONARY, WITH LEFT HEART CATHETERIZATION  02/10/2025    Procedure: Left Heart Cath;  Surgeon: Garcia De Luna MD;  Location: [facility] CATH;  Service: Cardiovascular;;    CHOLECYSTECTOMY      CYSTOSCOPY W/ URETERAL STENT PLACEMENT Right 12/26/2019    Procedure: CYSTOSCOPY, WITH URETERAL STENT INSERTION;  Surgeon: Rito Medley MD;  Location: [facility] OR;  Service: Urology;  Laterality: Right;    CYSTOSCOPY W/ URETERAL STENT REMOVAL Right 01/06/2020    Procedure: CYSTOSCOPY, WITH URETERAL STENT REMOVAL;  Surgeon: Rito Medley MD;  Location: [facility] OR;  Service: Urology;  Laterality: Right;    ESOPHAGOGASTRODUODENOSCOPY N/A 03/11/2025    Procedure: EGD (ESOPHAGOGASTRODUODENOSCOPY);  Surgeon: Vernon Pike DO;  Location: [facility] ENDO;  Service: Endoscopy;  Laterality: N/A;    INJECTION OF ANESTHETIC AGENT INTO TISSUE PLANE DEFINED BY TRANSVERSUS ABDOMINIS MUSCLE  01/06/2025    Procedure: BLOCK, TRANSVERSUS ABDOMINIS PLANE;  Surgeon: Bibi Edmondson MD;  Location: [facility] OR [address] [place] [address];  Service: General;;    LAPAROSCOPIC CHOLECYSTECTOMY  11/05/2024    LAPAROSCOPIC GASTROENTEROSTOMY N/A 01/06/2025    Procedure:  GASTROENTEROSTOMY, LAPAROSCOPIC with inraop EGD;  Surgeon: Bibi Edmondson MD;  [facility];  Service: General;  Laterality: N/A;  Surgeon to perform Tap Block itraoperatively    LASER LITHOTRIPSY Right 01/06/2020    Procedure: LITHOTRIPSY, USING LASER;  Surgeon: Rito Medley MD;  Location: [facility] OR;  Service: Urology;  Laterality: Right;    MAGNETIC RESONANCE IMAGING N/A 03/29/2025    Procedure: MRI (MAGNETIC RESONANCE IMAGING);  Surgeon: Dory Kirk;  Location: [facility];  Service: Anesthesiology;  Laterality: N/A;    STOMACH SURGERY  01/06/2025    TONSILLECTOMY, ADENOIDECTOMY, BILATERAL MYRINGOTOMY AND TUBES      UPPER GASTROINTESTINAL ENDOSCOPY      URETEROSCOPIC REMOVAL OF URETERIC CALCULUS Right 01/06/2020    Procedure: REMOVAL, CALCULUS, URETER, URETEROSCOPIC;  Surgeon: Rito Medley MD;  Location: [facility] OR;  Service: Urology;  Laterality: Right;    URETEROSCOPY Left 12/26/2019    Procedure: URETEROSCOPY;  Surgeon: Rito Medley MD;  Location: [facility] OR;  Service: Urology;  Laterality: Left;    URETEROSCOPY Right 01/06/2020    Procedure: URETEROSCOPY;  Surgeon: Rito Medley MD;  Location: [facility] OR;  Service: Urology;  Laterality: Right;      Medications Ordered Prior to Encounter[1]   Allergies: Ceftriaxone; Azithromycin; and Latex, natural rubber  Family History   Problem Relation Name Age of Onset    Obesity Mother      Stomach cancer Father      Ulcerative colitis Brother      Breast cancer Maternal Aunt      Colon cancer Neg Hx      Miscarriages / Stillbirths Neg Hx      Ovarian cancer Neg Hx      Crohn's disease Neg Hx      Esophageal cancer Neg Hx       Social History[2]  Review of Systems   Constitutional: Negative.    HENT: Negative.     Eyes: Negative.    Respiratory: Negative.  Negative for chest tightness and shortness of breath.    Cardiovascular: Negative.  Negative for chest pain and palpitations.   Gastrointestinal:  Positive for constipation.    Endocrine: Negative.    Genitourinary: Negative.    Musculoskeletal:  Positive for gait problem. Negative for back pain and neck pain.   Skin: Negative.    Allergic/Immunologic: Negative.    Neurological:  Positive for facial asymmetry, weakness, numbness and headaches. Negative for dizziness, tremors, seizures, syncope, speech difficulty and light-headedness.   Hematological: Negative.    Psychiatric/Behavioral:  The patient is nervous/anxious.      Objective:     Vitals:    Temp: 97.9 °F (36.6 °C)  Pulse: 67  BP: (!) 145/79  MAP (mmHg): 104  Resp: 16  SpO2: 99 %    Temp  Min: 97.9 °F (36.6 °C)  Max: 98.2 °F (36.8 °C)  Pulse  Min: 62  Max: 74  BP  Min: 122/59  Max: 169/107  MAP (mmHg)  Min: 85  Max: 126  Resp  Min: 14  Max: 20  SpO2  Min: 97 %  Max: 100 %    No intake/output data recorded.            Physical Exam  Vitals and nursing note reviewed.   Constitutional:       General: She is not in acute distress.     Appearance: She is obese. She is not ill-appearing.   HENT:      Head: Normocephalic and atraumatic.      Nose: Nose normal.      Mouth/Throat:      Mouth: Mucous membranes are moist.      Pharynx: Oropharynx is clear.   Eyes:      Pupils: Pupils are equal, round, and reactive to light.   Cardiovascular:      Rate and Rhythm: Normal rate and regular rhythm.      Pulses: Normal pulses.   Pulmonary:      Effort: Pulmonary effort is normal.   Abdominal:      General: There is no distension.      Tenderness: There is no abdominal tenderness.   Musculoskeletal:         General: No swelling or tenderness.      Cervical back: Normal range of motion.   Skin:     General: Skin is warm and dry.      Capillary Refill: Capillary refill takes less than 2 seconds.   Neurological:      Mental Status: She is alert.      Comments:   E4 V5 M6  Alert. Oriented x4. Speech fluent - no dysarthria or aphasia. Following commands.   PERRLA. EOMI. Visual fields intact   No facial asymmetry.  Tongue midline. Shoulder shrug  symmetric.  Motor:  RUE 4/5  RLE 0/5  LUE 4/5  LLE 0/5  No drift   Coordination impaired in BUE  States she cannot feel sharp or dull touch in BLE below the knee. She was able to feel when her upper posterior and anterior thigh was pinched. Her hands are numb as well.       Unable to test gait due to deficit.    Today I personally reviewed pertinent medications, lines/drains/airways, imaging, cardiology results, laboratory results, microbiology results    Assessment/Plan:     Neuro  Paresthesia  See primary problem    Thiamine deficiency neuropathy  History of  Was being treated for at [facility] with IV and transitioned to PO after discharge to rehab  Thiamine level pending    Psychiatric  Anxiety  History of  Resume home medications    TRACE (generalized anxiety disorder)  History of  Resume home meds    Endocrine  Morbid obesity  Body mass index is 54.71 kg/m².  Bariatric diet ordered  Nutrition consulted    Orthopedic  * Bilateral leg weakness  28 y/o F with PMH of thiamine deficiency neuropathy and BLE and BUE weakness/numbness for which she was recently hospitalized for at [facility] [place] [facility] [place] and [place]ness. Recently discharged 16 days ago from [facility] after admission for less severe BLE weakness and numbness. While admitted, she had a MRI Brain and C/T/L spine w/o contrast that did not show any significant findings. Symptom were attributed to a thiamine deficiency, and was started on IV thiamine. She was sent to rehab when discharged and got out of rehab 4 days ago. Over the last two days, she has had progressive weakness and numbness. She was not able to get out of bed today which prompted her ED visit today. [facility] was consulted due to NIF of -18 and VC of 1200. She is has no complaints of shortness of breath and has good SpO2 on RA. She is admitted to [facility] for further monitoring and higher level of care.    Admit to [facility]  Q1h neuro checks, vitals, I/Os  Echo, EKG, CXR pending  MRI brain  with and without contrast and MRI C/T/L spine with and without contrast pending  Patient very anxious in MRI tonight and refused scan before scan started, unable to complete with 5 of IV Haldol  Plans for LP in AM  A1c, TSH, lipid panel, aPTT, PT/INR, T&S pending   Daily CBC, CMP, mag, phos  SBP < 180  PRN labetalol, hydralazine  HOB 30°   Bariatric diet, nutrution consulted  PT/OT/SLP  VTE Prophylaxis: mechanical, hold chemical until after LP    Bilateral arm weakness  See primary problem    Other  Limitation of activity due to disability  See primary problem          The patient is being Prophylaxed for:  Venous Thromboembolism with: Mechanical or Chemical  Stress Ulcer with: PPI  Ventilator Pneumonia with: none    Activity Orders            Turn patient starting at 04/21 0000    Diet Bariatric Regular: Bariatric Regular starting at 04/20 2329    Straight Cath starting at 04/20 2248    Elevate HOB starting at 04/20 2229          Full Code    Critical care time spent on the evaluation and treatment of severe organ dysfunction, review of pertinent labs and imaging studies, discussions with consulting providers and discussions with patient/family: 45 minutes.    Jose Curtis, [facility]  Neurocritical Care  [facility] - Neuro Critical Care       [1]   No current facility-administered medications on file prior to encounter.     Current Outpatient Medications on File Prior to Encounter   Medication Sig Dispense Refill    acetaminophen (TYLENOL) 325 MG tablet Take 2 tablets (650 mg total) by mouth every 4 (four) hours as needed for Pain.      calcium-vitamin D tablet 600 mg-200 units Take 1 tablet by mouth 2 (two) times daily.      ergocalciferol (VITAMIN D2) 50,000 unit Cap Take 1 capsule (50,000 Units total) by mouth every 7 days.      metoprolol succinate (TOPROL-XL) 50 MG 24 hr tablet Take 1 tablet (50 mg total) by mouth 2 (two) times daily. 60 tablet 11    multivit-min/iron/folic acid/K (BARIATRIC MULTIVITAMINS  ORAL) Take 1 tablet by mouth 2 (two) times a day.      oxyCODONE-acetaminophen (PERCOCET) 5-325 mg per tablet Take 1 tablet by mouth every 6 (six) hours as needed for Pain.      pantoprazole (PROTONIX) 40 MG tablet Take 40 mg by mouth 2 (two) times daily.      thiamine 100 MG tablet Take 5 tablets (500 mg total) by mouth 2 (two) times a day for 3 days, THEN 1 tablet (100 mg total) 2 (two) times a day. 90 tablet 0    venlafaxine (EFFEXOR) 37.5 MG Tab Take 37.5 mg by mouth once daily.      enoxaparin (LOVENOX) 40 mg/0.4 mL Syrg Inject 0.4 mLs (40 mg total) into the skin 2 (two) times daily.      etonogestreL (NEXPLANON) 68 mg Impl subdermal device 68 mg by Subdermal route once. A single NEXPLANON implant is inserted subdermally just under the skin at the inner side of the non-dominant upper arm.      folic acid-vit B6-vit B12 2.5-25-2 mg (FOLBIC OR EQUIV) 2.5-25-2 mg Tab Take 1 tablet by mouth once daily.      polyethylene glycol (GLYCOLAX) 17 gram PwPk Take 17 g by mouth daily as needed for Constipation.     [2]   Social History  Tobacco Use    Smoking status: Former     Current packs/day: 0.25     Average packs/day: 0.3 packs/day for 15.3 years (3.8 ttl pk-yrs)     Types: Cigarettes     Start date: 2010    Smokeless tobacco: Never    Tobacco comments:     Quit smoking June 2024    Substance Use Topics    Alcohol use: Not Currently     Alcohol/week: 0.0 standard drinks of alcohol    Drug use: Not Currently     Types: Marijuana, Fentanyl     Comment: history of opoid abuse

## 2025-04-21 NOTE — PROVIDER PROGRESS NOTES - EMERGENCY DEPT.
Encounter Date: 4/20/2025    ED Physician Progress Notes        Physician Note:   ED Physician Hand-off Note:    ED Course: I assumed care of patient from off-going ED physician team. Briefly, Patient is a 29 F here for weakness, numbness that started several months ago and is progressing..  NIF -18, VC 1200    At the time of signout plan was pending Neuro ICU, .    Patient re-evaluate- reports pain to the left leg and lower back. Tylenol ordered    Medications given in the ED:    Medications  acetaminophen tablet 1,000 mg (has no administration in time range)  HYDROcodone-acetaminophen 5-325 mg per tablet 1 tablet (1 tablet Oral Given 4/20/25 1942)  LIDOcaine HCL 10 mg/ml (1%) injection 10 mL (10 mLs Infiltration Given 4/20/25 2015)    Disposition: admission    Patient comfortable with plan. Patient counseled regarding exam, results, diagnosis, treatment, and plan.    Impression:  Progressive weakness, progressive numbness    KERRIE Curry MD  Staff ED Physician  04/20/2025 9:23 PM    Patient admitted to [facility] for further management

## 2025-04-21 NOTE — PROGRESS NOTES
[facility] - Neuro Critical Care  Neurocritical Care  Progress Note    Admit Date: 4/20/2025  Service Date: 04/21/2025  Length of Stay: 1    Subjective:     Chief Complaint: Bilateral leg weakness    History of Present Illness: 30 y/o F with PMH of thiamine deficiency neuropathy and BLE and BUE weakness/numbness for which she was recently hospitalized for at [facility], s/p bariatric surgery (01/2025), TRACE, and morbid obesity admitted now to [facility] for ascending weakness and numbness. She was recently discharged 16 days ago from [facility] after admission for less severe BLE weakness and numbness. She first noticed tingling sensation in her feet after standing for long periods of time after her bypass surgery in January. The tingling sensation changed into numbness and started traveling up her legs and started to involve both hands. She reports having numbness up to her chest and shoulders. She also has had progressively worsening weakness to both legs and arms. While admitted, she had a MRI Brain and C/T/L spine w/o contrast that did not show any significant findings. Symptom were attributed to a thiamine deficiency, for which she was started on IV thiamine to treat. She was sent to rehab when discharged and got out of rehab 4 days ago. She was able to walk with a walker when she first got home. She has been incontinent of urine. Over the last two days, she has had progressive weakness and numbness. She was not able to get out of bed today due to her muscle weakness and numbness which prompted her ED visit today. She additionally notes having L sided facial weakness that is similar to when she had [place] Palsy. MRI brain and C/T/L spine with and without contrast pending. Vitamin labs ordered. NCC was consulted due to NIF of -18 and VC of 1200. She is has no complaints of shortness of breath and is not in respiratory distress. She is sating in the high 90s on RA. A LP was performed but was unsuccessful due to the size of the  needle and her body habitus. Given her NIF and progressive weakness, she will be admitted to [facility] for further monitoring and higher level of care.    Hospital Course: 04/21/2025: NIF -32, patient stable for stepdown to HM. Case request for anesthesia placed for MRI, General Neurology following along     Interval History:  Admitted overnight. Unable to obtain MRI due ti patient's anxiety. Patient requesting imaging be done under anesthesia as she has done previously. Consult for LP to be done under fluoroscopy also placed as unsuccessfully attempted in the Emergency Department, complicated by insufficient needle length. Patient's NIF and VC adequate this AM, no concerns for respiratory compromise or concern for need to monitor closely as this is a chronic process. OK to TTF under HM for continued follow up with General Neurology following along. EMG scheduled by neurology. Discussed with patient possible etiologies of worsening weakness, including possible functional component. Will consult psychology for additional support.     Review of Systems  +numbness, +tingling, +anxiety, +weakness, +headache.   Objective:     Vitals:  Temp: 98 °F (36.7 °C)  Pulse: 77  Rhythm: normal sinus rhythm, sinus bradycardia  BP: 138/73  MAP (mmHg): 98  Resp: (!) 21  SpO2: 97 %    Temp  Min: 97.9 °F (36.6 °C)  Max: 98.2 °F (36.8 °C)  Pulse  Min: 52  Max: 87  BP  Min: 97/58  Max: 169/107  MAP (mmHg)  Min: 73  Max: 126  Resp  Min: 12  Max: 49  SpO2  Min: 95 %  Max: 100 %    No intake/output data recorded.            Physical Exam    Examination:   Constitutional: Obese. No apparent distress.   Eyes: Conjunctiva clear, anicteric. Lids no lesions.  Head/Ears/Nose/Mouth/Throat/Neck: Moist mucous membranes. External ears, nose atraumatic.   Cardiovascular: Regular rhythm.   Respiratory: Comfortable respirations. No accessory muscle use, normal work of breathing.     Neurologic:  -GCS [id number]  -Alert. Oriented to person, place, and time. Speech  fluent. Follows commands.  -Cranial nerves II-XII intact  -Motor : generalized weakness present in all four extremities but antigravity movement and able to counter resistance x4.   -Sensation diminished in all four extremities.   -DTR 2+ in bilateral patellar and brachioradialis            Medications:  Continuous Scheduledfolic acid-vit B6-vit B12 2.5-25-2 mg, 1 tablet, Daily  pantoprazole, 40 mg, BID  polyethylene glycol, 17 g, Daily  senna-docusate, 1 tablet, BID  venlafaxine, 37.5 mg, Daily    PRNacetaminophen, 650 mg, Q6H PRN  bisacodyL, 10 mg, Daily PRN  labetalol, 10 mg, Q4H PRN  magnesium oxide, 800 mg, PRN  magnesium oxide, 800 mg, PRN  oxyCODONE, 5 mg, Q6H PRN  potassium bicarbonate, 35 mEq, PRN  potassium bicarbonate, 50 mEq, PRN  potassium bicarbonate, 60 mEq, PRN  potassium, sodium phosphates, 2 packet, PRN  potassium, sodium phosphates, 2 packet, PRN  potassium, sodium phosphates, 2 packet, PRN  sodium chloride 0.9%, 10 mL, PRN      Today I personally reviewed pertinent medications, imaging, laboratory results, notably:    Diet  Diet NPO  Diet NPO    - Thiamine level low on 3/6, has been WNL since on supplemental thiamine. Home B-complex supplementation restarted   - Discussed case with general neurology, additional vitamin levels sent. ENG ordered  - Discussed care with interventional radiology, LP under fluoroscopy ordered.   - CMP, CBC reviewed, unremarkable.     Assessment/Plan:     Neuro  Thiamine deficiency neuropathy  History of  Was being treated for at [facility] with IV and transitioned to PO after discharge to rehab  Thiamine level pending  Home thiamine resumed     Psychiatric  TRACE (generalized anxiety disorder)  History of  Resume home meds  Psychology consulted.    Endocrine  S/P bariatric surgery  Vitamin levels pending     Morbid obesity  Body mass index is 54.56 kg/m².  Bariatric diet ordered  Nutrition consulted  S/p bypass surgery in January 2025     Orthopedic  * Bilateral leg  weakness  30 y/o F with PMH of thiamine deficiency neuropathy and BLE and BUE weakness/numbness for which she was recently hospitalized for at [facility] admitted now to [facility] for ascending weakness and numbness. Recently discharged 16 days ago from [facility] after admission for less severe BLE weakness and numbness. While admitted, she had a MRI Brain and C/T/L spine w/o contrast that did not show any significant findings. Symptom were attributed to a thiamine deficiency, and was started on IV thiamine. She was sent to rehab when discharged and got out of rehab last week. Over the last two days, she has had progressive weakness and numbness. She was not able to get out of bed today which prompted her ED visit today. NCC was consulted due to NIF of -18 and VC of 1200. She is has no complaints of shortness of breath and has good SpO2 on RA. She is admitted to [facility] for further monitoring and higher level of care.    Admitted to [facility] for close respiratory monitoring overnight. ABG with CO2 wnl, normal respiratory effort and WOB without accessory muscle use. NIF and VC adequate this AM. OK to TTF today out of ICU.   Q4h neuro checks, vitals, I/Os  MRI brain with and without contrast and MRI C/T/L spine with and without contrast pending  Patient very anxious in MRI tonight and refused scan before scan started, unable to complete with 5 of IV Haldol. Case request placed for imaging to be done under anesthesia.   LP request placed to be done under fluoroscopy, attempted unsuccessfully in Emergency Department. Will not be able to do until Wednesday.   Repeat vitamin levels pending, continue B-complex supplementation. Previously found to have thiamine deficiency earlier this year at [facility]   EMG ordered  General Neurology following along   Daily CBC, CMP, mag, phos  SBP < 180  PRN labetalol, hydralazine  HOB 30°   Bariatric diet, nutrution consulted. NPO at this time given anesthesia case request for MRI, pending scheduling of  procedure.   PT/OT/SLP  VTE Prophylaxis: Resume chemical DVT ppx as earlier LP may be done is Wednesday           The patient is being Prophylaxed for:  Venous Thromboembolism with: Chemical  Stress Ulcer with: PPI  Ventilator Pneumonia with: not applicable    Activity Orders            Diet NPO: NPO starting at 04/22 0001    Turn patient starting at 04/21 0000    Straight Cath starting at 04/20 2248    Elevate HOB starting at 04/20 2229          Full Code    Level 3    Josi Alvarado PA-C  Neurocritical Care  [facility] - Neuro Critical Care

## 2025-04-21 NOTE — NURSING
Patient arrived to [facility] from [facility] (Rm 36) by (mode of transportation & company name)     Report received from: ED RN,      Type of stroke/diagnosis:  bilateral leg weakness    Current symptoms: generalized weakness, afebrile, slight L facial droop    Skin Assessment done: Y  Wounds noted: n/a  *If wounds noted, was Wound Care consulted? Y  *If wounds noted, LDA placed? Y  Skin Assessment Verified by: DEX Pimentel; DEX Palomares Completed? Y    Patient Belongings on Admit: black purse, white slippers, black fish bag, miscellaneous clothing, geovanna, cell phone, cigarettes, perfume, phone , brown wallet (cards and $68 cash), headphones, watch, 2 other chargers, ipad    [facility] notified: JESSA Garcia

## 2025-04-21 NOTE — SUBJECTIVE & OBJECTIVE
Interval History:  Admitted overnight. Unable to obtain MRI due ti patient's anxiety. Patient requesting imaging be done under anesthesia as she has done previously. Consult for LP to be done under fluoroscopy also placed as unsuccessfully attempted in the Emergency Department, complicated by insufficient needle length. Patient's NIF and VC adequate this AM, no concerns for respiratory compromise or concern for need to monitor closely as this is a chronic process. OK to TTF under HM for continued follow up with General Neurology following along. EMG scheduled by neurology. Discussed with patient possible etiologies of worsening weakness, including possible functional component. Will consult psychology for additional support.     Review of Systems  +numbness, +tingling, +anxiety, +weakness, +headache.   Objective:     Vitals:  Temp: 98 °F (36.7 °C)  Pulse: 77  Rhythm: normal sinus rhythm, sinus bradycardia  BP: 138/73  MAP (mmHg): 98  Resp: (!) 21  SpO2: 97 %    Temp  Min: 97.9 °F (36.6 °C)  Max: 98.2 °F (36.8 °C)  Pulse  Min: 52  Max: 87  BP  Min: 97/58  Max: 169/107  MAP (mmHg)  Min: 73  Max: 126  Resp  Min: 12  Max: 49  SpO2  Min: 95 %  Max: 100 %    No intake/output data recorded.            Physical Exam    Examination:   Constitutional: Obese. No apparent distress.   Eyes: Conjunctiva clear, anicteric. Lids no lesions.  Head/Ears/Nose/Mouth/Throat/Neck: Moist mucous membranes. External ears, nose atraumatic.   Cardiovascular: Regular rhythm.   Respiratory: Comfortable respirations. No accessory muscle use, normal work of breathing.     Neurologic:  -GCS [id number]  -Alert. Oriented to person, place, and time. Speech fluent. Follows commands.  -Cranial nerves II-XII intact  -Motor : generalized weakness present in all four extremities but antigravity movement and able to counter resistance x4.   -Sensation diminished in all four extremities.   -DTR 2+ in bilateral patellar and brachioradialis             Medications:  Continuous Scheduledfolic acid-vit B6-vit B12 2.5-25-2 mg, 1 tablet, Daily  pantoprazole, 40 mg, BID  polyethylene glycol, 17 g, Daily  senna-docusate, 1 tablet, BID  venlafaxine, 37.5 mg, Daily    PRNacetaminophen, 650 mg, Q6H PRN  bisacodyL, 10 mg, Daily PRN  labetalol, 10 mg, Q4H PRN  magnesium oxide, 800 mg, PRN  magnesium oxide, 800 mg, PRN  oxyCODONE, 5 mg, Q6H PRN  potassium bicarbonate, 35 mEq, PRN  potassium bicarbonate, 50 mEq, PRN  potassium bicarbonate, 60 mEq, PRN  potassium, sodium phosphates, 2 packet, PRN  potassium, sodium phosphates, 2 packet, PRN  potassium, sodium phosphates, 2 packet, PRN  sodium chloride 0.9%, 10 mL, PRN      Today I personally reviewed pertinent medications, imaging, laboratory results, notably:    Diet  Diet NPO  Diet NPO    - Thiamine level low on 3/6, has been WNL since on supplemental thiamine. Home B-complex supplementation restarted   - Discussed case with general neurology, additional vitamin levels sent. ENG ordered  - Discussed care with interventional radiology, LP under fluoroscopy ordered.   - CMP, CBC reviewed, unremarkable.

## 2025-04-21 NOTE — PT/OT/SLP EVAL
Physical Therapy Co-Evaluation and Treatment    Patient Name: Alexandra Martin   MRN: [id number]  Recent Surgery: * No surgery found *      Recommendations:     Discharge Recommendations: Low Intensity Therapy    Discharge Equipment Recommendations: none   Barriers to discharge: None  Nursing Mobilization: Patient clear to ambulate in hallways with RN/PCT.    Assessment:     Alexandra Martin is a 29 y.o. female admitted with a medical diagnosis of Bilateral leg weakness. She presents with the following impairments/functional limitations: weakness, impaired self care skills, impaired functional mobility, gait instability, decreased safety awareness. Pt w/ intact sensation and inconsistent strength deficits in BLE, for example she is unable to fully DF B ankles sitting EOB yet has no difficulty w/ DF while ambulating. She requires inc time for transfers and is easily fatigued w/ mobility but reports dec activity tolerance is baseline. Patient currently demonstrates a need for low intensity therapy on a scheduled basis secondary to a decline in functional status due to illness     Rehab Prognosis: Good; patient would benefit from acute skilled PT services to address these deficits and reach maximum level of function.  Recent Surgery: * No surgery found *      Plan:     During this hospitalization, patient to be seen 3 x/week to address the identified rehab impairments via gait training, therapeutic activities, therapeutic exercises, neuromuscular re-education and progress toward the following goals:    Plan of Care Expires: 04/30/25    Subjective     Chief Complaint: weakness  Patient/Family Comments/Goals: reports IPR helped but she doesn't think she needs it again, wants to do OP PT rather than HH PT   Pain/Comfort:  Pain Rating 1: 0/10  Pain Rating Post-Intervention 1: 0/10    Patients cultural, spiritual, Yazidism conflicts given the current situation: no    Social History:  Living Environment:  Patient  lives w/ mother  in a single story home.  Prior Level of Function: Prior to admission, patient was independent with ADLs. Has been using RW and w/c for the last 1-2wks, prev independent.  Equipment Used at Home: walker, rolling, shower chair, wheelchair    DME owned (not currently used): none  Assistance Upon Discharge:  mom can help 24/7    Objective:     Communicated with RN prior to session. Patient found HOB elevated with telemetry, blood pressure cuff, pulse ox (continuous), PureWick upon PT entry to room. Co-evaluation w/ OT 2/2 suspected pt complexity and requirement of assistance from 2 skilled therapists to maximize treatment potential and maintain pt safety     General Precautions: Standard, fall   Orthopedic Precautions:N/A   Braces: N/A  Respiratory Status: Room air    Exams:  Cognitive Exam: Patient is oriented to Person, Place, Time, Situation, follows commands 100% of the time  RLE ROM: WFL  RLE Strength:  3+/5 grossly on MMT except ankle DF/PF 2+/5  LLE ROM: WFL  LLE Strength:  3+/5 grossly on MMT except ankle DF/PF 2+/5  Sensation: Intact light touch to BLEs    Functional Mobility:  Bed Mobility:  Supine to Sit: supervision  Transfers:  Sit to Stand: stand by assistance with no AD  Bed to Chair: stand by assistance with rolling walker with cues for hand placement using Step Transfer  Toilet Transfer: stand by assistance with rolling walker with cues for hand placement and foot placement using Step Transfer  Gait:   Patient ambulated 14' + 100' with rolling walker and stand by assistance. Patient demonstrates steady gait, decreased step length, wide base of support, decreased weight shift, decreased foot clearance, ambulates outside AMBER of RW, flexed posture, and decreased thomas. All lines remained intact throughout ambulation trial, portable monitor utilized.  Balance:   Static Sitting: supervision at EOB  Dynamic Sitting: stand by assistance at EOB  Static Standing: stand by assistance  with no AD  Dynamic Standing: stand by assistance with rolling walker  5x sts: 54.77 sec    Therapeutic Activities and Exercises:  Patient educated on role of acute care PT and PT POC, safety while in hospital including calling nurse for mobility, and call light usage  Patient performed 2 set(s) of 10 repetitions of  the following seated exercises: ankle pumps, long arc quads, marches, and knee flexion for bilateral LE. Patient required skilled PT for instruction of exercises and appropriate cues to perform exercises safely and appropriately  Patient educated about importance of OOB mobility  Dynamic standing balance in bathroom while performing ADLs w/ BUE prop on countertop 2/2 fatigue and to assist w/ balance (reports she stands like this to do ADLs at baseline)    AM-PAC 6 CLICK MOBILITY  Turning over in bed (including adjusting bedclothes, sheets and blankets)?: 4  Sitting down on and standing up from a chair with arms (e.g., wheelchair, bedside commode, etc.): 3  Moving from lying on back to sitting on the side of the bed?: 3  Moving to and from a bed to a chair (including a wheelchair)?: 3  Need to walk in hospital room?: 3  Climbing 3-5 steps with a railing?: 3  Basic Mobility Total Score: 19     Patient left up in chair with all lines intact, call button in reach, RN notified, and chair alarm on.    GOALS:   Multidisciplinary Problems       Physical Therapy Goals          Problem: Physical Therapy    Goal Priority Disciplines Outcome Interventions   Physical Therapy Goal     PT, PT/OT Progressing    Description: Goals to be completed by: 4/30/25    Pt will be able to stand up from EOB w/ supervision using LRAD  Pt will ambulate 250 feet w/ supervision using LRAD  Pt will be independent w/ HEP therex on BLE w/ good form and ROM                        History:     Past Medical History:   Diagnosis Date    ADD (attention deficit disorder)     Anxiety disorder     Biliary dyskinesia 11/02/2024    Bronchitis  12/03/2024    Depression     Drug-induced constipation 03/04/2024    Headache     MDD (major depressive disorder), recurrent episode, moderate 07/29/2021    Morbid obesity with BMI of 60.0-69.9, adult 06/10/2024    Nephrolithiasis     Ovarian cyst     Substance abuse     Hospitalization     Trauma 09/11/2019    Tympanic membrane perforation, left 03/14/2018    UTI (urinary tract infection) 08/03/2018    Vertigo        Past Surgical History:   Procedure Laterality Date    ANGIOGRAM, CORONARY, WITH LEFT HEART CATHETERIZATION  02/10/2025    Procedure: Left Heart Cath;  Surgeon: Garcia De Luna MD;  Location: [facility] CATH;  Service: Cardiovascular;;    CHOLECYSTECTOMY      CYSTOSCOPY W/ URETERAL STENT PLACEMENT Right 12/26/2019    Procedure: CYSTOSCOPY, WITH URETERAL STENT INSERTION;  Surgeon: Rito Medley MD;  Location: [facility] OR;  Service: Urology;  Laterality: Right;    CYSTOSCOPY W/ URETERAL STENT REMOVAL Right 01/06/2020    Procedure: CYSTOSCOPY, WITH URETERAL STENT REMOVAL;  Surgeon: Rito Medley MD;  Location: [facility] OR;  Service: Urology;  Laterality: Right;    ESOPHAGOGASTRODUODENOSCOPY N/A 03/11/2025    Procedure: EGD (ESOPHAGOGASTRODUODENOSCOPY);  Surgeon: Vernon Pike DO;  Location: [facility] ENDO;  Service: Endoscopy;  Laterality: N/A;    INJECTION OF ANESTHETIC AGENT INTO TISSUE PLANE DEFINED BY TRANSVERSUS ABDOMINIS MUSCLE  01/06/2025    Procedure: BLOCK, TRANSVERSUS ABDOMINIS PLANE;  Surgeon: Bibi Edmondson MD;  Location: [facility] OR 2ND FLR;  Service: General;;    LAPAROSCOPIC CHOLECYSTECTOMY  11/05/2024    LAPAROSCOPIC GASTROENTEROSTOMY N/A 01/06/2025    Procedure: GASTROENTEROSTOMY, LAPAROSCOPIC with inraop EGD;  Surgeon: Bibi Edmondson MD;  Location: [facility] OR 2ND FLR;  Service: General;  Laterality: N/A;  Surgeon to perform Tap Block itraoperatively    LASER LITHOTRIPSY Right 01/06/2020    Procedure: LITHOTRIPSY, USING LASER;  Surgeon: Rito Medley MD;   Location: [facility] OR;  Service: Urology;  Laterality: Right;    MAGNETIC RESONANCE IMAGING N/A 03/29/2025    Procedure: MRI (MAGNETIC RESONANCE IMAGING);  Surgeon: Dory Kirk;  Location: [facility];  Service: Anesthesiology;  Laterality: N/A;    STOMACH SURGERY  01/06/2025    TONSILLECTOMY, ADENOIDECTOMY, BILATERAL MYRINGOTOMY AND TUBES      UPPER GASTROINTESTINAL ENDOSCOPY      URETEROSCOPIC REMOVAL OF URETERIC CALCULUS Right 01/06/2020    Procedure: REMOVAL, CALCULUS, URETER, URETEROSCOPIC;  Surgeon: Rito Medley MD;  Location: [facility] OR;  Service: Urology;  Laterality: Right;    URETEROSCOPY Left 12/26/2019    Procedure: URETEROSCOPY;  Surgeon: Rito Medley MD;  Location: [facility] OR;  Service: Urology;  Laterality: Left;    URETEROSCOPY Right 01/06/2020    Procedure: URETEROSCOPY;  Surgeon: Rito Medley MD;  Location: [facility] OR;  Service: Urology;  Laterality: Right;       Time Tracking:     PT Received On: 04/21/25  PT Start Time: 0854  PT Stop Time: 0926  PT Total Time (min): 32 min     Billable Minutes: Evaluation 8, Gait Training 9, and Therapeutic Activity 15    04/21/2025

## 2025-04-21 NOTE — CARE UPDATE
04/20/25 2120   Negative Inspiratory Force   $ Negative Inspiratory Force Charges Given   Negative Inspiratory Force (cm H2O) -18   Effort (NIF) good   Patient Position (NIF) semi-Conley's   Vital Capacity   $ Vital Capacity Charges Given   Vital Capacity (mL) 1200   Patient Position (VC) semi-Conley's   Effort (VC) good

## 2025-04-21 NOTE — PLAN OF CARE
PT Eval complete, appropriate goals created    Problem: Physical Therapy  Goal: Physical Therapy Goal  Description: Goals to be completed by: 4/30/25    Pt will be able to stand up from EOB w/ supervision using LRAD  Pt will ambulate 250 feet w/ supervision using LRAD  Pt will be independent w/ HEP therex on BLE w/ good form and ROM   Outcome: Progressing

## 2025-04-21 NOTE — HOSPITAL COURSE
04/21/2025: NIF -32, patient stable for stepdown to . Case request for anesthesia placed for MRI, General Neurology following along

## 2025-04-21 NOTE — CONSULTS
[facility] - Neuro Critical Care  Neurology  Consult Note    Patient Name: Alexandra Martin  MRN: [id number]  Admission Date: 4/20/2025  Hospital Length of Stay: 1 days  Code Status: Full Code   Attending Provider: Nohelia Murillo MD   Consulting Provider: Kook Ybarra MD  Primary Care Physician: Nicolette Andrade DO  Principal Problem:Generalized weakness    Inpatient consult to General Neurology  Consult performed by: Koko Ybarra MD  Consult ordered by: Jose Curtis [facility]      Inpatient Consult to Neurology Services (General Neurology)  Consult performed by: Koko Ybarra MD  Consult ordered by: Jg Rajput PA-C  Reason for consult: EMG/weakness         Subjective:     Chief Complaint:  EMG/weakness      HPI:   Ms. Alexandra Martin is a 29 y.o. patient here for generalized weakness. History of bariatric surgery (1/2025), L [place] palsy, peripheral neuropathy and vertigo with occasional headaches. Transferred to [facility] for ascending weakness.     Initially presented to [facility] for worsening tingling sensation on her feet following her gastric bypass surgery. Partesthesias have been more distal to proximal with some ataxia up to the point that she started using a walker at home. Evaluated by Neurology at OSH with presentation mostly of nutritional deficiency given prior surgery and started on thiamine and multi-vitamin replacement. Workup at the OSH negative (MRI B/C/T/L spine) and unable to perform LP given BMI.     Noted some urinary incontinence after being discharged from rehab and worsening weakness on the last 24hs. Admitted to [facility] from the ED given respiratory mechanics. On my initial examination, some spinothalamic tract sensory deficits, but no dorsal column (e.g.,vibratory) findings noted, in addition to ataxia. Neurology consulted for EMG assistance in the setting of subacute/chronic weakness.     Past Medical History:   Diagnosis Date    ADD (attention  deficit disorder)     Anxiety disorder     Biliary dyskinesia 11/02/2024    Bronchitis 12/03/2024    Depression     Drug-induced constipation 03/04/2024    Headache     MDD (major depressive disorder), recurrent episode, moderate 07/29/2021    Morbid obesity with BMI of 60.0-69.9, adult 06/10/2024    Nephrolithiasis     Ovarian cyst     Substance abuse     Hospitalization     Trauma 09/11/2019    Tympanic membrane perforation, left 03/14/2018    UTI (urinary tract infection) 08/03/2018    Vertigo        Past Surgical History:   Procedure Laterality Date    ANGIOGRAM, CORONARY, WITH LEFT HEART CATHETERIZATION  02/10/2025    Procedure: Left Heart Cath;  Surgeon: Garcia De Luna MD;  [email or web address];  Service: Cardiovascular;;    CHOLECYSTECTOMY      CYSTOSCOPY W/ URETERAL STENT PLACEMENT Right 12/26/2019    Procedure: CYSTOSCOPY, WITH URETERAL STENT INSERTION;  Surgeon: Rito Medley MD;  Location: [email or web address] OR;  Service: Urology;  Laterality: Right;    CYSTOSCOPY W/ URETERAL STENT REMOVAL Right 01/06/2020    Procedure: CYSTOSCOPY, WITH URETERAL STENT REMOVAL;  Surgeon: Rito Medley MD;  Location: [email or web address] OR;  Service: Urology;  Laterality: Right;    ESOPHAGOGASTRODUODENOSCOPY N/A 03/11/2025    Procedure: EGD (ESOPHAGOGASTRODUODENOSCOPY);  Surgeon: Vernon Pike DO;  Location: [email or web address] ENDO;  Service: Endoscopy;  Laterality: N/A;    INJECTION OF ANESTHETIC AGENT INTO TISSUE PLANE DEFINED BY TRANSVERSUS ABDOMINIS MUSCLE  01/06/2025    Procedure: BLOCK, TRANSVERSUS ABDOMINIS PLANE;  Surgeon: Bibi Edmondson MD;  Location: [facility] OR 2ND FLR;  Service: General;;    LAPAROSCOPIC CHOLECYSTECTOMY  11/05/2024    LAPAROSCOPIC GASTROENTEROSTOMY N/A 01/06/2025    Procedure: GASTROENTEROSTOMY, LAPAROSCOPIC with inraop EGD;  Surgeon: Bibi Edmondson MD;  Location: [facility] OR 2ND FLR;  Service: General;  Laterality: N/A;  Surgeon to perform Tap Block itraoperatively    LASER LITHOTRIPSY  Right 01/06/2020    Procedure: LITHOTRIPSY, USING LASER;  Surgeon: Rito Medley MD;  [facility];  Service: Urology;  Laterality: Right;    MAGNETIC RESONANCE IMAGING N/A 03/29/2025    Procedure: MRI (MAGNETIC RESONANCE IMAGING);  Surgeon: Bertha Kirk;  Location: [facility];  Service: Anesthesiology;  Laterality: N/A;    STOMACH SURGERY  01/06/2025    TONSILLECTOMY, ADENOIDECTOMY, BILATERAL MYRINGOTOMY AND TUBES      UPPER GASTROINTESTINAL ENDOSCOPY      URETEROSCOPIC REMOVAL OF URETERIC CALCULUS Right 01/06/2020    Procedure: REMOVAL, CALCULUS, URETER, URETEROSCOPIC;  Surgeon: Rito Medley MD;  Location: [facility] OR;  Service: Urology;  Laterality: Right;    URETEROSCOPY Left 12/26/2019    Procedure: URETEROSCOPY;  Surgeon: Rito Medley MD;  Location: [facility] OR;  Service: Urology;  Laterality: Left;    URETEROSCOPY Right 01/06/2020    Procedure: URETEROSCOPY;  Surgeon: Rito Medley MD;  [facility];  Service: Urology;  Laterality: Right;       Review of patient's allergies indicates:   Allergen Reactions    Ceftriaxone Nausea And Vomiting    Azithromycin Nausea And Vomiting     Hard to breathe     Latex, natural rubber Swelling and Rash       No current facility-administered medications on file prior to encounter.     Current Outpatient Medications on File Prior to Encounter   Medication Sig    acetaminophen (TYLENOL) 325 MG tablet Take 2 tablets (650 mg total) by mouth every 4 (four) hours as needed for Pain.    calcium-vitamin D tablet 600 mg-200 units Take 1 tablet by mouth 2 (two) times daily.    ergocalciferol (VITAMIN D2) 50,000 unit Cap Take 1 capsule (50,000 Units total) by mouth every 7 days.    metoprolol succinate (TOPROL-XL) 50 MG 24 hr tablet Take 1 tablet (50 mg total) by mouth 2 (two) times daily.    multivit-min/iron/folic acid/K (BARIATRIC MULTIVITAMINS ORAL) Take 1 tablet by mouth 2 (two) times a day.    oxyCODONE-acetaminophen (PERCOCET) 5-325 mg per tablet Take  1 tablet by mouth every 6 (six) hours as needed for Pain.    pantoprazole (PROTONIX) 40 MG tablet Take 40 mg by mouth 2 (two) times daily.    thiamine 100 MG tablet Take 5 tablets (500 mg total) by mouth 2 (two) times a day for 3 days, THEN 1 tablet (100 mg total) 2 (two) times a day.    venlafaxine (EFFEXOR) 37.5 MG Tab Take 37.5 mg by mouth once daily.    enoxaparin (LOVENOX) 40 mg/0.4 mL Syrg Inject 0.4 mLs (40 mg total) into the skin 2 (two) times daily.    etonogestreL (NEXPLANON) 68 mg Impl subdermal device 68 mg by Subdermal route once. A single NEXPLANON implant is inserted subdermally just under the skin at the inner side of the non-dominant upper arm.    folic acid-vit B6-vit B12 2.5-25-2 mg (FOLBIC OR EQUIV) 2.5-25-2 mg Tab Take 1 tablet by mouth once daily.    polyethylene glycol (GLYCOLAX) 17 gram PwPk Take 17 g by mouth daily as needed for Constipation.     Family History       Problem Relation (Age of Onset)    Breast cancer Maternal Aunt    Obesity Mother    Stomach cancer Father    Ulcerative colitis Brother          Tobacco Use    Smoking status: Former     Current packs/day: 0.25     Average packs/day: 0.3 packs/day for 15.3 years (3.8 ttl pk-yrs)     Types: Cigarettes     Start date: 2010    Smokeless tobacco: Never    Tobacco comments:     Quit smoking June 2024    Substance and Sexual Activity    Alcohol use: Not Currently     Alcohol/week: 0.0 standard drinks of alcohol    Drug use: Not Currently     Types: Marijuana, Fentanyl     Comment: history of opoid abuse    Sexual activity: Not Currently     Partners: Male     Birth control/protection: Implant     Review of Systems   Neurological:  Positive for weakness and numbness.     Objective:     Vital Signs (Most Recent):  Temp: 98 °F (36.7 °C) (04/21/25 1100)  Pulse: 77 (04/21/25 1100)  Resp: (!) 21 (04/21/25 1100)  BP: 138/73 (04/21/25 1100)  SpO2: 97 % (04/21/25 1100) Vital Signs (24h Range):  Temp:  [97.9 °F (36.6 °C)-98.2 °F (36.8 °C)] 98  °F (36.7 °C)  Pulse:  [52-87] 77  Resp:  [12-49] 21  SpO2:  [95 %-100 %] 97 %  BP: ()/() 138/73     Weight: (!) 139.7 kg (308 lb)  Body mass index is 54.56 kg/m².     Physical Exam  Neurological:      Mental Status: She is oriented to person, place, and time.      Cranial Nerves: Cranial nerves 2-12 are intact.      Coordination: Finger-Nose-Finger Test abnormal and Heel to Randhawa Test abnormal.      Deep Tendon Reflexes:      Reflex Scores:       Tricep reflexes are 2+ on the right side and 2+ on the left side.       Bicep reflexes are 2+ on the right side and 2+ on the left side.       Patellar reflexes are 2+ on the right side and 2+ on the left side.       Achilles reflexes are 2+ on the right side and 2+ on the left side.     Comments: See Neurological Exam.           NEUROLOGICAL EXAMINATION:     MENTAL STATUS   Oriented to person, place, and time.   Level of consciousness: alert    CRANIAL NERVES   Cranial nerves II through XII intact.     MOTOR EXAM   Muscle bulk: normal  Overall muscle tone: normal    Strength   Right biceps: 4/5  Left biceps: 4/5  Right triceps: 4/5  Left triceps: 4/5  Right interossei: 4/5  Left interossei: 4/5  Right quadriceps: 3/5  Left quadriceps: 4/5  Right hamstring: 3/5  Left hamstrin/5  Right anterior tibial: 3/5  Left anterior tibial: 4/5  Right posterior tibial: 3/5  Left posterior tibial: 4/5    REFLEXES     Reflexes   Right biceps: 2+  Left biceps: 2+  Right triceps: 2+  Left triceps: 2+  Right patellar: 2+  Left patellar: 2+  Right achilles: 2+  Left achilles: 2+  Right ankle clonus: absent  Left ankle clonus: absent    SENSORY EXAM   Light touch normal.   Vibration normal.   Proprioception normal.   Right arm pinprick: normal  Left arm pinprick: normal  Right leg pinprick: normal  Left leg pinprick: decreased from knee    GAIT AND COORDINATION      Coordination   Finger to nose coordination: abnormal  Heel to shin coordination: abnormal       Ataxia.         Significant Labs: All pertinent lab results from the past 24 hours have been reviewed.    Significant Imaging: I have reviewed all pertinent imaging results/findings within the past 24 hours.  Assessment and Plan:     * Generalized weakness  Ms. Alexandra Martin is a 29 y.o. patient transferred from [facility] given persistent weakness. Recent bariatric surgery is the major triggering risk factor given presentation most likely affecting spinothalamic tract (e.g., painful neuropathy). At the moment we continue to consider her neuropathy is related to nutritional deficiencies in the setting of recent gastric bypass surgery/malabsorption, which we will expand the nutritional workup while she is admitted. 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 We will consider performing a non-urgent EMG to rule out any other isolated axonal or sensory neuropathy. However, if primary team considers patient can be discharged home, this can also happen in the outpatient setting.     Recommendations:  -Recommend non-urgent EMG; no need to keep patient in the hospital for the EMG, which can happen as an outpatient but will consider scheduling while IP   -Follow up nutritional workup (selenium, copper, vitamin E, zinc)  -Follow LP - IR while IP   -Thank you for this consult. Neurology will continue to follow      Koko Ybarra MD  Neurology

## 2025-04-21 NOTE — PLAN OF CARE
Recommendation/Intervention:   1) Resume bariatric regular diet once able, encourage PO intake  2) Monitor need for ONS once PO diet resumed  3) RD to monitor and follow-up as needed     Goals: Meet % of EEN/EPN by next RD follow-up  Nutrition Goal Status: new  Communication of RD Recs: other (comment) (POC)

## 2025-04-21 NOTE — ED NOTES
In mri with patient after premedicating patient for scan patient became tearful stating she can't do the scan and wants to go home, provider Jose Curtis, [facility] called and notified, and floor nurse called and notified.

## 2025-04-21 NOTE — NURSING
Patient Transferred to NPU Room 956 @1430      Upon arrival to the floor, patient greeted and oriented to room. Complete head to toe assessment completed per protocol. VSS, see flowsheet for details. Neuro assessment completed. Cardiac monitoring orders reviewed. Patient added to tele monitor in nursing station, rate and rhythm verified. Primary team notified of patient's transfer to floor. All current and transfer orders reviewed/reconciled per protocol. All emergency equipment set up in patient's room. Fall/seizure precautions initiated per providers orders. 4 Eyes skin assessment performed, see flowsheets for details. Reviewed assessment and rounding frequency with patient and family. Questions were encouraged and addressed. Repositioned patient for comfort with bed locked in lowest position, side rails up x 4, bed alarm set, and call light within reach. Instructed patient to call staff for mobility/assistance, verbalized understanding. No acute signs of distress noted at this time.

## 2025-04-21 NOTE — SUBJECTIVE & OBJECTIVE
Past Medical History:   Diagnosis Date    ADD (attention deficit disorder)     Anxiety disorder     Biliary dyskinesia 11/02/2024    Bronchitis 12/03/2024    Depression     Drug-induced constipation 03/04/2024    Headache     MDD (major depressive disorder), recurrent episode, moderate 07/29/2021    Morbid obesity with BMI of 60.0-69.9, adult 06/10/2024    Nephrolithiasis     Ovarian cyst     Substance abuse     Hospitalization     Trauma 09/11/2019    Tympanic membrane perforation, left 03/14/2018    UTI (urinary tract infection) 08/03/2018    Vertigo      Past Surgical History:   Procedure Laterality Date    ANGIOGRAM, CORONARY, WITH LEFT HEART CATHETERIZATION  02/10/2025    Procedure: Left Heart Cath;  Surgeon: Garcia De Luna MD;  Location: [facility] CATH;  Service: Cardiovascular;;    CHOLECYSTECTOMY      CYSTOSCOPY W/ URETERAL STENT PLACEMENT Right 12/26/2019    Procedure: CYSTOSCOPY, WITH URETERAL STENT INSERTION;  Surgeon: Rito Medley MD;  Location: [facility] OR;  Service: Urology;  Laterality: Right;    CYSTOSCOPY W/ URETERAL STENT REMOVAL Right 01/06/2020    Procedure: CYSTOSCOPY, WITH URETERAL STENT REMOVAL;  Surgeon: Rito Medley MD;  Location: [facility] OR;  Service: Urology;  Laterality: Right;    ESOPHAGOGASTRODUODENOSCOPY N/A 03/11/2025    Procedure: EGD (ESOPHAGOGASTRODUODENOSCOPY);  Surgeon: Vernon Pike DO;  Location: [facility] ENDO;  Service: Endoscopy;  Laterality: N/A;    INJECTION OF ANESTHETIC AGENT INTO TISSUE PLANE DEFINED BY TRANSVERSUS ABDOMINIS MUSCLE  01/06/2025    Procedure: BLOCK, TRANSVERSUS ABDOMINIS PLANE;  Surgeon: iBbi Edmondson MD;  Location: [facility] OR 2ND FLR;  Service: General;;    LAPAROSCOPIC CHOLECYSTECTOMY  11/05/2024    LAPAROSCOPIC GASTROENTEROSTOMY N/A 01/06/2025    Procedure: GASTROENTEROSTOMY, LAPAROSCOPIC with inraop EGD;  Surgeon: Bibi Edmondson MD;  Location: [facility] OR 2ND FLR;  Service: General;  Laterality: N/A;  Surgeon to  perform Tap Block itraoperatively    LASER LITHOTRIPSY Right 01/06/2020    Procedure: LITHOTRIPSY, USING LASER;  Surgeon: Rito Medley MD;  Location: [facility] OR;  Service: Urology;  Laterality: Right;    MAGNETIC RESONANCE IMAGING N/A 03/29/2025    Procedure: MRI (MAGNETIC RESONANCE IMAGING);  Surgeon: Dory Kirk;  Location: [facility];  Service: Anesthesiology;  Laterality: N/A;    STOMACH SURGERY  01/06/2025    TONSILLECTOMY, ADENOIDECTOMY, BILATERAL MYRINGOTOMY AND TUBES      UPPER GASTROINTESTINAL ENDOSCOPY      URETEROSCOPIC REMOVAL OF URETERIC CALCULUS Right 01/06/2020    Procedure: REMOVAL, CALCULUS, URETER, URETEROSCOPIC;  Surgeon: iRto Medley MD;  Location: [facility] OR;  Service: Urology;  Laterality: Right;    URETEROSCOPY Left 12/26/2019    Procedure: URETEROSCOPY;  Surgeon: Rito Medley MD;  Location: [facility] OR;  Service: Urology;  Laterality: Left;    URETEROSCOPY Right 01/06/2020    Procedure: URETEROSCOPY;  Surgeon: Rito Medley MD;  Location: [facility] OR;  Service: Urology;  Laterality: Right;      Medications Ordered Prior to Encounter[1]   Allergies: Ceftriaxone; Azithromycin; and Latex, natural rubber  Family History   Problem Relation Name Age of Onset    Obesity Mother      Stomach cancer Father      Ulcerative colitis Brother      Breast cancer Maternal Aunt      Colon cancer Neg Hx      Miscarriages / Stillbirths Neg Hx      Ovarian cancer Neg Hx      Crohn's disease Neg Hx      Esophageal cancer Neg Hx       Social History[2]  Review of Systems   Constitutional: Negative.    HENT: Negative.     Eyes: Negative.    Respiratory: Negative.  Negative for chest tightness and shortness of breath.    Cardiovascular: Negative.  Negative for chest pain and palpitations.   Gastrointestinal:  Positive for constipation.   Endocrine: Negative.    Genitourinary: Negative.    Musculoskeletal:  Positive for gait problem. Negative for back pain and neck pain.   Skin: Negative.     Allergic/Immunologic: Negative.    Neurological:  Positive for facial asymmetry, weakness, numbness and headaches. Negative for dizziness, tremors, seizures, syncope, speech difficulty and light-headedness.   Hematological: Negative.    Psychiatric/Behavioral:  The patient is nervous/anxious.      Objective:     Vitals:    Temp: 97.9 °F (36.6 °C)  Pulse: 67  BP: (!) 145/79  MAP (mmHg): 104  Resp: 16  SpO2: 99 %    Temp  Min: 97.9 °F (36.6 °C)  Max: 98.2 °F (36.8 °C)  Pulse  Min: 62  Max: 74  BP  Min: 122/59  Max: 169/107  MAP (mmHg)  Min: 85  Max: 126  Resp  Min: 14  Max: 20  SpO2  Min: 97 %  Max: 100 %    No intake/output data recorded.            Physical Exam  Vitals and nursing note reviewed.   Constitutional:       General: She is not in acute distress.     Appearance: She is obese. She is not ill-appearing.   HENT:      Head: Normocephalic and atraumatic.      Nose: Nose normal.      Mouth/Throat:      Mouth: Mucous membranes are moist.      Pharynx: Oropharynx is clear.   Eyes:      Pupils: Pupils are equal, round, and reactive to light.   Cardiovascular:      Rate and Rhythm: Normal rate and regular rhythm.      Pulses: Normal pulses.   Pulmonary:      Effort: Pulmonary effort is normal.   Abdominal:      General: There is no distension.      Tenderness: There is no abdominal tenderness.   Musculoskeletal:         General: No swelling or tenderness.      Cervical back: Normal range of motion.   Skin:     General: Skin is warm and dry.      Capillary Refill: Capillary refill takes less than 2 seconds.   Neurological:      Mental Status: She is alert.      Comments:   E4 V5 M6  Alert. Oriented x4. Speech fluent - no dysarthria or aphasia. Following commands.   PERRLA. EOMI. Visual fields intact   No facial asymmetry.  Tongue midline. Shoulder shrug symmetric.  Motor:  RUE 4/5  RLE 0/5  LUE 4/5  LLE 0/5  No drift   Coordination impaired in BUE  States she cannot feel sharp or dull touch in BLE below the knee.  She was able to feel when her upper posterior and anterior thigh was pinched. Her hands are numb as well.       Unable to test gait due to deficit.    Today I personally reviewed pertinent medications, lines/drains/airways, imaging, cardiology results, laboratory results, microbiology results         [1]   No current facility-administered medications on file prior to encounter.     Current Outpatient Medications on File Prior to Encounter   Medication Sig Dispense Refill    acetaminophen (TYLENOL) 325 MG tablet Take 2 tablets (650 mg total) by mouth every 4 (four) hours as needed for Pain.      calcium-vitamin D tablet 600 mg-200 units Take 1 tablet by mouth 2 (two) times daily.      ergocalciferol (VITAMIN D2) 50,000 unit Cap Take 1 capsule (50,000 Units total) by mouth every 7 days.      metoprolol succinate (TOPROL-XL) 50 MG 24 hr tablet Take 1 tablet (50 mg total) by mouth 2 (two) times daily. 60 tablet 11    multivit-min/iron/folic acid/K (BARIATRIC MULTIVITAMINS ORAL) Take 1 tablet by mouth 2 (two) times a day.      oxyCODONE-acetaminophen (PERCOCET) 5-325 mg per tablet Take 1 tablet by mouth every 6 (six) hours as needed for Pain.      pantoprazole (PROTONIX) 40 MG tablet Take 40 mg by mouth 2 (two) times daily.      thiamine 100 MG tablet Take 5 tablets (500 mg total) by mouth 2 (two) times a day for 3 days, THEN 1 tablet (100 mg total) 2 (two) times a day. 90 tablet 0    venlafaxine (EFFEXOR) 37.5 MG Tab Take 37.5 mg by mouth once daily.      enoxaparin (LOVENOX) 40 mg/0.4 mL Syrg Inject 0.4 mLs (40 mg total) into the skin 2 (two) times daily.      etonogestreL (NEXPLANON) 68 mg Impl subdermal device 68 mg by Subdermal route once. A single NEXPLANON implant is inserted subdermally just under the skin at the inner side of the non-dominant upper arm.      folic acid-vit B6-vit B12 2.5-25-2 mg (FOLBIC OR EQUIV) 2.5-25-2 mg Tab Take 1 tablet by mouth once daily.      polyethylene glycol (GLYCOLAX) 17 gram PwPk  Take 17 g by mouth daily as needed for Constipation.     [2]   Social History  Tobacco Use    Smoking status: Former     Current packs/day: 0.25     Average packs/day: 0.3 packs/day for 15.3 years (3.8 ttl pk-yrs)     Types: Cigarettes     Start date: 2010    Smokeless tobacco: Never    Tobacco comments:     Quit smoking June 2024    Substance Use Topics    Alcohol use: Not Currently     Alcohol/week: 0.0 standard drinks of alcohol    Drug use: Not Currently     Types: Marijuana, Fentanyl     Comment: history of opoid abuse

## 2025-04-21 NOTE — CARE UPDATE
04/20/25 1946   Negative Inspiratory Force   $ Negative Inspiratory Force Charges Given   Negative Inspiratory Force (cm H2O) -18   Effort (NIF) good   Patient Position (NIF) semi-Conley's   Vital Capacity   $ Vital Capacity Charges Given   Vital Capacity (mL) 1000   Patient Position (VC) semi-Conley's   Effort (VC) good

## 2025-04-21 NOTE — ASSESSMENT & PLAN NOTE
History of  Was being treated for at [facility] with IV and transitioned to PO after discharge to rehab  Thiamine level pending

## 2025-04-21 NOTE — ASSESSMENT & PLAN NOTE
Ms. Alexandra Martin is a 29 y.o. patient transferred from OSH given persistent weakness. Recent bariatric surgery is the major triggering risk factor given presentation most likely affecting spinothalamic tract (e.g., painful neuropathy). At the moment we continue to consider her neuropathy is related to nutritional deficiencies in the setting of recent gastric bypass surgery/malabsorption, which we will expand the nutritional workup while she is admitted. 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 We will consider performing a non-urgent EMG to rule out any other isolated axonal or sensory neuropathy. However, if primary team considers patient can be discharged home, this can also happen in the outpatient setting.     Recommendations:  -Recommend non-urgent EMG; no need to keep patient in the hospital for the EMG, which can happen as an outpatient but will consider scheduling while IP   -Follow up nutritional workup (selenium, copper, vitamin E, zinc)  -Follow LP - IR while IP   -Thank you for this consult. Neurology will continue to follow

## 2025-04-21 NOTE — PT/OT/SLP EVAL
Occupational Therapy  Co- Evaluation and Discharge Note    Name: Alexandra Martin  MRN: [id number]  Admitting Diagnosis: Bilateral leg weakness  Recent Surgery: * No surgery found *      Recommendations:     Discharge Recommendations: Low Intensity Therapy  Discharge Equipment Recommendations: none  Barriers to discharge:  None    Assessment:     Alexandra Martin is a 29 y.o. female with a medical diagnosis of Bilateral leg weakness. At this time, patient is functioning at their prior level of function and does not require further acute OT services.     Plan:     During this hospitalization, patient does not require further acute OT services.  Please re-consult if situation changes.    Plan of Care Reviewed with: patient    Subjective     Chief Complaint: none   Patient/Family Comments/goals: DC     Occupational Profile:  Living Environment: Patient  lives w/ mother  in a single story home.   Previous level of function: Prior to admission patient independent with ADLs; patient has been using RW and EC for last 1-2 weeks   Roles and Routines: unknown   Equipment Used at home: shower chair, walker, rolling  Assistance upon Discharge: mom     Pain/Comfort:  Pain Rating 1: 0/10    Patients cultural, spiritual, [employer] conflicts given the current situation: no    Objective:     Communicated with: RN prior to session.  Patient found supine with telemetry, blood pressure cuff, pulse ox (continuous) upon OT entry to room.    General Precautions: Standard,    Orthopedic Precautions: N/A  Braces: N/A  Respiratory Status: Room air     Occupational Performance:    Bed Mobility:    Patient completed Supine to Sit with supervision    Functional Mobility/Transfers:  Patient completed Sit <> Stand Transfer with stand by assistance  with  no assistive device   Patient completed Bed <> Chair Transfer using Step Transfer technique with stand by assistance with rolling walker  Patient completed Toilet Transfer Step  Transfer technique with stand by assistance with  rolling walker  Functional Mobility: Patient ambulated 14' + 100' with RW and standby assistance     Activities of Daily Living:  Grooming: stand by assistance to complete oral care and facial grooming standing at sink   Toileting: stand by assistance to complete toileting in bathroom     Cognitive/Visual Perceptual:  Cognitive/Psychosocial Skills:     -       Oriented to: Person, Place, Time, and Situation   -       Follows Commands/attention:Follows multistep  commands  -       Communication: clear/fluent  -       Safety awareness/insight to disability: intact   Visual/Perceptual:      -Intact      Physical Exam:  Sensation:    -       Intact  Upper Extremity Range of Motion:     -       Right Upper Extremity: WFL  -       Left Upper Extremity: WFL  Upper Extremity Strength:    -       Right Upper Extremity: WFL  -       Left Upper Extremity: WFL   Strength:    -       Right Upper Extremity: WFL  -       Left Upper Extremity: WFL  Fine Motor Coordination:    -       Intact    AMPAC 6 Click ADL:  AMPAC Total Score:      Treatment & Education:  Co-evaluation completed due to patient medical instability and to ensure patient safety. Provided education regarding role of OT, POC, & discharge recommendations.  Pt with no further questions/concerns at this time. OT provided education on home recommendations and fall prevention in preparation for D/C.     Patient left supine with all lines intact and call button in reach    GOALS:   Multidisciplinary Problems       Occupational Therapy Goals       Not on file              Multidisciplinary Problems (Resolved)          Problem: Occupational Therapy    Goal Priority Disciplines Outcome Interventions   Occupational Therapy Goal   (Resolved)     OT, PT/OT Met    Description: Goals to be met by: 5/21/25    Patient will increase functional independence with ADLs by performing:    Step transfer with Maximum Assistance                          DME Justifications:  No DME recommended requiring DME justifications    History:     Past Medical History:   Diagnosis Date    ADD (attention deficit disorder)     Anxiety disorder     Biliary dyskinesia 11/02/2024    Bronchitis 12/03/2024    Depression     Drug-induced constipation 03/04/2024    Headache     MDD (major depressive disorder), recurrent episode, moderate 07/29/2021    Morbid obesity with BMI of 60.0-69.9, adult 06/10/2024    Nephrolithiasis     Ovarian cyst     Substance abuse     Hospitalization     Trauma 09/11/2019    Tympanic membrane perforation, left 03/14/2018    UTI (urinary tract infection) 08/03/2018    Vertigo          Past Surgical History:   Procedure Laterality Date    ANGIOGRAM, CORONARY, WITH LEFT HEART CATHETERIZATION  02/10/2025    Procedure: Left Heart Cath;  Surgeon: Garcia De Luna MD;  Location: [facility] CATH;  Service: Cardiovascular;;    CHOLECYSTECTOMY      CYSTOSCOPY W/ URETERAL STENT PLACEMENT Right 12/26/2019    Procedure: CYSTOSCOPY, WITH URETERAL STENT INSERTION;  Surgeon: Rito Medley MD;  Location: [facility] OR;  Service: Urology;  Laterality: Right;    CYSTOSCOPY W/ URETERAL STENT REMOVAL Right 01/06/2020    Procedure: CYSTOSCOPY, WITH URETERAL STENT REMOVAL;  Surgeon: Rito Medley MD;  Location: [facility] OR;  Service: Urology;  Laterality: Right;    ESOPHAGOGASTRODUODENOSCOPY N/A 03/11/2025    Procedure: EGD (ESOPHAGOGASTRODUODENOSCOPY);  Surgeon: Vernon Pike DO;  Location: [facility];  Service: Endoscopy;  Laterality: N/A;    INJECTION OF ANESTHETIC AGENT INTO TISSUE PLANE DEFINED BY TRANSVERSUS ABDOMINIS MUSCLE  01/06/2025    Procedure: BLOCK, TRANSVERSUS ABDOMINIS PLANE;  Surgeon: Bibi Edmondson MD;  Location: [facility] OR [address] [place] [address];  Service: General;;    LAPAROSCOPIC CHOLECYSTECTOMY  11/05/2024    LAPAROSCOPIC GASTROENTEROSTOMY N/A 01/06/2025    Procedure: GASTROENTEROSTOMY, LAPAROSCOPIC with inraop EGD;  Surgeon:  Bibi Edmondson MD;  Location: [facility] OR 2ND FLR;  Service: General;  Laterality: N/A;  Surgeon to perform Tap Block itraoperatively    LASER LITHOTRIPSY Right 01/06/2020    Procedure: LITHOTRIPSY, USING LASER;  Surgeon: Rito Medley MD;  Location: [facility] OR;  Service: Urology;  Laterality: Right;    MAGNETIC RESONANCE IMAGING N/A 03/29/2025    Procedure: MRI (MAGNETIC RESONANCE IMAGING);  Surgeon: Bertha Kirk;  Location: [facility];  Service: Anesthesiology;  Laterality: N/A;    STOMACH SURGERY  01/06/2025    TONSILLECTOMY, ADENOIDECTOMY, BILATERAL MYRINGOTOMY AND TUBES      UPPER GASTROINTESTINAL ENDOSCOPY      URETEROSCOPIC REMOVAL OF URETERIC CALCULUS Right 01/06/2020    Procedure: REMOVAL, CALCULUS, URETER, URETEROSCOPIC;  Surgeon: Rito Medley MD;  Location: [facility] OR;  Service: Urology;  Laterality: Right;    URETEROSCOPY Left 12/26/2019    Procedure: URETEROSCOPY;  Surgeon: Rito Medley MD;  Location: [facility] OR;  Service: Urology;  Laterality: Left;    URETEROSCOPY Right 01/06/2020    Procedure: URETEROSCOPY;  Surgeon: Rito Medley MD;  Location: [facility] OR;  Service: Urology;  Laterality: Right;       Time Tracking:     OT Date of Treatment: 04/21/25  OT Start Time: 0854  OT Stop Time: 0926  OT Total Time (min): 32 min    Billable Minutes:Evaluation 8  Self Care/Home Management 15  Therapeutic Activity 9    4/21/2025

## 2025-04-21 NOTE — ASSESSMENT & PLAN NOTE
Body mass index is 54.56 kg/m².  Bariatric diet ordered  Nutrition consulted  S/p bypass surgery in January 2025

## 2025-04-21 NOTE — PLAN OF CARE
Problem: Occupational Therapy  Goal: Occupational Therapy Goal  Description: Goals to be met by: 5/21/25    Patient will increase functional independence with ADLs by performing:    Step transfer with Maximum Assistance    Outcome: Met

## 2025-04-21 NOTE — PLAN OF CARE
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 She was recently discharged 16 days ago from [facility] after admission for less severe BLE weakness and numbness. She first noticed tingling sensation in her feet after standing for long periods of time after her bypass surgery in January. The tingling sensation changed into numbness and started traveling up her legs and started to involve both hands. She reports having numbness up to her chest and shoulders. She also has had progressively worsening weakness to both legs and arms. While admitted, she had a MRI Brain and C/T/L spine w/o contrast that did not show any significant findings. Symptom were attributed to a thiamine deficiency, for which she was started on IV thiamine to treat. She was sent to rehab when discharged and got out of rehab 4 days ago. She was able to walk with a walker when she first got home. She has been incontinent of urine. Over the last two days, she has had progressive weakness and numbness. She was not able to get out of bed today due to her muscle weakness and numbness which prompted her ED visit today. She additionally notes having L sided facial weakness that is similar to when she had [place] Palsy. MRI brain and C/T/L spine with and without contrast pending. Vitamin labs ordered. NCC was consulted due to NIF of -18 and VC of 1200. She is has no complaints of shortness of breath and is not in respiratory distress. She is sating in the high 90s on RA. A LP was performed but was unsuccessful due to the size of the needle and her body habitus. Given her NIF and progressive weakness, she will be admitted to [facility] for further monitoring and higher level of care. NIF is -32. Patient is stable for  step down to hospital medicine.

## 2025-04-21 NOTE — CONSULTS
Inpatient consult to Physical Medicine Rehab  Consult performed by: Josi Alvarez NP  Consult ordered by: Joes Curtis, NIKKI-BC  Reason for consult: Rehab      Consult received.     SANDRA Magana, FNP-C  Physical Medicine & Rehabilitation   04/21/2025

## 2025-04-21 NOTE — CONSULTS
[facility] - Neuro Critical Care  Adult Nutrition  Consult Note    SUMMARY     Recommendation/Intervention:   1) Resume bariatric regular diet once able, encourage PO intake  2) Monitor need for ONS once PO diet resumed  3) RD to monitor and follow-up as needed    Goals: Meet % of EEN/EPN by next RD follow-up  Nutrition Goal Status: new  Communication of RD Recs: other (comment) (POC)    Nutrition Discharge Planning     Nutrition Discharge Planning: Therapeutic diet (comments)  Therapeutic diet (comments): Bariatric Diet    Assessment and Plan    Nutrition Problem  Inadequate energy intake    Related to (etiology):   MRI planned     Signs and Symptoms (as evidenced by):   NPO status      Interventions/Recommendations (treatment strategy):  Collaboration with other providers  ADAT    Nutrition Diagnosis Status:   New      Malnutrition Assessment  NFPE not completed at this time- pt seen remotely. Will attempt at f/u visit.    Reason for Assessment    Reason For Assessment: consult  Diagnosis: other (see comments) (Generalized weakness)  General Information Comments: RD received consult- assess dietary needs. Admitted for progressive weakness and numbness. S/p bariatric surgery 1/2025. Pt was on regular bariatric diet but now NPO for MRI planned under anesthesia. LBM documented today, 4/21.    Past Medical History:   Diagnosis Date    ADD (attention deficit disorder)     Anxiety disorder     Biliary dyskinesia 11/02/2024    Bronchitis 12/03/2024    Depression     Drug-induced constipation 03/04/2024    Headache     MDD (major depressive disorder), recurrent episode, moderate 07/29/2021    Morbid obesity with BMI of 60.0-69.9, adult 06/10/2024    Nephrolithiasis     Ovarian cyst     Substance abuse     Hospitalization     Trauma 09/11/2019    Tympanic membrane perforation, left 03/14/2018    UTI (urinary tract infection) 08/03/2018    Vertigo      Nutrition/Diet History    Spiritual, Cultural Beliefs, Confucianism  "Practices, Values that Affect Care: no  Food Allergies: NKFA  Factors Affecting Nutritional Intake: NPO    Anthropometrics    Height: 5' 3" (160 cm)  Height (inches): 63 in  Height Method: Stated  Weight: (!) 139.7 kg (308 lb)  Weight (lb): (!) 308 lb  Weight Method: Bed Scale  Ideal Body Weight (IBW), Female: 115 lb  % Ideal Body Weight, Female (lb): 267.83 %  BMI (Calculated): 54.6  BMI Grade: greater than 40 - morbid obesity       Lab/Procedures/Meds    Pertinent Labs Reviewed: reviewed  Pertinent Labs Comments: CO2: 20, BUN: 5  Pertinent Medications Reviewed: reviewed   enoxparin  60 mg Subcutaneous Q12H (prophylaxis, 0900/2100)    folic acid-vit B6-vit B12 2.5-25-2 mg  1 tablet Oral Daily    pantoprazole  40 mg Oral BID    polyethylene glycol  17 g Oral Daily    senna-docusate  1 tablet Oral BID    venlafaxine  37.5 mg Oral Daily     Estimated/Assessed Needs    Weight Used For Calorie Calculations: (!) 139.7 kg (307 lb 15.7 oz)  Energy Calorie Requirements (kcal): 2061-1329 kcal/day  Energy Need Method: Kcal/kg (11-14 kcal/kg)  Protein Requirements: 62 g (1.2 g/kg IBW)  Weight Used For Protein Calculations: 52.2 kg (115 lb)  Fluid Requirements (mL): 1 mL/kcal or per MD  Estimated Fluid Requirement Method: RDA Method  RDA Method (mL): 1536  CHO Requirement: 192 g      Nutrition Prescription Ordered    Current Diet Order: NPO    Evaluation of Received Nutrient/Fluid Intake    I/O: - 150 mL since admit  Energy Calories Required: not meeting needs  Protein Required: not meeting needs  Fluid Required: not meeting needs  Comments: LBM 4/21  % Intake of Estimated Energy Needs: 0 - 25 %  % Meal Intake: NPO    Nutrition Risk    Level of Risk/Frequency of Follow-up: moderate - high       Monitor and Evaluation    Monitor and Evaluation: Energy intake, Food and beverage intake, Protein intake, Carbohydrate intake, Diet order, Weight       Nutrition Follow-Up    RD Follow-up?: Yes    "

## 2025-04-21 NOTE — EICU
"Intervention Initiated From:  COR / EICU    Carlos A intervened regarding:  Rounding (Video assessment)    Virtual ICU Admission    Admit Date: 2025  LOS: 1  Code Status: Full Code   : 1995  Bed: 9076/9076 A:     Diagnosis: Bilateral leg weakness    Patient  has a past medical history of ADD (attention deficit disorder), Anxiety disorder, Biliary dyskinesia, Bronchitis, Depression, Drug-induced constipation, Headache, MDD (major depressive disorder), recurrent episode, moderate, Morbid obesity with BMI of 60.0-69.9, adult, Nephrolithiasis, Ovarian cyst, Substance abuse, Trauma, Tympanic membrane perforation, left, UTI (urinary tract infection), and Vertigo.    Last VS: BP (!) 166/79   Pulse 64   Temp 97.9 °F (36.6 °C) (Oral)   Resp 16   Ht 5' 3" (1.6 m)   Wt (!) 140.1 kg (308 lb 13.8 oz)   LMP 2025   SpO2 100%   Breastfeeding No   BMI 54.71 kg/m²       VICU Review    VICU nurse assessment :  [place] completed and VTE prophylaxis review        Nursing orders placed : IP JUAN CARLOS Peripheral IV Access           "

## 2025-04-21 NOTE — ASSESSMENT & PLAN NOTE
28 y/o F with PMH of thiamine deficiency neuropathy and BLE and BUE weakness/numbness for which she was recently hospitalized for at [facility] admitted now to [facility] for ascending weakness and numbness. Recently discharged 16 days ago from [facility] after admission for less severe BLE weakness and numbness. While admitted, she had a MRI Brain and C/T/L spine w/o contrast that did not show any significant findings. Symptom were attributed to a thiamine deficiency, and was started on IV thiamine. She was sent to rehab when discharged and got out of rehab 4 days ago. Over the last two days, she has had progressive weakness and numbness. She was not able to get out of bed today which prompted her ED visit today. NCC was consulted due to NIF of -18 and VC of 1200. She is has no complaints of shortness of breath and has good SpO2 on RA. She is admitted to [facility] for further monitoring and higher level of care.    [facility]  Q1h neuro checks, vitals, I/Os  Echo, EKG, CXR pending  MRI brain with and without contrast and MRI C/T/L spine with and without contrast pending  Patient very anxious in MRI tonight and refused scan before scan started, unable to complete with 5 of IV Haldol  Plans for LP in AM  A1c, TSH, lipid panel, aPTT, PT/INR, T&S pending   Daily CBC, CMP, mag, phos  SBP < 180  PRN labetalol, hydralazine  HOB 30°   Bariatric diet, nutrution consulted  PT/OT/SLP  VTE Prophylaxis: mechanical, hold chemical until after LP

## 2025-04-22 ENCOUNTER — PROCEDURE VISIT (OUTPATIENT)
Facility: CLINIC | Age: 30
End: 2025-04-22
Payer: MEDICARE

## 2025-04-22 VITALS
HEART RATE: 73 BPM | SYSTOLIC BLOOD PRESSURE: 140 MMHG | HEIGHT: 63 IN | OXYGEN SATURATION: 94 % | WEIGHT: 293 LBS | BODY MASS INDEX: 51.91 KG/M2 | DIASTOLIC BLOOD PRESSURE: 92 MMHG | RESPIRATION RATE: 18 BRPM | TEMPERATURE: 98 F

## 2025-04-22 DIAGNOSIS — M54.17 LUMBOSACRAL RADICULOPATHY: Primary | ICD-10-CM

## 2025-04-22 DIAGNOSIS — G63 NEUROPATHY DUE TO NUTRITIONAL DEFICIENCY: ICD-10-CM

## 2025-04-22 DIAGNOSIS — E63.9 NEUROPATHY DUE TO NUTRITIONAL DEFICIENCY: ICD-10-CM

## 2025-04-22 LAB
ABSOLUTE EOSINOPHIL (OHS): 0.1 K/UL
ABSOLUTE MONOCYTE (OHS): 0.43 K/UL (ref 0.3–1)
ABSOLUTE NEUTROPHIL COUNT (OHS): 2.68 K/UL (ref 1.8–7.7)
ALBUMIN SERPL BCP-MCNC: 3.1 G/DL (ref 3.5–5.2)
ALP SERPL-CCNC: 91 UNIT/L (ref 40–150)
ALT SERPL W/O P-5'-P-CCNC: 32 UNIT/L (ref 10–44)
ANION GAP (OHS): 8 MMOL/L (ref 8–16)
AST SERPL-CCNC: 28 UNIT/L (ref 11–45)
BASOPHILS # BLD AUTO: 0.02 K/UL
BASOPHILS NFR BLD AUTO: 0.4 %
BILIRUB SERPL-MCNC: 0.6 MG/DL (ref 0.1–1)
BUN SERPL-MCNC: 7 MG/DL (ref 6–20)
CALCIUM SERPL-MCNC: 8.9 MG/DL (ref 8.7–10.5)
CHLORIDE SERPL-SCNC: 111 MMOL/L (ref 95–110)
CK SERPL-CCNC: 54 U/L (ref 20–180)
CO2 SERPL-SCNC: 23 MMOL/L (ref 23–29)
CREAT SERPL-MCNC: 0.7 MG/DL (ref 0.5–1.4)
ERYTHROCYTE [DISTWIDTH] IN BLOOD BY AUTOMATED COUNT: 13 % (ref 11.5–14.5)
GFR SERPLBLD CREATININE-BSD FMLA CKD-EPI: >60 ML/MIN/1.73/M2
GLUCOSE SERPL-MCNC: 89 MG/DL (ref 70–110)
HCT VFR BLD AUTO: 37.5 % (ref 37–48.5)
HGB BLD-MCNC: 11.8 GM/DL (ref 12–16)
IMM GRANULOCYTES # BLD AUTO: 0.02 K/UL (ref 0–0.04)
IMM GRANULOCYTES NFR BLD AUTO: 0.4 % (ref 0–0.5)
LYMPHOCYTES # BLD AUTO: 1.92 K/UL (ref 1–4.8)
MAGNESIUM SERPL-MCNC: 2.1 MG/DL (ref 1.6–2.6)
MCH RBC QN AUTO: 27.5 PG (ref 27–31)
MCHC RBC AUTO-ENTMCNC: 31.5 G/DL (ref 32–36)
MCV RBC AUTO: 87 FL (ref 82–98)
NUCLEATED RBC (/100WBC) (OHS): 0 /100 WBC
PANTOTHENATE SERPLBLD-MCNC: NORMAL UG/ML
PHOSPHATE SERPL-MCNC: 3.6 MG/DL (ref 2.7–4.5)
PLATELET # BLD AUTO: 177 K/UL (ref 150–450)
PMV BLD AUTO: 10.5 FL (ref 9.2–12.9)
POTASSIUM SERPL-SCNC: 3.5 MMOL/L (ref 3.5–5.1)
PROT SERPL-MCNC: 5.9 GM/DL (ref 6–8.4)
RBC # BLD AUTO: 4.29 M/UL (ref 4–5.4)
RELATIVE EOSINOPHIL (OHS): 1.9 %
RELATIVE LYMPHOCYTE (OHS): 37.1 % (ref 18–48)
RELATIVE MONOCYTE (OHS): 8.3 % (ref 4–15)
RELATIVE NEUTROPHIL (OHS): 51.9 % (ref 38–73)
SODIUM SERPL-SCNC: 142 MMOL/L (ref 136–145)
VIT B12 SERPL-MCNC: 242 NG/L (ref 180–914)
WBC # BLD AUTO: 5.17 K/UL (ref 3.9–12.7)

## 2025-04-22 PROCEDURE — 82550 ASSAY OF CK (CPK): CPT

## 2025-04-22 PROCEDURE — 85025 COMPLETE CBC W/AUTO DIFF WBC: CPT | Performed by: PHYSICIAN ASSISTANT

## 2025-04-22 PROCEDURE — 83735 ASSAY OF MAGNESIUM: CPT | Performed by: PHYSICIAN ASSISTANT

## 2025-04-22 PROCEDURE — 25000003 PHARM REV CODE 250: Performed by: STUDENT IN AN ORGANIZED HEALTH CARE EDUCATION/TRAINING PROGRAM

## 2025-04-22 PROCEDURE — 25000003 PHARM REV CODE 250: Performed by: PHYSICIAN ASSISTANT

## 2025-04-22 PROCEDURE — 51798 US URINE CAPACITY MEASURE: CPT

## 2025-04-22 PROCEDURE — 82085 ASSAY OF ALDOLASE: CPT

## 2025-04-22 PROCEDURE — 36415 COLL VENOUS BLD VENIPUNCTURE: CPT | Performed by: PHYSICIAN ASSISTANT

## 2025-04-22 PROCEDURE — 25000003 PHARM REV CODE 250

## 2025-04-22 PROCEDURE — 80053 COMPREHEN METABOLIC PANEL: CPT | Performed by: PHYSICIAN ASSISTANT

## 2025-04-22 PROCEDURE — 84100 ASSAY OF PHOSPHORUS: CPT | Performed by: PHYSICIAN ASSISTANT

## 2025-04-22 PROCEDURE — 36415 COLL VENOUS BLD VENIPUNCTURE: CPT

## 2025-04-22 PROCEDURE — 99233 SBSQ HOSP IP/OBS HIGH 50: CPT | Mod: GC,,, | Performed by: PSYCHIATRY & NEUROLOGY

## 2025-04-22 RX ORDER — GABAPENTIN 100 MG/1
100 CAPSULE ORAL 3 TIMES DAILY
Qty: 90 CAPSULE | Refills: 0 | Status: SHIPPED | OUTPATIENT
Start: 2025-04-22 | End: 2025-05-22

## 2025-04-22 RX ORDER — GABAPENTIN 100 MG/1
100 CAPSULE ORAL 3 TIMES DAILY
Status: DISCONTINUED | OUTPATIENT
Start: 2025-04-22 | End: 2025-04-22 | Stop reason: HOSPADM

## 2025-04-22 RX ADMIN — OXYCODONE 5 MG: 5 TABLET ORAL at 02:04

## 2025-04-22 RX ADMIN — Medication 100 MG: at 10:04

## 2025-04-22 RX ADMIN — ACETAMINOPHEN 650 MG: 325 TABLET ORAL at 06:04

## 2025-04-22 RX ADMIN — PANTOPRAZOLE SODIUM 40 MG: 20 TABLET, DELAYED RELEASE ORAL at 10:04

## 2025-04-22 RX ADMIN — VENLAFAXINE HYDROCHLORIDE 37.5 MG: 37.5 CAPSULE, EXTENDED RELEASE ORAL at 10:04

## 2025-04-22 RX ADMIN — OXYCODONE 5 MG: 5 TABLET ORAL at 10:04

## 2025-04-22 RX ADMIN — SENNOSIDES AND DOCUSATE SODIUM 1 TABLET: 50; 8.6 TABLET ORAL at 10:04

## 2025-04-22 RX ADMIN — GABAPENTIN 100 MG: 100 CAPSULE ORAL at 02:04

## 2025-04-22 RX ADMIN — FOLIC ACID-PYRIDOXINE-CYANOCOBALAMIN TAB 2.5-25-2 MG 1 TABLET: 2.5-25-2 TAB at 09:04

## 2025-04-22 RX ADMIN — POLYETHYLENE GLYCOL 3350 17 G: 17 POWDER, FOR SOLUTION ORAL at 10:04

## 2025-04-22 NOTE — DISCHARGE INSTRUCTIONS
If you develop worsening weakness, numbness, sensory changes, please return to ED for evaluation.     Outpatient appointments requested with neurology and PCP. Please follow up at your appointments.

## 2025-04-22 NOTE — PROCEDURES
Department of Neurology  Phone No: [phone], Fax: [phone]    Neurography & Electromyography Report    Full Name: Alexandra Martin Gender: Female  Patient ID: [id number] YOB: 1995      Visit Date: 4/22/2025 8:24 AM  Age: 29 Years  Examining Physician: Des Olmstead MD  Referring Physician: BILL Ybarra MD  Height: 5 feet 3 inch  Weight: 308 lbs    Alexandra Martin [id number] 4/22/2025 8:24 AM     Reason for Referral:    Concern for polyneuropathy.      Relevant medical diagnoses:    Morbid obesity.    Progressive polyneuropathy.    Thiamine deficiency.      Surgical procedures:    Bariatric surgery (01/2025).      Alexandra Martin [id number] 4/22/2025 8:24 AM       History and Examination:    29-year-old female with progressive paresthesia and weakness.  On examination, embellishment on motor assessment but normal deep tendon reflexes; impaired pin prick but normal Vibration sense. No FNF ataxia or nystagmus.    Technical Difficulties:    Difficulty in paraspinal muscle sampling due to obesity.      Interpretation:     Abnormal study.  There is electrodiagnostic evidence of diffuse denervation changes in bilateral lower extremities.  The differential diagnosis may include lumbosacral radiculopathy, myositis, diffuse denervating process.  There is no evidence of mononeuropathy, polyneuropathy.    Please correlate clinically.    Des Olmstead MD, FRCPE, FCPS, CHCQM  Neuromuscular Consultant  [facility]    Alexandra Martin [id number] 4/22/2025 8:24 AM                  Sensory NCS      Nerve / Sites Rec. Site Segments Onset Lat Peak Lat Onset [place] Temp. Amp Distance      ms ms m/s °C µV mm   R Median - Dig II (Antidromic)      Wrist Index Wrist - Index 2.19 3.02 61.7 31.1 41.7 135      Ref.  Ref. <=3.30 <=4.00   >=11.0    L Median - Dig II (Antidromic)      Wrist Index Wrist - Index 2.40 3.28 56.3 33.5 38.2 135      Ref.  Ref. <=3.30 <=4.00   >=11.0    R Ulnar - Dig V (Antidromic)       Wrist Dig V Wrist - Dig V 1.77 2.66 67.8 31.7 26.1 120      Ref.  Ref. <=3.10 <=4.00   >=11.0    L Ulnar - Dig V (Antidromic)      Wrist Dig V Wrist - Dig V 1.88 2.76 64.0 33.9 15.1 120      Ref.  Ref. <=3.10 <=4.00   >=11.0    R Radial - Superficial (Antidromic)      Forearm Wrist Forearm - Wrist 1.61 2.24 61.9 32.4 35.0 100      Ref.  Ref. <=2.20 <=2.80   >=7.0    R Sural - (Antidromic)      Calf Ankle Calf - Ankle 2.03 2.60 68.9 29.6 16.0 140      Ref.  Ref. <=3.60 <=4.50   >=4.0    L Sural - (Antidromic)      Calf Ankle Calf - Ankle 2.50 2.92 56.0 28.9 12.4 140      Ref.  Ref. <=3.60 <=4.50   >=4.0        Motor NCS      Nerve / Sites Muscle Segments Latency Ref. Velocity Ref. Amplitude Ref. Temp. Dur. Distance      ms ms m/s m/s mV mV °C ms mm   R Median - APB      Wrist APB Wrist - APB 3.21 <=4.40   8.4 >=5.9 32.4 6.2 60      Elbow APB Elbow - Wrist 7.31  66 >=53 7.1  32.5 6.0 270   L Median - APB      Wrist APB Wrist - APB 3.13 <=4.40   10.2 >=5.9 34.4 6.5 70      Elbow APB Elbow - Wrist 6.85  72 >=53 9.8  34.4 6.7 270   R Ulnar - ADM      Wrist ADM Wrist - ADM 2.13 <=3.70   9.0 >=7.9 32.5 8.0 80      B.Elbow ADM B.Elbow - Wrist 5.54  66 >=52 9.1  32.6 8.3 225      A.Elbow ADM A.Elbow - B.Elbow 7.48  67 >=43 8.8  32.5 8.0 130     A.Elbow - Wrist       32.5     L Ulnar - ADM      Wrist ADM Wrist - ADM 2.50 <=3.70   9.4 >=7.9 34.7 7.3 70      B.Elbow ADM B.Elbow - Wrist 5.33  69 >=52 8.2  34.7 7.2 195      A.Elbow ADM A.Elbow - B.Elbow 7.58  62 >=43 7.6  34.8 7.7 140     A.Elbow - Wrist       34.8     R Peroneal - EDB      Ankle EDB Ankle - EDB 3.19 <=6.50   7.6 >=2.6 29.2 4.8 80      B. Fib Head EDB B. Fib Head - Ankle 9.38  53 >=43 7.3  29 5.3 330      A. Fib Head EDB A. Fib Head - B. Fib Head 11.23  54 >=42 6.5  29 5.2 100   L Peroneal - EDB      Ankle EDB Ankle - EDB 2.94 <=6.50   5.4 >=2.6 28.9 4.9 80      B. Fib Head EDB B. Fib Head - Ankle 9.38  50 >=43 5.0  28.9 5.4 325      A. Fib Head EDB A. Fib Head -  B. Fib Head 11.33  51 >=42 4.9  28.9 5.1 100   R Tibial - AH      Ankle AH Ankle - AH 3.42 <=6.10   12.1 >=5.3 28.9 6.2 80   L Tibial - AH      Ankle AH Ankle - AH 3.69 <=6.10   12.6 >=5.3 28.9 5.9 80       F  Wave      Nerve F Latency Ref. M Latency F - M Lat    ms ms ms ms   R Median - APB 24.8 <=28.5 3.4 21.4   R Ulnar - ADM 25.3 <=30.2 2.5 22.8   L Median - APB 24.8 <=28.5 3.5 21.3   L Ulnar - ADM 25.3 <=30.2 2.9 22.4   R Peroneal - EDB 46.2 <=54.5 4.1 42.1   R Tibial - AH 44.2 <=53.2 4.1 40.1   L Peroneal - EDB 43.6 <=54.5 3.8 39.8   L Tibial - AH 45.7 <=53.2 4.3 41.4       EMG Summary Table     Spontaneous Recruitment MUAP   Muscle Nerve Roots IA Fib PSW Fasc Other Pattern Amp Dur. PPP   R. Tibialis anterior Deep peroneal (Fibular) L4-L5 1+ None 1+ None N N N N N   R. Gastrocnemius Tibial S1-S2 N 1+ 2+ None N No Activity N N N   R. Quadriceps Femoral L2-L4 N None 1+ None N N N N N   L. Tibialis anterior Deep peroneal (Fibular) L4-L5 1+ None None None N N N N N   L. Gastrocnemius Tibial S1-S2 1+ None 1+ None N N N N N   L. Quadriceps Femoral L2-L4 N None None None N N N N N   R. L5 paraspinal Spinal L5- N None None None N       R. S1 paraspinal Spinal S1- N None None None N

## 2025-04-22 NOTE — PLAN OF CARE
Problem: Adult Inpatient Plan of Care  Goal: Plan of Care Review  Outcome: Progressing  Goal: Absence of Hospital-Acquired Illness or Injury  Outcome: Progressing  Goal: Optimal Comfort and Wellbeing  Outcome: Progressing     Problem: Bariatric Environmental Safety  Goal: Safety Maintained with Care  Outcome: Progressing     Problem: Infection  Goal: Absence of Infection Signs and Symptoms  Outcome: Progressing     Problem: Skin Injury Risk Increased  Goal: Skin Health and Integrity  Outcome: Progressing

## 2025-04-22 NOTE — SUBJECTIVE & OBJECTIVE
Subjective:     Interval History: EMG normal. Pending nutritional workup.     Current Medications[1]    Review of Systems   Neurological:  Positive for weakness and numbness.     Objective:     Vital Signs (Most Recent):  Temp: 98.4 °F (36.9 °C) (25 1141)  Pulse: 73 (25 1512)  Resp: 18 (25 1141)  BP: (!) 140/92 (25 1141)  SpO2: (!) 94 % (25 1141) Vital Signs (24h Range):  Temp:  [98 °F (36.7 °C)-98.7 °F (37.1 °C)] 98.4 °F (36.9 °C)  Pulse:  [60-97] 73  Resp:  [16-18] 18  SpO2:  [94 %-99 %] 94 %  BP: (110-143)/(72-92) 140/92     Weight: (!) 139.7 kg (308 lb)  Body mass index is 54.56 kg/m².     Physical Exam  Neurological:      Mental Status: She is oriented to person, place, and time.      Cranial Nerves: Cranial nerves 2-12 are intact.      Coordination: Finger-Nose-Finger Test abnormal and Heel to Randhawa Test abnormal.      Deep Tendon Reflexes:      Reflex Scores:       Tricep reflexes are 2+ on the right side and 2+ on the left side.       Bicep reflexes are 2+ on the right side and 2+ on the left side.       Patellar reflexes are 2+ on the right side and 2+ on the left side.       Achilles reflexes are 2+ on the right side and 2+ on the left side.     Comments: See Neurological Exam.           NEUROLOGICAL EXAMINATION:     MENTAL STATUS   Oriented to person, place, and time.   Level of consciousness: alert    CRANIAL NERVES   Cranial nerves II through XII intact.     MOTOR EXAM   Muscle bulk: normal  Overall muscle tone: normal    Strength   Right biceps: 4/5  Left biceps: 4/5  Right triceps: 4/5  Left triceps: 4/5  Right interossei: 4/5  Left interossei: 4/5  Right quadriceps: 3/5  Left quadriceps: 4/5  Right hamstring: 3/5  Left hamstrin/5  Right anterior tibial: 3/5  Left anterior tibial: 4/5  Right posterior tibial: 3/5  Left posterior tibial: 4/5    REFLEXES     Reflexes   Right biceps: 2+  Left biceps: 2+  Right triceps: 2+  Left triceps: 2+  Right patellar: 2+  Left  patellar: 2+  Right achilles: 2+  Left achilles: 2+  Right ankle clonus: absent  Left ankle clonus: absent    SENSORY EXAM   Light touch normal.   Vibration normal.   Proprioception normal.   Right arm pinprick: normal  Left arm pinprick: normal  Right leg pinprick: normal  Left leg pinprick: decreased from knee    GAIT AND COORDINATION      Coordination   Finger to nose coordination: abnormal  Heel to shin coordination: abnormal       Ataxia.        Significant Labs: All pertinent lab results from the past 24 hours have been reviewed.    Significant Imaging: I have reviewed all pertinent imaging results/findings within the past 24 hours.       [1]   Current Facility-Administered Medications   Medication Dose Route Frequency Provider Last Rate Last Admin    acetaminophen tablet 650 mg  650 mg Oral Q6H PRN Josi Alvarado PA-C   650 mg at 04/22/25 0652    bisacodyL suppository 10 mg  10 mg Rectal Daily PRN Josi Alvarado PA-C        folic acid-vit B6-vit B12 2.5-25-2 mg tablet 1 tablet  1 tablet Oral Daily Josi Alvarado PA-C   1 tablet at 04/22/25 0900    gabapentin capsule 100 mg  100 mg Oral TID Koko Ybarra MD   100 mg at 04/22/25 1449    labetalol 20 mg/4 mL (5 mg/mL) IV syring  10 mg Intravenous Q4H PRN Josi Alvarado PA-C        oxyCODONE immediate release tablet 5 mg  5 mg Oral Q6H PRN Meghann Hernández DO   5 mg at 04/22/25 1057    pantoprazole EC tablet 40 mg  40 mg Oral BID Josi Alvarado PA-C   40 mg at 04/22/25 1056    polyethylene glycol packet 17 g  17 g Oral Daily Josi Alvarado PA-C   17 g at 04/22/25 1056    senna-docusate 8.6-50 mg per tablet 1 tablet  1 tablet Oral BID Josi Alvarado PA-C   1 tablet at 04/22/25 1056    sodium chloride 0.9% flush 10 mL  10 mL Intravenous PRN Josi Alvarado PA-C        thiamine tablet 100 mg  100 mg Oral Daily Josi Alvarado PA-C   100 mg at 04/22/25 1056    venlafaxine 24 hr capsule 37.5 mg  37.5 mg Oral Daily Josi Alvarado, APPLE    37.5 mg at 04/22/25 1057

## 2025-04-22 NOTE — PROGRESS NOTES
[facility]any Levine - Neurosurgery ([facility])  Neurology  Progress Note    Patient Name: Alexandra Martin  MRN: [id number]  Admission Date: 4/20/2025  Hospital Length of Stay: 2 days  Code Status: Full Code   Attending Provider: Adelaida Allen MD  Primary Care Physician: Nicolette Andrade DO   Principal Problem:Generalized weakness    HPI:   Ms. Alexandra Martin is a 29 y.o. patient here for generalized weakness. History of bariatric surgery (1/2025), L Eldon palsy, peripheral neuropathy and vertigo with occasional headaches. Transferred to [facility] for ascending weakness.     Initially presented to [facility] for worsening tingling sensation on her feet following her gastric bypass surgery. Partesthesias have been more distal to proximal with some ataxia up to the point that she started using a walker at home. Evaluated by Neurology at OSH with presentation mostly of nutritional deficiency given prior surgery and started on thiamine and multi-vitamin replacement. Workup at the OSH negative (MRI B/C/T/L spine) and unable to perform LP given BMI.     Noted some urinary incontinence after being discharged from rehab and worsening weakness on the last 24hs. Admitted to [facility] from the ED given respiratory mechanics. On my initial examination, some spinothalamic tract sensory deficits, but no dorsal column (e.g.,vibratory) findings noted, in addition to ataxia. Neurology consulted for EMG assistance in the setting of subacute/chronic weakness.         Subjective:     Interval History: EMG normal. Pending nutritional workup.     Current Medications[1]    Review of Systems   Neurological:  Positive for weakness and numbness.     Objective:     Vital Signs (Most Recent):  Temp: 98.4 °F (36.9 °C) (04/22/25 1141)  Pulse: 73 (04/22/25 1512)  Resp: 18 (04/22/25 1141)  BP: (!) 140/92 (04/22/25 1141)  SpO2: (!) 94 % (04/22/25 1141) Vital Signs (24h Range):  Temp:  [98 °F (36.7 °C)-98.7 °F (37.1 °C)] 98.4 °F (36.9 °C)  Pulse:   [60-97] 73  Resp:  [16-18] 18  SpO2:  [94 %-99 %] 94 %  BP: (110-143)/(72-92) 140/92     Weight: (!) 139.7 kg (308 lb)  Body mass index is 54.56 kg/m².     Physical Exam  Neurological:      Mental Status: She is oriented to person, place, and time.      Cranial Nerves: Cranial nerves 2-12 are intact.      Coordination: Finger-Nose-Finger Test abnormal and Heel to Randhawa Test abnormal.      Deep Tendon Reflexes:      Reflex Scores:       Tricep reflexes are 2+ on the right side and 2+ on the left side.       Bicep reflexes are 2+ on the right side and 2+ on the left side.       Patellar reflexes are 2+ on the right side and 2+ on the left side.       Achilles reflexes are 2+ on the right side and 2+ on the left side.     Comments: See Neurological Exam.           NEUROLOGICAL EXAMINATION:     MENTAL STATUS   Oriented to person, place, and time.   Level of consciousness: alert    CRANIAL NERVES   Cranial nerves II through XII intact.     MOTOR EXAM   Muscle bulk: normal  Overall muscle tone: normal    Strength   Right biceps: 4/5  Left biceps: 4/5  Right triceps: 4/5  Left triceps: 4/5  Right interossei: 4/5  Left interossei: 4/5  Right quadriceps: 3/5  Left quadriceps: 4/5  Right hamstring: 3/5  Left hamstrin/5  Right anterior tibial: 3/5  Left anterior tibial: 4/5  Right posterior tibial: 3/5  Left posterior tibial: 4/5    REFLEXES     Reflexes   Right biceps: 2+  Left biceps: 2+  Right triceps: 2+  Left triceps: 2+  Right patellar: 2+  Left patellar: 2+  Right achilles: 2+  Left achilles: 2+  Right ankle clonus: absent  Left ankle clonus: absent    SENSORY EXAM   Light touch normal.   Vibration normal.   Proprioception normal.   Right arm pinprick: normal  Left arm pinprick: normal  Right leg pinprick: normal  Left leg pinprick: decreased from knee    GAIT AND COORDINATION      Coordination   Finger to nose coordination: abnormal  Heel to shin coordination: abnormal       Ataxia.        Significant Labs: All  pertinent lab results from the past 24 hours have been reviewed.    Significant Imaging: I have reviewed all pertinent imaging results/findings within the past 24 hours.    Assessment and Plan:     * Generalized weakness  Ms. Alexandra Martin is a 29 y.o. patient transferred from OSH given persistent weakness. Recent bariatric surgery is the major triggering risk factor given presentation most likely affecting spinothalamic tract (e.g., painful neuropathy). At the moment we continue to consider her neuropathy is related to nutritional deficiencies in the setting of recent gastric bypass surgery/malabsorption, which we will expand the nutritional workup while she is admitted. 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 EMG normal but some scattered denervation findings. Pending nutritional workup and aldolase/CPK. No need on LP at the moment.     Recommendations:  -Started on gabapentin 100 TID, which can continue home   -Pending nutritional workup (selenium, copper, vitamin E, zinc)  -Recommend outpatient PT with [facility] back program   -Thank you for this consult. Neurology will signoff       Koko Ybarra MD  Neurology       [1]   Current Facility-Administered Medications   Medication Dose Route Frequency Provider Last Rate Last Admin    acetaminophen tablet 650 mg  650 mg Oral Q6H PRN Josi Alvarado PA-C   650 mg at 04/22/25 0652    bisacodyL suppository 10 mg  10 mg Rectal Daily PRN Josi Alvarado PA-C        folic acid-vit B6-vit B12 2.5-25-2 mg tablet 1 tablet  1 tablet Oral Daily Josi Alvarado PA-C   1 tablet at 04/22/25 0900    gabapentin capsule 100 mg  100 mg Oral TID Koko Ybarra MD   100 mg at 04/22/25 1449    labetalol 20 mg/4 mL (5 mg/mL) IV syring  10 mg Intravenous Q4H PRN Josi Alvarado PA-C        oxyCODONE immediate release tablet 5 mg  5 mg Oral Q6H PRN Meghann Hernández DO   5 mg at 04/22/25 1057    pantoprazole EC tablet 40 mg  40  mg Oral BID Josi Alvarado PA-C   40 mg at 04/22/25 1056    polyethylene glycol packet 17 g  17 g Oral Daily Josi Alvarado PA-C   17 g at 04/22/25 1056    senna-docusate 8.6-50 mg per tablet 1 tablet  1 tablet Oral BID Josi Alvarado PA-C   1 tablet at 04/22/25 1056    sodium chloride 0.9% flush 10 mL  10 mL Intravenous PRN Josi Alvarado PA-C        thiamine tablet 100 mg  100 mg Oral Daily Josi Alvarado PA-C   100 mg at 04/22/25 1056    venlafaxine 24 hr capsule 37.5 mg  37.5 mg Oral Daily Josi Alvarado PA-C   37.5 mg at 04/22/25 1056

## 2025-04-22 NOTE — PLAN OF CARE
Hospital follow-up was scheduled by CHW.  Patient follow-up appointment was scheduled for 4/24/25 at 1:45 pm, at [facility] in [place].      Mak COATS, RSW  Case Management

## 2025-04-22 NOTE — PLAN OF CARE
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       Follow Up Appointments:  Future Appointments   Date Time Provider Department Center   4/24/2025  1:45 PM Nicolette Andrade DO [facility] MED [place]   4/28/2025 11:00 AM Debi Salazar NP [facility] GASTRO [place]   5/5/2025 11:45 AM Gerber Hameed MD [facility]   5/12/2025  1:00 PM Kimberlyn Gutiérrez DO [facility] NEURO [place]   5/13/2025  9:00 AM Le Nicolas NP [facility] NEURO [place]   6/2/2025  3:30 PM Nicolette Andrade DO [facility] MED [place]   7/7/2025  9:30 AM LAB, APPOINTMENT [place] [facility] LAB [facility]   7/7/2025 10:00 AM Josi Ybarra NP [facility]   9/8/2025  9:00 AM Abdullahi Wong MD [facility] RHEUM [facility]       Allergies:  Review of patient's allergies indicates:   Allergen Reactions    Ceftriaxone Nausea And Vomiting    Azithromycin Nausea And Vomiting     Hard to breathe     Latex, natural rubber Swelling and Rash       Medications: Review discharge medications  with patient and family and provide education.    Current Medications[1]     Medication List        START taking these medications      gabapentin 100 MG capsule  Commonly known as: NEURONTIN  Take 1 capsule (100 mg total) by mouth 3 (three) times daily.            CONTINUE taking these medications      acetaminophen 325 MG tablet  Commonly known as: TYLENOL  Take 2 tablets (650 mg total) by mouth every 4 (four) hours as needed for Pain.     BARIATRIC MULTIVITAMINS ORAL  Take 1 tablet by mouth 2 (two) times a day.     calcium-vitamin D3 600 mg-5 mcg (200 unit) Tab  Commonly known as: CALCIUM 600 + D(3)  Take 1 tablet by mouth 2 (two) times daily.     ergocalciferol 50,000 unit Cap  Commonly known as: VITAMIN D2  Take 1 capsule (50,000 Units total) by mouth every 7 days.     etonogestreL 68 mg Impl intradermal implant  Commonly known as: Nexplanon  68 mg by Subdermal route once. A single NEXPLANON implant is inserted subdermally just under the skin at the inner side of the non-dominant upper arm.     folic acid-vit B6-vit B12 2.5-25-2 mg 2.5-25-2 mg Tab  Commonly known as: FOLBIC or Equiv  Take 1 tablet by mouth once daily.     hydrOXYzine pamoate 50 MG Cap  Commonly known as: VISTARIL  Take 50 mg by mouth 2 (two) times a day.     LINZESS 145 mcg Cap capsule  Generic drug: linaCLOtide  Take 145 mcg by mouth before breakfast.     metoclopramide HCl 5 MG tablet  Commonly known as: REGLAN  Take 5 mg by mouth 4 (four) times daily. TAKE 1 TAB 4 TIMES DAILY X 1 WEEK,1 TAB 3 X DAY X 1WK,1 TAB TWICE DAILY X 1 WK,THEN 1 DAILY X 1WK     pantoprazole 40 MG tablet  Commonly known as: PROTONIX  Take 40 mg by mouth 2 (two) times daily.     polyethylene glycol 17 gram Pwpk  Commonly known as: GLYCOLAX  Take 17 g by mouth daily as needed for Constipation.     thiamine 100 MG tablet  Take 5 tablets (500 mg total) by mouth 2 (two) times a day for 3 days, THEN 1 tablet (100 mg total) 2 (two) times a day.  Start taking on: April 4,  2025     venlafaxine 37.5 MG 24 hr capsule  Commonly known as: EFFEXOR-XR  Take 37.5 mg by mouth once daily.            STOP taking these medications      metoprolol succinate 50 MG 24 hr tablet  Commonly known as: TOPROL-XL                I have seen and examined this patient within the last 30 days. My clinical findings that support the need for the home health skilled services and home bound status are the following:no   Weakness/numbness causing balance and gait disturbance due to Weakness/Debility making it taxing to leave home.     Diet:   regular diet    Labs:  As needed    Referrals/ Consults  Physical Therapy to evaluate and treat. Evaluate for home safety and equipment needs; Establish/upgrade home exercise program. Perform / instruct on therapeutic exercises, gait training, transfer training, and Range of Motion.  Occupational Therapy to evaluate and treat. Evaluate home environment for safety and equipment needs. Perform/Instruct on transfers, ADL training, ROM, and therapeutic exercises.    Activities:   activity as tolerated    Nursing:   Agency to admit patient within 24 hours of hospital discharge unless specified on physician order or at patient request    SN to complete comprehensive assessment including routine vital signs. Instruct on disease process and s/s of complications to report to MD. Review/verify medication list sent home with the patient at time of discharge  and instruct patient/caregiver as needed. Frequency may be adjusted depending on start of care date.     Skilled nurse to perform up to 3 visits PRN for symptoms related to diagnosis    Notify MD if SBP > 160 or < 90; DBP > 90 or < 50; HR > 120 or < 50; Temp > 101; O2 < 88%; Other:      Ok to schedule additional visits based on staff availability and patient request on consecutive days within the home health episode.    When multiple disciplines ordered:    Start of Care occurs on Sunday - Wednesday schedule remaining discipline  evaluations as ordered on separate consecutive days following the start of care.    Thursday SOC -schedule subsequent evaluations Friday and Monday the following week.     Friday - Saturday SOC - schedule subsequent discipline evaluations on consecutive days starting Monday of the following week.    For all post-discharge communication and subsequent orders please contact patient's primary care physician.     Miscellaneous       Home Health Aide:  Physical Therapy Three times weekly and Occupational Therapy Three times weekly    Wound Care Orders  no    I certify that this patient is confined to her home and needs physical therapy and occupational therapy.              [1]   Current Facility-Administered Medications   Medication Dose Route Frequency Provider Last Rate Last Admin    acetaminophen tablet 650 mg  650 mg Oral Q6H PRN Josi Avlarado PA-C   650 mg at 04/22/25 0652    bisacodyL suppository 10 mg  10 mg Rectal Daily PRN Josi Alvarado PA-C        folic acid-vit B6-vit B12 2.5-25-2 mg tablet 1 tablet  1 tablet Oral Daily Josi Alvarado PA-C   1 tablet at 04/22/25 0900    gabapentin capsule 100 mg  100 mg Oral TID Koko Ybarra MD   100 mg at 04/22/25 1449    labetalol 20 mg/4 mL (5 mg/mL) IV syring  10 mg Intravenous Q4H PRN Josi Alvarado PA-C        oxyCODONE immediate release tablet 5 mg  5 mg Oral Q6H PRN Meghann Hernández DO   5 mg at 04/22/25 1057    pantoprazole EC tablet 40 mg  40 mg Oral BID Josi Alvarado PA-C   40 mg at 04/22/25 1056    polyethylene glycol packet 17 g  17 g Oral Daily Josi Alvarado PA-C   17 g at 04/22/25 1056    senna-docusate 8.6-50 mg per tablet 1 tablet  1 tablet Oral BID Josi Alvarado PA-C   1 tablet at 04/22/25 1056    sodium chloride 0.9% flush 10 mL  10 mL Intravenous PRN Josi Alvarado PA-C        thiamine tablet 100 mg  100 mg Oral Daily Josi Alvarado PA-C   100 mg at 04/22/25 1056    venlafaxine 24 hr capsule 37.5 mg  37.5 mg Oral Daily  Josi Alvarado PA-C   37.5 mg at 04/22/25 1054

## 2025-04-22 NOTE — ASSESSMENT & PLAN NOTE
Ms. Alexandra Martin is a 29 y.o. patient transferred from OSH given persistent weakness. Recent bariatric surgery is the major triggering risk factor given presentation most likely affecting spinothalamic tract (e.g., painful neuropathy). At the moment we continue to consider her neuropathy is related to nutritional deficiencies in the setting of recent gastric bypass surgery/malabsorption, which we will expand the nutritional workup while she is admitted. 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 EMG normal but some scattered denervation findings. Pending nutritional workup and aldolase/CPK. No need on LP at the moment.     Recommendations:  -Started on gabapentin 100 TID, which can continue home   -Pending nutritional workup (selenium, copper, vitamin E, zinc)  -Recommend outpatient PT with healthy back program   -Thank you for this consult. Neurology will signoff

## 2025-04-22 NOTE — CONSULTS
[facility]  Psychology    Treatment Attempt  Patient Name: Alexandra Martin   MRN: [id number]      Patient off the floor for a procedure. Psychology will attempt again tomorrow (4/23/25). Thank you for the consult request.       Oswaldo Pope, PhD  Dept. of Psychiatry  [facility]

## 2025-04-23 LAB
ALDOLASE (OHS): 17.7 U/L (ref 1.2–7.6)
SELENIUM SERPL-MCNC: 97 MCG/L (ref 110–165)

## 2025-04-23 NOTE — PLAN OF CARE
[facility] - Neurosurgery (Hospital)  Discharge Final Note    Primary Care Provider: Nicolette Andrade DO    Expected Discharge Date: 4/22/2025    Patient discharged to home with [facility]  services via personal transportation.     Patient's bedside nurse and pt notified of the above.    Discharge Plan A and Plan B have been determined by review of patient's clinical status, future medical and therapeutic needs, and coverage/benefits for post-acute care in coordination with multidisciplinary team members.        Final Discharge Note (most recent)       Final Note - 04/23/25 0850          Final Note    Assessment Type Final Discharge Note     Anticipated Discharge Disposition Home-Health Care [facility]     What phone number can be called within the next 1-3 days to see how you are doing after discharge? [phone]     Hospital Resources/Appts/Education Provided Appointments scheduled and added to AVS        Post-Acute Status    Post-Acute Authorization Home Health     Home Health Status Set-up Complete/Auth obtained     Coverage [employer] MANAGED MEDICARE - [employer] MEDICARE O     Discharge Delays None known at this time                     Important Message from Medicare             Contact Info       Nicolette Andrade DO   Specialty: Internal Medicine, Pediatrics   Relationship: PCP - General    [address] [facility] [address]   Phone: [phone]       Next Steps: Follow up            Future Appointments   Date Time Provider Department Center   4/24/2025  1:45 PM Nicolette Andrade DO [facility] MED [place]   4/28/2025 11:00 AM Debi Salazar, NP [facility] GASTRO [place]   5/1/2025 11:00 AM Oneyda Brooke, PT [facility] OP RH [place]   5/5/2025 11:45 AM Gerber Hameed MD [facility]   5/12/2025  1:00 PM Kimberlyn Gutiérrez DO [facility] NEURO [place]   5/13/2025  9:00 AM Le Nicolas, JESSA [facility] NEURO [place]   6/2/2025  3:30 PM Nicolette Andrade DO [facility]    7/7/2025  9:30 AM LAB, APPOINTMENT [place] [facility]   7/7/2025 10:00 AM Josi Ybarra NP [facility]   9/8/2025  9:00 AM Abdullahi Wong MD [facility] CHW scheduled post-discharge follow-up appointment and information added to AVS.     Miryam Fong MSW, CSW

## 2025-04-24 ENCOUNTER — OFFICE VISIT (OUTPATIENT)
Dept: FAMILY MEDICINE | Facility: CLINIC | Age: 30
End: 2025-04-24
Payer: MEDICARE

## 2025-04-24 VITALS
BODY MASS INDEX: 53.89 KG/M2 | HEART RATE: 69 BPM | WEIGHT: 293 LBS | SYSTOLIC BLOOD PRESSURE: 126 MMHG | OXYGEN SATURATION: 98 % | DIASTOLIC BLOOD PRESSURE: 68 MMHG

## 2025-04-24 DIAGNOSIS — G43.019 INTRACTABLE MIGRAINE WITHOUT AURA AND WITHOUT STATUS MIGRAINOSUS: ICD-10-CM

## 2025-04-24 DIAGNOSIS — F41.1 GAD (GENERALIZED ANXIETY DISORDER): ICD-10-CM

## 2025-04-24 DIAGNOSIS — Z09 HOSPITAL DISCHARGE FOLLOW-UP: Primary | ICD-10-CM

## 2025-04-24 DIAGNOSIS — R53.1 GENERALIZED WEAKNESS: ICD-10-CM

## 2025-04-24 DIAGNOSIS — Z98.84 S/P BARIATRIC SURGERY: ICD-10-CM

## 2025-04-24 DIAGNOSIS — R20.2 PARESTHESIA: ICD-10-CM

## 2025-04-24 DIAGNOSIS — R79.89 LOW VITAMIN B12 LEVEL: ICD-10-CM

## 2025-04-24 DIAGNOSIS — Z73.6 LIMITATION OF ACTIVITY DUE TO DISABILITY: ICD-10-CM

## 2025-04-24 DIAGNOSIS — F11.90 OPIOID USE DISORDER: ICD-10-CM

## 2025-04-24 DIAGNOSIS — E51.11 THIAMINE DEFICIENCY NEUROPATHY: ICD-10-CM

## 2025-04-24 DIAGNOSIS — R29.898 BILATERAL LEG WEAKNESS: ICD-10-CM

## 2025-04-24 DIAGNOSIS — E66.01 MORBID OBESITY: ICD-10-CM

## 2025-04-24 LAB
METHYLMALONATE SERPL-SCNC: 0.09 NMOL/ML
VIT B2 SERPL-MCNC: 3 MCG/L (ref 1–19)
W COPPER: 1157 UG/L
W ZINC: 60 UG/DL

## 2025-04-24 PROCEDURE — 3074F SYST BP LT 130 MM HG: CPT | Mod: CPTII,S$GLB,, | Performed by: STUDENT IN AN ORGANIZED HEALTH CARE EDUCATION/TRAINING PROGRAM

## 2025-04-24 PROCEDURE — 99999 PR PBB SHADOW E&M-EST. PATIENT-LVL IV: CPT | Mod: PBBFAC,,, | Performed by: STUDENT IN AN ORGANIZED HEALTH CARE EDUCATION/TRAINING PROGRAM

## 2025-04-24 PROCEDURE — 3008F BODY MASS INDEX DOCD: CPT | Mod: CPTII,S$GLB,, | Performed by: STUDENT IN AN ORGANIZED HEALTH CARE EDUCATION/TRAINING PROGRAM

## 2025-04-24 PROCEDURE — 99214 OFFICE O/P EST MOD 30 MIN: CPT | Mod: S$GLB,,, | Performed by: STUDENT IN AN ORGANIZED HEALTH CARE EDUCATION/TRAINING PROGRAM

## 2025-04-24 PROCEDURE — 3078F DIAST BP <80 MM HG: CPT | Mod: CPTII,S$GLB,, | Performed by: STUDENT IN AN ORGANIZED HEALTH CARE EDUCATION/TRAINING PROGRAM

## 2025-04-24 PROCEDURE — 3044F HG A1C LEVEL LT 7.0%: CPT | Mod: CPTII,S$GLB,, | Performed by: STUDENT IN AN ORGANIZED HEALTH CARE EDUCATION/TRAINING PROGRAM

## 2025-04-24 PROCEDURE — 1111F DSCHRG MED/CURRENT MED MERGE: CPT | Mod: CPTII,S$GLB,, | Performed by: STUDENT IN AN ORGANIZED HEALTH CARE EDUCATION/TRAINING PROGRAM

## 2025-04-24 PROCEDURE — G2211 COMPLEX E/M VISIT ADD ON: HCPCS | Mod: S$GLB,,, | Performed by: STUDENT IN AN ORGANIZED HEALTH CARE EDUCATION/TRAINING PROGRAM

## 2025-04-24 RX ORDER — RIZATRIPTAN BENZOATE 10 MG/1
10 TABLET ORAL
Qty: 14 TABLET | Refills: 2 | Status: SHIPPED | OUTPATIENT
Start: 2025-04-24 | End: 2025-05-24

## 2025-04-24 RX ORDER — CYANOCOBALAMIN 1000 UG/ML
1000 INJECTION, SOLUTION INTRAMUSCULAR; SUBCUTANEOUS
Status: SHIPPED | OUTPATIENT
Start: 2025-04-28 | End: 2025-05-26

## 2025-04-24 NOTE — Clinical Note
Hello, this patient is scheduled to follow up with you - she was scheduled for EMG before your appointment but since she had it done while inpatient, I am guessing this is why it is canceled. I just want to make sure you did not want her to have a repeat before your appointment. Thank you.

## 2025-04-25 ENCOUNTER — TELEPHONE (OUTPATIENT)
Dept: NEUROLOGY | Facility: CLINIC | Age: 30
End: 2025-04-25
Payer: MEDICARE

## 2025-04-28 ENCOUNTER — OFFICE VISIT (OUTPATIENT)
Dept: GASTROENTEROLOGY | Facility: CLINIC | Age: 30
End: 2025-04-28
Payer: MEDICARE

## 2025-04-28 VITALS — HEIGHT: 63 IN | BODY MASS INDEX: 51.91 KG/M2 | WEIGHT: 293 LBS

## 2025-04-28 DIAGNOSIS — Z98.890 HISTORY OF ESOPHAGOGASTRODUODENOSCOPY (EGD): Primary | ICD-10-CM

## 2025-04-28 DIAGNOSIS — Z98.84 S/P GASTRIC BYPASS: ICD-10-CM

## 2025-04-28 DIAGNOSIS — R10.30 LOWER ABDOMINAL PAIN: ICD-10-CM

## 2025-04-28 DIAGNOSIS — K59.04 CHRONIC IDIOPATHIC CONSTIPATION: ICD-10-CM

## 2025-04-28 DIAGNOSIS — K21.9 GASTROESOPHAGEAL REFLUX DISEASE WITHOUT ESOPHAGITIS: ICD-10-CM

## 2025-04-28 DIAGNOSIS — Z90.49 S/P CHOLECYSTECTOMY: ICD-10-CM

## 2025-04-28 DIAGNOSIS — K62.5 RECTAL BLEEDING: ICD-10-CM

## 2025-04-28 DIAGNOSIS — K62.89 RECTAL PAIN: ICD-10-CM

## 2025-04-28 DIAGNOSIS — R19.5 MUCUS IN STOOL: ICD-10-CM

## 2025-04-28 PROCEDURE — 1160F RVW MEDS BY RX/DR IN RCRD: CPT | Mod: CPTII,S$GLB,,

## 2025-04-28 PROCEDURE — 99999 PR PBB SHADOW E&M-EST. PATIENT-LVL IV: CPT | Mod: PBBFAC,,,

## 2025-04-28 PROCEDURE — 1111F DSCHRG MED/CURRENT MED MERGE: CPT | Mod: CPTII,S$GLB,,

## 2025-04-28 PROCEDURE — 99214 OFFICE O/P EST MOD 30 MIN: CPT | Mod: S$GLB,,,

## 2025-04-28 PROCEDURE — 1159F MED LIST DOCD IN RCRD: CPT | Mod: CPTII,S$GLB,,

## 2025-04-28 PROCEDURE — 3044F HG A1C LEVEL LT 7.0%: CPT | Mod: CPTII,S$GLB,,

## 2025-04-28 PROCEDURE — 3008F BODY MASS INDEX DOCD: CPT | Mod: CPTII,S$GLB,,

## 2025-04-28 RX ORDER — LUBIPROSTONE 24 UG/1
24 CAPSULE ORAL 2 TIMES DAILY
Qty: 60 CAPSULE | Refills: 2 | Status: SHIPPED | OUTPATIENT
Start: 2025-04-28 | End: 2025-07-27

## 2025-04-28 NOTE — PATIENT INSTRUCTIONS
- avoid constipation and straining with bowel movements; increase fiber in diet (20-30 grams daily)/OTC fiber supplement such as metamucil (take as directed)  - recommend SITZ baths  - possible referral to colorectal surgery if symptoms persist

## 2025-04-28 NOTE — PROGRESS NOTES
Subjective:       Patient ID: Alexandra Martin is a 29 y.o. female Body mass index is 53.66 kg/m².    Chief Complaint: Abdominal Pain, Constipation, Follow-up, and Rectal Bleeding    Established patient of Dr. Pike and myself.     Patient following up to discuss results after EGD and would like to reschedule colonoscopy.    Gastroesophageal Reflux  She complains of abdominal pain (constant lower abdominal pain/ache; currently rated 7/10; worsens prior to BMs; unsure of specific trigger, but possibly due to constipation) and nausea (chronic - improved). She reports no belching, no chest pain (constant chest pain currently rated 7/10 - started 2 weeks after gastric bypass 01/6/25; worsens with exertion; appt scheduled with cardiology), no choking, no coughing (intermittent dry cough since 10/2024), no dysphagia, no early satiety, no globus sensation, no heartburn, no hoarse voice, no sore throat or no water brash. This is a chronic problem. The current episode started more than 1 year ago. The problem occurs rarely. The problem has been resolved. Nothing aggravates the symptoms. Associated symptoms include fatigue, muscle weakness and weight loss (S/p gastric bypass 01/06/25). Pertinent negatives include no anemia or melena. Chronic constipation - sometimes goes weeks without BMs then has 3-4 BMs in a day rated 6-7 on [place] scale with mucus - described as blow outs.  She has tried MiraLax, Mag citrate, laxative pills, suppositories, enemas, stool softener, and Linzess 145 mcg (ineffective). Notices varied amounts of BRBPR in toilet bowl, on stool, and on tissue paper with BMs. Intermittent rectal pain/pressure after BMs - history of hemorrhoids. Risk factors include obesity. She has tried a PPI (Well-controlled taking Protonix 40 mg b.i.d.; past treatments: Omeprazole) for the symptoms. The treatment provided significant relief. Past procedures include an abdominal ultrasound (10/30/24), an EGD  (03/11/25  - Catalina-en-Y gastrojejunostomy with gastrojejunal anastomosis characterized by healthy appearing mucosa. Erythematous mucosa in the stomach. Biopsied; patho: benign, no bacteria) and a UGI. Past procedures do not include esophageal manometry, esophageal pH monitoring or H. pylori antibody titer. S/p cholecystectomy. Past invasive treatments do not include gastroplasty, gastroplication or reflux surgery.     Review of Systems   Constitutional:  Positive for activity change, appetite change, fatigue and weight loss (S/p gastric bypass 01/06/25). Negative for chills, diaphoresis, fever and unexpected weight change.   HENT:  Negative for hoarse voice, sore throat and trouble swallowing.    Respiratory:  Negative for cough (intermittent dry cough since 10/2024), choking and shortness of breath.    Cardiovascular:  Negative for chest pain (constant chest pain currently rated 7/10 - started 2 weeks after gastric bypass 01/6/25; worsens with exertion; appt scheduled with cardiology).   Gastrointestinal:  Positive for abdominal pain (constant lower abdominal pain/ache; currently rated 7/10; worsens prior to BMs; unsure of specific trigger, but possibly due to constipation), anal bleeding, constipation and nausea (chronic - improved). Negative for abdominal distention, blood in stool, diarrhea, dysphagia, heartburn, melena, rectal pain and vomiting.   Musculoskeletal:  Positive for muscle weakness.       Patient's last menstrual period was 04/13/2025.  Past Medical History:   Diagnosis Date    ADD (attention deficit disorder)     Anxiety disorder     Biliary dyskinesia 11/02/2024    Bronchitis 12/03/2024    Depression     Drug-induced constipation 03/04/2024    Headache     MDD (major depressive disorder), recurrent episode, moderate 07/29/2021    Morbid obesity with BMI of 60.0-69.9, adult 06/10/2024    Nephrolithiasis     Ovarian cyst     Substance abuse     Hospitalization     Trauma 09/11/2019    Tympanic  membrane perforation, left 03/14/2018    UTI (urinary tract infection) 08/03/2018    Vertigo      Past Surgical History:   Procedure Laterality Date    ANGIOGRAM, CORONARY, WITH LEFT HEART CATHETERIZATION  02/10/2025    Procedure: Left Heart Cath;  Surgeon: Garcia De Luna MD;  Location: [facility] CATH;  Service: Cardiovascular;;    CHOLECYSTECTOMY      CYSTOSCOPY W/ URETERAL STENT PLACEMENT Right 12/26/2019    Procedure: CYSTOSCOPY, WITH URETERAL STENT INSERTION;  Surgeon: Rito Medley MD;  Location: [facility] OR;  Service: Urology;  Laterality: Right;    CYSTOSCOPY W/ URETERAL STENT REMOVAL Right 01/06/2020    Procedure: CYSTOSCOPY, WITH URETERAL STENT REMOVAL;  Surgeon: Rito Medley MD;  Location: [facility] OR;  Service: Urology;  Laterality: Right;    ESOPHAGOGASTRODUODENOSCOPY N/A 03/11/2025    Procedure: EGD (ESOPHAGOGASTRODUODENOSCOPY);  Surgeon: Vernon Pike DO;  Location: [facility] ENDO;  Service: Endoscopy;  Laterality: N/A;    INJECTION OF ANESTHETIC AGENT INTO TISSUE PLANE DEFINED BY TRANSVERSUS ABDOMINIS MUSCLE  01/06/2025    Procedure: BLOCK, TRANSVERSUS ABDOMINIS PLANE;  Surgeon: Bibi Edmondson MD;  Location: [facility] OR 2ND FLR;  Service: General;;    LAPAROSCOPIC CHOLECYSTECTOMY  11/05/2024    LAPAROSCOPIC GASTROENTEROSTOMY N/A 01/06/2025    Procedure: GASTROENTEROSTOMY, LAPAROSCOPIC with inraop EGD;  Surgeon: Bibi Edmondson MD;  Location: [facility] OR 2ND FLR;  Service: General;  Laterality: N/A;  Surgeon to perform Tap Block itraoperatively    LASER LITHOTRIPSY Right 01/06/2020    Procedure: LITHOTRIPSY, USING LASER;  Surgeon: Rito Medley MD;  Location: [facility] OR;  Service: Urology;  Laterality: Right;    MAGNETIC RESONANCE IMAGING N/A 03/29/2025    Procedure: MRI (MAGNETIC RESONANCE IMAGING);  Surgeon: Dory Kirk;  Location: [facility] DORY;  Service: Anesthesiology;  Laterality: N/A;    STOMACH SURGERY  01/06/2025    TONSILLECTOMY, ADENOIDECTOMY, BILATERAL  MYRINGOTOMY AND TUBES      UPPER GASTROINTESTINAL ENDOSCOPY      URETEROSCOPIC REMOVAL OF URETERIC CALCULUS Right 01/06/2020    Procedure: REMOVAL, CALCULUS, URETER, URETEROSCOPIC;  Surgeon: Rito Medley MD;  Location: [facility] OR;  Service: Urology;  Laterality: Right;    URETEROSCOPY Left 12/26/2019    Procedure: URETEROSCOPY;  Surgeon: Rito Medley MD;  Location: [facility] OR;  Service: Urology;  Laterality: Left;    URETEROSCOPY Right 01/06/2020    Procedure: URETEROSCOPY;  Surgeon: Rito Medley MD;  Location: [facility] OR;  Service: Urology;  Laterality: Right;     Family History   Problem Relation Name Age of Onset    Obesity Mother      Stomach cancer Father      Ulcerative colitis Brother      Breast cancer Maternal Aunt      Colon cancer Neg Hx      Miscarriages / Stillbirths Neg Hx      Ovarian cancer Neg Hx      Crohn's disease Neg Hx      Esophageal cancer Neg Hx       Social History[1]  Wt Readings from Last 10 Encounters:   04/28/25 (!) 137.4 kg (302 lb 14.6 oz)   04/24/25 (!) 138 kg (304 lb 3.8 oz)   04/21/25 (!) 139.7 kg (308 lb)   03/28/25 (!) 141.5 kg (312 lb)   03/24/25 (!) 142.4 kg (313 lb 15 oz)   03/19/25 (!) 142.3 kg (313 lb 11.4 oz)   03/17/25 (!) 142.3 kg (313 lb 11.4 oz)   03/17/25 (!) 141.4 kg (311 lb 11.7 oz)   03/14/25 (!) 141.3 kg (311 lb 8.2 oz)   03/12/25 (!) 142.9 kg (315 lb)     Lab Results   Component Value Date    WBC 5.17 04/22/2025    HGB 11.8 (L) 04/22/2025    HCT 37.5 04/22/2025    MCV 87 04/22/2025     04/22/2025     CMP  Sodium   Date Value Ref Range Status   04/22/2025 142 136 - 145 mmol/L Final   04/10/2025 141 136 - 145 mmol/L Final   03/31/2025 141 136 - 145 mmol/L Final     Potassium   Date Value Ref Range Status   04/22/2025 3.5 3.5 - 5.1 mmol/L Final   04/10/2025 3.8 3.5 - 5.1 mmol/L Final   03/31/2025 3.7 3.5 - 5.1 mmol/L Final     Comment:     Anion Gap reference range revised on 4/28/2023     Chloride   Date Value Ref Range Status   04/22/2025 111 (H)  95 - 110 mmol/L Final   03/31/2025 109 95 - 110 mmol/L Final     CO2   Date Value Ref Range Status   04/22/2025 23 23 - 29 mmol/L Final   03/31/2025 24 23 - 29 mmol/L Final     Carbon Dioxide   Date Value Ref Range Status   04/10/2025 28 21 - 32 mmol/L Final     Glucose   Date Value Ref Range Status   03/31/2025 86 70 - 110 mg/dL Final     Comment:     The ADA recommends the following guidelines for fasting glucose:    Normal:       less than 100 mg/dL    Prediabetes:  100 mg/dL to 125 mg/dL    Diabetes:     126 mg/dL or higher       BUN   Date Value Ref Range Status   04/22/2025 7 6 - 20 mg/dL Final     Creatinine   Date Value Ref Range Status   04/22/2025 0.7 0.5 - 1.4 mg/dL Final     Calcium   Date Value Ref Range Status   04/22/2025 8.9 8.7 - 10.5 mg/dL Final   04/10/2025 9.1 8.5 - 10.1 mg/dL Final   03/31/2025 8.6 (L) 8.7 - 10.5 mg/dL Final     Total Protein   Date Value Ref Range Status   03/31/2025 5.8 (L) 6.0 - 8.4 g/dL Final     Albumin   Date Value Ref Range Status   04/22/2025 3.1 (L) 3.5 - 5.2 g/dL Final   04/05/2025 2.9 (L) 3.4 - 5.0 g/dL Final   03/31/2025 3.4 (L) 3.5 - 5.2 g/dL Final     Total Bilirubin   Date Value Ref Range Status   04/05/2025 0.8 0.2 - 1.3 mg/dL Final   03/31/2025 0.5 0.2 - 1.0 mg/dL Final     Bilirubin Total   Date Value Ref Range Status   04/22/2025 0.6 0.1 - 1.0 mg/dL Final     Comment:     For infants and newborns, interpretation of results should be based   on gestational age, weight and in agreement with clinical   observations.    Premature Infant recommended reference ranges:   0-24 hours:  <8.0 mg/dL   24-48 hours: <12.0 mg/dL   3-5 days:    <15.0 mg/dL   6-29 days:   <15.0 mg/dL     Alkaline Phosphatase   Date Value Ref Range Status   03/31/2025 91 40 - 150 U/L Final     ALP   Date Value Ref Range Status   04/22/2025 91 40 - 150 unit/L Final     AST   Date Value Ref Range Status   04/22/2025 28 11 - 45 unit/L Final   04/05/2025 39 (H) 15 - 37 U/L Final   03/31/2025 28 10  - 40 U/L Final     ALT   Date Value Ref Range Status   04/22/2025 32 10 - 44 unit/L Final   04/05/2025 107 (H) 13 - 61 U/L Final   03/31/2025 36 10 - 44 U/L Final     Anion Gap   Date Value Ref Range Status   04/22/2025 8 8 - 16 mmol/L Final     eGFR if    Date Value Ref Range Status   08/04/2021 >60.0 >60 mL/min/1.73 m^2 Final     eGFR    Date Value Ref Range Status   11/02/2024 90 mL/min/1.73mSq Final     Comment:     In accordance with NKF-ASN Task Force recommendation, calculation based on the Chronic Kidney Disease Epidemiology Collaboration (CKD-EPI) equation without adjustment for race. eGFR adjusted for gender and age and calculated in ml/min/1.73mSquared. eGFR cannot be calculated if patient is under 18 years of age.     Reference Range:   >= 60 ml/min/1.73mSquared.     eGFR if non    Date Value Ref Range Status   08/04/2021 >60.0 >60 mL/min/1.73 m^2 Final     Comment:     Calculation used to obtain the estimated glomerular filtration  rate (eGFR) is the CKD-EPI equation.        Lab Results   Component Value Date    AMYLASE 41 05/08/2017     Lab Results   Component Value Date    LIPASE 13 10/30/2024     Lab Results   Component Value Date    LIPASERES 11 01/10/2025     Lab Results   Component Value Date    TSH 0.294 (L) 04/20/2025     Reviewed prior medical records including radiology report of KUB 03/17/2025, CT abdomen and pelvis 01/10/2025, HIDA scan 10/31/2024, abdominal ultrasound 10/30/2024, CT abdomen and pelvis 10/13/2024, upper GI 08/27/2024 & endoscopy history (see surgical history).    Objective:      Physical Exam  Vitals and nursing note reviewed.   Constitutional:       General: She is not in acute distress.     Appearance: Normal appearance. She is obese. She is not ill-appearing.   HENT:      Mouth/Throat:      Lips: Pink. No lesions.   Cardiovascular:      Pulses: Normal pulses.      Heart sounds: Normal heart sounds.   Pulmonary:      Effort:  Pulmonary effort is normal. No respiratory distress.      Breath sounds: Normal breath sounds.   Abdominal:      General: Abdomen is protuberant. Bowel sounds are normal. There is no distension or abdominal bruit. There are no signs of injury.      Palpations: Abdomen is soft. There is no shifting dullness, fluid wave, hepatomegaly, splenomegaly or mass.      Tenderness: There is abdominal tenderness in the right lower quadrant, suprapubic area and left lower quadrant. There is no guarding or rebound. Negative signs include Solis's sign, Rovsing's sign and McBurney's sign.   Skin:     General: Skin is warm and dry.      Coloration: Skin is not jaundiced or pale.   Neurological:      Mental Status: She is alert and oriented to person, place, and time.   Psychiatric:         Attention and Perception: Attention normal.         Mood and Affect: Mood normal.         Speech: Speech normal.         Behavior: Behavior normal.         Assessment:       1. History of esophagogastroduodenoscopy (EGD)    2. Chronic idiopathic constipation    3. Mucus in stool    4. Lower abdominal pain    5. Rectal pain    6. Rectal bleeding    7. Gastroesophageal reflux disease without esophagitis    8. S/P gastric bypass    9. S/P cholecystectomy          Plan:       Patient would like to reschedule colonoscopy to another date.    History of esophagogastroduodenoscopy (EGD)    - Informed patient of EGD results, patient verbalized understanding    Chronic idiopathic constipation & Mucus in stool  - reschedule Colonoscopy, discussed procedure with the patient, including risks and benefits, patient verbalized understanding  Recommend daily exercise as tolerated, adequate water intake (six 8-oz glasses of water daily), and high fiber diet. OTC fiber supplements are recommended if diet does not reach daily fiber goal (20-30 grams daily), such as Metamucil, Citrucel, or FiberCon (take as directed, separate from other oral medications by >2  hours).  -Recommend taking an OTC stool softener such as Colace as directed to avoid hard stools and straining with bowel movements PRN  - Can try one time dose of OTC magnesium citrate (295ml- take as directed) to help clear the stool out of the colon. Otherwise, continue previous recommendations as discussed.  -CHANGE: lubiprostone (AMITIZA) 24 MCG Cap; Take 1 capsule (24 mcg total) by mouth 2 (two) times daily.  Dispense: 60 capsule; Refill: 2  - discontinue Linzess due to alternative therapy    Lower abdominal pain   - reschedule Colonoscopy, discussed procedure with the patient, including risks and benefits, patient verbalized understanding  - improve constipation    Rectal pain & Rectal bleeding  - reschedule Colonoscopy, discussed procedure with the patient, including risks and benefits, patient verbalized understanding  - different etiologies that can cause rectal bleeding, include but not limited to diverticulosis, polyps, colon mass, colon inflammation or infection, anal fissure or hemorrhoids.   - You may resume normal activity as long as you feel well.  - Avoid/minimize NSAIDs such as ibuprofen (Advil, Motrin) and naproxen (Aleve and Naprosyn).  - Avoid alcohol.  -Recommend high fiber diet (20-30 grams of fiber daily)/OTC fiber supplements daily as directed.  -     Ambulatory referral/consult to Colorectal Surgery; Future; Expected date: 05/05/2025    Gastroesophageal reflux disease without esophagitis   -Avoid large meals, avoid eating within 2-3 hours of bedtime (avoid late night eating & lying down soon after eating), elevate head of bed if nocturnal symptoms are present, smoking cessation (if current smoker), & weight loss (if overweight).   -Avoid known foods which trigger reflux symptoms & to minimize/avoid high-fat foods, chocolate, caffeine, citrus, alcohol, & tomato products.  -Avoid/limit use of NSAID's, since they can cause GI upset, bleeding, and/or ulcers. If needed, take with food.  -  continue pantoprazole 40 mg b.i.d.    S/P gastric bypass  - follow-up with bariatric surgeon for continued evaluation and management  - eat smaller more frequent meals    S/P cholecystectomy    Follow up in about 2 months (around 6/28/2025), or if symptoms worsen or fail to improve.      If no improvement in symptoms or symptoms worsen, call/follow-up at clinic or go to ER.        Total time spent on the encounter includes face to face time and non-face to face time preparing to see the patient (eg, review of tests), Obtaining and/or reviewing separately obtained history, Documenting clinical information in the electronic or other health record, Independently interpreting results (not separately reported) and communicating results to the patient/family/caregiver, or Care coordination (not separately reported).     A dictation software program was used for this note. Please expect some simple typographical  errors in this note.         [1]   Social History  Tobacco Use    Smoking status: Some Days     Current packs/day: 0.25     Average packs/day: 0.3 packs/day for 15.3 years (3.8 ttl pk-yrs)     Types: Cigarettes     Start date: 2010    Smokeless tobacco: Never    Tobacco comments:     Quit smoking June 2024    Substance Use Topics    Alcohol use: Not Currently     Alcohol/week: 0.0 standard drinks of alcohol    Drug use: Not Currently     Types: Marijuana, Fentanyl     Comment: history of opoid abuse

## 2025-04-29 LAB — W VITAMIN E: 920 UG/DL

## 2025-04-29 NOTE — ASSESSMENT & PLAN NOTE
Body mass index is 54.56 kg/m². Morbid obesity complicates all aspects of disease management from diagnostic modalities to treatment. Weight loss encouraged and health benefits explained to patient.

## 2025-04-29 NOTE — DISCHARGE SUMMARY
Geovany Levine - Neurosurgery ([facility])  Hospital Medicine  Discharge Summary      Patient Name: Alexandra Martin  MRN: [id number]  EDIN: [id number]  Patient Class: IP- Inpatient  Admission Date: 4/20/2025  Hospital Length of Stay: 2 days  Discharge Date and Time: 4/22/2025  5:08 PM  Attending Physician: Clarice att. providers found   Discharging Provider: Adelaida Allen MD  Primary Care Provider: Nicolette Andrade DO  [facility] Medicine Team: [facility] HOSP MED N Adelaida Allen MD  Primary Care Team: [facility] HOSP MED N    HPI:   Alexandra Martin is a 29 y.o. female with a PMHx of obesity, gastric bypass 1/2025, ADHD, anxiety who presents to [facility] for evaluation of presenting for worsening numbness and weakness in her extremities for a few weeks. Just discharged from [facility] for similar and it was contributed to Thiamine deficiency from her recent gastric bypass. She had w/o contrast MRI brain and spine with no acute findings. She was d/c from SNF to home 4 days ago. She was using a walker to get around at first but couldn't get out of bed today. She has been wearing a diaper because she dont not know when she is going to the bathroom. She is additionally having L facial weakness over the last few days similar to when she had [place] Palsy.     She has limited sensation up to her mid chest and shoulders on my exam. She has a reduced plantar reflex. She had a NIF -18 and FVC 1L for me on exam. I discussed with neurology who will see her tomorrow but we are concerned for GBS, MG, MS, transverse myelitis. We are going to attempt an LP on her right now. I ordered repeat MRIs with contrast but she needed anesthesia for sedation for her last one.       ED:    * No surgery found *      Hospital Course:   28 y/o F with PMH of thiamine deficiency neuropathy and BLE and BUE weakness/numbness for which she was recently hospitalized for at [facility], s/p bariatric surgery (01/2025), TRACE, and morbid obesity admitted now to [facility] for ascending  weakness and numbness. She was recently discharged 16 days ago from [facility] after admission for less severe BLE weakness and numbness. She first noticed tingling sensation in her feet after standing for long periods of time after her bypass surgery in January. The tingling sensation changed into numbness and started traveling up her legs and started to involve both hands. She reports having numbness up to her chest and shoulders. She also has had progressively worsening weakness to both legs and arms. While admitted, she had a MRI Brain and C/T/L spine w/o contrast that did not show any significant findings. Symptom were attributed to a thiamine deficiency, for which she was started on IV thiamine to treat. She was sent to rehab when discharged and got out of rehab 4 days ago. She was able to walk with a walker when she first got home. She has been incontinent of urine. Over the last two days, she has had progressive weakness and numbness. She was not able to get out of bed today due to her muscle weakness and numbness which prompted her ED visit today. She additionally notes having L sided facial weakness that is similar to when she had [place] Palsy. MRI brain and C/T/L spine with and without contrast pending. Vitamin labs ordered. NCC was consulted due to NIF of -18 and VC of 1200. She is has no complaints of shortness of breath and is not in respiratory distress. She is sating in the high 90s on RA. A LP was performed but was unsuccessful due to the size of the needle and her body habitus. Given her NIF and progressive weakness, she will be admitted to [facility] for further monitoring and higher level of care. NIF is -32. Patient is stable for step down to hospital medicine. Neurology is consulted. Recommend nutritional work up and nonurgent EMG. Anesthesia was consulted for LP - planned for next day however patient declined. Patient symptomatically improved and says she is at baseline. Patient underwent EMG by  neurologu. Neurology recommends starting gabapentin and outpatient PT with healthy back program. Patient is hemodynamically stable and ready for discharge home. Patient asking to be discharged. Outpatient appointments requested.      Goals of Care Treatment Preferences:  Code Status: Full Code         Consults:   Consults (From admission, onward)          Status Ordering Provider     Inpatient consult to Psychology  Once        Provider:  (Not yet assigned)    Completed PITA BEGUM     Inpatient consult to General Neurology  Once        Provider:  (Not yet assigned)    Completed JOSE D RICHARDSON     Inpatient consult to Registered Dietitian/Nutritionist  Once        Provider:  (Not yet assigned)    Completed JOSE D RICHARDSON     Inpatient consult to Physical Medicine Rehab  Once        Provider:  (Not yet assigned)    Completed JOSE D RICHARDSON     Inpatient Consult to Neurology Services (General Neurology)  Once        Provider:  (Not yet assigned)    Completed BOONE DUKES            Assessment & Plan  Generalized weakness      Morbid obesity  Body mass index is 54.56 kg/m². Morbid obesity complicates all aspects of disease management from diagnostic modalities to treatment. Weight loss encouraged and health benefits explained to patient.       TRACE (generalized anxiety disorder)      Anxiety      S/P bariatric surgery      Bilateral leg weakness      Limitation of activity due to disability      Thiamine deficiency neuropathy      Paresthesia      Final Active Diagnoses:    Diagnosis Date Noted POA    PRINCIPAL PROBLEM:  Generalized weakness [R53.1] 04/21/2025 Yes    Paresthesia [R20.2] 04/14/2025 Yes    Thiamine deficiency neuropathy [E51.11] 04/03/2025 Yes    Limitation of activity due to disability [Z73.6] 03/31/2025 Yes    Bilateral leg weakness [R29.898] 03/28/2025 Yes    S/P bariatric surgery [Z98.84] 03/06/2025 Not Applicable    Anxiety [F41.9] 02/07/2025 Yes    TRACE (generalized anxiety disorder)  [F41.1] 04/13/2021 Yes    Morbid obesity [E66.01] 02/28/2019 Yes      Problems Resolved During this Admission:       Discharged Condition: stable    Disposition: Home or Self Care    Follow Up:   Follow-up Information       Nicolette Andrade DO .    Specialties: Internal Medicine, Pediatrics  Contact information:  [address] [facility] [address] [place] [address]  [phone]                           Patient Instructions:      Ambulatory referral/consult to Neurology   Standing Status: Future   Referral Priority: Routine Referral Type: Consultation   Referral Reason: Specialty Services Required   Requested Specialty: Neurology   Number of Visits Requested: 1     Ambulatory referral/consult to [facility]   Standing Status: Future   Referral Priority: Routine Referral Type: Consultation   Referral Reason: Specialty Services Required   Requested Specialty: Physical Therapy   Number of Visits Requested: 1     PT evaluate and treat   Standing Status: Future Standing Exp. Date: 04/22/26   Order Comments: Treat according to established therapy treatment plan.       Significant Diagnostic Studies: N/A    Pending Diagnostic Studies:       Procedure Component Value Units Date/Time    Vitamin B1 [[id number]] Collected: 04/20/25 2010    Order Status: Sent Lab Status: In process Updated: 04/20/25 2014    Specimen: Blood            Medications:  Reconciled Home Medications:      Medication List        START taking these medications      gabapentin 100 MG capsule  Commonly known as: NEURONTIN  Take 1 capsule (100 mg total) by mouth 3 (three) times daily.            CONTINUE taking these medications      acetaminophen 325 MG tablet  Commonly known as: TYLENOL  Take 2 tablets (650 mg total) by mouth every 4 (four) hours as needed for Pain.     BARIATRIC MULTIVITAMINS ORAL  Take 1 tablet by mouth 2 (two) times a day.     calcium-vitamin D3 600 mg-5 mcg (200 unit) Tab  Commonly known as: CALCIUM 600 + D(3)  Take 1 tablet by mouth 2  (two) times daily.     ergocalciferol 50,000 unit Cap  Commonly known as: VITAMIN D2  Take 1 capsule (50,000 Units total) by mouth every 7 days.     etonogestreL 68 mg Impl intradermal implant  Commonly known as: Nexplanon  68 mg by Subdermal route once. A single NEXPLANON implant is inserted subdermally just under the skin at the inner side of the non-dominant upper arm.     folic acid-vit B6-vit B12 2.5-25-2 mg 2.5-25-2 mg Tab  Commonly known as: FOLBIC or Equiv  Take 1 tablet by mouth once daily.     hydrOXYzine pamoate 50 MG Cap  Commonly known as: VISTARIL  Take 50 mg by mouth 2 (two) times a day.     pantoprazole 40 MG tablet  Commonly known as: PROTONIX  Take 40 mg by mouth 2 (two) times daily.     thiamine 100 MG tablet  Take 5 tablets (500 mg total) by mouth 2 (two) times a day for 3 days, THEN 1 tablet (100 mg total) 2 (two) times a day.  Start taking on: April 4, 2025     venlafaxine 37.5 MG 24 hr capsule  Commonly known as: EFFEXOR-XR  Take 37.5 mg by mouth once daily.            STOP taking these medications      metoprolol succinate 50 MG 24 hr tablet  Commonly known as: TOPROL-XL              Indwelling Lines/Drains at time of discharge:   Lines/Drains/Airways       None                   Time spent on the discharge of patient: 45 minutes         Adelaida Allen MD  Department of Hospital Medicine  [facility] ([facility])

## 2025-04-29 NOTE — HOSPITAL COURSE
28 y/o F with PMH of thiamine deficiency neuropathy and BLE and BUE weakness/numbness for which she was recently hospitalized for at [facility], s/p bariatric surgery (01/2025), TRACE, and morbid obesity admitted now to [facility] for ascending weakness and numbness. She was recently discharged 16 days ago from [facility] after admission for less severe BLE weakness and numbness. She first noticed tingling sensation in her feet after standing for long periods of time after her bypass surgery in January. The tingling sensation changed into numbness and started traveling up her legs and started to involve both hands. She reports having numbness up to her chest and shoulders. She also has had progressively worsening weakness to both legs and arms. While admitted, she had a MRI Brain and C/T/L spine w/o contrast that did not show any significant findings. Symptom were attributed to a thiamine deficiency, for which she was started on IV thiamine to treat. She was sent to rehab when discharged and got out of rehab 4 days ago. She was able to walk with a walker when she first got home. She has been incontinent of urine. Over the last two days, she has had progressive weakness and numbness. She was not able to get out of bed today due to her muscle weakness and numbness which prompted her ED visit today. She additionally notes having L sided facial weakness that is similar to when she had [place] Palsy. MRI brain and C/T/L spine with and without contrast pending. Vitamin labs ordered. NCC was consulted due to NIF of -18 and VC of 1200. She is has no complaints of shortness of breath and is not in respiratory distress. She is sating in the high 90s on RA. A LP was performed but was unsuccessful due to the size of the needle and her body habitus. Given her NIF and progressive weakness, she will be admitted to [facility] for further monitoring and higher level of care. NIF is -32. Patient is stable for step down to hospital medicine. Neurology is  consulted. Recommend nutritional work up and nonurgent EMG. Anesthesia was consulted for LP - planned for next day however patient declined. Patient symptomatically improved and says she is at baseline. Patient underwent EMG by neurologu. Neurology recommends starting gabapentin and outpatient PT with healthy back program. Patient is hemodynamically stable and ready for discharge home. Patient asking to be discharged. Outpatient appointments requested.

## 2025-04-30 ENCOUNTER — OFFICE VISIT (OUTPATIENT)
Dept: BARIATRICS | Facility: CLINIC | Age: 30
End: 2025-04-30
Payer: MEDICARE

## 2025-04-30 DIAGNOSIS — F33.1 MAJOR DEPRESSIVE DISORDER, RECURRENT EPISODE, MODERATE: ICD-10-CM

## 2025-04-30 DIAGNOSIS — F11.11 HISTORY OF OPIOID ABUSE: ICD-10-CM

## 2025-04-30 DIAGNOSIS — E66.01 MORBID OBESITY WITH BMI OF 50.0-59.9, ADULT: Primary | ICD-10-CM

## 2025-04-30 PROBLEM — G43.019 INTRACTABLE MIGRAINE WITHOUT AURA AND WITHOUT STATUS MIGRAINOSUS: Status: ACTIVE | Noted: 2018-10-10

## 2025-04-30 PROBLEM — F11.90 OPIOID USE DISORDER: Status: ACTIVE | Noted: 2025-04-30

## 2025-04-30 PROBLEM — R79.89 LOW VITAMIN B12 LEVEL: Status: ACTIVE | Noted: 2025-04-30

## 2025-04-30 NOTE — PROGRESS NOTES
"  Individual Psychotherapy (PhD/LCSW)     DATE 4/30/25     Site: Met with pt virtually. Pt presented for virtual appointment via her home. Address confirmed.      Therapeutic Intervention: Met with patient. Outpatient - Insight oriented psychotherapy 45 min - CPT code 57112        Chief complaint/reason for encounter: Anxiety, sobriety.      Interval history and content of current session: Pt began session catching LCSW up on past few weeks during which pt experienced going to Physical Rehab and rehospitalization for weakness and "not being able to walk or feel my legs." Pt stated that she was diagnosed with Guillain-[place] syndrome. Pt stated that with psychical therapy she has begun to feel better, gain more mobility and " get back to life." Pt expressed anxiety around her hospitalizations and her sobriety. Pt stated that she reached 3 years sober on 4/10/25, " but I feel like I cheated because I took opiates when they gave them to me while I was in the hospital."Pt stated she did not have opiates prescribed to her post hospitalization and does not have accesses to opiates at this time. Pt denied any current cravings and stated that she has been able to utilize her coping skills, such as drawing and taking walks when anxiety becomes overwhelming.     LCSW and pt discussed different schools of thought on using substances in emergency situations and sobriety. LCSW and pt discussed NA ideology on emergency medication use. LCSW reality tested pt's idea on "cheating sobriety." LCSW provided psycho-education on relapse prevention and the relapse model. LCSW encouraged pt to discuss current feelings with their sponsor.     Treatment plan:  Target symptoms: anxiety  Why chosen therapy is appropriate versus another modality: relevant to diagnosis  Outcome monitoring methods: self-report, observation  Therapeutic intervention type: insight oriented psychotherapy, supportive psychotherapy, interactive psychotherapy     Risk " parameters:  Patient reports no suicidal ideation  Patient reports no homicidal ideation  Patient reports no self-injurious behavior  Patient reports no violent behavior     Verbal deficits: None     Patient's response to intervention:  The patient's response to intervention is accepting, motivated.     Progress toward goals and other mental status changes:  The patient's progress toward goals is fair .     Diagnosis:   Z68.43 (BMI) of 50.0-59.9 in adults   F33.1- Major Depressive Disorder, Moderate, recurring episode  F11.21-Opioid Use Disorder, Sever, in sustained remission     Plan:  individual psychotherapy and medication management by physician     Return to clinic: as scheduled     Length of Service (minutes): 45    Amaris Laura [facility]  Department of Surgery/ Bariatric Surgery [facility]

## 2025-05-01 ENCOUNTER — PATIENT MESSAGE (OUTPATIENT)
Dept: FAMILY MEDICINE | Facility: CLINIC | Age: 30
End: 2025-05-01

## 2025-05-01 ENCOUNTER — CLINICAL SUPPORT (OUTPATIENT)
Dept: FAMILY MEDICINE | Facility: CLINIC | Age: 30
End: 2025-05-01
Payer: MEDICARE

## 2025-05-01 VITALS — OXYGEN SATURATION: 98 % | HEART RATE: 88 BPM

## 2025-05-01 DIAGNOSIS — R53.1 GENERALIZED WEAKNESS: Primary | ICD-10-CM

## 2025-05-01 DIAGNOSIS — R20.2 PARESTHESIA: Primary | ICD-10-CM

## 2025-05-01 PROCEDURE — 99999 PR PBB SHADOW E&M-EST. PATIENT-LVL III: CPT | Mod: PBBFAC,,,

## 2025-05-01 RX ADMIN — CYANOCOBALAMIN 1000 MCG: 1000 INJECTION, SOLUTION INTRAMUSCULAR; SUBCUTANEOUS at 11:05

## 2025-05-01 NOTE — PROGRESS NOTES
After obtaining consent, and per orders of Dr. Andrade, injection of Cyanocobalamin Injection 1000 mcg given by Mariah Langston. Patient instructed to remain in clinic for 20 minutes afterwards, and to report any adverse reaction to me immediately. Pt tolerated well. Pt VU.

## 2025-05-01 NOTE — PROGRESS NOTES
Subjective:       Patient ID: Alexandra Martin is a 29 y.o. female.    Chief Complaint: Hospital Follow Up ([facility]-Wed 4/22/25), Back Pain, Arm Pain, and Leg Pain (Lt Side)    Back Pain  Associated symptoms include leg pain.   Arm Pain     Leg Pain         29-year-old female presents for hospital follow up after recent discharge from [facility].  Discharge summary reviewed.  Patient was admitted for 2 days and discharged 04/22/2025.  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 She noted inability to get out of bed, incontinence, and left sided facial weakness reminiscent of when she had [place] palsy. On exam she had limited sensation up to her chest and reduce plantar reflex and there was concern for GBS, MG, MS, transverse myelitis. Neurology was consulted due to NIF of -18 and VC of 1200, NIF -32. Attempt was made to perform LP but unsuccessful. She remained stable on room air. After monitoring in [facility], patient was stepped own and neurology recommended non urgent EMG and nutritional work up. Anesthesia was consulted for LP but patient declined. Patient was prescribed gabapentin and outpatient PT with healthy back program along with follow up with neurology.  Neurology exam noted spinothalamic tracts sensory deficits (painful neuropathy) but no dorsal column findings (vibratory) in addition to ataxia - abnormal finger to nose and heel-to-shin tests; left leg pinprick decreased from the knee. Noted postural ataxia with dysmetria but no ophthalmoparesis - normal EMG but some scattered denervation findings. Expanded nutritional work up (selenium, copper, vitamin E, zinc) and aldolase/CPK. Did not think LP needed at this time.    Patient is scheduled with Jaquelin Nicolas NP on 5/13/25 with possible  repeat EMG prior to.    The patient admits that her mother has had bariatric surgery and receives vitamin B12 injections to avoid neuropathy. On review of her record her level is on the low end of normal consistent with vitamin B12 deficiency. We will continue oral supplements as previously recommended and begin IM replacement.    Of note, the patient admits that she has opioid use disorder and with recent hospitalizations has been prescribed narcotic pain medicines. She now feels as though she is more anxious as if she is going through withdrawal experienced in the past. She is hesitant about taking gabapentin and understands it has the potential to build tolerance, dependence, and addiction. She is amenable to referral to Dr. Pyle to support continued abstinence from use of opioids.    She requests refill of Maxalt for migraines which is helpful.    Plan:  Begin vitamin B12 replacement.  Continue to keep appointment with Neurology as scheduled - may or may not need another EMG. I will send a message.  Continue to establish with healthy back program.  Refer to Dr. Pyle Addiction Medicine.  Follow up with me in about a month or sooner if needed.    Past Medical History:   Diagnosis Date    ADD (attention deficit disorder)     Anxiety disorder     Biliary dyskinesia 11/02/2024    Bronchitis 12/03/2024    Depression     Drug-induced constipation 03/04/2024    GERD (gastroesophageal reflux disease)     Headache     MDD (major depressive disorder), recurrent episode, moderate 07/29/2021    Morbid obesity with BMI of 60.0-69.9, adult 06/10/2024    Nephrolithiasis     Ovarian cyst     Substance abuse     Hospitalization     Trauma 09/11/2019    Tympanic membrane perforation, left 03/14/2018    UTI (urinary tract infection) 08/03/2018    Vertigo        Past Surgical History:   Procedure Laterality Date    ANGIOGRAM, CORONARY, WITH LEFT HEART CATHETERIZATION  02/10/2025    Procedure: Left Heart Cath;  Surgeon: Castillo Montes  Garcia Molina MD;  Location: [facility] CATH;  Service: Cardiovascular;;    CHOLECYSTECTOMY      CYSTOSCOPY W/ URETERAL STENT PLACEMENT Right 12/26/2019    Procedure: CYSTOSCOPY, WITH URETERAL STENT INSERTION;  Surgeon: Rito Medley MD;  Location: [facility] OR;  Service: Urology;  Laterality: Right;    CYSTOSCOPY W/ URETERAL STENT REMOVAL Right 01/06/2020    Procedure: CYSTOSCOPY, WITH URETERAL STENT REMOVAL;  Surgeon: Rito Medley MD;  Location: [facility] OR;  Service: Urology;  Laterality: Right;    ESOPHAGOGASTRODUODENOSCOPY N/A 03/11/2025    Procedure: EGD (ESOPHAGOGASTRODUODENOSCOPY);  Surgeon: Vernon Pike DO;  Location: [facility] ENDO;  Service: Endoscopy;  Laterality: N/A;    INJECTION OF ANESTHETIC AGENT INTO TISSUE PLANE DEFINED BY TRANSVERSUS ABDOMINIS MUSCLE  01/06/2025    Procedure: BLOCK, TRANSVERSUS ABDOMINIS PLANE;  Surgeon: Bibi Edmondson MD;  Location: [facility] OR 2ND FLR;  Service: General;;    LAPAROSCOPIC CHOLECYSTECTOMY  11/05/2024    LAPAROSCOPIC GASTROENTEROSTOMY N/A 01/06/2025    Procedure: GASTROENTEROSTOMY, LAPAROSCOPIC with inraop EGD;  Surgeon: Bibi Edmondson MD;  Location: [facility] OR 2ND FLR;  Service: General;  Laterality: N/A;  Surgeon to perform Tap Block itraoperatively    LASER LITHOTRIPSY Right 01/06/2020    Procedure: LITHOTRIPSY, USING LASER;  Surgeon: Rito Medley MD;  Location: [facility] OR;  Service: Urology;  Laterality: Right;    MAGNETIC RESONANCE IMAGING N/A 03/29/2025    Procedure: MRI (MAGNETIC RESONANCE IMAGING);  Surgeon: Dory Kirk;  Location: [facility] DORY;  Service: Anesthesiology;  Laterality: N/A;    STOMACH SURGERY  01/06/2025    TONSILLECTOMY, ADENOIDECTOMY, BILATERAL MYRINGOTOMY AND TUBES      UPPER GASTROINTESTINAL ENDOSCOPY      URETEROSCOPIC REMOVAL OF URETERIC CALCULUS Right 01/06/2020    Procedure: REMOVAL, CALCULUS, URETER, URETEROSCOPIC;  Surgeon: Rito Medley MD;  Location: [facility] OR;  Service: Urology;  Laterality: Right;     URETEROSCOPY Left 12/26/2019    Procedure: URETEROSCOPY;  Surgeon: Rito Medley MD;  Location: [facility] OR;  Service: Urology;  Laterality: Left;    URETEROSCOPY Right 01/06/2020    Procedure: URETEROSCOPY;  Surgeon: Rito Medley MD;  Location: [facility] OR;  Service: Urology;  Laterality: Right;       Review of patient's allergies indicates:   Allergen Reactions    Ceftriaxone Nausea And Vomiting    Haldol [haloperidol lactate] Swelling    Azithromycin Nausea And Vomiting     Hard to breathe     Latex, natural rubber Swelling and Rash       Social History[1]    Medications Ordered Prior to Encounter[2]    Family History   Problem Relation Name Age of Onset    Obesity Mother      Stomach cancer Father      Ulcerative colitis Brother      Breast cancer Maternal Aunt      Colon cancer Neg Hx      Miscarriages / Stillbirths Neg Hx      Ovarian cancer Neg Hx      Crohn's disease Neg Hx      Esophageal cancer Neg Hx         Review of Systems   Musculoskeletal:  Positive for back pain and leg pain.         Objective:      /68   Pulse 69   Wt (!) 138 kg (304 lb 3.8 oz)   LMP 04/13/2025   SpO2 98%   BMI 53.89 kg/m²   Physical Exam  Constitutional:       General: She is not in acute distress.     Appearance: She is not ill-appearing or toxic-appearing.   Pulmonary:      Effort: Pulmonary effort is normal.   Neurological:      Mental Status: She is alert. Mental status is at baseline.   Psychiatric:         Mood and Affect: Mood normal.         Behavior: Behavior normal.         Assessment:       1. Hospital discharge follow-up    2. Generalized weakness    3. S/P bariatric surgery    4. Bilateral leg weakness    5. Limitation of activity due to disability    6. Thiamine deficiency neuropathy    7. Paresthesia    8. Morbid obesity BMI 53    9. TRACE (generalized anxiety disorder)    10. Opioid use disorder    11. Low vitamin B12 level    12. Intractable migraine without aura and without status migrainosus         Plan:       Hospital discharge follow up  Generalized weakness  S/P bariatric surgery  Bilateral leg weakness  Limitation of activity due to disability  Thiamine deficiency neuropathy  Paresthesia  - Continue supplements as previously prescribed.  - Keep neurology appointment as scheduled.    Morbid obesity BMI 53  - Down 5 kg since last visit in the clinic 3/17/24. Or about 2 lbs weight loss weekly for the last 5.5 weeks.  - Continue to follow up with Bariatric surgery as scheduled.    TRACE (generalized anxiety disorder)  Mood stable.  Continue current Effexor XR 37.5 mg daily.  Denies thoughts of self-harm.    Opioid use disorder  -     Ambulatory referral/consult to Addiction Psychiatry; Future; Expected date: 05/01/2025    Low vitamin B12 level  -     cyanocobalamin injection 1,000 mcg  -     Prior authorization Order    Intractable migraine without aura and without status migrainosus  -     rizatriptan (MAXALT) 10 MG tablet; Take 1 tablet (10 mg total) by mouth as needed for Migraine.  Dispense: 14 tablet; Refill: 2        Return in about 1 month or sooner if needed.    Visit today included increased complexity associated with the care of the episodic problem low vitamin B12 level addressed and managing the longitudinal care of the patient due to the serious and/or complex managed problem(s) opioid use disorder.         [1]   Social History  Socioeconomic History    Marital status: Single   Tobacco Use    Smoking status: Some Days     Current packs/day: 0.25     Average packs/day: 0.3 packs/day for 15.3 years (3.8 ttl pk-yrs)     Types: Cigarettes     Start date: 2010    Smokeless tobacco: Never    Tobacco comments:     Quit smoking June 2024    Substance and Sexual Activity    Alcohol use: Not Currently     Alcohol/week: 0.0 standard drinks of alcohol    Drug use: Not Currently     Types: Marijuana, Fentanyl     Comment: history of opoid abuse    Sexual activity: Not Currently     Partners: Male     Birth  control/protection: Implant     Social Drivers of Health     Financial Resource Strain: Medium Risk (4/22/2025)    Overall Financial Resource Strain (CARDIA)     Difficulty of Paying Living Expenses: Somewhat hard   Food Insecurity: Patient Unable To Answer (4/22/2025)    Hunger Vital Sign     Worried About Running Out of Food in the Last Year: Patient unable to answer     Ran Out of Food in the Last Year: Patient unable to answer   Transportation Needs: No Transportation Needs (4/21/2025)    PRAPARE - Transportation     Lack of Transportation (Medical): No     Lack of Transportation (Non-Medical): No   Physical Activity: Inactive (4/21/2025)    Exercise Vital Sign     Days of Exercise per Week: 0 days     Minutes of Exercise per Session: 0 min   Stress: Patient Declined (4/22/2025)    [employer] - Occupational Stress Questionnaire     Feeling of Stress : Patient declined   Housing Stability: Low Risk  (4/22/2025)    Housing Stability Vital Sign     Unable to Pay for Housing in the Last Year: No     Number of Times Moved in the Last Year: 1     Homeless in the Last Year: No   [2]   Current Outpatient Medications on File Prior to Visit   Medication Sig Dispense Refill    calcium-vitamin D tablet 600 mg-200 units Take 1 tablet by mouth 2 (two) times daily.      ergocalciferol (VITAMIN D2) 50,000 unit Cap Take 1 capsule (50,000 Units total) by mouth every 7 days.      etonogestreL (NEXPLANON) 68 mg Impl subdermal device 68 mg by Subdermal route once. A single NEXPLANON implant is inserted subdermally just under the skin at the inner side of the non-dominant upper arm.      folic acid-vit B6-vit B12 2.5-25-2 mg (FOLBIC OR EQUIV) 2.5-25-2 mg Tab Take 1 tablet by mouth once daily.      hydrOXYzine pamoate (VISTARIL) 50 MG Cap Take 50 mg by mouth 2 (two) times a day.      multivit-min/iron/folic acid/K (BARIATRIC MULTIVITAMINS ORAL) Take 1 tablet by mouth 2 (two) times a day.      pantoprazole  (PROTONIX) 40 MG tablet Take 40 mg by mouth 2 (two) times daily.      thiamine 100 MG tablet Take 5 tablets (500 mg total) by mouth 2 (two) times a day for 3 days, THEN 1 tablet (100 mg total) 2 (two) times a day. 90 tablet 0    venlafaxine (EFFEXOR-XR) 37.5 MG 24 hr capsule Take 37.5 mg by mouth once daily.      acetaminophen (TYLENOL) 325 MG tablet Take 2 tablets (650 mg total) by mouth every 4 (four) hours as needed for Pain. (Patient not taking: Reported on 4/28/2025)      gabapentin (NEURONTIN) 100 MG capsule Take 1 capsule (100 mg total) by mouth 3 (three) times daily. (Patient not taking: Reported on 4/28/2025) 90 capsule 0     No current facility-administered medications on file prior to visit.

## 2025-05-09 ENCOUNTER — PATIENT MESSAGE (OUTPATIENT)
Dept: FAMILY MEDICINE | Facility: CLINIC | Age: 30
End: 2025-05-09
Payer: MEDICARE

## 2025-05-13 ENCOUNTER — PATIENT MESSAGE (OUTPATIENT)
Dept: BARIATRICS | Facility: CLINIC | Age: 30
End: 2025-05-13
Payer: MEDICARE

## 2025-05-13 ENCOUNTER — CLINICAL SUPPORT (OUTPATIENT)
Dept: FAMILY MEDICINE | Facility: CLINIC | Age: 30
End: 2025-05-13
Payer: MEDICARE

## 2025-05-13 ENCOUNTER — OFFICE VISIT (OUTPATIENT)
Dept: NEUROLOGY | Facility: CLINIC | Age: 30
End: 2025-05-13
Payer: MEDICARE

## 2025-05-13 VITALS
HEIGHT: 63 IN | WEIGHT: 293 LBS | BODY MASS INDEX: 51.91 KG/M2 | RESPIRATION RATE: 20 BRPM | DIASTOLIC BLOOD PRESSURE: 74 MMHG | SYSTOLIC BLOOD PRESSURE: 146 MMHG | HEART RATE: 92 BPM

## 2025-05-13 DIAGNOSIS — Z98.84 S/P BARIATRIC SURGERY: Primary | ICD-10-CM

## 2025-05-13 DIAGNOSIS — R79.89 LOW VITAMIN B12 LEVEL: Primary | ICD-10-CM

## 2025-05-13 DIAGNOSIS — F44.9 CONVERSION DISORDER: ICD-10-CM

## 2025-05-13 DIAGNOSIS — R79.89 ABNORMAL LFTS: ICD-10-CM

## 2025-05-13 DIAGNOSIS — R20.2 PARESTHESIA: ICD-10-CM

## 2025-05-13 PROCEDURE — 3078F DIAST BP <80 MM HG: CPT | Mod: CPTII,S$GLB,, | Performed by: NURSE PRACTITIONER

## 2025-05-13 PROCEDURE — 99215 OFFICE O/P EST HI 40 MIN: CPT | Mod: S$GLB,,, | Performed by: NURSE PRACTITIONER

## 2025-05-13 PROCEDURE — 3077F SYST BP >= 140 MM HG: CPT | Mod: CPTII,S$GLB,, | Performed by: NURSE PRACTITIONER

## 2025-05-13 PROCEDURE — 1111F DSCHRG MED/CURRENT MED MERGE: CPT | Mod: CPTII,S$GLB,, | Performed by: NURSE PRACTITIONER

## 2025-05-13 PROCEDURE — 3044F HG A1C LEVEL LT 7.0%: CPT | Mod: CPTII,S$GLB,, | Performed by: NURSE PRACTITIONER

## 2025-05-13 PROCEDURE — 3008F BODY MASS INDEX DOCD: CPT | Mod: CPTII,S$GLB,, | Performed by: NURSE PRACTITIONER

## 2025-05-13 PROCEDURE — 1159F MED LIST DOCD IN RCRD: CPT | Mod: CPTII,S$GLB,, | Performed by: NURSE PRACTITIONER

## 2025-05-13 PROCEDURE — 99417 PROLNG OP E/M EACH 15 MIN: CPT | Mod: S$GLB,,, | Performed by: NURSE PRACTITIONER

## 2025-05-13 PROCEDURE — 99999 PR PBB SHADOW E&M-EST. PATIENT-LVL IV: CPT | Mod: PBBFAC,,, | Performed by: NURSE PRACTITIONER

## 2025-05-13 RX ORDER — LIDOCAINE 50 MG/G
1 PATCH TOPICAL DAILY
Qty: 30 PATCH | Refills: 0 | Status: SHIPPED | OUTPATIENT
Start: 2025-05-13 | End: 2025-06-12

## 2025-05-13 RX ORDER — LANOLIN ALCOHOL/MO/W.PET/CERES
100 CREAM (GRAM) TOPICAL DAILY
COMMUNITY
Start: 2025-05-13 | End: 2026-05-13

## 2025-05-13 RX ADMIN — CYANOCOBALAMIN 1000 MCG: 1000 INJECTION, SOLUTION INTRAMUSCULAR; SUBCUTANEOUS at 10:05

## 2025-05-13 NOTE — PROGRESS NOTES
NEUROLOGY  Outpatient Visit     [facility]  [address] [facility], [place] [address]  [phone] (office) / [phone] (fax)    Patient Name:  Alexandra Martin  :  1995  MR #:  [id number]  Acct #:  [id number]    Date of  Visit: 2025    Other Physicians:  Nicolette Andrade DO (Primary Care Physician)      CHIEF COMPLAINT: Numbness (BLE- hosp follow up )        HISTORY OF PRESENT ILLNESS:  Alexandra Martin is a 29 y.o. R-handed female seen in hospital follow up for BLE numbness per Nicolette Andrade DO    Medical history is significant for obesity s/p gastric bypass 2025, ADHD, anxiety, depression, opiod use disorder     New to me.     EMR reviewed:  To [facility] [place] ED 3/28 with BLE weakness, numbness, daily HA x 3 weeks. She had previously been diagnosed with thiamine deficiency by her PCP. Neuroaxial imaging was negative. Her symptoms were felt to be r/t thiamine deficiency. Dc on  to [facility] rehab. She later reported worsening of the tingling to her PCP with progression to include her face. She was readmitted to [facility]. She was admitted to [facility] due to NIF of -18 and VC of 1200. There was concern for conversion disorder. LP was attempted, but unsuccessful. She did have inpt EMG with [facility] that showed diffuse denervation chnges in BLE. Demyelinating imaging was not done.     Pt reports onset of BLE paresthesias after gastric surgery (roughly Feb - 2025). Were intermittent, mainly after long periods of standing. Sensation progressed up the legs to the thighs and also into the hands, chest and shoulders. Upon leaving [facility] rehab, she was able to walk with a walker but remained incontinent of urine. Her weakness progressed to the point that she could not get OOB. She had L sided facial weakness that was reminiscent of when she had BP.     Home since  with HH. Doing therapies. Notes no improvement in her symptoms. She still has numnbess from  her toes up to mid chest level (about bra line) to include below her elbows. She also still notes sensory change in the L face (forehead to chin) but this is less severe. She has chronic facial asymmetry since having BP. She is using a walker and WC at home. She has had a few falls at home. She still has bladder incontinence. She doesn't feel the urge to urinate. She does feel herself when wiping. She has chronic constipation, which is unchanged from baseline. She reports that she also can't feel her rectum after having a BM.     She is having pain in her L thigh, upper arms and mid low back. Its sharp, constant in nature. This has been present throughout her course. Cannot take NSAIDs due to bartiatric status. Tylenol not helping. Topicals not helping. Reports no hx of SAM, was tested prior to surgery ---- in review, she did have SAM. She has an appt with [employer] pain mgmt in a few weeks.     She reports weakness in her extremities. Her dexterity has worsened. Hard to open things. Hard to walk, dress, do ADLs. She denies any respiratory symptoms.     She is getting B12 injections as of last week and has another one today per PCP. Also taking oral Folbic, D2 and bariatric vitamin. No longer taking thiamine.      She is taking gabapentin 100 mg TID. Again, she denies painful neuropathy sensations, only numbness. It doesn't help her other pain.      Tearful during visit. Asks if provider thinks that she is crazy because another provider told her that it was all in her head.     Allergies:  Review of patient's allergies indicates:   Allergen Reactions    Ceftriaxone Nausea And Vomiting    Haldol [haloperidol lactate] Swelling    Azithromycin Nausea And Vomiting     Hard to breathe     Latex, natural rubber Swelling and Rash       Current Medications:  Current Medications[1]    Past Medical History:  Past Medical History:   Diagnosis Date    ADD (attention deficit disorder)     Anxiety disorder     Biliary dyskinesia  11/02/2024    Bronchitis 12/03/2024    Depression     Drug-induced constipation 03/04/2024    GERD (gastroesophageal reflux disease)     Headache     MDD (major depressive disorder), recurrent episode, moderate 07/29/2021    Morbid obesity with BMI of 60.0-69.9, adult 06/10/2024    Nephrolithiasis     Ovarian cyst     Substance abuse     Hospitalization     Trauma 09/11/2019    Tympanic membrane perforation, left 03/14/2018    UTI (urinary tract infection) 08/03/2018    Vertigo        Past Surgical History:  Past Surgical History:   Procedure Laterality Date    ANGIOGRAM, CORONARY, WITH LEFT HEART CATHETERIZATION  02/10/2025    Procedure: Left Heart Cath;  Surgeon: Garcia De Luna MD;  Location: [facility] CATH;  Service: Cardiovascular;;    CHOLECYSTECTOMY      CYSTOSCOPY W/ URETERAL STENT PLACEMENT Right 12/26/2019    Procedure: CYSTOSCOPY, WITH URETERAL STENT INSERTION;  Surgeon: Rito Medley MD;  Location: [facility] OR;  Service: Urology;  Laterality: Right;    CYSTOSCOPY W/ URETERAL STENT REMOVAL Right 01/06/2020    Procedure: CYSTOSCOPY, WITH URETERAL STENT REMOVAL;  Surgeon: Rito Medley MD;  Location: [facility] OR;  Service: Urology;  Laterality: Right;    ESOPHAGOGASTRODUODENOSCOPY N/A 03/11/2025    Procedure: EGD (ESOPHAGOGASTRODUODENOSCOPY);  Surgeon: Vernon Pike DO;  Location: [facility] ENDO;  Service: Endoscopy;  Laterality: N/A;    INJECTION OF ANESTHETIC AGENT INTO TISSUE PLANE DEFINED BY TRANSVERSUS ABDOMINIS MUSCLE  01/06/2025    Procedure: BLOCK, TRANSVERSUS ABDOMINIS PLANE;  Surgeon: Bibi Edmondson MD;  Location: [facility] OR 2ND FLR;  Service: General;;    LAPAROSCOPIC CHOLECYSTECTOMY  11/05/2024    LAPAROSCOPIC GASTROENTEROSTOMY N/A 01/06/2025    Procedure: GASTROENTEROSTOMY, LAPAROSCOPIC with inraop EGD;  Surgeon: Bibi Edmondson MD;  Location: [facility] OR 2ND FLR;  Service: General;  Laterality: N/A;  Surgeon to perform Tap Block itraoperatively    LASER LITHOTRIPSY  "Right 01/06/2020    Procedure: LITHOTRIPSY, USING LASER;  Surgeon: Rito Medley MD;  Location: [facility] OR;  Service: Urology;  Laterality: Right;    MAGNETIC RESONANCE IMAGING N/A 03/29/2025    Procedure: MRI (MAGNETIC RESONANCE IMAGING);  Surgeon: Bertha Kirk;  Location: [facility] BERTHA;  Service: Anesthesiology;  Laterality: N/A;    STOMACH SURGERY  01/06/2025    TONSILLECTOMY, ADENOIDECTOMY, BILATERAL MYRINGOTOMY AND TUBES      UPPER GASTROINTESTINAL ENDOSCOPY      URETEROSCOPIC REMOVAL OF URETERIC CALCULUS Right 01/06/2020    Procedure: REMOVAL, CALCULUS, URETER, URETEROSCOPIC;  Surgeon: Rito Medley MD;  Location: [facility] OR;  Service: Urology;  Laterality: Right;    URETEROSCOPY Left 12/26/2019    Procedure: URETEROSCOPY;  Surgeon: Rito Medley MD;  Location: [facility] OR;  Service: Urology;  Laterality: Left;    URETEROSCOPY Right 01/06/2020    Procedure: URETEROSCOPY;  Surgeon: Rito Medley MD;  Location: [facility] OR;  Service: Urology;  Laterality: Right;       Family History:  family history includes Breast cancer in her maternal aunt; Obesity in her mother; Stomach cancer in her father; Ulcerative colitis in her brother.    Social History:   reports that she has been smoking cigarettes. She started smoking about 15 years ago. She has a 3.8 pack-year smoking history. She has never used smokeless tobacco. She reports that she does not currently use alcohol. She reports that she does not currently use drugs after having used the following drugs: Marijuana and Fentanyl.      REVIEW OF SYSTEMS:  As per HPI    PHYSICAL EXAM:  BP (!) 146/74   Pulse 92   Resp 20   Ht 5' 3" (1.6 m)   Wt 134.7 kg (296 lb 15.4 oz)   LMP 04/13/2025   BMI 52.60 kg/m²     General: Well groomed. No acute distress.  Pulmonary: Normal effort and rate.   Musculoskeletal: No obvious joint deformities, moves all extremities well.  Extremities: No clubbing, cyanosis or edema.     Neurological Exam  Mental Status  Awake and " alert. Oriented to person, place and time. Speech is normal. Language is fluent with no aphasia.    Cranial Nerves  CN II: Right visual acuity: Finger movement. Left visual acuity: Finger movement. Visual fields full to confrontation.  CN III, IV, VI: Extraocular movements intact bilaterally. Normal lids and orbits bilaterally. Pupils equal round and reactive to light bilaterally.  CN V:  Left: Diminished sensation of the entire left side of the face. Reduced to touch V1-3.  CN VII:  Left: Mild L lower facial asymmetry - no FH involvement  Pt notes chronic since having L BP years ago .  CN VIII: Hearing is normal.  CN IX, X: Palate elevates symmetrically. Normal gag reflex.  CN XI: Shoulder shrug strength is normal.  CN XII: Tongue midline without atrophy or fasciculations.    Motor  Normal muscle bulk throughout. No fasciculations present. Normal muscle tone. No abnormal involuntary movements. Strength is 5/5 in all four extremities except as noted. No pronator drift.  + Hastings's sign with BLE testing   Able to stand unassisted and slowly walk to exam table, step up onto step   Exam is equivocal -- very suggestive of effort depending weakness given variable responses -- may have R>L LE weakness, gives at least 4/5 in L DF / ham / quad and jet DF. .    Sensory  Light touch abnormality: Pinprick abnormality: Temperature abnormality: Vibration abnormality:   No response to pin from toes up to approx T4 level, no response in arms to above the elbows   No response to vibration in BLE or BUE  No response to temp .    Reflexes                                            Right                      Left  Brachioradialis                    1+                         1+  Biceps                                 1+                         1+  Patellar                                2+                         2+  Achilles                                2+                         2+    Right pathological reflexes: Josef's  absent. Ankle clonus absent.  Left pathological reflexes: Josef's absent. Ankle clonus absent.    Coordination  Right: Finger-to-nose normal. Rapid alternating movement abnormality:Left: Finger-to-nose normal. Rapid alternating movement abnormality:  Slow, no true decrement .    Gait   Able to rise from chair without using arms.  Slow, cautious, holds onto environment  Semi wide stance .      DIAGNOSTIC DATA:  I have personally reviewed provider notes, labs and imaging made available to me today.     Imaging:  Results for orders placed during the hospital encounter of 03/28/25    MRI Brain Without Contrast    Narrative  EXAMINATION:  MRI BRAIN WITHOUT CONTRAST    CLINICAL HISTORY:  Headache, new or worsening, neuro deficit (Age 19-49y);.    TECHNIQUE:  Multiplanar multisequence MR imaging of the brain was performed without IV contrast.    COMPARISON:  CT of the head performed yesterday.    FINDINGS:  There is no acute brain parenchymal finding. Diffusion-weighted images demonstrate no abnormal signal to suggest acute ischemia.  There is no mass lesion, acute hemorrhage or edema.    Ventricles and sulci are normal in size for age without evidence of hydrocephalus. No extra-axial blood or fluid collections.    Normal vascular flow voids are preserved.    There is no acute osseous finding.  Included paranasal sinuses and mastoid air cells are clear.    Impression  1. No acute intracranial abnormality.      Electronically signed by: Carlin Estevez MD  Date:    03/29/2025  Time:    14:56    Results for orders placed during the hospital encounter of 03/28/25    MRI Cervical Spine Without Contrast    Narrative  EXAMINATION:  MRI CERVICAL SPINE WITHOUT CONTRAST    CLINICAL HISTORY:  Demyelinating disease;.    TECHNIQUE:  Multiplanar, multisequence MR images of the cervical spine were acquired without the administration of contrast.    COMPARISON:  Radiographs dated 05/19/2012.    FINDINGS:  Vertebral body heights are  maintained.  There is mild diffuse bone marrow signal reconversion.  There is no bone marrow edema.  No focal bone lesions are identified.  There is straightening of the cervical lordosis with otherwise normal alignment.    The cervical spinal cord has normal morphology and signal intensity.  No intramedullary lesions are identified.    There is slight decrease in the signal of intervertebral discs, which could be secondary to disc desiccation.  Intervertebral disc spaces are maintained.    There is only minimal dorsal disc bulge at C5-C6.  Otherwise no discrete abnormality identified.  No central canal or neural foraminal narrowing is evident.  Paraspinal soft tissues are unremarkable.    Impression  1. Minimal disc disease, with disc desiccation.  There is minimal dorsal disc bulge at C5-C6.  2. Otherwise normal study.  No central canal or neural foraminal narrowing evident.      Electronically signed by: Jesenia Raygoza MD  Date:    03/29/2025  Time:    14:59    Results for orders placed during the hospital encounter of 03/28/25    MRI Thoracic Spine Without Contrast    Narrative  EXAMINATION:  MRI THORACIC SPINE WITHOUT CONTRAST    CLINICAL HISTORY:  Ataxia, nontraumatic, thoracic pathology suspected;    TECHNIQUE:  Multiplanar, multisequence MR images were acquired of the thoracic spine without the administration of contrast.    COMPARISON:  Thoracic spine radiographs 09/22/2020.    FINDINGS:  Vertebral body heights, alignment and signal are within normal limits.  Paravertebral soft tissues are within normal limits.  The thoracic cord is within normal limits.    There is no significant disc or joint disease.  There is no canal or foraminal stenosis.    Impression  1. No acute finding.      Electronically signed by: Carlin Estevez MD  Date:    03/29/2025  Time:    14:57    Results for orders placed during the hospital encounter of 03/28/25    MRI Lumbar Spine Without  "Contrast    Narrative  EXAMINATION:  MRI LUMBAR SPINE WITHOUT CONTRAST    CLINICAL HISTORY:  Demyelinating disease;    TECHNIQUE:  Multiplanar, multisequence MR images were acquired of the lumbar spine without the administration of contrast.    COMPARISON:  No pertinent prior study is available for comparison.    FINDINGS:  Vertebral body heights, alignment and signal are within normal limits.  Paravertebral soft tissues are within normal limits.  The included cord, conus and roots of the cauda equina are within normal limits.    There is no significant disc or joint disease at T12/L1 through L4/5.  There is no canal or foraminal stenosis at these levels.  There is only minimal posterior element hypertrophy.    L5/S1: Broad-based dorsal disc bulging minimally asymmetric to the left at L5/S1 narrows the most inferior margin of the left neural foramen but causes no nerve root contact or impingement.    Impression  1. No significant canal or foraminal stenosis.      Electronically signed by: Carlin Estevez MD  Date:    2025  Time:    15:00      EM limb 2025 - [facility]  "Interpretation:     Abnormal study.  There is electrodiagnostic evidence of diffuse denervation changes in bilateral lower extremities.  The differential diagnosis may include lumbosacral radiculopathy, myositis, diffuse denervating process.  There is no evidence of mononeuropathy, polyneuropathy."    Cardiac:  Results for orders placed or performed during the hospital encounter of 25   EKG, 12 - Lead    Collection Time: 25 10:39 PM   Result Value Ref Range    QRS Duration 78 ms    OHS QTC Calculation 414 ms    Narrative    Test Reason : R53.1,    Vent. Rate :  68 BPM     Atrial Rate : 267 BPM     P-R Int :    ms          QRS Dur :  78 ms      QT Int : 390 ms       P-R-T Axes :     16  31 degrees    QTcB Int : 414 ms    Probably NSR with marked artifact  Abnormal ECG  When compared with ECG of 2025 15:58,  Artifact now  T " wave inversion no longer evident in Anterior leads  Confirmed by Rah Mcgee ([id number]) on 4/21/2025 8:31:14 AM    Referred By: AAAREFERRAL SELF           Confirmed By: Rah Mcgee       Labs:  Lab Results   Component Value Date    WBC 5.17 04/22/2025    HGB 11.8 (L) 04/22/2025    HCT 37.5 04/22/2025     04/22/2025    MCV 87 04/22/2025    RDW 13.0 04/22/2025     Lab Results   Component Value Date     04/22/2025    K 3.5 04/22/2025     (H) 04/22/2025    CO2 23 04/22/2025    BUN 7 04/22/2025    CREATININE 0.7 04/22/2025    GLU 89 04/22/2025    CALCIUM 8.9 04/22/2025    MG 2.1 04/22/2025    PHOS 3.6 04/22/2025     Lab Results   Component Value Date    PROT 5.9 (L) 04/22/2025    ALBUMIN 3.1 (L) 04/22/2025    BILITOT 0.6 04/22/2025    AST 28 04/22/2025    ALKPHOS 91 04/22/2025    ALT 32 04/22/2025     Lab Results   Component Value Date    INR 1.0 04/21/2025    PROTIME 11.1 04/21/2025     Lab Results   Component Value Date    CHOL 156 04/20/2025    HDL 34 (L) 04/20/2025    LDLCALC 80.4 04/20/2025    TRIG 208 (H) 04/20/2025    CHOLHDL 21.8 04/20/2025     Lab Results   Component Value Date    HGBA1C 4.9 04/05/2025      Lab Results   Component Value Date    XECSAPMY33 370 03/28/2025     Lab Results   Component Value Date    FOLATE 8.1 04/02/2025     Lab Results   Component Value Date    TSH 0.294 (L) 04/20/2025     Component      Latest Ref Rng 3/29/2025 4/5/2025   JN Screen      None Detected  None Detected     Treponema Pallidum Antibodies (IgG, IgM)      Nonreactive  Nonreactive     CRP      <=8.2 mg/L  0.7 (H) (E)   Sed Rate      <=36 mm/hr     Vitamin B1      38 - 122 ug/L     Vitamin B12 Assay, S      180 - 914 ng/L     Riboflavin (Vitamin B2), P      1 - 19 mcg/L     Pantothenic Acid (B-5) Bioassay     Selenium, S      110 - 165 mcg/L     Copper      810 - 1990 ug/L     Zinc      60 - 130 ug/dL     Vitamin E      500 - 1800 ug/dL     Magnesium       1.6 - 2.6 mg/dL     Aldolase      1.2 - 7.6 U/L      CPK      20 - 180 U/L     Methylmalonic Acid, QN, S      <=0.40 nmol/mL       Component      Latest Ref Rn 4/20/2025 4/21/2025 4/22/2025   JN Screen      None Detected       Treponema Pallidum Antibodies (IgG, IgM)      Nonreactive       CRP      <=8.2 mg/L 5.7      Sed Rate      <=36 mm/hr 34      Vitamin B1      38 - 122 ug/L 71      Vitamin B12 Assay, S      180 - 914 ng/L 242      Riboflavin (Vitamin B2), P      1 - 19 mcg/L 3      Pantothenic Acid (B-5) Bioassay TNP      Selenium, S      110 - 165 mcg/L  97 (L)     Copper      810 - 1990 ug/L  1157     Zinc      60 - 130 ug/dL  60     Vitamin E      500 - 1800 ug/dL  920     Magnesium       1.6 - 2.6 mg/dL   2.1    Aldolase      1.2 - 7.6 U/L   17.7 (H)    CPK      20 - 180 U/L   54    Methylmalonic Acid, QN, S      <=0.40 nmol/mL 0.09         Component      Latest Ref Rn 3/29/2025 4/5/2025   JN Screen      None Detected  None Detected     Treponema Pallidum Antibodies (IgG, IgM)      Nonreactive  Nonreactive     CRP      <=8.2 mg/L  0.7 (H) (E)   Sed Rate      <=36 mm/hr     Vitamin B1      38 - 122 ug/L     Vitamin B12 Assay, S      180 - 914 ng/L     Riboflavin (Vitamin B2), P      1 - 19 mcg/L     Pantothenic Acid (B-5) Bioassay     Selenium, S      110 - 165 mcg/L     Copper      810 - 1990 ug/L     Zinc      60 - 130 ug/dL     Vitamin E      500 - 1800 ug/dL     Magnesium       1.6 - 2.6 mg/dL     Aldolase      1.2 - 7.6 U/L     CPK      20 - 180 U/L     Methylmalonic Acid, QN, S      <=0.40 nmol/mL       Component      Latest Ref Rn 4/20/2025 4/21/2025 4/22/2025   JN Screen      None Detected       Treponema Pallidum Antibodies (IgG, IgM)      Nonreactive       CRP      <=8.2 mg/L 5.7      Sed Rate      <=36 mm/hr 34      Vitamin B1      38 - 122 ug/L 71      Vitamin B12 Assay, S      180 - 914 ng/L 242      Riboflavin (Vitamin B2), P      1 - 19 mcg/L 3      Pantothenic Acid (B-5) Bioassay TNP      Selenium, S      110 - 165 mcg/L  97 (L)    "  Copper      810 - 1990 ug/L  1157     Zinc      60 - 130 ug/dL  60     Vitamin E      500 - 1800 ug/dL  920     Magnesium       1.6 - 2.6 mg/dL   2.1    Aldolase      1.2 - 7.6 U/L   17.7 (H)    CPK      20 - 180 U/L   54    Methylmalonic Acid, QN, S      <=0.40 nmol/mL 0.09         Component      Latest Ref Rng 1/17/2025 3/6/2025 4/20/2025   Thiamine      38 - 122 ug/L 49  36 (L)     Vitamin B1      38 - 122 ug/L   71       Legend:  (L) Low    ASSESSMENT & PLAN:  Alexandra Martin is a 29 y.o. R-handed female seen in hospital f/u for BLE numbness.     Problem List Items Addressed This Visit          Neuro    Paresthesia    Current Assessment & Plan   Diagnostic testing from recent hospitalizations reviewed with patient   - Non con neuro axial imaging grossly normal. Brain imaging has no FLAIR lesions, completely age appropriate. Minimal disc disease in the C spine and L spine - nothing to explain her symptoms / exam.   - Serological evaluation reviewed   - slightly reduced B1 in 3/2025, otherwise NL.   - B12 levels are borderline low, but MMA is normal.   - Selenium mildly reduced (non specific).    - elevated LFT trends. Normal CK, increased aldolase (?false + given liver enzymes. Normal liver imaging)  - negative JN, RPR  - EMG 4 limb per Dr. Olmstead ([facility] inpt) -- "diffuse denervation in BLE" Reviewed w/Dr. Gutiérrez -- no EMG done on UE. Sharps in BLE non specific. No e/o demyelinating or other peripheral neuropathy.        While she did have mild thiamine deficiency noted in March, her levels truly don't fit the clinical presentation/exam findings. Do not suspect B12 related given normal MMA. Lack of EMG NCS findings to support neuropathy is also hard to rationalize.   With reported pain and LFTs / aldolase -- query myositis, but this wouldn't explain neuropathy presentation.   Exam in clinic today is highly discordant and also doesn't fit diagnostic testing. As noted by several of my " colleagues, this raises concern for functional neurological disorder, but this is a diagnosis of exclusion.   Given that she has had several admissions for these complaints, recommend that the entire workup be completed to definitively rule out a neurologic basis of her symptoms and aid in arranging the appropriate care for this condition (via psychiatry). Do not need to pursue LP given EMG findings. Recommend EMG of an upper extremity to completely evaluate possible myositis. Will also repeat aldolase levels. Will refer to [facility] to complete demyelinating MRIs of the C and T spine under anesthesia due to her claustrophobia. I have placed a referral to psychiatry, as the provider was not able to see her during recent admission and she does have known psychiatric history.                            Endocrine    S/P bariatric surgery - Primary     Other Visit Diagnoses         Abnormal LFTs          Conversion disorder                Follow up:   in 2 months      I spent a total of 70 minutes on the day of the visit.    This includes face to face time with the patient, as well as non-face to face time preparing for and completing the visit (review of prior diagnostic testing and clinical notes, obtaining or reviewing history, documenting clinical information in the EMR, independently interpreting and communicating results to the patient/family and coordinating ongoing care). Case reviewed with Dr. Gutiérrez in clinic today.       I appreciate the opportunity to participate in the care of this patient. Please feel free to contact me with any concerns or questions.       Le Nicolas, [facility]  [facility]  [address] [place] [address]         [1]   Current Outpatient Medications   Medication Sig Dispense Refill    acetaminophen (TYLENOL) 325 MG tablet Take 2 tablets (650 mg total) by mouth every 4 (four) hours as needed for Pain.      calcium-vitamin D tablet 600 mg-200 units Take 1  tablet by mouth 2 (two) times daily.      ergocalciferol (VITAMIN D2) 50,000 unit Cap Take 1 capsule (50,000 Units total) by mouth every 7 days.      etonogestreL (NEXPLANON) 68 mg Impl subdermal device 68 mg by Subdermal route once. A single NEXPLANON implant is inserted subdermally just under the skin at the inner side of the non-dominant upper arm.      folic acid-vit B6-vit B12 2.5-25-2 mg (FOLBIC OR EQUIV) 2.5-25-2 mg Tab Take 1 tablet by mouth once daily.      lubiprostone (AMITIZA) 24 MCG Cap Take 1 capsule (24 mcg total) by mouth 2 (two) times daily. 60 capsule 2    multivit-min/iron/folic acid/K (BARIATRIC MULTIVITAMINS ORAL) Take 1 tablet by mouth 2 (two) times a day.      pantoprazole (PROTONIX) 40 MG tablet Take 40 mg by mouth 2 (two) times daily.      rizatriptan (MAXALT) 10 MG tablet Take 1 tablet (10 mg total) by mouth as needed for Migraine. 14 tablet 2    venlafaxine (EFFEXOR-XR) 37.5 MG 24 hr capsule Take 37.5 mg by mouth once daily.      LIDOcaine (LIDODERM) 5 % Place 1 patch onto the skin once daily. Remove & Discard patch within 12 hours or as directed by MD 30 patch 0    thiamine 100 MG tablet Take 1 tablet (100 mg total) by mouth once daily.       Current Facility-Administered Medications   Medication Dose Route Frequency Provider Last Rate Last Admin    cyanocobalamin injection 1,000 mcg  1,000 mcg Intramuscular Q7 Days    1,000 mcg at 05/13/25 1032

## 2025-05-13 NOTE — PATIENT INSTRUCTIONS
Gabapentin is for painful numbness and tingling. It can sometimes help with pains coming from the neck or back that radiate into the limbs. It won't help pure numbness. If this is not helping your symptoms, you can discontinue it.     I will try to prescribe a lidocaine patch to help with your back pain until you can get in to see pain mgmt.       Plan:  I will review EMG with our neuromuscular doctor to determine if we need to repeat it   We may need to proceed with getting the contrasted MRIs done under anesthesia to rule out demyelinating causes of your symptoms --- this is only available at the main campus.   Start taking the 100 mg of thiamine (B1) daily as maintenance. Continue your other vitamins.     Please call our clinic at [phone] or send a message on the [facility] portal if there are any changes to the plan discussed today. For example, if you are not contacted for the requested tests, referral(s) within one week, if you are unable to receive the medications prescribed, or if you feel you need to change the treatment course for any reason.

## 2025-05-13 NOTE — Clinical Note
EMG of an upper extremity in neurology Labs as ordered Referral to psychiatry  MRI C and T spine demyelinating --- will need anesthesia / schedule at [facility]

## 2025-05-13 NOTE — PROGRESS NOTES
After obtaining consent, and per orders of Dr. Andrade, injection of B12 given by Bibi Combs. Patient instructed to remain in clinic for 20 minutes afterwards, and to report any adverse reaction to me immediately.

## 2025-05-14 ENCOUNTER — TELEPHONE (OUTPATIENT)
Dept: NEUROLOGY | Facility: CLINIC | Age: 30
End: 2025-05-14
Payer: MEDICARE

## 2025-05-14 PROBLEM — R29.90 NEUROLOGICAL COMPLAINT: Status: RESOLVED | Noted: 2025-02-07 | Resolved: 2025-05-14

## 2025-05-14 PROBLEM — G43.909 MIGRAINE WITHOUT STATUS MIGRAINOSUS, NOT INTRACTABLE: Status: RESOLVED | Noted: 2023-11-15 | Resolved: 2025-05-14

## 2025-05-14 PROBLEM — R07.2 SUBSTERNAL CHEST PAIN: Status: RESOLVED | Noted: 2025-02-14 | Resolved: 2025-05-14

## 2025-05-14 PROBLEM — F41.9 ANXIETY: Status: RESOLVED | Noted: 2025-02-07 | Resolved: 2025-05-14

## 2025-05-14 PROBLEM — R53.1 GENERALIZED WEAKNESS: Status: RESOLVED | Noted: 2025-04-21 | Resolved: 2025-05-14

## 2025-05-14 NOTE — ASSESSMENT & PLAN NOTE
"Diagnostic testing from recent hospitalizations reviewed with patient   - Non con neuro axial imaging grossly normal. Brain imaging has no FLAIR lesions, completely age appropriate. Minimal disc disease in the C spine and L spine - nothing to explain her symptoms / exam.   - Serological evaluation reviewed   - slightly reduced B1 in 3/2025, otherwise NL.   - B12 levels are borderline low, but MMA is normal.   - Selenium mildly reduced (non specific).    - elevated LFT trends. Normal CK, increased aldolase (?false + given liver enzymes. Normal liver imaging)  - negative JN, RPR  - EMG 4 limb per Dr. Olmstead ([facility] neurology inpt) -- "diffuse denervation in BLE" Reviewed w/Dr. Gutiérrez -- no EMG done on UE. Sharps in BLE non specific. No e/o demyelinating or other peripheral neuropathy.        While she did have mild thiamine deficiency noted in March, her levels truly don't fit the clinical presentation/exam findings. Do not suspect B12 related given normal MMA. Lack of EMG NCS findings to support neuropathy is also hard to rationalize.   With reported pain and LFTs / aldolase -- query myositis, but this wouldn't explain neuropathy presentation.   Exam in clinic today is highly discordant and also doesn't fit diagnostic testing. As noted by several of my colleagues, this raises concern for functional neurological disorder, but this is a diagnosis of exclusion.   Given that she has had several admissions for these complaints, recommend that the entire workup be completed to definitively rule out a neurologic basis of her symptoms and aid in arranging the appropriate care for this condition (via psychiatry). Do not need to pursue LP given EMG findings. Recommend EMG of an upper extremity to completely evaluate possible myositis. Will also repeat aldolase levels. Will refer to [facility] to complete demyelinating MRIs of the C and T spine under anesthesia due to her claustrophobia. I have placed a referral to psychiatry, as " the provider was not able to see her during recent admission and she does have known psychiatric history.

## 2025-05-15 ENCOUNTER — OFFICE VISIT (OUTPATIENT)
Dept: FAMILY MEDICINE | Facility: CLINIC | Age: 30
End: 2025-05-15
Payer: MEDICARE

## 2025-05-15 ENCOUNTER — TELEPHONE (OUTPATIENT)
Dept: NEUROLOGY | Facility: CLINIC | Age: 30
End: 2025-05-15
Payer: MEDICARE

## 2025-05-15 ENCOUNTER — PATIENT MESSAGE (OUTPATIENT)
Dept: FAMILY MEDICINE | Facility: CLINIC | Age: 30
End: 2025-05-15

## 2025-05-15 VITALS
OXYGEN SATURATION: 98 % | TEMPERATURE: 98 F | SYSTOLIC BLOOD PRESSURE: 120 MMHG | WEIGHT: 293 LBS | DIASTOLIC BLOOD PRESSURE: 92 MMHG | HEART RATE: 82 BPM | HEIGHT: 63 IN | BODY MASS INDEX: 51.91 KG/M2

## 2025-05-15 DIAGNOSIS — M79.601 PAIN IN BOTH UPPER EXTREMITIES: ICD-10-CM

## 2025-05-15 DIAGNOSIS — F11.90 OPIOID USE DISORDER: ICD-10-CM

## 2025-05-15 DIAGNOSIS — R20.2 PARESTHESIA: Primary | ICD-10-CM

## 2025-05-15 DIAGNOSIS — M54.6 CHRONIC BILATERAL THORACIC BACK PAIN: Primary | ICD-10-CM

## 2025-05-15 DIAGNOSIS — G89.29 CHRONIC BILATERAL THORACIC BACK PAIN: Primary | ICD-10-CM

## 2025-05-15 DIAGNOSIS — M79.602 PAIN IN BOTH UPPER EXTREMITIES: ICD-10-CM

## 2025-05-15 DIAGNOSIS — R53.1 GENERALIZED WEAKNESS: ICD-10-CM

## 2025-05-15 DIAGNOSIS — R79.89 LOW VITAMIN B12 LEVEL: ICD-10-CM

## 2025-05-15 DIAGNOSIS — M79.605 LEFT LEG PAIN: ICD-10-CM

## 2025-05-15 DIAGNOSIS — R20.2 PARESTHESIA: ICD-10-CM

## 2025-05-15 DIAGNOSIS — F41.1 GAD (GENERALIZED ANXIETY DISORDER): ICD-10-CM

## 2025-05-15 DIAGNOSIS — Z73.6 LIMITATION OF ACTIVITY DUE TO DISABILITY: ICD-10-CM

## 2025-05-15 PROCEDURE — 1160F RVW MEDS BY RX/DR IN RCRD: CPT | Mod: CPTII,S$GLB,, | Performed by: STUDENT IN AN ORGANIZED HEALTH CARE EDUCATION/TRAINING PROGRAM

## 2025-05-15 PROCEDURE — 1159F MED LIST DOCD IN RCRD: CPT | Mod: CPTII,S$GLB,, | Performed by: STUDENT IN AN ORGANIZED HEALTH CARE EDUCATION/TRAINING PROGRAM

## 2025-05-15 PROCEDURE — 3008F BODY MASS INDEX DOCD: CPT | Mod: CPTII,S$GLB,, | Performed by: STUDENT IN AN ORGANIZED HEALTH CARE EDUCATION/TRAINING PROGRAM

## 2025-05-15 PROCEDURE — 1111F DSCHRG MED/CURRENT MED MERGE: CPT | Mod: CPTII,S$GLB,, | Performed by: STUDENT IN AN ORGANIZED HEALTH CARE EDUCATION/TRAINING PROGRAM

## 2025-05-15 PROCEDURE — 3080F DIAST BP >= 90 MM HG: CPT | Mod: CPTII,S$GLB,, | Performed by: STUDENT IN AN ORGANIZED HEALTH CARE EDUCATION/TRAINING PROGRAM

## 2025-05-15 PROCEDURE — 99214 OFFICE O/P EST MOD 30 MIN: CPT | Mod: S$GLB,,, | Performed by: STUDENT IN AN ORGANIZED HEALTH CARE EDUCATION/TRAINING PROGRAM

## 2025-05-15 PROCEDURE — 3044F HG A1C LEVEL LT 7.0%: CPT | Mod: CPTII,S$GLB,, | Performed by: STUDENT IN AN ORGANIZED HEALTH CARE EDUCATION/TRAINING PROGRAM

## 2025-05-15 PROCEDURE — 99999 PR PBB SHADOW E&M-EST. PATIENT-LVL IV: CPT | Mod: PBBFAC,,, | Performed by: STUDENT IN AN ORGANIZED HEALTH CARE EDUCATION/TRAINING PROGRAM

## 2025-05-15 PROCEDURE — 3074F SYST BP LT 130 MM HG: CPT | Mod: CPTII,S$GLB,, | Performed by: STUDENT IN AN ORGANIZED HEALTH CARE EDUCATION/TRAINING PROGRAM

## 2025-05-15 NOTE — TELEPHONE ENCOUNTER
----- Message from Gm sent at 5/15/2025 11:33 AM CDT -----  Type:  Patient Returning CallWho Called:ptWho Left Message for Patient:Does the patient know what this is regarding?:mri sedationWould the patient rather a call back or a response via [facility]? callBest Call Back Number: [phone] Information: pt states she would like a call back from office to discuss getting her MRI with sedation scheduled. Thank you

## 2025-05-15 NOTE — TELEPHONE ENCOUNTER
Scheduled at [facility] for MRI under sedation for 8/23/25 spoke with patient. EMG date gave to patient as well. Verbalized understanding

## 2025-05-15 NOTE — TELEPHONE ENCOUNTER
Spoke with MRI and anesthesia at main campus. Shreya Tamayo will place the case request for the MRI under sedation first available Sept 23 rd.

## 2025-05-15 NOTE — TELEPHONE ENCOUNTER
----- Message from Le Nicolas NP sent at 5/14/2025  3:39 PM CDT -----  EMG of an upper extremity in neurology  Labs as ordered  Referral to psychiatry   MRI C and T spine demyelinating --- will need anesthesia / schedule at [facility]

## 2025-05-16 ENCOUNTER — TELEPHONE (OUTPATIENT)
Dept: FAMILY MEDICINE | Facility: CLINIC | Age: 30
End: 2025-05-16
Payer: MEDICARE

## 2025-05-16 DIAGNOSIS — R20.2 PARESTHESIA: Primary | ICD-10-CM

## 2025-05-16 RX ORDER — PREGABALIN 25 MG/1
25 CAPSULE ORAL 3 TIMES DAILY
Qty: 90 CAPSULE | Refills: 1 | Status: SHIPPED | OUTPATIENT
Start: 2025-05-16 | End: 2025-11-14

## 2025-05-16 NOTE — PROGRESS NOTES
Subjective:       Patient ID: Alexandra Martin is a 29 y.o. female.    Chief Complaint: Follow-up (Patient is here today with pain on her left thigh, bilateral arms, and mid back area. Meds taken in the last 24 hours was Tynelol Extra strength around 9 pm.)    Follow-up      29 year old female presents for follow up with request for letter and chief complaint pain. She has recently seen neurology and stopped gabapentin admitting no relief from pain. She continues home health with nursing and PT and admits no improvement in pain with PT though it does help with mobility. She anticipates further work up for weakness, paresthesia with Neurology including MRI imaging with demyelination protocol and EMG to rule out MS; she was also referred to psychiatry for conversion disorder; vitamin deficiencies are not thought to be contributing to her symptoms. She is status post vitamin B12 injection x 2 with no noted benefit, yet. We may discontinue these. She has continued to follow with counseling social work since February. She asks for letter stating inability to participate with drug court in [place] at [address]. I will ask the nurse for help and we will send her a letter through the portal. She becomes tearful and expresses frustration that she does not want to be viewed as drug seeking and is tired of feeling like this - pain is constant and 9 out of 10. She was told Lyrica will likely be unhelpful if gabapentin was not. She asks me if I am uncomfortable prescribing her opioids. I let her know that I first want to do no harm. I do not view her as a 'drug seeker,' but I think an opioid prescription is not ideal and may make her worse. We discuss Cymbalta but as she is on Effexor XR and has been for a long time, Cymbalta is not ideal. She has tried application of heat and PT at home. She has tried lidocaine patches. Due to recent bariatric surgery, NSAIDs are contraindicated. I have reached out  to Addiction Medicine since she has not heard from them. I will update her tomorrow with more information. I thank her for the opportunity to help take care of her. She will continue to follow up with Bariatrics, planned colonoscopy, and will establish with Rheumatology in September. I see she has been referred by GI to Colorectal surgery for rectal pain and bleeding as well.    Past Medical History:   Diagnosis Date    ADD (attention deficit disorder)     Anxiety disorder     Biliary dyskinesia 11/02/2024    Bronchitis 12/03/2024    Depression     Drug-induced constipation 03/04/2024    GERD (gastroesophageal reflux disease)     Headache     MDD (major depressive disorder), recurrent episode, moderate 07/29/2021    Morbid obesity with BMI of 60.0-69.9, adult 06/10/2024    Nephrolithiasis     Ovarian cyst     Substance abuse     Hospitalization     Trauma 09/11/2019    Tympanic membrane perforation, left 03/14/2018    UTI (urinary tract infection) 08/03/2018    Vertigo        Past Surgical History:   Procedure Laterality Date    ANGIOGRAM, CORONARY, WITH LEFT HEART CATHETERIZATION  02/10/2025    Procedure: Left Heart Cath;  Surgeon: Garcia De Luna MD;  Location: [facility] CATH;  Service: Cardiovascular;;    CHOLECYSTECTOMY      CYSTOSCOPY W/ URETERAL STENT PLACEMENT Right 12/26/2019    Procedure: CYSTOSCOPY, WITH URETERAL STENT INSERTION;  Surgeon: Rito Medley MD;  Location: [facility] OR;  Service: Urology;  Laterality: Right;    CYSTOSCOPY W/ URETERAL STENT REMOVAL Right 01/06/2020    Procedure: CYSTOSCOPY, WITH URETERAL STENT REMOVAL;  Surgeon: Rito Medley MD;  Location: [facility] OR;  Service: Urology;  Laterality: Right;    ESOPHAGOGASTRODUODENOSCOPY N/A 03/11/2025    Procedure: EGD (ESOPHAGOGASTRODUODENOSCOPY);  Surgeon: Vernon Pike DO;  Location: [facility] ENDO;  Service: Endoscopy;  Laterality: N/A;    INJECTION OF ANESTHETIC AGENT INTO TISSUE PLANE DEFINED BY TRANSVERSUS ABDOMINIS MUSCLE   01/06/2025    Procedure: BLOCK, TRANSVERSUS ABDOMINIS PLANE;  Surgeon: Bibi Edmondson MD;  Location: [facility] OR 2ND FLR;  Service: General;;    LAPAROSCOPIC CHOLECYSTECTOMY  11/05/2024    LAPAROSCOPIC GASTROENTEROSTOMY N/A 01/06/2025    Procedure: GASTROENTEROSTOMY, LAPAROSCOPIC with inraop EGD;  Surgeon: Bibi Edmondson MD;  Location: [facility] OR 2ND FLR;  Service: General;  Laterality: N/A;  Surgeon to perform Tap Block itraoperatively    LASER LITHOTRIPSY Right 01/06/2020    Procedure: LITHOTRIPSY, USING LASER;  Surgeon: Rito Medley MD;  [facility];  Service: Urology;  Laterality: Right;    MAGNETIC RESONANCE IMAGING N/A 03/29/2025    Procedure: MRI (MAGNETIC RESONANCE IMAGING);  Surgeon: Dory Kirk;  [facility]: [facility];  Service: Anesthesiology;  Laterality: N/A;    STOMACH SURGERY  01/06/2025    TONSILLECTOMY, ADENOIDECTOMY, BILATERAL MYRINGOTOMY AND TUBES      UPPER GASTROINTESTINAL ENDOSCOPY      URETEROSCOPIC REMOVAL OF URETERIC CALCULUS Right 01/06/2020    Procedure: REMOVAL, CALCULUS, URETER, URETEROSCOPIC;  Surgeon: Rito Medley MD;  Location: [facility] OR;  Service: Urology;  Laterality: Right;    URETEROSCOPY Left 12/26/2019    Procedure: URETEROSCOPY;  Surgeon: Rito Medley MD;  Location: [facility] OR;  Service: Urology;  Laterality: Left;    URETEROSCOPY Right 01/06/2020    Procedure: URETEROSCOPY;  Surgeon: Rito Medley MD;  Location: [facility] OR;  Service: Urology;  Laterality: Right;       Review of patient's allergies indicates:   Allergen Reactions    Ceftriaxone Nausea And Vomiting    Haldol [haloperidol lactate] Swelling    Azithromycin Nausea And Vomiting     Hard to breathe     Latex, natural rubber Swelling and Rash       Social History[1]    Medications Ordered Prior to Encounter[2]    Family History   Problem Relation Name Age of Onset    Obesity Mother      Stomach cancer Father      Ulcerative colitis Brother      Breast cancer Maternal Aunt       "Colon cancer Neg Hx      Miscarriages / Stillbirths Neg Hx      Ovarian cancer Neg Hx      Crohn's disease Neg Hx      Esophageal cancer Neg Hx         Review of Systems      Objective:      BP (!) 120/92 (BP Location: Left forearm, Patient Position: Sitting)   Pulse 82   Temp 98.1 °F (36.7 °C) (Oral)   Ht 5' 3" (1.6 m) Comment: per patient  Wt (!) 138.1 kg (304 lb 7.3 oz)   LMP 04/13/2025   SpO2 98%   BMI 53.93 kg/m²   Physical Exam  Constitutional:       General: She is not in acute distress.     Appearance: She is not ill-appearing, toxic-appearing or diaphoretic.   Neurological:      Mental Status: She is alert. Mental status is at baseline.      Comments: Ambulates with rolling walker   Psychiatric:         Mood and Affect: Mood normal.         Behavior: Behavior normal.      Comments: Tearful and then regains her composure         Assessment:       1. Chronic bilateral thoracic back pain    2. Pain in both upper extremities    3. Left leg pain    4. Opioid use disorder    5. Paresthesia    6. Generalized weakness    7. Low vitamin B12 level    8. TRACE (generalized anxiety disorder)    9. Limitation of activity due to disability        Plan:       Chronic bilateral thoracic back pain  Pain in both upper extremities  Left leg pain  - Unrelieved by gabapentin (since stopped), extra strength tylenol, lidocaine pain patches (stopped), application of heat, home PT.  - Considered Cymbalta but same class as long time Effexor XR.  - History of opioid use disorder.  - History of bariatric surgery, NSAIDs contraindicated.  - Referred to Addiction Medicine previously. Reached out to Dr. Pyle. Will update the patient with more information tomorrow.    Opioid use disorder  - Per request, sent letter through the portal stating patient receiving home health with nursing and PT which may limit her ability to participate in [address] in [place].  - Received opioids during recent hospitalization " (documentation reviewed) and previously wrote letter for drug court saying so.  - Referred to Addiction Medicine.    Paresthesia  - Work up has been negative so far. Neurology states symptoms unlikely related to vitamin deficiencies. Recommends complete the work up with MRI with demyelination protocol and EMG. Also referred to psychiatry for possible conversion disorder.    Generalized weakness  - Continuing home health with PT and nursing set up after recent hospital and then outpatient rehab discharge.    Low vitamin B12 level  - S/P injections x 2 with no reported improvement in symptoms thus far. May consider discontinuing these. If so likely will need bariatric vitamins.    TRACE (generalized anxiety disorder)  - Stable on long time Effexor XR; does not wish to make a change.  - Follows with  since February.  - Established with [facility] psychiatry. Documents not available for review.    Limitation of activity due to disability  - Continue home health with nursing and PT. Ambulates with rolling walker.      Continue to follow with bariatrics, neurology.  I will update her with more information tomorrow.         [1]   Social History  Socioeconomic History    Marital status: Single   Tobacco Use    Smoking status: Some Days     Current packs/day: 0.25     Average packs/day: 0.3 packs/day for 15.4 years (3.8 ttl pk-yrs)     Types: Cigarettes     Start date: 2010    Smokeless tobacco: Never    Tobacco comments:     Quit smoking June 2024    Substance and Sexual Activity    Alcohol use: Not Currently     Alcohol/week: 0.0 standard drinks of alcohol    Drug use: Not Currently     Types: Marijuana, Fentanyl     Comment: history of opoid abuse    Sexual activity: Not Currently     Partners: Male     Birth control/protection: Implant     Social Drivers of Health     Financial Resource Strain: Medium Risk (4/22/2025)    Overall Financial Resource Strain (CARDIA)     Difficulty of Paying Living Expenses:  Somewhat hard   Food Insecurity: Patient Unable To Answer (4/22/2025)    Hunger Vital Sign     Worried About Running Out of Food in the Last Year: Patient unable to answer     Ran Out of Food in the Last Year: Patient unable to answer   Transportation Needs: No Transportation Needs (4/21/2025)    PRAPARE - Transportation     Lack of Transportation (Medical): No     Lack of Transportation (Non-Medical): No   Physical Activity: Inactive (4/21/2025)    Exercise Vital Sign     Days of Exercise per Week: 0 days     Minutes of Exercise per Session: 0 min   Stress: Patient Declined (4/22/2025)    [employer] - Occupational Stress Questionnaire     Feeling of Stress : Patient declined   Housing Stability: Low Risk  (4/22/2025)    Housing Stability Vital Sign     Unable to Pay for Housing in the Last Year: No     Number of Times Moved in the Last Year: 1     Homeless in the Last Year: No   [2]   Current Outpatient Medications on File Prior to Visit   Medication Sig Dispense Refill    acetaminophen (TYLENOL) 325 MG tablet Take 2 tablets (650 mg total) by mouth every 4 (four) hours as needed for Pain.      calcium-vitamin D tablet 600 mg-200 units Take 1 tablet by mouth 2 (two) times daily.      ergocalciferol (VITAMIN D2) 50,000 unit Cap Take 1 capsule (50,000 Units total) by mouth every 7 days.      etonogestreL (NEXPLANON) 68 mg Impl subdermal device 68 mg by Subdermal route once. A single NEXPLANON implant is inserted subdermally just under the skin at the inner side of the non-dominant upper arm.      folic acid-vit B6-vit B12 2.5-25-2 mg (FOLBIC OR EQUIV) 2.5-25-2 mg Tab Take 1 tablet by mouth once daily.      LIDOcaine (LIDODERM) 5 % Place 1 patch onto the skin once daily. Remove & Discard patch within 12 hours or as directed by MD 30 patch 0    lubiprostone (AMITIZA) 24 MCG Cap Take 1 capsule (24 mcg total) by mouth 2 (two) times daily. 60 capsule 2    multivit-min/iron/folic acid/K  (BARIATRIC MULTIVITAMINS ORAL) Take 1 tablet by mouth 2 (two) times a day.      pantoprazole (PROTONIX) 40 MG tablet Take 40 mg by mouth 2 (two) times daily.      rizatriptan (MAXALT) 10 MG tablet Take 1 tablet (10 mg total) by mouth as needed for Migraine. 14 tablet 2    thiamine 100 MG tablet Take 1 tablet (100 mg total) by mouth once daily.      venlafaxine (EFFEXOR-XR) 37.5 MG 24 hr capsule Take 37.5 mg by mouth once daily.       Current Facility-Administered Medications on File Prior to Visit   Medication Dose Route Frequency Provider Last Rate Last Admin    cyanocobalamin injection 1,000 mcg  1,000 mcg Intramuscular Q7 Days    1,000 mcg at 05/13/25 3399

## 2025-05-16 NOTE — TELEPHONE ENCOUNTER
Called patient who confirmed date of birth. She has tried higher dose of Effexor XR and had nausea and vomiting. She is hesitant to switch to another medicine in the same class for pain (eg. Cymbalta). She is willing to try lyrica - start low and slow with evening dose. Watch for dizziness, feeling tired. May have some initial benefit but can adjust dose with more benefit over time. Can discuss request for handicap parking sticker on follow up. Grateful for the call. No further questions.    Anticipates appointment with [facility] psychiatry June 3rd.  Anticipates appointment with outside pain management 5/28/25.  Continue to follow with counselor, Amaris Laura LCSW as scheduled 5/21/25.  Follow up with me as scheduled 6/2/25.

## 2025-05-21 ENCOUNTER — OFFICE VISIT (OUTPATIENT)
Dept: BARIATRICS | Facility: CLINIC | Age: 30
End: 2025-05-21
Payer: MEDICARE

## 2025-05-21 DIAGNOSIS — F33.1 MAJOR DEPRESSIVE DISORDER, RECURRENT EPISODE, MODERATE: ICD-10-CM

## 2025-05-21 DIAGNOSIS — F11.11 HISTORY OF OPIOID ABUSE: ICD-10-CM

## 2025-05-21 DIAGNOSIS — E66.01 MORBID OBESITY WITH BMI OF 50.0-59.9, ADULT: Primary | ICD-10-CM

## 2025-05-28 NOTE — TELEPHONE ENCOUNTER
Spoke w Pt - Let her know I was going to put the copy of her new letter she requested from Dr. Andrade in the front office for pick-up. Pt BAR.

## 2025-05-29 ENCOUNTER — TELEPHONE (OUTPATIENT)
Dept: FAMILY MEDICINE | Facility: CLINIC | Age: 30
End: 2025-05-29
Payer: MEDICARE

## 2025-05-29 NOTE — TELEPHONE ENCOUNTER
Spoke w Pt - Pt's asking about the B12 injections & if she needs them still. Told Pt I would forward her question and someone would get back to her. Pt BAR.

## 2025-06-02 ENCOUNTER — OFFICE VISIT (OUTPATIENT)
Dept: FAMILY MEDICINE | Facility: CLINIC | Age: 30
End: 2025-06-02
Payer: MEDICARE

## 2025-06-02 ENCOUNTER — RESULTS FOLLOW-UP (OUTPATIENT)
Dept: FAMILY MEDICINE | Facility: CLINIC | Age: 30
End: 2025-06-02

## 2025-06-02 VITALS
DIASTOLIC BLOOD PRESSURE: 82 MMHG | OXYGEN SATURATION: 98 % | SYSTOLIC BLOOD PRESSURE: 120 MMHG | HEIGHT: 63 IN | BODY MASS INDEX: 51.91 KG/M2 | WEIGHT: 293 LBS | HEART RATE: 76 BPM

## 2025-06-02 DIAGNOSIS — G89.29 CHRONIC BILATERAL THORACIC BACK PAIN: ICD-10-CM

## 2025-06-02 DIAGNOSIS — R20.2 PARESTHESIA: ICD-10-CM

## 2025-06-02 DIAGNOSIS — M79.602 PAIN IN BOTH UPPER EXTREMITIES: ICD-10-CM

## 2025-06-02 DIAGNOSIS — M79.601 PAIN IN BOTH UPPER EXTREMITIES: ICD-10-CM

## 2025-06-02 DIAGNOSIS — R09.89 TICKLE IN THROAT: ICD-10-CM

## 2025-06-02 DIAGNOSIS — F17.200 CURRENT SMOKER: Primary | ICD-10-CM

## 2025-06-02 DIAGNOSIS — F41.1 GAD (GENERALIZED ANXIETY DISORDER): ICD-10-CM

## 2025-06-02 DIAGNOSIS — M79.605 LEFT LEG PAIN: ICD-10-CM

## 2025-06-02 DIAGNOSIS — F11.90 OPIOID USE DISORDER: ICD-10-CM

## 2025-06-02 DIAGNOSIS — Z73.6 LIMITATION OF ACTIVITY DUE TO DISABILITY: ICD-10-CM

## 2025-06-02 DIAGNOSIS — M54.6 CHRONIC BILATERAL THORACIC BACK PAIN: ICD-10-CM

## 2025-06-02 DIAGNOSIS — R53.1 GENERALIZED WEAKNESS: ICD-10-CM

## 2025-06-02 PROCEDURE — 99999 PR PBB SHADOW E&M-EST. PATIENT-LVL V: CPT | Mod: PBBFAC,,, | Performed by: STUDENT IN AN ORGANIZED HEALTH CARE EDUCATION/TRAINING PROGRAM

## 2025-06-02 PROCEDURE — 1159F MED LIST DOCD IN RCRD: CPT | Mod: CPTII,S$GLB,, | Performed by: STUDENT IN AN ORGANIZED HEALTH CARE EDUCATION/TRAINING PROGRAM

## 2025-06-02 PROCEDURE — 3008F BODY MASS INDEX DOCD: CPT | Mod: CPTII,S$GLB,, | Performed by: STUDENT IN AN ORGANIZED HEALTH CARE EDUCATION/TRAINING PROGRAM

## 2025-06-02 PROCEDURE — 3079F DIAST BP 80-89 MM HG: CPT | Mod: CPTII,S$GLB,, | Performed by: STUDENT IN AN ORGANIZED HEALTH CARE EDUCATION/TRAINING PROGRAM

## 2025-06-02 PROCEDURE — 3044F HG A1C LEVEL LT 7.0%: CPT | Mod: CPTII,S$GLB,, | Performed by: STUDENT IN AN ORGANIZED HEALTH CARE EDUCATION/TRAINING PROGRAM

## 2025-06-02 PROCEDURE — 99214 OFFICE O/P EST MOD 30 MIN: CPT | Mod: S$GLB,,, | Performed by: STUDENT IN AN ORGANIZED HEALTH CARE EDUCATION/TRAINING PROGRAM

## 2025-06-02 PROCEDURE — 3074F SYST BP LT 130 MM HG: CPT | Mod: CPTII,S$GLB,, | Performed by: STUDENT IN AN ORGANIZED HEALTH CARE EDUCATION/TRAINING PROGRAM

## 2025-06-02 PROCEDURE — 1160F RVW MEDS BY RX/DR IN RCRD: CPT | Mod: CPTII,S$GLB,, | Performed by: STUDENT IN AN ORGANIZED HEALTH CARE EDUCATION/TRAINING PROGRAM

## 2025-06-02 RX ORDER — SELENIUM 50 MCG
50 TABLET ORAL DAILY
Refills: 0 | Status: CANCELLED | OUTPATIENT
Start: 2025-06-02

## 2025-06-02 RX ORDER — PREGABALIN 75 MG/1
75 CAPSULE ORAL 3 TIMES DAILY
COMMUNITY
Start: 2025-05-29

## 2025-06-02 RX ORDER — OXYCODONE AND ACETAMINOPHEN 7.5; 325 MG/1; MG/1
1 TABLET ORAL 2 TIMES DAILY
COMMUNITY
Start: 2025-05-28

## 2025-06-03 ENCOUNTER — PATIENT MESSAGE (OUTPATIENT)
Dept: BARIATRICS | Facility: CLINIC | Age: 30
End: 2025-06-03
Payer: MEDICARE

## 2025-06-04 ENCOUNTER — DOCUMENTATION ONLY (OUTPATIENT)
Dept: BARIATRICS | Facility: CLINIC | Age: 30
End: 2025-06-04
Payer: MEDICARE

## 2025-06-05 ENCOUNTER — OFFICE VISIT (OUTPATIENT)
Dept: BARIATRICS | Facility: CLINIC | Age: 30
End: 2025-06-05
Payer: MEDICARE

## 2025-06-05 DIAGNOSIS — E66.01 MORBID OBESITY WITH BMI OF 50.0-59.9, ADULT: Primary | ICD-10-CM

## 2025-06-05 DIAGNOSIS — F11.11 HISTORY OF OPIOID ABUSE: ICD-10-CM

## 2025-06-05 DIAGNOSIS — F33.1 MAJOR DEPRESSIVE DISORDER, RECURRENT EPISODE, MODERATE: ICD-10-CM

## 2025-06-08 ENCOUNTER — PATIENT MESSAGE (OUTPATIENT)
Dept: FAMILY MEDICINE | Facility: CLINIC | Age: 30
End: 2025-06-08
Payer: MEDICARE

## 2025-06-08 PROBLEM — M79.605 LEFT LEG PAIN: Status: ACTIVE | Noted: 2025-06-08

## 2025-06-08 PROBLEM — M79.602 PAIN IN BOTH UPPER EXTREMITIES: Status: ACTIVE | Noted: 2025-06-08

## 2025-06-08 PROBLEM — R09.89 TICKLE IN THROAT: Status: ACTIVE | Noted: 2025-06-08

## 2025-06-08 PROBLEM — M79.601 PAIN IN BOTH UPPER EXTREMITIES: Status: ACTIVE | Noted: 2025-06-08

## 2025-06-08 PROBLEM — M54.6 CHRONIC BILATERAL THORACIC BACK PAIN: Status: ACTIVE | Noted: 2025-06-08

## 2025-06-08 PROBLEM — G89.29 CHRONIC BILATERAL THORACIC BACK PAIN: Status: ACTIVE | Noted: 2025-06-08

## 2025-06-08 PROBLEM — F17.200 CURRENT SMOKER: Status: ACTIVE | Noted: 2025-06-08

## 2025-06-08 NOTE — PROGRESS NOTES
Subjective:       Patient ID: Alexandra Martin is a 29 y.o. female.    Chief Complaint: Follow-up (Pain ) and Back Pain (Chronic bilateral thoracic back pain)    Follow-up    Back Pain      29 year old female presents for follow up. She has established with outside pain management and is started on Percocet 7.5 mg twice daily - pain is stable and they have pain contract with urine drug testing and monitoring; lyrica was increased to 75 mg. They have ordered MRI to be completed sooner than what she has scheduled already through Neurology at [facility] with suspicion for MS. She says they will make sure she has a CD of imaging that she can share with Neurology. She has EMG scheduled and will likely need to schedule follow up with Neurology after completing this and imaging. There is new symptom of throat tickle - audible on exam intermittently today. Tic versus esophageal spasm? She requests parking decal which is completed and given to her. Results came back that were likely from prior hospitalization - I will reach out to Neurology and Bariatric as selenium is low with narrow therapeutic index; also aldolase is elevated which is nonspecific finding - will ask about next steps and update the patient. She is taking bariatric medicines. We discuss that she did not notice any benefit with two injections of vitamin B12 so we will discontinue these. She is going to see new behavioral health provider at [facility]. She is finished with PT and continues home skilled nursing visits. She continues home exercise regimen as recommended by PT on her own. Nausea and vomiting is resolved with good results on amitiza, protonix.    Plan:  May try selenium 50 mcg daily and recheck in one month as well as increase meat and seafood as good sources of selenium.  Continue to follow with home health, bariatrics, neurology, psychiatry, outside pain management.  Return to clinic in three months or sooner if needed.    Past Medical  History:   Diagnosis Date    ADD (attention deficit disorder)     Anxiety disorder     Biliary dyskinesia 11/02/2024    Bronchitis 12/03/2024    Depression     Drug-induced constipation 03/04/2024    GERD (gastroesophageal reflux disease)     Headache     MDD (major depressive disorder), recurrent episode, moderate 07/29/2021    Morbid obesity with BMI of 60.0-69.9, adult 06/10/2024    Nephrolithiasis     Ovarian cyst     Substance abuse     Hospitalization     Trauma 09/11/2019    Tympanic membrane perforation, left 03/14/2018    UTI (urinary tract infection) 08/03/2018    Vertigo        Past Surgical History:   Procedure Laterality Date    ANGIOGRAM, CORONARY, WITH LEFT HEART CATHETERIZATION  02/10/2025    Procedure: Left Heart Cath;  Surgeon: Garcia De Luna MD;  Location: [facility] CATH;  Service: Cardiovascular;;    CHOLECYSTECTOMY      CYSTOSCOPY W/ URETERAL STENT PLACEMENT Right 12/26/2019    Procedure: CYSTOSCOPY, WITH URETERAL STENT INSERTION;  Surgeon: Rito Medley MD;  Location: [facility] OR;  Service: Urology;  Laterality: Right;    CYSTOSCOPY W/ URETERAL STENT REMOVAL Right 01/06/2020    Procedure: CYSTOSCOPY, WITH URETERAL STENT REMOVAL;  Surgeon: Rito Medley MD;  Location: [facility] OR;  Service: Urology;  Laterality: Right;    ESOPHAGOGASTRODUODENOSCOPY N/A 03/11/2025    Procedure: EGD (ESOPHAGOGASTRODUODENOSCOPY);  Surgeon: Vernon Pike DO;  Location: [facility] ENDO;  Service: Endoscopy;  Laterality: N/A;    INJECTION OF ANESTHETIC AGENT INTO TISSUE PLANE DEFINED BY TRANSVERSUS ABDOMINIS MUSCLE  01/06/2025    Procedure: BLOCK, TRANSVERSUS ABDOMINIS PLANE;  Surgeon: Bibi Edmondson MD;  [facility];  Service: General;;    LAPAROSCOPIC CHOLECYSTECTOMY  11/05/2024    LAPAROSCOPIC GASTROENTEROSTOMY N/A 01/06/2025    Procedure: GASTROENTEROSTOMY, LAPAROSCOPIC with inraop EGD;  Surgeon: Bibi Edmondson MD;  Location: [facility] OR 2ND FLR;  Service: General;   "Laterality: N/A;  Surgeon to perform Tap Block itraoperatively    LASER LITHOTRIPSY Right 01/06/2020    Procedure: LITHOTRIPSY, USING LASER;  Surgeon: Rito Medley MD;  [email or web address] OR;  Service: Urology;  Laterality: Right;    MAGNETIC RESONANCE IMAGING N/A 03/29/2025    Procedure: MRI (MAGNETIC RESONANCE IMAGING);  Surgeon: Bertha Kirk;  Location: [email or web address] BERTHA;  Service: Anesthesiology;  Laterality: N/A;    STOMACH SURGERY  01/06/2025    TONSILLECTOMY, ADENOIDECTOMY, BILATERAL MYRINGOTOMY AND TUBES      UPPER GASTROINTESTINAL ENDOSCOPY      URETEROSCOPIC REMOVAL OF URETERIC CALCULUS Right 01/06/2020    Procedure: REMOVAL, CALCULUS, URETER, URETEROSCOPIC;  Surgeon: Rito Medley MD;  Location: [email or web address] OR;  Service: Urology;  Laterality: Right;    URETEROSCOPY Left 12/26/2019    Procedure: URETEROSCOPY;  Surgeon: Rito Medley MD;  Location: [email or web address] OR;  Service: Urology;  Laterality: Left;    URETEROSCOPY Right 01/06/2020    Procedure: URETEROSCOPY;  Surgeon: Rito Medley MD;  Location: [email or web address] OR;  Service: Urology;  Laterality: Right;       Review of patient's allergies indicates:   Allergen Reactions    Ceftriaxone Nausea And Vomiting    Haldol [haloperidol lactate] Swelling    Azithromycin Nausea And Vomiting     Hard to breathe     Latex, natural rubber Swelling and Rash       Social History[1]    Medications Ordered Prior to Encounter[2]    Family History   Problem Relation Name Age of Onset    Obesity Mother      Stomach cancer Father      Ulcerative colitis Brother      Breast cancer Maternal Aunt      Colon cancer Neg Hx      Miscarriages / Stillbirths Neg Hx      Ovarian cancer Neg Hx      Crohn's disease Neg Hx      Esophageal cancer Neg Hx         Review of Systems   Musculoskeletal:  Positive for back pain.         Objective:      /82   Pulse 76   Ht 5' 3" (1.6 m)   Wt (!) 136.4 kg (300 lb 11.3 oz)   LMP 05/12/2025 (Exact Date)   SpO2 98%   BMI 53.27 kg/m²   Physical " Exam  Neurological:      Mental Status: Mental status is at baseline.      Comments: Ambulates with rollator   Psychiatric:         Mood and Affect: Mood normal.         Behavior: Behavior normal.         Assessment:       1. Current smoker    2. Chronic bilateral thoracic back pain    3. Pain in both upper extremities    4. Left leg pain    5. Opioid use disorder    6. Paresthesia    7. Generalized weakness    8. TRACE (generalized anxiety disorder)    9. Limitation of activity due to disability    10. Tickle in throat        Plan:       Current smoker  - Smoking cigarettes - Cessation strongly recommended. Patient not ready to quit.    Chronic bilateral thoracic back pain  Pain in both upper extremities  Left leg pain  - Established with outside pain management, Dr. Dhillon, started on pain contract with percocet 7.5 mg bid, lyrica was increased - reportedly planning imaging with MRI for suspected MS at outside facility sooner than how she is scheduled through [facility]. She will make sure to have CD of imaging.    Opioid use disorder  - Referred to Addiction medicine. Not yet scheduled.    Paresthesia  - Anticipates EMG, outside MRI ordered by pain management with suspicion for MS.    Generalized weakness  - Completed PT. Continues with home exercises, skilled nursing at home. Suspected MS v somatic symptoms.  - Nonspecific elevated aldolase. Low selenium. Results back from previous hospitalization.  - Reached out to Neurology, Bariatrics. Consider recheck selenium in one month. Low daily supplement.    TRACE (generalized anxiety disorder)  - Stable. [facility].    Limitation of activity due to disability  - Completed PT. Continues with home exercises, skilled nursing at home. Suspected MS v somatic symptoms.    Tickle in throat  - Audible on exam intermittently today. Denies difficulty with food and water getting stuck. Denies pain. Possible tic? Globus? Spasm? - though no pain.      Return in three  months or sooner if needed.  I will update her after I hear back from neurology and bariatrics.         [1]   Social History  Socioeconomic History    Marital status: Single   Tobacco Use    Smoking status: Some Days     Current packs/day: 0.25     Average packs/day: 0.3 packs/day for 15.4 years (3.9 ttl pk-yrs)     Types: Cigarettes     Start date: 2010    Smokeless tobacco: Never    Tobacco comments:     Quit smoking June 2024    Substance and Sexual Activity    Alcohol use: Not Currently     Alcohol/week: 0.0 standard drinks of alcohol    Drug use: Not Currently     Types: Marijuana, Fentanyl     Comment: history of opoid abuse    Sexual activity: Not Currently     Partners: Male     Birth control/protection: Implant     Social Drivers of Health     Financial Resource Strain: Medium Risk (4/22/2025)    Overall Financial Resource Strain (CARDIA)     Difficulty of Paying Living Expenses: Somewhat hard   Food Insecurity: Patient Unable To Answer (4/22/2025)    Hunger Vital Sign     Worried About Running Out of Food in the Last Year: Patient unable to answer     Ran Out of Food in the Last Year: Patient unable to answer   Transportation Needs: No Transportation Needs (4/21/2025)    PRAPARE - Transportation     Lack of Transportation (Medical): No     Lack of Transportation (Non-Medical): No   Physical Activity: Inactive (4/21/2025)    Exercise Vital Sign     Days of Exercise per Week: 0 days     Minutes of Exercise per Session: 0 min   Stress: Patient Declined (4/22/2025)    [employer] - Occupational Stress Questionnaire     Feeling of Stress : Patient declined   Housing Stability: Low Risk  (4/22/2025)    Housing Stability Vital Sign     Unable to Pay for Housing in the Last Year: No     Number of Times Moved in the Last Year: 1     Homeless in the Last Year: No   [2]   Current Outpatient Medications on File Prior to Visit   Medication Sig Dispense Refill    calcium-vitamin D tablet 600  mg-200 units Take 1 tablet by mouth 2 (two) times daily.      etonogestreL (NEXPLANON) 68 mg Impl subdermal device 68 mg by Subdermal route once. A single NEXPLANON implant is inserted subdermally just under the skin at the inner side of the non-dominant upper arm.      folic acid-vit B6-vit B12 2.5-25-2 mg (FOLBIC OR EQUIV) 2.5-25-2 mg Tab Take 1 tablet by mouth once daily.      lubiprostone (AMITIZA) 24 MCG Cap Take 1 capsule (24 mcg total) by mouth 2 (two) times daily. 60 capsule 2    multivit-min/iron/folic acid/K (BARIATRIC MULTIVITAMINS ORAL) Take 1 tablet by mouth 2 (two) times a day.      oxyCODONE-acetaminophen (PERCOCET) 7.5-325 mg per tablet Take 1 tablet by mouth 2 (two) times daily.      pantoprazole (PROTONIX) 40 MG tablet Take 40 mg by mouth 2 (two) times daily.      pregabalin (LYRICA) 75 MG capsule Take 75 mg by mouth 3 (three) times daily.      thiamine 100 MG tablet Take 1 tablet (100 mg total) by mouth once daily.      venlafaxine (EFFEXOR-XR) 37.5 MG 24 hr capsule Take 37.5 mg by mouth once daily.      acetaminophen (TYLENOL) 325 MG tablet Take 2 tablets (650 mg total) by mouth every 4 (four) hours as needed for Pain. (Patient not taking: Reported on 6/2/2025)      ergocalciferol (VITAMIN D2) 50,000 unit Cap Take 1 capsule (50,000 Units total) by mouth every 7 days. (Patient not taking: Reported on 6/2/2025)      rizatriptan (MAXALT) 10 MG tablet Take 1 tablet (10 mg total) by mouth as needed for Migraine. (Patient not taking: Reported on 6/2/2025) 14 tablet 2    [DISCONTINUED] LIDOcaine (LIDODERM) 5 % Place 1 patch onto the skin once daily. Remove & Discard patch within 12 hours or as directed by MD (Patient not taking: Reported on 6/2/2025) 30 patch 0    [DISCONTINUED] pregabalin (LYRICA) 25 MG capsule Take 1 capsule (25 mg total) by mouth 3 (three) times daily. (Patient not taking: Reported on 6/2/2025) 90 capsule 1     No current facility-administered medications on file prior to visit.

## 2025-06-10 ENCOUNTER — PATIENT MESSAGE (OUTPATIENT)
Dept: FAMILY MEDICINE | Facility: CLINIC | Age: 30
End: 2025-06-10
Payer: MEDICARE

## 2025-06-10 ENCOUNTER — PATIENT MESSAGE (OUTPATIENT)
Dept: PSYCHIATRY | Facility: CLINIC | Age: 30
End: 2025-06-10
Payer: MEDICARE

## 2025-06-12 ENCOUNTER — PATIENT MESSAGE (OUTPATIENT)
Dept: FAMILY MEDICINE | Facility: CLINIC | Age: 30
End: 2025-06-12
Payer: MEDICARE

## 2025-06-19 ENCOUNTER — OFFICE VISIT (OUTPATIENT)
Dept: BARIATRICS | Facility: CLINIC | Age: 30
End: 2025-06-19
Payer: MEDICARE

## 2025-06-19 DIAGNOSIS — E66.01 MORBID OBESITY WITH BMI OF 50.0-59.9, ADULT: Primary | ICD-10-CM

## 2025-06-19 DIAGNOSIS — F33.1 MAJOR DEPRESSIVE DISORDER, RECURRENT EPISODE, MODERATE: ICD-10-CM

## 2025-06-19 DIAGNOSIS — F11.11 HISTORY OF OPIOID ABUSE: ICD-10-CM

## 2025-06-19 NOTE — PROGRESS NOTES
"Individual Psychotherapy (PhD/LCSW)     DATE 6/19/25     Site: Met with pt virtually. Pt presented for appointment via her home. Address confirmed.      Therapeutic Intervention: Met with patient. Outpatient - Insight oriented psychotherapy 30 min - CPT code 42163        Chief complaint/reason for encounter: Time management, anxiety     Interval history and content of current session: Pt expressed in increase in anxiety symptoms related to school. Pt stated she has noticed an increase in lack of concentration, hyposomnia and negative cognitions. Pt stated " I just haven't been in school for so long. I feel like I have to always be doing work or reading because I want to do well and not fall behind." Pt stated she will often have difficulty falling and staying asleep due to worry about school and will "stay up" and work on school work till early morning hours. LCSW inquired into pt's current grade and pt stated she has a A in the class, but is experiencing anxious thoughts about an upcoming paper "worth a lot of my grade."   LCSWS provided empathetic listening. LCSW provided psycho-education on time management, anxiety and sleep hygiene. LCSW engaged pt in reality testing anxious thoughts around grades and worth. LCSW engaged pt in sensory grounding to aid in lessening thoughts of anxiety and aid in sleep. Pt practiced skill in session.      Treatment plan:  Target symptoms: Anxiety  Why chosen therapy is appropriate versus another modality: relevant to diagnosis  Outcome monitoring methods: self-report, observation  Therapeutic intervention type: insight oriented psychotherapy, supportive psychotherapy, interactive psychotherapy     Risk parameters:  Patient reports no suicidal ideation  Patient reports no homicidal ideation  Patient reports no self-injurious behavior  Patient reports no violent behavior     Verbal deficits: None     Patient's response to intervention:  The patient's response to intervention is " accepting, motivated.     Progress toward goals and other mental status changes:  The patient's progress toward goals is fair .     Diagnosis:   Z68.43 (BMI) of 50.0-59.9 in adults   F33.1- Major Depressive Disorder, Moderate, recurring episode  F11.21-Opioid Use Disorder, Sever, in sustained remission        Plan:  individual psychotherapy and medication management by physician     Return to clinic: as scheduled     Length of Service (minutes): 30    Amaris Laura [facility]  Department of Surgery/ Bariatric Surgery [facility]

## 2025-06-25 NOTE — DISCHARGE INSTRUCTIONS
It is important that you follow up with your primary care provider or specialist if indicated for further evaluation, workup, and treatment as necessary. The exam and treatment you received in Emergency Department was for an urgent problem and NOT INTENDED AS COMPLETE CARE. It is important that you FOLLOW UP with a doctor for ongoing care. If your symptoms become WORSE or you DO NOT IMPROVE and you are unable to reach your health care provider, you should RETURN to the Emergency Department.    Virtual Visit Details    Type of service:  Video Visit     Originating Location (pt. Location): Home    Distant Location (provider location):  On-site  Platform used for Video Visit: Vanita

## 2025-06-30 ENCOUNTER — TELEPHONE (OUTPATIENT)
Dept: BARIATRICS | Facility: CLINIC | Age: 30
End: 2025-06-30
Payer: MEDICARE

## 2025-06-30 ENCOUNTER — ON-DEMAND VIRTUAL (OUTPATIENT)
Dept: URGENT CARE | Facility: CLINIC | Age: 30
End: 2025-06-30
Payer: MEDICARE

## 2025-06-30 DIAGNOSIS — J06.9 UPPER RESPIRATORY TRACT INFECTION, UNSPECIFIED TYPE: Primary | ICD-10-CM

## 2025-06-30 DIAGNOSIS — J02.9 PHARYNGITIS, UNSPECIFIED ETIOLOGY: ICD-10-CM

## 2025-06-30 PROCEDURE — 98004 SYNCH AUDIO-VIDEO EST SF 10: CPT | Mod: 95,,, | Performed by: NURSE PRACTITIONER

## 2025-06-30 RX ORDER — PHENOL 1.4 %
AEROSOL, SPRAY (ML) MUCOUS MEMBRANE
Qty: 177 ML | Refills: 0 | Status: SHIPPED | OUTPATIENT
Start: 2025-06-30 | End: 2025-07-05

## 2025-06-30 RX ORDER — PREDNISONE 20 MG/1
20 TABLET ORAL DAILY
Qty: 3 TABLET | Refills: 0 | Status: SHIPPED | OUTPATIENT
Start: 2025-06-30 | End: 2025-07-03

## 2025-06-30 RX ORDER — BROMPHENIRAMINE MALEATE, PSEUDOEPHEDRINE HYDROCHLORIDE, AND DEXTROMETHORPHAN HYDROBROMIDE 2; 30; 10 MG/5ML; MG/5ML; MG/5ML
10 SYRUP ORAL 3 TIMES DAILY PRN
Qty: 100 ML | Refills: 0 | Status: SHIPPED | OUTPATIENT
Start: 2025-06-30 | End: 2025-06-30 | Stop reason: ALTCHOICE

## 2025-06-30 RX ORDER — PROMETHAZINE HYDROCHLORIDE AND DEXTROMETHORPHAN HYDROBROMIDE 6.25; 15 MG/5ML; MG/5ML
7.5 SYRUP ORAL NIGHTLY
Qty: 52.5 ML | Refills: 0 | Status: SHIPPED | OUTPATIENT
Start: 2025-06-30 | End: 2025-07-07

## 2025-06-30 NOTE — TELEPHONE ENCOUNTER
S/p NENONY with Dr. Cee on 1/6/25.    Called and spoke with the pt. She requested for her appt with Josi Ybarra NP to be rescheduled for virtual appt and labs rescheduled closer to home.  Rescheduled with the pt.

## 2025-06-30 NOTE — TELEPHONE ENCOUNTER
Copied from CRM #[id number]. Topic: Appointments - Appointment Access  >> Jun 30, 2025 10:48 AM Renuka wrote:    Reschedule    Caller: The pt     Call back number:[phone]    Current Appt Date: 07/07/25    Type of Appt: Post Op       Provider: Dr. Ybarra       Reason for Rescheduling:  Pt not feeling well.       Additional Information: Thank you  >> Jun 30, 2025 12:55 PM Josi Ybarra NP wrote:    ----- Message -----  From: Renuka Najera  Sent: 6/30/2025  10:50 AM CDT  To: [facility] Staff

## 2025-06-30 NOTE — PROGRESS NOTES
Subjective:      Patient ID: Alexandra Martin is a 29 y.o. female.    Vitals:  vitals were not taken for this visit.     Chief Complaint: Cough      Visit Type: TELE AUDIOVISUAL - This visit was conducted virtually based on  subjective information and limited objective exam    Present with the patient at the time of consultation: TELEMED PRESENT WITH PATIENT: None  LOCATION OF PATIENT [facility]  Two patient identifiers used to verify patient- saying out date of birth and full name.       Past Medical History:   Diagnosis Date    ADD (attention deficit disorder)     Anxiety disorder     Biliary dyskinesia 11/02/2024    Bronchitis 12/03/2024    Depression     Drug-induced constipation 03/04/2024    GERD (gastroesophageal reflux disease)     Headache     MDD (major depressive disorder), recurrent episode, moderate 07/29/2021    Morbid obesity with BMI of 60.0-69.9, adult 06/10/2024    Nephrolithiasis     Ovarian cyst     Substance abuse     Hospitalization     Trauma 09/11/2019    Tympanic membrane perforation, left 03/14/2018    UTI (urinary tract infection) 08/03/2018    Vertigo      Past Surgical History:   Procedure Laterality Date    ANGIOGRAM, CORONARY, WITH LEFT HEART CATHETERIZATION  02/10/2025    Procedure: Left Heart Cath;  Surgeon: Garcia De Luna MD;  Location: [facility] CATH;  Service: Cardiovascular;;    CHOLECYSTECTOMY      CYSTOSCOPY W/ URETERAL STENT PLACEMENT Right 12/26/2019    Procedure: CYSTOSCOPY, WITH URETERAL STENT INSERTION;  Surgeon: Rito Medley MD;  Location: [facility] OR;  Service: Urology;  Laterality: Right;    CYSTOSCOPY W/ URETERAL STENT REMOVAL Right 01/06/2020    Procedure: CYSTOSCOPY, WITH URETERAL STENT REMOVAL;  Surgeon: Rito Medley MD;  Location: [facility] OR;  Service: Urology;  Laterality: Right;    ESOPHAGOGASTRODUODENOSCOPY N/A 03/11/2025    Procedure: EGD (ESOPHAGOGASTRODUODENOSCOPY);  Surgeon: Vernon Pike DO;  Location: [facility] ENDO;  Service:  Endoscopy;  Laterality: N/A;    INJECTION OF ANESTHETIC AGENT INTO TISSUE PLANE DEFINED BY TRANSVERSUS ABDOMINIS MUSCLE  01/06/2025    Procedure: BLOCK, TRANSVERSUS ABDOMINIS PLANE;  Surgeon: Bibi Edmondson MD;  Location: [facility] OR 2ND FLR;  Service: General;;    LAPAROSCOPIC CHOLECYSTECTOMY  11/05/2024    LAPAROSCOPIC GASTROENTEROSTOMY N/A 01/06/2025    Procedure: GASTROENTEROSTOMY, LAPAROSCOPIC with inraop EGD;  Surgeon: Bibi Edmondson MD;  Location: [facility] OR 2ND FLR;  Service: General;  Laterality: N/A;  Surgeon to perform Tap Block itraoperatively    LASER LITHOTRIPSY Right 01/06/2020    Procedure: LITHOTRIPSY, USING LASER;  Surgeon: Rito Medley MD;  Location: [facility] OR;  Service: Urology;  Laterality: Right;    MAGNETIC RESONANCE IMAGING N/A 03/29/2025    Procedure: MRI (MAGNETIC RESONANCE IMAGING);  Surgeon: Dory Kirk;  Location: [facility];  Service: Anesthesiology;  Laterality: N/A;    STOMACH SURGERY  01/06/2025    TONSILLECTOMY, ADENOIDECTOMY, BILATERAL MYRINGOTOMY AND TUBES      UPPER GASTROINTESTINAL ENDOSCOPY      URETEROSCOPIC REMOVAL OF URETERIC CALCULUS Right 01/06/2020    Procedure: REMOVAL, CALCULUS, URETER, URETEROSCOPIC;  Surgeon: Rito Medley MD;  Location: [facility] OR;  Service: Urology;  Laterality: Right;    URETEROSCOPY Left 12/26/2019    Procedure: URETEROSCOPY;  Surgeon: Rito Medley MD;  Location: [facility] OR;  Service: Urology;  Laterality: Left;    URETEROSCOPY Right 01/06/2020    Procedure: URETEROSCOPY;  Surgeon: Rito Medley MD;  Location: [facility] OR;  Service: Urology;  Laterality: Right;     Review of patient's allergies indicates:   Allergen Reactions    Ceftriaxone Nausea And Vomiting    Haldol [haloperidol lactate] Swelling    Azithromycin Nausea And Vomiting     Hard to breathe     Latex, natural rubber Swelling and Rash     Medications Ordered Prior to Encounter[1]  Family History   Problem Relation Name Age of Onset    Obesity Mother       Stomach cancer Father      Ulcerative colitis Brother      Breast cancer Maternal Aunt      Colon cancer Neg Hx      Miscarriages / Stillbirths Neg Hx      Ovarian cancer Neg Hx      Crohn's disease Neg Hx      Esophageal cancer Neg Hx         Medications Ordered                [id number] - [place] - [address] AT [facility]   [address] [place] [address]    Telephone: [phone]   Fax: [phone]   Hours: Not open 24 hours                         E-Prescribed (3 of 3)              phenoL (CHLORASEPTIC THROAT SPRAY) 1.4 % SprA    Sig: by Mucous Membrane route every 2 (two) hours. for 5 days       Start: 6/30/25     Quantity: 177 mL Refills: 0                         predniSONE (DELTASONE) 20 MG tablet    Sig: Take 1 tablet (20 mg total) by mouth once daily. for 3 doses       Start: 6/30/25     Quantity: 3 tablet Refills: 0                         promethazine-dextromethorphan (PROMETHAZINE-DM) 6.25-15 mg/5 mL Syrp    Sig: Take 7.5 mLs by mouth every evening. for 7 days       Start: 6/30/25     Quantity: 52.5 mL Refills: 0                           Ohs Peq Odvv Intake    6/30/2025  2:25 PM CDT - Filed by Patient   What is your current physical address in the event of a medical emergency? [address] [address] [address]   Are you able to take your vital signs? Yes   Systolic Blood Pressure: 193   Diastolic Blood Pressure: 95   Weight: 293   Height: 53   Pulse: 84   Temperature: 97.6   Respiration rate: 80   Pulse Oxygen: 99   Please attach any relevant images or files    Is your employer contracted with [facility]? No         Cough/congestion,runny nose x 1 day  Denies known sick contact or recent travel   + SOB due to congestion, denies wheezing   Sore throat with swallowing   No fever   Taking Robitussin without relief         Constitution: Negative for chills and fever.   HENT:  Positive for congestion, postnasal drip and sore throat. Negative for ear  pain and ear discharge.    Neck: neck negative.   Cardiovascular:  Negative for chest pain.   Eyes: Negative.    Respiratory:  Positive for cough. Negative for sputum production, shortness of breath, wheezing and asthma.    Gastrointestinal: Negative.    Endocrine: negative.   Genitourinary: Negative.    Musculoskeletal: Negative.    Skin: Negative.    Allergic/Immunologic: Negative for asthma.   Neurological:  Negative for headaches.        Objective:   The physical exam was conducted virtually.    AAO x 3 ; no acute distress noted; appearance normal; mood and behavior normal; thought process normal  Head- normocephalic  Nose- appears normal, no discharge or erythema  Eyes- pupils appear normal in size, no drainage, no erythema  Ears- normal appearing; no discharge, no erythema  Mouth- appears normal  Oropharynx- no erythema, lesions  Lungs- breathing at a normal rate, no acute distress noted  Heart- no reports of tachycardia, palpitations, chest pain  Abdomen- non distended, non tender reported by patient  Skin- warm and dry, no erythema or edema noted by patient or visualized  Psych- as above; no si/hi      Assessment:     1. Upper respiratory tract infection, unspecified type    2. Pharyngitis, unspecified etiology        Plan:     Unable to escribed Bromfed as discussed  Reccmmend phenergan DM, prednisone and chloraseptic spray as needed   Follow up with pcp in 2-3 days    Thank you for choosing [facility]!    Our goal in the [facility] is to always provide outstanding medical care. You may receive a survey by mail or e-mail in the next week regarding your experience today. We would greatly appreciate you completing and returning the survey. Your feedback provides us with a way to recognize our staff who provide very good care, and it helps us learn how to improve when your experience was below our aspiration of excellence.         We appreciate you trusting us with your  medical care. We hope you feel better soon. We will be happy to take care of you for all of your future medical needs.    You must understand that you've received an Urgent Care treatment only and that you may be released before all your medical problems are known or treated. You, the patient, will arrange for follow up care as instructed.    Follow up with your PCP or specialty clinic as directed in the next 1-2 weeks if not improved or as needed.  You can call [phone] to schedule an appointment with the appropriate provider.    If your condition worsens we recommend that you receive another evaluation in person, with your primary care provider, urgent care or at the emergency room immediately or contact your primary medical clinics after hours call service to discuss your concerns.         Upper respiratory tract infection, unspecified type  -     Discontinue: brompheniramine-pseudoeph-DM (BROMFED DM) 2-30-10 mg/5 mL Syrp; Take 10 mLs by mouth 3 (three) times daily as needed (cough/congestion).  Dispense: 100 mL; Refill: 0  -     promethazine-dextromethorphan (PROMETHAZINE-DM) 6.25-15 mg/5 mL Syrp; Take 7.5 mLs by mouth every evening. for 7 days  Dispense: 52.5 mL; Refill: 0    Pharyngitis, unspecified etiology  -     predniSONE (DELTASONE) 20 MG tablet; Take 1 tablet (20 mg total) by mouth once daily. for 3 doses  Dispense: 3 tablet; Refill: 0  -     phenoL (CHLORASEPTIC THROAT SPRAY) 1.4 % SprA; by Mucous Membrane route every 2 (two) hours. for 5 days  Dispense: 177 mL; Refill: 0                          [1]   Current Outpatient Medications on File Prior to Visit   Medication Sig Dispense Refill    acetaminophen (TYLENOL) 325 MG tablet Take 2 tablets (650 mg total) by mouth every 4 (four) hours as needed for Pain. (Patient not taking: Reported on 6/2/2025)      calcium-vitamin D tablet 600 mg-200 units Take 1 tablet by mouth 2 (two) times daily.      ergocalciferol (VITAMIN D2) 50,000 unit Cap Take 1  capsule (50,000 Units total) by mouth every 7 days. (Patient not taking: Reported on 6/2/2025)      etonogestreL (NEXPLANON) 68 mg Impl subdermal device 68 mg by Subdermal route once. A single NEXPLANON implant is inserted subdermally just under the skin at the inner side of the non-dominant upper arm.      folic acid-vit B6-vit B12 2.5-25-2 mg (FOLBIC OR EQUIV) 2.5-25-2 mg Tab Take 1 tablet by mouth once daily.      lubiprostone (AMITIZA) 24 MCG Cap Take 1 capsule (24 mcg total) by mouth 2 (two) times daily. 60 capsule 2    multivit-min/iron/folic acid/K (BARIATRIC MULTIVITAMINS ORAL) Take 1 tablet by mouth 2 (two) times a day.      oxyCODONE-acetaminophen (PERCOCET) 7.5-325 mg per tablet Take 1 tablet by mouth 2 (two) times daily.      pantoprazole (PROTONIX) 40 MG tablet Take 40 mg by mouth 2 (two) times daily.      pregabalin (LYRICA) 75 MG capsule Take 75 mg by mouth 3 (three) times daily.      rizatriptan (MAXALT) 10 MG tablet Take 1 tablet (10 mg total) by mouth as needed for Migraine. (Patient not taking: Reported on 6/2/2025) 14 tablet 2    thiamine 100 MG tablet Take 1 tablet (100 mg total) by mouth once daily.      venlafaxine (EFFEXOR-XR) 37.5 MG 24 hr capsule Take 37.5 mg by mouth once daily.       No current facility-administered medications on file prior to visit.

## 2025-07-01 ENCOUNTER — TELEPHONE (OUTPATIENT)
Dept: GASTROENTEROLOGY | Facility: CLINIC | Age: 30
End: 2025-07-01
Payer: MEDICARE

## 2025-07-07 ENCOUNTER — PATIENT MESSAGE (OUTPATIENT)
Dept: BARIATRICS | Facility: CLINIC | Age: 30
End: 2025-07-07

## 2025-07-07 ENCOUNTER — OFFICE VISIT (OUTPATIENT)
Dept: BARIATRICS | Facility: CLINIC | Age: 30
End: 2025-07-07
Payer: MEDICARE

## 2025-07-07 VITALS — WEIGHT: 293 LBS | BODY MASS INDEX: 52.43 KG/M2

## 2025-07-07 DIAGNOSIS — Z98.84 S/P BARIATRIC SURGERY: Primary | ICD-10-CM

## 2025-07-07 NOTE — PATIENT INSTRUCTIONS
Meal Ideas for Regular Bariatric Diet  *Recipes and products available at [email or web address].com      Breakfast: (15-20g protein)    - Egg white omelet: 2 egg whites or ½ cup Egg Beaters. (Optional proteins: cheese, shrimp, black beans, chicken, sliced turkey) (Optional veggies: tomatoes, salsa, spinach, mushrooms, onions, green peppers, or small slice avocado)     - Egg and sausage: 1 egg or ¼ cup Egg Beaters (any variety), with 1 tejas or 2 links of Turkey sausage or Veggie breakfast sausage (Rapp [employer] Up or [employer])    - Crust-less breakfast quiche: To make a glass pie dish, mix 4oz part skim Ricotta, 1 cup skim milk, and 2 eggs as your base. Add protein: shredded cheese, sliced lean ham or turkey, turkey crawford/sausage. Add veggies: tomato, onion, green onion, mushroom, green pepper, spinach, etc.    - Yogurt parfait: Mix 1 - 6oz container Dannon Light N Fit vanilla yogurt, with ¼ cup crushed unsalted nuts    - Cottage cheese and fruit: ½ cup part-skim cottage cheese or ricotta cheese topped with fresh fruit or sugar free preserves     - Socorro Murphy's Vanilla Egg custard* (add 2 Tbsp instant coffee granules to make Cappuccino Custard*)    - Hi-Protein café latte (skim milk, decaf coffee, 1 scoop protein powder). Optional to add Sugar free syrup or extract flavoring.    - Breakfast Lox: spread fat free cream cheese on slices of smoked salmon. Serve over scrambled or egg over easy (sauteed with nonstick cookspray) OR on a cucumber slice    - Eggwhich: Scramble or cook 1 large egg over easy using nonstick cookspray. Place between 2 slices of Niuean crawford and low fat cheese.     Lunch: (20-30g protein)    - ½ cup Black bean soup (Homemade or Progresso), with ¼ cup shredded low-fat cheese. Top with chopped tomato or fresh salsa.     - Lean deli turkey breast and low-fat sliced cheese, mustard or light magana to moisten, rolled up together, or wrapped in a Isaac lettuce leaf    - Chicken salad made from dinner  leftovers, moisten with low-fat salad dressing or light magana. Also try leftover salmon, shrimp, tuna or boiled eggs. Serve ½ cup over dark green salad    - Fat-free canned refried beans, topped with ¼ cup shredded low-fat cheese. Top with chopped tomato or fresh salsa.     - Greek salad: Top mixed greens with 1-2oz grilled chicken, tomatoes, red onions, 2-3 kalamata olives, and sprinkle lightly with feta cheese. Spritz with Balsamic vinegar to taste.     - Crust-less lunch quiche: To make a glass pie dish, mix 4oz part skim Ricotta, 1 cup skim milk, and 2 eggs as your base. Add protein: shredded cheese, sliced lean ham or turkey, shrimp, chicken. Add veggies: tomato, onion, green onion, mushroom, green pepper, spinach, artichoke, broccoli, etc.    - Pizza bake: spread a  denise pinky mushroom with tomato sauce, low-fat shredded mozzarella and turkey pepperoni or [place] crawford. Add any veggies. Roast for 10-15 minutes, until cheese melted.     - Cucumber crab bites: Spread ¼ cup crab dip (lump crabmeat + light cream cheese and green onions) over sliced cucumber.     - Chicken with light spinach and artichoke dip*: Puree in : 6oz cooked and drained spinach, 2 cloves garlic, 1 can cannelloni beans, ½ cup chopped green onions, 1 can drained artichoke hearts (not marinated in oil), lemon juice and basil. Mix in 2oz chopped up chicken.    Supper: (20-30g protein)    - Serve grilled fish over dark green salad tossed with low-fat dressing, served with grilled asparagus gayle     - Rotisserie chicken salad: served with sliced strawberries, walnuts, fat-free feta cheese crumbles and 1 tbsp Chowdarys Own Light Raspberry [place] Vinaigrette    - Shrimp cocktail: Dip cold boiled shrimp in homemade low-sugar cocktail sauce (1/2 cup Darby One Carb ketchup, 2 tbsp horseradish, 1/4 tsp hot sauce, 1 tsp Worcestershire sauce, 1 tbsp freshly-squeezed lemon juice). Serve with dark green salad, walnuts, and crumbled blue  cheese drizzled with olive oil and Balsamic vinegar    - Tuna Melt: Spread tuna salad onto 2 thick slices of tomato. Top with low-fat cheese and broil until cheese is melted. May also be made with chicken salad of shrimp salad. Lilly with different types of cheeses.    - Chicken or beef fajitas (no tortilla, rice, beans, chips). Top meat and veggies w/ fresh salsa, fat free sour cream.     - Homemade low-fat Chili using extra lean ground beef or ground turkey. Top with shredded cheese and salsa as desired. May add dollop fat-free sour cream if desired    - Chicken parmesan: Top chicken breast w/ low sugar marinara sauce, mozzarella cheese and bake until chicken reaches 165*.  Serve w/ spaghetti SQUASH or Faroese cut green beans    - Dinner Omelet with shrimp or chicken and onion, green peppers and chives.    - No noodle lasagna: Use sliced zucchini or eggplant in place of noodles.  Layer with part skim ricotta cheese and low sugar meat sauce (use very lean ground beef or ground turkey).    - Mexican chicken bake: Bake chunks of chicken breast or thigh with taco seasoning, Pace brand enchilada sauce, green onions and low-fat cheese. Serve with ¼ cup black beans or fat free refried beans topped with chopped tomatoes or salsa.    - López frozen meatballs, simmered in Classico Marinara sauce. Different flavors of salsa or spaghetti sauce create different dishes! Sprinkle with parmesan cheese. Serve with grilled or steamed veggies, or a dark green salad.    - Simmer boneless skinless chicken thigh chunks in Classico Marinara sauce or roasted salsa until tender with chopped onion, bell pepper, garlic, mushrooms, spinach, etc.     - Hamburger or veggie burger, without the bun, dressed the way you like. Served with grilled or steamed veggies.    - Eggplant parmesan: Bake slices of eggplant at 350 degrees for 15 minutes. Layer tomato sauce, sliced eggplant and low-fat mozzarella cheese in a baking dish and cover with  foil. Bake 30-40 more minutes or until bubbly. Uncover and bake at 400 degrees for about 15 more minutes, or until top is slightly crisp.    - Fish tacos: grilled/baked white fish, wrapped in Isaac lettuce leaf, topped with salsa, shredded low-fat cheese, and light coleslaw.    - Chicken sabino: Sprinkle chicken w/ 1 tsp of hidden valley ranch dip mix. Then grill chicken and top with black beans, salsa and 1 tsp fat free sour cream.     - Cauliflower pizza crust: Use cauliflower as crust (see recipe on pinterest, no flour!). Top w/ low fat cheese, turkey pepperoni and veggies and bake again    - chicken or turkey crust pizza: use ground chicken or turkey instead of cauliflower, spread in [place]q and bake at 350 for about 20-30 minutes(may want to add garlic, black pepper, oregano and other herbs to ground meat mixture).  Remove and top w/ low fat cheese, turkey pepperoni and veggies and bake again for another 10 minutes or until cheese is browned.     Snacks: (100-200 calories; >5g protein)    - 1 low-fat cheese stick with 8 cherry tomatoes or 1 serving fresh fruit  - 4 thin slices fat-free turkey breast and 1 slice low-fat cheese  - 4 thin slices fat-free honey ham with wedge of melon  - 6-8 edamame pods (equivalent to about 1/4 cup edamame without pods).   - 1/4 cup unsalted nuts with ½ cup fruit  - 6-oz container Dannon Light n Fit vanilla yogurt, topped with 1oz unsalted nuts         - apple, celery or baby carrots spread with 2 Tbsp PB2  - apple slices with 1 oz slice low-fat cheese  - Apple slices dipped in 2 Tbsp of PB2  - celery, cucumber, bell pepper or baby carrots dipped in ¼ cup hummus bean spread or light spinach and artichoke dip (*recipe in lunch section)  - celery, cucumber, baby carrots dipped in high protein greek yogurt (Mix 16 oz plain greek yogurt + 1 packet of hidden valley ranch dip mix)  - Praveen Links Beef Steak - 14g protein! (similar to beef jerky)  - 2 wedges Laughing Cow - Light Herb  & Garlic Cheese with sliced cucumber or green bell pepper  - 1/2 cup low-fat cottage cheese with ¼ cup fruit or ¼ cup salsa  - RTD Protein drinks: Atkins, Low Carb Slim Fast, EAS light, Muscle Milk Light, etc.  - Homemade Protein drinks: GNC Soy95, Isopure, Nectar, UNJURY, Whey Gourmet, etc. Mix 1 scoop powder with 8oz skim/1% milk or light soymilk.  - Protein bars: Atkins, EAS, Pure Protein, Think Thin, Detour, etc. Must have 0-4 grams sugar - Read the label.    Takeout Options: No more than twice/week  Deli - Salads (no pasta or rice), meats, cheeses. Roasted chicken. Lox (salmon)    Mexican - Platters which don't include tortillas, chips, or rice. Go easy on the beans. Example: Fajitas without the tortillas. Ask the  not to bring chips to the table if they are too tempting.    Greek - Meat or fish and vegetable, but no bread or rice. Including hummus, baba ganoush, etc, is OK. Most sit-down Greek restaurants can provide you with cucumber slices for dipping instead of ruth bread.    Fast Food (Avoid as much as possible) - Salads (no croutons and limit salad dressing to 2 tbsp), grilled chicken sandwich without the bun and ask for no magana. Sylvies low fat chili or Taco Bell pintos and cheese.    BBQ - The meats are fine if you ask for sauces on the side, but most of the traditional side dishes are loaded with carbs. Bang slaw, baked beans and BBQ sauce are typically made with sugar.    Chinese - Nothing deep-fried, no rice or noodles. Many Chinese sauces have starch and sugar in them, so you'll have to use your judgement. If you find that these sauces trigger cravings, or cause Dumping, you can ask for the sauce to be made without sugar or just use soy sauce.

## 2025-07-07 NOTE — PROGRESS NOTES
The patient location is: [place]  The chief complaint leading to consultation is: 6 month    Visit type: audiovisual    Face to Face time with patient: 10  15 minutes of total time spent on the encounter, which includes face to face time and non-face to face time preparing to see the patient (eg, review of tests), Obtaining and/or reviewing separately obtained history, Documenting clinical information in the electronic or other health record, Independently interpreting results (not separately reported) and communicating results to the patient/family/caregiver, or Care coordination (not separately reported).       Each patient to whom he or she provides medical services by telemedicine is:  (1) informed of the relationship between the physician and patient and the respective role of any other health care provider with respect to management of the patient; and (2) notified that he or she may decline to receive medical services by telemedicine and may withdraw from such care at any time.    Notes:             BARIATRIC POST-OPERATIVE VISIT:    HPI:  Alexandra Martin is a 29 y.o. year old female presents for 6 months post op visit following [facility].  she is doing well and tolerating the diet without difficulty.  she has no complaints.    Denies:  abdominal pain, changes in bowel movement pattern, fever, chills, dysphagia, chest pain, and shortness of breath.    Pt using walker waiting for MRI to eval for MS. Neuro following    Review of Systems   Constitutional:  Negative for activity change and fatigue.   Respiratory:  Negative for cough and shortness of breath.    Cardiovascular:  Negative for chest pain, palpitations and leg swelling.   Gastrointestinal:  Negative for abdominal pain, nausea and vomiting.   Endocrine: Negative for polydipsia, polyphagia and polyuria.   Genitourinary:  Negative for dysuria.   Musculoskeletal:  Negative for gait problem.   Skin:  Negative for rash.   Allergic/Immunologic:  Negative for immunocompromised state.   Neurological:  Negative for dizziness, syncope and weakness.   Hematological:  Does not bruise/bleed easily.   Psychiatric/Behavioral:  Negative for behavioral problems.        EXERCISE & VITAMINS:  See Bariatric Assessment    MEDICATIONS/ALLERGIES:  Have been reviewed.    DIET: Regular Bariatric Diet. 1 shake daily ~60-80 grams protein.  60 fl oz SF clear beverage.  See Dietician note from today for a more detailed assessment.      Physical Exam  Vitals and nursing note reviewed.   Constitutional:       Appearance: She is well-developed. She is morbidly obese.   HENT:      Head: Normocephalic.      Nose: Nose normal.      Mouth/Throat:      Mouth: Mucous membranes are moist.   Eyes:      Extraocular Movements: Extraocular movements intact.   Cardiovascular:      Rate and Rhythm: Normal rate and regular rhythm.      Heart sounds: Normal heart sounds.   Pulmonary:      Effort: Pulmonary effort is normal.      Breath sounds: Normal breath sounds.   Abdominal:      General: Bowel sounds are normal.      Palpations: Abdomen is soft.   Musculoskeletal:         General: Normal range of motion.      Cervical back: Normal range of motion.   Skin:     General: Skin is warm and dry.      Capillary Refill: Capillary refill takes less than 2 seconds.   Neurological:      Mental Status: She is alert and oriented to person, place, and time.   Psychiatric:         Mood and Affect: Mood normal.       ASSESSMENT:  - Morbid obesity s/p laparoscopic Catalina-en-Y on 1/6/25.  - Co-morbidities: obstructive sleep apnea  -  Weight loss, 62#'s and 28% EWL  -  Exercise routine not current   - Good Diet  - Good Vitamin regimen    PLAN:  - Miralax daily for constipation prn  - Emphasized the importance of regular exercise and adherence to bariatric diet to achieve maximum weight loss.  - Encouraged patient to start regular exercise.  - Follow-up with dietician to advance diet.  - Continue daily vitamins and  medications.  - RTC in 4 months or sooner if needed.  - Call the office for any issues.  - Check labs today.  - F/u with Amaris MORRISON   - Will talk in August about Bariatric Medicine

## 2025-07-08 ENCOUNTER — PATIENT MESSAGE (OUTPATIENT)
Dept: BARIATRICS | Facility: CLINIC | Age: 30
End: 2025-07-08
Payer: MEDICARE

## 2025-07-10 ENCOUNTER — OFFICE VISIT (OUTPATIENT)
Dept: BARIATRICS | Facility: CLINIC | Age: 30
End: 2025-07-10
Payer: MEDICARE

## 2025-07-10 DIAGNOSIS — F11.21: ICD-10-CM

## 2025-07-10 DIAGNOSIS — F33.1 MAJOR DEPRESSIVE DISORDER, RECURRENT EPISODE, MODERATE: ICD-10-CM

## 2025-07-10 DIAGNOSIS — E66.01 MORBID OBESITY WITH BMI OF 50.0-59.9, ADULT: Primary | ICD-10-CM

## 2025-07-10 NOTE — PROGRESS NOTES
"Individual Psychotherapy (PhD/LCSW)     DATE 7/10/25     Site: Met with pt virtually. Pt presented for appointment via their home. Address confirmed.       Therapeutic Intervention: Met with patient. Outpatient - Insight oriented psychotherapy 30 min - CPT code 00672        Chief complaint/reason for encounter: Anxiety     Interval history and content of current session: Pt continues to express stress around school, specifically her ability to "do well, like get good grades." Pt stated she is enjoying her class and is "doing well" but still feels "less than" compared to some of her peers in the class. Pt also reported feelings of frustration due to having her MRI pushed back do to having an upper respiratory infection. Pt stated her pain has no lessened and currently manageable on current medications, but "wants answers" about her health.   LCSW provided empathetic listening. LCSW reality checked pt's feelings around academic performance and encouraged pt to utilize academic resources provided by [employer].        Treatment plan:  Target symptoms: Anxiety  Why chosen therapy is appropriate versus another modality: relevant to diagnosis  Outcome monitoring methods: self-report, observation  Therapeutic intervention type: insight oriented psychotherapy, supportive psychotherapy, interactive psychotherapy     Risk parameters:  Patient reports no suicidal ideation  Patient reports no homicidal ideation  Patient reports no self-injurious behavior  Patient reports no violent behavior     Verbal deficits: None     Patient's response to intervention:  The patient's response to intervention is accepting, motivated.     Progress toward goals and other mental status changes:  The patient's progress toward goals is fair .     Diagnosis:   Z68.43 (BMI) of 50.0-59.9 in adults   F33.1- Major Depressive Disorder, Moderate, recurring episode  F11.21-Opioid Use Disorder, Sever, in sustained remission     Plan:  individual " psychotherapy and medication management by physician     Return to clinic: as scheduled     Length of Service (minutes): 30    [facility]  Department of Surgery/ [facility]ric [facility]

## 2025-07-17 ENCOUNTER — TELEPHONE (OUTPATIENT)
Dept: NEUROLOGY | Facility: CLINIC | Age: 30
End: 2025-07-17
Payer: MEDICARE

## 2025-07-17 NOTE — TELEPHONE ENCOUNTER
Copied from CRM #[id number]. Topic: Appointments - Appointment Access  >> Jul 17, 2025  4:02 PM Yanira wrote:  Type:  Apoointment Request    Name of Caller:[facility]G   When is the first available appointment?N/A  Symptoms:EMG   Would the patient rather a call back or a response via [facility]? CALL   Best Call Back Number:[phone]  Additional Information: THANK YOU

## 2025-07-27 PROBLEM — F32.A DEPRESSION: Status: ACTIVE | Noted: 2025-07-27

## 2025-07-27 PROBLEM — E66.813 CLASS 3 SEVERE OBESITY IN ADULT: Status: ACTIVE | Noted: 2025-07-27

## 2025-07-27 PROBLEM — E51.9 THIAMINE DEFICIENCY: Status: ACTIVE | Noted: 2025-07-27

## 2025-07-27 PROBLEM — G43.909 MIGRAINES: Status: ACTIVE | Noted: 2025-07-27

## 2025-07-27 PROBLEM — R53.1 WEAKNESS: Status: ACTIVE | Noted: 2025-07-27

## 2025-07-28 DIAGNOSIS — Z00.00 ENCOUNTER FOR MEDICARE ANNUAL WELLNESS EXAM: ICD-10-CM

## 2025-07-28 PROBLEM — D64.9 ANEMIA: Status: ACTIVE | Noted: 2025-07-28

## 2025-07-29 ENCOUNTER — TELEPHONE (OUTPATIENT)
Dept: FAMILY MEDICINE | Facility: CLINIC | Age: 30
End: 2025-07-29
Payer: MEDICARE

## 2025-07-29 NOTE — TELEPHONE ENCOUNTER
Copied from CRM #[id number]. Topic: Appointments - Appointment Access  >> Jul 29, 2025  2:19 PM Juliana wrote:  Type:  Sooner Apoointment Request    Caller is requesting a sooner appointment. Caller is requesting a message be sent to doctor.    Name of Caller: Sheryl HADDAD      When is the first available appointment? None     Would the patient rather a call back or a response via [facility]? Call     Best Call Back Number:[phone]     Additional Information: 7 day  hospital f/u needed for pt is discharging 07/29/2025.

## 2025-08-04 ENCOUNTER — TELEPHONE (OUTPATIENT)
Dept: NEUROLOGY | Facility: CLINIC | Age: 30
End: 2025-08-04
Payer: MEDICARE

## 2025-08-04 ENCOUNTER — LAB VISIT (OUTPATIENT)
Dept: LAB | Facility: HOSPITAL | Age: 30
End: 2025-08-04
Attending: NURSE PRACTITIONER
Payer: MEDICARE

## 2025-08-04 DIAGNOSIS — R79.89 ABNORMAL LFTS: ICD-10-CM

## 2025-08-04 DIAGNOSIS — R20.2 PARESTHESIA: ICD-10-CM

## 2025-08-04 LAB
ALBUMIN SERPL BCP-MCNC: 3.6 G/DL (ref 3.5–5.2)
ALP SERPL-CCNC: 121 UNIT/L (ref 40–150)
ALT SERPL W/O P-5'-P-CCNC: 15 UNIT/L (ref 0–55)
AST SERPL-CCNC: 23 UNIT/L (ref 0–50)
B-HCG SERPL QL: NEGATIVE
BILIRUB DIRECT SERPL-MCNC: 0.2 MG/DL (ref 0.1–0.3)
BILIRUB SERPL-MCNC: 0.5 MG/DL (ref 0.1–1)
CREAT SERPL-MCNC: 0.6 MG/DL (ref 0.5–1.4)
GFR SERPLBLD CREATININE-BSD FMLA CKD-EPI: >60 ML/MIN/1.73/M2
PROT SERPL-MCNC: 6.7 GM/DL (ref 6–8.4)

## 2025-08-04 PROCEDURE — 36415 COLL VENOUS BLD VENIPUNCTURE: CPT | Mod: HCNC,PO

## 2025-08-04 PROCEDURE — 82565 ASSAY OF CREATININE: CPT | Mod: HCNC

## 2025-08-04 PROCEDURE — 80076 HEPATIC FUNCTION PANEL: CPT | Mod: HCNC

## 2025-08-04 PROCEDURE — 82085 ASSAY OF ALDOLASE: CPT | Mod: HCNC

## 2025-08-04 PROCEDURE — 84703 CHORIONIC GONADOTROPIN ASSAY: CPT | Mod: HCNC,PO

## 2025-08-05 ENCOUNTER — PATIENT MESSAGE (OUTPATIENT)
Dept: PSYCHIATRY | Facility: CLINIC | Age: 30
End: 2025-08-05
Payer: MEDICARE

## 2025-08-06 LAB — ALDOLASE (OHS): 2.7 U/L (ref 1.2–7.6)

## 2025-08-07 ENCOUNTER — OFFICE VISIT (OUTPATIENT)
Dept: BARIATRICS | Facility: CLINIC | Age: 30
End: 2025-08-07
Payer: MEDICARE

## 2025-08-07 DIAGNOSIS — E66.01 MORBID OBESITY WITH BMI OF 50.0-59.9, ADULT: Primary | ICD-10-CM

## 2025-08-07 DIAGNOSIS — F11.21: ICD-10-CM

## 2025-08-07 DIAGNOSIS — F33.1 MAJOR DEPRESSIVE DISORDER, RECURRENT EPISODE, MODERATE: ICD-10-CM

## 2025-08-07 NOTE — PROGRESS NOTES
"Individual Psychotherapy (PhD/LCSW)     DATE 8/7/25     Site: Met with pt virtually. Pt presented for appointment via their home. Address confirmed.        Therapeutic Intervention: Met with patient. Outpatient - Insight oriented psychotherapy 45 min - CPT code 50440        Chief complaint/reason for encounter: Anxiety     Interval history and content of current session: Pt stated that she was hospitalized for increased pain and weakness in her legs during the last weak of July. Pt stated while in the hospital, she received the MRI she has been waiting for to aid in making a formal diagnosis for her ongoing pain. Pt stated the MRI was "normal." Pt stated " After the MRI came back fine , everyone started treating me different. They were accusing me of being drug seeking, called psych to come talk to me and even said my pain could be because of my weight. I got really upset about them saying that about my weight because I've lost 100 pounds since surgery." Pt expressed increased feelings and thoughts of anxiety around her hospital follow up appointments. Pt stated " I know something is wrong with me, physically. I want them (doctors) to take me seriously and not bring up my addiction, mental health or weight while I am in the appointments."    LCSW provided empathetic listening. LCSW praised pt for ongoing dedication to self-advocacy. LCSW and pt discussed anxiety and medical gaslighting. LCSW and pt safety planned around upcoming follow up appointments.     Treatment plan:  Target symptoms: Axniety  Why chosen therapy is appropriate versus another modality: relevant to diagnosis  Outcome monitoring methods: self-report, observation  Therapeutic intervention type: insight oriented psychotherapy, supportive psychotherapy, interactive psychotherapy     Risk parameters:  Patient reports no suicidal ideation  Patient reports no homicidal ideation  Patient reports no self-injurious behavior  Patient reports no violent " behavior     Verbal deficits: None     Patient's response to intervention:  The patient's response to intervention is accepting, motivated.     Progress toward goals and other mental status changes:  The patient's progress toward goals is fair .     Diagnosis:   Z68.43 (BMI) of 50.0-59.9 in adults   F33.1- Major Depressive Disorder, Moderate, recurring episode  F11.21-Opioid Use Disorder, Sever, in sustained remission     Plan:  individual psychotherapy and medication management by physician     Return to clinic: as scheduled     Length of Service (minutes): 45    [facility]  Department of Surgery/ Bariatric Surgery [facility]

## 2025-08-08 ENCOUNTER — PATIENT MESSAGE (OUTPATIENT)
Dept: BARIATRICS | Facility: CLINIC | Age: 30
End: 2025-08-08
Payer: MEDICARE

## 2025-08-11 ENCOUNTER — OFFICE VISIT (OUTPATIENT)
Facility: CLINIC | Age: 30
End: 2025-08-11
Payer: MEDICARE

## 2025-08-11 ENCOUNTER — OFFICE VISIT (OUTPATIENT)
Dept: NEUROLOGY | Facility: CLINIC | Age: 30
End: 2025-08-11
Payer: MEDICARE

## 2025-08-11 ENCOUNTER — TELEPHONE (OUTPATIENT)
Dept: FAMILY MEDICINE | Facility: CLINIC | Age: 30
End: 2025-08-11
Payer: MEDICARE

## 2025-08-11 ENCOUNTER — PATIENT MESSAGE (OUTPATIENT)
Facility: CLINIC | Age: 30
End: 2025-08-11

## 2025-08-11 VITALS
BODY MASS INDEX: 49.99 KG/M2 | HEART RATE: 74 BPM | WEIGHT: 282.19 LBS | SYSTOLIC BLOOD PRESSURE: 144 MMHG | DIASTOLIC BLOOD PRESSURE: 87 MMHG

## 2025-08-11 DIAGNOSIS — G89.4 CHRONIC PAIN SYNDROME: ICD-10-CM

## 2025-08-11 DIAGNOSIS — T40.2X5A OPIOID-INDUCED HYPERALGESIA: ICD-10-CM

## 2025-08-11 DIAGNOSIS — R20.2 PARESTHESIA: Primary | ICD-10-CM

## 2025-08-11 DIAGNOSIS — R20.8 OPIOID-INDUCED HYPERALGESIA: ICD-10-CM

## 2025-08-11 DIAGNOSIS — F11.90 OPIOID USE DISORDER: Primary | ICD-10-CM

## 2025-08-11 PROCEDURE — 98006 SYNCH AUDIO-VIDEO EST MOD 30: CPT | Mod: HCNC,95,, | Performed by: NURSE PRACTITIONER

## 2025-08-11 PROCEDURE — 3079F DIAST BP 80-89 MM HG: CPT | Mod: CPTII,HCNC,S$GLB, | Performed by: NEUROMUSCULOSKELETAL MEDICINE & OMM

## 2025-08-11 PROCEDURE — 3008F BODY MASS INDEX DOCD: CPT | Mod: CPTII,HCNC,S$GLB, | Performed by: NEUROMUSCULOSKELETAL MEDICINE & OMM

## 2025-08-11 PROCEDURE — 1160F RVW MEDS BY RX/DR IN RCRD: CPT | Mod: CPTII,HCNC,S$GLB, | Performed by: NEUROMUSCULOSKELETAL MEDICINE & OMM

## 2025-08-11 PROCEDURE — 3077F SYST BP >= 140 MM HG: CPT | Mod: CPTII,HCNC,S$GLB, | Performed by: NEUROMUSCULOSKELETAL MEDICINE & OMM

## 2025-08-11 PROCEDURE — 3044F HG A1C LEVEL LT 7.0%: CPT | Mod: CPTII,HCNC,S$GLB, | Performed by: NEUROMUSCULOSKELETAL MEDICINE & OMM

## 2025-08-11 PROCEDURE — 99205 OFFICE O/P NEW HI 60 MIN: CPT | Mod: HCNC,S$GLB,, | Performed by: NEUROMUSCULOSKELETAL MEDICINE & OMM

## 2025-08-11 PROCEDURE — 3044F HG A1C LEVEL LT 7.0%: CPT | Mod: CPTII,HCNC,95, | Performed by: NURSE PRACTITIONER

## 2025-08-11 PROCEDURE — 99999 PR PBB SHADOW E&M-EST. PATIENT-LVL III: CPT | Mod: PBBFAC,HCNC,, | Performed by: NEUROMUSCULOSKELETAL MEDICINE & OMM

## 2025-08-11 PROCEDURE — 1159F MED LIST DOCD IN RCRD: CPT | Mod: CPTII,HCNC,S$GLB, | Performed by: NEUROMUSCULOSKELETAL MEDICINE & OMM

## 2025-08-11 PROCEDURE — G2211 COMPLEX E/M VISIT ADD ON: HCPCS | Mod: HCNC,S$GLB,, | Performed by: NEUROMUSCULOSKELETAL MEDICINE & OMM

## 2025-08-11 RX ORDER — BUPRENORPHINE AND NALOXONE 8; 2 MG/1; MG/1
1 FILM, SOLUBLE BUCCAL; SUBLINGUAL 3 TIMES DAILY
Qty: 45 FILM | Refills: 0 | Status: SHIPPED | OUTPATIENT
Start: 2025-08-11

## 2025-08-11 RX ORDER — NORTRIPTYLINE HYDROCHLORIDE 10 MG/1
10 CAPSULE ORAL NIGHTLY
Qty: 30 CAPSULE | Refills: 0 | Status: SHIPPED | OUTPATIENT
Start: 2025-08-11 | End: 2025-09-10

## 2025-08-11 RX ORDER — RIZATRIPTAN BENZOATE 10 MG/1
10 TABLET ORAL ONCE AS NEEDED
COMMUNITY
Start: 2025-08-06

## 2025-08-12 ENCOUNTER — PATIENT MESSAGE (OUTPATIENT)
Dept: BARIATRICS | Facility: CLINIC | Age: 30
End: 2025-08-12
Payer: MEDICARE

## 2025-08-18 DIAGNOSIS — F41.9 ANXIETY: Primary | ICD-10-CM

## 2025-08-18 RX ORDER — HYDROXYZINE PAMOATE 25 MG/1
25 CAPSULE ORAL 4 TIMES DAILY
Qty: 24 CAPSULE | Refills: 4 | Status: SHIPPED | OUTPATIENT
Start: 2025-08-18 | End: 2025-08-21

## 2025-08-19 ENCOUNTER — OUTPATIENT CASE MANAGEMENT (OUTPATIENT)
Dept: ADMINISTRATIVE | Facility: OTHER | Age: 30
End: 2025-08-19
Payer: MEDICARE

## 2025-08-20 ENCOUNTER — PATIENT MESSAGE (OUTPATIENT)
Dept: ADMINISTRATIVE | Facility: OTHER | Age: 30
End: 2025-08-20
Payer: MEDICARE

## 2025-08-21 ENCOUNTER — PATIENT MESSAGE (OUTPATIENT)
Dept: FAMILY MEDICINE | Facility: CLINIC | Age: 30
End: 2025-08-21

## 2025-08-21 ENCOUNTER — OFFICE VISIT (OUTPATIENT)
Facility: CLINIC | Age: 30
End: 2025-08-21
Payer: MEDICARE

## 2025-08-21 ENCOUNTER — OUTPATIENT CASE MANAGEMENT (OUTPATIENT)
Dept: ADMINISTRATIVE | Facility: OTHER | Age: 30
End: 2025-08-21
Payer: MEDICARE

## 2025-08-21 ENCOUNTER — OFFICE VISIT (OUTPATIENT)
Dept: FAMILY MEDICINE | Facility: CLINIC | Age: 30
End: 2025-08-21
Payer: MEDICARE

## 2025-08-21 VITALS — HEIGHT: 63 IN | WEIGHT: 282.19 LBS | BODY MASS INDEX: 50 KG/M2

## 2025-08-21 DIAGNOSIS — M79.602 PAIN IN BOTH UPPER EXTREMITIES: ICD-10-CM

## 2025-08-21 DIAGNOSIS — F11.93 OPIOID WITHDRAWAL: ICD-10-CM

## 2025-08-21 DIAGNOSIS — M79.601 PAIN IN BOTH UPPER EXTREMITIES: ICD-10-CM

## 2025-08-21 DIAGNOSIS — F11.11 HISTORY OF OPIOID ABUSE: ICD-10-CM

## 2025-08-21 DIAGNOSIS — R29.898 BILATERAL LEG WEAKNESS: ICD-10-CM

## 2025-08-21 DIAGNOSIS — Z00.00 ENCOUNTER FOR MEDICARE ANNUAL WELLNESS EXAM: Primary | ICD-10-CM

## 2025-08-21 DIAGNOSIS — E66.01 MORBID OBESITY: ICD-10-CM

## 2025-08-21 DIAGNOSIS — E51.11 THIAMINE DEFICIENCY NEUROPATHY: ICD-10-CM

## 2025-08-21 DIAGNOSIS — F11.90 OPIOID USE DISORDER: Primary | ICD-10-CM

## 2025-08-21 DIAGNOSIS — F32.A DEPRESSION, UNSPECIFIED DEPRESSION TYPE: ICD-10-CM

## 2025-08-21 DIAGNOSIS — F17.200 CURRENT SMOKER: ICD-10-CM

## 2025-08-21 DIAGNOSIS — Z59.819 HOUSING INSECURITY: ICD-10-CM

## 2025-08-21 DIAGNOSIS — G43.909 MIGRAINE WITHOUT STATUS MIGRAINOSUS, NOT INTRACTABLE, UNSPECIFIED MIGRAINE TYPE: ICD-10-CM

## 2025-08-21 PROCEDURE — 1160F RVW MEDS BY RX/DR IN RCRD: CPT | Mod: CPTII,HCNC,95, | Performed by: NEUROMUSCULOSKELETAL MEDICINE & OMM

## 2025-08-21 PROCEDURE — 1159F MED LIST DOCD IN RCRD: CPT | Mod: CPTII,HCNC,95, | Performed by: NEUROMUSCULOSKELETAL MEDICINE & OMM

## 2025-08-21 PROCEDURE — 98005 SYNCH AUDIO-VIDEO EST LOW 20: CPT | Mod: HCNC,95,, | Performed by: NEUROMUSCULOSKELETAL MEDICINE & OMM

## 2025-08-21 PROCEDURE — 3044F HG A1C LEVEL LT 7.0%: CPT | Mod: CPTII,HCNC,95, | Performed by: NEUROMUSCULOSKELETAL MEDICINE & OMM

## 2025-08-21 SDOH — SOCIAL DETERMINANTS OF HEALTH (SDOH): HOUSING INSTABILITY UNSPECIFIED: Z59.819

## 2025-08-26 ENCOUNTER — TELEPHONE (OUTPATIENT)
Dept: BARIATRICS | Facility: CLINIC | Age: 30
End: 2025-08-26
Payer: MEDICARE

## 2025-08-27 ENCOUNTER — OFFICE VISIT (OUTPATIENT)
Dept: BARIATRICS | Facility: CLINIC | Age: 30
End: 2025-08-27
Payer: MEDICARE

## 2025-08-27 ENCOUNTER — PATIENT MESSAGE (OUTPATIENT)
Dept: BARIATRICS | Facility: CLINIC | Age: 30
End: 2025-08-27

## 2025-08-27 DIAGNOSIS — F11.21: ICD-10-CM

## 2025-08-27 DIAGNOSIS — F33.1 MAJOR DEPRESSIVE DISORDER, RECURRENT EPISODE, MODERATE: Primary | ICD-10-CM

## 2025-09-03 ENCOUNTER — OFFICE VISIT (OUTPATIENT)
Dept: BARIATRICS | Facility: CLINIC | Age: 30
End: 2025-09-03
Payer: MEDICARE

## 2025-09-03 DIAGNOSIS — F33.1 MAJOR DEPRESSIVE DISORDER, RECURRENT EPISODE, MODERATE: ICD-10-CM

## 2025-09-03 DIAGNOSIS — F11.21: ICD-10-CM

## (undated) DEVICE — ADHESIVE DERMABOND ADVANCED

## (undated) DEVICE — ELECTRODE REM PLYHSV RETURN 9

## (undated) DEVICE — APPLICATOR CHLORAPREP ORN 26ML

## (undated) DEVICE — STAPLER ECHELON FLEX 60MM 44CM

## (undated) DEVICE — RELOAD ECHELON FLEX WHT 60MM

## (undated) DEVICE — SCISSOR 5MMX35CM DIRECT DRIVE

## (undated) DEVICE — LUBRICANT SURGILUBE 2 OZ

## (undated) DEVICE — TRAY MINOR GEN SURG OMC

## (undated) DEVICE — SUT MONOCRYL 4-0 PS-2

## (undated) DEVICE — SOL NACL 0.9% IV INJ 1000ML

## (undated) DEVICE — SUT TICRON BLUE 2-0 48 SK

## (undated) DEVICE — NDL HYPO REG 25G X 1 1/2

## (undated) DEVICE — TROCAR ENDOPATH XCEL 12MM 10CM

## (undated) DEVICE — IRRIGATOR ENDOSCOPY DISP.

## (undated) DEVICE — SUT VICRYL CTD 2-0 GI 27 SH

## (undated) DEVICE — DRAPE ABDOMINAL TIBURON 14X11

## (undated) DEVICE — GOWN POLY REINF BRTH SLV XL

## (undated) DEVICE — RELOAD ECHELON FLEX BLU 60MM

## (undated) DEVICE — BLADE SURG CARBON STEEL SZ11

## (undated) DEVICE — SYR 10CC LUER LOCK

## (undated) DEVICE — TROCAR ENDOPATH XCEL 11MM 10CM

## (undated) DEVICE — TROCAR ENDOPATH XCEL 5X100MM

## (undated) DEVICE — GOWN SURGICAL X-LARGE

## (undated) DEVICE — SHEARS HARMONIC ADV CRV 45CM

## (undated) DEVICE — CANNULA ENDOPATH XCEL 5X100MM

## (undated) DEVICE — TUBING HF INSUFFLATION W/ FLTR